# Patient Record
Sex: FEMALE | Race: WHITE | NOT HISPANIC OR LATINO | Employment: OTHER | URBAN - METROPOLITAN AREA
[De-identification: names, ages, dates, MRNs, and addresses within clinical notes are randomized per-mention and may not be internally consistent; named-entity substitution may affect disease eponyms.]

---

## 2023-01-10 ENCOUNTER — APPOINTMENT (EMERGENCY)
Dept: RADIOLOGY | Facility: HOSPITAL | Age: 33
DRG: 832 | End: 2023-01-10
Payer: COMMERCIAL

## 2023-01-10 ENCOUNTER — TRANSCRIBE ORDERS (OUTPATIENT)
Dept: SCHEDULING | Age: 33
End: 2023-01-10

## 2023-01-10 DIAGNOSIS — E05.90 THYROTOXICOSIS, UNSPECIFIED WITHOUT THYROTOXIC CRISIS OR STORM: Primary | ICD-10-CM

## 2023-01-10 LAB
ANION GAP SERPL CALC-SCNC: 1 MEQ/L (ref 3–15)
BASOPHILS # BLD: 0.04 K/UL (ref 0.01–0.1)
BASOPHILS NFR BLD: 1 %
BLASTS # BLD MANUAL: 0 %
BUN SERPL-MCNC: 14 MG/DL (ref 8–20)
CALCIUM SERPL-MCNC: 8.7 MG/DL (ref 8.9–10.3)
CHLORIDE SERPL-SCNC: 105 MEQ/L (ref 98–109)
CO2 SERPL-SCNC: 25 MEQ/L (ref 22–32)
CREAT SERPL-MCNC: 0.7 MG/DL (ref 0.6–1.1)
DIFFERENTIAL METHOD BLD: ABNORMAL
EOSINOPHIL # BLD: 0.04 K/UL (ref 0.04–0.36)
EOSINOPHIL NFR BLD: 1 %
ERYTHROCYTE [DISTWIDTH] IN BLOOD BY AUTOMATED COUNT: 13.3 % (ref 11.7–14.4)
GFR SERPL CREATININE-BSD FRML MDRD: >60 ML/MIN/1.73M*2
GLUCOSE SERPL-MCNC: 105 MG/DL (ref 70–99)
HCT VFR BLDCO AUTO: 24.9 % (ref 35–45)
HGB BLD-MCNC: 7.9 G/DL (ref 11.8–15.7)
HYPOCHROMIA BLD QL SMEAR: ABNORMAL
LYMPHOCYTES # BLD: 0.86 K/UL (ref 1.2–3.5)
LYMPHOCYTES NFR BLD: 23 %
MCH RBC QN AUTO: 26.7 PG (ref 28–33.2)
MCHC RBC AUTO-ENTMCNC: 31.7 G/DL (ref 32.2–35.5)
MCV RBC AUTO: 84.1 FL (ref 83–98)
METAMYELOCYTES NFR BLD MANUAL: 0 %
MONOCYTES # BLD: 0.41 K/UL (ref 0.28–0.8)
MONOCYTES NFR BLD: 11 %
MYELOCYTES NFR BLD MANUAL: 0 %
NEUTS BAND # BLD: 2.41 K/UL (ref 1.7–7)
NEUTS BAND NFR BLD: 0 %
NEUTS SEG NFR BLD: 64 %
NRBC BLD MANUAL-RTO: 0 /100 WBC
OTHER CELLS # BLD MANUAL: 0 %
PDW BLD AUTO: 9.9 FL (ref 9.4–12.3)
PLATELET # BLD AUTO: 202 K/UL (ref 150–369)
PLATELET # BLD EST: ABNORMAL 10*3/UL
POTASSIUM SERPL-SCNC: 3.7 MEQ/L (ref 3.6–5.1)
PROMYELOCYTES NFR BLD MANUAL: 0 %
RBC # BLD AUTO: 2.96 M/UL (ref 3.93–5.22)
SODIUM SERPL-SCNC: 131 MEQ/L (ref 136–144)
TROPONIN I SERPL HS-MCNC: 4.6 PG/ML
VARIANT LYMPHS NFR BLD: 0 %
WBC # BLD AUTO: 3.76 K/UL (ref 3.8–10.5)

## 2023-01-10 PROCEDURE — 84484 ASSAY OF TROPONIN QUANT: CPT | Mod: 91

## 2023-01-10 PROCEDURE — 84439 ASSAY OF FREE THYROXINE: CPT | Performed by: PHYSICIAN ASSISTANT

## 2023-01-10 PROCEDURE — 85025 COMPLETE CBC W/AUTO DIFF WBC: CPT

## 2023-01-10 PROCEDURE — 84484 ASSAY OF TROPONIN QUANT: CPT | Mod: 91 | Performed by: EMERGENCY MEDICINE

## 2023-01-10 PROCEDURE — 84443 ASSAY THYROID STIM HORMONE: CPT | Performed by: PHYSICIAN ASSISTANT

## 2023-01-10 PROCEDURE — 84484 ASSAY OF TROPONIN QUANT: CPT

## 2023-01-10 PROCEDURE — 84702 CHORIONIC GONADOTROPIN TEST: CPT

## 2023-01-10 PROCEDURE — 36415 COLL VENOUS BLD VENIPUNCTURE: CPT

## 2023-01-10 PROCEDURE — 80048 BASIC METABOLIC PNL TOTAL CA: CPT | Performed by: EMERGENCY MEDICINE

## 2023-01-10 PROCEDURE — 84702 CHORIONIC GONADOTROPIN TEST: CPT | Performed by: EMERGENCY MEDICINE

## 2023-01-10 PROCEDURE — 93005 ELECTROCARDIOGRAM TRACING: CPT | Performed by: EMERGENCY MEDICINE

## 2023-01-10 PROCEDURE — 80048 BASIC METABOLIC PNL TOTAL CA: CPT

## 2023-01-10 PROCEDURE — 93970 EXTREMITY STUDY: CPT

## 2023-01-10 PROCEDURE — 85025 COMPLETE CBC W/AUTO DIFF WBC: CPT | Performed by: EMERGENCY MEDICINE

## 2023-01-10 PROCEDURE — 93005 ELECTROCARDIOGRAM TRACING: CPT

## 2023-01-10 PROCEDURE — 99285 EMERGENCY DEPT VISIT HI MDM: CPT | Mod: 25

## 2023-01-11 ENCOUNTER — APPOINTMENT (EMERGENCY)
Dept: RADIOLOGY | Facility: HOSPITAL | Age: 33
DRG: 832 | End: 2023-01-11
Payer: COMMERCIAL

## 2023-01-11 ENCOUNTER — HOSPITAL ENCOUNTER (INPATIENT)
Facility: HOSPITAL | Age: 33
LOS: 2 days | Discharge: ANOTHER HOSPITAL | DRG: 832 | End: 2023-01-13
Attending: EMERGENCY MEDICINE | Admitting: STUDENT IN AN ORGANIZED HEALTH CARE EDUCATION/TRAINING PROGRAM
Payer: COMMERCIAL

## 2023-01-11 DIAGNOSIS — O26.91 ABNORMAL PREGNANCY IN FIRST TRIMESTER: ICD-10-CM

## 2023-01-11 DIAGNOSIS — D64.9 ANEMIA, UNSPECIFIED TYPE: Primary | ICD-10-CM

## 2023-01-11 DIAGNOSIS — R00.0 TACHYCARDIA: ICD-10-CM

## 2023-01-11 DIAGNOSIS — E05.90 HYPERTHYROIDISM: ICD-10-CM

## 2023-01-11 PROBLEM — Z3A.01 LESS THAN 8 WEEKS GESTATION OF PREGNANCY: Status: ACTIVE | Noted: 2023-01-11

## 2023-01-11 PROBLEM — D50.9 MICROCYTIC ANEMIA: Status: ACTIVE | Noted: 2023-01-11

## 2023-01-11 PROBLEM — O99.280 HYPERTHYROIDISM DURING PREGNANCY: Status: ACTIVE | Noted: 2023-01-11

## 2023-01-11 PROBLEM — Z86.16 HISTORY OF COVID-19: Status: ACTIVE | Noted: 2023-01-11

## 2023-01-11 LAB
ABO + RH BLD: NORMAL
ALBUMIN SERPL-MCNC: 2.7 G/DL (ref 3.4–5)
ALP SERPL-CCNC: 88 IU/L (ref 35–126)
ALT SERPL-CCNC: 26 IU/L (ref 11–54)
ANION GAP SERPL CALC-SCNC: 7 MEQ/L (ref 3–15)
ANISOCYTOSIS BLD QL SMEAR: ABNORMAL
AST SERPL-CCNC: 32 IU/L (ref 15–41)
ATRIAL RATE: 129
BASOPHILS # BLD: 0.01 K/UL (ref 0.01–0.1)
BASOPHILS NFR BLD: 0.3 %
BILIRUB SERPL-MCNC: 0.2 MG/DL (ref 0.3–1.2)
BLD GP AB SCN SERPL QL: NEGATIVE
BUN SERPL-MCNC: 11 MG/DL (ref 8–20)
CALCIUM SERPL-MCNC: 8.3 MG/DL (ref 8.9–10.3)
CHLORIDE SERPL-SCNC: 109 MEQ/L (ref 98–109)
CO2 SERPL-SCNC: 19 MEQ/L (ref 22–32)
CREAT SERPL-MCNC: 0.6 MG/DL (ref 0.6–1.1)
D AG BLD QL: POSITIVE
D DIMER PPP IA.FEU-MCNC: 3.37 UG/ML FEU (ref 0–0.5)
DIFFERENTIAL METHOD BLD: ABNORMAL
EOSINOPHIL # BLD: 0.01 K/UL (ref 0.04–0.36)
EOSINOPHIL NFR BLD: 0.3 %
ERYTHROCYTE [DISTWIDTH] IN BLOOD BY AUTOMATED COUNT: 13.5 % (ref 11.7–14.4)
FERRITIN SERPL-MCNC: 55 NG/ML (ref 11–250)
GFR SERPL CREATININE-BSD FRML MDRD: >60 ML/MIN/1.73M*2
GLUCOSE SERPL-MCNC: 134 MG/DL (ref 70–99)
HCT VFR BLDCO AUTO: 20.9 % (ref 35–45)
HGB BLD-MCNC: 6.9 G/DL (ref 11.8–15.7)
IMM GRANULOCYTES # BLD AUTO: 0.01 K/UL (ref 0–0.08)
IMM GRANULOCYTES NFR BLD AUTO: 0.3 %
IRON SATN MFR SERPL: 12 % (ref 15–45)
IRON SERPL-MCNC: 31 UG/DL (ref 35–150)
LABORATORY COMMENT REPORT: NORMAL
LABORATORY COMMENT REPORT: NORMAL
LYMPHOCYTES # BLD: 0.71 K/UL (ref 1.2–3.5)
LYMPHOCYTES NFR BLD: 24.2 %
MCH RBC QN AUTO: 27.1 PG (ref 28–33.2)
MCHC RBC AUTO-ENTMCNC: 33 G/DL (ref 32.2–35.5)
MCV RBC AUTO: 82 FL (ref 83–98)
MONOCYTES # BLD: 0.44 K/UL (ref 0.28–0.8)
MONOCYTES NFR BLD: 15 %
NEUTROPHILS # BLD: 1.75 K/UL (ref 1.7–7)
NEUTS SEG NFR BLD: 59.9 %
NRBC BLD-RTO: 0 %
P AXIS: 72
PDW BLD AUTO: 9.7 FL (ref 9.4–12.3)
PLAT MORPH BLD: NORMAL
PLATELET # BLD AUTO: 168 K/UL (ref 150–369)
PLATELET # BLD EST: ABNORMAL 10*3/UL
POTASSIUM SERPL-SCNC: 3.8 MEQ/L (ref 3.6–5.1)
PR INTERVAL: 136
PROT SERPL-MCNC: 5.7 G/DL (ref 6–8.2)
QRS DURATION: 74
QT INTERVAL: 290
QTC CALCULATION(BAZETT): 424
R AXIS: 34
RBC # BLD AUTO: 2.55 M/UL (ref 3.93–5.22)
RETICS #: 0.08 M/UL (ref 0–0.12)
RETICS/RBC NFR: 3.21 % (ref 0.6–2.8)
SODIUM SERPL-SCNC: 135 MEQ/L (ref 136–144)
SPECIMEN EXP DATE BLD: NORMAL
T WAVE AXIS: 44
T3FREE SERPL-MCNC: 10.42 PG/ML (ref 2.1–3.7)
T4 FREE SERPL-MCNC: 5.07 NG/DL (ref 0.58–1.64)
TIBC SERPL-MCNC: 262 UG/DL (ref 270–460)
TROPONIN I SERPL HS-MCNC: 4.3 PG/ML
TSH SERPL DL<=0.05 MIU/L-ACNC: <0.01 MIU/L (ref 0.34–5.6)
UIBC SERPL-MCNC: 231 UG/DL (ref 180–360)
VENTRICULAR RATE: 129
WBC # BLD AUTO: 2.93 K/UL (ref 3.8–10.5)

## 2023-01-11 PROCEDURE — 82728 ASSAY OF FERRITIN: CPT | Performed by: STUDENT IN AN ORGANIZED HEALTH CARE EDUCATION/TRAINING PROGRAM

## 2023-01-11 PROCEDURE — 63700000 HC SELF-ADMINISTRABLE DRUG: Performed by: STUDENT IN AN ORGANIZED HEALTH CARE EDUCATION/TRAINING PROGRAM

## 2023-01-11 PROCEDURE — 83010 ASSAY OF HAPTOGLOBIN QUANT: CPT | Performed by: STUDENT IN AN ORGANIZED HEALTH CARE EDUCATION/TRAINING PROGRAM

## 2023-01-11 PROCEDURE — 63700000 HC SELF-ADMINISTRABLE DRUG: Performed by: INTERNAL MEDICINE

## 2023-01-11 PROCEDURE — P9016 RBC LEUKOCYTES REDUCED: HCPCS

## 2023-01-11 PROCEDURE — 86920 COMPATIBILITY TEST SPIN: CPT

## 2023-01-11 PROCEDURE — 85379 FIBRIN DEGRADATION QUANT: CPT | Performed by: PHYSICIAN ASSISTANT

## 2023-01-11 PROCEDURE — 25800000 HC PHARMACY IV SOLUTIONS: Performed by: PHYSICIAN ASSISTANT

## 2023-01-11 PROCEDURE — 25800000 HC PHARMACY IV SOLUTIONS: Performed by: STUDENT IN AN ORGANIZED HEALTH CARE EDUCATION/TRAINING PROGRAM

## 2023-01-11 PROCEDURE — 85045 AUTOMATED RETICULOCYTE COUNT: CPT | Performed by: STUDENT IN AN ORGANIZED HEALTH CARE EDUCATION/TRAINING PROGRAM

## 2023-01-11 PROCEDURE — 93975 VASCULAR STUDY: CPT

## 2023-01-11 PROCEDURE — 86850 RBC ANTIBODY SCREEN: CPT

## 2023-01-11 PROCEDURE — 76817 TRANSVAGINAL US OBSTETRIC: CPT

## 2023-01-11 PROCEDURE — 21400000 HC ROOM AND CARE CCU/INTERMEDIATE

## 2023-01-11 PROCEDURE — 36415 COLL VENOUS BLD VENIPUNCTURE: CPT | Performed by: PHYSICIAN ASSISTANT

## 2023-01-11 PROCEDURE — 63600000 HC DRUGS/DETAIL CODE: Performed by: STUDENT IN AN ORGANIZED HEALTH CARE EDUCATION/TRAINING PROGRAM

## 2023-01-11 PROCEDURE — 76815 OB US LIMITED FETUS(S): CPT

## 2023-01-11 PROCEDURE — 1123F ACP DISCUSS/DSCN MKR DOCD: CPT | Performed by: STUDENT IN AN ORGANIZED HEALTH CARE EDUCATION/TRAINING PROGRAM

## 2023-01-11 PROCEDURE — 84481 FREE ASSAY (FT-3): CPT | Performed by: STUDENT IN AN ORGANIZED HEALTH CARE EDUCATION/TRAINING PROGRAM

## 2023-01-11 PROCEDURE — 83540 ASSAY OF IRON: CPT | Performed by: STUDENT IN AN ORGANIZED HEALTH CARE EDUCATION/TRAINING PROGRAM

## 2023-01-11 PROCEDURE — 99223 1ST HOSP IP/OBS HIGH 75: CPT | Performed by: STUDENT IN AN ORGANIZED HEALTH CARE EDUCATION/TRAINING PROGRAM

## 2023-01-11 PROCEDURE — 80053 COMPREHEN METABOLIC PANEL: CPT | Performed by: STUDENT IN AN ORGANIZED HEALTH CARE EDUCATION/TRAINING PROGRAM

## 2023-01-11 PROCEDURE — 85025 COMPLETE CBC W/AUTO DIFF WBC: CPT | Performed by: STUDENT IN AN ORGANIZED HEALTH CARE EDUCATION/TRAINING PROGRAM

## 2023-01-11 PROCEDURE — 36430 TRANSFUSION BLD/BLD COMPNT: CPT

## 2023-01-11 RX ORDER — NITROGLYCERIN 0.4 MG/1
0.4 TABLET SUBLINGUAL EVERY 5 MIN PRN
Status: DISCONTINUED | OUTPATIENT
Start: 2023-01-11 | End: 2023-01-13 | Stop reason: HOSPADM

## 2023-01-11 RX ORDER — ACETAMINOPHEN 325 MG/1
650 TABLET ORAL EVERY 4 HOURS PRN
Status: DISCONTINUED | OUTPATIENT
Start: 2023-01-11 | End: 2023-01-13 | Stop reason: HOSPADM

## 2023-01-11 RX ORDER — SODIUM CHLORIDE 9 MG/ML
5 INJECTION, SOLUTION INTRAVENOUS AS NEEDED
Status: ACTIVE | OUTPATIENT
Start: 2023-01-11 | End: 2023-01-12

## 2023-01-11 RX ORDER — PROPYLTHIOURACIL 50 MG/1
50 TABLET ORAL
Status: DISCONTINUED | OUTPATIENT
Start: 2023-01-11 | End: 2023-01-13 | Stop reason: HOSPADM

## 2023-01-11 RX ORDER — PROTRIPTYLINE HYDROCHLORIDE 5 MG/1
10 TABLET, FILM COATED ORAL DAILY
COMMUNITY
Start: 2022-02-21 | End: 2023-01-21 | Stop reason: HOSPADM

## 2023-01-11 RX ORDER — DEXTROSE 50 % IN WATER (D50W) INTRAVENOUS SYRINGE
25 AS NEEDED
Status: DISCONTINUED | OUTPATIENT
Start: 2023-01-11 | End: 2023-01-13 | Stop reason: HOSPADM

## 2023-01-11 RX ORDER — METOPROLOL TARTRATE 1 MG/ML
5 INJECTION, SOLUTION INTRAVENOUS ONCE
Status: DISCONTINUED | OUTPATIENT
Start: 2023-01-11 | End: 2023-01-11

## 2023-01-11 RX ORDER — ONDANSETRON 4 MG/1
TABLET, FILM COATED ORAL
COMMUNITY
Start: 2022-12-21 | End: 2023-01-21 | Stop reason: HOSPADM

## 2023-01-11 RX ORDER — ATROPINE SULFATE 0.1 MG/ML
0.5 INJECTION INTRAVENOUS EVERY 5 MIN PRN
Status: DISCONTINUED | OUTPATIENT
Start: 2023-01-11 | End: 2023-01-13 | Stop reason: HOSPADM

## 2023-01-11 RX ORDER — BUPROPION HYDROCHLORIDE 150 MG/1
150 TABLET ORAL NIGHTLY
COMMUNITY
Start: 2022-12-15

## 2023-01-11 RX ORDER — DEXTROSE 40 %
15-30 GEL (GRAM) ORAL AS NEEDED
Status: DISCONTINUED | OUTPATIENT
Start: 2023-01-11 | End: 2023-01-13 | Stop reason: HOSPADM

## 2023-01-11 RX ORDER — ACETAMINOPHEN 650 MG/20.3ML
650 LIQUID ORAL EVERY 4 HOURS PRN
Status: DISCONTINUED | OUTPATIENT
Start: 2023-01-11 | End: 2023-01-13 | Stop reason: HOSPADM

## 2023-01-11 RX ORDER — ONDANSETRON HYDROCHLORIDE 2 MG/ML
4 INJECTION, SOLUTION INTRAVENOUS EVERY 8 HOURS PRN
Status: DISCONTINUED | OUTPATIENT
Start: 2023-01-11 | End: 2023-01-13 | Stop reason: HOSPADM

## 2023-01-11 RX ORDER — SODIUM CHLORIDE 9 MG/ML
INJECTION, SOLUTION INTRAVENOUS CONTINUOUS
Status: ACTIVE | OUTPATIENT
Start: 2023-01-11 | End: 2023-01-12

## 2023-01-11 RX ORDER — ACETAMINOPHEN 650 MG/1
650 SUPPOSITORY RECTAL EVERY 4 HOURS PRN
Status: DISCONTINUED | OUTPATIENT
Start: 2023-01-11 | End: 2023-01-13 | Stop reason: HOSPADM

## 2023-01-11 RX ORDER — ONDANSETRON 4 MG/1
4 TABLET, ORALLY DISINTEGRATING ORAL EVERY 8 HOURS PRN
Status: DISCONTINUED | OUTPATIENT
Start: 2023-01-11 | End: 2023-01-13 | Stop reason: HOSPADM

## 2023-01-11 RX ORDER — IBUPROFEN 200 MG
16-32 TABLET ORAL AS NEEDED
Status: DISCONTINUED | OUTPATIENT
Start: 2023-01-11 | End: 2023-01-13 | Stop reason: HOSPADM

## 2023-01-11 RX ORDER — ENOXAPARIN SODIUM 100 MG/ML
40 INJECTION SUBCUTANEOUS
Status: DISCONTINUED | OUTPATIENT
Start: 2023-01-11 | End: 2023-01-13 | Stop reason: HOSPADM

## 2023-01-11 RX ADMIN — ENOXAPARIN SODIUM 40 MG: 40 INJECTION SUBCUTANEOUS at 18:56

## 2023-01-11 RX ADMIN — METOPROLOL TARTRATE 12.5 MG: 25 TABLET, FILM COATED ORAL at 11:59

## 2023-01-11 RX ADMIN — SODIUM CHLORIDE 500 ML: 9 INJECTION, SOLUTION INTRAVENOUS at 07:56

## 2023-01-11 RX ADMIN — ONDANSETRON HYDROCHLORIDE 4 MG: 2 INJECTION, SOLUTION INTRAMUSCULAR; INTRAVENOUS at 21:01

## 2023-01-11 RX ADMIN — METOPROLOL TARTRATE 12.5 MG: 25 TABLET, FILM COATED ORAL at 18:56

## 2023-01-11 RX ADMIN — SODIUM CHLORIDE: 9 INJECTION, SOLUTION INTRAVENOUS at 10:50

## 2023-01-11 RX ADMIN — PROPYLTHIOURACIL 50 MG: 50 TABLET ORAL at 19:57

## 2023-01-11 RX ADMIN — SODIUM CHLORIDE: 9 INJECTION, SOLUTION INTRAVENOUS at 21:03

## 2023-01-11 ASSESSMENT — ENCOUNTER SYMPTOMS
FEVER: 0
BACK PAIN: 0
HEADACHES: 0
PALPITATIONS: 0
COLOR CHANGE: 0
WOUND: 1
FLANK PAIN: 0
ABDOMINAL PAIN: 1
LIGHT-HEADEDNESS: 0
DYSURIA: 0
VOMITING: 0
WEAKNESS: 0
COUGH: 0
SHORTNESS OF BREATH: 1
NUMBNESS: 0
DIFFICULTY URINATING: 0
DIARRHEA: 0

## 2023-01-11 NOTE — ASSESSMENT & PLAN NOTE
Pregnant and hyperthyroid. tsh suppressed with elevated ft3 and ft4. Beta blocker per OB - was ordered metoprolol 12.5mg bid. Can start PTU during first trimester, then will need follow up to change to methimazole after first trimester. Start 50mg bid of propylthiouracil and repeat labs in 3 weeks on d/c with endocrine or pcp on d/c as she lives in [place] and says she will find someone to f/u with there. Anemia can be in part related to her hyperthyroidism. futher w/u per OB

## 2023-01-11 NOTE — ED PROVIDER NOTES
Emergency Medicine Note  HPI   HISTORY OF PRESENT ILLNESS       The patient is a 32-year-old pregnant female,  who presents emergency room with right lower extremity pain and bruising.  She reports that she is about 6 weeks pregnant.  She states that she had COVID about 3 weeks ago and ever since she has had a high heart rate and some shortness of breath.  She has seen her doctor who did blood work which showed that she is anemic and her TSH is low.  She is scheduled to see a cardiologist this morning around 9 AM due to the elevated heart rate.   when she called her doctor about the right leg pain she was instructed to come to the ER to rule out a clot.  She noticed bruising behind the knee earlier today and denies any injury.  She has not had any difficulty ambulating.  She has not had any chest pain, pleuritic pain.  She does admit to some lower abdominal pain right side worse than left.  She has not had any vaginal bleeding, change in urination, back pain, nausea or vomiting.  She has not been started on any thyroid medications or iron tablets.  She is taking prenatal vitamins.  Denies a family or personal history of blood clots.      HPI      Patient History   PAST HISTORY     Reviewed from Nursing Triage:       No past medical history on file.    No past surgical history on file.    No family history on file.           Review of Systems   REVIEW OF SYSTEMS     Review of Systems   Constitutional: Negative for fever.   Respiratory: Positive for shortness of breath. Negative for cough.    Cardiovascular: Negative for chest pain, palpitations and leg swelling.   Gastrointestinal: Positive for abdominal pain. Negative for diarrhea and vomiting.   Genitourinary: Negative for difficulty urinating, dysuria, flank pain, urgency and vaginal discharge.   Musculoskeletal: Negative for back pain.        RLE pain   Skin: Positive for wound (bruising to the posterior knee). Negative for color change and rash.    Neurological: Negative for syncope, weakness, light-headedness, numbness and headaches.         VITALS     ED Vitals    Date/Time Temp Pulse Resp BP SpO2 [facility]   01/11/23 0742 36.7 °C (98.1 °F) 116 18 93/47 98 % SDN   01/11/23 0555 -- 118 20 117/58 99 % SL   01/11/23 0356 -- 126 20 122/56 100 % SL   01/11/23 0158 -- 126 20 119/54 100 % SL   01/10/23 2314 36.7 °C (98 °F) 128 18 123/60 100 % RPF   01/10/23 2036 36.8 °C (98.2 °F) 131 18 115/59 100 % BM        Pulse Ox %: 100 % (01/11/23 0348)  Pulse Ox Interpretation: Normal (01/11/23 0348)  Heart Rate: 126 (01/11/23 0348)  Rhythm Strip Interpretation: Sinus Tachycardia (01/11/23 0348)     Physical Exam   PHYSICAL EXAM     Physical Exam  Vitals and nursing note reviewed.   Constitutional:       General: She is not in acute distress.     Appearance: Normal appearance. She is well-developed. She is not ill-appearing or toxic-appearing.   HENT:      Head: Atraumatic.   Eyes:      Conjunctiva/sclera: Conjunctivae normal.   Cardiovascular:      Rate and Rhythm: Regular rhythm. Tachycardia present.   Pulmonary:      Effort: Pulmonary effort is normal. No respiratory distress.      Breath sounds: Normal breath sounds. No wheezing or rhonchi.   Abdominal:      Palpations: Abdomen is soft.      Tenderness: There is abdominal tenderness in the right lower quadrant and suprapubic area. There is no right CVA tenderness, left CVA tenderness, guarding or rebound. Negative signs include Esquivel's sign.   Musculoskeletal:         General: No deformity. Normal range of motion.      Cervical back: Normal range of motion and neck supple. No rigidity.      Right knee: Ecchymosis (posterior knee, mild tenderness) present. No swelling or bony tenderness. Normal range of motion. Tenderness (posterior knee) present.      Right lower leg: Normal. No swelling, tenderness or bony tenderness. No edema.      Left lower leg: Normal. No tenderness or bony tenderness. No edema.      Comments: No calf  tenderness, no cords palpated   Skin:     General: Skin is warm and dry.      Capillary Refill: Capillary refill takes less than 2 seconds.   Neurological:      Mental Status: She is alert and oriented to person, place, and time. Mental status is at baseline.   Psychiatric:         Behavior: Behavior normal. Behavior is cooperative.           PROCEDURES     Critical Care  Performed by: Pollo Hughes MD  Authorized by: Pollo Hughes MD     Critical care provider statement:     Critical care time (minutes):  90    Critical care was necessary to treat or prevent imminent or life-threatening deterioration of the following conditions: Anemia, tachycardia, hyperthyroidism.    Critical care was time spent personally by me on the following activities:  Development of treatment plan with patient or surrogate, discussions with consultants, evaluation of patient's response to treatment, examination of patient, ordering and review of laboratory studies, ordering and performing treatments and interventions, ordering and review of radiographic studies, pulse oximetry and re-evaluation of patient's condition         DATA     Results     Procedure Component Value Units Date/Time    D-dimer, quantitative [[id number]]  (Abnormal) Collected: 01/11/23 0356    Specimen: Blood, Venous Updated: 01/11/23 0423     D-Dimer, Quant 3.37 ug/mL FEU      Comment: The D-Dimer assay can be used as an aid in the diagnosis of DVT or PE. The test can not be used by itself to exclude DVT or PE. When used as a diagnostic aid, the cutoff value is the same as the reference range: <0.5 ug/ml FEU.       TSH w reflex FT4 [[id number]]  (Abnormal) Collected: 01/10/23 2050    Specimen: Blood, Venous Updated: 01/11/23 0238     TSH <0.01 mIU/L     BhCG, Serum, Quant [[id number]]  (Abnormal) Collected: 01/10/23 2050    Specimen: Blood, Venous Updated: 01/10/23 2152     hCG Quant 444.6 IU/L (mIU/mL)      Comment: Approx. Gestational Age   Approx. hCG  Range         (Weeks)                  (IU/L)          0.2-1                    5-50            1-2                               2-3                  100-5000            3-4                  500-10,000            4-5                 1000-50,000            5-6               10,000-100,000            6-8               15,000-200,000            8-12              10,000-100,000       Basic metabolic panel [[id number]]  (Abnormal) Collected: 01/10/23 2050    Specimen: Blood, Venous Updated: 01/10/23 2152     Sodium 131 mEQ/L      Potassium 3.7 mEQ/L      Comment: Results obtained on plasma. Plasma Potassium values may be up to 0.4 mEQ/L less than serum values. The differences may be greater for patients with high platelet or white cell counts.        Chloride 105 mEQ/L      CO2 25 mEQ/L      BUN 14 mg/dL      Creatinine 0.7 mg/dL      Glucose 105 mg/dL      Calcium 8.7 mg/dL      eGFR >60.0 mL/min/1.73m*2      Anion Gap 1 mEQ/L     HS Troponin I (with 2 hour reflex) [[id number]]  (Normal) Collected: 01/10/23 2050    Specimen: Blood, Venous Updated: 01/10/23 2150     High Sens Troponin I 4.6 pg/mL     CBC and differential [[id number]]  (Abnormal) Collected: 01/10/23 2050    Specimen: Blood, Venous Updated: 01/10/23 2141     WBC 3.76 K/uL      RBC 2.96 M/uL      Hemoglobin 7.9 g/dL      Hematocrit 24.9 %      MCV 84.1 fL      MCH 26.7 pg      MCHC 31.7 g/dL      RDW 13.3 %      Platelets 202 K/uL      MPV 9.9 fL      Differential Type Manu     Neutrophils 64 %      Lymphocytes 23 %      Monocytes 11 %      Eosinophils 1 %      Basophils 1 %      Bands 0 %      Metamyelocytes 0 %      Myelocytes 0 %      Promyelocytes 0 %      Blasts 0 %      Atypical Lymphocytes 0 %      Other Cells 0 %      nRBC/100 Cells 0 /100 WBC      Neutrophils, Absolute 2.41 K/uL      Lymphocytes, Absolute 0.86 K/uL      Monocytes, Absolute 0.41 K/uL      Eosinophils, Absolute 0.04 K/uL      Basophils, Absolute 0.04 K/uL      Platelet Estimate  Adequate (150,000-400,000)     Hypochromia 1+          Imaging Results          ULTRASOUND PREGNANCY < 14 WEEKS TRANSABDOMINAL LIMITED (Final result)  Result time 01/11/23 08:17:06    Final result                 Impression:    IMPRESSION:  1.  No intrauterine gestation and no adnexal mass evident. These findings  represent a pregnancy of unknown location. The appearance of a complex mass with  small cystic spaces and peripheral vascularity occupying the central uterine  cavity could represent a molar pregnancy but additional evaluation is suggested.  2.  Both ovaries are mildly enlarged with multiple complex cystic lesions as  well as some with solid-appearing echoes that may represent a combination of  hemorrhagic cysts/follicles or endometriomas, but challenging to evaluate as the  ovaries are only well evaluated transabdominally. Recommended these be  reassessed on follow-up. No sonographic findings of ovarian torsion.                   Narrative:    CLINICAL HISTORY:   right sided pelvic pain, 6 weeks pregnant   LMP 12/6/2022    PROCEDURE: Ultrasound examination of the pelvis was performed transabdominally.  Because of the concern for ovarian torsion, a vascular study of the ovaries was  done using color flow and spectral Doppler imaging.    COMPARISON: None.    COMMENT:    UTERUS: Anteverted and anteflexed measuring 13.4 x 13.2 x 15.9 cm. There is a  large solid mixed echogenicity mass projecting over the central uterine cavity  measuring 8 x 9.8 x 11.7 cm. It is hyperechoic eccentrically and hypoechoic  centrally, with multiple small cystic spaces and surrounding vascularity.    RIGHT OVARY/ADNEXA: Mildly enlarged, containing several complex cystic  structures with low level internal echoes or eccentric solid appearing echoes.  For example a 3.1 x 3.3 x 2.1 cm cystic structure with eccentric solid appearing  echoes and no internal color flow, likely representing a hemorrhagic cyst with  retractile clot..  Normal arterial and venous flow on color and spectral Doppler.    Ovarian size: 4.3 x  6.8 x  7.0 cm    LEFT OVARY/ADNEXA: Contains multiple complex cystic structures, some with  nonappearing internal echoes and no internal color flow. Normal arterial and  venous flow on color and spectral Doppler.  Ovarian size: 3.2 x 6.4 x 6.6 cm    FLUID: There is no abnormal free fluid in the cul-de-sac.                    ED Interpretation    Patient Name: Alondra Villela   Exam(s): pelvic US  MR#: [id number]    History: ABD discomfort x1wk, HCG=445    Preliminary Impression:    Thickened, abnormal appearing endometrium with poorly defined borders and with several small cystic spaces, and with apparent vascular flow. No intrauterine uterine pregnancy seen. No adnexal masses seen to suggest ectopic pregnancy. While the etiology for the abnormal appearance of the endometrium is not definitive, the possibility of molar pregnancy or other malignant process is not entirely excluded and additional workup is suggested.    Bilateral ovarian cysts, likely follicles, some of which are somewhat complex, likely reflecting hemorrhagic cysts, but no mural nodularity or vascularity is noted.      Interpreted by: Efrain Awad MD, Jan 11, 2023 05:51 AM    Patient Name: Alondra Villela  Exam(s): pelvic US  MR#: [id number]    History: ABD discomfort x1wk, HCG=445    Preliminary Impression:    Thickened, abnormal appearing endometrium with poorly   defined borders and with several small cystic spaces, and with apparent vascular flow. No intrauterine uterine pregnancy seen. No adnexal masses seen to suggest ectopic pregnancy. While the etiology for the abnormal appearance of the endometrium is not definitive, the possibility of molar pregnancy or other malignant process is not entirely excluded and additional workup is suggested.    Bilateral ovarian cysts, likely follicles, some of which are somewhat complex, likely reflecting hemorrhagic cysts, but no  mural nodularity or vascularity is noted.      Interpreted by: Efrain Awad MD, Jan 11, 2023 05:51 AM    Alondra Villela [id number], Jan 11, 2023                               ULTRASOUND PREGNANCY TRANSVAGINAL ONLY (Final result)  Result time 01/11/23 08:17:06    Final result                 Impression:    IMPRESSION:  1.  No intrauterine gestation and no adnexal mass evident. These findings  represent a pregnancy of unknown location. The appearance of a complex mass with  small cystic spaces and peripheral vascularity occupying the central uterine  cavity could represent a molar pregnancy but additional evaluation is suggested.  2.  Both ovaries are mildly enlarged with multiple complex cystic lesions as  well as some with solid-appearing echoes that may represent a combination of  hemorrhagic cysts/follicles or endometriomas, but challenging to evaluate as the  ovaries are only well evaluated transabdominally. Recommended these be  reassessed on follow-up. No sonographic findings of ovarian torsion.                   Narrative:    CLINICAL HISTORY:   right sided pelvic pain, 6 weeks pregnant   LMP 12/6/2022    PROCEDURE: Ultrasound examination of the pelvis was performed transabdominally.  Because of the concern for ovarian torsion, a vascular study of the ovaries was  done using color flow and spectral Doppler imaging.    COMPARISON: None.    COMMENT:    UTERUS: Anteverted and anteflexed measuring 13.4 x 13.2 x 15.9 cm. There is a  large solid mixed echogenicity mass projecting over the central uterine cavity  measuring 8 x 9.8 x 11.7 cm. It is hyperechoic eccentrically and hypoechoic  centrally, with multiple small cystic spaces and surrounding vascularity.    RIGHT OVARY/ADNEXA: Mildly enlarged, containing several complex cystic  structures with low level internal echoes or eccentric solid appearing echoes.  For example a 3.1 x 3.3 x 2.1 cm cystic structure with eccentric solid appearing  echoes and no internal  color flow, likely representing a hemorrhagic cyst with  retractile clot.. Normal arterial and venous flow on color and spectral Doppler.    Ovarian size: 4.3 x  6.8 x  7.0 cm    LEFT OVARY/ADNEXA: Contains multiple complex cystic structures, some with  nonappearing internal echoes and no internal color flow. Normal arterial and  venous flow on color and spectral Doppler.  Ovarian size: 3.2 x 6.4 x 6.6 cm    FLUID: There is no abnormal free fluid in the cul-de-sac.                               ULTRASOUND DOPPLER (Final result)  Result time 01/11/23 08:17:06    Final result                 Impression:    IMPRESSION:  1.  No intrauterine gestation and no adnexal mass evident. These findings  represent a pregnancy of unknown location. The appearance of a complex mass with  small cystic spaces and peripheral vascularity occupying the central uterine  cavity could represent a molar pregnancy but additional evaluation is suggested.  2.  Both ovaries are mildly enlarged with multiple complex cystic lesions as  well as some with solid-appearing echoes that may represent a combination of  hemorrhagic cysts/follicles or endometriomas, but challenging to evaluate as the  ovaries are only well evaluated transabdominally. Recommended these be  reassessed on follow-up. No sonographic findings of ovarian torsion.                   Narrative:    CLINICAL HISTORY:   right sided pelvic pain, 6 weeks pregnant   LMP 12/6/2022    PROCEDURE: Ultrasound examination of the pelvis was performed transabdominally.  Because of the concern for ovarian torsion, a vascular study of the ovaries was  done using color flow and spectral Doppler imaging.    COMPARISON: None.    COMMENT:    UTERUS: Anteverted and anteflexed measuring 13.4 x 13.2 x 15.9 cm. There is a  large solid mixed echogenicity mass projecting over the central uterine cavity  measuring 8 x 9.8 x 11.7 cm. It is hyperechoic eccentrically and hypoechoic  centrally, with multiple  small cystic spaces and surrounding vascularity.    RIGHT OVARY/ADNEXA: Mildly enlarged, containing several complex cystic  structures with low level internal echoes or eccentric solid appearing echoes.  For example a 3.1 x 3.3 x 2.1 cm cystic structure with eccentric solid appearing  echoes and no internal color flow, likely representing a hemorrhagic cyst with  retractile clot.. Normal arterial and venous flow on color and spectral Doppler.    Ovarian size: 4.3 x  6.8 x  7.0 cm    LEFT OVARY/ADNEXA: Contains multiple complex cystic structures, some with  nonappearing internal echoes and no internal color flow. Normal arterial and  venous flow on color and spectral Doppler.  Ovarian size: 3.2 x 6.4 x 6.6 cm    FLUID: There is no abnormal free fluid in the cul-de-sac.                               US venous leg, bilateral (Final result)  Result time 01/10/23 21:56:11    Final result                 Impression:    IMPRESSION: No femoropopliteal thrombosis.    COMMENT: A duplex Doppler study was performed of both lower extremities,  consisting of integrated two-dimensional (2D) real-time imaging color flow  Doppler and Doppler spectral analysis utilizing 5.0 MHz and 7.0 MHz linear array  probes.    RIGHT:    COMMON FEMORAL VEIN: Normal compression  DEEP FEMORAL VEIN: Patent by color Doppler.  FEMORAL VEIN: Normal compression, flow, and augmentation demonstrated on Doppler  spectral analysis.  POPLITEAL VEIN: Normal compression, flow, and augmentation demonstrated on  Doppler spectral analysis.    LEFT:    COMMON FEMORAL VEIN: Normal compression  DEEP FEMORAL VEIN: Patent by color Doppler.  FEMORAL VEIN: Normal compression, flow, and augmentation demonstrated on Doppler  spectral analysis.  POPLITEAL VEIN: Normal compression, flow, and augmentation demonstrated on  Doppler spectral analysis.    Visualized calf veins compress normally.                 Narrative:    CLINICAL INDICATION: Pregnancy pain R leg, high  HR    COMPARISON: None                                ECG 12 lead             Scoring tools                                  ED Course & MDM   MDM / ED COURSE / CLINICAL IMPRESSION / DISPO     Medical Decision Making  Amount and/or Complexity of Data Reviewed  Labs: ordered.  Radiology: ordered and independent interpretation performed. Decision-making details documented in ED Course.  ECG/medicine tests: ordered.  Discussion of management or test interpretation with external provider(s): Discussed with OB GYN and [facility]      Risk  Decision regarding hospitalization.          ED Course as of 01/11/23 0840   Wed Jan 11, 2023   0156 Called US, they have a patient on their table. They will take the patient next [LB]   0555   Patient Name: Alondra Villela  Exam(s): pelvic US  MR#: [id number]    History: ABD discomfort x1wk, HCG=445    Preliminary Impression:    Thickened, abnormal appearing endometrium with poorly defined borders and with several small cystic spaces, and with apparent vascular flow. No intrauterine uterine pregnancy seen. No adnexal masses seen to suggest ectopic pregnancy. While the etiology for the abnormal appearance of the endometrium is not definitive, the possibility of molar pregnancy or other malignant process is not entirely excluded and additional workup is suggested.    Bilateral ovarian cysts, likely follicles, some of which are somewhat complex, likely reflecting hemorrhagic cysts, but no mural nodularity or vascularity is noted.      Interpreted by: Efrain Awad MD, Jan 11, 2023 05:51 AM    Alondra Villela [id number], Jan 11, 2023   [LB]   0566 Patient Name: Cl Villelaanda   Exam(s): pelvic US  MR#: [id number]    History: ABD discomfort x1wk, HCG=445    Preliminary Impression:    Thickened, abnormal appearing endometrium with poorly defined borders and with several small cystic spaces, and with apparent vascular flow. No intrauterine uterine pregnancy seen. No adnexal masses seen to suggest ectopic  pregnancy. While the etiology for the abnormal appearance of the endometrium is not definitive, the possibility of molar pregnancy or other malignant process is not entirely excluded and additional workup is suggested.    Bilateral ovarian cysts, likely follicles, some of which are somewhat complex, likely reflecting hemorrhagic cysts, but no mural nodularity or vascularity is noted.      Interpreted by: Efrain Awad MD, Jan 11, 2023 05:51 AM   [RS]   0559 Jonah Murphy, OB GYN. we discussed the patient's history, labs, ultrasound.  Dr. Murphy does not feel that the ultrasound is consistent with an ectopic or anything acute.  We also discussed her thyroid function and he recommended that we discuss the levels with [facility] or endocrine.  He feels that if the patient is clinically stable she could likely be discharged home to follow-up with her OB/GYN in [place] as an outpatient.  He does not feel   [LB]   0559 Dr [LB]   0601 Evaluation patient continues to have no chest pain no pleuritic symptoms.  Dimer may be elevated due to pregnancy.  We are consulting obstetrics about an empty uterus and about 6 weeks pregnant.  Obstetrics was not concerned however will admit patient to medicine for hypothyroidism anemia and tachycardia. [RS]   0610 Paged [facility] [LB]   0656 Signed It out to [facility], would like a CT PE [LB]   0743 Pt was seen by Dr murphy. He does not think pt needs CT scan. Page out to [facility] to notify them  [AC]      ED Course User Index  [AC] Estela Adamson PA C  [LB] Maribel Portillo PA C  [RS] Pollo Hughes MD     Clinical Impression      Anemia, unspecified type   Hyperthyroidism   Tachycardia   Abnormal pregnancy in first trimester     _________________     ED Disposition   Admit / Observation                   Maribel Portillo PA C  01/11/23 9802

## 2023-01-11 NOTE — PLAN OF CARE
Problem: Adult Inpatient Plan of Care  Goal: Plan of Care Review  Outcome: Unable to meet, plan of care revised  Flowsheets (Taken 1/11/2023 5940)  Progress: improving  Plan of Care Reviewed With:   patient   family  Goal: Patient-Specific Goal (Individualized)  Outcome: Progressing  Goal: Absence of Hospital-Acquired Illness or Injury  Outcome: Progressing  Goal: Optimal Comfort and Wellbeing  Outcome: Progressing  Goal: Readiness for Transition of Care  Outcome: Progressing   Plan of Care Review  Plan of Care Reviewed With: patient, family  Progress: improving

## 2023-01-11 NOTE — Clinical Note
Send to the following Provider Care Team:: [facility] CONSULT/ADMIT TEAM [[id number]]

## 2023-01-11 NOTE — ASSESSMENT & PLAN NOTE
Patient reports typically becomes anemia during pregnancy  Does not recall most recent Hb  Hb dropped from 7.9>6.9  Possible dilutional with IVFs, but warranted transfusion with Hb<7  Iron panel, ferritin, acceptable  Haptoglobin pending, retic count mildly elevated  Improved with 1u PRBC  Likely related to above concern for malignancy  Serial cbc

## 2023-01-11 NOTE — CONSULTS
Obstetrics Consult Note    Subjective     Alondra Villela is a 32 y.o. female  LMP 2022 who was admitted for Tachycardia [R00.0] and leg pain and anemia. Patient was seen in consultation at the request of referring physician for management recommendations. Patient is recently diagnosed with current pregnancy. She denies any abdominal pain or vaginal bleeding.    QBHCG 445. US no IUP, thickened endometrium with cystic spaces, no adnexal masses.    TSH 0.1, FT4 5.07, Hb 7.9    Pertinent radiology results reviewed. Pertinent lab results reviewed..    OB History:  PCS (fetal nonreassurance) male 8lb 13oz  8/15/2022 ERCS male 7lb 14 oz  OB History   No obstetric history on file.       Medical History: Migraine headaches without aura    Surgical History: CS x 2, right knee meniscus repair    Social History: Denies Tob, ETOH, street drugs    Family History: Unremarkable    Allergies: Patient has no known allergies.      Current Facility-Administered Medications:   •  metoprolol (LOPRESSOR) injection 5 mg, 5 mg, intravenous, Once, Maribel Portillo PA C  No current outpatient medications on file.    Review of Systems:  All other systems reviewed and negative except as noted in the HPI.    Objective   Labs:  I have reviewed the patient's labs.  Significant abnormals are reported.    Imaging/Ultrasounds:   I have independently reviewed the patient's ultrasounds. Significant abnormals are reported.      Assessment   Alondra Villela is a 32 y.o. female , early first trimester pregnancy with tachycardia and hyperthyroidism.    No IUP on US c/w early pregnancy with QBHCG    Plan Cannot r/o GTN or ectopic pregnancy, although no symptoms or findings to establish the diagnoses at this time.    Will obtain serial QBHCG and pelvic US when indicated.    Explained my findings and management recommendation to Alondra and her .    Pt being [place] management [place] [facility]    Reg Murphy DO  Pager  [phone]  [place] [phone]

## 2023-01-11 NOTE — Clinical Note
Patient placed on procedure table in supine position with arms at side. Positioning devices: arm board under arms and safety strap applied.

## 2023-01-11 NOTE — H&P
Hospital Medicine Service -  History & Physical        CHIEF COMPLAINT   tachycardia     HISTORY OF PRESENT ILLNESS      Alondra Villela is a 32 y.o.  female with no other significant past medical history who presents with tachycardia.    States that she had COVID about 3 weeks ago residual tachycardia and intermittent shortness of breath.  She was seen in an urgent care and found to be pregnant.  She has not followed up with her PCP who performed routine blood tests and noted anemia and low TSH.  She was scheduled to be seen by cardiologist today, but this morning noticed right leg pain therefore recommended to present to the ED for PE evaluation.    On presentation, vitals significant for tachycardia heart rate ranging 110s to 130s, BP borderline low, afebrile, sats maintained mid 90s.  Labs significant for sodium 131, troponin 4.6> 4.3, TSH<0.01, free T4 5.07, hCG 444, hemoglobin 7.8, D-dimer 3.37.  EKG with sinus tachycardia, .  Pelvic ultrasound with no intrauterine pregnancy, and bilateral enlarged ovaries.  Lower extremity Doppler ultrasound showed no evidence of DVT      Admits headache and occasional lightheadedness with standing.  Admits occasional palpitations.  Admits extreme fatigue, intermittent nausea and vomiting consistent with morning sickness.  Admits small area of bruising behind the right knee and calf soreness.    No rhinorrhea, sore throat, otalgia, postnasal drip.  No visual changes, focal weakness, speech disturbances.  No dysphagia.  No cough or shortness of breath or dyspnea on exertion.  No leg edema, orthopnea, paroxysmal nocturnal dyspnea.  No melena or bright red blood per rectum.  No abdominal pain, diarrhea or constipation.  No dysuria or hematuria.  No fever, chills, rigors.  No rashes. No depression or anxiety. No suicidal thoughts or homicidal thoughts.       PAST MEDICAL AND SURGICAL HISTORY      History reviewed. No pertinent past medical history.    Past Surgical  History:   Procedure Laterality Date   •  SECTION      x2       PCP: Pedro Pyle, DO    MEDICATIONS      Prior to Admission medications    Not on File       ALLERGIES      Patient has no known allergies.    FAMILY HISTORY      History reviewed. No pertinent family history.    SOCIAL HISTORY      Social History     Socioeconomic History   • Marital status:      Spouse name: None   • Number of children: None   • Years of education: None   • Highest education level: None   Tobacco Use   • Smoking status: Never   • Smokeless tobacco: Never   Substance and Sexual Activity   • Alcohol use: Not Currently   • Drug use: Never   • Sexual activity: Yes     Social Determinants of Health     Food Insecurity: No Food Insecurity   • Worried About Running Out of Food in the Last Year: Never true   • Ran Out of Food in the Last Year: Never true       REVIEW OF SYSTEMS      All other systems reviewed and negative except as noted in HPI    PHYSICAL EXAMINATION      Temp:  [36.3 °C (97.4 °F)-36.8 °C (98.2 °F)] 36.3 °C (97.4 °F)  Heart Rate:  [102-131] 103  Resp:  [18-20] 20  BP: ()/(47-60) 104/49  Body mass index is 29.18 kg/m².    Physical Exam  General: NAD, pleasant, appears tired, overweight, well developed, laying in bed comfortably comfortably  HEENT: atraumatic, normocephalic, dry mm, sclera anicteric  Cardiovascular: tachycardic, no murmurs; S1/S2+  Pulmonary: CTAB; no rales, rhonchi or wheezing  Gi: Soft, nontender, nondistended, BS present  Ext: trace peripheral edema  Neuro: Alert and oriented x3; sensations and motor function grossly intact  Psych: Calm, cooperative, appropriate answers    LABS / IMAGING / EKG        Labs  I have reviewed the patient's pertinent labs.  Significant abnormals are as below.  Results from last 7 days   Lab Units 23  1049 01/10/23  2050   WBC K/uL 2.93* 3.76*   HEMOGLOBIN g/dL 6.9* 7.9*   HEMATOCRIT % 20.9* 24.9*   PLATELETS K/uL 168 202    Results from last 7  days   Lab Units 01/11/23  1049 01/10/23  2050   SODIUM mEQ/L 135* 131*   POTASSIUM mEQ/L 3.8 3.7   CHLORIDE mEQ/L 109 105   CO2 mEQ/L 19* 25   BUN mg/dL 11 14   CREATININE mg/dL 0.6 0.7   GLUCOSE mg/dL 134* 105*                        Results from last 7 days   Lab Units 01/11/23  1049   AST IU/L 32   ALT IU/L 26   ALK PHOS IU/L 88   BILIRUBIN TOTAL mg/dL 0.2*   PROTEIN TOTAL g/dL 5.7*   ALBUMIN g/dL 2.7*         Results from last 7 days   Lab Units 01/11/23  0356   D DIMER FEU ug/mL FEU 3.37*    Results from last 7 days   Lab Units 01/10/23  2317 01/10/23  2050   HIGH SENSITIVE TROPONIN I pg/mL 4.3 4.6        Microbiology Results     ** No results found for the last 720 hours. **           Imaging  I have independently reviewed the patient's pertinent imaging for this hospital visit.   US venous leg, bilateral    Result Date: 1/10/2023  Narrative: CLINICAL INDICATION: Pregnancy pain R leg, high HR COMPARISON: None     Impression: IMPRESSION: No femoropopliteal thrombosis. COMMENT: A duplex Doppler study was performed of both lower extremities, consisting of integrated two-dimensional (2D) real-time imaging color flow Doppler and Doppler spectral analysis utilizing 5.0 MHz and 7.0 MHz linear array probes. RIGHT: COMMON FEMORAL VEIN: Normal compression DEEP FEMORAL VEIN: Patent by color Doppler. FEMORAL VEIN: Normal compression, flow, and augmentation demonstrated on Doppler spectral analysis. POPLITEAL VEIN: Normal compression, flow, and augmentation demonstrated on Doppler spectral analysis. LEFT: COMMON FEMORAL VEIN: Normal compression DEEP FEMORAL VEIN: Patent by color Doppler. FEMORAL VEIN: Normal compression, flow, and augmentation demonstrated on Doppler spectral analysis. POPLITEAL VEIN: Normal compression, flow, and augmentation demonstrated on Doppler spectral analysis. Visualized calf veins compress normally.     ULTRASOUND PREGNANCY < 14 WEEKS TRANSABDOMINAL LIMITED, ULTRASOUND PREGNANCY TRANSVAGINAL  ONLY, ULTRASOUND DOPPLER    Result Date: 1/11/2023  Narrative: CLINICAL HISTORY:   right sided pelvic pain, 6 weeks pregnant   LMP 12/6/2022 PROCEDURE: Ultrasound examination of the pelvis was performed transabdominally. Because of the concern for ovarian torsion, a vascular study of the ovaries was done using color flow and spectral Doppler imaging. COMPARISON: None. COMMENT: UTERUS: Anteverted and anteflexed measuring 13.4 x 13.2 x 15.9 cm. There is a large solid mixed echogenicity mass projecting over the central uterine cavity measuring 8 x 9.8 x 11.7 cm. It is hyperechoic eccentrically and hypoechoic centrally, with multiple small cystic spaces and surrounding vascularity. RIGHT OVARY/ADNEXA: Mildly enlarged, containing several complex cystic structures with low level internal echoes or eccentric solid appearing echoes. For example a 3.1 x 3.3 x 2.1 cm cystic structure with eccentric solid appearing echoes and no internal color flow, likely representing a hemorrhagic cyst with retractile clot.. Normal arterial and venous flow on color and spectral Doppler. Ovarian size: 4.3 x  6.8 x  7.0 cm LEFT OVARY/ADNEXA: Contains multiple complex cystic structures, some with nonappearing internal echoes and no internal color flow. Normal arterial and venous flow on color and spectral Doppler. Ovarian size: 3.2 x 6.4 x 6.6 cm FLUID: There is no abnormal free fluid in the cul-de-sac.     Impression: IMPRESSION: 1.  No intrauterine gestation and no adnexal mass evident. These findings represent a pregnancy of unknown location. The appearance of a complex mass with small cystic spaces and peripheral vascularity occupying the central uterine cavity could represent a molar pregnancy but additional evaluation is suggested. 2.  Both ovaries are mildly enlarged with multiple complex cystic lesions as well as some with solid-appearing echoes that may represent a combination of hemorrhagic cysts/follicles or endometriomas, but  challenging to evaluate as the ovaries are only well evaluated transabdominally. Recommended these be reassessed on follow-up. No sonographic findings of ovarian torsion.       No results found for: COVID19    ECG/Telemetry  I have independently reviewed the telemetry. Significant findings include sinus tachycardia., I have independently reviewed the ECG. Significant findings include sinus tachycardia.    ASSESSMENT AND PLAN           * Hyperthyroidism during pregnancy  Assessment & Plan  TSH<0.01  Free T4  5.07  Endocrinology consulted  As patient is in the first trimester of pregnancy, to consider starting PTU for treatment  Start metoprolol for tachycardia as BP allows    Tachycardia  Assessment & Plan  Likely 2/2 to hyperthryoidism  D-dimer check and is elevated  Troponin negative x2  LE dopplers showed no No femoropopliteal thrombosis    Recommended for CT chest PE study but patient declined  Discussed with Dr. Murphy, OB/GYN, who relayed d-dimer elevation is normal in pregnancy and has a low suspicion for acute PE    Discussed at length with the patient and discussed risk vs benefit of PE study. She continues to have concern for radiation exposure with her current pregnancy, but will think about it  Start BB as BP allows  IVF hydration  Check echocardiogram  Consider card consult  Will consider initiation of thyroid treatment after discussion with Endocrinology      Microcytic anemia  Assessment & Plan  Patient reports typically becomes anemia during pregnancy  Does not recall most recent Hb  Hb dropped from 7.9>6.9  Possible dilutional with IVFs, but warrants transfusion with Hb<7  Check iron panel, ferritin, haptoglobin, retic count  Will transfuse 1u PRBC  F/u repeat CBC after transfusion completion    History of COVID-19  Assessment & Plan  Reported prior history of COVID infection 3 weeks ago  Does not require repeat testing at this time  Does not require isolation precautions   Supportive  care      Less than 8 weeks gestation of pregnancy  Assessment & Plan  Reported 6 wks pregnancy  bHCG noted  Pelvic US with no IUP, but per OB this is not uncommon in early first trimester  OB/GYN following, recs appreciated  Avoid teratogenic agents as able       VTE Assessment: Padua VTE Score: 6  VTE Prophylaxis: Current anticoagulants:  enoxaparin (LOVENOX) syringe 40 mg, subcutaneous, Daily (6p)      Code Status: Full Code  Palliative Care Screening Score: 0    Alondra's surrogate decision maker is her .  Estimated Discharge Date: 1/13/2023  Disposition Planning: admit to telemetry, endo consult, echo     Zeb Lara, DO  1/11/2023

## 2023-01-11 NOTE — CONSULTS
Endocrinology Consult Note  Subjective     Alondra Villela is a 32 y.o. female who was admitted for pregnant and hyperthyroid. tsh found to be low with elevated ft4. This is third pregnancy states about 6 weeks pregnant. Mother in room. Patient is visiting mom lives in [place]. No prior h/o thyroid dz with other pregnancies. Elevated HR. Otherwise feels well at this time.    .    Medical History: History reviewed. No pertinent past medical history.    Surgical History:   Past Surgical History:   Procedure Laterality Date   •  SECTION      x2       Social History:   Lives in [place]    Family History: History reviewed. No pertinent family history.    Allergies: Patient has no known allergies.    Current Inpatient Medications   Medication Dose Route Frequency Provider Last Rate Last Admin   • acetaminophen (TYLENOL) tablet 650 mg  650 mg oral q4h PRN Zeb Lara DO        Or   • acetaminophen (TYLENOL) suppository 650 mg  650 mg rectal q4h PRN Zeb Lara, DO        Or   • acetaminophen (TYLENOL) 650 mg/20.3 mL solution 650 mg  650 mg oral q4h PRN Zeb Lara, DO       • atropine injection 0.5 mg  0.5 mg intravenous q5 min PRN Zeb Lara, DO       • glucose chewable tablet 16-32 g of dextrose  16-32 g of dextrose oral PRN Zeb Lara DO        Or   • dextrose 40 % oral gel 15-30 g of dextrose  15-30 g of dextrose oral PRN Zeb Lara, DO        Or   • glucagon (GLUCAGEN) injection 1 mg  1 mg intramuscular PRN Zeb Lara, DO        Or   • dextrose 50 % in water (D50) injection 12.5 g  25 mL intravenous PRN Zeb Lara, DO       • enoxaparin (LOVENOX) syringe 40 mg  40 mg subcutaneous Daily (6p) Zeb Lara DO       • metoprolol tartrate (LOPRESSOR) tablet 12.5 mg  12.5 mg oral BID Zeb Lara DO   12.5 mg at 23 1159   • nitroglycerin (NITROSTAT) SL tablet 0.4 mg  0.4 mg sublingual q5 min PRN Zeb Lara DO       • ondansetron ODT  "(ZOFRAN-ODT) disintegrating tablet 4 mg  4 mg oral q8h PRN Marco, Vinodhini N, DO        Or   • ondansetron (ZOFRAN) injection 4 mg  4 mg intravenous q8h PRN Marco, Vinodhini N, DO       • sodium chloride 0.9 % infusion   intravenous Continuous Marco, Vinodhini N,  mL/hr at 01/11/23 1050 New Bag at 01/11/23 1050   • sodium chloride 0.9 % infusion  5 mL/hr intravenous PRN Marco, Vinodhini N, DO           Review of Systems  All other systems reviewed and negative except as noted in the HPI.    Objective   Labs  reviewed    Physical Exam  Visit Vitals  BP (!) 112/54 (BP Location: Left upper arm)   Pulse (!) 112   Temp 36.7 °C (98 °F) (Temporal)   Resp 16   Ht 1.626 m (5' 4\")   Wt 77.1 kg (170 lb)   LMP 12/06/2022 (Approximate)   SpO2 100%   BMI 29.18 kg/m²       General Appearance:  Alert, cooperative, no distress, appears stated age   Head:  Normocephalic, without obvious abnormality, atraumatic   Eyes:  eomi          Neck: Supple,    Thyroid: No enlargement/tenderness/nodules palpated;        Lungs:    respirations unlabored   Heart: Regular rate   Abdomen:   Soft, non-tender,    Extremities:  : No  edema         Skin: Skin warm and dry       Neurologic:     [id number]             Assessment   32 y.o. female being consulted for management recommendations   Plan   * Hyperthyroidism during pregnancy  Assessment & Plan  Pregnant and hyperthyroid. tsh suppressed with elevated ft3 and ft4. This is overt hyperthyroidism.  Beta blocker per OB - was ordered metoprolol 12.5mg bid. Can start PTU during first trimester as is recommended for hyperthyroidism in pregnancy, then will need follow up to adjust dosing and to eventually change to methimazole after first trimester. Start 50mg bid of propylthiouracil and repeat labs in 2 weeks on d/c with endocrine on d/c . she lives in [place] and says she will find someone to f/u with there. Anemia can be in part related to her hyperthyroidism. futher w/u per OB. She does run a low " wbc count but absolute neutrophils are within normal range. She should have this closely monitored while on PTU . Other symptoms she is experiencing being worked up by OB

## 2023-01-11 NOTE — ASSESSMENT & PLAN NOTE
Likely 2/2 to hyperthryoidism  D-dimer check and is elevated  Troponin negative x2  LE dopplers showed no No femoropopliteal thrombosis    Recommended for CT chest PE study but patient initially declined  Discussed with Dr. Murphy, OB/GYN, on day of admission who relayed d-dimer elevation is normal in pregnancy and had a low suspicion for acute PE    Due to persistent tachycardia, borderline hypotension, and new right chest pain proceeded with CTA PE study  CTA PE with abnormal findings as above, no PE noted but significant other abnormal findings  Continue BB as BP allows  IVF hydration  Echocardiogram wnl  Consider card consult  Started on PTU for treatment of hyperthyroidism

## 2023-01-11 NOTE — ASSESSMENT & PLAN NOTE
Reported 6 wks pregnancy  bHCG noted  Pelvic US with no IUP, but per OB this is not uncommon in early first trimester  OB/GYN following, recs appreciated  Avoid teratogenic agents as able   no

## 2023-01-11 NOTE — ASSESSMENT & PLAN NOTE
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  MRI brain WO with scattered nonspecific white matter changes  MRI brain W contrast showed no evidence of metastatic brain lesions  CT A/P W contrast pending, delayed till this AM to allow for contrast washout  PICC team consult placed  Gyn/Onc following and has initiated transfer to [facility] for further management and initiation of specialized treatment

## 2023-01-11 NOTE — ED ATTESTATION NOTE
I have personally seen and examined the patient.  I reviewed and agree with physician assistant / nurse practitioner’s assessment and plan of care    My examination, assessment, and plan of care of  is as follows:     Patient is here for dyspnea on exertion and palpitations for the last few days she had COVID recently.  She is pregnant and does admit to having some right side abdominal pain no vaginal bleeding or vaginal discharge.  She is anemic and had her hemoglobin checked yesterday she dropped almost a gram.  I immediately did an ultrasound at bedside did not see any bowtie sign or blood around the bladder.    Exam  Patient is awake alert nontoxic-appearing  Lungs are clear bilateral auscultation  Heart tachycardic but regular rhythm  Abdomen is soft nontender nondistended  Skin is warm and dry    Plan  We are ruling patient out emergently for ectopic pregnancy ultrasound has been ordered.  If ultrasound is negative.  We will need to explain symptoms may be due to anemia, patient has no chest pain no pleuritic symptoms at all, will explore PE if no other explanation can be found     Pollo Hughes MD  01/11/23 3047

## 2023-01-11 NOTE — ASSESSMENT & PLAN NOTE
Likely 2/2 to hyperthryoidism  D-dimer check and is elevated  Troponin negative x2  LE dopplers showed no No femoropopliteal thrombosis    Recommended for CT chest PE study but patient declined  Discussed with Dr. Murphy, OB/GYN, who relayed d-dimer elevation is normal in pregnancy and has a low suspicion for acute PE    Discussed at length with the patient and discussed risk vs benefit of PE study. She continues to have concern for radiation exposure with her current pregnancy, but will think about it  Start BB as BP allows  IVF hydration  Check echocardiogram  Consider card consult  Will consider initiation of thyroid treatment after discussion with Endocrinology

## 2023-01-11 NOTE — ASSESSMENT & PLAN NOTE
TSH<0.01  Free T4  5.07  Endocrinology consulted  As patient is in the first trimester of pregnancy, to consider starting PTU for treatment  Start metoprolol for tachycardia as BP allows

## 2023-01-11 NOTE — ASSESSMENT & PLAN NOTE
TSH<0.01  Free T4  5.07  maria iens related to malignancy    Endocrinology consulted, recs appreciated  As patient is in the first trimester of pregnancy, started on PTU  Continue metoprolol for tachycardia as BP allows  Will require repeat TFTs in 2 weeks

## 2023-01-11 NOTE — ASSESSMENT & PLAN NOTE
Reported prior history of COVID infection 3 weeks ago  Does not require repeat testing at this time  Does not require isolation precautions   Supportive care

## 2023-01-12 ENCOUNTER — APPOINTMENT (INPATIENT)
Dept: CARDIOLOGY | Facility: HOSPITAL | Age: 33
DRG: 832 | End: 2023-01-12
Attending: STUDENT IN AN ORGANIZED HEALTH CARE EDUCATION/TRAINING PROGRAM
Payer: COMMERCIAL

## 2023-01-12 ENCOUNTER — APPOINTMENT (INPATIENT)
Dept: RADIOLOGY | Facility: HOSPITAL | Age: 33
DRG: 832 | End: 2023-01-12
Attending: OBSTETRICS & GYNECOLOGY
Payer: COMMERCIAL

## 2023-01-12 ENCOUNTER — APPOINTMENT (INPATIENT)
Dept: RADIOLOGY | Facility: HOSPITAL | Age: 33
DRG: 832 | End: 2023-01-12
Attending: STUDENT IN AN ORGANIZED HEALTH CARE EDUCATION/TRAINING PROGRAM
Payer: COMMERCIAL

## 2023-01-12 ENCOUNTER — APPOINTMENT (INPATIENT)
Dept: RADIOLOGY | Facility: HOSPITAL | Age: 33
DRG: 832 | End: 2023-01-12
Attending: RADIOLOGY
Payer: COMMERCIAL

## 2023-01-12 ENCOUNTER — APPOINTMENT (INPATIENT)
Dept: RADIOLOGY | Facility: HOSPITAL | Age: 33
DRG: 832 | End: 2023-01-12
Payer: COMMERCIAL

## 2023-01-12 PROBLEM — C57.9: Status: ACTIVE | Noted: 2023-01-11

## 2023-01-12 PROBLEM — R93.89 ABNORMAL CT SCAN: Status: ACTIVE | Noted: 2023-01-12

## 2023-01-12 LAB
ALBUMIN SERPL-MCNC: 2.8 G/DL (ref 3.4–5)
ALP SERPL-CCNC: 101 IU/L (ref 35–126)
ALT SERPL-CCNC: 32 IU/L (ref 11–54)
ANION GAP SERPL CALC-SCNC: 8 MEQ/L (ref 3–15)
AORTIC ROOT ANNULUS: 2.7 CM
AORTIC VALVE MEAN VELOCITY: 1.19 M/S
AORTIC VALVE VELOCITY TIME INTEGRAL: 28.7 CM
ASCENDING AORTA: 2.9 CM
AST SERPL-CCNC: 40 IU/L (ref 15–41)
AV MEAN GRADIENT: 7 MMHG
AV PEAK GRADIENT: 13 MMHG
AV PEAK VELOCITY-S: 1.79 M/S
AV VALVE AREA INDEX: 0.93
AV VALVE AREA: 1.36 CM2
AV VELOCITY RATIO: 0.69
AVA (VTI): 1.75 CM2
BASOPHILS # BLD: 0.01 K/UL (ref 0.01–0.1)
BASOPHILS NFR BLD: 0.3 %
BILIRUB DIRECT SERPL-MCNC: 0.3 MG/DL
BILIRUB SERPL-MCNC: 0.7 MG/DL (ref 0.3–1.2)
BSA FOR ECHO PROCEDURE: 1.87 M2
BUN SERPL-MCNC: 8 MG/DL (ref 8–20)
CALCIUM SERPL-MCNC: 8.3 MG/DL (ref 8.9–10.3)
CANCER AG125 SERPL-ACNC: 87 U/ML (ref 0–35)
CANCER AG15-3 SERPL-ACNC: 10.4 U/ML (ref 0–31.3)
CANCER AG19-9 SERPL-ACNC: 5 U/ML (ref 0–35)
CEA SERPL-MCNC: 1.3 NG/ML
CHLORIDE SERPL-SCNC: 108 MEQ/L (ref 98–109)
CO2 SERPL-SCNC: 18 MEQ/L (ref 22–32)
CREAT SERPL-MCNC: 0.4 MG/DL (ref 0.6–1.1)
DIFFERENTIAL METHOD BLD: ABNORMAL
DOP CALC LVOT STROKE VOLUME: 50.1 CM3
E WAVE DECELERATION TIME: 236 MS
E/A RATIO: 1.4
E/E' RATIO: 11
E/LAT E' RATIO: 8.3
EDV (BP): 87.3 CM3
EF (A4C): 64.8 %
EF A2C: 63.7 %
EJECTION FRACTION: 64.3 %
EOSINOPHIL # BLD: 0.03 K/UL (ref 0.04–0.36)
EOSINOPHIL NFR BLD: 0.8 %
ERYTHROCYTE [DISTWIDTH] IN BLOOD BY AUTOMATED COUNT: 13.5 % (ref 11.7–14.4)
ESV (BP): 31.2 CM3
FRACTIONAL SHORTENING: 27.07 %
GFR SERPL CREATININE-BSD FRML MDRD: >60 ML/MIN/1.73M*2
GLUCOSE SERPL-MCNC: 81 MG/DL (ref 70–99)
HAPTOGLOB SERPL-MCNC: 69 MG/DL (ref 41–203)
HCT VFR BLDCO AUTO: 24.4 % (ref 35–45)
HGB BLD-MCNC: 7.9 G/DL (ref 11.8–15.7)
IMM GRANULOCYTES # BLD AUTO: 0.01 K/UL (ref 0–0.08)
IMM GRANULOCYTES NFR BLD AUTO: 0.3 %
INR PPP: 1.2
INTERVENTRICULAR SEPTUM: 1.31 CM
LA ESV (BP): 47.8 CM3
LA ESV INDEX (A2C): 29.57 CM3/M2
LA ESV INDEX (BP): 25.56 CM3/M2
LA/AORTA RATIO: 1.22
LAAS-AP2: 19.7 CM2
LAAS-AP4: 17.6 CM2
LAD 2D: 3.3 CM
LALD A4C: 5.52 CM
LALD A4C: 5.52 CM
LAV-S: 55.3 CM3
LEFT ATRIUM VOLUME INDEX: 22.62 CM3/M2
LEFT ATRIUM VOLUME: 42.3 CM3
LEFT INTERNAL DIMENSION IN SYSTOLE: 3.26 CM (ref 2.55–3.85)
LEFT VENTRICLE DIASTOLIC VOLUME INDEX: 42.35 CM3/M2
LEFT VENTRICLE DIASTOLIC VOLUME: 79.2 CM3
LEFT VENTRICLE SYSTOLIC VOLUME INDEX: 14.92 CM3/M2
LEFT VENTRICLE SYSTOLIC VOLUME: 27.9 CM3
LEFT VENTRICULAR INTERNAL DIMENSION IN DIASTOLE: 4.47 CM (ref 4.3–5.98)
LEFT VENTRICULAR POSTERIOR WALL IN END DIASTOLE: 1.24 CM (ref 0.56–1.05)
LV DIASTOLIC VOLUME: 96.6 CM3
LV ESV (APICAL 2 CHAMBER): 35.1 CM3
LVAD-AP2: 30.6 CM2
LVAD-AP4: 28.2 CM2
LVAS-AP2: 17.1 CM2
LVAS-AP4: 15.4 CM2
LVEDVI(A2C): 51.66 CM3/M2
LVEDVI(BP): 46.68 CM3/M2
LVESVI(A2C): 18.77 CM3/M2
LVESVI(BP): 16.68 CM3/M2
LVLD-AP2: 8.17 CM
LVLD-AP4: 8.11 CM
LVLS-AP2: 7.08 CM
LVLS-AP4: 6.96 CM
LVOT 2D: 1.8 CM
LVOT A: 2.54 CM2
LVOT MG: 3 MMHG
LVOT MV: 0.78 M/S
LVOT PEAK VELOCITY: 1.22 M/S
LVOT PG: 6 MMHG
LVOT STROKE VOLUME INDEX: 26.79 ML/M2
LVOT VTI: 19.7 CM
LYMPHOCYTES # BLD: 0.81 K/UL (ref 1.2–3.5)
LYMPHOCYTES NFR BLD: 22.6 %
MCH RBC QN AUTO: 27.4 PG (ref 28–33.2)
MCHC RBC AUTO-ENTMCNC: 32.4 G/DL (ref 32.2–35.5)
MCV RBC AUTO: 84.7 FL (ref 83–98)
MLH CV ECHO AVA INDEX VELOCITY RATIO: 0.7
MONOCYTES # BLD: 0.42 K/UL (ref 0.28–0.8)
MONOCYTES NFR BLD: 11.7 %
MV E'TISSUE VEL-LAT: 0.15 M/S
MV E'TISSUE VEL-MED: 0.11 M/S
MV PEAK A VEL: 0.92 M/S
MV PEAK E VEL: 1.25 M/S
NEUTROPHILS # BLD: 2.3 K/UL (ref 1.7–7)
NEUTS SEG NFR BLD: 64.3 %
NRBC BLD-RTO: 0 %
PDW BLD AUTO: 10.1 FL (ref 9.4–12.3)
PLATELET # BLD AUTO: 161 K/UL (ref 150–369)
POSTERIOR WALL: 1.24 CM
POTASSIUM SERPL-SCNC: 4.1 MEQ/L (ref 3.6–5.1)
PROT SERPL-MCNC: 5.4 G/DL (ref 6–8.2)
PROTHROMBIN TIME: 14.9 SEC (ref 12.2–14.5)
PV PEAK GRADIENT: 3 MMHG
PV PV: 0.87 M/S
RBC # BLD AUTO: 2.88 M/UL (ref 3.93–5.22)
SEPTAL TISSUE DOPPLER FREE WALL LATE DIA VELOCITY (APICAL 4 CHAMBER VIEW): 0.19 M/S
SODIUM SERPL-SCNC: 134 MEQ/L (ref 136–144)
TAPSE: 2.9 CM
WBC # BLD AUTO: 3.58 K/UL (ref 3.8–10.5)
Z-SCORE OF LEFT VENTRICULAR DIMENSION IN END DIASTOLE: -1.26
Z-SCORE OF LEFT VENTRICULAR DIMENSION IN END SYSTOLE: 0.32
Z-SCORE OF LEFT VENTRICULAR POSTERIOR WALL IN END DIASTOLE: 2.48

## 2023-01-12 PROCEDURE — 25800000 HC PHARMACY IV SOLUTIONS: Performed by: STUDENT IN AN ORGANIZED HEALTH CARE EDUCATION/TRAINING PROGRAM

## 2023-01-12 PROCEDURE — 86304 IMMUNOASSAY TUMOR CA 125: CPT | Performed by: INTERNAL MEDICINE

## 2023-01-12 PROCEDURE — 86301 IMMUNOASSAY TUMOR CA 19-9: CPT | Performed by: INTERNAL MEDICINE

## 2023-01-12 PROCEDURE — 71275 CT ANGIOGRAPHY CHEST: CPT | Mod: ME

## 2023-01-12 PROCEDURE — 63600000 HC DRUGS/DETAIL CODE: Performed by: STUDENT IN AN ORGANIZED HEALTH CARE EDUCATION/TRAINING PROGRAM

## 2023-01-12 PROCEDURE — 80048 BASIC METABOLIC PNL TOTAL CA: CPT | Performed by: STUDENT IN AN ORGANIZED HEALTH CARE EDUCATION/TRAINING PROGRAM

## 2023-01-12 PROCEDURE — 12000000 HC ROOM AND CARE MED/SURG

## 2023-01-12 PROCEDURE — 63700000 HC SELF-ADMINISTRABLE DRUG: Performed by: INTERNAL MEDICINE

## 2023-01-12 PROCEDURE — 63700000 HC SELF-ADMINISTRABLE DRUG: Performed by: STUDENT IN AN ORGANIZED HEALTH CARE EDUCATION/TRAINING PROGRAM

## 2023-01-12 PROCEDURE — 71045 X-RAY EXAM CHEST 1 VIEW: CPT

## 2023-01-12 PROCEDURE — 63600105 HC IODINE BASED CONTRAST: Performed by: PHYSICIAN ASSISTANT

## 2023-01-12 PROCEDURE — 63600000 HC DRUGS/DETAIL CODE: Performed by: RADIOLOGY

## 2023-01-12 PROCEDURE — 86300 IMMUNOASSAY TUMOR CA 15-3: CPT | Performed by: INTERNAL MEDICINE

## 2023-01-12 PROCEDURE — 82378 CARCINOEMBRYONIC ANTIGEN: CPT | Performed by: INTERNAL MEDICINE

## 2023-01-12 PROCEDURE — 80076 HEPATIC FUNCTION PANEL: CPT | Performed by: STUDENT IN AN ORGANIZED HEALTH CARE EDUCATION/TRAINING PROGRAM

## 2023-01-12 PROCEDURE — 93306 TTE W/DOPPLER COMPLETE: CPT

## 2023-01-12 PROCEDURE — 70552 MRI BRAIN STEM W/DYE: CPT | Mod: MG

## 2023-01-12 PROCEDURE — 36415 COLL VENOUS BLD VENIPUNCTURE: CPT | Performed by: INTERNAL MEDICINE

## 2023-01-12 PROCEDURE — 84445 ASSAY OF TSI GLOBULIN: CPT | Performed by: INTERNAL MEDICINE

## 2023-01-12 PROCEDURE — A9585 GADOBUTROL INJECTION: HCPCS | Mod: JW | Performed by: OBSTETRICS & GYNECOLOGY

## 2023-01-12 PROCEDURE — 36100360 IR TECHNICAL ASSISTANCE

## 2023-01-12 PROCEDURE — 99233 SBSQ HOSP IP/OBS HIGH 50: CPT | Performed by: STUDENT IN AN ORGANIZED HEALTH CARE EDUCATION/TRAINING PROGRAM

## 2023-01-12 PROCEDURE — 63600000 HC DRUGS/DETAIL CODE: Performed by: INTERNAL MEDICINE

## 2023-01-12 PROCEDURE — 85025 COMPLETE CBC W/AUTO DIFF WBC: CPT | Performed by: STUDENT IN AN ORGANIZED HEALTH CARE EDUCATION/TRAINING PROGRAM

## 2023-01-12 PROCEDURE — 70551 MRI BRAIN STEM W/O DYE: CPT | Mod: MG

## 2023-01-12 PROCEDURE — 85610 PROTHROMBIN TIME: CPT | Performed by: RADIOLOGY

## 2023-01-12 PROCEDURE — 1123F ACP DISCUSS/DSCN MKR DOCD: CPT | Performed by: STUDENT IN AN ORGANIZED HEALTH CARE EDUCATION/TRAINING PROGRAM

## 2023-01-12 PROCEDURE — 88305 TISSUE EXAM BY PATHOLOGIST: CPT | Performed by: STUDENT IN AN ORGANIZED HEALTH CARE EDUCATION/TRAINING PROGRAM

## 2023-01-12 PROCEDURE — 20600000 HC ROOM AND CARE INTERMEDIATE/TELEMETRY

## 2023-01-12 PROCEDURE — 77012 CT SCAN FOR NEEDLE BIOPSY: CPT

## 2023-01-12 PROCEDURE — 0BBF3ZX EXCISION OF RIGHT LOWER LUNG LOBE, PERCUTANEOUS APPROACH, DIAGNOSTIC: ICD-10-PCS | Performed by: RADIOLOGY

## 2023-01-12 RX ORDER — MORPHINE SULFATE 2 MG/ML
2 INJECTION, SOLUTION INTRAMUSCULAR; INTRAVENOUS ONCE
Status: COMPLETED | OUTPATIENT
Start: 2023-01-12 | End: 2023-01-12

## 2023-01-12 RX ORDER — ONDANSETRON 4 MG/1
4 TABLET, ORALLY DISINTEGRATING ORAL EVERY 8 HOURS PRN
Status: CANCELLED | OUTPATIENT
Start: 2023-01-12

## 2023-01-12 RX ORDER — ACETAMINOPHEN 650 MG/20.3ML
650 LIQUID ORAL EVERY 4 HOURS PRN
Status: CANCELLED | OUTPATIENT
Start: 2023-01-12

## 2023-01-12 RX ORDER — DEXTROSE 40 %
15-30 GEL (GRAM) ORAL AS NEEDED
Status: CANCELLED | OUTPATIENT
Start: 2023-01-12

## 2023-01-12 RX ORDER — MORPHINE SULFATE 2 MG/ML
2 INJECTION, SOLUTION INTRAMUSCULAR; INTRAVENOUS ONCE
Status: DISCONTINUED | OUTPATIENT
Start: 2023-01-12 | End: 2023-01-12

## 2023-01-12 RX ORDER — SODIUM CHLORIDE 9 MG/ML
5 INJECTION, SOLUTION INTRAVENOUS AS NEEDED
Status: CANCELLED | OUTPATIENT
Start: 2023-01-12 | End: 2023-01-13

## 2023-01-12 RX ORDER — DEXTROSE 50 % IN WATER (D50W) INTRAVENOUS SYRINGE
25 AS NEEDED
Status: CANCELLED | OUTPATIENT
Start: 2023-01-12

## 2023-01-12 RX ORDER — IBUPROFEN 200 MG
16-32 TABLET ORAL AS NEEDED
Status: CANCELLED | OUTPATIENT
Start: 2023-01-12

## 2023-01-12 RX ORDER — NITROGLYCERIN 0.4 MG/1
0.4 TABLET SUBLINGUAL EVERY 5 MIN PRN
Status: CANCELLED | OUTPATIENT
Start: 2023-01-12

## 2023-01-12 RX ORDER — SODIUM CHLORIDE 9 MG/ML
INJECTION, SOLUTION INTRAVENOUS CONTINUOUS
Status: CANCELLED | OUTPATIENT
Start: 2023-01-12 | End: 2023-01-13

## 2023-01-12 RX ORDER — TRAMADOL HYDROCHLORIDE 50 MG/1
50 TABLET ORAL ONCE
Status: COMPLETED | OUTPATIENT
Start: 2023-01-12 | End: 2023-01-12

## 2023-01-12 RX ORDER — PROPYLTHIOURACIL 50 MG/1
50 TABLET ORAL
Status: CANCELLED | OUTPATIENT
Start: 2023-01-13

## 2023-01-12 RX ORDER — SODIUM CHLORIDE 9 MG/ML
INJECTION, SOLUTION INTRAVENOUS CONTINUOUS
Status: DISCONTINUED | OUTPATIENT
Start: 2023-01-12 | End: 2023-01-13

## 2023-01-12 RX ORDER — ONDANSETRON HYDROCHLORIDE 2 MG/ML
4 INJECTION, SOLUTION INTRAVENOUS EVERY 8 HOURS PRN
Status: CANCELLED | OUTPATIENT
Start: 2023-01-12

## 2023-01-12 RX ORDER — LORAZEPAM 1 MG/1
1 TABLET ORAL EVERY 8 HOURS PRN
Status: DISCONTINUED | OUTPATIENT
Start: 2023-01-12 | End: 2023-01-13 | Stop reason: HOSPADM

## 2023-01-12 RX ORDER — ACETAMINOPHEN 325 MG/1
650 TABLET ORAL EVERY 4 HOURS PRN
Status: CANCELLED | OUTPATIENT
Start: 2023-01-12

## 2023-01-12 RX ORDER — SODIUM CHLORIDE 9 MG/ML
5 INJECTION, SOLUTION INTRAVENOUS AS NEEDED
Status: DISCONTINUED | OUTPATIENT
Start: 2023-01-12 | End: 2023-01-13 | Stop reason: HOSPADM

## 2023-01-12 RX ORDER — FENTANYL CITRATE 50 UG/ML
INJECTION, SOLUTION INTRAMUSCULAR; INTRAVENOUS
Status: COMPLETED | OUTPATIENT
Start: 2023-01-12 | End: 2023-01-12

## 2023-01-12 RX ORDER — LORAZEPAM 1 MG/1
1 TABLET ORAL EVERY 8 HOURS PRN
Status: CANCELLED | OUTPATIENT
Start: 2023-01-12

## 2023-01-12 RX ORDER — GADOBUTROL 604.72 MG/ML
7 INJECTION INTRAVENOUS ONCE
Status: COMPLETED | OUTPATIENT
Start: 2023-01-12 | End: 2023-01-12

## 2023-01-12 RX ORDER — BUPROPION HYDROCHLORIDE 150 MG/1
150 TABLET ORAL DAILY
Status: DISCONTINUED | OUTPATIENT
Start: 2023-01-12 | End: 2023-01-13 | Stop reason: HOSPADM

## 2023-01-12 RX ORDER — ATROPINE SULFATE 0.1 MG/ML
0.5 INJECTION INTRAVENOUS EVERY 5 MIN PRN
Status: CANCELLED | OUTPATIENT
Start: 2023-01-12

## 2023-01-12 RX ORDER — ACETAMINOPHEN 650 MG/1
650 SUPPOSITORY RECTAL EVERY 4 HOURS PRN
Status: CANCELLED | OUTPATIENT
Start: 2023-01-12

## 2023-01-12 RX ADMIN — GADOBUTROL 7 MMOL: 604.72 INJECTION INTRAVENOUS at 19:56

## 2023-01-12 RX ADMIN — PROPYLTHIOURACIL 50 MG: 50 TABLET ORAL at 08:00

## 2023-01-12 RX ADMIN — PROPYLTHIOURACIL 50 MG: 50 TABLET ORAL at 21:02

## 2023-01-12 RX ADMIN — METOPROLOL TARTRATE 12.5 MG: 25 TABLET, FILM COATED ORAL at 21:02

## 2023-01-12 RX ADMIN — ONDANSETRON HYDROCHLORIDE 4 MG: 2 INJECTION, SOLUTION INTRAMUSCULAR; INTRAVENOUS at 19:13

## 2023-01-12 RX ADMIN — TRAMADOL HYDROCHLORIDE 50 MG: 50 TABLET, COATED ORAL at 21:02

## 2023-01-12 RX ADMIN — SODIUM CHLORIDE: 9 INJECTION, SOLUTION INTRAVENOUS at 04:45

## 2023-01-12 RX ADMIN — SODIUM CHLORIDE: 9 INJECTION, SOLUTION INTRAVENOUS at 18:58

## 2023-01-12 RX ADMIN — ACETAMINOPHEN 650 MG: 325 TABLET ORAL at 08:01

## 2023-01-12 RX ADMIN — FENTANYL CITRATE 50 MCG: 50 INJECTION, SOLUTION INTRAMUSCULAR; INTRAVENOUS at 16:47

## 2023-01-12 RX ADMIN — MORPHINE SULFATE 2 MG: 2 INJECTION, SOLUTION INTRAMUSCULAR; INTRAVENOUS at 23:11

## 2023-01-12 RX ADMIN — BUPROPION HYDROCHLORIDE 150 MG: 150 TABLET, EXTENDED RELEASE ORAL at 21:02

## 2023-01-12 RX ADMIN — IOHEXOL 80 ML: 350 INJECTION, SOLUTION INTRAVENOUS at 12:36

## 2023-01-12 RX ADMIN — METOPROLOL TARTRATE 12.5 MG: 25 TABLET, FILM COATED ORAL at 08:05

## 2023-01-12 NOTE — TREATMENT PLAN
I independently reviewed and personally discussed CT scan results with Radiologist which revealed concern for metastatic disease of unknown origin.     Discussed case at length with Oncologist, Dr. Souza. He agreed with my plan to obtain CT A/P with contrast, consult IR for lung biopsy. Recommended also to perform MRI brain w/wo, stool occult, and cancer screening lab tests.     I went to bedside and had a lengthy discussion with the patient, her mother at bedside, and  over the phone. I described in detail the CT results and reviewed the images with them on the computer. I relayed that these abnormal findings are concerning for metastatic cancer. The patient and her family were expectedly distraught with the new findings. I provided much emotional support and answered all of their questions to the best of my ability. We discussed the above plan for further investigation of the etiology of the abnormal findings. The patient relayed that knowing there may be risk to a possible pregnancy, she wishes to proceed with the above plan in pursuit of elucidating the etiology of this possible life threatening disease.    I called and discussed the case with the IR physician, Dr. Adames, who will proceed with the biopsy this afternoon.     I called and discussed the case with OB physician, Dr. Murphy. He recommended to keep patient NPO after midnight if procedure is requiring tomorrow based on CT abd imaging.    After discussion with the MRI department, MRI brain ordered without contrast per [facility] policy for a potentially pregnant patient.    Dr. Souza to discuss with patient's PCP and will see patient later this afternoon.

## 2023-01-12 NOTE — PATIENT CARE CONFERENCE
Care Progression Rounds Note  Date: 1/12/2023  Time: 1:49 PM     Patient Name: Alondra Villela     Medical Record Number: [id number]   YOB: 1990  Sex: Female      Room/Bed: 0600    Admitting Diagnosis: Hyperthyroidism [E05.90]  Tachycardia [R00.0]  Abnormal pregnancy in first trimester [O26.91]  Anemia, unspecified type [D64.9]   Admit Date/Time: 1/11/2023  1:26 AM    Primary Diagnosis: Hyperthyroidism during pregnancy  Principal Problem: Hyperthyroidism during pregnancy    GMLOS: pending  Anticipated Discharge Date: 1/13/2023    AM-PAC:  Mobility Score:      Discharge Planning:  Anticipated Discharge Disposition: home with assistance    Barriers to Discharge:  Medical issues not resolved    Comments:       Participants:  nursing

## 2023-01-12 NOTE — PROGRESS NOTES
Hospital Medicine Service -  Daily Progress Note       SUBJECTIVE   Interval History: Patient was seen and examined at bedside. No acute events overnight. Patient states she feels much better after the blood transfusion. She now has right sided chest pain that is new     OBJECTIVE      Vital signs in last 24 hours:  Temp:  [36.3 °C (97.3 °F)-37.1 °C (98.8 °F)] 36.3 °C (97.3 °F)  Heart Rate:  [102-119] 115  Resp:  [16-18] 16  BP: (103-117)/(51-59) 103/54    Intake/Output Summary (Last 24 hours) at 1/12/2023 1540  Last data filed at 1/11/2023 1849  Gross per 24 hour   Intake 374.33 ml   Output --   Net 374.33 ml       PHYSICAL EXAMINATION      Physical Exam  General: NAD, pleasant, improved appearance, well developed, sitting up in bed comfortably  HEENT: atraumatic, normocephalic, mmm, sclera anicteric  Cardiovascular: tachycardic, no murmurs; S1/S2+  Pulmonary: CTAB; no rales, rhonchi or wheezing  Gi: Soft, nontender, nondistended, BS present  Ext: trace peripheral edema  Neuro: Alert and oriented x3; sensations and motor function grossly intact  Psych: Calm, cooperative, appropriate answers     LINES, CATHETERS, DRAINS, AIRWAYS, AND WOUNDS   Lines, Drains, and Airways:  Wounds (agree with documentation and present on admission):  Peripheral IV (Adult) 01/10/23 Right Antecubital (Active)   Number of days: 2         Comments:      LABS / IMAGING / TELE      Labs  I have reviewed the patient's pertinent labs.  Significant abnormals are as below.  Results from last 7 days   Lab Units 01/12/23  0624 01/11/23  1049 01/10/23  2050   WBC K/uL 3.58* 2.93* 3.76*   HEMOGLOBIN g/dL 7.9* 6.9* 7.9*   HEMATOCRIT % 24.4* 20.9* 24.9*   PLATELETS K/uL 161 168 202    Results from last 7 days   Lab Units 01/12/23  0624 01/11/23  1049 01/10/23  2050   SODIUM mEQ/L 134* 135* 131*   POTASSIUM mEQ/L 4.1 3.8 3.7   CHLORIDE mEQ/L 108 109 105   CO2 mEQ/L 18* 19* 25   BUN mg/dL 8 11 14   CREATININE mg/dL 0.4* 0.6 0.7   GLUCOSE mg/dL 81  134* 105*                   Results from last 7 days   Lab Units 01/11/23  0915   FERRITIN ng/mL 55      Results from last 7 days   Lab Units 01/11/23  1049   AST IU/L 32   ALT IU/L 26   ALK PHOS IU/L 88   BILIRUBIN TOTAL mg/dL 0.2*   PROTEIN TOTAL g/dL 5.7*   ALBUMIN g/dL 2.7*    Results from last 7 days   Lab Units 01/12/23  1514   PROTIME sec 14.9*   INR  1.2      Results from last 7 days   Lab Units 01/11/23  0356   D DIMER FEU ug/mL FEU 3.37*    Results from last 7 days   Lab Units 01/10/23  2317 01/10/23  2050   HIGH SENSITIVE TROPONIN I pg/mL 4.3 4.6        Microbiology Results     ** No results found for the last 720 hours. **           No results found for: COVID19    Imaging  I have independently reviewed the patient's pertinent imaging for this hospital visit.   CT ANGIOGRAPHY CHEST PULMONARY EMBOLISM WITH IV CONTRAST    Result Date: 1/12/2023  Narrative: CLINICAL HISTORY: Shortness of breath. Positive d-dimer. Patient is 6 weeks pregnant. COMMENT: CTA examination of the chest with attention to the pulmonary vasculature was performed using contiguous 1.25 mm transaxial sections. 3D MIP and/or volume rendered image reconstruction performed and reviewed.Images were acquired after the uneventful administration of 80 cc of Omnipaque 350 intravenously. CT DOSE:  One or more dose reduction techniques (e.g. automated exposure control, adjustment of the mA and/or kV according to patient size, use of iterative reconstruction technique) utilized for this examination Comparison studies: None. Heart and pericardium: The heart is normal in size.  There is no pericardial effusion. Great vessels: There is no evidence for thoracic aortic aneurysm. The main pulmonary artery is normal in caliber.  No large or central filling defects are identified. Mediastinum: No mediastinal adenopathy. Anterior mediastinal mass measures 2.1 x 2.3 x 3.8 cm. Hina: No hilar adenopathy. Axilla: No axillary adenopathy. Lung parenchyma:  Lobulated mass in the right lower lobe extending to pleura measures 3.3 x 5.1 x 5.1 cm. Multiple pulmonary nodules most in keeping with metastases. Pleura: Small right effusions. Visualized upper abdominal organs: There are hyperenhancing lesions in the right lobe of the liver measuring 2.1 x 4.6 cm. A 4 mm hyperenhancing lesion in the left lobe. These findings are nonspecific and further assessment with MRI is recommended. Bones: Within normal limits.     Impression: IMPRESSION: No evidence for pulmonary embolism. 5 cm right lower lobe mass. Anterior mediastinal mass. Innumerable bilateral pulmonary nodules which are suspicious for metastatic disease to the lungs. 2 hyperenhancing liver lesions, the larger measuring 5 cm in maximal dimension for which further assessment with MRI with and without contrast is recommended.     US venous leg, bilateral    Result Date: 1/10/2023  Narrative: CLINICAL INDICATION: Pregnancy pain R leg, high HR COMPARISON: None     Impression: IMPRESSION: No femoropopliteal thrombosis. COMMENT: A duplex Doppler study was performed of both lower extremities, consisting of integrated two-dimensional (2D) real-time imaging color flow Doppler and Doppler spectral analysis utilizing 5.0 MHz and 7.0 MHz linear array probes. RIGHT: COMMON FEMORAL VEIN: Normal compression DEEP FEMORAL VEIN: Patent by color Doppler. FEMORAL VEIN: Normal compression, flow, and augmentation demonstrated on Doppler spectral analysis. POPLITEAL VEIN: Normal compression, flow, and augmentation demonstrated on Doppler spectral analysis. LEFT: COMMON FEMORAL VEIN: Normal compression DEEP FEMORAL VEIN: Patent by color Doppler. FEMORAL VEIN: Normal compression, flow, and augmentation demonstrated on Doppler spectral analysis. POPLITEAL VEIN: Normal compression, flow, and augmentation demonstrated on Doppler spectral analysis. Visualized calf veins compress normally.     ULTRASOUND PREGNANCY < 14 WEEKS TRANSABDOMINAL LIMITED,  ULTRASOUND PREGNANCY TRANSVAGINAL ONLY, ULTRASOUND DOPPLER    Result Date: 1/11/2023  Narrative: CLINICAL HISTORY:   right sided pelvic pain, 6 weeks pregnant   LMP 12/6/2022 PROCEDURE: Ultrasound examination of the pelvis was performed transabdominally. Because of the concern for ovarian torsion, a vascular study of the ovaries was done using color flow and spectral Doppler imaging. COMPARISON: None. COMMENT: UTERUS: Anteverted and anteflexed measuring 13.4 x 13.2 x 15.9 cm. There is a large solid mixed echogenicity mass projecting over the central uterine cavity measuring 8 x 9.8 x 11.7 cm. It is hyperechoic eccentrically and hypoechoic centrally, with multiple small cystic spaces and surrounding vascularity. RIGHT OVARY/ADNEXA: Mildly enlarged, containing several complex cystic structures with low level internal echoes or eccentric solid appearing echoes. For example a 3.1 x 3.3 x 2.1 cm cystic structure with eccentric solid appearing echoes and no internal color flow, likely representing a hemorrhagic cyst with retractile clot.. Normal arterial and venous flow on color and spectral Doppler. Ovarian size: 4.3 x  6.8 x  7.0 cm LEFT OVARY/ADNEXA: Contains multiple complex cystic structures, some with nonappearing internal echoes and no internal color flow. Normal arterial and venous flow on color and spectral Doppler. Ovarian size: 3.2 x 6.4 x 6.6 cm FLUID: There is no abnormal free fluid in the cul-de-sac.     Impression: IMPRESSION: 1.  No intrauterine gestation and no adnexal mass evident. These findings represent a pregnancy of unknown location. The appearance of a complex mass with small cystic spaces and peripheral vascularity occupying the central uterine cavity could represent a molar pregnancy but additional evaluation is suggested. 2.  Both ovaries are mildly enlarged with multiple complex cystic lesions as well as some with solid-appearing echoes that may represent a combination of hemorrhagic  cysts/follicles or endometriomas, but challenging to evaluate as the ovaries are only well evaluated transabdominally. Recommended these be reassessed on follow-up. No sonographic findings of ovarian torsion.       ECG/Telemetry  I have independently reviewed the telemetry. Significant findings include sinus tachycardia.    ASSESSMENT AND PLAN      * Hyperthyroidism during pregnancy  Assessment & Plan  TSH<0.01  Free T4  5.07  Endocrinology consulted, recs appreciated  As patient is in the first trimester of pregnancy, started on PTU  Continue metoprolol for tachycardia as BP allows  Will require repeat TFTs in 2 weeks    Tachycardia  Assessment & Plan  Likely 2/2 to hyperthryoidism  D-dimer check and is elevated  Troponin negative x2  LE dopplers showed no No femoropopliteal thrombosis    Recommended for CT chest PE study but patient declined  Discussed with Dr. Murphy, OB/GYN, who relayed d-dimer elevation is normal in pregnancy and has a low suspicion for acute PE    Discussed at length with the patient and discussed risk vs benefit of PE study. She continues to have concern for radiation exposure with her current pregnancy, but decided to proceed with CT PE study  Continue BB as BP allows  IVF hydration  Check echocardiogram, pending report  Consider card consult  Started on PTU      Microcytic anemia  Assessment & Plan  Patient reports typically becomes anemia during pregnancy  Does not recall most recent Hb  Hb dropped from 7.9>6.9  Possible dilutional with IVFs, but warrants transfusion with Hb<7  Iron panel, ferritin, acceptable  Haptoglobin pending, retic count mildly elevated  Improved with 1u PRBC  Serial cbc    History of COVID-19  Assessment & Plan  Reported prior history of COVID infection 3 weeks ago  Does not require repeat testing at this time  Does not require isolation precautions   Supportive care      Less than 8 weeks gestation of pregnancy  Assessment & Plan  Reported 6 wks pregnancy  [facility]  [place]  Pelvic US with no IUP, but per OB this is not uncommon in early first trimester  OB/GYN following, recs appreciated  Avoid teratogenic agents as able       VTE Assessment: Pad[place] VTE Score: 6  VTE Prophylaxis:  Current anticoagulants:  [Provider Managed Hold] enoxaparin (LOVENOX) syringe 40 mg, subcutaneous, Daily (6p)      Code Status: Full Code  Palliative Care Screening Score: 0   Estimated Discharge Date: 1/14/2023     Disposition Planning: awaiting imaging results     Zeb Lara,   1/12/2023

## 2023-01-12 NOTE — POST-PROCEDURE NOTE
Interventional Radiology Brief Postprocedure Note    Alondra Villela     Attending: Arturo Adames MD     Assistant: None    Diagnosis: lung masses    Description of procedure: CT guided RLL lung mass biopsy    Contrast: None     Anesthesia:  Local (Lidocaine), fentanyl IV    Volume of Lidocaine Utilized (ml): 5     Medications: None     Complications: None      Estimated Blood Loss: Estimated Blood Loss: 0-10 ml    Anticoagulation: None    Specimens: 4 20G core samples    Findings: Status post CT guided right lung mass biopsy.    1/12/2023 5:10 PM

## 2023-01-12 NOTE — PLAN OF CARE
Problem: Adult Inpatient Plan of Care  Goal: Readiness for Transition of Care  Outcome: Progressing  Intervention: Mutually Develop Transition Plan  Flowsheets (Taken 1/12/2023 1530)  Anticipated Discharge Disposition: home with assistance  Equipment Needed After Discharge: none  Assistive Device/Animal Currently Used at Home: none  Anticipated Changes Related to Illness: none  Transportation Concerns: car, none  Readmission Within the Last 30 Days: no previous admission in last 30 days  Patient/Family Anticipated Services at Transition: none  Patient/Family Anticipates Transition to: home with family  Transportation Anticipated: family or friend will provide  Concerns to be Addressed: no discharge needs identified  Offered/Gave Vendor List: no     Pt presented from home with tachycardia. SW met with pt and her  at the bedside this afternoon. Pt reported she lives with her  and 2 children in a 2SH with basement and 2 KAPIL. Pt has a ME on 1st flr and full baths on the 2nd. Pt was independent with ADLs PTA. Pt has no AD, O2, AD/LW, HC, or SNF/[facility] stays. PCP and Pharmacy confirmed. Pts  will transport home. No other d/c needs identified.    Discharge Plan  Home   will transport

## 2023-01-12 NOTE — PLAN OF CARE
Discussed with OB/GYN,  Dr. Murphy, and Heme/Onc, Dr. Souza. Concern for possible gestational trophoblastic disease. Recommend MRI brain and CT abd/pelvis tonight and planning for D&C in the AM. Requested for PICC line placement as soon as possible.    IV team consulted. Patient NPO after midnight.     Dr. Souza and I both agree there medical necessity and urgency for additional contrast imaging studies to further delineate the abdominal pathology prior to planned D&C tomorrow. Advise that CT imaging with PO and IV contrast proceed today. Will continue aggressive IVF hydration as prophylaxis against contrast induced nephropathy.

## 2023-01-13 ENCOUNTER — HOSPITAL ENCOUNTER (INPATIENT)
Facility: HOSPITAL | Age: 33
LOS: 8 days | Discharge: HOME | DRG: 754 | End: 2023-01-21
Attending: OBSTETRICS & GYNECOLOGY | Admitting: STUDENT IN AN ORGANIZED HEALTH CARE EDUCATION/TRAINING PROGRAM
Payer: COMMERCIAL

## 2023-01-13 ENCOUNTER — PREP FOR CASE (OUTPATIENT)
Dept: GYNECOLOGIC ONCOLOGY | Facility: CLINIC | Age: 33
End: 2023-01-13
Payer: COMMERCIAL

## 2023-01-13 ENCOUNTER — APPOINTMENT (INPATIENT)
Dept: RADIOLOGY | Facility: HOSPITAL | Age: 33
DRG: 832 | End: 2023-01-13
Attending: STUDENT IN AN ORGANIZED HEALTH CARE EDUCATION/TRAINING PROGRAM
Payer: COMMERCIAL

## 2023-01-13 VITALS
TEMPERATURE: 98.2 F | DIASTOLIC BLOOD PRESSURE: 55 MMHG | RESPIRATION RATE: 16 BRPM | OXYGEN SATURATION: 96 % | HEART RATE: 105 BPM | HEIGHT: 64 IN | SYSTOLIC BLOOD PRESSURE: 105 MMHG | WEIGHT: 172.4 LBS | BODY MASS INDEX: 29.43 KG/M2

## 2023-01-13 DIAGNOSIS — C58 GESTATIONAL CHORIOCARCINOMA (CMS/HCC)  (CMS/HCC): Primary | ICD-10-CM

## 2023-01-13 DIAGNOSIS — O02.89 GESTATIONAL CHORIOCARCINOMA (CMS/HCC)  (CMS/HCC): Primary | ICD-10-CM

## 2023-01-13 DIAGNOSIS — R00.0 TACHYCARDIA: ICD-10-CM

## 2023-01-13 PROBLEM — O01.9 GESTATIONAL TROPHOBLASTIC DISEASE: Status: ACTIVE | Noted: 2023-01-13

## 2023-01-13 LAB
ABO + RH BLD: NORMAL
ALBUMIN SERPL-MCNC: 2.5 G/DL (ref 3.4–5)
ALP SERPL-CCNC: 92 IU/L (ref 35–126)
ALT SERPL-CCNC: 28 IU/L (ref 11–54)
ANION GAP SERPL CALC-SCNC: 6 MEQ/L (ref 3–15)
APTT PPP: 26 SEC (ref 23–35)
AST SERPL-CCNC: 28 IU/L (ref 15–41)
BASOPHILS # BLD: 0 K/UL (ref 0.01–0.1)
BASOPHILS NFR BLD: 0 %
BILIRUB SERPL-MCNC: 0.4 MG/DL (ref 0.3–1.2)
BLD GP AB SCN SERPL QL: NEGATIVE
BUN SERPL-MCNC: 7 MG/DL (ref 8–20)
CALCIUM SERPL-MCNC: 8.2 MG/DL (ref 8.9–10.3)
CHLORIDE SERPL-SCNC: 109 MEQ/L (ref 98–109)
CO2 SERPL-SCNC: 19 MEQ/L (ref 22–32)
CREAT SERPL-MCNC: 0.5 MG/DL (ref 0.6–1.1)
CROSSMATCH: NORMAL
D AG BLD QL: POSITIVE
DIFFERENTIAL METHOD BLD: ABNORMAL
EOSINOPHIL # BLD: 0.04 K/UL (ref 0.04–0.36)
EOSINOPHIL NFR BLD: 1.2 %
ERYTHROCYTE [DISTWIDTH] IN BLOOD BY AUTOMATED COUNT: 13.5 % (ref 11.7–14.4)
FIBRINOGEN PPP-MCNC: 268 MG/DL (ref 220–480)
GFR SERPL CREATININE-BSD FRML MDRD: >60 ML/MIN/1.73M*2
GLUCOSE SERPL-MCNC: 88 MG/DL (ref 70–99)
HCG SERPL-ACNC: ABNORMAL IU/L (MIU/ML)
HCG SERPL-ACNC: ABNORMAL IU/L (MIU/ML)
HCT VFR BLDCO AUTO: 24.2 % (ref 35–45)
HGB BLD-MCNC: 7.9 G/DL (ref 11.8–15.7)
IMM GRANULOCYTES # BLD AUTO: 0.01 K/UL (ref 0–0.08)
IMM GRANULOCYTES NFR BLD AUTO: 0.3 %
INR PPP: 1.3
ISBT CODE: 7300
LABORATORY COMMENT REPORT: NORMAL
LYMPHOCYTES # BLD: 0.86 K/UL (ref 1.2–3.5)
LYMPHOCYTES NFR BLD: 26.7 %
MAGNESIUM SERPL-MCNC: 1.4 MG/DL (ref 1.8–2.5)
MCH RBC QN AUTO: 27.1 PG (ref 28–33.2)
MCHC RBC AUTO-ENTMCNC: 32.6 G/DL (ref 32.2–35.5)
MCV RBC AUTO: 82.9 FL (ref 83–98)
MONOCYTES # BLD: 0.38 K/UL (ref 0.28–0.8)
MONOCYTES NFR BLD: 11.8 %
NEUTROPHILS # BLD: 1.93 K/UL (ref 1.7–7)
NEUTS SEG NFR BLD: 60 %
NRBC BLD-RTO: 0 %
PDW BLD AUTO: 9.6 FL (ref 9.4–12.3)
PLATELET # BLD AUTO: 151 K/UL (ref 150–369)
POTASSIUM SERPL-SCNC: 3.7 MEQ/L (ref 3.6–5.1)
PRODUCT CODE: NORMAL
PRODUCT STATUS: NORMAL
PROT SERPL-MCNC: 5.3 G/DL (ref 6–8.2)
PROTHROMBIN TIME: 16.1 SEC (ref 12.2–14.5)
RBC # BLD AUTO: 2.92 M/UL (ref 3.93–5.22)
SODIUM SERPL-SCNC: 134 MEQ/L (ref 136–144)
SPECIMEN EXP DATE BLD: NORMAL
SPECIMEN EXP DATE BLD: NORMAL
UNIT ABO: NORMAL
UNIT ID: NORMAL
UNIT RH: POSITIVE
WBC # BLD AUTO: 3.22 K/UL (ref 3.8–10.5)

## 2023-01-13 PROCEDURE — 99222 1ST HOSP IP/OBS MODERATE 55: CPT | Performed by: INTERNAL MEDICINE

## 2023-01-13 PROCEDURE — 63700000 HC SELF-ADMINISTRABLE DRUG: Performed by: OBSTETRICS & GYNECOLOGY

## 2023-01-13 PROCEDURE — 25500000 HC DRUGS/INCIDENT RAD: Performed by: INTERNAL MEDICINE

## 2023-01-13 PROCEDURE — 86900 BLOOD TYPING SEROLOGIC ABO: CPT

## 2023-01-13 PROCEDURE — 99239 HOSP IP/OBS DSCHRG MGMT >30: CPT | Performed by: STUDENT IN AN ORGANIZED HEALTH CARE EDUCATION/TRAINING PROGRAM

## 2023-01-13 PROCEDURE — 85730 THROMBOPLASTIN TIME PARTIAL: CPT | Performed by: OBSTETRICS & GYNECOLOGY

## 2023-01-13 PROCEDURE — 80053 COMPREHEN METABOLIC PANEL: CPT | Performed by: STUDENT IN AN ORGANIZED HEALTH CARE EDUCATION/TRAINING PROGRAM

## 2023-01-13 PROCEDURE — 25800000 HC PHARMACY IV SOLUTIONS: Performed by: STUDENT IN AN ORGANIZED HEALTH CARE EDUCATION/TRAINING PROGRAM

## 2023-01-13 PROCEDURE — 83735 ASSAY OF MAGNESIUM: CPT | Performed by: OBSTETRICS & GYNECOLOGY

## 2023-01-13 PROCEDURE — C1751 CATH, INF, PER/CENT/MIDLINE: HCPCS

## 2023-01-13 PROCEDURE — 99223 1ST HOSP IP/OBS HIGH 75: CPT | Performed by: STUDENT IN AN ORGANIZED HEALTH CARE EDUCATION/TRAINING PROGRAM

## 2023-01-13 PROCEDURE — 25800000 HC PHARMACY IV SOLUTIONS: Performed by: OBSTETRICS & GYNECOLOGY

## 2023-01-13 PROCEDURE — 63600000 HC DRUGS/DETAIL CODE: Performed by: OBSTETRICS & GYNECOLOGY

## 2023-01-13 PROCEDURE — 63700000 HC SELF-ADMINISTRABLE DRUG: Performed by: INTERNAL MEDICINE

## 2023-01-13 PROCEDURE — 84702 CHORIONIC GONADOTROPIN TEST: CPT | Performed by: OBSTETRICS & GYNECOLOGY

## 2023-01-13 PROCEDURE — 36415 COLL VENOUS BLD VENIPUNCTURE: CPT | Performed by: OBSTETRICS & GYNECOLOGY

## 2023-01-13 PROCEDURE — 36573 INSJ PICC RS&I 5 YR+: CPT

## 2023-01-13 PROCEDURE — 05HB33Z INSERTION OF INFUSION DEVICE INTO RIGHT BASILIC VEIN, PERCUTANEOUS APPROACH: ICD-10-PCS | Performed by: STUDENT IN AN ORGANIZED HEALTH CARE EDUCATION/TRAINING PROGRAM

## 2023-01-13 PROCEDURE — 71045 X-RAY EXAM CHEST 1 VIEW: CPT

## 2023-01-13 PROCEDURE — P9016 RBC LEUKOCYTES REDUCED: HCPCS

## 2023-01-13 PROCEDURE — 63600000 HC DRUGS/DETAIL CODE: Performed by: STUDENT IN AN ORGANIZED HEALTH CARE EDUCATION/TRAINING PROGRAM

## 2023-01-13 PROCEDURE — 86850 RBC ANTIBODY SCREEN: CPT

## 2023-01-13 PROCEDURE — 85025 COMPLETE CBC W/AUTO DIFF WBC: CPT | Performed by: STUDENT IN AN ORGANIZED HEALTH CARE EDUCATION/TRAINING PROGRAM

## 2023-01-13 PROCEDURE — 63700000 HC SELF-ADMINISTRABLE DRUG: Performed by: STUDENT IN AN ORGANIZED HEALTH CARE EDUCATION/TRAINING PROGRAM

## 2023-01-13 PROCEDURE — 21400000 HC ROOM AND CARE CCU/INTERMEDIATE

## 2023-01-13 PROCEDURE — 99222 1ST HOSP IP/OBS MODERATE 55: CPT | Performed by: SURGERY

## 2023-01-13 PROCEDURE — 86300 IMMUNOASSAY TUMOR CA 15-3: CPT | Performed by: INTERNAL MEDICINE

## 2023-01-13 PROCEDURE — 1123F ACP DISCUSS/DSCN MKR DOCD: CPT | Performed by: STUDENT IN AN ORGANIZED HEALTH CARE EDUCATION/TRAINING PROGRAM

## 2023-01-13 PROCEDURE — 0BBF3ZX EXCISION OF RIGHT LOWER LUNG LOBE, PERCUTANEOUS APPROACH, DIAGNOSTIC: ICD-10-PCS | Performed by: RADIOLOGY

## 2023-01-13 PROCEDURE — 74177 CT ABD & PELVIS W/CONTRAST: CPT | Mod: MG

## 2023-01-13 PROCEDURE — 85610 PROTHROMBIN TIME: CPT | Performed by: OBSTETRICS & GYNECOLOGY

## 2023-01-13 PROCEDURE — 63600105 HC IODINE BASED CONTRAST: Performed by: STUDENT IN AN ORGANIZED HEALTH CARE EDUCATION/TRAINING PROGRAM

## 2023-01-13 PROCEDURE — 86920 COMPATIBILITY TEST SPIN: CPT

## 2023-01-13 PROCEDURE — 85384 FIBRINOGEN ACTIVITY: CPT | Performed by: OBSTETRICS & GYNECOLOGY

## 2023-01-13 PROCEDURE — 3E03305 INTRODUCTION OF OTHER ANTINEOPLASTIC INTO PERIPHERAL VEIN, PERCUTANEOUS APPROACH: ICD-10-PCS | Performed by: STUDENT IN AN ORGANIZED HEALTH CARE EDUCATION/TRAINING PROGRAM

## 2023-01-13 RX ORDER — IBUPROFEN 200 MG
16-32 TABLET ORAL AS NEEDED
Status: DISCONTINUED | OUTPATIENT
Start: 2023-01-13 | End: 2023-01-21 | Stop reason: HOSPADM

## 2023-01-13 RX ORDER — OXYCODONE HYDROCHLORIDE 5 MG/1
5 TABLET ORAL EVERY 4 HOURS PRN
Status: DISCONTINUED | OUTPATIENT
Start: 2023-01-13 | End: 2023-01-13 | Stop reason: HOSPADM

## 2023-01-13 RX ORDER — DEXTROSE 50 % IN WATER (D50W) INTRAVENOUS SYRINGE
25 AS NEEDED
Status: DISCONTINUED | OUTPATIENT
Start: 2023-01-13 | End: 2023-01-21 | Stop reason: HOSPADM

## 2023-01-13 RX ORDER — LORAZEPAM 1 MG/1
1 TABLET ORAL EVERY 8 HOURS PRN
Status: DISCONTINUED | OUTPATIENT
Start: 2023-01-13 | End: 2023-01-21 | Stop reason: HOSPADM

## 2023-01-13 RX ORDER — SODIUM CHLORIDE 9 MG/ML
5 INJECTION, SOLUTION INTRAVENOUS AS NEEDED
Status: ACTIVE | OUTPATIENT
Start: 2023-01-13 | End: 2023-01-14

## 2023-01-13 RX ORDER — EPINEPHRINE 1 MG/ML
0.5 INJECTION, SOLUTION INTRAMUSCULAR; SUBCUTANEOUS ONCE AS NEEDED
Status: CANCELLED | OUTPATIENT
Start: 2023-01-13

## 2023-01-13 RX ORDER — SODIUM CHLORIDE 9 MG/ML
500 INJECTION, SOLUTION INTRAVENOUS CONTINUOUS
Status: CANCELLED | OUTPATIENT
Start: 2023-01-13

## 2023-01-13 RX ORDER — FAMOTIDINE 10 MG/ML
20 INJECTION INTRAVENOUS ONCE AS NEEDED
Status: CANCELLED | OUTPATIENT
Start: 2023-01-13

## 2023-01-13 RX ORDER — DEXTROSE 40 %
15-30 GEL (GRAM) ORAL AS NEEDED
Status: DISCONTINUED | OUTPATIENT
Start: 2023-01-13 | End: 2023-01-21 | Stop reason: HOSPADM

## 2023-01-13 RX ORDER — PALONOSETRON 0.05 MG/ML
250 INJECTION, SOLUTION INTRAVENOUS ONCE
Status: CANCELLED | OUTPATIENT
Start: 2023-01-13

## 2023-01-13 RX ORDER — SENNOSIDES 8.6 MG/1
1 TABLET ORAL 2 TIMES DAILY PRN
Status: DISCONTINUED | OUTPATIENT
Start: 2023-01-13 | End: 2023-01-21 | Stop reason: HOSPADM

## 2023-01-13 RX ORDER — BUPROPION HYDROCHLORIDE 150 MG/1
150 TABLET ORAL DAILY
Status: DISCONTINUED | OUTPATIENT
Start: 2023-01-14 | End: 2023-01-13

## 2023-01-13 RX ORDER — FAMOTIDINE 10 MG/ML
20 INJECTION INTRAVENOUS ONCE AS NEEDED
Status: CANCELLED | OUTPATIENT
Start: 2023-01-14

## 2023-01-13 RX ORDER — SODIUM CHLORIDE 9 MG/ML
500 INJECTION, SOLUTION INTRAVENOUS CONTINUOUS
Status: CANCELLED | OUTPATIENT
Start: 2023-01-14

## 2023-01-13 RX ORDER — FAMOTIDINE 10 MG/ML
20 INJECTION INTRAVENOUS ONCE AS NEEDED
Status: DISCONTINUED | OUTPATIENT
Start: 2023-01-13 | End: 2023-01-19

## 2023-01-13 RX ORDER — MORPHINE SULFATE 2 MG/ML
2 INJECTION, SOLUTION INTRAMUSCULAR; INTRAVENOUS EVERY 4 HOURS PRN
Status: CANCELLED | OUTPATIENT
Start: 2023-01-13

## 2023-01-13 RX ORDER — IBUPROFEN 400 MG/1
400 TABLET ORAL EVERY 4 HOURS PRN
Status: DISCONTINUED | OUTPATIENT
Start: 2023-01-13 | End: 2023-01-21 | Stop reason: HOSPADM

## 2023-01-13 RX ORDER — DEXTROSE MONOHYDRATE AND SODIUM CHLORIDE 5; .9 G/100ML; G/100ML
INJECTION, SOLUTION INTRAVENOUS CONTINUOUS
Status: DISCONTINUED | OUTPATIENT
Start: 2023-01-13 | End: 2023-01-13 | Stop reason: HOSPADM

## 2023-01-13 RX ORDER — PROPYLTHIOURACIL 50 MG/1
50 TABLET ORAL
Status: DISCONTINUED | OUTPATIENT
Start: 2023-01-13 | End: 2023-01-13

## 2023-01-13 RX ORDER — OXYCODONE HYDROCHLORIDE 5 MG/1
5 TABLET ORAL EVERY 4 HOURS PRN
Status: CANCELLED | OUTPATIENT
Start: 2023-01-13

## 2023-01-13 RX ORDER — MORPHINE SULFATE 2 MG/ML
2 INJECTION, SOLUTION INTRAMUSCULAR; INTRAVENOUS EVERY 4 HOURS PRN
Status: DISCONTINUED | OUTPATIENT
Start: 2023-01-13 | End: 2023-01-13 | Stop reason: HOSPADM

## 2023-01-13 RX ORDER — DEXAMETHASONE SODIUM PHOSPHATE 4 MG/ML
8 INJECTION, SOLUTION INTRA-ARTICULAR; INTRALESIONAL; INTRAMUSCULAR; INTRAVENOUS; SOFT TISSUE ONCE
Status: CANCELLED | OUTPATIENT
Start: 2023-01-14

## 2023-01-13 RX ORDER — SODIUM CHLORIDE 9 MG/ML
500 INJECTION, SOLUTION INTRAVENOUS CONTINUOUS
Status: ACTIVE | OUTPATIENT
Start: 2023-01-13 | End: 2023-01-13

## 2023-01-13 RX ORDER — PALONOSETRON 0.05 MG/ML
250 INJECTION, SOLUTION INTRAVENOUS ONCE
Status: CANCELLED | OUTPATIENT
Start: 2023-01-14

## 2023-01-13 RX ORDER — ONDANSETRON HYDROCHLORIDE 2 MG/ML
4 INJECTION, SOLUTION INTRAVENOUS EVERY 8 HOURS PRN
Status: DISCONTINUED | OUTPATIENT
Start: 2023-01-13 | End: 2023-01-21 | Stop reason: HOSPADM

## 2023-01-13 RX ORDER — PALONOSETRON 0.05 MG/ML
250 INJECTION, SOLUTION INTRAVENOUS ONCE
Status: COMPLETED | OUTPATIENT
Start: 2023-01-13 | End: 2023-01-13

## 2023-01-13 RX ORDER — PROPYLTHIOURACIL 50 MG/1
100 TABLET ORAL EVERY 8 HOURS
Status: DISCONTINUED | OUTPATIENT
Start: 2023-01-13 | End: 2023-01-21

## 2023-01-13 RX ORDER — DIPHENHYDRAMINE HCL 50 MG/ML
25 VIAL (ML) INJECTION ONCE AS NEEDED
Status: DISCONTINUED | OUTPATIENT
Start: 2023-01-13 | End: 2023-01-19

## 2023-01-13 RX ORDER — BUPROPION HYDROCHLORIDE 150 MG/1
150 TABLET ORAL DAILY
Status: DISCONTINUED | OUTPATIENT
Start: 2023-01-13 | End: 2023-01-21 | Stop reason: HOSPADM

## 2023-01-13 RX ORDER — DEXTROSE MONOHYDRATE AND SODIUM CHLORIDE 5; .9 G/100ML; G/100ML
INJECTION, SOLUTION INTRAVENOUS CONTINUOUS
Status: CANCELLED | OUTPATIENT
Start: 2023-01-13 | End: 2023-01-14

## 2023-01-13 RX ORDER — PROTRIPTYLINE HYDROCHLORIDE 5 MG/1
15 TABLET, FILM COATED ORAL DAILY
Status: DISCONTINUED | OUTPATIENT
Start: 2023-01-14 | End: 2023-01-21 | Stop reason: HOSPADM

## 2023-01-13 RX ORDER — DIPHENHYDRAMINE HCL 50 MG/ML
25 VIAL (ML) INJECTION ONCE AS NEEDED
Status: CANCELLED | OUTPATIENT
Start: 2023-01-13

## 2023-01-13 RX ORDER — EPINEPHRINE 1 MG/ML
0.5 INJECTION, SOLUTION INTRAMUSCULAR; SUBCUTANEOUS ONCE AS NEEDED
Status: DISCONTINUED | OUTPATIENT
Start: 2023-01-13 | End: 2023-01-19

## 2023-01-13 RX ORDER — EPINEPHRINE 1 MG/ML
0.5 INJECTION, SOLUTION INTRAMUSCULAR; SUBCUTANEOUS ONCE AS NEEDED
Status: CANCELLED | OUTPATIENT
Start: 2023-01-14

## 2023-01-13 RX ORDER — DIPHENHYDRAMINE HCL 50 MG/ML
25 VIAL (ML) INJECTION ONCE AS NEEDED
Status: CANCELLED | OUTPATIENT
Start: 2023-01-14

## 2023-01-13 RX ORDER — ACETAMINOPHEN 325 MG/1
650 TABLET ORAL EVERY 4 HOURS PRN
Status: DISCONTINUED | OUTPATIENT
Start: 2023-01-13 | End: 2023-01-21 | Stop reason: HOSPADM

## 2023-01-13 RX ORDER — ONDANSETRON 4 MG/1
4 TABLET, ORALLY DISINTEGRATING ORAL EVERY 8 HOURS PRN
Status: DISCONTINUED | OUTPATIENT
Start: 2023-01-13 | End: 2023-01-21 | Stop reason: HOSPADM

## 2023-01-13 RX ORDER — SODIUM CHLORIDE, SODIUM LACTATE, POTASSIUM CHLORIDE, CALCIUM CHLORIDE 600; 310; 30; 20 MG/100ML; MG/100ML; MG/100ML; MG/100ML
INJECTION, SOLUTION INTRAVENOUS CONTINUOUS
Status: DISCONTINUED | OUTPATIENT
Start: 2023-01-13 | End: 2023-01-13

## 2023-01-13 RX ADMIN — PROPYLTHIOURACIL 50 MG: 50 TABLET ORAL at 06:21

## 2023-01-13 RX ADMIN — PROPYLTHIOURACIL 100 MG: 50 TABLET ORAL at 15:06

## 2023-01-13 RX ADMIN — METOPROLOL TARTRATE 12.5 MG: 25 TABLET, FILM COATED ORAL at 09:57

## 2023-01-13 RX ADMIN — BUPROPION HYDROCHLORIDE 150 MG: 150 TABLET, EXTENDED RELEASE ORAL at 21:45

## 2023-01-13 RX ADMIN — SODIUM CHLORIDE 500 ML/HR: 9 INJECTION, SOLUTION INTRAVENOUS at 20:43

## 2023-01-13 RX ADMIN — POTASSIUM CHLORIDE 500 ML/HR: 2 INJECTION, SOLUTION, CONCENTRATE INTRAVENOUS at 15:18

## 2023-01-13 RX ADMIN — DEXAMETHASONE SODIUM PHOSPHATE 12 MG: 4 INJECTION, SOLUTION INTRA-ARTICULAR; INTRALESIONAL; INTRAMUSCULAR; INTRAVENOUS; SOFT TISSUE at 17:06

## 2023-01-13 RX ADMIN — SODIUM CHLORIDE: 9 INJECTION, SOLUTION INTRAVENOUS at 03:32

## 2023-01-13 RX ADMIN — ONDANSETRON 4 MG: 4 TABLET, ORALLY DISINTEGRATING ORAL at 16:13

## 2023-01-13 RX ADMIN — FOSAPREPITANT DIMEGLUMINE 150 MG: 150 INJECTION, POWDER, LYOPHILIZED, FOR SOLUTION INTRAVENOUS at 16:31

## 2023-01-13 RX ADMIN — DEXTROSE MONOHYDRATE 188 MG: 50 INJECTION, SOLUTION INTRAVENOUS at 17:47

## 2023-01-13 RX ADMIN — METOPROLOL TARTRATE 12.5 MG: 25 TABLET, FILM COATED ORAL at 21:44

## 2023-01-13 RX ADMIN — IOHEXOL 100 ML: 350 INJECTION, SOLUTION INTRAVENOUS at 10:34

## 2023-01-13 RX ADMIN — BARIUM SULFATE 675 ML: 20 SUSPENSION ORAL at 07:43

## 2023-01-13 RX ADMIN — PALONOSETRON HYDROCHLORIDE 250 MCG: 0.25 INJECTION, SOLUTION INTRAVENOUS at 16:28

## 2023-01-13 RX ADMIN — CISPLATIN 38 MG: 1 INJECTION, SOLUTION INTRAVENOUS at 18:56

## 2023-01-13 RX ADMIN — BARIUM SULFATE 225 ML: 20 SUSPENSION ORAL at 07:43

## 2023-01-13 RX ADMIN — PROPYLTHIOURACIL 100 MG: 50 TABLET ORAL at 21:45

## 2023-01-13 NOTE — DISCHARGE SUMMARY
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  MRI brain WO with scattered nonspecific white matter changes  MRI brain W contrast showed no evidence of metastatic brain lesions  CT A/P W contrast pending, delayed till this AM to allow for contrast washout  PICC team consult placed  Gyn/Onc following and [place] [facility] [place] of specialized treatment      * Hyperthyroidism  Assessment & Plan  TSH<0.01  Free T4   5.07  maria ines related to malignancy    Endocrinology consulted, recs appreciated  As patient is in the first trimester of pregnancy, started on PTU  Continue metoprolol for tachycardia as BP allows  Will require repeat TFTs in 2 weeks    Abnormal CT scan  Assessment & Plan  Abnormal CT chest findings concerning for metastatic disease in the liver and lungs  IR performed CT guided biopsy of right lung mass  Serial post-procedure CXRs noted  F/u pathology  Heme/Onc and Gyn/Onc following    Microcytic anemia  Assessment & Plan  Patient reports typically becomes anemia during pregnancy  Does not recall most recent Hb  Hb dropped from 7.9>6.9  Possible dilutional with IVFs, but warranted transfusion with Hb<7  Iron panel, ferritin, acceptable  Haptoglobin pending, retic count mildly elevated  Improved with 1u PRBC  Likely related to above concern for malignancy  Serial cbc    Tachycardia  Assessment & Plan  Likely 2/2 to hyperthryoidism  D-dimer check and is elevated  Troponin negative x2  LE dopplers showed no No femoropopliteal thrombosis    Recommended for CT chest PE study but patient initially declined  Discussed with Dr. Murphy, OB/GYN, on day of admission who relayed d-dimer elevation is normal in pregnancy and had a low suspicion for acute PE    Due to persistent tachycardia, borderline hypotension, and new right chest pain proceeded with CTA PE study  CTA PE with abnormal findings as above, no PE noted but significant other abnormal findings  Continue BB as BP allows  IVF hydration  Echocardiogram wnl  Consider card consult  Started on PTU for treatment of hyperthyroidism      History of COVID-19  Assessment & Plan  Reported prior history of COVID infection 3 weeks ago  Does not require repeat testing at this time  Does not require isolation precautions   Supportive care      SUMMARY OF HOSPITALIZATION      Presenting Problem/History of Present Illness  This is a 32 y.o. year-old female admitted on 1/11/2023  "with Hyperthyroidism [E05.90]  Tachycardia [R00.0]  Abnormal pregnancy in first trimester [O26.91]  Anemia, unspecified type [D64.9].    History per admission H&P:  \"Alondra Villela is a 32 y.o.  female with no other significant past medical history who presents with tachycardia.     States that she had COVID about 3 weeks ago residual tachycardia and intermittent shortness of breath.  She was seen in an urgent care and found to be pregnant.  She has not followed up with her PCP who performed routine blood tests and noted anemia and low TSH.  She was scheduled to be seen by cardiologist today, but this morning noticed right leg pain therefore recommended to present to the ED for PE evaluation.     On presentation, vitals significant for tachycardia heart rate ranging 110s to 130s, BP borderline low, afebrile, sats maintained mid 90s.  Labs significant for sodium 131, troponin 4.6> 4.3, TSH<0.01, free T4 5.07, hCG 444, hemoglobin 7.8, D-dimer 3.37.  EKG with sinus tachycardia, .  Pelvic ultrasound with no intrauterine pregnancy, and bilateral enlarged ovaries.  Lower extremity Doppler ultrasound showed no evidence of DVT        Admits headache and occasional lightheadedness with standing.  Admits occasional palpitations.  Admits extreme fatigue, intermittent nausea and vomiting consistent with morning sickness.  Admits small area of bruising behind the right knee and calf soreness.     No rhinorrhea, sore throat, otalgia, postnasal drip.  No visual changes, focal weakness, speech disturbances.  No dysphagia.  No cough or shortness of breath or dyspnea on exertion.  No leg edema, orthopnea, paroxysmal nocturnal dyspnea.  No melena or bright red blood per rectum.  No abdominal pain, diarrhea or constipation.  No dysuria or hematuria.  No fever, chills, rigors.  No rashes. No depression or anxiety. No suicidal thoughts or homicidal thoughts. \"       Hospital Course    Alondra Villela is a 33yo female with no " significant past medical history that was admitted for evaluation of palpitations. She was diagnosed as 6 weeks pregnant per outpatient urine pregnancy screen and elevated bHCG level.     Admission blood work was consistent with hyperthyroidism therefore, at the guidance of the Endocrinology team, she was initiated on treatment with Propylthiouracil and Lopressor.     She had a pelvic ultrasound which showed no intrauterine pregnancy but did note a complex mass within the central uterine cavity concerning for a possible molar pregnancy and bilateral enlarged ovaries with multiple complex cystic lesions. Gynecology followed in consult.     Repeat blood work revealed worsening anemia which responded to 1 unit PRBC transfusion.     She had persistent tachycardia, borderline hypotension, and new right sided chest pain. CTA chest PE study ordered which noted no pulmonary embolism, but noted significant abnormal findings of a 5cm right lower lobe mass, an anterior mediastinal mass, innumerable bilateral pulmonary nodules suspicious for metastatic disease to the lungs and 2 hyperenhancing liver lesions.     Hematology/Oncology and Gynecology/Oncology were consulted. She had a CT-guided biopsy of the right lung mass by Interventional Radiology on 1/12/23. Serial CXR post-biopsy with no evidence of pneumothorax. A MRI brain without JASWANT preliminary read was  concerning for mild scattered white matter disease which is nonspecific. MRI brain with JASWANT showed no abnormal intracranial contrast enhancement identified to suggest intracranial metastatic disease. CT abdomen/pelvis with contrast ordered and pending.     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 Patient will be transferred to [facility] for further specialized treatment.     Plan discussed at length with patient and her  at bedside and they are agreeable  to proceed with transfer.    Exam on Day of Discharge  Physical Exam   General: NAD, pleasant,non toxic, well developed, sitting up in bed comfortably  HEENT: atraumatic, normocephalic, mmm, sclera anicteric  Cardiovascular: tachycardic improved, no murmurs; S1/S2+  Pulmonary: CTAB; no rales, rhonchi or wheezing  Gi: Soft, nontender, nondistended, BS present  MSK: TTP of right chest wall and at biopsy site  Ext: trace peripheral edema  Neuro: Alert and oriented x3; sensations and motor function grossly intact  Psych: Calm, cooperative, appropriate answers    Consults During Admission  IP CONSULT TO OB GYN  IP CONSULT TO ENDOCRINOLOGY  IP CONSULT TO HEMATOLOGY/ONCOLOGY  IP CONSULT TO IV TEAM    DISCHARGE MEDICATIONS               Medication List      ASK your doctor about these medications    buPROPion  mg 24 hr tablet  Commonly known as: WELLBUTRIN XL  Take 150 mg by mouth daily.  Dose: 150 mg     ondansetron 4 mg tablet  Commonly known as: ZOFRAN  Take by mouth.     protriptyline 5 mg tablet  Commonly known as: VIVACTIL  Take 10 mg by mouth daily.  Dose: 10 mg                 Current Facility-Administered Medications   Medication Dose Route Frequency   • acetaminophen  650 mg oral q4h PRN    Or   • acetaminophen  650 mg rectal q4h PRN    Or   • acetaminophen  650 mg oral q4h PRN   • atropine  0.5 mg intravenous q5 min PRN   • buPROPion XL  150 mg oral Daily   • D5 % and 0.9 % sodium chloride   intravenous Continuous   • glucose  16-32 g of dextrose oral PRN    Or   • dextrose  15-30 g of dextrose oral PRN    Or   • glucagon  1 mg intramuscular PRN    Or   • dextrose 50 % in water (D50)  25 mL intravenous PRN   • [Provider Managed Hold] enoxaparin  40 mg subcutaneous Daily (6p)   • LORazepam  1 mg oral q8h PRN   • metoprolol tartrate  12.5 mg oral BID   • oxyCODONE  5 mg oral q4h PRN    And   • morphine  2 mg intravenous q4h PRN   • nitroglycerin  0.4 mg sublingual q5 min PRN   • ondansetron ODT  4 mg oral q8h  PRN    Or   • ondansetron  4 mg intravenous q8h PRN   • propylthiouraciL  50 mg oral q12h (7a, 7p)   • sodium chloride 0.9 %  5 mL/hr intravenous PRN          PROCEDURES / LABS / IMAGING      Operative Procedures  none    Other Procedures  PRBC transfusion x1unit 1/11/23  Lung biopsy 1/12/23    Pertinent Labs  Results from last 7 days   Lab Units 01/13/23  0517 01/12/23  0624 01/11/23  1049   WBC K/uL 3.22* 3.58* 2.93*   HEMOGLOBIN g/dL 7.9* 7.9* 6.9*   HEMATOCRIT % 24.2* 24.4* 20.9*   PLATELETS K/uL 151 161 168    Results from last 7 days   Lab Units 01/13/23  0517 01/12/23  0624 01/11/23  1049   SODIUM mEQ/L 134* 134* 135*   POTASSIUM mEQ/L 3.7 4.1 3.8   CHLORIDE mEQ/L 109 108 109   CO2 mEQ/L 19* 18* 19*   BUN mg/dL 7* 8 11   CREATININE mg/dL 0.5* 0.4* 0.6   GLUCOSE mg/dL 88 81 134*                   Results from last 7 days   Lab Units 01/11/23  0915   FERRITIN ng/mL 55      Results from last 7 days   Lab Units 01/13/23  0517 01/12/23  0624 01/11/23  1049   AST IU/L 28 40 32   ALT IU/L 28 32 26   ALK PHOS IU/L 92 101 88   BILIRUBIN TOTAL mg/dL 0.4 0.7 0.2*   PROTEIN TOTAL g/dL 5.3* 5.4* 5.7*   ALBUMIN g/dL 2.5* 2.8* 2.7*    Results from last 7 days   Lab Units 01/12/23  1514   PROTIME sec 14.9*   INR  1.2      Results from last 7 days   Lab Units 01/11/23  0356   D DIMER FEU ug/mL FEU 3.37*    Results from last 7 days   Lab Units 01/10/23  2317 01/10/23  2050   HIGH SENSITIVE TROPONIN I pg/mL 4.3 4.6        Microbiology Results     ** No results found for the last 720 hours. **         Results from last 7 days   Lab Units 01/11/23  0915 01/10/23  2050   TSH mIU/L  --  <0.01*   T3 FREE pg/mL 10.42*  --    FREE T4 ng/dL  --  5.07*                         No results found for: COVID19    Pertinent Imaging  MRI BRAIN WITHOUT CONTRAST    Result Date: 1/13/2023  Narrative: CLINICAL HISTORY: Gynecologic malignancy.  Evaluate for metastatic disease. COMMENT: Unenhanced magnetic resonance imaging of the brain was  performed utilizing standard [facility] protocol. Comparison: None Findings: The ventricles, sulci and cisternal spaces are unremarkable. There is no acute intracranial hemorrhage, intra-axial mass-effect or extra-axial fluid collection. No abnormal areas of restricted diffusion are identified to suggest acute infarction.  Multiple small foci of increased T2 signal are present in the periventricular and subcortical white matter which are nonspecific. The major intracranial arterial flow-voids are preserved. Concentric mucosal thickening is present in the maxillary sinuses, frontal and sphenoid sinuses and there is ethmoid air cell mucosal thickening.  The mastoid air cells appear patent.     Impression: IMPRESSION: 1.  No acute infarct, acute intracranial hemorrhage or evidence of intra-axial mass effect. 2.  Mild scattered white matter disease which is nonspecific. 3.  Please note that evaluation for intracranial metastatic disease is limited without intravenous contrast.    MRI BRAIN WITH CONTRAST    Result Date: 1/13/2023  Narrative: CLINICAL HISTORY: Metastatic disease evaluation.  Newly diagnosed gynecologic malignancy. COMMENT: Magnetic resonance imaging of the brain was performed with intravenous contrast. Axial pre and postcontrast T1 weighted sequences and coronal and sagittal postcontrast T1 weighted sequences were obtained.  A total of 7 cc of Gadavist were administered intravenously without reported complication. COMPARISON: Correlation is made with the unenhanced MRI of the brain performed two hours earlier. FINDINGS: There is no abnormally enhancing intraparenchymal lesion to suggest intraparenchymal metastatic disease.  There is no abnormal pachymeningeal contrast-enhancement.  The previously seen scattered nonspecific foci of increased T2 signal in the white matter do not demonstrate contrast enhancement.     Impression: IMPRESSION: 1.  No abnormal intracranial contrast enhancement identified  to suggest intracranial metastatic disease. 2.  Previously seen tiny foci of increased T2 signal in the white matter do not demonstrate contrast enhancement.  These findings could reflect foci of chronic ischemia, the sequelae of migraine headaches, demyelinating or postinfectious processes.  Other etiologies are not excluded.  Clinical correlation is recommended.    CT ANGIOGRAPHY CHEST PULMONARY EMBOLISM WITH IV CONTRAST    Result Date: 1/12/2023  Narrative: CLINICAL HISTORY: Shortness of breath. Positive d-dimer. Patient is 6 weeks pregnant. COMMENT: CTA examination of the chest with attention to the pulmonary vasculature was performed using contiguous 1.25 mm transaxial sections. 3D MIP and/or volume rendered image reconstruction performed and reviewed.Images were acquired after the uneventful administration of 80 cc of Omnipaque 350 intravenously. CT DOSE:  One or more dose reduction techniques (e.g. automated exposure control, adjustment of the mA and/or kV according to patient size, use of iterative reconstruction technique) utilized for this examination Comparison studies: None. Heart and pericardium: The heart is normal in size.  There is no pericardial effusion. Great vessels: There is no evidence for thoracic aortic aneurysm. The main pulmonary artery is normal in caliber.  No large or central filling defects are identified. Mediastinum: No mediastinal adenopathy. Anterior mediastinal mass measures 2.1 x 2.3 x 3.8 cm. Hina: No hilar adenopathy. Axilla: No axillary adenopathy. Lung parenchyma: Lobulated mass in the right lower lobe extending to pleura measures 3.3 x 5.1 x 5.1 cm. Multiple pulmonary nodules most in keeping with metastases. Pleura: Small right effusions. Visualized upper abdominal organs: There are hyperenhancing lesions in the right lobe of the liver measuring 2.1 x 4.6 cm. A 4 mm hyperenhancing lesion in the left lobe. These findings are nonspecific and further assessment with MRI is  recommended. Bones: Within normal limits.     Impression: IMPRESSION: No evidence for pulmonary embolism. 5 cm right lower lobe mass. Anterior mediastinal mass. Innumerable bilateral pulmonary nodules which are suspicious for metastatic disease to the lungs. 2 hyperenhancing liver lesions, the larger measuring 5 cm in maximal dimension for which further assessment with MRI with and without contrast is recommended.     X-RAY CHEST 1 VIEW    Result Date: 1/12/2023  Narrative: CLINICAL HISTORY: Status post right lung biopsy COMMENT: A single frontal radiograph of the chest is obtained, and compared to previous exam from 1/12/2023. The heart is normal in size. The mediastinum is within normal limits. Right peripheral basilar lung mass. No pneumothorax. The regional osseous structures are unremarkable.     Impression: IMPRESSION: No pneumothorax following right lung biopsy.    X-RAY CHEST 1 VIEW    Result Date: 1/12/2023  Narrative: CLINICAL HISTORY: s/p lung bx COMMENT: COMPARISON: CTA chest on 1/12/2023. TECHNIQUE: One view of the chest was obtained. FINDINGS: Lines/tubes/foreign bodies:  None. Lungs:  Multiple lung masses are noted, with the largest one at the right lung base. Pleura:  No evidence for pneumothorax or pleural effusion. Heart / mediastinum:  Cardiac and mediastinal silhouettes are within normal limits. Bones / soft tissues:  Unremarkable.     Impression: IMPRESSION: No pneumothorax status post right lung mass biopsy.     US venous leg, bilateral    Result Date: 1/10/2023  Narrative: CLINICAL INDICATION: Pregnancy pain R leg, high HR COMPARISON: None     Impression: IMPRESSION: No femoropopliteal thrombosis. COMMENT: A duplex Doppler study was performed of both lower extremities, consisting of integrated two-dimensional (2D) real-time imaging color flow Doppler and Doppler spectral analysis utilizing 5.0 MHz and 7.0 MHz linear array probes. RIGHT: COMMON FEMORAL VEIN: Normal compression DEEP FEMORAL  VEIN: Patent by color Doppler. FEMORAL VEIN: Normal compression, flow, and augmentation demonstrated on Doppler spectral analysis. POPLITEAL VEIN: Normal compression, flow, and augmentation demonstrated on Doppler spectral analysis. LEFT: COMMON FEMORAL VEIN: Normal compression DEEP FEMORAL VEIN: Patent by color Doppler. FEMORAL VEIN: Normal compression, flow, and augmentation demonstrated on Doppler spectral analysis. POPLITEAL VEIN: Normal compression, flow, and augmentation demonstrated on Doppler spectral analysis. Visualized calf veins compress normally.     Transthoracic Echo (TTE) Complete    Result Date: 1/12/2023  Narrative: •  Left Ventricle: Normal ventricle size. Mild concentric left ventricular hypertrophy. Estimated EF 65%. •  Mitral Valve: Normal leaflet structure. •  Left Atrium: Normal sized atrium. •  Aortic Valve: Normal structure. No regurgitation. No stenosis. Calculated dimensionless index = 0.69. •  Aorta: Aortic root normal. •  Right Atrium: Normal sized atrium. •  Right Ventricle: Normal ventricle size. Normal systolic function. •  Tricuspid Valve: Normal structure. •  Pulmonic Valve: Normal structure. •  IVC/SVC: Normal sized inferior vena cava. •  Pericardium: Normal structure.     ULTRASOUND PREGNANCY < 14 WEEKS TRANSABDOMINAL LIMITED, ULTRASOUND PREGNANCY TRANSVAGINAL ONLY, ULTRASOUND DOPPLER    Result Date: 1/11/2023  Narrative: CLINICAL HISTORY:   right sided pelvic pain, 6 weeks pregnant   LMP 12/6/2022 PROCEDURE: Ultrasound examination of the pelvis was performed transabdominally. Because of the concern for ovarian torsion, a vascular study of the ovaries was done using color flow and spectral Doppler imaging. COMPARISON: None. COMMENT: UTERUS: Anteverted and anteflexed measuring 13.4 x 13.2 x 15.9 cm. There is a large solid mixed echogenicity mass projecting over the central uterine cavity measuring 8 x 9.8 x 11.7 cm. It is hyperechoic eccentrically and hypoechoic centrally, with  multiple small cystic spaces and surrounding vascularity. RIGHT OVARY/ADNEXA: Mildly enlarged, containing several complex cystic structures with low level internal echoes or eccentric solid appearing echoes. For example a 3.1 x 3.3 x 2.1 cm cystic structure with eccentric solid appearing echoes and no internal color flow, likely representing a hemorrhagic cyst with retractile clot.. Normal arterial and venous flow on color and spectral Doppler. Ovarian size: 4.3 x  6.8 x  7.0 cm LEFT OVARY/ADNEXA: Contains multiple complex cystic structures, some with nonappearing internal echoes and no internal color flow. Normal arterial and venous flow on color and spectral Doppler. Ovarian size: 3.2 x 6.4 x 6.6 cm FLUID: There is no abnormal free fluid in the cul-de-sac.     Impression: IMPRESSION: 1.  No intrauterine gestation and no adnexal mass evident. These findings represent a pregnancy of unknown location. The appearance of a complex mass with small cystic spaces and peripheral vascularity occupying the central uterine cavity could represent a molar pregnancy but additional evaluation is suggested. 2.  Both ovaries are mildly enlarged with multiple complex cystic lesions as well as some with solid-appearing echoes that may represent a combination of hemorrhagic cysts/follicles or endometriomas, but challenging to evaluate as the ovaries are only well evaluated transabdominally. Recommended these be reassessed on follow-up. No sonographic findings of ovarian torsion.       OUTPATIENT  FOLLOW-UP / REFERRALS / PENDING TESTS        Outpatient Follow-Up Appointments  Encounter Information    This patient does not currently have any appointments scheduled.         Referrals  No orders of the defined types were placed in this encounter.      Test Results Pending at Discharge  Unresulted Labs (From admission, onward)     Start     Ordered    01/13/23 0600  BhCG, Serum, Quant  Once        Question:  Release to patient  Answer:   Immediate    01/11/23 0738    01/13/23 0600  CBC and Differential  Morning draw        Question:  Release to patient  Answer:  Immediate    01/12/23 1545    01/13/23 0600  Comprehensive metabolic panel  Morning draw        Question:  Release to patient  Answer:  Immediate    01/12/23 1545    01/12/23 1648  Pathology Tissue Exam  RELEASE UPON ORDERING        Comments: Specimen 1: right lung mass     Question:  Release to patient  Answer:  Immediate    01/12/23 1648    01/12/23 1517    Once        Question:  Release to patient  Answer:  Immediate    01/12/23 1516    01/12/23 1517  CA 27.29  Once        Question:  Release to patient  Answer:  Immediate    01/12/23 1516    01/12/23 1517  CEA  Once        Question:  Release to patient  Answer:  Immediate    01/12/23 1516    01/12/23 1517  Cancer antigen 15-3  Once        Question:  Release to patient  Answer:  Immediate    01/12/23 1516    01/12/23 1517  Cancer antigen 19-9  Once        Question:  Release to patient  Answer:  Immediate    01/12/23 1516    01/12/23 1436  Occult blood x 1, stool Per Rectum  Once        Question:  Release to patient  Answer:  Immediate    01/12/23 1436    01/11/23 1741  Thyroid stimulating immunoglobulin  Once        Question:  Release to patient  Answer:  Immediate    01/11/23 1741    Signed and Held  BhCG, Serum, Quant  Once        Question:  Release to patient  Answer:  Immediate    Signed and Held    Signed and Held  Occult blood x 1, stool Per Rectum  Once        Question:  Release to patient  Answer:  Immediate    Signed and Held    Signed and Held  Comprehensive metabolic panel  Morning draw        Question:  Release to patient  Answer:  Immediate    Signed and Held                Important Issues to Address in Follow-Up  Transfer to [facility] [place] for Gyn/Onc evaluation and treatment    DISCHARGE DISPOSITION AND DESTINATION      Disposition: Transfer to another type of institution   Destination: Home                            Code  Status At Discharge: Full Code    Physician Order for Life-Sustaining Treatment Document Status      No documents found                 Spent more than 35 minutes in patient evaluation, family update, physical evaluation, and  coordination of discharge and care.

## 2023-01-13 NOTE — PLAN OF CARE
Problem: Adult Inpatient Plan of Care  Goal: Plan of Care Review  Outcome: Progressing  Flowsheets (Taken 1/13/2023 9305)  Progress: no change  Plan of Care Reviewed With: patient  Outcome Summary: Pt with complaints of pain, not covered with tylenol, Dr. carr notified, tramadol ordered without relief, morphine ordered with relief of pain.  in room. MRI of brain completed, chest x-ray completed. Spoke with CT, pt to begin contrast around 0900 due to recent CT withing 24 hrs. Independent in room, AAOx4. Tachy on monitor, VSS will ctm.   Plan of Care Review  Plan of Care Reviewed With: patient  Progress: no change  Outcome Summary: Pt with complaints of pain, not covered with tylenol, Dr. carr notified, tramadol ordered without relief, morphine ordered with relief of pain.  in room. MRI of brain completed, chest x-ray completed. Spoke with CT, pt to begin contrast around 0900 due to recent CT withing 24 hrs. Independent in room, AAOx4. Tachy on monitor, VSS will ctm.

## 2023-01-13 NOTE — CONSULTS
PATIENTS: Alondra Villela  YOB: 1990  DATE: January 13, 2023  VISIT TYPE: consultation    History of Present Illness:   Alondra Villela is a 32 year old woman on whom we are consulted for management of HCG mediated hyperthyroidism in the context of a new diagnosis of gestational trophoblastic neoplasm.    She has no prior past medical or endocrine related history.  She had 2 prior uncomplicated pregnancies with youngest child now 4 months old.     She recently learned of pregnancy with LMP 12/6.  She lives in [place] and is currently visiting family members in [place].     She experienced COVID infection several weeks ago.   She came to attention this week with concerns of progresive palpitations, nausea/vomiting with inability to keep down food, and weight loss of 13 pounds.  She was seen in urgent care and was told of pregnancy.  She started to experience R leg pain and proceeded to the ED for evaluation to rule out DVT.    Upon presentation to [facility] she was found to be hyperthyroid with suppressed TSH and free T4 4.5.  FT3 was 10.42.  She was found to have very high (and rapidly increasing) HCG levels. US imaging showed no evidence of intrauterine pregnancy - but with a complex mass in the uterine cavity that raised the suspicion of trophoblastic neoplasm.    She has since been closely evaluated by gyn onc.  Abdominal CT imaging has confirmed the presence of a 12.9 x 10.6 x 13.8 cm uterine mass with peripheral  enhancement and central relative hypoattenuation.  She had numerous pulmonary nodules and a 4.9 cm mass in the RLL.  She had hypervascular liver lesions as well.  Brain MRI imaging did not suggest intracranial metastatic disease though non specific findings were noted.      She was seen by endocrinology at [facility] prior to her receiving a dx of trophoblastic neoplasm - when she was still thought to have a typical pregnancy.  She was started on PTU 50 bid and beta blockade with metoprolol 12.5 bid.     She  was seen by gyn and prompt initiation of chemotherapy has been recommended.  She was transferred to [facility] [place] for this purpose. She notably will be receiving high dose dex as part of her chemotherapeutic regimen.       Medical History:  No past medical history on file.    Surgical History:   Past Surgical History:   Procedure Laterality Date   •  SECTION      x2       Family History:  No family history on file.    Social history:  Social History     Socioeconomic History   • Marital status:      Spouse name: Not on file   • Number of children: Not on file   • Years of education: Not on file   • Highest education level: Not on file   Occupational History   • Not on file   Tobacco Use   • Smoking status: Never   • Smokeless tobacco: Never   Substance and Sexual Activity   • Alcohol use: Not Currently   • Drug use: Never   • Sexual activity: Yes   Other Topics Concern   • Not on file   Social History Narrative   • Not on file     Social Determinants of Health     Financial Resource Strain: Not on file   Food Insecurity: No Food Insecurity   • Worried About Running Out of Food in the Last Year: Never true   • Ran Out of Food in the Last Year: Never true   Transportation Needs: Not on file   Physical Activity: Not on file   Stress: Not on file   Social Connections: Not on file   Intimate Partner Violence: Not on file   Housing Stability: Not on file       Medication(active prior to today):  Current Facility-Administered Medications   Medication Dose Route Frequency Provider Last Rate Last Admin   • acetaminophen (TYLENOL) tablet 650 mg  650 mg oral q4h PRN Gerardo Sparks MD        And   • ibuprofen (MOTRIN) tablet 400 mg  400 mg oral q4h PRN Gerardo Sparks MD       • alteplase (CATHFLO ACTIVASE) injection 2 mg  2 mg intra-catheter PRN Lorena Hogue MD       • CISplatin (PLATINOL) 38 mg in sodium chloride 0.9 % 583 mL chemo IVPB  20 mg/m2 (Treatment Plan Recorded) intravenous Once Shantelle Mcmullen  MD BENNIE       • dexAMETHasone (DECADRON) 12 mg in sodium chloride 0.9 % 50 mL IVPB  12 mg intravenous Once Lorena Hogue MD       • glucose chewable tablet 16-32 g of dextrose  16-32 g of dextrose oral PRN Gerardo Sparks MD        Or   • dextrose 40 % oral gel 15-30 g of dextrose  15-30 g of dextrose oral PRN Gerardo Sparks MD        Or   • glucagon (GLUCAGEN) injection 1 mg  1 mg intramuscular PRN Gerardo Sparks MD        Or   • dextrose 50 % in water (D50) injection 12.5 g  25 mL intravenous PRN Gerardo Sparks MD       • diphenhydrAMINE (BENADRYL) injection 25 mg  25 mg intravenous Once PRN Lorena Hogue MD       • EPINEPHrine (ADRENALIN) injection 0.5 mg  0.5 mg intramuscular Once PRN Lorena Hogue MD       • etoposide (TOPOSAR) 188 mg in dextrose 5 % 554.4 mL chemo IVPB  100 mg/m2 (Treatment Plan Recorded) intravenous Once Shantelle Mcmullen MD       • famotidine (PEPCID) injection 20 mg  20 mg intravenous Once PRN Lorena Hogue MD       • fosaprepitant (EMEND) 150 mg in sodium chloride 0.9 % 150 mL IVPB  150 mg intravenous Once Lorena Hogue MD       • LORazepam (ATIVAN) tablet 1 mg  1 mg oral q8h PRN Zeb Lara DO       • magnesium sulfate 1 g, potassium chloride 10 mEq in sodium chloride 0.9 % 500 mL IVPB  500 mL/hr intravenous Continuous Lorena Hogue MD       • methylPREDNISolone sod suc(PF) (SOLU-Medrol) injection 40 mg  40 mg intravenous Once PRN Lorena Hogue MD       • metoprolol tartrate (LOPRESSOR) split tablet 12.5 mg  12.5 mg oral BID Gerardo Sparks MD       • ondansetron ODT (ZOFRAN-ODT) disintegrating tablet 4 mg  4 mg oral q8h PRN Gerardo Sparks MD        Or   • ondansetron (ZOFRAN) injection 4 mg  4 mg intravenous q8h PRN Gerardo Sparks MD       • palonosetron (ALOXI) injection 250 mcg  250 mcg intravenous Once Stickdi, MD Lorena       • propylthiouraciL (PTU) tablet 100 mg  100 mg oral q8h Phuong Valderrama MD       • nicohlas  (SENOKOT) tablet 1 tablet  1 tablet oral 2x daily PRN Gerardo Sparks MD       • sodium chloride 0.9 % infusion  500 mL/hr intravenous Lorena Patterson MD       • sodium chloride 0.9 % infusion  5 mL/hr intravenous PRN Gerardo Sparks MD           Allergies:  Patient has no known allergies.    Review of Systems:   +palpitations +wt loss + nausea/vomiting  Remainder     Vitals Signs:  Vitals:    01/13/23 1229   Pulse: (!) 122     There is no height or weight on file to calculate BMI.      Physical Exam:  Constitutional: Patient is oriented to person, place, and time.  Appears comfortable and not in immediate distress  Eyes: EOM are normal. No scleral icterus  Neck: Normal range of motion. Neck supple. no readily palpable thyroid nodules; thyroid with minimal prominence  Cardiovascular: tachy to 120s   Pulmonary/Chest: Effort normal and breath sounds normal.   Abdominal: Soft.   Musculoskeletal: Normal range of motion.   Neurological:  Alert and oriented to person, place, and time. limited tremor  Skin: Skin is warm and dry.   Psychiatric:  Normal mood and affect. Behavior is normal. Judgment and thought content normal.     Labs:  Lab Results   Component Value Date    WBC 3.22 (L) 01/13/2023    HGB 7.9 (L) 01/13/2023    HCT 24.2 (L) 01/13/2023     01/13/2023    ALT 28 01/13/2023    AST 28 01/13/2023     (L) 01/13/2023    K 3.7 01/13/2023     01/13/2023    CREATININE 0.5 (L) 01/13/2023    BUN 7 (L) 01/13/2023    CO2 19 (L) 01/13/2023    TSH <0.01 (L) 01/10/2023    INR 1.2 01/12/2023      Latest Reference Range & Units 01/10/23 20:50 01/13/23 05:17   hCG Quant <5.0 IU/L (mIU/mL) >461,700.0 (H) (C) >1,846,800.0 (H) (C)   (H): Data is abnormally high  (C): Corrected     Latest Reference Range & Units 01/10/23 20:50 01/11/23 09:15   TSH 0.34 - 5.60 mIU/L <0.01 (L)    Free T4 0.58 - 1.64 ng/dL 5.07 (H)    T3, Free 2.10 - 3.70 pg/mL  10.42 (H)      Latest Reference Range & Units 01/12/23  06:24   CA 15-3 0.0 - 31.3 U/mL 10.4   CA 19-9 0.0 - 35.0 U/mL 5.0    0 - 35 U/mL 87 (H)   CEA ng/mL 1.3   (H): Data is abnormally high      Assessment/Plan:    Hyperthyroidism  32 year old woman with very new recognition of gestational trophoblastic neoplasm  She has exceedingly high and rapidly rising HCG levels  Her hyperthyroidism is most likely related to HCG effect - ie HCG has homology to TSH and is binding to the TSH receptor to create hyperthyroidism related to increased production of thyroid hormone - ATD therapy should therefore be used to blunt excessive production of thyroid hormone.    She was started on PTU (rather than MMI) when she was thought to have a conventional 1st trimester pregnancy as this is thought to be less teratogenic than MMI     Although a fetus is not present can plan to continue PTU rather than MMI for potentially more immediate effect and greater reduction in T3 conversion    Small doses of ATD therapy are generally chosen in conventional pregnancy. Because no fetus is present - and because of the magnitude of her TFTs will plan to augment PTU  - at least short term -  to 100 mg tid.      Can consider changing her beta blocker to propranolol - would be cautious with dosing as she has baseline lower BP and her current high HR may be helping to preserve BP.    Ideally with treatment of her trophoblastic neoplasm HCG levels will begin to decline which should favorably affect her hyperthyroidism.  Can plan to monitor TFTs closely and wean down ATD therapy accordingly.    Her baseline WBC are borderline lower with anemia - her CBC should be scrutinized closely while on PTU with these baseline changes and with her receiving chemotherapy.        For completeness can check TSIs but the much more likely unifying explanation to her hyperthyroidism relates to HCG effect rather than TSI activity.    Adrenal calcification  Noted on CT  Uncertain significance  She is receiving dex as part  of her chemotherapeutic regimen  Can monitor closely for any evidence of adrenal hormone compromise once the effect of dex dissipates    Gestational trophoblastic neoplasm  Metastatic hanna per gyn onc  Initiation of chemotherapy today noted.  Concern for high risk of lung hemorrhage noted with plan for involvement of thoracic surgery              Electronically signed by: Phuong Gaona MD on 1/13/2023 1:22 PM

## 2023-01-13 NOTE — CONSULTS
Thoracic Surgery Consult    Subjective     Alondra Villela is a 32 y.o. female with no significant past medical history who was transferred to [facility] from [facility] for Gestational trophoblastic disease [O01.9]. Patient was seen in consultation at the request of referring physician for management recommendations regarding lung mets and concerns for spontaneous bleeding from lung mets with initiation of chemo.    Pt initially presented to [facility] on  from urgent care for concerns of tachycardia and swelling of the right lower extremity, and pt was found to be pregnant as well (LMP 12/). LE doppler showed no evidence of DVT. Initially, held off on CT PE due to recent pregnancy finding, but was then completed on  which demonstrated 5cm right lower lobe mass as well as innumerable b/l pulmonary nodules suspicious for metastatic disease of the lungs, as well as 2 hyperenhancing liver lesions. Biopsy of the RLL mass was completed which has since resulted as benign. CT A/P today demonstrates uterine mass measuring up to 13.8 cm, consistent with choriocarcinoma.     Pt was transferred to [facility] today, , to initiate chemotherapy. Pt reports feeling overall tired today. Reports some intermittent nausea and vomiting leading up to presentation to [place], which she associated to morning sickness. Denies cough, shortness of breath. Endorses mild chest pain at the site of the previous lung biopsy from yesterday. Denies any history of previous respiratory conditions or previous cancers. Denies smoking history. Pt with 2 previous pregnancies, most recent was uncomplicated and deliver via planned  in 2022.       Medical History: No past medical history on file.    Surgical History:   Past Surgical History:   Procedure Laterality Date   •  SECTION      x2       Social History:   Social History     Social History Narrative   • Not on file       Family History: No family history on file.    Allergies:  Patient has no known allergies.    Home Medications:  •  buPROPion XL, Take 150 mg by mouth daily.  •  ondansetron, Take by mouth.  •  protriptyline, Take 10 mg by mouth daily.    Current Medications:  •  acetaminophen, 650 mg, oral, q4h PRN **AND** ibuprofen, 400 mg, oral, q4h PRN  •  alteplase, 2 mg, intra-catheter, PRN  •  CISplatin (PLATINOL) chemo IVPB, 20 mg/m2 (Treatment Plan Recorded), intravenous, Once  •  dexamethasone, 12 mg, intravenous, Once  •  glucose, 16-32 g of dextrose, oral, PRN **OR** dextrose, 15-30 g of dextrose, oral, PRN **OR** glucagon, 1 mg, intramuscular, PRN **OR** dextrose 50 % in water (D50), 25 mL, intravenous, PRN  •  diphenhydrAMINE, 25 mg, intravenous, Once PRN  •  EPINEPHrine, 0.5 mg, intramuscular, Once PRN  •  etoposide, 100 mg/m2 (Treatment Plan Recorded), intravenous, Once  •  famotidine, 20 mg, intravenous, Once PRN  •  fosaprepitant (EMEND) IVPB, 150 mg, intravenous, Once  •  LORazepam, 1 mg, oral, q8h PRN  •  custom IVPB builder, 500 mL/hr, intravenous, Continuous  •  methylPREDNISolone sodium succinate, 40 mg, intravenous, Once PRN  •  metoprolol tartrate, 12.5 mg, oral, BID  •  ondansetron ODT, 4 mg, oral, q8h PRN **OR** ondansetron, 4 mg, intravenous, q8h PRN  •  palonosetron, 250 mcg, intravenous, Once  •  propylthiouraciL, 100 mg, oral, q8h NOHEMY  •  senna, 1 tablet, oral, 2x daily PRN  •  sodium chloride 0.9 %, 500 mL/hr, intravenous, Continuous  •  sodium chloride 0.9 %, 5 mL/hr, intravenous, PRN    Review of Systems  Pertinent items are noted in HPI.    Objective     Physicial Exam  Visit Vitals  Pulse (!) 122       General appearance: alert, appears stated age and cooperative  Head: normocephalic, without obvious abnormality  Neck: supple, symmetrical, trachea midline  Lungs: clear to ausculation anterior fields  Heart: regular rate and rhythm  Abdomen: soft, mildly distended, nontender  Extremities: warm and well perfused  Skin: warm and dry  Neurologic: Grossly  normal  Labs  CBC Results       01/13/23 01/12/23 01/11/23     0517 0624 1049    WBC 3.22 3.58 2.93    RBC 2.92 2.88 2.55    HGB 7.9 7.9 6.9    HCT 24.2 24.4 20.9    MCV 82.9 84.7 82.0    MCH 27.1 27.4 27.1    MCHC 32.6 32.4 33.0     161 168         Comment for HGB at 1049 on 01/11/23: ALL RESULTS HAVE BEEN CHECKED. This result has been called to CHANA CHOPRA by Bonita Murphy on 01 11 23 at 11:02, and has been read back.         BMP Results       01/13/23 01/12/23 01/11/23     0517 0624 1049     134 135    K 3.7 4.1 3.8    Cl 109 108 109    CO2 19 18 19    Glucose 88 81 134    BUN 7 8 11    Creatinine 0.5 0.4 0.6    Calcium 8.2 8.3 8.3    Anion Gap 6 8 7    EGFR >60.0 >60.0 >60.0         Comment for K at 1049 on 01/11/23: Results obtained on plasma. Plasma Potassium values may be up to 0.4 mEQ/L less than serum values. The differences may be greater for patients with high platelet or white cell counts.          Imaging  CT A/P:    IMPRESSION:     1.   Uterine mass measuring up to 13.8 cm, consistent with the clinically  suspected diagnosis of choriocarcinoma.  2.  Extensive pulmonary metastases, most notably a right lower lobe 4.9 cm  subpleural mass.  3.  Enlarged ovaries with numerous cysts likely representing theca lutein cysts  4.  Indeterminate 0.8 cm hypoattenuating lesion at the dome of the liver.  The  previously noted hypervascular liver lesions are not well seen due to the phase  of contrast at the time of imaging.  Contrast-enhanced MRI of the abdomen can be  considered for more optimal characterization.  5.  Indeterminate hypoattenuating lesions in the right kidney which can also be  more optimally evaluated with MRI.    Assessment     Alondra Villela is a 31 y/o female with no significant PMHx who presented to [facility] on 1/11 found to have gestational trophoblastic disease, [place] [facility] [place] initiate chemotherapy. Imaging demonstrates 5cm mass in the RLL (biopsy benign), innumerable  pulmonary nodules, questionable liver mets. CT A/P pelvis today demonstrated uterine mass measuring up to 13.8 cm consistent with choriocarcinoma. Thoracic surgery has been consulted for lungs mets as well as concerns for spontaneous bleeding from lung nodules associated with initiation of chemotherapy.        Plan     -Thoracic surgery available as needed should there be concerns for spontaneous bleeding after initiating chemotherapy. Please page [phone] with concerns.  -Recommend preparing additional blood units should need arise.   -Please page [phone] with any questions or concerns.    Case discussed with Dr. Grant.    KELTON Ospina

## 2023-01-13 NOTE — CONSULTS
Gyn Onc Consult Note    Subjective     Alondra Villela is a 32 y.o. female who was admitted for shortness of breath, tachycardia, anemia, and found to have positive HCG and imaging findings suspicious for metastatic GTN to liver, lungs, and questionable brain mets. Gyn oncology was consulted for management and assumption of care. Her  and in-laws are at the bedside.    Alondra is a  who had an uncomplicated 2nd pregnancy and delivered via planned repeat  in 2022. She reports having resumed menstruation post-partum and believes her LMP to be 22. She lives in [place] and is in the area visiting her parents. She used to live in the area.     She contracted COVID-19 3 weeks ago and developed persistent shortness of breath. She was evaluated by her previous PCP from when she used to live around here Dr. Hinton. CBC showed anemia and she was found to be tachycardic to the 140s. She then developed left leg pain and was sent to the ER on 1/10/23 for VTE evaluation. Upon arrival, she was found to be severely hyperthyroid, tachycardic, anemic (hgb 6.9), with a mildly elevated serum HCG of 444. Pelvic US showed no intra-uterine pregnancy, bilateral complex ovarian cysts, and material in the endometrial cavity. CTPA showed no PE but innumerable lung metastases. She underwent an image-guided biopsy of the largest right lower lobe lesion which showed benign liver tissue.  She received 1 unit PRBC upon admission with a rise in hgb to 7.9.    Medical History: History reviewed. No pertinent past medical history.    Surgical History:   Past Surgical History:   Procedure Laterality Date   •  SECTION      x2       Menstrual History:  OB History        2    Para   2    Term   2            AB        Living           SAB        IAB        Ectopic        Multiple        Live Births   2                Patient's last menstrual period was 2022 (approximate).      Social History:    Social History     Socioeconomic History   • Marital status:      Spouse name: None   • Number of children: None   • Years of education: None   • Highest education level: None   Tobacco Use   • Smoking status: Never   • Smokeless tobacco: Never   Substance and Sexual Activity   • Alcohol use: Not Currently   • Drug use: Never   • Sexual activity: Yes     Social Determinants of Health     Food Insecurity: No Food Insecurity   • Worried About Running Out of Food in the Last Year: Never true   • Ran Out of Food in the Last Year: Never true       Family History: History reviewed. No pertinent family history.    Allergies: Patient has no known allergies.    Current Inpatient Medications   Medication Dose Route Frequency Provider Last Rate Last Admin   • acetaminophen (TYLENOL) tablet 650 mg  650 mg oral q4h PRN Zeb Lara, DO   650 mg at 01/12/23 0801    Or   • acetaminophen (TYLENOL) suppository 650 mg  650 mg rectal q4h PRN Zeb Lara, DO        Or   • acetaminophen (TYLENOL) 650 mg/20.3 mL solution 650 mg  650 mg oral q4h PRN Zeb Lara, DO       • atropine injection 0.5 mg  0.5 mg intravenous q5 min PRN Zeb Lara, DO       • buPROPion XL (WELLBUTRIN XL) 24 hr ER tablet 150 mg  150 mg oral Daily Zeb Lara, DO   150 mg at 01/12/23 2102   • D5 % and 0.9 % sodium chloride infusion   intravenous Continuous Jacqueline Mcgregor, DO       • glucose chewable tablet 16-32 g of dextrose  16-32 g of dextrose oral PRN Zeb Lara, DO        Or   • dextrose 40 % oral gel 15-30 g of dextrose  15-30 g of dextrose oral PRN Zeb Lara, DO        Or   • glucagon (GLUCAGEN) injection 1 mg  1 mg intramuscular PRN Zeb Lara, DO        Or   • dextrose 50 % in water (D50) injection 12.5 g  25 mL intravenous PRN Zeb Lara, DO       • [Provider Managed Hold] enoxaparin (LOVENOX) syringe 40 mg  40 mg subcutaneous Daily (6p) Zeb Lara DO   40 mg at 01/11/23  1856   • LORazepam (ATIVAN) tablet 1 mg  1 mg oral q8h PRN Zeb Lara DO       • metoprolol tartrate (LOPRESSOR) split tablet 12.5 mg  12.5 mg oral BID Zeb Lara DO   12.5 mg at 01/13/23 0957   • oxyCODONE (ROXICODONE) immediate release tablet 5 mg  5 mg oral q4h PRN Zeb Lara DO        And   • morphine injection 2 mg  2 mg intravenous q4h PRN Zeb Lara, DO       • nitroglycerin (NITROSTAT) SL tablet 0.4 mg  0.4 mg sublingual q5 min PRN Zeb Lara DO       • ondansetron ODT (ZOFRAN-ODT) disintegrating tablet 4 mg  4 mg oral q8h PRN Zeb Lara DO        Or   • ondansetron (ZOFRAN) injection 4 mg  4 mg intravenous q8h PRN Zeb Lara DO   4 mg at 01/12/23 1913   • propylthiouraciL (PTU) tablet 50 mg  50 mg oral q12h (7a, 7p) Sonal Gonzales MD   50 mg at 01/13/23 0621   • sodium chloride 0.9 % infusion  5 mL/hr intravenous PRN Reg Murphy DO            Medication List      ASK your doctor about these medications    buPROPion  mg 24 hr tablet  Commonly known as: WELLBUTRIN XL  Take 150 mg by mouth daily.  Dose: 150 mg     ondansetron 4 mg tablet  Commonly known as: ZOFRAN  Take by mouth.     protriptyline 5 mg tablet  Commonly known as: VIVACTIL  Take 10 mg by mouth daily.  Dose: 10 mg          Review of Systems  All other systems reviewed and negative except as noted in the HPI.    Objective    Temp:  [36.4 °C (97.5 °F)-37.4 °C (99.4 °F)] 36.8 °C (98.2 °F)  Heart Rate:  [102-137] 105  Resp:  [16-18] 16  BP: ()/(52-74) 105/55   Physical Exam:  General: well-developed, well-nourished, no apparent distress  Extremity: no clubbing, cyanosis, edema  GI: abdomen soft, non-distended, non-tender, no hepatosplenomegaly, no masses  Neurologic: alert & oriented x3, no gross deficits  Psychiatric: mood and affect normal  Musculoskeletal: no deformity or gross strength deficit    Images of pelvic US, CTPA, CXR, and MRI brain without and  post-contrast were independently reviewed and discussed with radiology and neurosurgery    10/10/23 jose armando HCG upon admission was 444, but after 1:10 dilutional assay was performed, resulted as >461,700 (after dilution)    Assessment   32 y.o.  Lady with  1. 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 Given timing of antecedent 2nd pregnancy, multiple metastases, less likely that this is due to a 3rd pregnancy. Initially there was concern for brain metastases on non-contrast MRI. Post-contrast images were obtained which was interpreted as negative for metastatic disease. The areas of increased T2 signal in the white matter does not enhance with contrast, thus less likely to be metastatic and more likely due to chronic ischemia or other non-malignant causes. In light of this new finding, her WHO score calculation is 17  Age <40: 0  Antecedent pregnancy term: 2  Interval months 4-6: 1  Pre-treatment HCG level >100,000: 4  Largest tumor >5cm: 2  Site of metastases brain/liver: 4  Number of metastases innumerable >8: 4  Previous chemo failure none: 0    CT abd/pelvis with po and IV contrast read is pending. The question was raised that the liver lesions may or may not represent metastases, could be hemangiomas. However, it was reviewed by multiple radiologists and final read will be out shortly. Given the time-sensitive nature of the need to start chemotherapy, and even with the lack of liver metastases, the situation still does not obviate the need for a multi-drug regimen, will hold off on obtaining abdominal MRI liver protocol.  Official read is pending. Even if these end up not being metastases, her WHO score is only down-graded to 13, which is still high risk disease requiring multi-drug regimen. I have discussed this unusual scenario with GTN center at [facility]. Recommendation is for induction chemo with EP for 2-3 cycles (to decrease the risk of hemorrhagic  complications from rapidly killed-off tumor), followed by EMA-EP for 3 cycles past negative HCG levels. Given the extremely time-sensitive nature of this diagnosis and the propensity for rapid progression without prompt treatment, [place] [facility] [place] and negatively impact her prognosis. Plan to transfer patient to [facility] now to initial chemo today. She will need a PICC line as soon as possible upon admission.    I have spoke with Dr. Bran from neurosurgery. We will touch base with thoracic surgery and have them available in case she starts to bleed from the lung metastases during induction chemo.    Long-term prognosis is still good (60-80% probability of cure) in the abcence of brain mets.    Anticipate she'll be in-house for at least the first 2 cycles of induction chemotherapy. Depending on how she does, she may or may not be able to go home between subsequent chemo cycles.    2. Hyperthyroidism, due to #1. Currently on labetalol and PTU. Will consult endocrinology at [facility] for management    3. Cancer diagnosis related acute adjustment disorder. Emotional support was provided. Patient has excellent family support system in the area. She plans to stay in the area for the duration of treatment. But understandably it will be difficult to be away from her family, particularly her young children. Can add benzodiazepine as needed for anxiety. Discussed role of therapy in helping her and family navigate this sudden and devastating diagnosis.    The patient was extensively counseled on the risks of the porposed chemotherapy regimen: induction etoposide/cisplatin .    Risks include:    General: malaise, fatigue, myalgias and flu-like symptoms, mucositis    Hematologic:  Neutropenia leading to increased risk of infection and more serious infections  Anemia leading to fatigue and possibly needing transfusion  Thrombocytopenia leading to easy bruising/bleeding and  possibly needing transfusion  Small risk of myelodysplastic syndrome, even hematologic cancers    GI:  Constipation is almost universal. A good bowel regimen was discussed.  Diarrhea is less likely but can occur.    Neuro:  Neuropathy, usually mild but can be a dose-limiting toxicity, altered taste tinnitus. Sometimes this can be permanent    Skin/soft tissue:  Total alopecia, which is usually temporary and Extravasation injury.    Allergy:  Infusion reaction, which is usually mild and self-limited, but occasionally can be anaphylaxis-like and life-threatening    Organ function:  Kidney dysfunction, even kidney failure and Liver dysfunction, even liver failure     Fertility and teratogenicity:  contracpetion is recommended and avoidance of conception for at least 12 months is advised. She has no plans for further child-bearing    Chemotherapy consent was signed and witnessed by her .    I spent 240 minutes on this date of service performing the following activities: obtaining history, performing examination, entering orders, documenting, preparing for visit, obtaining / reviewing records, providing counseling and education, independently reviewing study/studies, communicating results and coordinating care.     Lorena Hogue MD, FACOG, FACS

## 2023-01-13 NOTE — ASSESSMENT & PLAN NOTE
Abnormal CT chest findings concerning for metastatic disease in the liver and lungs  IR performed CT guided biopsy of right lung mass  Serial post-procedure CXRs noted  F/u pathology  Heme/Onc and Gyn/Onc following

## 2023-01-13 NOTE — H&P
Gynecology History and Physical    HPI     Patient is a 32 y.o. 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     Please see Dr. Hogue consultation note from earlier today for additional details regarding patient history and presentation.     OB History:   OB History    Para Term  AB Living   2 2 2 0 0 0   SAB IAB Ectopic Multiple Live Births   0 0 0 0 2      # Outcome Date GA Lbr Kelvin/2nd Weight Sex Delivery Anes PTL Lv   2 Term            1 Term                Medical History: No past medical history on file.    Surgical History:   Past Surgical History:   Procedure Laterality Date   •  SECTION      x2     Allergies: Patient has no known allergies.    Prior to Admission medications    Medication Sig Start Date End Date Taking? Authorizing Provider   buPROPion XL (WELLBUTRIN XL) 150 mg 24 hr tablet Take 150 mg by mouth daily. 12/15/22   ProviderRicardo MD   ondansetron (ZOFRAN) 4 mg tablet Take by mouth. 22   ProviderRicardo MD   protriptyline (VIVACTIL) 5 mg tablet Take 10 mg by mouth daily. 22   ProviderRicardo MD     Review of Systems  All systems were reviewed and normal with the exception of the above.     Objective     Vital Signs for the last 24 hours:  Temp:  [36.4 °C (97.5 °F)-37.4 °C (99.4 °F)] 36.8 °C (98.2 °F)  Heart Rate:  [102-137] 122  Resp:  [16-18] 16  BP: ()/(52-74) 105/55    Exam  General Appearance: Alert, cooperative, no acute distress  Lungs: Clear to auscultation bilaterally, respirations unlabored  Heart: tachycardic, regular rhythm  Breast: Deferred  Abdomen: soft, non-tender, non-distended  Genitalia: Deferred  Rectal: Deferred  Extremities: no edema or calf tenderness    Labs  Results from last 7 days   Lab Units 23  0517 23  0624 23  1049   WBC K/uL 3.22* 3.58* 2.93*   HEMOGLOBIN g/dL 7.9* 7.9* 6.9*   HEMATOCRIT % 24.2* 24.4* 20.9*   PLATELETS  K/uL 151 161 168      Results from last 7 days   Lab Units 01/13/23  0517 01/12/23  0624 01/11/23  1049   SODIUM mEQ/L 134* 134* 135*   POTASSIUM mEQ/L 3.7 4.1 3.8   CHLORIDE mEQ/L 109 108 109   CO2 mEQ/L 19* 18* 19*   BUN mg/dL 7* 8 11   CREATININE mg/dL 0.5* 0.4* 0.6   GLUCOSE mg/dL 88 81 134*   CALCIUM mg/dL 8.2* 8.3* 8.3*        Imaging    CT A/P 1/13/22  --  IMPRESSION:     1.   Uterine mass measuring up to 13.8 cm, consistent with the clinically  suspected diagnosis of choriocarcinoma.  2.  Extensive pulmonary metastases, most notably a right lower lobe 4.9 cm  subpleural mass.  3.  Enlarged ovaries with numerous cysts likely representing theca lutein cysts  4.  Indeterminate 0.8 cm hypoattenuating lesion at the dome of the liver.  The  previously noted hypervascular liver lesions are not well seen due to the phase  of contrast at the time of imaging.  Contrast-enhanced MRI of the abdomen can be  considered for more optimal characterization.  5.  Indeterminate hypoattenuating lesions in the right kidney which can also be  more optimally evaluated with MRI.    MRI brain 1/12  --  IMPRESSION:  1.  No abnormal intracranial contrast enhancement identified to suggest  intracranial metastatic disease.  2.  Previously seen tiny foci of increased T2 signal in the white matter do not  demonstrate contrast enhancement.  These findings could reflect foci of chronic  ischemia, the sequelae of migraine headaches, demyelinating or postinfectious  processes.  Other etiologies are not excluded.  Clinical correlation is  Recommended.    Assessment/Plan     Alondra Villela is a 32 y.o. 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     1. Gestational trophoblastic neoplasia:   a. Please see consult note by Dr. Hogue for full patient discussion of diease diagnosis, treatment and prognosis.   b. IV team consulted STAT for PICC line placement. Chemotherapy regimen ordered with  plan for EP induction for 2-3 cycles followed by EMA-EP for 3 cycles past negative hcg levels.   c. Thoracic surgery consulted 2/2 high risk of lung metastasis hemorrhage with initiation of chemotherapy, appreciate input. Coags ordered and patient crossmatched for an additional 2u per recommendations.     2. Thyroid storm:   a. 1/10 TSH <0.01, T4 5.04. TSI ab pending.  b. Patient persistently tachycardic, on tele monitoring pending endocrinology recommendations.   c. PTU and metoprolol ordered per Bernardo Monroy endocrinology recommendations.   [facility] for additional recommendations regarding medications, vital sign monitoring and trending of labs, appreciate input.     3. Anemia:   a. Most recent hgb 7.9 s/p 1u PRBC.   b. Will transfuse an additional unit at this time for goal >8.0 in setting of chemotherapy.   c. Continue daily CBC for time being.    4. VTE PPE:   a. Hold off on chemical VTE for now given high risk of hemorrhage with chemotherapy induction. Will reassess as able.   b. Mechanical prophylaxis with SCDs ordered.   c. Encourage ambulation QID.    5. Anx/Depression   a. Continue home regimen of Wellbutrin 150 mg qd.   b. Ativan ordered PRN while inpatient.     Dr. Mcmullen was present for assessment and plan of patient.     Gerardo Sparks MD

## 2023-01-13 NOTE — CONSULTS
Patient seen and examined  Chart Reviewed    Case D/w Gyn-Onc, Dr. Murphy, Dr. Lara, Dr. Hinton    D/w patient,  and parents at bedside    Admitted with anemia, hyperthyroidism, abnormal uterine U/S. CT done today S/O met. Disease  Concern for GTN raised--Lung Bx done and results pending  Will get PICC line for Chemo thru Gyn-ONC  Monitor HCG  Rpt Labs  PRBC  MRI brain  CT A/P pending  Monitor CXRAY and pulmonary status carefully  Hopefully have all info by tomorrow and then start chemotherapy there after

## 2023-01-13 NOTE — PLAN OF CARE
Problem: Adult Inpatient Plan of Care  Goal: Plan of Care Review  Outcome: Progressing  Flowsheets (Taken 1/13/2023 1443)  Progress: improving  Outcome Summary: Pt transferred from [facility] for chemo. DANIEL TBD.     SW will remain available for emotional support and dispo planning. Alka NICHOLSON [phone]

## 2023-01-13 NOTE — DISCHARGE INSTRUCTIONS
Alondra Villela is a 33yo female with no significant past medical history that was admitted for evaluation of palpitations. She was diagnosed as 6 weeks pregnant per outpatient urine pregnancy screen and elevated bHCG level.     Admission blood work was consistent with hyperthyroidism therefore, at the guidance of the Endocrinology team, she was initiated on treatment with Propylthiouracil and Lopressor.     She had a pelvic ultrasound which showed no intrauterine pregnancy but did note a complex mass within the central uterine cavity concerning for a possible molar pregnancy and bilateral enlarged ovaries with multiple complex cystic lesions. Gynecology followed in consult.     Repeat blood work revealed worsening anemia which responded to 1 unit PRBC transfusion.     She had persistent tachycardia, borderline hypotension, and new right sided chest pain. CTA chest PE study ordered which noted no pulmonary embolism, but noted significant abnormal findings of a 5cm right lower lobe mass, an anterior mediastinal mass, innumerable bilateral pulmonary nodules suspicious for metastatic disease to the lungs and 2 hyperenhancing liver lesions.     Hematology/Oncology and Gynecology/Oncology were consulted. She had a CT-guided biopsy of the right lung mass by Interventional Radiology on 1/12/23. Serial CXR post-biopsy with no evidence of pneumothorax. A MRI brain without JASWANT preliminary read was  concerning for mild scattered white matter disease which is nonspecific. MRI brain with JASWANT showed no abnormal intracranial contrast enhancement identified to suggest intracranial metastatic disease. CT abdomen/pelvis with contrast ordered and pending.     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 [place] [facility] [place].     Plan discussed at length with patient and her   at bedside and they are agreeable to proceed with transfer.

## 2023-01-14 LAB
ALBUMIN SERPL-MCNC: 2.8 G/DL (ref 3.4–5)
ALP SERPL-CCNC: 107 IU/L (ref 35–126)
ALT SERPL-CCNC: 28 IU/L (ref 11–54)
ANION GAP SERPL CALC-SCNC: 9 MEQ/L (ref 3–15)
AST SERPL-CCNC: 28 IU/L (ref 15–41)
BILIRUB SERPL-MCNC: 0.9 MG/DL (ref 0.3–1.2)
BUN SERPL-MCNC: 8 MG/DL (ref 8–20)
CALCIUM SERPL-MCNC: 8.7 MG/DL (ref 8.9–10.3)
CHLORIDE SERPL-SCNC: 108 MEQ/L (ref 98–109)
CO2 SERPL-SCNC: 18 MEQ/L (ref 22–32)
CREAT SERPL-MCNC: 0.5 MG/DL (ref 0.6–1.1)
ERYTHROCYTE [DISTWIDTH] IN BLOOD BY AUTOMATED COUNT: 13.2 % (ref 11.7–14.4)
GFR SERPL CREATININE-BSD FRML MDRD: >60 ML/MIN/1.73M*2
GLUCOSE SERPL-MCNC: 130 MG/DL (ref 70–99)
HCT VFR BLDCO AUTO: 27.9 % (ref 35–45)
HGB BLD-MCNC: 9.4 G/DL (ref 11.8–15.7)
MAGNESIUM SERPL-MCNC: 1.7 MG/DL (ref 1.8–2.5)
MCH RBC QN AUTO: 27.3 PG (ref 28–33.2)
MCHC RBC AUTO-ENTMCNC: 33.7 G/DL (ref 32.2–35.5)
MCV RBC AUTO: 81.1 FL (ref 83–98)
PDW BLD AUTO: 10.3 FL (ref 9.4–12.3)
PLATELET # BLD AUTO: 163 K/UL (ref 150–369)
POTASSIUM SERPL-SCNC: 4.1 MEQ/L (ref 3.6–5.1)
PROT SERPL-MCNC: 5.6 G/DL (ref 6–8.2)
RBC # BLD AUTO: 3.44 M/UL (ref 3.93–5.22)
SODIUM SERPL-SCNC: 135 MEQ/L (ref 136–144)
WBC # BLD AUTO: 3.28 K/UL (ref 3.8–10.5)

## 2023-01-14 PROCEDURE — 25800000 HC PHARMACY IV SOLUTIONS: Performed by: OBSTETRICS & GYNECOLOGY

## 2023-01-14 PROCEDURE — 36415 COLL VENOUS BLD VENIPUNCTURE: CPT | Performed by: STUDENT IN AN ORGANIZED HEALTH CARE EDUCATION/TRAINING PROGRAM

## 2023-01-14 PROCEDURE — 63600000 HC DRUGS/DETAIL CODE: Performed by: OBSTETRICS & GYNECOLOGY

## 2023-01-14 PROCEDURE — 25800000 HC PHARMACY IV SOLUTIONS: Performed by: STUDENT IN AN ORGANIZED HEALTH CARE EDUCATION/TRAINING PROGRAM

## 2023-01-14 PROCEDURE — 36430 TRANSFUSION BLD/BLD COMPNT: CPT

## 2023-01-14 PROCEDURE — 63700000 HC SELF-ADMINISTRABLE DRUG: Performed by: STUDENT IN AN ORGANIZED HEALTH CARE EDUCATION/TRAINING PROGRAM

## 2023-01-14 PROCEDURE — 63600000 HC DRUGS/DETAIL CODE: Performed by: STUDENT IN AN ORGANIZED HEALTH CARE EDUCATION/TRAINING PROGRAM

## 2023-01-14 PROCEDURE — 63700000 HC SELF-ADMINISTRABLE DRUG: Performed by: OBSTETRICS & GYNECOLOGY

## 2023-01-14 PROCEDURE — 99232 SBSQ HOSP IP/OBS MODERATE 35: CPT | Performed by: INTERNAL MEDICINE

## 2023-01-14 PROCEDURE — 83735 ASSAY OF MAGNESIUM: CPT | Performed by: STUDENT IN AN ORGANIZED HEALTH CARE EDUCATION/TRAINING PROGRAM

## 2023-01-14 PROCEDURE — 21400000 HC ROOM AND CARE CCU/INTERMEDIATE

## 2023-01-14 PROCEDURE — 80053 COMPREHEN METABOLIC PANEL: CPT | Performed by: STUDENT IN AN ORGANIZED HEALTH CARE EDUCATION/TRAINING PROGRAM

## 2023-01-14 PROCEDURE — 63700000 HC SELF-ADMINISTRABLE DRUG: Performed by: INTERNAL MEDICINE

## 2023-01-14 PROCEDURE — 85027 COMPLETE CBC AUTOMATED: CPT | Performed by: STUDENT IN AN ORGANIZED HEALTH CARE EDUCATION/TRAINING PROGRAM

## 2023-01-14 PROCEDURE — 99231 SBSQ HOSP IP/OBS SF/LOW 25: CPT | Performed by: STUDENT IN AN ORGANIZED HEALTH CARE EDUCATION/TRAINING PROGRAM

## 2023-01-14 PROCEDURE — 99232 SBSQ HOSP IP/OBS MODERATE 35: CPT | Performed by: SURGERY

## 2023-01-14 RX ORDER — PALONOSETRON 0.05 MG/ML
250 INJECTION, SOLUTION INTRAVENOUS ONCE
Status: COMPLETED | OUTPATIENT
Start: 2023-01-14 | End: 2023-01-14

## 2023-01-14 RX ORDER — SODIUM CHLORIDE 9 MG/ML
500 INJECTION, SOLUTION INTRAVENOUS CONTINUOUS
Status: ACTIVE | OUTPATIENT
Start: 2023-01-14 | End: 2023-01-14

## 2023-01-14 RX ORDER — DIPHENHYDRAMINE HCL 50 MG/ML
25 VIAL (ML) INJECTION ONCE AS NEEDED
Status: DISCONTINUED | OUTPATIENT
Start: 2023-01-14 | End: 2023-01-21 | Stop reason: HOSPADM

## 2023-01-14 RX ORDER — DEXAMETHASONE SODIUM PHOSPHATE 4 MG/ML
8 INJECTION, SOLUTION INTRA-ARTICULAR; INTRALESIONAL; INTRAMUSCULAR; INTRAVENOUS; SOFT TISSUE ONCE
Status: COMPLETED | OUTPATIENT
Start: 2023-01-14 | End: 2023-01-14

## 2023-01-14 RX ORDER — FAMOTIDINE 10 MG/ML
20 INJECTION INTRAVENOUS ONCE AS NEEDED
Status: DISCONTINUED | OUTPATIENT
Start: 2023-01-14 | End: 2023-01-21 | Stop reason: HOSPADM

## 2023-01-14 RX ORDER — EPINEPHRINE 1 MG/ML
0.5 INJECTION, SOLUTION INTRAMUSCULAR; SUBCUTANEOUS ONCE AS NEEDED
Status: DISCONTINUED | OUTPATIENT
Start: 2023-01-14 | End: 2023-01-21 | Stop reason: HOSPADM

## 2023-01-14 RX ADMIN — ONDANSETRON 4 MG: 2 INJECTION INTRAMUSCULAR; INTRAVENOUS at 06:25

## 2023-01-14 RX ADMIN — PROPYLTHIOURACIL 100 MG: 50 TABLET ORAL at 14:04

## 2023-01-14 RX ADMIN — METOPROLOL TARTRATE 12.5 MG: 25 TABLET, FILM COATED ORAL at 20:11

## 2023-01-14 RX ADMIN — ACETAMINOPHEN 650 MG: 325 TABLET, FILM COATED ORAL at 15:04

## 2023-01-14 RX ADMIN — DEXAMETHASONE SODIUM PHOSPHATE 8 MG: 4 INJECTION, SOLUTION INTRA-ARTICULAR; INTRALESIONAL; INTRAMUSCULAR; INTRAVENOUS; SOFT TISSUE at 14:57

## 2023-01-14 RX ADMIN — DEXTROSE MONOHYDRATE 188 MG: 50 INJECTION, SOLUTION INTRAVENOUS at 15:47

## 2023-01-14 RX ADMIN — BUPROPION HYDROCHLORIDE 150 MG: 150 TABLET, EXTENDED RELEASE ORAL at 20:11

## 2023-01-14 RX ADMIN — PALONOSETRON HYDROCHLORIDE 250 MCG: 0.25 INJECTION, SOLUTION INTRAVENOUS at 14:59

## 2023-01-14 RX ADMIN — CISPLATIN 38 MG: 1 INJECTION, SOLUTION INTRAVENOUS at 16:57

## 2023-01-14 RX ADMIN — PROPYLTHIOURACIL 100 MG: 50 TABLET ORAL at 22:27

## 2023-01-14 RX ADMIN — PROTRIPTYLINE HYDROCHLORIDE 15 MG: 5 TABLET, FILM COATED ORAL at 22:26

## 2023-01-14 RX ADMIN — METOPROLOL TARTRATE 12.5 MG: 25 TABLET, FILM COATED ORAL at 09:11

## 2023-01-14 RX ADMIN — PROPYLTHIOURACIL 100 MG: 50 TABLET ORAL at 05:39

## 2023-01-14 RX ADMIN — POTASSIUM CHLORIDE 500 ML/HR: 2 INJECTION, SOLUTION, CONCENTRATE INTRAVENOUS at 13:14

## 2023-01-14 RX ADMIN — SODIUM CHLORIDE 500 ML/HR: 9 INJECTION, SOLUTION INTRAVENOUS at 17:57

## 2023-01-14 NOTE — PLAN OF CARE
Plan of Care Review  Plan of Care Reviewed With: patient  Progress: improving  Outcome Summary: Pt remains tachycardic t/o shift. HR 110s-130s with ambulation. Pt c/o new rash on chin, chest, and one hive on her back. Dr. Sparks made aware; see note. Pt c/o new dry cough, resident also made aware. Chemotherapy day two completed. FSP maintained and call bell in reach.

## 2023-01-14 NOTE — NURSING NOTE
Dr. Sparks paged to make aware of new development of persistent dry cough. Oxygen 97% on RA. . Pt denies SOB at this time.     Also paged [phone]

## 2023-01-14 NOTE — PROGRESS NOTES
ROB4 note:     Temp:  [36.5 °C (97.7 °F)-36.9 °C (98.4 °F)] 36.7 °C (98 °F)  Heart Rate:  [] 115  Resp:  [16-18] 18  BP: (104-123)/(53-63) 113/55  SpO2:  [95 %-99 %] 99 %  Oxygen Therapy: None (Room air)    Reviewed new sx of dry cough with Dr. Mcmullen. This is an expected side effect of chemotherapy in the setting of lung metastasis. Vitals stable. Will continue to monitor.     Gerardo Sparks MD

## 2023-01-14 NOTE — PROGRESS NOTES
Gynecology Progress Note    Subjective     Patient feeling well this afternoon. Reports her rash has improved. Had a headache earlier but this resolved with tylenol. Reports her dry cough has improved as well.     She is tolerating PO without issue and denies nausea or vomiting. She is ambulating without. Issue. She is voiding without issue and her most recent BM was yesterday.     Objective     Vital signs in last 24 hours:  Temp:  [36.5 °C (97.7 °F)-37.7 °C (99.8 °F)] 37.2 °C (99 °F)  Heart Rate:  [] 130  Resp:  [16-18] 18  BP: (104-123)/(53-61) 116/56    Intake/Output this shift:  No intake/output data recorded.    Exam  General Appearance: Alert, cooperative, no acute distress  Lungs: Clear to auscultation bilaterally, respirations unlabored  Heart: Regular rate and rhythm  Abdomen: soft, non-tender, non-distended  Extremities: no edema or calf tenderness; no SCDs in room  Skin: erythema of chin and chest significantly improved, remains flat and without raised lesions     Labs  Results from last 7 days   Lab Units 01/14/23  0540 01/13/23  0517 01/12/23  0624   WBC K/uL 3.28* 3.22* 3.58*   HEMOGLOBIN g/dL 9.4* 7.9* 7.9*   HEMATOCRIT % 27.9* 24.2* 24.4*   PLATELETS K/uL 163 151 161      Results from last 7 days   Lab Units 01/14/23  0540 01/13/23  0517 01/12/23  0624   SODIUM mEQ/L 135* 134* 134*   POTASSIUM mEQ/L 4.1 3.7 4.1   CHLORIDE mEQ/L 108 109 108   CO2 mEQ/L 18* 19* 18*   BUN mg/dL 8 7* 8   CREATININE mg/dL 0.5* 0.5* 0.4*   GLUCOSE mg/dL 130* 88 81   CALCIUM mg/dL 8.7* 8.2* 8.3*        Assessment/Plan     Alondra Villela is a 32 y.o. 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      1. Gestational trophoblastic neoplasia:   a. s/p PICC line placement. Chemotherapy regimen ordered with plan for EP induction for 2-3 cycles followed by EMA-EP for 3 cycles past negative hcg levels. Successfully received her first dose of EP last night, second dose infusing at  this time.   b. Thoracic surgery consulted 2/2 high risk of lung metastasis hemorrhage with initiation of chemotherapy, appreciate input. Coags ordered and patient crossmatched for an additional 2u per recommendations.      2. Thyroid storm:   a. 1/10 TSH <0.01, T4 5.04. TSI ab pending.  b. Patient remains largely tachycardic, remains on tele.   c. PTU and metoprolol ordered.   d. Will plan to repeat FT4 and FT3 1/15 to ensure response.   e. S/p endocrine consult, continue to appreciate recommendations.      3. Anemia:   a. Most recent hgb 9.4 s/p 2u PRBC.   b. Goal hemoglobin>8.0 in setting of chemotherapy.   c. Continue daily CBC.     4. VTE PPE:   a. Hold off on chemical VTE for now given high risk of hemorrhage with chemotherapy induction. Will reassess as able.   b. Mechanical prophylaxis with SCDs ordered. SCDs not present in room on afternoon rounds, discussed with nursing who will place.    c. Encourage ambulation QID.     5. Anx/Depression:  a. Continue home regimen of Wellbutrin 150 mg qd.   b. Ativan ordered PRN while inpatient.       6.  Migraines:   A. Home protriptyline ordered via nonformulary order.    Plan previously discussed with Dr. Benedict Sparks MD

## 2023-01-14 NOTE — PROGRESS NOTES
Thoracic Surgery Daily Progress Note      Subjective   Patient with gestational trophoblastic disease. CT A/P 1/13 demonstrates uterine mass measuring up to 13.8 cm, consistent with choriocarcinoma. Multiple pulmonary nodules also suspicious for metastatic disease. Following pre-emptively if there is spontaneous bleeding from lung mets with initiation of chemo  No acute events overnight, patient feels well without any shortness of breathe or abdominal pain     Objective     Vital signs in last 24 hours:  Temp:  [36.6 °C (97.9 °F)-36.9 °C (98.4 °F)] 36.6 °C (97.9 °F)  Heart Rate:  [] 96  Resp:  [16-18] 18  BP: (104-123)/(53-63) 118/58      Intake/Output Summary (Last 24 hours) at 1/14/2023 0614  Last data filed at 1/14/2023 0017  Gross per 24 hour   Intake 325 ml   Output --   Net 325 ml     Intake/Output this shift:  I/O this shift:  In: 325 [Blood:325]  Out: -     Physical Exam    General appearance: alert, appears stated age and cooperative  Head: normocephalic, without obvious abnormality  Neck: supple, symmetrical, trachea midline  Lungs: clear to ausculation anterior fields  Heart: regular rate and rhythm  Abdomen: soft, mildly distended, nontender  Extremities: warm and well perfused  Skin: warm and dry  Neurologic: Grossly normal    VTE Assessment: I have reassessed and the patient's VTE risk and treatment plan is appropriate.    Labs  I have reviewed the patient's labs.  Current labs are within normal limits.    Imaging  I have reviewed the Imaging from the last 24 hrs.      Assessment/Plan   33 y/o female with no significant PMHx who presented to [facility] on 1/11 found to have gestational trophoblastic disease, [place] [facility] [place] to initiate chemotherapy. Imaging demonstrates 5cm mass in the RLL (biopsy benign), innumerable pulmonary nodules, questionable liver mets. CT A/P pelvis today demonstrated uterine mass measuring up to 13.8 cm consistent with choriocarcinoma. Thoracic surgery following for  concern of lung mets and preemptively for concerns of spontaneous bleeding from lung nodules that can happen with initiation of chemotherapy     - Thoracic surgery available as needed if patient develops spontaneous bleeding after initiation of chemotherapy   - Rest of care per primary team     Please page [phone] with any questions or concerns     Bill Patel MD  General Surgery PGY-2

## 2023-01-14 NOTE — NURSING NOTE
Chemotherapy day two    Pre hydration given along with pre meds    Etoposide started at 1547 completed at 1654    Cisplatin started at 1657 completed at 1757    Post hydration completed.     VSS t/o infusions.

## 2023-01-14 NOTE — PROGRESS NOTES
Endocrinology Progress Note       SUBJECTIVE   Interval History:pt tolerated chemotherapy yesterday without difficultly.  She feels well today with only occasional palpitations.     OBJECTIVE      Vital signs in last 24 hours:  Temp:  [36.5 °C (97.7 °F)-37.7 °C (99.8 °F)] 37.7 °C (99.8 °F)  Heart Rate:  [] 128  Resp:  [16-18] 18  BP: (104-123)/(53-61) 116/56      PHYSICAL EXAMINATION        HEENT: NCAT mucous membranes moist  EOMI sclerae anicteric  NEURO:A&Ox3   PSYCH:Appropriate and cooperative       LABS / IMAGING / TELE      Labs    Results from last 7 days   Lab Units 01/11/23  0915 01/10/23  2050   TSH mIU/L  --  <0.01*   T3 FREE pg/mL 10.42*  --    FREE T4 ng/dL  --  5.07*     [unfilled]  Lab Results   Component Value Date    WBC 3.28 (L) 01/14/2023    HGB 9.4 (L) 01/14/2023    HCT 27.9 (L) 01/14/2023    MCV 81.1 (L) 01/14/2023     01/14/2023     No results found for: HGBA1C  Lab Results   Component Value Date    CALCIUM 8.7 (L) 01/14/2023     Results from last 7 days   Lab Units 01/14/23  0540   SODIUM mEQ/L 135*   POTASSIUM mEQ/L 4.1   CHLORIDE mEQ/L 108   CO2 mEQ/L 18*   BUN mg/dL 8   CREATININE mg/dL 0.5*   CALCIUM mg/dL 8.7*   ALBUMIN g/dL 2.8*   BILIRUBIN TOTAL mg/dL 0.9   ALK PHOS IU/L 107   ALT IU/L 28   AST IU/L 28   GLUCOSE mg/dL 130*     Results from last 7 days   Lab Units 01/11/23  0915 01/10/23  2050   TSH mIU/L  --  <0.01*   T3 FREE pg/mL 10.42*  --    FREE T4 ng/dL  --  5.07*         ASSESSMENT AND PLAN      1) Hyperthyroidism  32 year old woman with very new recognition of gestational trophoblastic neoplasm  She presented with exceedingly high and rapidly rising HCG levels  Her hyperthyroidism is most likely related to HCG effect  Now on  mg tid together with beta blockade  Would repeat FT4 and FT3 in the next 1-2 days to ensure early response to ATD therapy  HR gradually improved with intermittent excursions  Continue beta blockade.  Ongoing monitoring of LITs and  CBC/Diff with PTU and with chemotherapy    2) Gestational trophoblastic neoplasm  Metastatic hanna per gyn onc  Initiation of chemotherapy today noted.  Concern for high risk of lung hemorrhage noted  - with plan for involvement of thoracic surgery     3) Adrenal calcification  Noted on CT  Uncertain significance  She is receiving dex as part of her chemotherapeutic regimen  Can monitor closely for any evidence of adrenal hormone compromise once the effect of dex dissipates

## 2023-01-14 NOTE — PROGRESS NOTES
Gynecology Progress Note    Subjective     Alondra is doing very well this morning.  She tolerated the chemotherapy yesterday evening.  She is sitting up in bed this morning eating breakfast.  She had a mild bout of nausea earlier this morning that was relieved with Zofran.  Denies headache, chest pain, shortness of breath, vomiting, fever, chills, hemoptysis, or changes in bowel or bladder habits.    Objective     Vital signs in last 24 hours:  Temp:  [36.6 °C (97.9 °F)-36.9 °C (98.4 °F)] 36.6 °C (97.9 °F)  Heart Rate:  [] 99  Resp:  [16-18] 18  BP: (104-123)/(53-63) 118/58    Intake/Output this shift:  No intake/output data recorded.    Exam  General Appearance: Alert, cooperative, no acute distress  Lungs: Clear to auscultation bilaterally, respirations unlabored  Heart: Regular rate and rhythm  Abdomen: Soft, nontender to palpation.  Nondistended.  No rebound or guarding.  Extremities: no edema or calf tenderness;     Labs  Results from last 7 days   Lab Units 01/14/23  0540 01/13/23  0517 01/12/23  0624   WBC K/uL 3.28* 3.22* 3.58*   HEMOGLOBIN g/dL 9.4* 7.9* 7.9*   HEMATOCRIT % 27.9* 24.2* 24.4*   PLATELETS K/uL 163 151 161      Results from last 7 days   Lab Units 01/14/23  0540 01/13/23  0517 01/12/23  0624   SODIUM mEQ/L 135* 134* 134*   POTASSIUM mEQ/L 4.1 3.7 4.1   CHLORIDE mEQ/L 108 109 108   CO2 mEQ/L 18* 19* 18*   BUN mg/dL 8 7* 8   CREATININE mg/dL 0.5* 0.5* 0.4*   GLUCOSE mg/dL 130* 88 81   CALCIUM mg/dL 8.7* 8.2* 8.3*        Assessment/Plan     Alondra Villela is a 32 y.o.  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      1. Gestational trophoblastic neoplasia:   a. Please see consult note by Dr. Hogue for full patient discussion of diease diagnosis, treatment and prognosis.   b. s/p PICC line placement. Chemotherapy regimen ordered with plan for EP induction for 2-3 cycles followed by EMA-EP for 3 cycles past negative hcg levels.   Successfully received her first round of EP last night  c. Thoracic surgery consulted 2/2 high risk of lung metastasis hemorrhage with initiation of chemotherapy, appreciate input. Coags ordered and patient crossmatched for an additional 2u per recommendations.      2. Thyroid storm:   a. 1/10 TSH <0.01, T4 5.04. TSI ab pending.  b. The patient's tachycardia seems to be improving heart rate most recently 99.  c. PTU and metoprolol ordered  d. S/p endocrine consults, recommendations much appreciated.     3. Anemia:   a. Most recent hgb 9.4 s/p 2u PRBC (1 of which was administered last night).   b. Goal hemoglobin>8.0 in setting of chemotherapy.   c. Continue daily CBC for time being.     4. VTE PPE:   a. Hold off on chemical VTE for now given high risk of hemorrhage with chemotherapy induction. Will reassess as able.   b. Mechanical prophylaxis with SCDs ordered.   c. Encourage ambulation QID.     5. Anx/Depression:  a. Continue home regimen of Wellbutrin 150 mg qd.   b. Ativan ordered PRN while inpatient.      6.  Migraines:   A.  Home protriptyline ordered via nonformulary order.    Discussed with Dr. Benedict Sweeney DO

## 2023-01-14 NOTE — NURSING NOTE
Cisplatin infusion completed. Pt tolerated well. Denies pain/discomfort. VSS. Pt sinus tachy on tele . 500ml NSS bolus currently infusing.

## 2023-01-14 NOTE — PROGRESS NOTES
ROB4 progress note:     Temp:  [36.5 °C (97.7 °F)-36.9 °C (98.4 °F)] 36.7 °C (98 °F)  Heart Rate:  [] 115  Resp:  [16-18] 18  BP: (104-123)/(53-63) 113/55  SpO2:  [95 %-99 %] 99 %  Oxygen Therapy: None (Room air)    Called to bedside to assess patient's newly developing rash. Patient with diffuse redness of lower chin and upper chest, sparing her neck. Redness is dry appearing and without raised lesions. No other skin abnormalities visualized. Patient denies itchiness, pain, SOB, CP. Will continue to monitor, no medication changes at this time.     Discussed with Dr. Benedict Sparks MD

## 2023-01-14 NOTE — PROGRESS NOTES
GYN resident to bedside to assess patient after completion of chemotherapy infusion. She had nausea during the infusion which subsided with Zofran.  Alondra has no complaints at this time and feels well.   She is sitting up in bed, alert and oriented, with her partner at bedside.  Consented Alondra for blood transfusion.  She will receive 1 unit of packed red blood cells.     Visit Vitals  /61 (BP Location: Left upper arm)   Pulse (!) 107   Temp 36.7 °C (98 °F) (Oral)   Resp 16   SpO2 96%     Discussed with Dr. Benedict Sweeney DO, PGY-3  (She/Her)  Obstetrics and Gynecology  Pager #[phone]

## 2023-01-14 NOTE — PLAN OF CARE
Problem: Adult Inpatient Plan of Care  Goal: Plan of Care Review  Outcome: Progressing  Flowsheets (Taken 1/14/2023 0548)  Progress: no change  Plan of Care Reviewed With: patient  Outcome Summary: Pt stable. VSS. Denies pain/discomfort. 1st day of chemo complete. Mild nausea at beginning of shift resolving on it's own. Pt with good water intake. Up to BR independenlty. Emotional support provided.  at bedside.

## 2023-01-15 LAB
ALBUMIN SERPL-MCNC: 2.6 G/DL (ref 3.4–5)
ALP SERPL-CCNC: 116 IU/L (ref 35–126)
ALT SERPL-CCNC: 53 IU/L (ref 11–54)
ANION GAP SERPL CALC-SCNC: 11 MEQ/L (ref 3–15)
AST SERPL-CCNC: 62 IU/L (ref 15–41)
BASOPHILS # BLD: 0 K/UL (ref 0.01–0.1)
BASOPHILS NFR BLD: 0 %
BILIRUB SERPL-MCNC: 0.7 MG/DL (ref 0.3–1.2)
BUN SERPL-MCNC: 12 MG/DL (ref 8–20)
BURR CELLS BLD QL SMEAR: ABNORMAL
CALCIUM SERPL-MCNC: 8.4 MG/DL (ref 8.9–10.3)
CANCER AG27-29 SERPL-ACNC: 20 U/ML
CHLORIDE SERPL-SCNC: 105 MEQ/L (ref 98–109)
CO2 SERPL-SCNC: 16 MEQ/L (ref 22–32)
CREAT SERPL-MCNC: 0.6 MG/DL (ref 0.6–1.1)
CROSSMATCH: NORMAL
CROSSMATCH: NORMAL
DACRYOCYTES BLD QL SMEAR: ABNORMAL
DIFFERENTIAL METHOD BLD: ABNORMAL
DOHLE BOD BLD QL SMEAR: ABNORMAL
EOSINOPHIL # BLD: 0.04 K/UL (ref 0.04–0.36)
EOSINOPHIL NFR BLD: 1 %
ERYTHROCYTE [DISTWIDTH] IN BLOOD BY AUTOMATED COUNT: 13.3 % (ref 11.7–14.4)
ERYTHROCYTE [DISTWIDTH] IN BLOOD BY AUTOMATED COUNT: 13.3 % (ref 11.7–14.4)
GFR SERPL CREATININE-BSD FRML MDRD: >60 ML/MIN/1.73M*2
GLUCOSE SERPL-MCNC: 91 MG/DL (ref 70–99)
HCG SERPL-ACNC: ABNORMAL IU/L (MIU/ML)
HCT VFR BLDCO AUTO: 25.9 % (ref 35–45)
HCT VFR BLDCO AUTO: 26.7 % (ref 35–45)
HGB BLD-MCNC: 9 G/DL (ref 11.8–15.7)
HGB BLD-MCNC: 9.1 G/DL (ref 11.8–15.7)
HYPOCHROMIA BLD QL SMEAR: ABNORMAL
ISBT CODE: 7300
ISBT CODE: 7300
LYMPHOCYTES # BLD: 0.07 K/UL (ref 1.2–3.5)
LYMPHOCYTES NFR BLD: 2 %
MAGNESIUM SERPL-MCNC: 1.5 MG/DL (ref 1.8–2.5)
MCH RBC QN AUTO: 27.6 PG (ref 28–33.2)
MCH RBC QN AUTO: 28 PG (ref 28–33.2)
MCHC RBC AUTO-ENTMCNC: 34.1 G/DL (ref 32.2–35.5)
MCHC RBC AUTO-ENTMCNC: 34.7 G/DL (ref 32.2–35.5)
MCV RBC AUTO: 79.4 FL (ref 83–98)
MCV RBC AUTO: 82.2 FL (ref 83–98)
MICROCYTES BLD QL SMEAR: ABNORMAL
MONOCYTES # BLD: 0 K/UL (ref 0.28–0.8)
MONOCYTES NFR BLD: 0 %
NEUTS BAND # BLD: 3.45 K/UL (ref 1.7–7)
NEUTS SEG NFR BLD: 97 %
OVALOCYTES BLD QL SMEAR: ABNORMAL
PDW BLD AUTO: 10.1 FL (ref 9.4–12.3)
PDW BLD AUTO: 10.1 FL (ref 9.4–12.3)
PLAT MORPH BLD: NORMAL
PLATELET # BLD AUTO: 100 K/UL (ref 150–369)
PLATELET # BLD AUTO: 124 K/UL (ref 150–369)
PLATELET # BLD EST: ABNORMAL 10*3/UL
POTASSIUM SERPL-SCNC: 3.8 MEQ/L (ref 3.6–5.1)
PRODUCT CODE: NORMAL
PRODUCT CODE: NORMAL
PRODUCT STATUS: NORMAL
PRODUCT STATUS: NORMAL
PROT SERPL-MCNC: 5.1 G/DL (ref 6–8.2)
RBC # BLD AUTO: 3.25 M/UL (ref 3.93–5.22)
RBC # BLD AUTO: 3.26 M/UL (ref 3.93–5.22)
SODIUM SERPL-SCNC: 132 MEQ/L (ref 136–144)
SPECIMEN EXP DATE BLD: NORMAL
SPECIMEN EXP DATE BLD: NORMAL
T3FREE SERPL-MCNC: 3.95 PG/ML (ref 2.1–3.7)
T4 FREE SERPL-MCNC: 3.59 NG/DL (ref 0.58–1.64)
TOXIC GRANULES BLD QL SMEAR: SLIGHT
UNIT ABO: NORMAL
UNIT ABO: NORMAL
UNIT ID: NORMAL
UNIT ID: NORMAL
UNIT RH: POSITIVE
UNIT RH: POSITIVE
WBC # BLD AUTO: 3.56 K/UL (ref 3.8–10.5)
WBC # BLD AUTO: 5.37 K/UL (ref 3.8–10.5)

## 2023-01-15 PROCEDURE — 63700000 HC SELF-ADMINISTRABLE DRUG: Performed by: OBSTETRICS & GYNECOLOGY

## 2023-01-15 PROCEDURE — 85027 COMPLETE CBC AUTOMATED: CPT | Performed by: STUDENT IN AN ORGANIZED HEALTH CARE EDUCATION/TRAINING PROGRAM

## 2023-01-15 PROCEDURE — 63700000 HC SELF-ADMINISTRABLE DRUG: Performed by: STUDENT IN AN ORGANIZED HEALTH CARE EDUCATION/TRAINING PROGRAM

## 2023-01-15 PROCEDURE — 84702 CHORIONIC GONADOTROPIN TEST: CPT | Performed by: STUDENT IN AN ORGANIZED HEALTH CARE EDUCATION/TRAINING PROGRAM

## 2023-01-15 PROCEDURE — 83735 ASSAY OF MAGNESIUM: CPT | Performed by: STUDENT IN AN ORGANIZED HEALTH CARE EDUCATION/TRAINING PROGRAM

## 2023-01-15 PROCEDURE — 93005 ELECTROCARDIOGRAM TRACING: CPT | Performed by: STUDENT IN AN ORGANIZED HEALTH CARE EDUCATION/TRAINING PROGRAM

## 2023-01-15 PROCEDURE — 63600000 HC DRUGS/DETAIL CODE: Performed by: STUDENT IN AN ORGANIZED HEALTH CARE EDUCATION/TRAINING PROGRAM

## 2023-01-15 PROCEDURE — 84439 ASSAY OF FREE THYROXINE: CPT | Performed by: STUDENT IN AN ORGANIZED HEALTH CARE EDUCATION/TRAINING PROGRAM

## 2023-01-15 PROCEDURE — 80053 COMPREHEN METABOLIC PANEL: CPT | Performed by: STUDENT IN AN ORGANIZED HEALTH CARE EDUCATION/TRAINING PROGRAM

## 2023-01-15 PROCEDURE — 21400000 HC ROOM AND CARE CCU/INTERMEDIATE

## 2023-01-15 PROCEDURE — 36415 COLL VENOUS BLD VENIPUNCTURE: CPT | Performed by: STUDENT IN AN ORGANIZED HEALTH CARE EDUCATION/TRAINING PROGRAM

## 2023-01-15 PROCEDURE — 84481 FREE ASSAY (FT-3): CPT | Performed by: STUDENT IN AN ORGANIZED HEALTH CARE EDUCATION/TRAINING PROGRAM

## 2023-01-15 PROCEDURE — 99232 SBSQ HOSP IP/OBS MODERATE 35: CPT | Performed by: SURGERY

## 2023-01-15 PROCEDURE — 85025 COMPLETE CBC W/AUTO DIFF WBC: CPT | Performed by: STUDENT IN AN ORGANIZED HEALTH CARE EDUCATION/TRAINING PROGRAM

## 2023-01-15 PROCEDURE — 99232 SBSQ HOSP IP/OBS MODERATE 35: CPT | Performed by: INTERNAL MEDICINE

## 2023-01-15 PROCEDURE — 63700000 HC SELF-ADMINISTRABLE DRUG: Performed by: INTERNAL MEDICINE

## 2023-01-15 PROCEDURE — 99231 SBSQ HOSP IP/OBS SF/LOW 25: CPT | Performed by: STUDENT IN AN ORGANIZED HEALTH CARE EDUCATION/TRAINING PROGRAM

## 2023-01-15 RX ORDER — METOPROLOL TARTRATE 25 MG/1
25 TABLET, FILM COATED ORAL 2 TIMES DAILY
Status: DISCONTINUED | OUTPATIENT
Start: 2023-01-15 | End: 2023-01-16

## 2023-01-15 RX ADMIN — PROPYLTHIOURACIL 100 MG: 50 TABLET ORAL at 21:02

## 2023-01-15 RX ADMIN — BUPROPION HYDROCHLORIDE 150 MG: 150 TABLET, EXTENDED RELEASE ORAL at 21:01

## 2023-01-15 RX ADMIN — IBUPROFEN 400 MG: 400 TABLET, FILM COATED ORAL at 06:51

## 2023-01-15 RX ADMIN — IBUPROFEN 400 MG: 400 TABLET, FILM COATED ORAL at 15:05

## 2023-01-15 RX ADMIN — METOPROLOL TARTRATE 12.5 MG: 25 TABLET, FILM COATED ORAL at 08:30

## 2023-01-15 RX ADMIN — ACETAMINOPHEN 650 MG: 325 TABLET, FILM COATED ORAL at 22:10

## 2023-01-15 RX ADMIN — PROTRIPTYLINE HYDROCHLORIDE 15 MG: 5 TABLET, FILM COATED ORAL at 21:02

## 2023-01-15 RX ADMIN — PROPYLTHIOURACIL 100 MG: 50 TABLET ORAL at 06:52

## 2023-01-15 RX ADMIN — ACETAMINOPHEN 650 MG: 325 TABLET, FILM COATED ORAL at 11:05

## 2023-01-15 RX ADMIN — PROPYLTHIOURACIL 100 MG: 50 TABLET ORAL at 15:05

## 2023-01-15 RX ADMIN — BENZOCAINE AND MENTHOL 1 LOZENGE: 15; 3.6 LOZENGE ORAL at 15:06

## 2023-01-15 RX ADMIN — MAGNESIUM SULFATE HEPTAHYDRATE 2 G: 40 INJECTION, SOLUTION INTRAVENOUS at 15:06

## 2023-01-15 RX ADMIN — LORAZEPAM 1 MG: 1 TABLET ORAL at 16:18

## 2023-01-15 RX ADMIN — ACETAMINOPHEN 650 MG: 325 TABLET, FILM COATED ORAL at 03:59

## 2023-01-15 RX ADMIN — METOPROLOL TARTRATE 25 MG: 25 TABLET, FILM COATED ORAL at 21:01

## 2023-01-15 NOTE — PLAN OF CARE
Plan of Care Review  Plan of Care Reviewed With: patient  Progress: improving  Outcome Summary: Pt remains tachycardic t/o shift. Still with persistent dry cough. Vitals otherwise stable. PRN meds for headache. Pt c/o CP, EKG normal, VSS, stat labs normal. PRN ativan given with improvement. Family updated at the bedside. Mg repleted today. FSP maintained and call bell in reach.

## 2023-01-15 NOTE — PROGRESS NOTES
Endocrinology Progress Note       SUBJECTIVE   Interval History:pt has received 2 doses of chemotherapy and will reportedly receive her next dose on 1/20.  She reports feeling worse today with cough and low grade fever  She has intermittent palpitations and desires to increase her beta blockade.     OBJECTIVE      Vital signs in last 24 hours:  Temp:  [36.6 °C (97.8 °F)-37.7 °C (99.8 °F)] 36.7 °C (98.1 °F)  Heart Rate:  [] 111  Resp:  [18] 18  BP: (105-128)/(52-87) 105/52      PHYSICAL EXAMINATION        HEENT: NCAT mucous membranes moist  EOMI sclerae anicteric  NEURO:A&Ox3   PSYCH:Appropriate and cooperative       LABS / IMAGING / TELE      Labs    [unfilled]  Lab Results   Component Value Date    WBC 5.37 01/15/2023    HGB 9.1 (L) 01/15/2023    HCT 26.7 (L) 01/15/2023    MCV 82.2 (L) 01/15/2023     (L) 01/15/2023     No results found for: HGBA1C  Lab Results   Component Value Date    CALCIUM 8.4 (L) 01/15/2023     Results from last 7 days   Lab Units 01/15/23  0701   SODIUM mEQ/L 132*   POTASSIUM mEQ/L 3.8   CHLORIDE mEQ/L 105   CO2 mEQ/L 16*   BUN mg/dL 12   CREATININE mg/dL 0.6   CALCIUM mg/dL 8.4*   ALBUMIN g/dL 2.6*   BILIRUBIN TOTAL mg/dL 0.7   ALK PHOS IU/L 116   ALT IU/L 53   AST IU/L 62*   GLUCOSE mg/dL 91        Latest Reference Range & Units 01/10/23 20:50 01/11/23 09:15 01/15/23 07:01   TSH 0.34 - 5.60 mIU/L <0.01 (L)     Free T4 0.58 - 1.64 ng/dL 5.07 (H)  3.59 (H)   T3, Free 2.10 - 3.70 pg/mL  10.42 (H) 3.95 (H)       ASSESSMENT AND PLAN      1) Hyperthyroidism  32 year old woman with very new recognition of gestational trophoblastic neoplasm  She presented with exceedingly high and rapidly rising HCG levels  HCG notably higher though will defer to gyn onc regarding expected timing of decline in HCG levels following initiation of chemotherapy  FT3 markedly improved with just 2 days of high dose PTU - FT4 trending down as well  Will continue current dose of PTU  Low grade fevers noted  which is likely multifactorial  WBC today are WNL in 5s - please obtain diff with CBCs as well  Discussed with pt rare risk of agranulocytosis with ATD therapy though it would be unusual for this to occur very early in the course of ATD therapy  LITs mildly elevated today - likely also multifactorial with chemo and hyperthyroidism itself - please continue to monitor with PTU treatment to ensure that LITs are not trending up further  She is having ongoing palpitations and wishes to augment her beta blockade  Will attempt a trial of 25 bid metoprolol - however her HR may be preserving BP and if BP trends too lower would need to reduce dosing   Would make sure electrolytes including Mg remain replete in the event that this is aggravating her palpitations      2) Gestational trophoblastic neoplasm  Ongoing mgt per gyn onc  Received chemotherapy 1/13 and 1/14  - plans to resume 1/20 noted  Concern for high risk of lung hemorrhage noted  - with plan for involvement of thoracic surgery     3) Adrenal calcification  Noted on CT  Uncertain significance  She received dex as part of her chemotherapeutic regimen  Can monitor closely for any evidence of adrenal hormone compromise once the effect of dex dissipates

## 2023-01-15 NOTE — PLAN OF CARE
Problem: Adult Inpatient Plan of Care  Goal: Plan of Care Review  Flowsheets (Taken 1/15/2023 0544)  Progress: no change  Plan of Care Reviewed With: patient  Outcome Summary: Patient denies pain, nausea/vomiting all shift. Reported headache this morning for which Tylenol was administered. Remains tachycardic with rates in the 110's. Dry cough present. SCD's in place. Spouse at bedside. Call bell within reach.

## 2023-01-15 NOTE — PROGRESS NOTES
Gynecology Progress Note    Subjective     Called to evaluate patient given new onset chest pain 6/10 which occurred suddenly. Per RN patient seems to get rebound increase in tachycardia after PTU medication. Patient reports that after seeing her HR increase she developed chest pressure. During my assessment she reports this decreased slightly to a 5/10. Patient denies SOB or wheezing. She continues to have some cough which is improved slightly with cough drops. She denies abdominal pain or vaginal bleeding.     Objective     Vital signs in last 24 hours:  Temp:  [36.6 °C (97.8 °F)-37.3 °C (99.2 °F)] 37.3 °C (99.1 °F)  Heart Rate:  [] 120  Resp:  [18] 18  BP: (105-128)/(52-87) 120/55   O2 100% on RA    Exam  General Appearance: Alert, cooperative, no acute distress  Lungs: Clear to auscultation bilaterally, respirations unlabored  Heart: tachycardic   Abdomen:  Soft, non tender, non distended   Extremities: no edema or calf tenderness    Labs    Results from last 7 days   Lab Units 01/15/23  1623 01/14/23  0540 01/13/23  0517 01/11/23  1049 01/10/23  2050   WBC K/uL 3.56*   < > 3.22*   < > 3.76*   HEMOGLOBIN g/dL 9.0*   < > 7.9*   < > 7.9*   HEMATOCRIT % 25.9*   < > 24.2*   < > 24.9*   PLATELETS K/uL 100*   < > 151   < > 202   DIFF TYPE  Manu  --  Auto   < > Manu   NRBC %  --   --  0.0   < >  --    IMM GRANULOCYTES %  --   --  0.3   < >  --    NEUTROPHILS %  --   --  60.0   < >  --    NEUTROS PCT MAN % 97  --   --   --  64   LYMPHOCYTES %  --   --  26.7   < >  --    LYMPHO PCT MAN % 2  --   --   --  23   MONOCYTES %  --   --  11.8   < >  --    MONO PCT MAN % 0  --   --   --  11   EOSINOPHILS %  --   --  1.2   < >  --    EOSINO PCT MAN % 1  --   --   --  1   BASOPHILS %  --   --  0.0   < >  --    BASOS PCT MAN % 0  --   --   --  1   BANDS PCT MAN %  --   --   --   --  0   IMM GRANUCOCYTES ABS K/uL  --   --  0.01   < >  --    SEGS ABS MAN K/uL 3.45  --   --   --  2.41   LYMPHO ABS MAN K/uL 0.07*  --   --   --   0.86*   MONO ABS AUTO K/uL  --   --  0.38   < >  --    MONO ABS MAN K/uL 0.00*  --   --   --  0.41   EOS ABS AUTO K/uL  --   --  0.04   < >  --    EOS ABS MAN K/uL 0.04  --   --   --  0.04   BASO ABS AUTO K/uL  --   --  0.00*   < >  --    BASOS ABS MAN K/uL 0.00*  --   --   --  0.04    < > = values in this interval not displayed.       Results from last 7 days   Lab Units 01/15/23  0701 01/14/23  0540 01/13/23  0517   SODIUM mEQ/L 132* 135* 134*   POTASSIUM mEQ/L 3.8 4.1 3.7   CHLORIDE mEQ/L 105 108 109   CO2 mEQ/L 16* 18* 19*   BUN mg/dL 12 8 7*   CREATININE mg/dL 0.6 0.5* 0.5*   GLUCOSE mg/dL 91 130* 88   CALCIUM mg/dL 8.4* 8.7* 8.2*      Quantitative Beta-HCGs in last 30 days:   Recent Labs   Lab 01/15/23  0701 01/13/23  0517 01/10/23  2050   HCGQUANT >2,740,000.0* >1,846,800.0* >461,700.0*         Assessment/Plan     Alondra Villela is a 32 y.o. 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      1. Gestational trophoblastic neoplasia:   a. s/p PICC line placement. Chemotherapy regimen ordered with plan for EP induction for 2-3 cycles followed by EMA-EP for 3 cycles past negative hcg levels. Successfully received first induction cycle 1/13-1/14. Next induction to be administered 1/20-1/21. bHCG increasing but to be expected. Induction cycles to occur weekly.   b. Thoracic surgery consulted 2/2 high risk of lung metastasis hemorrhage with initiation of chemotherapy, appreciate input. Coags ordered and patient crossmatched for an additional 2u per recommendations.   c. Continue daily CBC with diff, CMP and Mg with repletion PRN. Will order for labs to be drawn off PICC.   d. Patient with cough since chemo started, cepacol cough drop ordered PRN.      2. Thyroid storm:   a. 1/10 TSH <0.01, T4 5.04. TSI ab pending.  b. Repeat T4/T3 on 1/15 were 3.59/3.95.   c. Patient remains largely tachycardic, stable. Patient also with development of low grade fever. Remains on tele and  close vital sign monitoring.   d. PTU and metoprolol ordered per endocrine. Currently on Metoprolol 25 BID and  TID   e. S/p endocrine consult, continue to appreciate recommendations.      3. Anemia:   a. Most recent hgb 9.1 s/p 2u PRBC.   b. Goal hemoglobin >8.0 in setting of chemotherapy.   c. Continue daily CBC with diff.     4. VTE PPE:   a. Hold off on chemical VTE for now given high risk of hemorrhage with chemotherapy induction. Will reassess as able.   b. Mechanical prophylaxis with SCDs ordered.  c. Encourage ambulation QID.     5. Anx/Depression:  a. Continue home regimen of Wellbutrin 150 mg qd.   b. Ativan ordered PRN while inpatient.       6.  Migraines:              A. Home protriptyline ordered via nonformulary order.    7. Chest Pain:    A. Stat CBC with diff ordered to assess for hemorrhage, would be possible to develop chest pain with bleeding in chest. Reassured that stat labs showed  stable Hgb 9.1->9.0 now. Suspect a component of anxiety may be contributing to chest pain however given patient's self report that CP occurred after  seeing HR increase. Will give dose ativan now and  continue close monitoring.     8. Hypomagnesia:   A. Magnesium 1.5 this AM s/p 2g IV Mg ordered. Will continue to trend daily.     Reviewed with Dr. Benedict Koehler MD PGY4  Pager [phone]

## 2023-01-15 NOTE — PROGRESS NOTES
Gynecology Progress Note    Subjective     Patient feeling well this AM and is without complaints. She is tolerating PO without issue and denies nausea or vomiting. She is ambulating without. Issue. She is voiding without issue and her most recent BM was 1/13.     Objective     Vital signs in last 24 hours:  Temp:  [36.5 °C (97.7 °F)-37.7 °C (99.8 °F)] 37.2 °C (99 °F)  Heart Rate:  [] 119  Resp:  [18] 18  BP: (113-128)/(55-87) 122/60    Exam  General Appearance: Alert, cooperative, no acute distress  Lungs: Clear to auscultation bilaterally, respirations unlabored  Heart: Regular rate and rhythm  Abdomen: soft, non-tender, non-distended  Extremities: no edema or calf tenderness; SCDs not in place but are now present in room  Skin: erythema of chin and chest unchanged, remains flat and without raised lesions     Labs  Results from last 7 days   Lab Units 01/14/23  0540 01/13/23  0517 01/12/23  0624   WBC K/uL 3.28* 3.22* 3.58*   HEMOGLOBIN g/dL 9.4* 7.9* 7.9*   HEMATOCRIT % 27.9* 24.2* 24.4*   PLATELETS K/uL 163 151 161      Results from last 7 days   Lab Units 01/14/23  0540 01/13/23  0517 01/12/23  0624   SODIUM mEQ/L 135* 134* 134*   POTASSIUM mEQ/L 4.1 3.7 4.1   CHLORIDE mEQ/L 108 109 108   CO2 mEQ/L 18* 19* 18*   BUN mg/dL 8 7* 8   CREATININE mg/dL 0.5* 0.5* 0.4*   GLUCOSE mg/dL 130* 88 81   CALCIUM mg/dL 8.7* 8.2* 8.3*        Assessment/Plan     Alondra Villela is a 32 y.o. 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      1. Gestational trophoblastic neoplasia:   a. s/p PICC line placement. Chemotherapy regimen ordered with plan for EP induction for 2-3 cycles followed by EMA-EP for 3 cycles past negative hcg levels. Successfully received first induction cycle 1/13-1/14.   b. Thoracic surgery consulted 2/2 high risk of lung metastasis hemorrhage with initiation of chemotherapy, appreciate input. Coags ordered and patient crossmatched for an additional 2u per  recommendations.   c. Continue daily CMP and Mg with repletion PRN.      2. Thyroid storm:   a. 1/10 TSH <0.01, T4 5.04. TSI ab pending.  b. Patient remains largely tachycardic, stable. Patient also with development of low grade fever. Remains on tele and close vital sign monitoring.   c. PTU and metoprolol ordered.   d. Will plan to repeat FT4 and FT3 today to ensure response.   e. S/p endocrine consult, continue to appreciate recommendations.      3. Anemia:   a. Most recent hgb 9.4 s/p 2u PRBC.   b. Goal hemoglobin >8.0 in setting of chemotherapy.   c. Continue daily CBC.     4. VTE PPE:   a. Hold off on chemical VTE for now given high risk of hemorrhage with chemotherapy induction. Will reassess as able.   b. Mechanical prophylaxis with SCDs ordered.  c. Encourage ambulation QID.     5. Anx/Depression:  a. Continue home regimen of Wellbutrin 150 mg qd.   b. Ativan ordered PRN while inpatient.       6.  Migraines:              A. Home protriptyline ordered via nonformulary order.    Will discuss with day time coverage. Please page [phone] with questions.     Gerardo Sparks MD

## 2023-01-15 NOTE — PROGRESS NOTES
Thoracic Surgery Daily Progress Note      Subjective     Patient with gestational trophoblastic disease. CT A/P 1/13 demonstrates uterine mass measuring up to 13.8 cm, consistent with choriocarcinoma. Multiple pulmonary nodules also suspicious for metastatic disease. Following pre-emptively if there is spontaneous bleeding from lung mets with initiation of chemo    No acute events overnight, Pt has no complaints at this time. On a regular diet, She received 1u pRBC, responded to hgb 9.4 yesterday, (9.1 this AM)    Objective     Vital signs in last 24 hours:  Temp:  [36.6 °C (97.8 °F)-37.7 °C (99.8 °F)] 36.7 °C (98.1 °F)  Heart Rate:  [] 111  Resp:  [18] 18  BP: (105-128)/(52-87) 105/52    No intake or output data in the 24 hours ending 01/15/23 1227  Intake/Output this shift:  No intake/output data recorded.    Physical Exam    General appearance: alert, appears stated age and cooperative  Head: normocephalic, without obvious abnormality  Neck: supple, symmetrical, trachea midline  Lungs: clear to ausculation anterior fields  Heart: regular rate and rhythm  Abdomen: soft, mildly distended, nontender  Extremities: warm and well perfused  Skin: warm and dry  Neurologic: Grossly normal    VTE Assessment: I have reassessed and the patient's VTE risk and treatment plan is appropriate.    Labs  I have reviewed the patient's labs.  Current labs are within normal limits.   CBC Results       01/15/23 01/14/23 01/13/23     0701 0540 0517    WBC 5.37 3.28 3.22    RBC 3.25 3.44 2.92    HGB 9.1 9.4 7.9    HCT 26.7 27.9 24.2    MCV 82.2 81.1 82.9    MCH 28.0 27.3 27.1    MCHC 34.1 33.7 32.6     163 151        CMP Results       01/15/23 01/14/23 01/13/23     0701 0540 0517     135 134    K 3.8 4.1 3.7    Cl 105 108 109    CO2 16 18 19    Glucose 91 130 88    BUN 12 8 7    Creatinine 0.6 0.5 0.5    Calcium 8.4 8.7 8.2    Anion Gap 11 9 6    AST 62 28 28    ALT 53 28 28    Albumin 2.6 2.8 2.5    EGFR >60.0 >60.0  >60.0          Imaging  I have reviewed the Imaging from the last 24 hrs.      Assessment/Plan   31 y/o female with no significant PMHx who presented to [facility] on 1/11 found to have gestational trophoblastic disease, transferred to [facility] [place] on 1/13 to initiate chemotherapy. Imaging demonstrates 5cm mass in the RLL (biopsy benign), innumerable pulmonary nodules, questionable liver mets. CT A/P pelvis today demonstrated uterine mass measuring up to 13.8 cm consistent with choriocarcinoma. Thoracic surgery following for concern of lung mets and preemptively for concerns of spontaneous bleeding from lung nodules that can happen with initiation of chemotherapy     - Thoracic surgery available as needed if patient develops spontaneous bleeding after initiation of chemotherapy   - f/u daily CBC  - Rest of care per primary team     Please page [phone] with any questions or concerns     Glynn Hernandez MD  General Surgery PGY-1

## 2023-01-16 LAB
ALBUMIN SERPL-MCNC: 2.4 G/DL (ref 3.4–5)
ALP SERPL-CCNC: 150 IU/L (ref 35–126)
ALT SERPL-CCNC: 45 IU/L (ref 11–54)
ANION GAP SERPL CALC-SCNC: 10 MEQ/L (ref 3–15)
ANION GAP SERPL CALC-SCNC: 8 MEQ/L (ref 3–15)
ANISOCYTOSIS BLD QL SMEAR: ABNORMAL
AST SERPL-CCNC: 47 IU/L (ref 15–41)
ATRIAL RATE: 128
BASOPHILS # BLD: 0 K/UL (ref 0.01–0.1)
BASOPHILS NFR BLD: 0 %
BILIRUB SERPL-MCNC: 2.6 MG/DL (ref 0.3–1.2)
BUN SERPL-MCNC: 11 MG/DL (ref 8–20)
BUN SERPL-MCNC: 14 MG/DL (ref 8–20)
BURR CELLS BLD QL SMEAR: ABNORMAL
CALCIUM SERPL-MCNC: 7.8 MG/DL (ref 8.9–10.3)
CALCIUM SERPL-MCNC: 8 MG/DL (ref 8.9–10.3)
CHLORIDE SERPL-SCNC: 103 MEQ/L (ref 98–109)
CHLORIDE SERPL-SCNC: 104 MEQ/L (ref 98–109)
CO2 SERPL-SCNC: 16 MEQ/L (ref 22–32)
CO2 SERPL-SCNC: 16 MEQ/L (ref 22–32)
CREAT SERPL-MCNC: 0.7 MG/DL (ref 0.6–1.1)
CREAT SERPL-MCNC: 0.7 MG/DL (ref 0.6–1.1)
DIFFERENTIAL METHOD BLD: ABNORMAL
EOSINOPHIL # BLD: 0 K/UL (ref 0.04–0.36)
EOSINOPHIL NFR BLD: 0 %
ERYTHROCYTE [DISTWIDTH] IN BLOOD BY AUTOMATED COUNT: 13.2 % (ref 11.7–14.4)
GFR SERPL CREATININE-BSD FRML MDRD: >60 ML/MIN/1.73M*2
GFR SERPL CREATININE-BSD FRML MDRD: >60 ML/MIN/1.73M*2
GLUCOSE SERPL-MCNC: 105 MG/DL (ref 70–99)
GLUCOSE SERPL-MCNC: 74 MG/DL (ref 70–99)
HCT VFR BLDCO AUTO: 27.9 % (ref 35–45)
HGB BLD-MCNC: 9.2 G/DL (ref 11.8–15.7)
LYMPHOCYTES # BLD: 0.08 K/UL (ref 1.2–3.5)
LYMPHOCYTES NFR BLD: 3 %
MAGNESIUM SERPL-MCNC: 1.4 MG/DL (ref 1.8–2.5)
MCH RBC QN AUTO: 27.1 PG (ref 28–33.2)
MCHC RBC AUTO-ENTMCNC: 33 G/DL (ref 32.2–35.5)
MCV RBC AUTO: 82.3 FL (ref 83–98)
MONOCYTES # BLD: 0.08 K/UL (ref 0.28–0.8)
MONOCYTES NFR BLD: 3 %
NEUTS BAND # BLD: 2.47 K/UL (ref 1.7–7)
NEUTS SEG NFR BLD: 94 %
P AXIS: 47
PDW BLD AUTO: 10.7 FL (ref 9.4–12.3)
PLAT MORPH BLD: NORMAL
PLATELET # BLD AUTO: 90 K/UL (ref 150–369)
PLATELET # BLD EST: ABNORMAL 10*3/UL
POTASSIUM SERPL-SCNC: 3.4 MEQ/L (ref 3.6–5.1)
POTASSIUM SERPL-SCNC: 3.9 MEQ/L (ref 3.6–5.1)
PR INTERVAL: 128
PROT SERPL-MCNC: 4.7 G/DL (ref 6–8.2)
QRS DURATION: 76
QT INTERVAL: 300
QTC CALCULATION(BAZETT): 438
R AXIS: 12
RBC # BLD AUTO: 3.39 M/UL (ref 3.93–5.22)
RBC MORPH BLD: NORMAL
SODIUM SERPL-SCNC: 127 MEQ/L (ref 136–144)
SODIUM SERPL-SCNC: 130 MEQ/L (ref 136–144)
T WAVE AXIS: 38
TSI SER-ACNC: 95 % BASELINE
VENTRICULAR RATE: 128
WBC # BLD AUTO: 2.63 K/UL (ref 3.8–10.5)

## 2023-01-16 PROCEDURE — 83735 ASSAY OF MAGNESIUM: CPT | Performed by: STUDENT IN AN ORGANIZED HEALTH CARE EDUCATION/TRAINING PROGRAM

## 2023-01-16 PROCEDURE — 99232 SBSQ HOSP IP/OBS MODERATE 35: CPT | Performed by: INTERNAL MEDICINE

## 2023-01-16 PROCEDURE — 80048 BASIC METABOLIC PNL TOTAL CA: CPT | Performed by: OBSTETRICS & GYNECOLOGY

## 2023-01-16 PROCEDURE — 63700000 HC SELF-ADMINISTRABLE DRUG: Performed by: OBSTETRICS & GYNECOLOGY

## 2023-01-16 PROCEDURE — 63700000 HC SELF-ADMINISTRABLE DRUG: Performed by: STUDENT IN AN ORGANIZED HEALTH CARE EDUCATION/TRAINING PROGRAM

## 2023-01-16 PROCEDURE — 99232 SBSQ HOSP IP/OBS MODERATE 35: CPT | Performed by: SURGERY

## 2023-01-16 PROCEDURE — 82533 TOTAL CORTISOL: CPT | Performed by: INTERNAL MEDICINE

## 2023-01-16 PROCEDURE — 99232 SBSQ HOSP IP/OBS MODERATE 35: CPT | Performed by: OBSTETRICS & GYNECOLOGY

## 2023-01-16 PROCEDURE — 21400000 HC ROOM AND CARE CCU/INTERMEDIATE

## 2023-01-16 PROCEDURE — 63700000 HC SELF-ADMINISTRABLE DRUG: Performed by: INTERNAL MEDICINE

## 2023-01-16 PROCEDURE — 80053 COMPREHEN METABOLIC PANEL: CPT | Performed by: STUDENT IN AN ORGANIZED HEALTH CARE EDUCATION/TRAINING PROGRAM

## 2023-01-16 PROCEDURE — 93010 ELECTROCARDIOGRAM REPORT: CPT | Performed by: INTERNAL MEDICINE

## 2023-01-16 PROCEDURE — 63600000 HC DRUGS/DETAIL CODE: Performed by: OBSTETRICS & GYNECOLOGY

## 2023-01-16 PROCEDURE — 36415 COLL VENOUS BLD VENIPUNCTURE: CPT | Performed by: STUDENT IN AN ORGANIZED HEALTH CARE EDUCATION/TRAINING PROGRAM

## 2023-01-16 PROCEDURE — 85025 COMPLETE CBC W/AUTO DIFF WBC: CPT | Performed by: STUDENT IN AN ORGANIZED HEALTH CARE EDUCATION/TRAINING PROGRAM

## 2023-01-16 RX ORDER — POTASSIUM CHLORIDE 750 MG/1
40 TABLET, EXTENDED RELEASE ORAL ONCE
Status: COMPLETED | OUTPATIENT
Start: 2023-01-16 | End: 2023-01-16

## 2023-01-16 RX ORDER — AMOXICILLIN 250 MG
2 CAPSULE ORAL 2 TIMES DAILY
Status: DISCONTINUED | OUTPATIENT
Start: 2023-01-16 | End: 2023-01-21 | Stop reason: HOSPADM

## 2023-01-16 RX ADMIN — METOPROLOL TARTRATE 25 MG: 25 TABLET, FILM COATED ORAL at 08:04

## 2023-01-16 RX ADMIN — IBUPROFEN 400 MG: 400 TABLET, FILM COATED ORAL at 08:04

## 2023-01-16 RX ADMIN — BUPROPION HYDROCHLORIDE 150 MG: 150 TABLET, EXTENDED RELEASE ORAL at 21:13

## 2023-01-16 RX ADMIN — POTASSIUM CHLORIDE 40 MEQ: 750 TABLET, EXTENDED RELEASE ORAL at 21:15

## 2023-01-16 RX ADMIN — PROPYLTHIOURACIL 100 MG: 50 TABLET ORAL at 16:38

## 2023-01-16 RX ADMIN — MAGNESIUM SULFATE HEPTAHYDRATE 2 G: 40 INJECTION, SOLUTION INTRAVENOUS at 17:07

## 2023-01-16 RX ADMIN — PROPYLTHIOURACIL 100 MG: 50 TABLET ORAL at 23:58

## 2023-01-16 RX ADMIN — SENNOSIDES 1 TABLET: 8.6 TABLET, FILM COATED ORAL at 16:45

## 2023-01-16 RX ADMIN — METOPROLOL TARTRATE 37.5 MG: 25 TABLET, FILM COATED ORAL at 21:13

## 2023-01-16 RX ADMIN — SENNOSIDES AND DOCUSATE SODIUM 2 TABLET: 50; 8.6 TABLET ORAL at 21:14

## 2023-01-16 RX ADMIN — PROPYLTHIOURACIL 100 MG: 50 TABLET ORAL at 06:42

## 2023-01-16 RX ADMIN — PROTRIPTYLINE HYDROCHLORIDE 15 MG: 5 TABLET, FILM COATED ORAL at 21:09

## 2023-01-16 NOTE — PROGRESS NOTES
Thoracic Surgery Daily Progress Note      Subjective     Patient with gestational trophoblastic disease. CT A/P 1/13 demonstrates uterine mass measuring up to 13.8 cm, consistent with choriocarcinoma. Multiple pulmonary nodules also suspicious for metastatic disease. Following pre-emptively if there is spontaneous bleeding from lung mets with initiation of chemo    Interval history:  NAEON, patient denies any hemoptysis, coughing, or SOB. No new complaints at this time     Objective     Vital signs in last 24 hours:  Temp:  [36.7 °C (98.1 °F)-37.4 °C (99.4 °F)] 37.4 °C (99.4 °F)  Heart Rate:  [106-128] 128  Resp:  [18-20] 20  BP: (100-137)/(51-62) 137/62    No intake or output data in the 24 hours ending 01/16/23 0759  Intake/Output this shift:  No intake/output data recorded.    Physical Exam    General appearance: alert, appears stated age and cooperative  Head: normocephalic, without obvious abnormality  Neck: supple, symmetrical, trachea midline  Lungs: clear to ausculation anterior fields  Heart: regular rate and rhythm  Abdomen: soft, mildly distended, nontender  Extremities: warm and well perfused  Skin: warm and dry  Neurologic: Grossly normal    VTE Assessment: I have reassessed and the patient's VTE risk and treatment plan is appropriate.    Labs  I have reviewed the patient's labs.  Current labs are within normal limits.   CBC Results       01/15/23 01/14/23 01/13/23     0701 0540 0517    WBC 5.37 3.28 3.22    RBC 3.25 3.44 2.92    HGB 9.1 9.4 7.9    HCT 26.7 27.9 24.2    MCV 82.2 81.1 82.9    MCH 28.0 27.3 27.1    MCHC 34.1 33.7 32.6     163 151        CMP Results       01/15/23 01/14/23 01/13/23     0701 0540 0517     135 134    K 3.8 4.1 3.7    Cl 105 108 109    CO2 16 18 19    Glucose 91 130 88    BUN 12 8 7    Creatinine 0.6 0.5 0.5    Calcium 8.4 8.7 8.2    Anion Gap 11 9 6    AST 62 28 28    ALT 53 28 28    Albumin 2.6 2.8 2.5    EGFR >60.0 >60.0 >60.0          Imaging  I have reviewed  the Imaging from the last 24 hrs.      Assessment/Plan   31 y/o female with no significant PMHx who presented to [facility] on 1/11 found to have gestational trophoblastic disease, transferred to [facility] [place] on 1/13 to initiate chemotherapy. Imaging demonstrates 5cm mass in the RLL (biopsy benign), innumerable pulmonary nodules, questionable liver mets. CT A/P pelvis today demonstrated uterine mass measuring up to 13.8 cm consistent with choriocarcinoma. Thoracic surgery following for concern of lung mets and preemptively for concerns of spontaneous bleeding from lung nodules that can happen with initiation of chemotherapy     - Thoracic surgery available as needed if patient develops spontaneous bleeding after initiation of chemotherapy   - f/u daily CBC, 1/16 AM Hgb not resulted at time of this note  - Rest of care per primary team     Please page [phone] with any questions or concerns     Pedro Epstein DO

## 2023-01-16 NOTE — PROGRESS NOTES
Gynecologic Oncology Progress Note    Subjective     Patient seen on afternoon rounds. Feeling subjectively well, reports some fatigue. Continues to have dry cough which is worse with deep inspiration/talking. Denies headache, chest pain/pressure, dyspnea, wheezing, abdominal pain, vaginal bleeding, lightheadedness, dizziness, palpitations. Ambulating frequently, voiding spontaneously, reports constipation.    Objective     Vital signs in last 24 hours:  Temp:  [36.4 °C (97.5 °F)-37.5 °C (99.5 °F)] 36.4 °C (97.5 °F)  Heart Rate:  [106-128] 112  Resp:  [18-20] 18  BP: (100-137)/(51-62) 100/54  SpO2:  [95 %-98 %] 97 %  Oxygen Therapy: None (Room air)    Exam  General Appearance: Resting, arousable, alert, cooperative, no acute distress   Lungs: Clear to auscultation bilaterally, respirations unlabored   Heart: Regular rate (high 80s) and rhythm   Abdomen:  Soft, non-tender, non-distended   Extremities: No edema or calf tenderness     Labs    Results from last 7 days   Lab Units 01/16/23  0810 01/14/23  0540 01/13/23  0517 01/11/23  1049 01/10/23  2050   WBC K/uL 2.63*   < > 3.22*   < > 3.76*   HEMOGLOBIN g/dL 9.2*   < > 7.9*   < > 7.9*   HEMATOCRIT % 27.9*   < > 24.2*   < > 24.9*   PLATELETS K/uL 90*   < > 151   < > 202   DIFF TYPE  Manu   < > Auto   < > Manu   NRBC %  --   --  0.0   < >  --    IMM GRANULOCYTES %  --   --  0.3   < >  --    NEUTROPHILS %  --   --  60.0   < >  --    NEUTROS PCT MAN % 94   < >  --   --  64   LYMPHOCYTES %  --   --  26.7   < >  --    LYMPHO PCT MAN % 3   < >  --   --  23   MONOCYTES %  --   --  11.8   < >  --    MONO PCT MAN % 3   < >  --   --  11   EOSINOPHILS %  --   --  1.2   < >  --    EOSINO PCT MAN % 0   < >  --   --  1   BASOPHILS %  --   --  0.0   < >  --    BASOS PCT MAN % 0   < >  --   --  1   BANDS PCT MAN %  --   --   --   --  0   IMM GRANUCOCYTES ABS K/uL  --   --  0.01   < >  --    SEGS ABS MAN K/uL 2.47   < >  --   --  2.41   LYMPHO ABS MAN K/uL 0.08*   < >  --   --  0.86*    MONO ABS AUTO K/uL  --   --  0.38   < >  --    MONO ABS MAN K/uL 0.08*   < >  --   --  0.41   EOS ABS AUTO K/uL  --   --  0.04   < >  --    EOS ABS MAN K/uL 0.00*   < >  --   --  0.04   BASO ABS AUTO K/uL  --   --  0.00*   < >  --    BASOS ABS MAN K/uL 0.00*   < >  --   --  0.04    < > = values in this interval not displayed.     Results from last 7 days   Lab Units 01/16/23  0810 01/15/23  0701 01/14/23  0540   SODIUM mEQ/L 127* 132* 135*   POTASSIUM mEQ/L 3.9 3.8 4.1   CHLORIDE mEQ/L 103 105 108   CO2 mEQ/L 16* 16* 18*   BUN mg/dL 11 12 8   CREATININE mg/dL 0.7 0.6 0.5*   GLUCOSE mg/dL 74 91 130*   CALCIUM mg/dL 7.8* 8.4* 8.7*      Quantitative Beta-HCGs in last 30 days:   Recent Labs   Lab 01/15/23  0701 01/13/23  0517 01/10/23  2050   HCGQUANT >2,740,000.0* >1,846,800.0* >461,700.0*     Results from last 7 days   Lab Units 01/15/23  0701 01/12/23  0624 01/11/23  0915 01/10/23  2050   TSH mIU/L  --   --   --  <0.01*   T3 FREE pg/mL 3.95*  --  10.42*  --    FREE T4 ng/dL 3.59*  --   --  5.07*   TSI (THYROID STIMULATING IMMUNOGLOBULIN) % baseline  --  95  --   --        TSH   Date Value Ref Range Status   01/10/2023 <0.01 (L) 0.34 - 5.60 mIU/L Final     Free T4   Date Value Ref Range Status   01/15/2023 3.59 (H) 0.58 - 1.64 ng/dL Final     T3, Free   Date Value Ref Range Status   01/15/2023 3.95 (H) 2.10 - 3.70 pg/mL Final     Thyroid Stimulating Immunoglobulin   Date Value Ref Range Status   01/12/2023 95 <140 % baseline Final     Comment:     Thyroid stimulating immunoglobulins (TSI) can engage  the TSH receptors resulting in hyperthyroidism in  Graves' disease patients. TSI levels can be useful in  monitoring the clinical outcome of Graves' disease as  well as assessing the potential for hyperthyroidism  from maternal-fetal transfer. TSI results greater than  or equal to (>=) 140% of the Reference Control are  considered positive.     NOTE:  A serum TSH level greater than 350 micro-International  Units/mL can  interfere with the TSI bioassay and  potentially give false positive results.     Patients who are pregnant and are suspected of having  hyperthyroidism should have both TSI and human  Chorionic Gonadotropin(hCG) tests measured. A serum  hCG level greater than 40,625 mIU/mL can interfere  with the TSI bioassay and may give false negative  results. In these patients it is recommended that  a second TSI be obtained when the hCG concentration  falls below 40,625 mIU/mL (usually after approximately  20-weeks gestation).     The analytical performance characteristics of this  assay have been determined by [employer] [place], [place], [place].  The modifications  have not been cleared or approved by the FDA.  This  assay has been validated pursuant to the CLIA  regulations and is used for clinical purposes.                Assessment/Plan     Alondra Villela is a 32 y.o. 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      1. Gestational Trophoblastic Neoplasia:   · s/p PICC line placement.  · Chemotherapy regimen ordered with plan for EP induction for 2-3 cycles followed by EMA-EP for 3 cycles past negative hcg levels. Successfully received first induction cycle 1/13/23-1/14/23. Next induction to be administered 1/20/23-1/21/23.  · bHCG increasing but to be expected. Induction cycles to occur weekly.   · Thoracic surgery consulted 2/2 high risk of lung metastasis hemorrhage with initiation of chemotherapy, appreciate input.  · Coags were obtained and patient crossmatched for an additional 2u per recommendations.   · Continue daily CBC with diff, CMP, and Mg with repletion PRN. Ordered for labs to be drawn off PICC.   · Patient with cough since chemo started, cepacol cough drop ordered PRN.    · PRN stool softeners for constipation.  2. Thyroid Storm:   · 1/10/23: TSH <0.01, T4 5.04. TSI antibodies normal (95).  · Repeat T4/T3 on 1/15/23 were 3.59/3.95  · Patient remains largely  tachycardic, stable. Patient also with low grade fever. Remains on tele and close vital sign monitoring.   · PTU and metoprolol ordered per Endocrinology. Increased to Metoprolol 37.5 mg BID and continue  mg TID.  · Continue to appreciate Endocrinology recommendations including daily CMP.  3. Monitoring for Neutropenia:  · ANC 2.47 today.   · Likely chemo effect.   · Continue to trend, consider Neupogen/Filgrastim if below neutropenic threshold.   4. Anemia:   · Most recent Hgb 9.3 s/p 2u PRBC.   · Goal hemoglobin >8.0 in setting of chemotherapy.   · Continue daily CBC with diff.   5. VTE PPx:   · Hold off on chemical VTE for now given high risk of hemorrhage s/p chemotherapy induction. Will reassess as able.   · Mechanical prophylaxis with SCDs ordered.  · Encouraged ambulation QID.  6. Anxiety/Depression:  · Continue home regimen of Wellbutrin 150 mg qd.   · Ativan ordered PRN while inpatient.  7. Migraines:  · Home protriptyline ordered via nonformulary order.  8. Hypomagnesia:  · Magnesium 1.5 this on 1/15 s/p 2g IV Mg ordered. Will continue to trend daily.   9. Hyponatremia:  · Na 127 today, ordered a BMP now to calculate her sodium needs to aid in determining plan.  · Continue to trend daily CMP.  · Continue strict I/Os.    Discussed with Dr. Smith and Dr. Mcmullen.    Please reach out to our team at pager #[phone] with any questions or concerns.     Reg Madison MD, PGY3  Obstetrics & Gynecology

## 2023-01-16 NOTE — PLAN OF CARE
Plan of Care Review  Plan of Care Reviewed With: patient  Progress: improving  Outcome Summary: Hr SR/ST @-'s. Ambulate in hallway. No pain.  @ bedside. Emotional support provided. Stool softner ordered. Will cont to monitor.

## 2023-01-16 NOTE — PLAN OF CARE
Problem: Adult Inpatient Plan of Care  Goal: Plan of Care Review  Outcome: Progressing  Flowsheets (Taken 1/16/2023 1520)  Progress: improving  Outcome Summary: Pt is not stable for d/c. Continue to monitor. Plan for chemo while inpatient. Pt will d/c home when stable for d/c. SW will remain available for emotional support and dispo planning. Alka NICHOLSON [phone]

## 2023-01-16 NOTE — PROGRESS NOTES
Gynecology Progress Note    Subjective     Patient seen on AM rounds. Reports chest pain/pressure from yesterday afternoon resolved. Continues to have dry cough which is worse with deep inspiration/talking. Denies SOB or wheezing. No abdominal pain or vaginal bleeding.     Objective     Vital signs in last 24 hours:  Temp:  [36.7 °C (98.1 °F)-37.4 °C (99.4 °F)] 37.4 °C (99.4 °F)  Heart Rate:  [106-128] 128  Resp:  [18-20] 20  BP: (100-137)/(51-62) 137/62   O2 96-99% on RA    Exam  General Appearance: Alert, cooperative, no acute distress  Lungs: Clear to auscultation bilaterally, respirations unlabored  Heart: tachycardic   Abdomen:  Soft, non tender, non distended   Extremities: no edema or calf tenderness    Labs    Results from last 7 days   Lab Units 01/15/23  1623 01/14/23  0540 01/13/23  0517 01/11/23  1049 01/10/23  2050   WBC K/uL 3.56*   < > 3.22*   < > 3.76*   HEMOGLOBIN g/dL 9.0*   < > 7.9*   < > 7.9*   HEMATOCRIT % 25.9*   < > 24.2*   < > 24.9*   PLATELETS K/uL 100*   < > 151   < > 202   DIFF TYPE  Manu  --  Auto   < > Manu   NRBC %  --   --  0.0   < >  --    IMM GRANULOCYTES %  --   --  0.3   < >  --    NEUTROPHILS %  --   --  60.0   < >  --    NEUTROS PCT MAN % 97  --   --   --  64   LYMPHOCYTES %  --   --  26.7   < >  --    LYMPHO PCT MAN % 2  --   --   --  23   MONOCYTES %  --   --  11.8   < >  --    MONO PCT MAN % 0  --   --   --  11   EOSINOPHILS %  --   --  1.2   < >  --    EOSINO PCT MAN % 1  --   --   --  1   BASOPHILS %  --   --  0.0   < >  --    BASOS PCT MAN % 0  --   --   --  1   BANDS PCT MAN %  --   --   --   --  0   IMM GRANUCOCYTES ABS K/uL  --   --  0.01   < >  --    SEGS ABS MAN K/uL 3.45  --   --   --  2.41   LYMPHO ABS MAN K/uL 0.07*  --   --   --  0.86*   MONO ABS AUTO K/uL  --   --  0.38   < >  --    MONO ABS MAN K/uL 0.00*  --   --   --  0.41   EOS ABS AUTO K/uL  --   --  0.04   < >  --    EOS ABS MAN K/uL 0.04  --   --   --  0.04   BASO ABS AUTO K/uL  --   --  0.00*   < >  --     BASOS ABS MAN K/uL 0.00*  --   --   --  0.04    < > = values in this interval not displayed.       Results from last 7 days   Lab Units 01/15/23  0701 01/14/23  0540 01/13/23  0517   SODIUM mEQ/L 132* 135* 134*   POTASSIUM mEQ/L 3.8 4.1 3.7   CHLORIDE mEQ/L 105 108 109   CO2 mEQ/L 16* 18* 19*   BUN mg/dL 12 8 7*   CREATININE mg/dL 0.6 0.5* 0.5*   GLUCOSE mg/dL 91 130* 88   CALCIUM mg/dL 8.4* 8.7* 8.2*      Quantitative Beta-HCGs in last 30 days:   Recent Labs   Lab 01/15/23  0701 01/13/23  0517 01/10/23  2050   HCGQUANT >2,740,000.0* >1,846,800.0* >461,700.0*         Assessment/Plan     Alondra Villela is a 32 y.o. 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      1. Gestational trophoblastic neoplasia:   a. s/p PICC line placement. Chemotherapy regimen ordered with plan for EP induction for 2-3 cycles followed by EMA-EP for 3 cycles past negative hcg levels. Successfully received first induction cycle 1/13-1/14. Next induction to be administered 1/20-1/21. bHCG increasing but to be expected. Induction cycles to occur weekly.   b. Thoracic surgery consulted 2/2 high risk of lung metastasis hemorrhage with initiation of chemotherapy, appreciate input. Coags ordered and patient crossmatched for an additional 2u per recommendations.   c. Continue daily CBC with diff, CMP and Mg with repletion PRN. Will order for labs to be drawn off PICC.   d. Patient with cough since chemo started, cepacol cough drop ordered PRN.      2. Thyroid storm:   a. 1/10 TSH <0.01, T4 5.04. TSI ab pending.  b. Repeat T4/T3 on 1/15 were 3.59/3.95.   c. Patient remains largely tachycardic, stable. Patient also with low grade fever. Remains on tele and close vital sign monitoring.   d. PTU and metoprolol ordered per endocrine. Currently on Metoprolol 25 BID and  TID   e. S/p endocrine consult, continue to appreciate recommendations.      3. Anemia:   a. Most recent hgb 9.0 s/p 2u PRBC.   b. Goal hemoglobin >8.0  in setting of chemotherapy.   c. Continue daily CBC with diff.     4. VTE PPE:   a. Hold off on chemical VTE for now given high risk of hemorrhage with chemotherapy induction. Will reassess as able.   b. Mechanical prophylaxis with SCDs ordered.  c. Encourage ambulation QID.     5. Anx/Depression:  a. Continue home regimen of Wellbutrin 150 mg qd.   b. Ativan ordered PRN while inpatient.       6.  Migraines:              A. Home protriptyline ordered via nonformulary order.    7. Hypomagnesia:   A. Magnesium 1.5 this on 1/15 s/p 2g IV Mg ordered. Will continue to trend daily.     Will review with Dr. Mcmullen and Dr. Luis Koehler MD PGY4  Pager [phone]

## 2023-01-16 NOTE — PROGRESS NOTES
Endocrinology Progress Note       SUBJECTIVE   Interval History:    pt has received 2 doses of chemotherapy and will reportedly receive her next dose on 1/20.    She feels slightly better today. Has dry cough. No SOB. No abd pain.      OBJECTIVE      Vital signs in last 24 hours:  Temp:  [36.8 °C (98.3 °F)-37.5 °C (99.5 °F)] 37.5 °C (99.5 °F)  Heart Rate:  [106-128] 122  Resp:  [18-20] 18  BP: (100-137)/(51-62) 122/59      PHYSICAL EXAMINATION        HEENT: NCAT mucous membranes moist  EOMI sclerae anicteric  NEURO:A&Ox3   PSYCH:Appropriate and cooperative       LABS / IMAGING / TELE      Labs    CBC Results       01/16/23 01/15/23 01/15/23     0810 1623 0701    WBC 2.63 3.56 5.37    RBC 3.39 3.26 3.25    HGB 9.2 9.0 9.1    HCT 27.9 25.9 26.7    MCV 82.3 79.4 82.2    MCH 27.1 27.6 28.0    MCHC 33.0 34.7 34.1    PLT 90 100 124         Comment for PLT at 1623 on 01/15/23: ALL RESULTS HAVE BEEN RECHECKED. CONFIRMED WITH SMEAR ESTIMATE        Lab Results   Component Value Date    GLUCOSE 74 01/16/2023    CALCIUM 7.8 (L) 01/16/2023     (L) 01/16/2023    K 3.9 01/16/2023    CO2 16 (L) 01/16/2023     01/16/2023    BUN 11 01/16/2023    CREATININE 0.7 01/16/2023      Latest Reference Range & Units 01/13/23 05:17 01/14/23 05:40 01/15/23 07:01 01/15/23 16:23 01/16/23 08:10   AST (SGOT) 15 - 41 IU/L 28 28 62 (H)  47 (H)   ALT (SGPT) 11 - 54 IU/L 28 28 53  45   WBC 3.80 - 10.50 K/uL 3.22 (L) 3.28 (L) 5.37 3.56 (L) 2.63 (L)   (H): Data is abnormally high  (L): Data is abnormally low     Latest Reference Range & Units 01/10/23 20:50 01/11/23 09:15 01/15/23 07:01   TSH 0.34 - 5.60 mIU/L <0.01 (L)     Free T4 0.58 - 1.64 ng/dL 5.07 (H)  3.59 (H)   T3, Free 2.10 - 3.70 pg/mL  10.42 (H) 3.95 (H)      Latest Reference Range & Units 01/10/23 20:50 01/13/23 05:17 01/15/23 07:01   hCG Quant <5.0 IU/L (mIU/mL) >461,700.0 (H) (C) >1,846,800.0 (H) (C) >2,740,000.0 (H)   (H): Data is abnormally high  (C):  Corrected    ASSESSMENT AND PLAN      1) Hyperthyroidism  32 year old woman with very new recognition of gestational trophoblastic neoplasm  She presented with exceedingly high and rapidly rising HCG levels    FT3 markedly improved with just 2 days of high dose PTU - FT4 trending down as well  Will continue current dose of PTU  Mildly low WBC and elevated LFT.  Suspect multifactorial and related to chemo.  However PTU can rarely cause those changes as well. Will closely monitor.     2) Tachycardia  - believe hyperthyroidism is contributing to the presentation. However her HR seems higher from the 14th, which is not totally correlating with T3/T4 level. pls rule out other cause for tachycardia.   - recommend to increase metoprolol dose to 37.5 mg bid and 50 mg bid if tolerated/needed.     2) Gestational trophoblastic neoplasm  Ongoing mgt per gyn onc  Received chemotherapy 1/13 and 1/14  - plans to resume 1/20 noted  Concern for high risk of lung hemorrhage noted  - with plan for involvement of thoracic surgery     3) Adrenal calcification  Noted on CT  Uncertain significance  She received dex as part of her chemotherapeutic regimen  Can monitor closely for any evidence of adrenal hormone compromise once the effect of dex dissipates    Discussed with Dr. Koehler.        Alice Hodge MD

## 2023-01-17 LAB
ABO + RH BLD: NORMAL
ALBUMIN SERPL-MCNC: 2.3 G/DL (ref 3.4–5)
ALP SERPL-CCNC: 165 IU/L (ref 35–126)
ALT SERPL-CCNC: 40 IU/L (ref 11–54)
ANION GAP SERPL CALC-SCNC: 8 MEQ/L (ref 3–15)
ANISOCYTOSIS BLD QL SMEAR: ABNORMAL
AST SERPL-CCNC: 40 IU/L (ref 15–41)
BASOPHILS # BLD: 0 K/UL (ref 0.01–0.1)
BASOPHILS NFR BLD: 0 %
BILIRUB SERPL-MCNC: 1.8 MG/DL (ref 0.3–1.2)
BLD GP AB SCN SERPL QL: NEGATIVE
BUN SERPL-MCNC: 9 MG/DL (ref 8–20)
BURR CELLS BLD QL SMEAR: ABNORMAL
CALCIUM SERPL-MCNC: 8 MG/DL (ref 8.9–10.3)
CHLORIDE SERPL-SCNC: 103 MEQ/L (ref 98–109)
CO2 SERPL-SCNC: 17 MEQ/L (ref 22–32)
CORTIS SERPL-MCNC: 15 UG/DL
CORTIS SERPL-MCNC: 23.8 UG/DL
CREAT SERPL-MCNC: 0.7 MG/DL (ref 0.6–1.1)
CREAT UR-MCNC: 147.7 MG/DL
CROSSMATCH: NORMAL
D AG BLD QL: POSITIVE
DIFFERENTIAL METHOD BLD: ABNORMAL
EOSINOPHIL # BLD: 0.18 K/UL (ref 0.04–0.36)
EOSINOPHIL NFR BLD: 8 %
ERYTHROCYTE [DISTWIDTH] IN BLOOD BY AUTOMATED COUNT: 13.3 % (ref 11.7–14.4)
GFR SERPL CREATININE-BSD FRML MDRD: >60 ML/MIN/1.73M*2
GLUCOSE SERPL-MCNC: 78 MG/DL (ref 70–99)
HCT VFR BLDCO AUTO: 27.7 % (ref 35–45)
HGB BLD-MCNC: 9.5 G/DL (ref 11.8–15.7)
ISBT CODE: 5100
LABORATORY COMMENT REPORT: NORMAL
LYMPHOCYTES # BLD: 0.15 K/UL (ref 1.2–3.5)
LYMPHOCYTES NFR BLD: 7 %
MAGNESIUM SERPL-MCNC: 1.5 MG/DL (ref 1.8–2.5)
MCH RBC QN AUTO: 27.7 PG (ref 28–33.2)
MCHC RBC AUTO-ENTMCNC: 34.3 G/DL (ref 32.2–35.5)
MCV RBC AUTO: 80.8 FL (ref 83–98)
MICROCYTES BLD QL SMEAR: ABNORMAL
MONOCYTES # BLD: 0.04 K/UL (ref 0.28–0.8)
MONOCYTES NFR BLD: 2 %
NEUTS BAND # BLD: 1.82 K/UL (ref 1.7–7)
NEUTS SEG NFR BLD: 83 %
OSMOLALITY UR: 581 MOSM/KG (ref 100–1400)
PDW BLD AUTO: 10.4 FL (ref 9.4–12.3)
PLAT MORPH BLD: NORMAL
PLATELET # BLD AUTO: 82 K/UL (ref 150–369)
PLATELET # BLD EST: ABNORMAL 10*3/UL
POTASSIUM SERPL-SCNC: 3.9 MEQ/L (ref 3.6–5.1)
PRODUCT CODE: NORMAL
PRODUCT STATUS: NORMAL
PROT SERPL-MCNC: 4.7 G/DL (ref 6–8.2)
RBC # BLD AUTO: 3.43 M/UL (ref 3.93–5.22)
SODIUM SERPL-SCNC: 128 MEQ/L (ref 136–144)
SODIUM UR-SCNC: 113 MEQ/L
SPECIMEN EXP DATE BLD: NORMAL
UNIT ABO: NORMAL
UNIT ID: NORMAL
UNIT RH: POSITIVE
WBC # BLD AUTO: 2.19 K/UL (ref 3.8–10.5)

## 2023-01-17 PROCEDURE — 63700000 HC SELF-ADMINISTRABLE DRUG: Performed by: INTERNAL MEDICINE

## 2023-01-17 PROCEDURE — 86901 BLOOD TYPING SEROLOGIC RH(D): CPT

## 2023-01-17 PROCEDURE — 84300 ASSAY OF URINE SODIUM: CPT | Performed by: STUDENT IN AN ORGANIZED HEALTH CARE EDUCATION/TRAINING PROGRAM

## 2023-01-17 PROCEDURE — 63700000 HC SELF-ADMINISTRABLE DRUG: Performed by: OBSTETRICS & GYNECOLOGY

## 2023-01-17 PROCEDURE — 21400000 HC ROOM AND CARE CCU/INTERMEDIATE

## 2023-01-17 PROCEDURE — 63700000 HC SELF-ADMINISTRABLE DRUG: Performed by: STUDENT IN AN ORGANIZED HEALTH CARE EDUCATION/TRAINING PROGRAM

## 2023-01-17 PROCEDURE — 82570 ASSAY OF URINE CREATININE: CPT | Performed by: STUDENT IN AN ORGANIZED HEALTH CARE EDUCATION/TRAINING PROGRAM

## 2023-01-17 PROCEDURE — 83935 ASSAY OF URINE OSMOLALITY: CPT | Performed by: STUDENT IN AN ORGANIZED HEALTH CARE EDUCATION/TRAINING PROGRAM

## 2023-01-17 PROCEDURE — 80053 COMPREHEN METABOLIC PANEL: CPT | Performed by: STUDENT IN AN ORGANIZED HEALTH CARE EDUCATION/TRAINING PROGRAM

## 2023-01-17 PROCEDURE — 85025 COMPLETE CBC W/AUTO DIFF WBC: CPT | Performed by: STUDENT IN AN ORGANIZED HEALTH CARE EDUCATION/TRAINING PROGRAM

## 2023-01-17 PROCEDURE — 82040 ASSAY OF SERUM ALBUMIN: CPT | Performed by: STUDENT IN AN ORGANIZED HEALTH CARE EDUCATION/TRAINING PROGRAM

## 2023-01-17 PROCEDURE — 99232 SBSQ HOSP IP/OBS MODERATE 35: CPT | Performed by: SURGERY

## 2023-01-17 PROCEDURE — 99231 SBSQ HOSP IP/OBS SF/LOW 25: CPT | Performed by: STUDENT IN AN ORGANIZED HEALTH CARE EDUCATION/TRAINING PROGRAM

## 2023-01-17 PROCEDURE — 36415 COLL VENOUS BLD VENIPUNCTURE: CPT | Performed by: STUDENT IN AN ORGANIZED HEALTH CARE EDUCATION/TRAINING PROGRAM

## 2023-01-17 PROCEDURE — 99232 SBSQ HOSP IP/OBS MODERATE 35: CPT | Performed by: INTERNAL MEDICINE

## 2023-01-17 PROCEDURE — 83735 ASSAY OF MAGNESIUM: CPT | Performed by: STUDENT IN AN ORGANIZED HEALTH CARE EDUCATION/TRAINING PROGRAM

## 2023-01-17 PROCEDURE — 82533 TOTAL CORTISOL: CPT | Performed by: OBSTETRICS & GYNECOLOGY

## 2023-01-17 PROCEDURE — 86900 BLOOD TYPING SEROLOGIC ABO: CPT

## 2023-01-17 RX ORDER — DIPHENHYDRAMINE HCL 25 MG
25 CAPSULE ORAL EVERY 6 HOURS PRN
Status: DISCONTINUED | OUTPATIENT
Start: 2023-01-17 | End: 2023-01-21 | Stop reason: HOSPADM

## 2023-01-17 RX ORDER — METOPROLOL TARTRATE 25 MG/1
25 TABLET, FILM COATED ORAL 2 TIMES DAILY
Status: DISCONTINUED | OUTPATIENT
Start: 2023-01-17 | End: 2023-01-19

## 2023-01-17 RX ORDER — BENZONATATE 100 MG/1
100 CAPSULE ORAL 3 TIMES DAILY PRN
Status: DISCONTINUED | OUTPATIENT
Start: 2023-01-17 | End: 2023-01-21 | Stop reason: HOSPADM

## 2023-01-17 RX ADMIN — PROPYLTHIOURACIL 100 MG: 50 TABLET ORAL at 21:23

## 2023-01-17 RX ADMIN — METOPROLOL TARTRATE 25 MG: 25 TABLET, FILM COATED ORAL at 21:23

## 2023-01-17 RX ADMIN — PROTRIPTYLINE HYDROCHLORIDE 15 MG: 5 TABLET, FILM COATED ORAL at 21:24

## 2023-01-17 RX ADMIN — BENZOCAINE AND MENTHOL 1 LOZENGE: 15; 3.6 LOZENGE ORAL at 20:04

## 2023-01-17 RX ADMIN — BENZONATATE 100 MG: 100 CAPSULE ORAL at 21:23

## 2023-01-17 RX ADMIN — ACETAMINOPHEN 650 MG: 325 TABLET, FILM COATED ORAL at 20:04

## 2023-01-17 RX ADMIN — PROPYLTHIOURACIL 100 MG: 50 TABLET ORAL at 15:52

## 2023-01-17 RX ADMIN — SENNOSIDES AND DOCUSATE SODIUM 2 TABLET: 50; 8.6 TABLET ORAL at 21:23

## 2023-01-17 RX ADMIN — IBUPROFEN 400 MG: 400 TABLET, FILM COATED ORAL at 09:19

## 2023-01-17 RX ADMIN — DIPHENHYDRAMINE HYDROCHLORIDE 25 MG: 25 CAPSULE ORAL at 15:51

## 2023-01-17 RX ADMIN — PROPYLTHIOURACIL 100 MG: 50 TABLET ORAL at 09:17

## 2023-01-17 RX ADMIN — SENNOSIDES AND DOCUSATE SODIUM 2 TABLET: 50; 8.6 TABLET ORAL at 09:17

## 2023-01-17 RX ADMIN — BUPROPION HYDROCHLORIDE 150 MG: 150 TABLET, EXTENDED RELEASE ORAL at 21:23

## 2023-01-17 NOTE — PLAN OF CARE
Plan of Care Review  Plan of Care Reviewed With: patient  Progress: improving  Outcome Summary: Patient denies pain, complaints of rash/itching on abdomen. PO benadryl given. BP's soft. No events on tele. No signs of bleeding this shift. Family at the bedside. Call bell within reach, bed alarm refused.

## 2023-01-17 NOTE — PLAN OF CARE
Plan of Care Review  Plan of Care Reviewed With: patient  Progress: improving  Outcome Summary: Pt is in room with  at the start of the shift. Answered all questions about medications due, &lab results. Pts HR remains in the 115's. No c/o of pain, tolerated all PO meds well. No signs of bleeding. Pt rested well t/o the night.

## 2023-01-17 NOTE — PROGRESS NOTES
Thoracic Surgery Daily Progress Note      Subjective     Patient with gestational trophoblastic disease. CT A/P 1/13 demonstrates uterine mass measuring up to 13.8 cm, consistent with choriocarcinoma. Multiple pulmonary nodules also suspicious for metastatic disease. Following pre-emptively if there is spontaneous bleeding from lung mets with initiation of chemo    Interval history:  Patient examined at bedside this morning. She endorses an intermittent, nonproductive cough. No hemoptysis. She notes that yesterday she was having some central chest pressure that has since resolved. EKG obtained showed no arrhythmia or ischemic changes. Tolerating diet, no nausea or vomiting     Objective     Vital signs in last 24 hours:  Temp:  [36.4 °C (97.5 °F)-37 °C (98.6 °F)] 37 °C (98.6 °F)  Heart Rate:  [] 86  Resp:  [16-18] 16  BP: (100-113)/(51-58) 111/54    No intake or output data in the 24 hours ending 01/17/23 0823  Intake/Output this shift:  No intake/output data recorded.    Physical Exam    General appearance: alert, appears stated age and cooperative  Head: normocephalic, without obvious abnormality  Neck: supple, symmetrical, trachea midline  Lungs: clear to ausculation anterior fields  Heart: regular rate and rhythm  Abdomen: soft, mildly distended, nontender  Extremities: warm and well perfused  Skin: warm and dry  Neurologic: Grossly normal    VTE Assessment: I have reassessed and the patient's VTE risk and treatment plan is appropriate.    Labs  I have reviewed the patient's labs.  Current labs are within normal limits.   CBC Results       01/15/23 01/14/23 01/13/23     0701 0540 0517    WBC 5.37 3.28 3.22    RBC 3.25 3.44 2.92    HGB 9.1 9.4 7.9    HCT 26.7 27.9 24.2    MCV 82.2 81.1 82.9    MCH 28.0 27.3 27.1    MCHC 34.1 33.7 32.6     163 151        CMP Results       01/15/23 01/14/23 01/13/23     0701 0540 0517     135 134    K 3.8 4.1 3.7    Cl 105 108 109    CO2 16 18 19    Glucose 91  130 88    BUN 12 8 7    Creatinine 0.6 0.5 0.5    Calcium 8.4 8.7 8.2    Anion Gap 11 9 6    AST 62 28 28    ALT 53 28 28    Albumin 2.6 2.8 2.5    EGFR >60.0 >60.0 >60.0          Imaging  I have reviewed the Imaging from the last 24 hrs.      Assessment/Plan   31 y/o female with no significant PMHx who presented to [facility] on 1/11 found to have gestational trophoblastic disease, transferred to [facility] [place] [facility] on 1/13 to initiate chemotherapy. Imaging demonstrates 5cm mass in the RLL (biopsy benign), innumerable pulmonary nodules, questionable liver mets. CT A/P pelvis today demonstrated uterine mass measuring up to 13.8 cm consistent with choriocarcinoma. Thoracic surgery following for concern of lung mets and preemptively for concerns of spontaneous bleeding from lung nodules that can happen with initiation of chemotherapy     - Thoracic surgery available as needed if patient develops spontaneous bleeding after initiation of chemotherapy   - f/u daily CBC, 1/17 AM Hgb 9.5, up from 9.2  -Will continue to monitor  - Rest of care per primary team     Please page [phone] with any questions or concerns     Pedro Epstein DO

## 2023-01-17 NOTE — PROGRESS NOTES
Gynecology Progress Note    Subjective     Patient seen on PM rounds. She recently received benadryl for itchy rash on abdomen. Continues to have dry cough which is worse with deep inspiration/talking. Denies SOB or wheezing. Has still not had BM although admits she did not walk much today.     Objective     Vital signs in last 24 hours:  Temp:  [36.6 °C (97.8 °F)-37 °C (98.6 °F)] 36.6 °C (97.9 °F)  Heart Rate:  [] 97  Resp:  [16-18] 18  BP: ()/(49-58) 94/55   O2 96-99% on RA    Exam  General Appearance: Alert, cooperative, no acute distress  Lungs: Clear to auscultation bilaterally, respirations unlabored  Heart: regular heart rate  Abdomen:  Soft, non tender, non distended  Extremities: no edema or calf tenderness, SCDs on and functioning     Labs    Results from last 7 days   Lab Units 01/17/23  0642 01/14/23  0540 01/13/23  0517 01/11/23  1049 01/10/23  2050   WBC K/uL 2.19*   < > 3.22*   < > 3.76*   HEMOGLOBIN g/dL 9.5*   < > 7.9*   < > 7.9*   HEMATOCRIT % 27.7*   < > 24.2*   < > 24.9*   PLATELETS K/uL 82*   < > 151   < > 202   DIFF TYPE  Manu   < > Auto   < > Manu   NRBC %  --   --  0.0   < >  --    IMM GRANULOCYTES %  --   --  0.3   < >  --    NEUTROPHILS %  --   --  60.0   < >  --    NEUTROS PCT MAN % 83   < >  --   --  64   LYMPHOCYTES %  --   --  26.7   < >  --    LYMPHO PCT MAN % 7   < >  --   --  23   MONOCYTES %  --   --  11.8   < >  --    MONO PCT MAN % 2   < >  --   --  11   EOSINOPHILS %  --   --  1.2   < >  --    EOSINO PCT MAN % 8   < >  --   --  1   BASOPHILS %  --   --  0.0   < >  --    BASOS PCT MAN % 0   < >  --   --  1   BANDS PCT MAN %  --   --   --   --  0   IMM GRANUCOCYTES ABS K/uL  --   --  0.01   < >  --    SEGS ABS MAN K/uL 1.82   < >  --   --  2.41   LYMPHO ABS MAN K/uL 0.15*   < >  --   --  0.86*   MONO ABS AUTO K/uL  --   --  0.38   < >  --    MONO ABS MAN K/uL 0.04*   < >  --   --  0.41   EOS ABS AUTO K/uL  --   --  0.04   < >  --    EOS ABS MAN K/uL 0.18   < >  --   --   0.04   BASO ABS AUTO K/uL  --   --  0.00*   < >  --    BASOS ABS MAN K/uL 0.00*   < >  --   --  0.04    < > = values in this interval not displayed.       Results from last 7 days   Lab Units 01/17/23  0642 01/16/23  1705 01/16/23  0810   SODIUM mEQ/L 128* 130* 127*   POTASSIUM mEQ/L 3.9 3.4* 3.9   CHLORIDE mEQ/L 103 104 103   CO2 mEQ/L 17* 16* 16*   BUN mg/dL 9 14 11   CREATININE mg/dL 0.7 0.7 0.7   GLUCOSE mg/dL 78 105* 74   CALCIUM mg/dL 8.0* 8.0* 7.8*      Quantitative Beta-HCGs in last 30 days:   Recent Labs   Lab 01/15/23  0701 01/13/23  0517 01/10/23  2050   HCGQUANT >2,740,000.0* >1,846,800.0* >461,700.0*         Assessment/Plan     Alondra Villela is a 32 y.o. 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      1. Gestational trophoblastic neoplasia:   a. s/p PICC line placement. Chemotherapy regimen ordered with plan for EP induction for 2-3 cycles followed by EMA-EP for 3 cycles past negative hcg levels. Successfully received first induction cycle 1/13-1/14. Next induction to be administered 1/20-1/21. bHCG increasing but to be expected. Induction cycles to occur weekly. Will repeat bhcg on 1/20/23.   b. Thoracic surgery consulted 2/2 high risk of lung metastasis hemorrhage with initiation of chemotherapy, appreciate input. Coags ordered and patient crossmatched on admission. Will keep active type and screen on file, next due on 1/20/23.   c. Continue daily CBC with diff, CMP and Mg with repletion PRN. Labs ordered to be drawn off PICC.   d. Patient with cough since chemo started, cepacol cough drop ordered PRN.      2. Thyroid storm:   a. 1/10 TSH <0.01, T4 5.04. TSI ab WNL.   b. Repeat T4/T3 on 1/15 were 3.59/3.95. Repeat thyroid function labs ordered for tomorrow AM.   c. Patient remains intermittently tachycardic, stable. Remains on tele and close vital sign monitoring.   d. PTU and metoprolol ordered per endocrine. Metoprolol decreased to 25 BID today given low BP, PTU ordered  100 TID   e. S/p endocrine consult, continue to appreciate recommendations.      3. Anemia:   a. Most recent hgb 9.5 s/p 2u PRBC.   b. Goal hemoglobin >8.0 in setting of chemotherapy.   c. Continue daily CBC with diff.     4. VTE PPE:   a. Hold off on chemical VTE for now given high risk of hemorrhage with chemotherapy induction. Will reassess as able.   b. Mechanical prophylaxis with SCDs ordered.  c. Encourage ambulation QID.     5. Anx/Depression:  a. Continue home regimen of Wellbutrin 150 mg qd.   b. Ativan ordered PRN while inpatient.       6.  Migraines:              A. Home protriptyline ordered via nonformulary order.    7. Hypomagnesia:   A. Magnesium 1.5 on 1/16 s/p 2g IV Mg ordered. Will continue to trend daily.     8. Hyponatremia:    A. Na last 128 this AM. Will continue to trend CMP daily. Fluid restriction of 1L daily ordered and urine osmolality/Na  collected and WNL. Patient asymptomatic     9. Rash/Pruititis    A. Present on abdomen. Benadryl ordered PRN.     Reviewed with Dr. Benedict Koehler MD PGY4  Pager [phone]

## 2023-01-17 NOTE — PROGRESS NOTES
Endocrinology Progress Note       SUBJECTIVE   Interval History:    Metoprolol dose was increased slightly. HR improving but BP is low this morning.  She feels tired but otherwise asymptomatic for dizziness, lightheadedness.  No CP or SOB. Has cough. No abd pain. .      OBJECTIVE      Vital signs in last 24 hours:  Temp:  [36.4 °C (97.5 °F)-37 °C (98.6 °F)] 36.7 °C (98 °F)  Heart Rate:  [] 115  Resp:  [16-18] 18  BP: ()/(49-58) 86/49      PHYSICAL EXAMINATION        HEENT: NCAT mucous membranes moist  EOMI sclerae anicteric  NEURO:A&Ox3   PSYCH:Appropriate and cooperative       LABS / IMAGING / TELE      Labs  Lab Results   Component Value Date    WBC 2.19 (LL) 01/17/2023    HGB 9.5 (L) 01/17/2023    HCT 27.7 (L) 01/17/2023    MCV 80.8 (L) 01/17/2023    PLT 82 (L) 01/17/2023     Lab Results   Component Value Date    GLUCOSE 78 01/17/2023    CALCIUM 8.0 (L) 01/17/2023     (L) 01/17/2023    K 3.9 01/17/2023    CO2 17 (L) 01/17/2023     01/17/2023    BUN 9 01/17/2023    CREATININE 0.7 01/17/2023     AST/ALT wnl     Latest Reference Range & Units 01/10/23 20:50 01/11/23 09:15 01/15/23 07:01   TSH 0.34 - 5.60 mIU/L <0.01 (L)     Free T4 0.58 - 1.64 ng/dL 5.07 (H)  3.59 (H)   T3, Free 2.10 - 3.70 pg/mL  10.42 (H) 3.95 (H)      Latest Reference Range & Units 01/10/23 20:50 01/13/23 05:17 01/15/23 07:01   hCG Quant <5.0 IU/L (mIU/mL) >461,700.0 (H) (C) >1,846,800.0 (H) (C) >2,740,000.0 (H)   (H): Data is abnormally high  (C): Corrected    ASSESSMENT AND PLAN      1) Hyperthyroidism  32 year old woman with very new recognition of gestational trophoblastic neoplasm  She presented with exceedingly high and rapidly rising HCG levels    FT3 markedly improved with just 2 days of high dose PTU - FT4 trending down as well  Will continue current dose of PTU. Please check FT3, FT4 tmr.   Low WBC: likely related to chemo.  Neutrophil 83%  Normal LFT this morning.     2) Tachycardia  - believe hyperthyroidism  is contributing to the presentation.  The change of HR didn't correlate with TFT improvement.  Likely anemia, electrolytes disturbances all contributing to the presentation.   - BP was low with metoprolol 37.5 mg bid. pls reduce back to 25 mg bid.   - keep well hydration.   - added Am cortisol stat.  Last Dex dose was 1/14.     2) Gestational trophoblastic neoplasm  Ongoing mgt per gyn onc  Received chemotherapy 1/13 and 1/14  - plans to resume 1/20 noted  Concern for high risk of lung hemorrhage noted  - with plan for involvement of thoracic surgery     3) Adrenal calcification  Noted on CT  Uncertain significance  She received dex as part of her chemotherapeutic regimen    4) hyponatremia  - worsening.   - appears euvolemic clinically. pls check urine osm, Na.   - fu cortisol level.       Discussed with Dr. Koehler.        Alice Hodge MD

## 2023-01-17 NOTE — NURSING NOTE
Pt reported some brownish aginal discharge. No active bleeding. Gyn Resident with pt. Emotional supporrt provided.

## 2023-01-17 NOTE — PROGRESS NOTES
Gyn resident to bedside to assess patient after episode of vaginal spotting    Alondra noted a scant amount of brown discharge in the toilet paper after urinating.  She denies pelvic or abdominal pain.  She has no complaints at this time aside from this abnormal discharge.  Since urinating she has had no additional vaginal bleeding or spotting.  She seems very anxious about this discharge.      Visit Vitals  BP (!) 100/54 (BP Location: Left upper arm)   Pulse (!) 112   Temp 36.4 °C (97.5 °F) (Oral)   Resp 18   SpO2 97%     BMP Results       01/16/23 01/16/23 01/15/23     1705 0810 0701     127 132    K 3.4 3.9 3.8    Cl 104 103 105    CO2 16 16 16    Glucose 105 74 91    BUN 14 11 12    Creatinine 0.7 0.7 0.6    Calcium 8.0 7.8 8.4    Anion Gap 10 8 11    EGFR >60.0 >60.0 >60.0        Physical Exam:  General: Well-appearing, no apparent distress  Heart: Regular rate and rhythm. No murmurs  Lungs: Clear to auscultation throughout all lung fields  Abdomen: nontender to palpation throughout  Extremities: Warm, symmetric, no edema.  No calf tenderness.  : Normal external genitalia.  No active vaginal bleeding noted.  No abnormal discharge noted. Chux beneath the patient appears clean and dry.    Alondra's exam is benign.  Requested that she place a pad inside of her underwear and please let her nurse know if she has any additional vaginal bleeding.  Reassured Alondra that she is stable at this time. Sodium is improving from 127 at at 8AM today to 130 at 5PM this afternoon.  Will continue fluid restriction at this time.  Potassium is 3.4-> will replete with 40 mEq PO.  Alondra reports that her cough is improving.  Encouraged Alondra to please let her nurse know if she has any additional concerns, and reassured her that we are always here to evaluate her if needed.    Discussed with Dr. Luis Sweeney DO, PGY-3  (She/Her)  Obstetrics and Gynecology  Gyn Pager #[phone]

## 2023-01-17 NOTE — PROGRESS NOTES
Gynecology Progress Note    Subjective     Patient seen on AM rounds. Continues to have dry cough which is worse with deep inspiration/talking. Denies SOB or wheezing. No abdominal pain. Had a few episodes of dark vaginal spotting when coughing overnight but denies other vaginal bleeding. Has not had BM since admission but is taking senna. Reports  presented to her room for AM labs but she requested labs be done off her PICC, nurse notified of this request. Patient reports she had 2 cups of water overnight. Patient also reports itchy rash on abdomen which she noticed last night.     Objective     Vital signs in last 24 hours:  Temp:  [36.4 °C (97.5 °F)-37.5 °C (99.5 °F)] 37 °C (98.6 °F)  Heart Rate:  [] 93  Resp:  [16-18] 16  BP: (100-122)/(51-59) 111/54   O2 96-99% on RA    Exam  General Appearance: Alert, cooperative, no acute distress  Lungs: Clear to auscultation bilaterally, respirations unlabored  Heart: regular heart rate  Abdomen:  Soft, non tender, non distended, small amount petechia noted on abdomen    Extremities: no edema or calf tenderness, SCDs on and functioning     Labs    Results from last 7 days   Lab Units 01/16/23  0810 01/14/23  0540 01/13/23  0517 01/11/23  1049 01/10/23  2050   WBC K/uL 2.63*   < > 3.22*   < > 3.76*   HEMOGLOBIN g/dL 9.2*   < > 7.9*   < > 7.9*   HEMATOCRIT % 27.9*   < > 24.2*   < > 24.9*   PLATELETS K/uL 90*   < > 151   < > 202   DIFF TYPE  Manu   < > Auto   < > Manu   NRBC %  --   --  0.0   < >  --    IMM GRANULOCYTES %  --   --  0.3   < >  --    NEUTROPHILS %  --   --  60.0   < >  --    NEUTROS PCT MAN % 94   < >  --   --  64   LYMPHOCYTES %  --   --  26.7   < >  --    LYMPHO PCT MAN % 3   < >  --   --  23   MONOCYTES %  --   --  11.8   < >  --    MONO PCT MAN % 3   < >  --   --  11   EOSINOPHILS %  --   --  1.2   < >  --    EOSINO PCT MAN % 0   < >  --   --  1   BASOPHILS %  --   --  0.0   < >  --    BASOS PCT MAN % 0   < >  --   --  1   BANDS PCT MAN %  --    --   --   --  0   IMM GRANUCOCYTES ABS K/uL  --   --  0.01   < >  --    SEGS ABS MAN K/uL 2.47   < >  --   --  2.41   LYMPHO ABS MAN K/uL 0.08*   < >  --   --  0.86*   MONO ABS AUTO K/uL  --   --  0.38   < >  --    MONO ABS MAN K/uL 0.08*   < >  --   --  0.41   EOS ABS AUTO K/uL  --   --  0.04   < >  --    EOS ABS MAN K/uL 0.00*   < >  --   --  0.04   BASO ABS AUTO K/uL  --   --  0.00*   < >  --    BASOS ABS MAN K/uL 0.00*   < >  --   --  0.04    < > = values in this interval not displayed.       Results from last 7 days   Lab Units 01/16/23  1705 01/16/23  0810 01/15/23  0701   SODIUM mEQ/L 130* 127* 132*   POTASSIUM mEQ/L 3.4* 3.9 3.8   CHLORIDE mEQ/L 104 103 105   CO2 mEQ/L 16* 16* 16*   BUN mg/dL 14 11 12   CREATININE mg/dL 0.7 0.7 0.6   GLUCOSE mg/dL 105* 74 91   CALCIUM mg/dL 8.0* 7.8* 8.4*      Quantitative Beta-HCGs in last 30 days:   Recent Labs   Lab 01/15/23  0701 01/13/23  0517 01/10/23  2050   HCGQUANT >2,740,000.0* >1,846,800.0* >461,700.0*         Assessment/Plan     Alondra Villela is a 32 y.o. 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      1. Gestational trophoblastic neoplasia:   a. s/p PICC line placement. Chemotherapy regimen ordered with plan for EP induction for 2-3 cycles followed by EMA-EP for 3 cycles past negative hcg levels. Successfully received first induction cycle 1/13-1/14. Next induction to be administered 1/20-1/21. bHCG increasing but to be expected. Induction cycles to occur weekly.   b. Thoracic surgery consulted 2/2 high risk of lung metastasis hemorrhage with initiation of chemotherapy, appreciate input. Coags ordered and patient crossmatched on admission. Will keep active type and screen on file.    c. Continue daily CBC with diff, CMP and Mg with repletion PRN. Will order for labs to be drawn off PICC.   d. Patient with cough since chemo started, cepacol cough drop ordered PRN.      2. Thyroid storm:   a. 1/10 TSH <0.01, T4 5.04. TSI ab  pending.  b. Repeat T4/T3 on 1/15 were 3.59/3.95.   c. Patient remains largely tachycardic, stable. Patient also with low grade fever. Remains on tele and close vital sign monitoring.   d. PTU and metoprolol ordered per endocrine. Currently on Metoprolol 37.5 BID and  TID   e. S/p endocrine consult, continue to appreciate recommendations.      3. Anemia:   a. Most recent hgb 9.2 s/p 2u PRBC.   b. Goal hemoglobin >8.0 in setting of chemotherapy.   c. Continue daily CBC with diff.     4. VTE PPE:   a. Hold off on chemical VTE for now given high risk of hemorrhage with chemotherapy induction. Will reassess as able.   b. Mechanical prophylaxis with SCDs ordered.  c. Encourage ambulation QID.     5. Anx/Depression:  a. Continue home regimen of Wellbutrin 150 mg qd.   b. Ativan ordered PRN while inpatient.       6.  Migraines:              A. Home protriptyline ordered via nonformulary order.    7. Hypomagnesia:   A. Magnesium 1.4 on 1/16 s/p 2g IV Mg ordered. Will continue to trend daily.     8. Hyponatremia:    A. Na last 130 yesterday evening. Am labs ordered. Fluid restriction of 1L daily ordered and RN notified.     9. Hypokalemia:    A. Potassium 3.4 yesterday evening, 40meq KCL ordered yesterday evening. AM labs ordered and RN  aware.     Will review with Dr. Mcmullen and Dr. Luis Koehler MD PGY4  Pager [phone]

## 2023-01-18 LAB
ALBUMIN SERPL-MCNC: 2.4 G/DL (ref 3.4–5)
ALP SERPL-CCNC: 221 IU/L (ref 35–126)
ALT SERPL-CCNC: 40 IU/L (ref 11–54)
ANION GAP SERPL CALC-SCNC: 7 MEQ/L (ref 3–15)
AST SERPL-CCNC: 33 IU/L (ref 15–41)
BASOPHILS # BLD: 0.02 K/UL (ref 0.01–0.1)
BASOPHILS NFR BLD: 1 %
BILIRUB SERPL-MCNC: 1.4 MG/DL (ref 0.3–1.2)
BUN SERPL-MCNC: 8 MG/DL (ref 8–20)
BURR CELLS BLD QL SMEAR: ABNORMAL
CALCIUM SERPL-MCNC: 8.3 MG/DL (ref 8.9–10.3)
CHLORIDE SERPL-SCNC: 108 MEQ/L (ref 98–109)
CO2 SERPL-SCNC: 17 MEQ/L (ref 22–32)
CREAT SERPL-MCNC: 0.6 MG/DL (ref 0.6–1.1)
DACRYOCYTES BLD QL SMEAR: ABNORMAL
DIFFERENTIAL METHOD BLD: ABNORMAL
EOSINOPHIL # BLD: 0.13 K/UL (ref 0.04–0.36)
EOSINOPHIL NFR BLD: 6 %
ERYTHROCYTE [DISTWIDTH] IN BLOOD BY AUTOMATED COUNT: 13.5 % (ref 11.7–14.4)
GFR SERPL CREATININE-BSD FRML MDRD: >60 ML/MIN/1.73M*2
GLUCOSE SERPL-MCNC: 92 MG/DL (ref 70–99)
HCT VFR BLDCO AUTO: 29.9 % (ref 35–45)
HGB BLD-MCNC: 10.2 G/DL (ref 11.8–15.7)
LYMPHOCYTES # BLD: 0.19 K/UL (ref 1.2–3.5)
LYMPHOCYTES NFR BLD: 9 %
MAGNESIUM SERPL-MCNC: 1.5 MG/DL (ref 1.8–2.5)
MCH RBC QN AUTO: 27.4 PG (ref 28–33.2)
MCHC RBC AUTO-ENTMCNC: 34.1 G/DL (ref 32.2–35.5)
MCV RBC AUTO: 80.4 FL (ref 83–98)
MONOCYTES # BLD: 0 K/UL (ref 0.28–0.8)
MONOCYTES NFR BLD: 0 %
NEUTS BAND # BLD: 0.02 K/UL (ref 0–0.53)
NEUTS BAND # BLD: 1.76 K/UL (ref 1.7–7)
NEUTS BAND NFR BLD: 1 %
NEUTS SEG NFR BLD: 83 %
OVALOCYTES BLD QL SMEAR: ABNORMAL
PDW BLD AUTO: 10.2 FL (ref 9.4–12.3)
PLAT MORPH BLD: NORMAL
PLATELET # BLD AUTO: 69 K/UL (ref 150–369)
PLATELET # BLD EST: ABNORMAL 10*3/UL
POTASSIUM SERPL-SCNC: 4.2 MEQ/L (ref 3.6–5.1)
PROT SERPL-MCNC: 4.9 G/DL (ref 6–8.2)
RBC # BLD AUTO: 3.72 M/UL (ref 3.93–5.22)
SODIUM SERPL-SCNC: 132 MEQ/L (ref 136–144)
T3FREE SERPL-MCNC: 2.8 PG/ML (ref 2.1–3.7)
T4 FREE SERPL-MCNC: 2.02 NG/DL (ref 0.58–1.64)
WBC # BLD AUTO: 2.12 K/UL (ref 3.8–10.5)

## 2023-01-18 PROCEDURE — 25800000 HC PHARMACY IV SOLUTIONS: Performed by: OBSTETRICS & GYNECOLOGY

## 2023-01-18 PROCEDURE — 80053 COMPREHEN METABOLIC PANEL: CPT | Performed by: STUDENT IN AN ORGANIZED HEALTH CARE EDUCATION/TRAINING PROGRAM

## 2023-01-18 PROCEDURE — 83735 ASSAY OF MAGNESIUM: CPT | Performed by: STUDENT IN AN ORGANIZED HEALTH CARE EDUCATION/TRAINING PROGRAM

## 2023-01-18 PROCEDURE — 63700000 HC SELF-ADMINISTRABLE DRUG: Performed by: INTERNAL MEDICINE

## 2023-01-18 PROCEDURE — 85025 COMPLETE CBC W/AUTO DIFF WBC: CPT | Performed by: STUDENT IN AN ORGANIZED HEALTH CARE EDUCATION/TRAINING PROGRAM

## 2023-01-18 PROCEDURE — 63700000 HC SELF-ADMINISTRABLE DRUG: Performed by: OBSTETRICS & GYNECOLOGY

## 2023-01-18 PROCEDURE — 63600000 HC DRUGS/DETAIL CODE: Performed by: STUDENT IN AN ORGANIZED HEALTH CARE EDUCATION/TRAINING PROGRAM

## 2023-01-18 PROCEDURE — 63600000 HC DRUGS/DETAIL CODE: Performed by: OBSTETRICS & GYNECOLOGY

## 2023-01-18 PROCEDURE — 36415 COLL VENOUS BLD VENIPUNCTURE: CPT | Performed by: STUDENT IN AN ORGANIZED HEALTH CARE EDUCATION/TRAINING PROGRAM

## 2023-01-18 PROCEDURE — 99232 SBSQ HOSP IP/OBS MODERATE 35: CPT | Performed by: STUDENT IN AN ORGANIZED HEALTH CARE EDUCATION/TRAINING PROGRAM

## 2023-01-18 PROCEDURE — 63700000 HC SELF-ADMINISTRABLE DRUG: Performed by: STUDENT IN AN ORGANIZED HEALTH CARE EDUCATION/TRAINING PROGRAM

## 2023-01-18 PROCEDURE — 21400000 HC ROOM AND CARE CCU/INTERMEDIATE

## 2023-01-18 PROCEDURE — 84439 ASSAY OF FREE THYROXINE: CPT | Performed by: OBSTETRICS & GYNECOLOGY

## 2023-01-18 PROCEDURE — 99232 SBSQ HOSP IP/OBS MODERATE 35: CPT | Performed by: INTERNAL MEDICINE

## 2023-01-18 PROCEDURE — 84481 FREE ASSAY (FT-3): CPT | Performed by: OBSTETRICS & GYNECOLOGY

## 2023-01-18 RX ADMIN — PROTRIPTYLINE HYDROCHLORIDE 15 MG: 5 TABLET, FILM COATED ORAL at 21:40

## 2023-01-18 RX ADMIN — BUPROPION HYDROCHLORIDE 150 MG: 150 TABLET, EXTENDED RELEASE ORAL at 21:39

## 2023-01-18 RX ADMIN — BENZONATATE 100 MG: 100 CAPSULE ORAL at 05:43

## 2023-01-18 RX ADMIN — SENNOSIDES AND DOCUSATE SODIUM 2 TABLET: 50; 8.6 TABLET ORAL at 21:39

## 2023-01-18 RX ADMIN — PROPYLTHIOURACIL 100 MG: 50 TABLET ORAL at 21:40

## 2023-01-18 RX ADMIN — IBUPROFEN 400 MG: 400 TABLET, FILM COATED ORAL at 03:52

## 2023-01-18 RX ADMIN — METOPROLOL TARTRATE 25 MG: 25 TABLET, FILM COATED ORAL at 09:41

## 2023-01-18 RX ADMIN — METOPROLOL TARTRATE 25 MG: 25 TABLET, FILM COATED ORAL at 21:39

## 2023-01-18 RX ADMIN — BENZONATATE 100 MG: 100 CAPSULE ORAL at 15:48

## 2023-01-18 RX ADMIN — ONDANSETRON 4 MG: 2 INJECTION INTRAMUSCULAR; INTRAVENOUS at 11:14

## 2023-01-18 RX ADMIN — PROPYLTHIOURACIL 100 MG: 50 TABLET ORAL at 05:42

## 2023-01-18 RX ADMIN — PROPYLTHIOURACIL 100 MG: 50 TABLET ORAL at 15:46

## 2023-01-18 RX ADMIN — SENNOSIDES AND DOCUSATE SODIUM 2 TABLET: 50; 8.6 TABLET ORAL at 09:41

## 2023-01-18 RX ADMIN — PROMETHAZINE HYDROCHLORIDE 12.5 MG: 25 INJECTION INTRAMUSCULAR; INTRAVENOUS at 17:42

## 2023-01-18 NOTE — PROGRESS NOTES
Gynecology Progress Note    Subjective     Patient seen on AM rounds. She received benadryl yesterday afternoon for rash but reports she continued to have difficulty sleeping overnight. Continues to have dry cough however this improved with tessalon pearls overnight. Had a few drops of bleeding from nose yesterday afternoon but no active nose bleeding overnight. Denies SOB or wheezing. Has still not had BM.    Objective     Vital signs in last 24 hours:  Temp:  [36.4 °C (97.5 °F)-36.9 °C (98.4 °F)] 36.9 °C (98.4 °F)  Heart Rate:  [] 95  Resp:  [16-18] 18  BP: ()/(49-56) 110/51   O2 96-99% on RA    Exam  General Appearance: Alert, cooperative, no acute distress  Lungs: Clear to auscultation bilaterally, respirations unlabored  Heart: regular heart rate  Abdomen:  Soft, non tender, non distended  Extremities: no edema or calf tenderness    Labs    Results from last 7 days   Lab Units 01/18/23  0350 01/17/23  0642 01/14/23  0540 01/13/23  0517   WBC K/uL 2.12* 2.19*   < > 3.22*   HEMOGLOBIN g/dL 10.2* 9.5*   < > 7.9*   HEMATOCRIT % 29.9* 27.7*   < > 24.2*   PLATELETS K/uL 69* 82*   < > 151   DIFF TYPE   --  Manu   < > Auto   NRBC %  --   --   --  0.0   IMM GRANULOCYTES %  --   --   --  0.3   NEUTROPHILS %  --   --   --  60.0   NEUTROS PCT MAN %  --  83   < >  --    LYMPHOCYTES %  --   --   --  26.7   LYMPHO PCT MAN %  --  7   < >  --    MONOCYTES %  --   --   --  11.8   MONO PCT MAN %  --  2   < >  --    EOSINOPHILS %  --   --   --  1.2   EOSINO PCT MAN %  --  8   < >  --    BASOPHILS %  --   --   --  0.0   BASOS PCT MAN %  --  0   < >  --    IMM GRANUCOCYTES ABS K/uL  --   --   --  0.01   SEGS ABS MAN K/uL  --  1.82   < >  --    LYMPHO ABS MAN K/uL  --  0.15*   < >  --    MONO ABS AUTO K/uL  --   --   --  0.38   MONO ABS MAN K/uL  --  0.04*   < >  --    EOS ABS AUTO K/uL  --   --   --  0.04   EOS ABS MAN K/uL  --  0.18   < >  --    BASO ABS AUTO K/uL  --   --   --  0.00*   BASOS ABS MAN K/uL  --  0.00*   <  >  --     < > = values in this interval not displayed.       Results from last 7 days   Lab Units 01/18/23  0350 01/17/23  0642 01/16/23  1705   SODIUM mEQ/L 132* 128* 130*   POTASSIUM mEQ/L 4.2 3.9 3.4*   CHLORIDE mEQ/L 108 103 104   CO2 mEQ/L 17* 17* 16*   BUN mg/dL 8 9 14   CREATININE mg/dL 0.6 0.7 0.7   GLUCOSE mg/dL 92 78 105*   CALCIUM mg/dL 8.3* 8.0* 8.0*      Quantitative Beta-HCGs in last 30 days:   Recent Labs   Lab 01/15/23  0701 01/13/23  0517 01/10/23  2050   HCGQUANT >2,740,000.0* >1,846,800.0* >461,700.0*     Results from last 7 days   Lab Units 01/18/23  0350 01/17/23  0642 01/16/23  0810   MAGNESIUM mg/dL 1.5* 1.5* 1.4*         Assessment/Plan     Alondra Villela is a 32 y.o. 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      1. Gestational trophoblastic neoplasia:   a. s/p PICC line placement. Chemotherapy regimen ordered with plan for EP induction for 2-3 cycles followed by EMA-EP for 3 cycles past negative hcg levels. Successfully received first induction cycle 1/13-1/14. Next induction to be administered 1/20-1/21. bHCG increasing but to be expected. Induction cycles to occur weekly. Will repeat bhcg on 1/20/23.   b. Thoracic surgery consulted 2/2 high risk of lung metastasis hemorrhage with initiation of chemotherapy, appreciate input. Will keep active type and screen on file, next due on 1/20/23.   c. Continue daily CBC with diff, CMP and Mg with repletion PRN. Labs ordered to be drawn off PICC.   d. Patient with cough since chemo started, cepacol cough drop and tessalon pearls ordered PRN.      2. Thyroid storm:   a. 1/10 TSH <0.01, T4 5.04. TSI ab WNL.   b. Repeat T4/T3 on 1/18 were FT4 2.02 T3 2.8.   c. Patient remains intermittently tachycardic although improved since admission. Remains on tele and close vital sign monitoring.   d. PTU and metoprolol ordered per endocrine. Metoprolol at 25 BID, PTU ordered 100 TID   e. S/p endocrine consult, continue to appreciate  recommendations.      3. Anemia:   a. Most recent hgb 10.2 s/p 2u PRBC.   b. Goal hemoglobin >8.0 in setting of chemotherapy.   c. Continue daily CBC with diff.     4. VTE PPE:   a. Hold off on chemical VTE for now given high risk of hemorrhage with chemotherapy induction. Will reassess as able.   b. Mechanical prophylaxis with SCDs ordered.  c. Encourage ambulation QID.     5. Anx/Depression:  a. Continue home regimen of Wellbutrin 150 mg qd.   b. Ativan ordered PRN while inpatient.       6.  Migraines:              A. Home protriptyline ordered via nonformulary order.    7. Hypomagnesia:   A. Magnesium 1.5 on 1/18 & 2g IV Mg ordered. Will continue to trend daily.     8. Hyponatremia:    A. Na last 132 this AM. Will continue to trend CMP daily. Fluid restriction of 1L daily ordered and urine osmolality/Na  Collected 1/17 were WNL. Patient asymptomatic     9. Rash/Pruititis    A. Present on abdomen. Benadryl ordered PRN.     10. Constipation   A. Senna ordered BID, consider enema. Would hold on diuretics given intermittent e-lyte abnormalities on chemo    11. Thrombocytopenia    A. plts most recently 69. Patient asymptomatic. Continue to monitor daily with CBC with diff. Side effect of chemo.     12. Neutropenia    A. Continue CBC with Diff, would consider neulasta/neupogen if ANC <0.5    Will review with Dr. Benedict Koehler MD PGY4  Pager [phone]

## 2023-01-18 NOTE — PLAN OF CARE
Problem: Adult Inpatient Plan of Care  Goal: Plan of Care Review  Outcome: Progressing  Flowsheets (Taken 1/18/2023 0558)  Progress: no change  Plan of Care Reviewed With: patient  Outcome Summary: Pt stable. VSS. C/o a mild headache and generalized pain during the night. PRN meds given with relief. Tessalon pearls ordered/given for persistent dry cough. Dizziness reported when walking from the bathroom this AM. MD aware. Vitals remain WDL. Pt encouraged to call nursing staff when getting up oob. Bed alarm refused. PO intake monitored. Family remains at bedside.

## 2023-01-18 NOTE — PROGRESS NOTES
Gynecologic Oncology Progress Note    Subjective     Interval events: transient nausea earlier today resolved with a dose of IV Zofran.    Otherwise, patient able to get some more sleep today. Continues to have dry cough however improved with tessalon perles. No mac epistaxis, dyspnea, chest pain, or wheezing.    Objective     Vital signs in last 24 hours:  Temp:  [36.4 °C (97.5 °F)-36.9 °C (98.4 °F)] 36.8 °C (98.2 °F)  Heart Rate:  [] 100  Resp:  [16-18] 16  BP: ()/(50-56) 102/50  SpO2:  [98 %-100 %] 100 %  Oxygen Therapy: None (Room air)    Exam:  General Appearance: Alert, cooperative, no acute distress  Lungs: Clear to auscultation bilaterally, respirations unlabored  Heart: Mildly tachycardic heart rate  Abdomen:  Soft, non tender, non distended  Extremities: No edema or calf tenderness    Labs:  Results from last 7 days   Lab Units 01/18/23  0350 01/14/23  0540 01/13/23  0517   WBC K/uL 2.12*   < > 3.22*   HEMOGLOBIN g/dL 10.2*   < > 7.9*   HEMATOCRIT % 29.9*   < > 24.2*   PLATELETS K/uL 69*   < > 151   DIFF TYPE  Manu   < > Auto   NRBC %  --   --  0.0   IMM GRANULOCYTES %  --   --  0.3   NEUTROPHILS %  --   --  60.0   NEUTROS PCT MAN % 83   < >  --    LYMPHOCYTES %  --   --  26.7   LYMPHO PCT MAN % 9   < >  --    MONOCYTES %  --   --  11.8   MONO PCT MAN % 0   < >  --    EOSINOPHILS %  --   --  1.2   EOSINO PCT MAN % 6   < >  --    BASOPHILS %  --   --  0.0   BASOS PCT MAN % 1   < >  --    BANDS PCT MAN % 1  --   --    IMM GRANUCOCYTES ABS K/uL  --   --  0.01   SEGS ABS MAN K/uL 1.76   < >  --    LYMPHO ABS MAN K/uL 0.19*   < >  --    MONO ABS AUTO K/uL  --   --  0.38   MONO ABS MAN K/uL 0.00*   < >  --    EOS ABS AUTO K/uL  --   --  0.04   EOS ABS MAN K/uL 0.13   < >  --    BASO ABS AUTO K/uL  --   --  0.00*   BASOS ABS MAN K/uL 0.02   < >  --     < > = values in this interval not displayed.     Results from last 7 days   Lab Units 01/18/23  0350 01/17/23  0642 01/16/23  1705   SODIUM mEQ/L  132* 128* 130*   POTASSIUM mEQ/L 4.2 3.9 3.4*   CHLORIDE mEQ/L 108 103 104   CO2 mEQ/L 17* 17* 16*   BUN mg/dL 8 9 14   CREATININE mg/dL 0.6 0.7 0.7   GLUCOSE mg/dL 92 78 105*   CALCIUM mg/dL 8.3* 8.0* 8.0*        Recent Labs   Lab 01/15/23  0701 01/13/23  0517 01/10/23  2050   HCGQUANT >2,740,000.0* >1,846,800.0* >461,700.0*     Results from last 7 days   Lab Units 01/18/23  0350 01/17/23  0642 01/16/23  0810   MAGNESIUM mg/dL 1.5* 1.5* 1.4*     Results from last 7 days   Lab Units 01/18/23  0350 01/15/23  0701 01/12/23  0624   T3 FREE pg/mL 2.80 3.95*  --    FREE T4 ng/dL 2.02* 3.59*  --    TSI (THYROID STIMULATING IMMUNOGLOBULIN) % baseline  --   --  95       TSH   Date Value Ref Range Status   01/10/2023 <0.01 (L) 0.34 - 5.60 mIU/L Final     Free T4   Date Value Ref Range Status   01/18/2023 2.02 (H) 0.58 - 1.64 ng/dL Final     T3, Free   Date Value Ref Range Status   01/18/2023 2.80 2.10 - 3.70 pg/mL Final     Thyroid Stimulating Immunoglobulin   Date Value Ref Range Status   01/12/2023 95 <140 % baseline Final     Comment:     Thyroid stimulating immunoglobulins (TSI) can engage  the TSH receptors resulting in hyperthyroidism in  Graves' disease patients. TSI levels can be useful in  monitoring the clinical outcome of Graves' disease as  well as assessing the potential for hyperthyroidism  from maternal-fetal transfer. TSI results greater than  or equal to (>=) 140% of the Reference Control are  considered positive.     NOTE:  A serum TSH level greater than 350 micro-International  Units/mL can interfere with the TSI bioassay and  potentially give false positive results.     Patients who are pregnant and are suspected of having  hyperthyroidism should have both TSI and human  Chorionic Gonadotropin(hCG) tests measured. A serum  hCG level greater than 40,625 mIU/mL can interfere  with the TSI bioassay and may give false negative  results. In these patients it is recommended that  a second TSI be obtained when  the hCG concentration  falls below 40,625 mIU/mL (usually after approximately  20-weeks gestation).     The analytical performance characteristics of this  assay have been determined by [facility], [place], [place].  The modifications  have not been cleared or approved by the FDA.  This  assay has been validated pursuant to the CLIA  regulations and is used for clinical purposes.                Assessment/Plan     Alondra Villela is a 32 y.o. 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      1. Gestational trophoblastic neoplasia:   a. s/p PICC line placement. Chemotherapy regimen ordered with plan for EP induction for 2-3 cycles followed by EMA-EP for 3 cycles past negative hcg levels. Successfully received first induction cycle 1/13-1/14. Next induction to be administered 1/20-1/21. beta hCG increasing but to be expected. Induction cycles to occur weekly. Will repeat beta hCG on 1/20/23.   b. Thoracic surgery consulted 2/2 high risk of lung metastasis hemorrhage with initiation of chemotherapy, appreciate input. Will keep active type and screen on file, next due on 1/20/23.   c. Continue daily CBC with diff, CMP and Mg with repletion PRN. Labs ordered to be drawn off PICC.   d. Patient with cough since chemo started, cepacol cough drop and tessalon perles ordered PRN.   2. Thyroid storm:   a. 1/10 TSH <0.01, T4 5.04. TSI ab WNL.  b. Repeat T4/T3 on 1/18 were FT4 2.02 T3 2.8.   c. Patient remains intermittently tachycardic although improved since admission. Remains on tele and close vital sign monitoring.   d. PTU and metoprolol ordered per Endocrinology. Metoprolol at 25 mg BID and continue  mg TID.  e. Continue to appreciate Endocrinology recommendations including daily CMP.  3. Anemia:   a. Most recent Hgb 10.2 s/p 2u pRBC.   b. Goal hemoglobin >8.0 in setting of chemotherapy.   c. Continue daily CBC with diff.  4. VTE ppx:   a. Hold off on chemical VTE for  now given high risk of hemorrhage with chemotherapy induction. Will reassess as able.   b. Mechanical prophylaxis with SCDs ordered.  c. Encourage ambulation QID.  5. Anxiety/depression:  a. Continue home regimen of Wellbutrin 150 mg qd.   b. Ativan ordered PRN while inpatient.  6. Migraines:   a. Home protriptyline ordered via nonformulary order.  7. Hypomagnesia:  a. Magnesium 1.5 on 1/18/23 & 2g IV Mg ordered.  b. Continue to trend and replete PRN.  8. Hyponatremia:  a. Na last 132 this AM, continue to trend CMP daily.  b. Continue fluid restriction of 1L daily.  c. Urine osmolality/Na 1/17/23 were WNL  d. Patient asymptomatic.  9. Rash/pruititis  a. Present on abdomen.  b. Benadryl ordered PRN.  10. Constipation  a. Senna ordered BID, consider enema.  b. Would hold on diuretics given intermittent e-lyte abnormalities on chemo  11. Thrombocytopenia  a. Plts most recently 69.  b. Patient asymptomatic.  c. Continue to monitor daily with CBC with diff.  d. Known side effect of chemo.  12. Neutropenia   a. Continue CBC with diff daily, last ANC 1.76.  b. Will consider neulasta/neupogen if ANC <1.0    Please reach out to the team at pager #[phone] with any questions or concerns.    Will review with Dr. Mcmullen.    Reg Madison MD, PGY3  Obstetrics & Gynecology

## 2023-01-18 NOTE — PROGRESS NOTES
R3 GYN ONC    To bedside to assess patient for episode of emesis.    S:    Alondra reports that at 1600 today she experienced a return of nausea (which had previously responded to Zofran) and an episode of vomiting brought on by a violent coughing fit. Also noted some pink-tinged mucous on blowing her nose after that episode.     Alondra is also reporting RUQ pain which she thinks may be near the prior biopsy site, but is difficult to localize. She only began to notice this recently with the vomiting. No other associated symptoms.     O:    Temp:  [36.4 °C (97.5 °F)-36.9 °C (98.4 °F)] 36.8 °C (98.2 °F)  Heart Rate:  [] 100  Resp:  [16-18] 16  BP: ()/(50-56) 102/50  SpO2:  [98 %-100 %] 100 %  Oxygen Therapy: None (Room air)    On exam she is visibly uncomfortable s/p emesis, however alert, cooperative, not acutely distressed, mildly tachycardic heart rate, lungs are CTAB, dry cough present on deep inspiration, pink-tinged mucous on nasal tissue with no active bleeding, abdomen soft, non-distended, no point tenderness, no reproducible pain, no CVA tenderness, no LE edema/TTP, neurologically grossly intact.     A/P:    Emesis: Likely in the setting of persistent coughing fits. Will give a dose of IV phenergan and continue to monitor very closely.     RUQ Pain: Mild, non-reproducible, etiology unclear, will continue to monitor and if no improvement (or worsening) despite PO meds (Tylenol/Motrin) have low threshold to obtain stat CBC/diff and reassess.     Remainder of plan as previously noted.    Discussed with Dr. Mcmullen.    Reg Madison MD, PGY3  Obstetrics & Gynecology

## 2023-01-18 NOTE — PROGRESS NOTES
Endocrinology Progress Note       SUBJECTIVE   Interval History:  She feels tired. Cough slightly better with symptomatic treatment.  HR went up to 140 while getting up to bathroom last night. No CP or SOB.      OBJECTIVE      Vital signs in last 24 hours:  Temp:  [36.4 °C (97.5 °F)-36.9 °C (98.4 °F)] 36.7 °C (98 °F)  Heart Rate:  [] 113  Resp:  [16-18] 18  BP: ()/(51-56) 90/55      PHYSICAL EXAMINATION        HEENT: NCAT mucous membranes moist  EOMI sclerae anicteric  NEURO:A&Ox3   PSYCH:Appropriate and cooperative       LABS / IMAGING / TELE      Labs  Lab Results   Component Value Date    WBC 2.12 (LL) 01/18/2023    HGB 10.2 (L) 01/18/2023    HCT 29.9 (L) 01/18/2023    MCV 80.4 (L) 01/18/2023    PLT 69 (L) 01/18/2023     Lab Results   Component Value Date    GLUCOSE 92 01/18/2023    CALCIUM 8.3 (L) 01/18/2023     (L) 01/18/2023    K 4.2 01/18/2023    CO2 17 (L) 01/18/2023     01/18/2023    BUN 8 01/18/2023    CREATININE 0.6 01/18/2023     AST/ALT wnl       Latest Reference Range & Units 01/10/23 20:50 01/11/23 09:15 01/12/23 06:24 01/13/23 05:17 01/15/23 07:01 01/18/23 03:50   TSH 0.34 - 5.60 mIU/L <0.01 (L)        Free T4 0.58 - 1.64 ng/dL 5.07 (H)    3.59 (H) 2.02 (H)   T3, Free 2.10 - 3.70 pg/mL  10.42 (H)   3.95 (H) 2.80   Thyroid Stimulating Immunoglobulin <140 % baseline   95      hCG Quant <5.0 IU/L (mIU/mL) >461,700.0 (H) (C)   >1,846,800.0 (H) (C) >2,740,000.0 (H)    (L): Data is abnormally low  (H): Data is abnormally high  (C): Corrected    ASSESSMENT AND PLAN      1) Hyperthyroidism  32 year old woman with very new recognition of gestational trophoblastic neoplasm  She presented with exceedingly high and rapidly rising HCG levels    Responding to PTU well. T3 normalized. T4 remains high but much improved.  Will continue the current dose.   Low WBC: likely related to chemo. good Neutrophil count  Normal LFT    2) Tachycardia  - multifactorial, related to hyperthyroidism,  anemia, electrolytes disturbances etc. Improving overall. Keep metoprolol 25 mg bid.   - keep well hydration.   - AM cortisol >20 on 1/16.  No sign of AI    2) Gestational trophoblastic neoplasm  Ongoing mgt per gyn onc  Received chemotherapy 1/13 and 1/14  - plans to resume 1/20 noted  Concern for high risk of lung hemorrhage noted  - with plan for involvement of thoracic surgery     3) Adrenal calcification  Noted on CT  Uncertain significance  She received dex as part of her chemotherapeutic regimen  Good AM cortisol level     4) hyponatremia/hypomagnesium  - improving.   - mgt per primary team.       Discussed with Dr. Koehler.        Alice Hodge MD

## 2023-01-19 ENCOUNTER — DOCUMENTATION (OUTPATIENT)
Dept: GYNECOLOGIC ONCOLOGY | Facility: CLINIC | Age: 33
End: 2023-01-19
Payer: COMMERCIAL

## 2023-01-19 LAB
ALBUMIN SERPL-MCNC: 2.4 G/DL (ref 3.4–5)
ALP SERPL-CCNC: 288 IU/L (ref 35–126)
ALT SERPL-CCNC: 41 IU/L (ref 11–54)
ANION GAP SERPL CALC-SCNC: 7 MEQ/L (ref 3–15)
AST SERPL-CCNC: 32 IU/L (ref 15–41)
BASOPHILS # BLD: 0.01 K/UL (ref 0.01–0.1)
BASOPHILS NFR BLD: 0.3 %
BILIRUB SERPL-MCNC: 1.2 MG/DL (ref 0.3–1.2)
BUN SERPL-MCNC: 11 MG/DL (ref 8–20)
BURR CELLS BLD QL SMEAR: ABNORMAL
CALCIUM SERPL-MCNC: 8.2 MG/DL (ref 8.9–10.3)
CHLORIDE SERPL-SCNC: 104 MEQ/L (ref 98–109)
CO2 SERPL-SCNC: 18 MEQ/L (ref 22–32)
CREAT SERPL-MCNC: 0.7 MG/DL (ref 0.6–1.1)
DIFFERENTIAL METHOD BLD: ABNORMAL
EOSINOPHIL # BLD: 0.12 K/UL (ref 0.04–0.36)
EOSINOPHIL NFR BLD: 4.2 %
ERYTHROCYTE [DISTWIDTH] IN BLOOD BY AUTOMATED COUNT: 13.3 % (ref 11.7–14.4)
GFR SERPL CREATININE-BSD FRML MDRD: >60 ML/MIN/1.73M*2
GLUCOSE SERPL-MCNC: 84 MG/DL (ref 70–99)
HCT VFR BLDCO AUTO: 30.5 % (ref 35–45)
HGB BLD-MCNC: 10.4 G/DL (ref 11.8–15.7)
IMM GRANULOCYTES # BLD AUTO: 0.07 K/UL (ref 0–0.08)
IMM GRANULOCYTES NFR BLD AUTO: 2.4 %
LYMPHOCYTES # BLD: 0.28 K/UL (ref 1.2–3.5)
LYMPHOCYTES NFR BLD: 9.7 %
MAGNESIUM SERPL-MCNC: 1.4 MG/DL (ref 1.8–2.5)
MCH RBC QN AUTO: 27.7 PG (ref 28–33.2)
MCHC RBC AUTO-ENTMCNC: 34.1 G/DL (ref 32.2–35.5)
MCV RBC AUTO: 81.1 FL (ref 83–98)
MONOCYTES # BLD: 0.07 K/UL (ref 0.28–0.8)
MONOCYTES NFR BLD: 2.4 %
NEUTROPHILS # BLD: 2.33 K/UL (ref 1.7–7)
NEUTS SEG NFR BLD: 81 %
NRBC BLD-RTO: 0 %
OVALOCYTES BLD QL SMEAR: ABNORMAL
PDW BLD AUTO: 10.3 FL (ref 9.4–12.3)
PLAT MORPH BLD: NORMAL
PLATELET # BLD AUTO: 68 K/UL (ref 150–369)
PLATELET # BLD EST: ABNORMAL 10*3/UL
POTASSIUM SERPL-SCNC: 4.3 MEQ/L (ref 3.6–5.1)
PROT SERPL-MCNC: 5.1 G/DL (ref 6–8.2)
RBC # BLD AUTO: 3.76 M/UL (ref 3.93–5.22)
SODIUM SERPL-SCNC: 129 MEQ/L (ref 136–144)
WBC # BLD AUTO: 2.88 K/UL (ref 3.8–10.5)

## 2023-01-19 PROCEDURE — 63700000 HC SELF-ADMINISTRABLE DRUG: Performed by: INTERNAL MEDICINE

## 2023-01-19 PROCEDURE — 36415 COLL VENOUS BLD VENIPUNCTURE: CPT | Performed by: STUDENT IN AN ORGANIZED HEALTH CARE EDUCATION/TRAINING PROGRAM

## 2023-01-19 PROCEDURE — 21400000 HC ROOM AND CARE CCU/INTERMEDIATE

## 2023-01-19 PROCEDURE — 99232 SBSQ HOSP IP/OBS MODERATE 35: CPT | Performed by: STUDENT IN AN ORGANIZED HEALTH CARE EDUCATION/TRAINING PROGRAM

## 2023-01-19 PROCEDURE — 85025 COMPLETE CBC W/AUTO DIFF WBC: CPT | Performed by: STUDENT IN AN ORGANIZED HEALTH CARE EDUCATION/TRAINING PROGRAM

## 2023-01-19 PROCEDURE — 63700000 HC SELF-ADMINISTRABLE DRUG: Performed by: STUDENT IN AN ORGANIZED HEALTH CARE EDUCATION/TRAINING PROGRAM

## 2023-01-19 PROCEDURE — 80053 COMPREHEN METABOLIC PANEL: CPT | Performed by: STUDENT IN AN ORGANIZED HEALTH CARE EDUCATION/TRAINING PROGRAM

## 2023-01-19 PROCEDURE — 63700000 HC SELF-ADMINISTRABLE DRUG: Performed by: OBSTETRICS & GYNECOLOGY

## 2023-01-19 PROCEDURE — 83735 ASSAY OF MAGNESIUM: CPT | Performed by: STUDENT IN AN ORGANIZED HEALTH CARE EDUCATION/TRAINING PROGRAM

## 2023-01-19 PROCEDURE — 63600000 HC DRUGS/DETAIL CODE: Performed by: OBSTETRICS & GYNECOLOGY

## 2023-01-19 PROCEDURE — 99232 SBSQ HOSP IP/OBS MODERATE 35: CPT | Performed by: INTERNAL MEDICINE

## 2023-01-19 RX ADMIN — MAGNESIUM SULFATE HEPTAHYDRATE 2 G: 40 INJECTION, SOLUTION INTRAVENOUS at 09:40

## 2023-01-19 RX ADMIN — BENZONATATE 100 MG: 100 CAPSULE ORAL at 04:10

## 2023-01-19 RX ADMIN — PROPYLTHIOURACIL 100 MG: 50 TABLET ORAL at 13:03

## 2023-01-19 RX ADMIN — IBUPROFEN 400 MG: 400 TABLET, FILM COATED ORAL at 20:13

## 2023-01-19 RX ADMIN — BUPROPION HYDROCHLORIDE 150 MG: 150 TABLET, EXTENDED RELEASE ORAL at 20:14

## 2023-01-19 RX ADMIN — BENZONATATE 100 MG: 100 CAPSULE ORAL at 13:03

## 2023-01-19 RX ADMIN — BENZONATATE 100 MG: 100 CAPSULE ORAL at 20:16

## 2023-01-19 RX ADMIN — METOPROLOL TARTRATE 12.5 MG: 25 TABLET, FILM COATED ORAL at 20:14

## 2023-01-19 RX ADMIN — SENNOSIDES AND DOCUSATE SODIUM 2 TABLET: 50; 8.6 TABLET ORAL at 09:40

## 2023-01-19 RX ADMIN — SENNOSIDES AND DOCUSATE SODIUM 2 TABLET: 50; 8.6 TABLET ORAL at 20:14

## 2023-01-19 RX ADMIN — PROPYLTHIOURACIL 100 MG: 50 TABLET ORAL at 06:45

## 2023-01-19 RX ADMIN — PROTRIPTYLINE HYDROCHLORIDE 15 MG: 5 TABLET, FILM COATED ORAL at 21:10

## 2023-01-19 RX ADMIN — IBUPROFEN 400 MG: 400 TABLET, FILM COATED ORAL at 14:35

## 2023-01-19 RX ADMIN — IBUPROFEN 400 MG: 400 TABLET, FILM COATED ORAL at 04:10

## 2023-01-19 RX ADMIN — PROPYLTHIOURACIL 100 MG: 50 TABLET ORAL at 21:10

## 2023-01-19 NOTE — PLAN OF CARE
Plan of Care Review  Plan of Care Reviewed With: patient  Progress: improving  Outcome Summary: BP soft in the 80s, see flowsheets. Paged [phone] with each low BP. Pt asymtomatic. Metoprolol dose held this morning and evening dose decreased. FSP Maintained. Mg repleted. Family updated at the bedside. Call bell in reach.

## 2023-01-19 NOTE — PROGRESS NOTES
Endocrinology Progress Note       SUBJECTIVE   Interval History:  Seen in the morning. Feeling tired and has dry cough as before. No bleeding. Trying to keep a good balance between hydration and limiting liquid intake   OBJECTIVE      Vital signs in last 24 hours:  Temp:  [36.5 °C (97.7 °F)-37.3 °C (99.2 °F)] 36.7 °C (98.1 °F)  Heart Rate:  [] 108  Resp:  [16-18] 16  BP: ()/(46-97) 83/46      PHYSICAL EXAMINATION        HEENT: NCAT mucous membranes moist  EOMI sclerae anicteric  NEURO:A&Ox3   PSYCH:Appropriate and cooperative       LABS / IMAGING / TELE      Labs  Lab Results   Component Value Date    WBC 2.88 (L) 01/19/2023    HGB 10.4 (L) 01/19/2023    HCT 30.5 (L) 01/19/2023    MCV 81.1 (L) 01/19/2023    PLT 68 (L) 01/19/2023     Lab Results   Component Value Date    GLUCOSE 84 01/19/2023    CALCIUM 8.2 (L) 01/19/2023     (L) 01/19/2023    K 4.3 01/19/2023    CO2 18 (L) 01/19/2023     01/19/2023    BUN 11 01/19/2023    CREATININE 0.7 01/19/2023     AST/ALT wnl       Latest Reference Range & Units 01/10/23 20:50 01/11/23 09:15 01/12/23 06:24 01/13/23 05:17 01/15/23 07:01 01/18/23 03:50   TSH 0.34 - 5.60 mIU/L <0.01 (L)        Free T4 0.58 - 1.64 ng/dL 5.07 (H)    3.59 (H) 2.02 (H)   T3, Free 2.10 - 3.70 pg/mL  10.42 (H)   3.95 (H) 2.80   Thyroid Stimulating Immunoglobulin <140 % baseline   95      hCG Quant <5.0 IU/L (mIU/mL) >461,700.0 (H) (C)   >1,846,800.0 (H) (C) >2,740,000.0 (H)    (L): Data is abnormally low  (H): Data is abnormally high  (C): Corrected    ASSESSMENT AND PLAN      1) Hyperthyroidism  32 year old woman with very new recognition of gestational trophoblastic neoplasm  She presented with exceedingly high and rapidly rising HCG levels    Responding to PTU well. T3 normalized. T4 remains high but much improved.  Will continue the current dose.   Low WBC: likely related to chemo. good Neutrophil count  Normal LFT    2) Tachycardia/Hypotensiion  - multifactorial, related  to hyperthyroidism, anemia, electrolytes disturbances etc.  - tachycardia is improving.  Consider to reduce metoprolol to 12.5 mg bid if BP persistently low.   - keep well hydration.   - AM cortisol >20 on 1/16.  No sign of AI    2) Gestational trophoblastic neoplasm  Ongoing mgt per gyn onc  Received chemotherapy 1/13 and 1/14  - plans to resume 1/20      3) Adrenal calcification  Noted on CT  Uncertain significance  She received dex as part of her chemotherapeutic regimen  Good AM cortisol level     4) hyponatremia/hypomagnesium  - mgt per primary team.     Discussed with Dr. Madison.          Alice Hodge MD

## 2023-01-19 NOTE — PROGRESS NOTES
Called [facility] to perform a prior authorization request for outpatient cisplatin, neulasta and etopaside. PA started over the phone with representative Carmen but unable to continue without stage of cancer. She will fax me a form to continue filling out and explained I can call back with answers or go online to complete the form.   Draft code: [id number].     Will complete with needed information to proceed.

## 2023-01-19 NOTE — PROGRESS NOTES
Gynecologic Oncology Progress Note    Subjective     Interval events: assessed for mild asymptomatic hypotension which has improved slightly (please see prior note), metoprolol dose lowered per Endocrinology recommendations    Alondra continues to feel subjectively well. Continues to rest intermittently through the day. Continues to have dry cough which is controlled with tessalon perles. No abdominal pain, mac epistaxis, dyspnea, chest pain, wheezing, vaginal bleeding. Noted a small brown spot of vaginal discharge which was thought to be physiologic discharge. No BM yet. Passing flatus.     Objective     Vital signs in last 24 hours:  Temp:  [36.5 °C (97.7 °F)-37.4 °C (99.3 °F)] 37.4 °C (99.3 °F)  Heart Rate:  [] 118  Resp:  [16-18] 16  BP: ()/(46-97) 101/51  SpO2:  [97 %-100 %] 100 %  Oxygen Therapy: None (Room air)    Exam:  General Appearance: Resting; on awakening, alert, cooperative, no acute distress  Lungs: Clear to auscultation bilaterally, respirations unlabored  Heart: Mildly tachycardic heart rate, regular rhythm  Abdomen: Soft, hypoactive bowel sounds present, non-distended, non-tender, no RUQ/epigastric pain elicited on palpation, no rebound/rigidity/guarding, rash of upper abdomen present in distribution/shape of previously removed adhesives  Extremities: No edema or calf tenderness, SCDs not on    Labs:  Results from last 7 days   Lab Units 01/19/23  0409 01/18/23  0350   WBC K/uL 2.88* 2.12*   HEMOGLOBIN g/dL 10.4* 10.2*   HEMATOCRIT % 30.5* 29.9*   PLATELETS K/uL 68* 69*   DIFF TYPE  Auto Manu   NRBC % 0.0  --    IMM GRANULOCYTES % 2.4  --    NEUTROPHILS % 81.0  --    NEUTROS PCT MAN %  --  83   LYMPHOCYTES % 9.7  --    LYMPHO PCT MAN %  --  9   MONOCYTES % 2.4  --    MONO PCT MAN %  --  0   EOSINOPHILS % 4.2  --    EOSINO PCT MAN %  --  6   BASOPHILS % 0.3  --    BASOS PCT MAN %  --  1   BANDS PCT MAN %  --  1   IMM GRANUCOCYTES ABS K/uL 0.07  --    SEGS ABS MAN K/uL  --  1.76    LYMPHO ABS MAN K/uL  --  0.19*   MONO ABS AUTO K/uL 0.07*  --    MONO ABS MAN K/uL  --  0.00*   EOS ABS AUTO K/uL 0.12  --    EOS ABS MAN K/uL  --  0.13   BASO ABS AUTO K/uL 0.01  --    BASOS ABS MAN K/uL  --  0.02      Results from last 7 days   Lab Units 01/19/23  0409 01/18/23  0350 01/17/23  0642   SODIUM mEQ/L 129* 132* 128*   POTASSIUM mEQ/L 4.3 4.2 3.9   CHLORIDE mEQ/L 104 108 103   CO2 mEQ/L 18* 17* 17*   BUN mg/dL 11 8 9   CREATININE mg/dL 0.7 0.6 0.7   GLUCOSE mg/dL 84 92 78   CALCIUM mg/dL 8.2* 8.3* 8.0*       Recent Labs   Lab 01/15/23  0701 01/13/23  0517 01/10/23  2050   HCGQUANT >2,740,000.0* >1,846,800.0* >461,700.0*      Results from last 7 days   Lab Units 01/19/23  0409 01/18/23  0350 01/17/23  0642   MAGNESIUM mg/dL 1.4* 1.5* 1.5*      Results from last 7 days   Lab Units 01/18/23  0350 01/15/23  0701   T3 FREE pg/mL 2.80 3.95*   FREE T4 ng/dL 2.02* 3.59*     TSH   Date Value Ref Range Status   01/10/2023 <0.01 (L) 0.34 - 5.60 mIU/L Final     Free T4   Date Value Ref Range Status   01/18/2023 2.02 (H) 0.58 - 1.64 ng/dL Final     T3, Free   Date Value Ref Range Status   01/18/2023 2.80 2.10 - 3.70 pg/mL Final     Thyroid Stimulating Immunoglobulin   Date Value Ref Range Status   01/12/2023 95 <140 % baseline Final     Comment:     Thyroid stimulating immunoglobulins (TSI) can engage  the TSH receptors resulting in hyperthyroidism in  Graves' disease patients. TSI levels can be useful in  monitoring the clinical outcome of Graves' disease as  well as assessing the potential for hyperthyroidism  from maternal-fetal transfer. TSI results greater than  or equal to (>=) 140% of the Reference Control are  considered positive.     NOTE:  A serum TSH level greater than 350 micro-International  Units/mL can interfere with the TSI bioassay and  potentially give false positive results.     Patients who are pregnant and are suspected of having  hyperthyroidism should have both TSI and human  Chorionic  Gonadotropin(hCG) tests measured. A serum  hCG level greater than 40,625 mIU/mL can interfere  with the TSI bioassay and may give false negative  results. In these patients it is recommended that  a second TSI be obtained when the hCG concentration  falls below 40,625 mIU/mL (usually after approximately  20-weeks gestation).     The analytical performance characteristics of this  assay have been determined by [facility], [place], [place].  The modifications  have not been cleared or approved by the FDA.  This  assay has been validated pursuant to the CLIA  regulations and is used for clinical purposes.                Assessment/Plan     Alondra Villela is a 32 y.o. 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      1. Gestational trophoblastic neoplasia:   a. s/p PICC line placement. Chemotherapy regimen ordered with plan for EP induction for 2-3 cycles followed by EMA-EP for 3 cycles past negative hcg levels. Successfully received first induction cycle 1/13/23-1/14/23. Next induction to be administered 1/20/23-1/21/23. beta hCG increasing but to be expected. Induction cycles to occur weekly. Will repeat beta hCG on 1/20/23 (spoke with lab who is aware that a dilutional assay should be performed, can always call again if issues at extension [phone]).  b. Thoracic surgery consulted 2/2 high risk of lung metastasis hemorrhage with initiation of chemotherapy, appreciate input. Will keep active type and screen on file, next due on 1/20/23.   c. Continue daily CBC with diff, CMP and Mg with repletion PRN. Labs ordered to be drawn off PICC.   d. Patient with cough since chemo started, cepacol cough drop and tessalon perles ordered PRN.   2. Thyroid storm:   a. 1/10/23: TSH <0.01, T4 5.04. TSI ab WNL.  b. Repeat T4/T3 on 1/18/23 were FT4 2.02 T3 2.8.   c. Patient remains intermittently tachycardic although improved since admission. Remains on tele and close vital sign  monitoring.   d. PTU and metoprolol ordered per Endocrinology. Metoprolol reduced to 12.5 mg BID due to mild asymptomatic hypotension, and continue  mg TID.  e. Continue to appreciate Endocrinology recommendations including daily CMP.  3. Anemia:   a. Most recent Hgb 10.4 this AM.  b. S/p 2u pRBC.   c. Goal hemoglobin >8.0 in setting of chemotherapy.   d. Continue daily CBC with diff.  4. VTE ppx:   a. Hold off on chemical VTE for now given high risk of hemorrhage with chemotherapy induction. Will reassess as able.   b. Mechanical prophylaxis with SCDs while in bed ordered and encouraged.  c. Encourage ambulation QID.  5. Anxiety/depression:  a. Mood has been stable.  b. Continue home regimen of Wellbutrin 150 mg qd.   c. Ativan ordered PRN while inpatient.  6. Migraines:   a. Home protriptyline ordered via nonformulary order.  7. Hypomagnesia:  a. Magnesium last 1.4 this AM, additional 2g IV Mg was ordered.  b. Continue to trend and replete PRN.  8. Hyponatremia:  a. Na last 129 this AM, continue to trend CMP daily.  b. Continue fluid restriction of 1L daily.  c. Urine osmolality/Na 1/17/23 were WNL.  d. Patient asymptomatic.  9. Rash/pruititis  a. Present on abdomen.  b. Benadryl ordered PRN.  10. Constipation  a. Baseline/chronic.  b. Senna ordered BID, consider enema.  c. Would hold on diuretics given intermittent e-lyte abnormalities on chemo.  d. Also will hold off on osmotic laxatives (i.e. Miralax) for now given her fluid restriction.  11. Thrombocytopenia  a. Plts this AM 68 (stable from 69).  b. Patient asymptomatic.  c. Continue to monitor daily with CBC with diff.  d. Known side effect of chemo.  12. Neutropenia   a. Continue CBC with diff daily, ANC 2.33 this AM.  b. Plan for neulasta/neupogen if ANC <1.0.    Please reach out to the team at pager #[phone] with any questions or concerns.    Discussed with Dr. Mcmullen.    Reg Madison MD, PGY3  Obstetrics & Gynecology

## 2023-01-19 NOTE — PROGRESS NOTES
Gynecologic Oncology Progress Note    Subjective     Interval events: no further nausea/emesis overnight, and no other acute events.    Alondra is feeling subjectively better this AM. Was able to get some rest overnight. Continues to have dry cough however reporting continued improvement with tessalon perles. No mac epistaxis, dyspnea, chest pain, or wheezing. Reassured further as she reports the RUQ pain she was having yesterday responded to ibuprofen, and there is no pain at present. She has still not had a BM, but reports this would not be unusual for her at home (tells me she usually has a BM once per week on average). Passing flatus.    Objective     Vital signs in last 24 hours:  Temp:  [36.7 °C (98 °F)-37.3 °C (99.2 °F)] 37.3 °C (99.2 °F)  Heart Rate:  [] 104  Resp:  [16-18] 18  BP: ()/(50-97) 141/97  SpO2:  [97 %-100 %] 98 %  Oxygen Therapy: None (Room air)    Exam:  General Appearance: Resting; on awakening, alert, cooperative, no acute distress  Lungs: Clear to auscultation bilaterally, respirations unlabored  Heart: Mildly tachycardic heart rate, regular rhythm  Abdomen: Soft, hypoactive bowel sounds present, non-distended, non-tender, no RUQ/epigastric pain elicited on palpation, no rebound/rigidity/guarding, rash of upper abdomen present in distribution/shape of previously removed adhesives  Extremities: No edema or calf tenderness, SCDs not on    Labs:  Results from last 7 days   Lab Units 01/19/23  0409 01/18/23  0350 01/14/23  0540 01/13/23  0517   WBC K/uL 2.88* 2.12*   < > 3.22*   HEMOGLOBIN g/dL 10.4* 10.2*   < > 7.9*   HEMATOCRIT % 30.5* 29.9*   < > 24.2*   PLATELETS K/uL 68* 69*   < > 151   DIFF TYPE   --  Manu   < > Auto   NRBC %  --   --   --  0.0   IMM GRANULOCYTES %  --   --   --  0.3   NEUTROPHILS %  --   --   --  60.0   NEUTROS PCT MAN %  --  83   < >  --    LYMPHOCYTES %  --   --   --  26.7   LYMPHO PCT MAN %  --  9   < >  --    MONOCYTES %  --   --   --  11.8   MONO PCT MAN  %  --  0   < >  --    EOSINOPHILS %  --   --   --  1.2   EOSINO PCT MAN %  --  6   < >  --    BASOPHILS %  --   --   --  0.0   BASOS PCT MAN %  --  1   < >  --    BANDS PCT MAN %  --  1  --   --    IMM GRANUCOCYTES ABS K/uL  --   --   --  0.01   SEGS ABS MAN K/uL  --  1.76   < >  --    LYMPHO ABS MAN K/uL  --  0.19*   < >  --    MONO ABS AUTO K/uL  --   --   --  0.38   MONO ABS MAN K/uL  --  0.00*   < >  --    EOS ABS AUTO K/uL  --   --   --  0.04   EOS ABS MAN K/uL  --  0.13   < >  --    BASO ABS AUTO K/uL  --   --   --  0.00*   BASOS ABS MAN K/uL  --  0.02   < >  --     < > = values in this interval not displayed.     Results from last 7 days   Lab Units 01/19/23  0409 01/18/23  0350 01/17/23  0642   SODIUM mEQ/L 129* 132* 128*   POTASSIUM mEQ/L 4.3 4.2 3.9   CHLORIDE mEQ/L 104 108 103   CO2 mEQ/L 18* 17* 17*   BUN mg/dL 11 8 9   CREATININE mg/dL 0.7 0.6 0.7   GLUCOSE mg/dL 84 92 78   CALCIUM mg/dL 8.2* 8.3* 8.0*        Recent Labs   Lab 01/15/23  0701 01/13/23  0517 01/10/23  2050   HCGQUANT >2,740,000.0* >1,846,800.0* >461,700.0*     Results from last 7 days   Lab Units 01/19/23  0409 01/18/23  0350 01/17/23  0642   MAGNESIUM mg/dL 1.4* 1.5* 1.5*     Results from last 7 days   Lab Units 01/18/23  0350 01/15/23  0701   T3 FREE pg/mL 2.80 3.95*   FREE T4 ng/dL 2.02* 3.59*       TSH   Date Value Ref Range Status   01/10/2023 <0.01 (L) 0.34 - 5.60 mIU/L Final     Free T4   Date Value Ref Range Status   01/18/2023 2.02 (H) 0.58 - 1.64 ng/dL Final     T3, Free   Date Value Ref Range Status   01/18/2023 2.80 2.10 - 3.70 pg/mL Final     Thyroid Stimulating Immunoglobulin   Date Value Ref Range Status   01/12/2023 95 <140 % baseline Final     Comment:     Thyroid stimulating immunoglobulins (TSI) can engage  the TSH receptors resulting in hyperthyroidism in  Graves' disease patients. TSI levels can be useful in  monitoring the clinical outcome of Graves' disease as  well as assessing the potential for  hyperthyroidism  from maternal-fetal transfer. TSI results greater than  or equal to (>=) 140% of the Reference Control are  considered positive.     NOTE:  A serum TSH level greater than 350 micro-International  Units/mL can interfere with the TSI bioassay and  potentially give false positive results.     Patients who are pregnant and are suspected of having  hyperthyroidism should have both TSI and human  Chorionic Gonadotropin(hCG) tests measured. A serum  hCG level greater than 40,625 mIU/mL can interfere  with the TSI bioassay and may give false negative  results. In these patients it is recommended that  a second TSI be obtained when the hCG concentration  falls below 40,625 mIU/mL (usually after approximately  20-weeks gestation).     The analytical performance characteristics of this  assay have been determined by [employer], [place], [place].  The modifications  have not been cleared or approved by the FDA.  This  assay has been validated pursuant to the CLIA  regulations and is used for clinical purposes.                Assessment/Plan     Alondra Villela is a 32 y.o. 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      1. Gestational trophoblastic neoplasia:   a. s/p PICC line placement. Chemotherapy regimen ordered with plan for EP induction for 2-3 cycles followed by EMA-EP for 3 cycles past negative hcg levels. Successfully received first induction cycle 1/13-1/14. Next induction to be administered 1/20-1/21. beta hCG increasing but to be expected. Induction cycles to occur weekly. Will repeat beta hCG on 1/20/23.   b. Thoracic surgery consulted 2/2 high risk of lung metastasis hemorrhage with initiation of chemotherapy, appreciate input. Will keep active type and screen on file, next due on 1/20/23.   c. Continue daily CBC with diff, CMP and Mg with repletion PRN. Labs ordered to be drawn off PICC.   d. Patient with cough since chemo started, cepacol cough  drop and tessalon perles ordered PRN.   2. Thyroid storm:   a. 1/10/23: TSH <0.01, T4 5.04. TSI ab WNL.  b. Repeat T4/T3 on 1/18/23 were FT4 2.02 T3 2.8.   c. Patient remains intermittently tachycardic although improved since admission. Remains on tele and close vital sign monitoring.   d. PTU and metoprolol ordered per Endocrinology. Metoprolol at 25 mg BID and continue  mg TID.  e. Continue to appreciate Endocrinology recommendations including daily CMP.  3. Anemia:   a. Most recent Hgb 10.4 this AM.  b. S/p 2u pRBC.   c. Goal hemoglobin >8.0 in setting of chemotherapy.   d. Continue daily CBC with diff.  4. VTE ppx:   a. Hold off on chemical VTE for now given high risk of hemorrhage with chemotherapy induction. Will reassess as able.   b. Mechanical prophylaxis with SCDs ordered.  c. Encourage ambulation QID.  5. Anxiety/depression:  a. Mood has been stable.  b. Continue home regimen of Wellbutrin 150 mg qd.   c. Ativan ordered PRN while inpatient.  6. Migraines:   a. Home protriptyline ordered via nonformulary order.  7. Hypomagnesia:  a. Magnesium last 1.4 this AM, additional 2g IV Mg ordered.  b. Continue to trend and replete PRN.  8. Hyponatremia:  a. Na last 129 this AM, continue to trend CMP daily.  b. Continue fluid restriction of 1L daily.  c. Urine osmolality/Na 1/17/23 were WNL.  d. Patient asymptomatic.  9. Rash/pruititis  a. Present on abdomen.  b. Benadryl ordered PRN.  10. Constipation  a. Senna ordered BID, consider enema.  b. Would hold on diuretics given intermittent e-lyte abnormalities on chemo  c. Also will hold off on osmotic laxatives (I.e. Miralax) for now given her fluid restriction.  11. Thrombocytopenia  a. Plts this AM 68 (stable from 69).  b. Patient asymptomatic.  c. Continue to monitor daily with CBC with diff.  d. Known side effect of chemo.  12. Neutropenia   a. Continue CBC with diff daily, last ANC 1.76 (this AM result is still in process)  b. Plan for neulasta/neupogen if  ANC <1.0.    Please reach out to the team at pager #[phone] with any questions or concerns.    Will review with Dr. Mcmullen.    Reg Madison MD, PGY3  Obstetrics & Gynecology

## 2023-01-19 NOTE — PLAN OF CARE
Plan of Care Review  Plan of Care Reviewed With: patient, spouse, mother  Outcome Summary: Pt received in bed A&O x4. C/o nausea this morning. PRN IV Zofran given with effective symptom mgmt.  1 episode of emesis. IVPB Phenergan given.  Pt c/o pain in right flank.  PRN Motrin given with effective pain resolution.  No SOB. Pulse ox stable on RA.  PRN Tessalon pearls given for persistent dry cough.  Double lumen PICC in place. CHG bath given.  Pt's spouse supportive and attentive.  At bedside this evening.

## 2023-01-19 NOTE — PLAN OF CARE
Problem: Adult Inpatient Plan of Care  Goal: Plan of Care Review  Outcome: Progressing  Flowsheets (Taken 1/19/2023 0502)  Progress: no change  Plan of Care Reviewed With: patient  Outcome Summary: Pt stable. VSS. Slept t/o most of the night. Denies nausea. C/o mild generalized pain controlled with PRN motrin. Tessalon pearls given for dry cough. Pt up oob indepedently. Intermittent dizziness reported. Call bell within reach. Support provided.  at bedside

## 2023-01-19 NOTE — PROGRESS NOTES
R3 GYN ONC    To bedside to assess patient for hypotension and ongoing tachycardia.    S:  At the bedside, she was found to be sleeping. On awakening, she denies any complaints and reports feeling subjectively well. She denies nausea, vomiting, lightheadedness, dizziness, abdominal pain, chest pain, dyspnea, palpitations, fevers, chills, vaginal bleeding, epistaxis, confusion. Feels tired but no complaints besides her baseline dry cough this admission.    O:  Temp:  [36.5 °C (97.7 °F)-37.3 °C (99.2 °F)] 36.7 °C (98.1 °F)  Heart Rate:  [] 108  Resp:  [16-18] 16  BP: ()/(46-97) 83/46  SpO2:  [97 %-100 %] 99 %  Oxygen Therapy: None (Room air)    On exam she is comfortable, and upon arousal is alert, cooperative, not acutely distressed, mildly tachycardic heart rate, lungs are CTAB, abdomen soft, non-distended, non-tender, no rebound/rigidity/guarding, no LE edema/TTP, neurologically grossly intact and no altered mental status.    ANC still has not resulted from this AM, but CBC/diff as previously noted is below:    Results from last 7 days   Lab Units 01/19/23  0409 01/18/23  0350 01/17/23  0642 01/16/23  0810 01/14/23  0540 01/13/23  0517   WBC K/uL 2.88* 2.12* 2.19* 2.63*   < > 3.22*   HEMOGLOBIN g/dL 10.4* 10.2* 9.5* 9.2*   < > 7.9*   HEMATOCRIT % 30.5* 29.9* 27.7* 27.9*   < > 24.2*   PLATELETS K/uL 68* 69* 82* 90*   < > 151   DIFF TYPE  Auto Manu Manu Manu   < > Auto   NRBC % 0.0  --   --   --   --  0.0   IMM GRANULOCYTES % 2.4  --   --   --   --  0.3   NEUTROPHILS % 81.0  --   --   --   --  60.0   NEUTROS PCT MAN %  --  83 83 94   < >  --    LYMPHOCYTES % 9.7  --   --   --   --  26.7   LYMPHO PCT MAN %  --  9 7 3   < >  --    MONOCYTES % 2.4  --   --   --   --  11.8   MONO PCT MAN %  --  0 2 3   < >  --    EOSINOPHILS % 4.2  --   --   --   --  1.2   EOSINO PCT MAN %  --  6 8 0   < >  --    BASOPHILS % 0.3  --   --   --   --  0.0   BASOS PCT MAN %  --  1 0 0   < >  --    BANDS PCT MAN %  --  1  --   --    --   --    IMM GRANUCOCYTES ABS K/uL 0.07  --   --   --   --  0.01   SEGS ABS MAN K/uL  --  1.76 1.82 2.47   < >  --    LYMPHO ABS MAN K/uL  --  0.19* 0.15* 0.08*   < >  --    MONO ABS AUTO K/uL 0.07*  --   --   --   --  0.38   MONO ABS MAN K/uL  --  0.00* 0.04* 0.08*   < >  --    EOS ABS AUTO K/uL 0.12  --   --   --   --  0.04   EOS ABS MAN K/uL  --  0.13 0.18 0.00*   < >  --    BASO ABS AUTO K/uL 0.01  --   --   --   --  0.00*   BASOS ABS MAN K/uL  --  0.02 0.00* 0.00*   < >  --     < > = values in this interval not displayed.     A/P:    Hypotension: Suspect dehydration in the setting of under-resuscitation given her fluid restriction for hyponatremia, and patient admittedly has been resting. Reassured as she is asymptomatic and with benign exam. No signs/symptoms of acute bleeding at this time. Monitor closely.    Remainder as previously noted.    Pleas reach out to the team at pager #[phone] with any questions or concerns.     Discussed with Dr. Mcmullen.     Reg Madison MD, PGY3  Obstetrics & Gynecology

## 2023-01-20 ENCOUNTER — DOCUMENTATION (OUTPATIENT)
Dept: GYNECOLOGIC ONCOLOGY | Facility: CLINIC | Age: 33
End: 2023-01-20
Payer: COMMERCIAL

## 2023-01-20 ENCOUNTER — TELEPHONE (OUTPATIENT)
Dept: GYNECOLOGIC ONCOLOGY | Facility: CLINIC | Age: 33
End: 2023-01-20
Payer: COMMERCIAL

## 2023-01-20 LAB
ABO + RH BLD: NORMAL
ALBUMIN SERPL-MCNC: 2.3 G/DL (ref 3.4–5)
ALP SERPL-CCNC: 260 IU/L (ref 35–126)
ALT SERPL-CCNC: 34 IU/L (ref 11–54)
ANION GAP SERPL CALC-SCNC: 9 MEQ/L (ref 3–15)
AST SERPL-CCNC: 22 IU/L (ref 15–41)
BASOPHILS # BLD: 0 K/UL (ref 0.01–0.1)
BASOPHILS NFR BLD: 0 %
BILIRUB SERPL-MCNC: 1 MG/DL (ref 0.3–1.2)
BLD GP AB SCN SERPL QL: NEGATIVE
BUN SERPL-MCNC: 9 MG/DL (ref 8–20)
BURR CELLS BLD QL SMEAR: ABNORMAL
CALCIUM SERPL-MCNC: 8.2 MG/DL (ref 8.9–10.3)
CHLORIDE SERPL-SCNC: 106 MEQ/L (ref 98–109)
CO2 SERPL-SCNC: 18 MEQ/L (ref 22–32)
CREAT SERPL-MCNC: 0.7 MG/DL (ref 0.6–1.1)
D AG BLD QL: POSITIVE
DIFFERENTIAL METHOD BLD: ABNORMAL
EOSINOPHIL # BLD: 0.14 K/UL (ref 0.04–0.36)
EOSINOPHIL NFR BLD: 7 %
ERYTHROCYTE [DISTWIDTH] IN BLOOD BY AUTOMATED COUNT: 13.3 % (ref 11.7–14.4)
GFR SERPL CREATININE-BSD FRML MDRD: >60 ML/MIN/1.73M*2
GLUCOSE SERPL-MCNC: 96 MG/DL (ref 70–99)
HCG SERPL-ACNC: ABNORMAL IU/L (MIU/ML)
HCT VFR BLDCO AUTO: 30 % (ref 35–45)
HGB BLD-MCNC: 10.1 G/DL (ref 11.8–15.7)
LABORATORY COMMENT REPORT: NORMAL
LYMPHOCYTES # BLD: 0.43 K/UL (ref 1.2–3.5)
LYMPHOCYTES NFR BLD: 21 %
MAGNESIUM SERPL-MCNC: 1.6 MG/DL (ref 1.8–2.5)
MCH RBC QN AUTO: 27.4 PG (ref 28–33.2)
MCHC RBC AUTO-ENTMCNC: 33.7 G/DL (ref 32.2–35.5)
MCV RBC AUTO: 81.3 FL (ref 83–98)
MONOCYTES # BLD: 0.04 K/UL (ref 0.28–0.8)
MONOCYTES NFR BLD: 2 %
NEUTS BAND # BLD: 1.43 K/UL (ref 1.7–7)
NEUTS SEG NFR BLD: 70 %
OVALOCYTES BLD QL SMEAR: ABNORMAL
PDW BLD AUTO: 10.6 FL (ref 9.4–12.3)
PLAT MORPH BLD: NORMAL
PLATELET # BLD AUTO: 71 K/UL (ref 150–369)
PLATELET # BLD EST: ABNORMAL 10*3/UL
POTASSIUM SERPL-SCNC: 3.8 MEQ/L (ref 3.6–5.1)
PROT SERPL-MCNC: 4.8 G/DL (ref 6–8.2)
RBC # BLD AUTO: 3.69 M/UL (ref 3.93–5.22)
SODIUM SERPL-SCNC: 133 MEQ/L (ref 136–144)
SPECIMEN EXP DATE BLD: NORMAL
WBC # BLD AUTO: 2.04 K/UL (ref 3.8–10.5)

## 2023-01-20 PROCEDURE — 63700000 HC SELF-ADMINISTRABLE DRUG: Performed by: INTERNAL MEDICINE

## 2023-01-20 PROCEDURE — 25800000 HC PHARMACY IV SOLUTIONS: Performed by: STUDENT IN AN ORGANIZED HEALTH CARE EDUCATION/TRAINING PROGRAM

## 2023-01-20 PROCEDURE — 86901 BLOOD TYPING SEROLOGIC RH(D): CPT

## 2023-01-20 PROCEDURE — 63700000 HC SELF-ADMINISTRABLE DRUG: Performed by: STUDENT IN AN ORGANIZED HEALTH CARE EDUCATION/TRAINING PROGRAM

## 2023-01-20 PROCEDURE — 63700000 HC SELF-ADMINISTRABLE DRUG: Performed by: OBSTETRICS & GYNECOLOGY

## 2023-01-20 PROCEDURE — 99231 SBSQ HOSP IP/OBS SF/LOW 25: CPT | Performed by: STUDENT IN AN ORGANIZED HEALTH CARE EDUCATION/TRAINING PROGRAM

## 2023-01-20 PROCEDURE — 85025 COMPLETE CBC W/AUTO DIFF WBC: CPT | Performed by: STUDENT IN AN ORGANIZED HEALTH CARE EDUCATION/TRAINING PROGRAM

## 2023-01-20 PROCEDURE — 21400000 HC ROOM AND CARE CCU/INTERMEDIATE

## 2023-01-20 PROCEDURE — 63600000 HC DRUGS/DETAIL CODE: Performed by: STUDENT IN AN ORGANIZED HEALTH CARE EDUCATION/TRAINING PROGRAM

## 2023-01-20 PROCEDURE — 99232 SBSQ HOSP IP/OBS MODERATE 35: CPT | Performed by: SURGERY

## 2023-01-20 PROCEDURE — 99232 SBSQ HOSP IP/OBS MODERATE 35: CPT | Performed by: INTERNAL MEDICINE

## 2023-01-20 PROCEDURE — 36415 COLL VENOUS BLD VENIPUNCTURE: CPT | Performed by: STUDENT IN AN ORGANIZED HEALTH CARE EDUCATION/TRAINING PROGRAM

## 2023-01-20 PROCEDURE — 83735 ASSAY OF MAGNESIUM: CPT | Performed by: STUDENT IN AN ORGANIZED HEALTH CARE EDUCATION/TRAINING PROGRAM

## 2023-01-20 PROCEDURE — 80053 COMPREHEN METABOLIC PANEL: CPT | Performed by: STUDENT IN AN ORGANIZED HEALTH CARE EDUCATION/TRAINING PROGRAM

## 2023-01-20 PROCEDURE — 3E03305 INTRODUCTION OF OTHER ANTINEOPLASTIC INTO PERIPHERAL VEIN, PERCUTANEOUS APPROACH: ICD-10-PCS | Performed by: STUDENT IN AN ORGANIZED HEALTH CARE EDUCATION/TRAINING PROGRAM

## 2023-01-20 PROCEDURE — 84702 CHORIONIC GONADOTROPIN TEST: CPT | Performed by: STUDENT IN AN ORGANIZED HEALTH CARE EDUCATION/TRAINING PROGRAM

## 2023-01-20 RX ORDER — EPINEPHRINE 1 MG/ML
0.5 INJECTION, SOLUTION INTRAMUSCULAR; SUBCUTANEOUS ONCE AS NEEDED
Status: DISCONTINUED | OUTPATIENT
Start: 2023-01-20 | End: 2023-01-21 | Stop reason: HOSPADM

## 2023-01-20 RX ORDER — SODIUM CHLORIDE 9 MG/ML
500 INJECTION, SOLUTION INTRAVENOUS CONTINUOUS
Status: ACTIVE | OUTPATIENT
Start: 2023-01-20 | End: 2023-01-20

## 2023-01-20 RX ORDER — DIPHENHYDRAMINE HCL 50 MG/ML
25 VIAL (ML) INJECTION ONCE AS NEEDED
Status: DISCONTINUED | OUTPATIENT
Start: 2023-01-20 | End: 2023-01-21 | Stop reason: HOSPADM

## 2023-01-20 RX ORDER — FAMOTIDINE 10 MG/ML
20 INJECTION INTRAVENOUS ONCE AS NEEDED
Status: DISCONTINUED | OUTPATIENT
Start: 2023-01-20 | End: 2023-01-21 | Stop reason: HOSPADM

## 2023-01-20 RX ORDER — PALONOSETRON 0.05 MG/ML
250 INJECTION, SOLUTION INTRAVENOUS ONCE
Status: COMPLETED | OUTPATIENT
Start: 2023-01-20 | End: 2023-01-20

## 2023-01-20 RX ADMIN — PALONOSETRON HYDROCHLORIDE 250 MCG: 0.25 INJECTION, SOLUTION INTRAVENOUS at 11:46

## 2023-01-20 RX ADMIN — PROPYLTHIOURACIL 100 MG: 50 TABLET ORAL at 21:09

## 2023-01-20 RX ADMIN — DEXAMETHASONE SODIUM PHOSPHATE 12 MG: 4 INJECTION, SOLUTION INTRA-ARTICULAR; INTRALESIONAL; INTRAMUSCULAR; INTRAVENOUS; SOFT TISSUE at 11:47

## 2023-01-20 RX ADMIN — SENNOSIDES AND DOCUSATE SODIUM 2 TABLET: 50; 8.6 TABLET ORAL at 09:22

## 2023-01-20 RX ADMIN — BUPROPION HYDROCHLORIDE 150 MG: 150 TABLET, EXTENDED RELEASE ORAL at 21:08

## 2023-01-20 RX ADMIN — IBUPROFEN 400 MG: 400 TABLET, FILM COATED ORAL at 21:08

## 2023-01-20 RX ADMIN — FILGRASTIM-SNDZ 300 MCG: 300 INJECTION, SOLUTION INTRAVENOUS; SUBCUTANEOUS at 16:35

## 2023-01-20 RX ADMIN — IBUPROFEN 400 MG: 400 TABLET, FILM COATED ORAL at 09:22

## 2023-01-20 RX ADMIN — PROTRIPTYLINE HYDROCHLORIDE 15 MG: 5 TABLET, FILM COATED ORAL at 21:08

## 2023-01-20 RX ADMIN — BENZONATATE 100 MG: 100 CAPSULE ORAL at 08:07

## 2023-01-20 RX ADMIN — METOPROLOL TARTRATE 12.5 MG: 25 TABLET, FILM COATED ORAL at 21:08

## 2023-01-20 RX ADMIN — SODIUM CHLORIDE 500 ML/HR: 9 INJECTION, SOLUTION INTRAVENOUS at 15:07

## 2023-01-20 RX ADMIN — PROPYLTHIOURACIL 100 MG: 50 TABLET ORAL at 05:02

## 2023-01-20 RX ADMIN — DEXTROSE MONOHYDRATE 188 MG: 50 INJECTION, SOLUTION INTRAVENOUS at 12:21

## 2023-01-20 RX ADMIN — METOPROLOL TARTRATE 12.5 MG: 25 TABLET, FILM COATED ORAL at 09:22

## 2023-01-20 RX ADMIN — SODIUM CHLORIDE 150 MG: 900 INJECTION, SOLUTION INTRAVENOUS at 11:47

## 2023-01-20 RX ADMIN — PROPYLTHIOURACIL 100 MG: 50 TABLET ORAL at 13:45

## 2023-01-20 RX ADMIN — MAGNESIUM SULFATE HEPTAHYDRATE 2 G: 40 INJECTION, SOLUTION INTRAVENOUS at 07:59

## 2023-01-20 RX ADMIN — CISPLATIN 38 MG: 1 INJECTION, SOLUTION INTRAVENOUS at 13:46

## 2023-01-20 RX ADMIN — BENZONATATE 100 MG: 100 CAPSULE ORAL at 16:35

## 2023-01-20 RX ADMIN — SENNOSIDES AND DOCUSATE SODIUM 2 TABLET: 50; 8.6 TABLET ORAL at 21:08

## 2023-01-20 RX ADMIN — POTASSIUM CHLORIDE 500 ML/HR: 2 INJECTION, SOLUTION, CONCENTRATE INTRAVENOUS at 10:05

## 2023-01-20 NOTE — CONSULTS
Brief Nutrition Note    Recommendations   1. Continue Regular diet.     2. Daily MVI.     Clinical Course: Patient is a 32 y.o. female who was admitted on 2023 with a diagnosis of Gestational trophoblastic disease [O01.9].     No past medical history on file.  Past Surgical History:   Procedure Laterality Date   •  SECTION      x2       Reason for Assessment  Reason For Assessment: identified at risk by screening criteria     [facility] Nutrition Screen Tool  Has patient lost weight without trying?: 0-->No  Has patient been eating poorly due to decreased appetite?: 0-->No  [facility] Nutrition Screen Score: 0     Nutrition/Diet History  Appetite Prior to Admission: Good-50-75%  Intake (%): 75%    Physical Findings  Last Bowel Movement: 23     [id number] Physical Appearance  Last Bowel Movement: 23     Nutrition Order  Nutrition Order: meets nutritional requirements  Nutrition Order Comments: Regular       Labs/Procedures/Meds  Lab Results Reviewed: reviewed, pertinent   BMP Results       23     0509 0409 0350     129 132    K 3.8 4.3 4.2    Cl 106 104 108    CO2 18 18 17    Glucose 96 84 92    BUN 9 11 8    Creatinine 0.7 0.7 0.6    Calcium 8.2 8.2 8.3    Anion Gap 9 7 7    EGFR >60.0 >60.0 >60.0      M.6      Medications  Pertinent Medications Reviewed: reviewed, pertinent   Scheduled Meds:  • buPROPion XL  150 mg oral Daily   • CISplatin (PLATINOL) chemo IVPB  20 mg/m2 (Treatment Plan Recorded) intravenous Once   • dexamethasone  12 mg intravenous Once   • etoposide  100 mg/m2 (Treatment Plan Recorded) intravenous Once   • filgrastim-sndz  300 mcg subcutaneous Once   • fosaprepitant (EMEND) IVPB  150 mg intravenous Once   • metoprolol tartrate  12.5 mg oral BID   • palonosetron  250 mcg intravenous Once   • propylthiouraciL  100 mg oral q8h NOHEMY   • protriptyline  15 mg oral Daily   • sennosides-docusate sodium  2 tablet oral BID     Continuous Infusions:  • custom IVPB  builder  500 mL/hr 500 mL/hr (01/20/23 1005)   • sodium chloride 0.9 %  500 mL/hr             Clinical comments: Pt seen for LOS. Pt undergoing chemotherapy via PICC line for treatment of gestational trophoblastic neoplasia. Pt seen resting in bed, reports good appetite. Pt denies concerns regarding menu at this time. Family bringing in food PRN. Electrolytes being monitored. No concerns/questions regarding diet at this time.     Goals: PO intake >75% of meals.   Monitor: PO intake, weights, labs, skin, plan of care.     Recommendations: See above       Date: 01/20/23  Signature: DEEPA Spaulding

## 2023-01-20 NOTE — PROGRESS NOTES
Endocrinology Progress Note       SUBJECTIVE   Interval History:  Seen in the morning. Feeling better. Plan to start chemo again today.   dry cough same as before. No bleeding.   Metoprolol reduced to 12.5 mg twice a day. BP low normal range   OBJECTIVE      Vital signs in last 24 hours:  Temp:  [36.6 °C (97.9 °F)-37.4 °C (99.3 °F)] 36.6 °C (97.9 °F)  Heart Rate:  [] 91  Resp:  [16-18] 18  BP: ()/(49-55) 88/49      PHYSICAL EXAMINATION        HEENT: NCAT mucous membranes moist  EOMI sclerae anicteric  NEURO:A&Ox3   PSYCH:Appropriate and cooperative       LABS / IMAGING / TELE      Labs  Lab Results   Component Value Date    WBC 2.04 (LL) 01/20/2023    HGB 10.1 (L) 01/20/2023    HCT 30.0 (L) 01/20/2023    MCV 81.3 (L) 01/20/2023    PLT 71 (L) 01/20/2023     Lab Results   Component Value Date    GLUCOSE 96 01/20/2023    CALCIUM 8.2 (L) 01/20/2023     (L) 01/20/2023    K 3.8 01/20/2023    CO2 18 (L) 01/20/2023     01/20/2023    BUN 9 01/20/2023    CREATININE 0.7 01/20/2023     AST/ALT wnl       Latest Reference Range & Units 01/10/23 20:50 01/11/23 09:15 01/12/23 06:24 01/13/23 05:17 01/15/23 07:01 01/18/23 03:50   TSH 0.34 - 5.60 mIU/L <0.01 (L)        Free T4 0.58 - 1.64 ng/dL 5.07 (H)    3.59 (H) 2.02 (H)   T3, Free 2.10 - 3.70 pg/mL  10.42 (H)   3.95 (H) 2.80   Thyroid Stimulating Immunoglobulin <140 % baseline   95      hCG Quant <5.0 IU/L (mIU/mL) >461,700.0 (H) (C)   >1,846,800.0 (H) (C) >2,740,000.0 (H)    (L): Data is abnormally low  (H): Data is abnormally high  (C): Corrected      ASSESSMENT AND PLAN      1) Hyperthyroidism  32 year old woman with very new recognition of gestational trophoblastic neoplasm  She presented with exceedingly high and rapidly rising HCG levels    Responding to PTU well. T3 normalized. T4 remains high but much improved.  Will continue the current dose.  Please repeat FT4 level and FT3 Sunday.   Low WBC: likely related to chemo. good Neutrophil  count  Normal LFT    2) Tachycardia/Hypotensiion  - multifactorial, related to hyperthyroidism, anemia, electrolytes disturbances etc.  - cont metoprolol to 12.5 mg bid. Monitor BP  - keep well hydration.   - AM cortisol >20 on 1/16.  No sign of AI    2) Gestational trophoblastic neoplasm  Ongoing mgt per gyn onc  Received chemotherapy 1/13 and 1/14. Now second cycle today      3) Adrenal calcification  Noted on CT  Uncertain significance  She received dex as part of her chemotherapeutic regimen  Good AM cortisol level     4) hyponatremia/hypomagnesium  - mgt per primary team.            Alice Hodge MD

## 2023-01-20 NOTE — PLAN OF CARE
Problem: Adult Inpatient Plan of Care  Goal: Plan of Care Review  Outcome: Progressing  Flowsheets (Taken 1/20/2023 4570)  Progress: improving  Outcome Summary: Per info in medical rounds today, pt is not stable for d/c. DANIEL TBD. Plans for chemo while inpatient. Plan for pt to d/c home when stable for d/c.     SW will remain available for emotional support and dispo planning. Alka NICHOLSON [phone]

## 2023-01-20 NOTE — PROGRESS NOTES
General Surgery Daily Progress Note    Subjective     Interval History: Feeling well, no complaints of chest pain, SOB or dizziness.         Objective     Vital signs in last 24 hours:  Temp:  [36.7 °C (98 °F)-37.4 °C (99.3 °F)] 36.8 °C (98.2 °F)  Heart Rate:  [] 98  Resp:  [16-18] 18  BP: ()/(46-55) 99/54    No intake or output data in the 24 hours ending 01/20/23 0935  Intake/Output this shift:  No intake/output data recorded.    Physical Exam    General appearance: alert, appears stated age and cooperative  Neck: no adenopathy, no carotid bruit, no JVD, supple, symmetrical, trachea midline and thyroid not enlarged, symmetric, no tenderness/mass/nodules  Lungs: clear to auscultation bilaterally  Heart: regular rate and rhythm, S1, S2 normal, no murmur, click, rub or gallop  Abdomen: soft, non-tender; bowel sounds normal; no masses, no organomegaly  Extremities: extremities normal, warm and well-perfused; no cyanosis, clubbing, or edema  Pulses: 2+ and symmetric  Skin: Skin color, texture, turgor normal. No rashes or lesions  Neurologic: Grossly normal    VTE Assessment: I have reassessed and the patient's VTE risk and treatment plan is appropriate.    Labs    CBC:   Lab Results   Component Value Date    WBC 2.04 (LL) 01/20/2023    RBC 3.69 (L) 01/20/2023     BMP:   Lab Results   Component Value Date    GLUCOSE 96 01/20/2023    CO2 18 (L) 01/20/2023    BUN 9 01/20/2023    CREATININE 0.7 01/20/2023    CALCIUM 8.2 (L) 01/20/2023     Coagulation: No results found for: PT, INR, APTT    Imaging  I have reviewed the Imaging from the last 24 hrs.      Assessment/Plan      31 y/o female with newly diagnosed gestational trophoblastic disease. Thoracic surgery consulted in the event patient develops intrathoracic hemorrhage while undergoing chemo.     -Chemo first performed 1/13 - 1/4  -Next Chemo 1/20-1/21  -Thoracic surgery available as needed, page with questions or concerns  -remainder of care per primary  team    Contact [phone] for questions or concerns  Sergey Irving, DO

## 2023-01-20 NOTE — PROGRESS NOTES
Representative Zach DE LUNA   from [facility] called to request using a biosilimar for Neupogen called [employer]  that is preferred option for patient.  This can be given the day after treatment up to five days/times depending on how neutropenic they are.     Pending code : [id number]    I can call [phone] extension [phone]. Say I do not have ID in order to put in extension. This case expires tuesday at 11:30 am. Needs answer by Monday at 11:30.     I am also faxing clinicals to Zach at  [phone].

## 2023-01-20 NOTE — TELEPHONE ENCOUNTER
"Call made to [facility] and spoke with representative Paris ZAPATA to submit \"Urgent\" inpatient prior authorizations for the below, all with a start of service date for 1/26/23. Paris states if patient clinicals are needed a representative will reach out to our office within 48 business hours.   -Etoposide ()  -Actinomycin ()  -Methotrexate ()  Pending case #: [id number]    Addendum 1/23/23: Fax determination received today from [facility] approving (inpatient) Etoposide () Actinomycin (), and Methotrexate (), service date of 1/26/23 Authorization #: [id number] Stating \"Total number of days or services approved:1\" Follow up call made to [facility] to clarify prior authorization details. Representative Danny WEINER states a new authorization must be submitted for each inpatient stay. Additionally confirming each injection was only approved x1. I requested number of units for Etoposide () and Actinomycin () be updated to 2. Danny states he will adjust the authorization and send it to the clinical team for review. He states determination should be made by today.  Call reference #: [id number]    Addendum 1/24/23: Called [facility] and spoke with representative Jessica OLIVER To follow up on status of prior authorization. She states she has updated the units requested for both Etoposide and Actinomycin from 1 to 2. She provided me with contact information for  Stefany Pimentel with instructions to fax patient clinicals P) [phone], F) [phone]. This nurse called and left a voicemail for Stefany with request for call back, additionally faxed patients clinical notes and treatment plan to the number above. Call reference #: [id number]    Addendum 1/26/23: Call made to [facility] and spoke to representative Jl DUTTA to request change in date of service for inpatient authorization [id number]. Requested to update service date from 1/26/23 to 1/29/23. Jl states date cannot be changed at " this time and we must call [facility] on 1/29/23 to request change.  Call reference #: [id number]

## 2023-01-20 NOTE — PLAN OF CARE
Plan of Care Review  Plan of Care Reviewed With: patient  Progress: improving  Outcome Summary: Hypotensive again today, pt asymptomatic. OOB & independent with ADLs. Family updated at the bedside. Chemo day 1 of cycle 2 completed, pt tolerated well. FSP maintained and call bell in reach.

## 2023-01-20 NOTE — PROGRESS NOTES
Gynecologic Oncology Progress Note    Subjective     Patient seen on AM rounds. She is feeling well. Reports continued brown vaginal discharge without active vaginal bleeding. No nose bleeds overnight. Continues to have dry cough which is improved with tessalon perles. No abdominal pain, dyspnea, chest pain, wheezing. Patient had a BM last night. Passing flatus.     Objective     Vital signs in last 24 hours:  Temp:  [36.5 °C (97.7 °F)-37.4 °C (99.3 °F)] 36.7 °C (98 °F)  Heart Rate:  [] 92  Resp:  [16-18] 16  BP: ()/(46-55) 103/52  SpO2:  [99 %-100 %] 100 %  Oxygen Therapy: None (Room air)    Exam:  General Appearance: Resting; on awakening, alert, cooperative, no acute distress  Lungs: Clear to auscultation bilaterally, respirations unlabored  Heart: regular heart rate, regular rhythm  Abdomen: Soft, non-distended, non-tender, no RUQ/epigastric pain   Extremities: No edema or calf tenderness    Labs:  Results from last 7 days   Lab Units 01/19/23  0409 01/18/23  0350   WBC K/uL 2.88* 2.12*   HEMOGLOBIN g/dL 10.4* 10.2*   HEMATOCRIT % 30.5* 29.9*   PLATELETS K/uL 68* 69*   DIFF TYPE  Auto Manu   NRBC % 0.0  --    IMM GRANULOCYTES % 2.4  --    NEUTROPHILS % 81.0  --    NEUTROS PCT MAN %  --  83   LYMPHOCYTES % 9.7  --    LYMPHO PCT MAN %  --  9   MONOCYTES % 2.4  --    MONO PCT MAN %  --  0   EOSINOPHILS % 4.2  --    EOSINO PCT MAN %  --  6   BASOPHILS % 0.3  --    BASOS PCT MAN %  --  1   BANDS PCT MAN %  --  1   IMM GRANUCOCYTES ABS K/uL 0.07  --    SEGS ABS MAN K/uL  --  1.76   LYMPHO ABS MAN K/uL  --  0.19*   MONO ABS AUTO K/uL 0.07*  --    MONO ABS MAN K/uL  --  0.00*   EOS ABS AUTO K/uL 0.12  --    EOS ABS MAN K/uL  --  0.13   BASO ABS AUTO K/uL 0.01  --    BASOS ABS MAN K/uL  --  0.02      Results from last 7 days   Lab Units 01/20/23  0509 01/19/23  0409 01/18/23  0350   SODIUM mEQ/L 133* 129* 132*   POTASSIUM mEQ/L 3.8 4.3 4.2   CHLORIDE mEQ/L 106 104 108   CO2 mEQ/L 18* 18* 17*   BUN mg/dL 9 11  8   CREATININE mg/dL 0.7 0.7 0.6   GLUCOSE mg/dL 96 84 92   CALCIUM mg/dL 8.2* 8.2* 8.3*       Recent Labs   Lab 01/15/23  0701 01/13/23  0517 01/10/23  2050   HCGQUANT >2,740,000.0* >1,846,800.0* >461,700.0*      Results from last 7 days   Lab Units 01/20/23  0509 01/19/23  0409 01/18/23  0350   MAGNESIUM mg/dL 1.6* 1.4* 1.5*      Results from last 7 days   Lab Units 01/18/23  0350 01/15/23  0701   T3 FREE pg/mL 2.80 3.95*   FREE T4 ng/dL 2.02* 3.59*     TSH   Date Value Ref Range Status   01/10/2023 <0.01 (L) 0.34 - 5.60 mIU/L Final     Free T4   Date Value Ref Range Status   01/18/2023 2.02 (H) 0.58 - 1.64 ng/dL Final     T3, Free   Date Value Ref Range Status   01/18/2023 2.80 2.10 - 3.70 pg/mL Final     Thyroid Stimulating Immunoglobulin   Date Value Ref Range Status   01/12/2023 95 <140 % baseline Final     Comment:     Thyroid stimulating immunoglobulins (TSI) can engage  the TSH receptors resulting in hyperthyroidism in  Graves' disease patients. TSI levels can be useful in  monitoring the clinical outcome of Graves' disease as  well as assessing the potential for hyperthyroidism  from maternal-fetal transfer. TSI results greater than  or equal to (>=) 140% of the Reference Control are  considered positive.     NOTE:  A serum TSH level greater than 350 micro-International  Units/mL can interfere with the TSI bioassay and  potentially give false positive results.     Patients who are pregnant and are suspected of having  hyperthyroidism should have both TSI and human  Chorionic Gonadotropin(hCG) tests measured. A serum  hCG level greater than 40,625 mIU/mL can interfere  with the TSI bioassay and may give false negative  results. In these patients it is recommended that  a second TSI be obtained when the hCG concentration  falls below 40,625 mIU/mL (usually after approximately  20-weeks gestation).     The analytical performance characteristics of this  assay have been determined by [facility] [employer]  [place], [place].  The modifications  have not been cleared or approved by the FDA.  This  assay has been validated pursuant to the CLIA  regulations and is used for clinical purposes.                Assessment/Plan     Alondra Villela is a 32 y.o. 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      1. Gestational trophoblastic neoplasia:   a. s/p PICC line placement. Chemotherapy regimen ordered with plan for EP induction for 2-3 cycles followed by EMA-EP for 3 cycles past negative hcg levels. Successfully received first induction cycle 1/13/23-1/14/23. Next induction to be administered 1/20/23-1/21/23. beta hCG increasing but to be expected. Induction cycles to occur weekly. Will repeat beta hCG today 1/20/23 (spoke with lab who is aware that a dilutional assay should be performed, can always call again if issues at extension [phone]).  b. Thoracic surgery consulted 2/2 high risk of lung metastasis hemorrhage with initiation of chemotherapy, appreciate input. Will keep active type and screen on file, next due on 1/20/23.   c. Continue daily CBC with diff, CMP and Mg with repletion PRN. Labs ordered to be drawn off PICC.   d. Patient with cough since chemo started, cepacol cough drop and tessalon perles ordered PRN.   2. Thyroid storm:   a. 1/10/23: TSH <0.01, T4 5.04. TSI ab WNL.  b. Repeat T4/T3 on 1/18/23 were FT4 2.02 T3 2.8.   c. Patient remains intermittently tachycardic although improved since admission. Remains on tele and close vital sign monitoring.   d. PTU and metoprolol ordered per Endocrinology. Metoprolol reduced to 12.5 mg BID due to mild asymptomatic hypotension, and continue  mg TID.  e. Continue to appreciate Endocrinology recommendations including daily CMP.  3. Anemia:   a. Most recent Hgb 10.4, CBC pending today.  b. S/p 2u pRBC.   c. Goal hemoglobin >8.0 in setting of chemotherapy.   d. Continue daily CBC with diff.  4. VTE ppx:   a. Hold off on chemical  VTE for now given high risk of hemorrhage with chemotherapy induction. Will reassess as able.   b. Mechanical prophylaxis with SCDs while in bed ordered and encouraged.  c. Encourage ambulation QID.  5. Anxiety/depression:  a. Mood has been stable.  b. Continue home regimen of Wellbutrin 150 mg qd.   c. Ativan ordered PRN while inpatient.  6. Migraines:   a. Home protriptyline ordered via nonformulary order.  7. Hypomagnesia:  a. Magnesium last 1.6 this AM, additional 2g IV Mg was ordered.  b. Continue to trend and replete PRN.  8. Hyponatremia:  a. Na last 133 this AM, continue to trend CMP daily.  b. Continue fluid restriction of 1L daily.  c. Urine osmolality/Na 1/17/23 were WNL.  d. Patient asymptomatic.  9. Intermittent Rash/pruititis  a. Previously present on chest and then on abdomen.  b. Benadryl ordered PRN.  10. Constipation  a. Baseline/chronic.  b. Senna ordered BID  c. Would hold on diuretics given intermittent e-lyte abnormalities on chemo.  d. Also will hold off on osmotic laxatives (i.e. Miralax) for now given her fluid restriction.  11. Thrombocytopenia  a. Plts yesterday 68 (stable from 69).  b. Patient asymptomatic.  c. Continue to monitor daily with CBC with diff.  d. Known side effect of chemo.  12. Neutropenia   a. Continue CBC with diff daily  b. Plan for neulasta/neupogen if ANC <1.0.    Please reach out to the team at pager #[phone] with any questions or concerns.    Discussed with Dr. Mcmullen.    Bailee Koehler MD, PGY4  Obstetrics & Gynecology

## 2023-01-20 NOTE — PROGRESS NOTES
"Submitted prior authorization for generic of Folinic Acid- Leucorvin Calcium 15mg via [employer]. Received message; \"This medication or product is on your plan's list of covered drugs. Prior authorization is not required at this time. If your pharmacy has questions regarding the processing of your prescription, please have them call the [facility] pharmacy help desk at [phone]\"  Hooker: [id number]   PA Case ID: [id number]    "

## 2023-01-20 NOTE — PLAN OF CARE
Problem: Adult Inpatient Plan of Care  Goal: Plan of Care Review  Outcome: Progressing  Flowsheets (Taken 1/20/2023 0797)  Progress: improving  Plan of Care Reviewed With: patient  Outcome Summary: BPs more stable this shift wth SBP's in 100's-110's. Pt headache controlled with motrin, coughing with tessalon pearls. Pt slept most of the night. HR more stable in NSR. Pt on RA, AAOx4, Independent in room, call light within reach. Pt had no sign or complaints of anxiety this shift, will cont. to monitor.

## 2023-01-20 NOTE — PROGRESS NOTES
Gynecologic Oncology Progress Note    Subjective      Interval History: patient received cisplatin/etoposide today, still due for neupogen    Patient seen on afternoon rounds. She is feeling well. Reports continued brown vaginal discharge without active vaginal bleeding. Ate lunch without N/V. Not feeling any side effects from chemotherapy. Continues to have dry cough which is improved with tessalon perles. No abdominal pain, dyspnea, chest pain, wheezing. Patient had a BM last night. Passing flatus.     Objective     Vital signs in last 24 hours:  Temp:  [36.6 °C (97.9 °F)-36.8 °C (98.2 °F)] 36.6 °C (97.9 °F)  Heart Rate:  [] 91  Resp:  [16-18] 18  BP: ()/(49-55) 88/49  SpO2:  [100 %] 100 %  Oxygen Therapy: None (Room air)    Exam:  General Appearance: Resting; on awakening, alert, cooperative, no acute distress  Lungs: Clear to auscultation bilaterally, respirations unlabored  Heart: regular heart rate, regular rhythm  Abdomen: Soft, non-distended, non-tender, no RUQ/epigastric pain   Extremities: No edema or calf tenderness    Labs:  Results from last 7 days   Lab Units 01/20/23  0509 01/19/23  0409 01/18/23  0350   WBC K/uL 2.04* 2.88* 2.12*   HEMOGLOBIN g/dL 10.1* 10.4* 10.2*   HEMATOCRIT % 30.0* 30.5* 29.9*   PLATELETS K/uL 71* 68* 69*   DIFF TYPE  Manu Auto Manu   NRBC %  --  0.0  --    IMM GRANULOCYTES %  --  2.4  --    NEUTROPHILS %  --  81.0  --    NEUTROS PCT MAN % 70  --  83   LYMPHOCYTES %  --  9.7  --    LYMPHO PCT MAN % 21  --  9   MONOCYTES %  --  2.4  --    MONO PCT MAN % 2  --  0   EOSINOPHILS %  --  4.2  --    EOSINO PCT MAN % 7  --  6   BASOPHILS %  --  0.3  --    BASOS PCT MAN % 0  --  1   BANDS PCT MAN %  --   --  1   IMM GRANUCOCYTES ABS K/uL  --  0.07  --    SEGS ABS MAN K/uL 1.43*  --  1.76   LYMPHO ABS MAN K/uL 0.43*  --  0.19*   MONO ABS AUTO K/uL  --  0.07*  --    MONO ABS MAN K/uL 0.04*  --  0.00*   EOS ABS AUTO K/uL  --  0.12  --    EOS ABS MAN K/uL 0.14  --  0.13   BASO ABS  AUTO K/uL  --  0.01  --    BASOS ABS MAN K/uL 0.00*  --  0.02      Results from last 7 days   Lab Units 01/20/23  0509 01/19/23  0409 01/18/23  0350   SODIUM mEQ/L 133* 129* 132*   POTASSIUM mEQ/L 3.8 4.3 4.2   CHLORIDE mEQ/L 106 104 108   CO2 mEQ/L 18* 18* 17*   BUN mg/dL 9 11 8   CREATININE mg/dL 0.7 0.7 0.6   GLUCOSE mg/dL 96 84 92   CALCIUM mg/dL 8.2* 8.2* 8.3*       Recent Labs   Lab 01/20/23  0509 01/15/23  0701 01/13/23  0517 01/10/23  2050   HCGQUANT >1,361,000.0* >2,740,000.0* >1,846,800.0* >461,700.0*      Results from last 7 days   Lab Units 01/20/23  0509 01/19/23  0409 01/18/23  0350   MAGNESIUM mg/dL 1.6* 1.4* 1.5*      Results from last 7 days   Lab Units 01/18/23  0350 01/15/23  0701   T3 FREE pg/mL 2.80 3.95*   FREE T4 ng/dL 2.02* 3.59*     TSH   Date Value Ref Range Status   01/10/2023 <0.01 (L) 0.34 - 5.60 mIU/L Final     Free T4   Date Value Ref Range Status   01/18/2023 2.02 (H) 0.58 - 1.64 ng/dL Final     T3, Free   Date Value Ref Range Status   01/18/2023 2.80 2.10 - 3.70 pg/mL Final     Thyroid Stimulating Immunoglobulin   Date Value Ref Range Status   01/12/2023 95 <140 % baseline Final     Comment:     Thyroid stimulating immunoglobulins (TSI) can engage  the TSH receptors resulting in hyperthyroidism in  Graves' disease patients. TSI levels can be useful in  monitoring the clinical outcome of Graves' disease as  well as assessing the potential for hyperthyroidism  from maternal-fetal transfer. TSI results greater than  or equal to (>=) 140% of the Reference Control are  considered positive.     NOTE:  A serum TSH level greater than 350 micro-International  Units/mL can interfere with the TSI bioassay and  potentially give false positive results.     Patients who are pregnant and are suspected of having  hyperthyroidism should have both TSI and human  Chorionic Gonadotropin(hCG) tests measured. A serum  hCG level greater than 40,625 mIU/mL can interfere  with the TSI bioassay and may give  false negative  results. In these patients it is recommended that  a second TSI be obtained when the hCG concentration  falls below 40,625 mIU/mL (usually after approximately  20-weeks gestation).     The analytical performance characteristics of this  assay have been determined by [employer] [facility], [place], [place].  The modifications  have not been cleared or approved by the FDA.  This  assay has been validated pursuant to the CLIA  regulations and is used for clinical purposes.                Assessment/Plan     Alondra Villela is a 32 y.o. 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      1. Gestational trophoblastic neoplasia:   a. s/p PICC line placement. Chemotherapy regimen ordered with plan for EP induction for 2-3 cycles followed by EMA-EP for 3 cycles past negative hcg levels. Successfully received first induction cycle 1/13/23-1/14/23. Second induction cycle begun 1/20/23 and will continue 1/21/23. Repeat bhcg on 1/20/23 downtrending. Patient due for neupogen on 1/20/23 given ANC 1400.   b. Thoracic surgery consulted 2/2 high risk of lung metastasis hemorrhage with initiation of chemotherapy, appreciate input. Will keep active type and screen on file, next due on 1/20/23.   c. Continue daily CBC with diff, CMP and Mg with repletion PRN. Labs ordered to be drawn off PICC.   d. Patient with cough since chemo started, cepacol cough drop and tessalon perles ordered PRN.   2. Thyroid storm:   a. 1/10/23: TSH <0.01, T4 5.04. TSI ab WNL.  b. Repeat T4/T3 on 1/18/23 were FT4 2.02 T3 2.8.   c. Patient remains intermittently tachycardic although improved since admission. Remains on tele and close vital sign monitoring.   d. PTU and metoprolol ordered per Endocrinology. Metoprolol reduced to 12.5 mg BID due to mild asymptomatic hypotension, and continue  mg TID.  e. Continue to appreciate Endocrinology recommendations including daily CMP.  3. Anemia:   a. Most  recent Hgb 10.1, CBC pending today.  b. S/p 2u pRBC.   c. Goal hemoglobin >8.0 in setting of chemotherapy.   d. Continue daily CBC with diff.  4. VTE ppx:   a. Hold off on chemical VTE for now given high risk of hemorrhage with chemotherapy induction. Will reassess as able.   b. Mechanical prophylaxis with SCDs while in bed ordered and encouraged.  c. Encourage ambulation QID.  5. Anxiety/depression:  a. Mood has been stable.  b. Continue home regimen of Wellbutrin 150 mg qd.   c. Ativan ordered PRN while inpatient.  6. Migraines:   a. Home protriptyline ordered via nonformulary order.  7. Hypomagnesia:  a. Magnesium last 1.6 this AM, additional 2g IV Mg was ordered.  b. Continue to trend and replete PRN.  8. Hyponatremia:  a. Na last 133 this AM, continue to trend CMP daily.  b. Continue fluid restriction of 1L daily.  c. Urine osmolality/Na 1/17/23 were WNL.  d. Patient asymptomatic.  9. Intermittent Rash/pruititis  a. Previously present on chest and then on abdomen.  b. Benadryl ordered PRN.  10. Constipation  a. Baseline/chronic.  b. Senna ordered BID  c. Would hold on diuretics given intermittent e-lyte abnormalities on chemo.  d. Also will hold off on osmotic laxatives (i.e. Miralax) for now given her fluid restriction.  11. Thrombocytopenia  a. Plts today 71 (stable from 69).  b. Patient asymptomatic.  c. Continue to monitor daily with CBC with diff.  d. Known side effect of chemo.  12. Neutropenia   a. Continue CBC with diff daily  b. Neupogen ordered on 1/20/23     Please reach out to the team at pager #[phone] with any questions or concerns.    Bailee Koehler MD, PGY4  Obstetrics & Gynecology

## 2023-01-20 NOTE — PROGRESS NOTES
Spoke with Narendra FLORES from [facility] [employer] at [phone] to finish authorization request for draft code: [id number]    Medications include:  -Cisplatin : total of 12 units( twice a month x 6 months)  -Etoposide  total of 12 units ( twice a month x 6 months)  Status pending: code: [id number]        -Neupogen  480 mcg max 9 doses per cycle total of 108   Status pending: pending code [id number]    All clinical documents requested to be faxed to [phone]

## 2023-01-21 VITALS
OXYGEN SATURATION: 100 % | SYSTOLIC BLOOD PRESSURE: 118 MMHG | TEMPERATURE: 98 F | HEART RATE: 98 BPM | RESPIRATION RATE: 18 BRPM | DIASTOLIC BLOOD PRESSURE: 64 MMHG

## 2023-01-21 LAB
ALBUMIN SERPL-MCNC: 2.4 G/DL (ref 3.4–5)
ALP SERPL-CCNC: 277 IU/L (ref 35–126)
ALT SERPL-CCNC: 38 IU/L (ref 11–54)
ANION GAP SERPL CALC-SCNC: 9 MEQ/L (ref 3–15)
ANISOCYTOSIS BLD QL SMEAR: ABNORMAL
AST SERPL-CCNC: 24 IU/L (ref 15–41)
BASOPHILS # BLD: 0 K/UL (ref 0.01–0.1)
BASOPHILS NFR BLD: 0 %
BILIRUB SERPL-MCNC: 0.8 MG/DL (ref 0.3–1.2)
BUN SERPL-MCNC: 11 MG/DL (ref 8–20)
BURR CELLS BLD QL SMEAR: ABNORMAL
CALCIUM SERPL-MCNC: 8.3 MG/DL (ref 8.9–10.3)
CHLORIDE SERPL-SCNC: 112 MEQ/L (ref 98–109)
CO2 SERPL-SCNC: 15 MEQ/L (ref 22–32)
CREAT SERPL-MCNC: 0.6 MG/DL (ref 0.6–1.1)
DACRYOCYTES BLD QL SMEAR: ABNORMAL
DIFFERENTIAL METHOD BLD: ABNORMAL
EOSINOPHIL # BLD: 0 K/UL (ref 0.04–0.36)
EOSINOPHIL NFR BLD: 0 %
ERYTHROCYTE [DISTWIDTH] IN BLOOD BY AUTOMATED COUNT: 13.2 % (ref 11.7–14.4)
GFR SERPL CREATININE-BSD FRML MDRD: >60 ML/MIN/1.73M*2
GIANT PLATELETS BLD QL SMEAR: ABNORMAL
GLUCOSE SERPL-MCNC: 129 MG/DL (ref 70–99)
HCT VFR BLDCO AUTO: 27.4 % (ref 35–45)
HGB BLD-MCNC: 9.5 G/DL (ref 11.8–15.7)
HYPOCHROMIA BLD QL SMEAR: ABNORMAL
LYMPHOCYTES # BLD: 0.05 K/UL (ref 1.2–3.5)
LYMPHOCYTES NFR BLD: 1 %
MAGNESIUM SERPL-MCNC: 1.8 MG/DL (ref 1.8–2.5)
MCH RBC QN AUTO: 27.8 PG (ref 28–33.2)
MCHC RBC AUTO-ENTMCNC: 34.7 G/DL (ref 32.2–35.5)
MCV RBC AUTO: 80.1 FL (ref 83–98)
MONOCYTES # BLD: 0.05 K/UL (ref 0.28–0.8)
MONOCYTES NFR BLD: 1 %
NEUTS BAND # BLD: 0.38 K/UL (ref 0–0.53)
NEUTS BAND # BLD: 4.9 K/UL (ref 1.7–7)
NEUTS BAND NFR BLD: 7 %
NEUTS SEG NFR BLD: 91 %
OVALOCYTES BLD QL SMEAR: ABNORMAL
PDW BLD AUTO: 11.3 FL (ref 9.4–12.3)
PLATELET # BLD AUTO: 62 K/UL (ref 150–369)
PLATELET # BLD EST: ABNORMAL 10*3/UL
POIKILOCYTOSIS BLD QL SMEAR: ABNORMAL
POTASSIUM SERPL-SCNC: 4.6 MEQ/L (ref 3.6–5.1)
PROT SERPL-MCNC: 4.6 G/DL (ref 6–8.2)
RBC # BLD AUTO: 3.42 M/UL (ref 3.93–5.22)
SODIUM SERPL-SCNC: 136 MEQ/L (ref 136–144)
T3FREE SERPL-MCNC: 2.24 PG/ML (ref 2.1–3.7)
T4 FREE SERPL-MCNC: 1.38 NG/DL (ref 0.58–1.64)
TOXIC GRANULES BLD QL SMEAR: ABNORMAL
WBC # BLD AUTO: 5.39 K/UL (ref 3.8–10.5)

## 2023-01-21 PROCEDURE — 63700000 HC SELF-ADMINISTRABLE DRUG: Performed by: INTERNAL MEDICINE

## 2023-01-21 PROCEDURE — 99232 SBSQ HOSP IP/OBS MODERATE 35: CPT | Performed by: INTERNAL MEDICINE

## 2023-01-21 PROCEDURE — 63600000 HC DRUGS/DETAIL CODE: Performed by: OBSTETRICS & GYNECOLOGY

## 2023-01-21 PROCEDURE — 63700000 HC SELF-ADMINISTRABLE DRUG: Performed by: OBSTETRICS & GYNECOLOGY

## 2023-01-21 PROCEDURE — 85025 COMPLETE CBC W/AUTO DIFF WBC: CPT | Performed by: STUDENT IN AN ORGANIZED HEALTH CARE EDUCATION/TRAINING PROGRAM

## 2023-01-21 PROCEDURE — 84481 FREE ASSAY (FT-3): CPT | Performed by: OBSTETRICS & GYNECOLOGY

## 2023-01-21 PROCEDURE — 83735 ASSAY OF MAGNESIUM: CPT | Performed by: STUDENT IN AN ORGANIZED HEALTH CARE EDUCATION/TRAINING PROGRAM

## 2023-01-21 PROCEDURE — 63700000 HC SELF-ADMINISTRABLE DRUG: Performed by: STUDENT IN AN ORGANIZED HEALTH CARE EDUCATION/TRAINING PROGRAM

## 2023-01-21 PROCEDURE — 36415 COLL VENOUS BLD VENIPUNCTURE: CPT | Performed by: STUDENT IN AN ORGANIZED HEALTH CARE EDUCATION/TRAINING PROGRAM

## 2023-01-21 PROCEDURE — 25800000 HC PHARMACY IV SOLUTIONS: Performed by: STUDENT IN AN ORGANIZED HEALTH CARE EDUCATION/TRAINING PROGRAM

## 2023-01-21 PROCEDURE — 84439 ASSAY OF FREE THYROXINE: CPT | Performed by: OBSTETRICS & GYNECOLOGY

## 2023-01-21 PROCEDURE — 99238 HOSP IP/OBS DSCHRG MGMT 30/<: CPT | Performed by: OBSTETRICS & GYNECOLOGY

## 2023-01-21 PROCEDURE — 80053 COMPREHEN METABOLIC PANEL: CPT | Performed by: STUDENT IN AN ORGANIZED HEALTH CARE EDUCATION/TRAINING PROGRAM

## 2023-01-21 PROCEDURE — 63600000 HC DRUGS/DETAIL CODE: Performed by: STUDENT IN AN ORGANIZED HEALTH CARE EDUCATION/TRAINING PROGRAM

## 2023-01-21 RX ORDER — METHIMAZOLE 5 MG/1
5 TABLET ORAL DAILY
Qty: 30 TABLET | Refills: 1 | Status: SHIPPED | OUTPATIENT
Start: 2023-01-21 | End: 2023-02-04 | Stop reason: SDUPTHER

## 2023-01-21 RX ORDER — BENZONATATE 100 MG/1
100 CAPSULE ORAL 3 TIMES DAILY PRN
Qty: 90 CAPSULE | Refills: 0 | Status: SHIPPED | OUTPATIENT
Start: 2023-01-21 | End: 2023-01-31

## 2023-01-21 RX ORDER — PROTRIPTYLINE HYDROCHLORIDE 5 MG/1
15 TABLET, FILM COATED ORAL DAILY
Qty: 90 TABLET | Refills: 0
Start: 2023-01-21 | End: 2023-03-20 | Stop reason: SDUPTHER

## 2023-01-21 RX ORDER — PROCHLORPERAZINE MALEATE 5 MG
5-10 TABLET ORAL EVERY 6 HOURS PRN
Qty: 60 TABLET | Refills: 0 | Status: SHIPPED | OUTPATIENT
Start: 2023-01-21 | End: 2023-03-06

## 2023-01-21 RX ORDER — EPINEPHRINE 1 MG/ML
0.5 INJECTION, SOLUTION INTRAMUSCULAR; SUBCUTANEOUS ONCE AS NEEDED
Status: DISCONTINUED | OUTPATIENT
Start: 2023-01-21 | End: 2023-01-21 | Stop reason: HOSPADM

## 2023-01-21 RX ORDER — FAMOTIDINE 10 MG/ML
20 INJECTION INTRAVENOUS ONCE AS NEEDED
Status: DISCONTINUED | OUTPATIENT
Start: 2023-01-21 | End: 2023-01-21 | Stop reason: HOSPADM

## 2023-01-21 RX ORDER — METOPROLOL TARTRATE 25 MG/1
6.25 TABLET, FILM COATED ORAL 2 TIMES DAILY
Qty: 15 TABLET | Refills: 0 | Status: SHIPPED | OUTPATIENT
Start: 2023-01-21 | End: 2023-01-21 | Stop reason: SDUPTHER

## 2023-01-21 RX ORDER — DEXAMETHASONE SODIUM PHOSPHATE 4 MG/ML
8 INJECTION, SOLUTION INTRA-ARTICULAR; INTRALESIONAL; INTRAMUSCULAR; INTRAVENOUS; SOFT TISSUE ONCE
Status: COMPLETED | OUTPATIENT
Start: 2023-01-21 | End: 2023-01-21

## 2023-01-21 RX ORDER — DIPHENHYDRAMINE HCL 50 MG/ML
25 VIAL (ML) INJECTION ONCE AS NEEDED
Status: DISCONTINUED | OUTPATIENT
Start: 2023-01-21 | End: 2023-01-21 | Stop reason: HOSPADM

## 2023-01-21 RX ORDER — METOPROLOL TARTRATE 25 MG/1
6.25 TABLET, FILM COATED ORAL 2 TIMES DAILY
Qty: 15 TABLET | Refills: 0 | Status: CANCELLED | OUTPATIENT
Start: 2023-01-21 | End: 2023-02-20

## 2023-01-21 RX ORDER — ONDANSETRON 4 MG/1
4-8 TABLET, ORALLY DISINTEGRATING ORAL EVERY 6 HOURS PRN
Qty: 30 TABLET | Refills: 1 | Status: SHIPPED | OUTPATIENT
Start: 2023-01-21

## 2023-01-21 RX ORDER — METHIMAZOLE 5 MG/1
5 TABLET ORAL DAILY
Status: DISCONTINUED | OUTPATIENT
Start: 2023-01-21 | End: 2023-01-21 | Stop reason: HOSPADM

## 2023-01-21 RX ORDER — METOPROLOL TARTRATE 25 MG/1
12.5 TABLET, FILM COATED ORAL
Qty: 15 TABLET | Refills: 0 | Status: SHIPPED | OUTPATIENT
Start: 2023-01-21 | End: 2023-02-14

## 2023-01-21 RX ORDER — SODIUM CHLORIDE 9 MG/ML
500 INJECTION, SOLUTION INTRAVENOUS CONTINUOUS
Status: ACTIVE | OUTPATIENT
Start: 2023-01-21 | End: 2023-01-21

## 2023-01-21 RX ORDER — PALONOSETRON 0.05 MG/ML
250 INJECTION, SOLUTION INTRAVENOUS ONCE
Status: COMPLETED | OUTPATIENT
Start: 2023-01-21 | End: 2023-01-21

## 2023-01-21 RX ADMIN — BENZONATATE 100 MG: 100 CAPSULE ORAL at 13:00

## 2023-01-21 RX ADMIN — SENNOSIDES AND DOCUSATE SODIUM 2 TABLET: 50; 8.6 TABLET ORAL at 08:09

## 2023-01-21 RX ADMIN — MAGNESIUM SULFATE HEPTAHYDRATE 2 G: 40 INJECTION, SOLUTION INTRAVENOUS at 08:09

## 2023-01-21 RX ADMIN — METOPROLOL TARTRATE 12.5 MG: 25 TABLET, FILM COATED ORAL at 08:08

## 2023-01-21 RX ADMIN — CISPLATIN 38 MG: 1 INJECTION, SOLUTION INTRAVENOUS at 12:52

## 2023-01-21 RX ADMIN — PALONOSETRON HYDROCHLORIDE 250 MCG: 0.25 INJECTION INTRAVENOUS at 11:18

## 2023-01-21 RX ADMIN — METHIMAZOLE 5 MG: 5 TABLET ORAL at 14:49

## 2023-01-21 RX ADMIN — DEXAMETHASONE SODIUM PHOSPHATE 8 MG: 4 INJECTION, SOLUTION INTRA-ARTICULAR; INTRALESIONAL; INTRAMUSCULAR; INTRAVENOUS; SOFT TISSUE at 11:18

## 2023-01-21 RX ADMIN — SODIUM CHLORIDE 500 ML/HR: 9 INJECTION, SOLUTION INTRAVENOUS at 14:01

## 2023-01-21 RX ADMIN — ONDANSETRON 4 MG: 4 TABLET, ORALLY DISINTEGRATING ORAL at 09:16

## 2023-01-21 RX ADMIN — DEXTROSE MONOHYDRATE 188 MG: 50 INJECTION, SOLUTION INTRAVENOUS at 11:47

## 2023-01-21 RX ADMIN — PROPYLTHIOURACIL 100 MG: 50 TABLET ORAL at 05:29

## 2023-01-21 RX ADMIN — POTASSIUM CHLORIDE 500 ML/HR: 2 INJECTION, SOLUTION, CONCENTRATE INTRAVENOUS at 10:10

## 2023-01-21 NOTE — PROGRESS NOTES
R4    Results from last 7 days   Lab Units 01/21/23  0538   T3 FREE pg/mL 2.24   FREE T4 ng/dL 1.38        Discussed plan of care with Dr. Hodge.  She requested free T3 and free T4 studies today.  These seem to have normalized.  She recommended discontinuing PTU and transitioning to methimazole 5 mg daily.  She initially was planning on metoprolol 6.25 mg twice daily, but given that the smallest tablet is 25 mg, we will proceed with 12.5 mg daily in the morning.  Patient finishing cisplatin now.  Anticipate discharge.    Artur Macias MD PGY4  Obstetrics and Gynecology  Beeper #[id number]     Addendum 2:26 PM: Patient seen and evaluated.  IV hydration running after her cisplatin ended.  She feels well.  Medications reviewed.  Lab and office follow-up instructions reviewed.  Patient provided with phone numbers for both gynecologic oncology and endocrinology on her discharge papers.  All questions answered to the best of my ability and the patient will be discharged home.    Discussed with Dr. Luis Macias MD PGY4  Obstetrics and Gynecology  Beeper #[id number]

## 2023-01-21 NOTE — PROGRESS NOTES
Endocrinology Progress Note       SUBJECTIVE   Interval History:  Seen in the morning. Tolerated chemo well.  Plan to go home after completion.      OBJECTIVE      Vital signs in last 24 hours:  Temp:  [36.3 °C (97.4 °F)-36.6 °C (97.9 °F)] 36.4 °C (97.6 °F)  Heart Rate:  [75-92] 75  Resp:  [16-18] 18  BP: ()/(5-64) 104/59      PHYSICAL EXAMINATION        HEENT: NCAT mucous membranes moist  EOMI sclerae anicteric  NEURO:A&Ox3   PSYCH:Appropriate and cooperative       LABS / IMAGING / TELE      Labs  Lab Results   Component Value Date    WBC 5.39 01/21/2023    HGB 9.5 (L) 01/21/2023    HCT 27.4 (L) 01/21/2023    MCV 80.1 (L) 01/21/2023    PLT 62 (L) 01/21/2023     Lab Results   Component Value Date    GLUCOSE 129 (H) 01/21/2023    CALCIUM 8.3 (L) 01/21/2023     01/21/2023    K 4.6 01/21/2023    CO2 15 (L) 01/21/2023     (H) 01/21/2023    BUN 11 01/21/2023    CREATININE 0.6 01/21/2023     AST/ALT wnl       Latest Reference Range & Units 01/10/23 20:50 01/11/23 09:15 01/12/23 06:24 01/13/23 05:17 01/15/23 07:01 01/18/23 03:50   TSH 0.34 - 5.60 mIU/L <0.01 (L)        Free T4 0.58 - 1.64 ng/dL 5.07 (H)    3.59 (H) 2.02 (H)   T3, Free 2.10 - 3.70 pg/mL  10.42 (H)   3.95 (H) 2.80   Thyroid Stimulating Immunoglobulin <140 % baseline   95      hCG Quant <5.0 IU/L (mIU/mL) >461,700.0 (H) (C)   >1,846,800.0 (H) (C) >2,740,000.0 (H)    (L): Data is abnormally low  (H): Data is abnormally high  (C): Corrected      ASSESSMENT AND PLAN      1) Hyperthyroidism  32 year old woman with very new recognition of gestational trophoblastic neoplasm  She presented with exceedingly high and rapidly rising HCG levels    Responding to PTU well. repeat FT4 level and FT3 today. Will decide on discharge THIEN dose after lab.   Discussed option of PTU and MMI.  She is aware that she might not need to be on PTU for long depending her good response to chemo. She opt to switch to MMI for convenience.   Potential side effects  discussed.  Patient will hold methimazole immediately and notify MD if fever, sore throat, chills or cough, urinary burning; or abdominal pain, nausea vomiting, jaundice.   TFT weekly after discharge.   Will schedule fu in 1-2 weeks.   Reduce metoprolol 6.25 mg bid.     2) Tachycardia/Hypotensiion  - multifactorial, related to hyperthyroidism, anemia, electrolytes disturbances etc.  - improving. Cont low dose metoprolol    2) Gestational trophoblastic neoplasm  Ongoing mgt per gyn onc  Received chemotherapy 1/13 and 1/14.again 1/20, 1/21    3) Adrenal calcification  Noted on CT  Uncertain significance  She received dex as part of her chemotherapeutic regimen  Good AM cortisol level     Discussed with primary team.          Alice Hodge MD

## 2023-01-21 NOTE — NURSING NOTE
Pt discharged. Will be leaving with PICC line. Tele monitor removed. Tolerated chemotherapy very well today. Education provided to pt and mother on flushing PICC. Pt verbalized understanding of all d/c instructions. Will be leaving this afternoon with family.

## 2023-01-21 NOTE — PLAN OF CARE
Problem: Adult Inpatient Plan of Care  Goal: Plan of Care Review  Outcome: Progressing  Flowsheets (Taken 1/21/2023 0614)  Progress: improving  Plan of Care Reviewed With: patient  Outcome Summary: VSS. No major complaints overnight. Mild headaches controlled with PRN motrin. Tessalon pearls given for cough. Pt reports still having brown vaginal discharge. No signs of bleeding noted t/o shift. Neutropenia education/printouts provided. FSP maintained. Call bell witihn reach

## 2023-01-21 NOTE — PROGRESS NOTES
Gynecologic Oncology Progress Note    Subjective      Interval history: no acute events overnight, patient to continue receiving induction chemo today.    Alondra is feeling subjectively very well this AM. Denies active vaginal bleeding. Tolerating regular diet. Not feeling any side effects from chemotherapy. Continues to have dry cough which is improved with tessalon perles. No abdominal pain, dyspnea, chest pain, wheezing. Patient has had a BM recently. Passing flatus.     Objective     Vital signs in last 24 hours:  Temp:  [36.3 °C (97.4 °F)-36.8 °C (98.2 °F)] 36.6 °C (97.9 °F)  Heart Rate:  [77-98] 88  Resp:  [16-18] 16  BP: ()/(5-64) 109/59  SpO2:  [96 %-99 %] 96 %  Oxygen Therapy: None (Room air)    Exam:  General Appearance: Resting; on awakening, alert, cooperative, no acute distress  Lungs: Clear to auscultation bilaterally, respirations unlabored  Heart: Regular heart rate, regular rhythm  Abdomen: Soft, non-distended, non-tender, no RUQ/epigastric pain   Extremities: No edema or calf tenderness    Labs:  Results from last 7 days   Lab Units 01/20/23  0509 01/19/23  0409 01/18/23  0350   WBC K/uL 2.04* 2.88* 2.12*   HEMOGLOBIN g/dL 10.1* 10.4* 10.2*   HEMATOCRIT % 30.0* 30.5* 29.9*   PLATELETS K/uL 71* 68* 69*   DIFF TYPE  Manu Auto Manu   NRBC %  --  0.0  --    IMM GRANULOCYTES %  --  2.4  --    NEUTROPHILS %  --  81.0  --    NEUTROS PCT MAN % 70  --  83   LYMPHOCYTES %  --  9.7  --    LYMPHO PCT MAN % 21  --  9   MONOCYTES %  --  2.4  --    MONO PCT MAN % 2  --  0   EOSINOPHILS %  --  4.2  --    EOSINO PCT MAN % 7  --  6   BASOPHILS %  --  0.3  --    BASOS PCT MAN % 0  --  1   BANDS PCT MAN %  --   --  1   IMM GRANUCOCYTES ABS K/uL  --  0.07  --    SEGS ABS MAN K/uL 1.43*  --  1.76   LYMPHO ABS MAN K/uL 0.43*  --  0.19*   MONO ABS AUTO K/uL  --  0.07*  --    MONO ABS MAN K/uL 0.04*  --  0.00*   EOS ABS AUTO K/uL  --  0.12  --    EOS ABS MAN K/uL 0.14  --  0.13   BASO ABS AUTO K/uL  --  0.01  --     BASOS ABS MAN K/uL 0.00*  --  0.02      Results from last 7 days   Lab Units 01/20/23  0509 01/19/23  0409 01/18/23  0350   SODIUM mEQ/L 133* 129* 132*   POTASSIUM mEQ/L 3.8 4.3 4.2   CHLORIDE mEQ/L 106 104 108   CO2 mEQ/L 18* 18* 17*   BUN mg/dL 9 11 8   CREATININE mg/dL 0.7 0.7 0.6   GLUCOSE mg/dL 96 84 92   CALCIUM mg/dL 8.2* 8.2* 8.3*       Recent Labs   Lab 01/20/23  0509 01/15/23  0701 01/13/23  0517 01/10/23  2050   HCGQUANT >1,361,000.0* >2,740,000.0* >1,846,800.0* >461,700.0*      Results from last 7 days   Lab Units 01/20/23  0509 01/19/23  0409 01/18/23  0350   MAGNESIUM mg/dL 1.6* 1.4* 1.5*      Results from last 7 days   Lab Units 01/18/23  0350 01/15/23  0701   T3 FREE pg/mL 2.80 3.95*   FREE T4 ng/dL 2.02* 3.59*     TSH   Date Value Ref Range Status   01/10/2023 <0.01 (L) 0.34 - 5.60 mIU/L Final     Free T4   Date Value Ref Range Status   01/18/2023 2.02 (H) 0.58 - 1.64 ng/dL Final     T3, Free   Date Value Ref Range Status   01/18/2023 2.80 2.10 - 3.70 pg/mL Final     Thyroid Stimulating Immunoglobulin   Date Value Ref Range Status   01/12/2023 95 <140 % baseline Final     Comment:     Thyroid stimulating immunoglobulins (TSI) can engage  the TSH receptors resulting in hyperthyroidism in  Graves' disease patients. TSI levels can be useful in  monitoring the clinical outcome of Graves' disease as  well as assessing the potential for hyperthyroidism  from maternal-fetal transfer. TSI results greater than  or equal to (>=) 140% of the Reference Control are  considered positive.     NOTE:  A serum TSH level greater than 350 micro-International  Units/mL can interfere with the TSI bioassay and  potentially give false positive results.     Patients who are pregnant and are suspected of having  hyperthyroidism should have both TSI and human  Chorionic Gonadotropin(hCG) tests measured. A serum  hCG level greater than 40,625 mIU/mL can interfere  with the TSI bioassay and may give false negative  results.  In these patients it is recommended that  a second TSI be obtained when the hCG concentration  falls below 40,625 mIU/mL (usually after approximately  20-weeks gestation).     The analytical performance characteristics of this  assay have been determined by [employer] [facility], [place], [place].  The modifications  have not been cleared or approved by the FDA.  This  assay has been validated pursuant to the CLIA  regulations and is used for clinical purposes.                Assessment/Plan     Alondra Villela is a 32 y.o. 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       1. Gestational trophoblastic neoplasia:    a. s/p PICC line placement. Chemotherapy regimen ordered with plan for EP induction for 2-3 cycles followed by EMA-EP for 3 cycles past negative hcg levels. Successfully received first induction cycle 1/13/23-1/14/23. Second induction cycle begun 1/20/23 and will continue today 1/21/23. Repeat bhcg on 1/20/23 downtrending. Patient ordered neupogen on 1/20/23 given ANC 1400.   b. Thoracic surgery consulted 2/2 high risk of lung metastasis hemorrhage with initiation of chemotherapy, appreciate input. Will keep active type and screen on file [id number].   c. Continue daily CBC with diff, CMP and Mg with repletion PRN. Labs ordered to be drawn off PICC.   d. Patient with cough since chemo started, cepacol cough drop and tessalon perles ordered PRN, noted to be improving.  2. Thyroid storm:   a. 1/10/23: TSH <0.01, T4 5.04. TSI Ab WNL.   b. Repeat T4/T3 on 1/18/23 were FT4 2.02 / T3 2.8.   c. Resolution of intermittent tachycardia noted 1/20/23. Remains on tele and close vital sign monitoring.   d. PTU and metoprolol ordered per Endocrinology. Metoprolol reduced to 12.5 mg BID due to mild asymptomatic hypotension, and continue  mg TID.   e. Continue to appreciate Endocrinology recommendations including daily CMP.  3. Anemia:   a. Most recent Hgb 10.1.  b. S/p 2u  pRBC.   c. Goal hemoglobin >8.0 in setting of chemotherapy.   d. Continue daily CBC with diff.   4. VTE ppx:   a. Hold off on chemical VTE for now given high risk of hemorrhage with chemotherapy induction. Will reassess as able.   b. Mechanical prophylaxis with SCDs while in bed ordered and encouraged.  c. Encourage ambulation QID.  5. Anxiety/depression:  a. Mood has been stable.  b. Continue home regimen of Wellbutrin 150 mg qd.   c. Ativan ordered PRN while inpatient.  6. Migraines:   a. Home protriptyline ordered via nonformulary order.  7. Hypomagnesia:  a. Magnesium last 1.6, additional 2g IV Mg was ordered.  b. Continue to trend and replete PRN.  8. Hyponatremia:  a. Improving, Na last 133, continue to trend CMP daily.  b. Continue fluid restriction of 1L daily.  c. Urine osmolality/Na 1/17/23 were WNL.  d. Patient asymptomatic.  9. Intermittent rash/pruititis  a. Previously present on chest and then on abdomen.  b. Benadryl ordered PRN.  10. Constipation  a. Baseline/chronic.  b. Senna ordered BID.  c. Would hold on diuretics given intermittent e-lyte abnormalities on chemo.  d. Also will hold off on osmotic laxatives (i.e. Miralax) for now given her fluid restriction.  11. Thrombocytopenia  a. Plts last 71 (stable from 69).  b. Patient asymptomatic.  c. Continue to monitor daily with CBC with diff.  d. Known side effect of chemo.  12. Neutropenia   a. Continue CBC with diff daily.  b. Neupogen ordered on 1/20/23.    Please reach out to the team at pager #[phone] with any questions or concerns.    To be discussed with Dr. Smith.    Reg Madison MD, PGY3  Obstetrics & Gynecology

## 2023-01-23 ENCOUNTER — DOCUMENTATION (OUTPATIENT)
Dept: GYNECOLOGIC ONCOLOGY | Facility: CLINIC | Age: 33
End: 2023-01-23
Payer: COMMERCIAL

## 2023-01-23 ENCOUNTER — APPOINTMENT (OUTPATIENT)
Dept: LAB | Facility: HOSPITAL | Age: 33
End: 2023-01-23
Attending: STUDENT IN AN ORGANIZED HEALTH CARE EDUCATION/TRAINING PROGRAM
Payer: COMMERCIAL

## 2023-01-23 DIAGNOSIS — O02.89 GESTATIONAL CHORIOCARCINOMA (CMS/HCC)  (CMS/HCC): ICD-10-CM

## 2023-01-23 DIAGNOSIS — C58 GESTATIONAL CHORIOCARCINOMA (CMS/HCC)  (CMS/HCC): ICD-10-CM

## 2023-01-23 LAB
ALBUMIN SERPL-MCNC: 3 G/DL (ref 3.4–5)
ALP SERPL-CCNC: 200 IU/L (ref 35–126)
ALT SERPL-CCNC: 37 IU/L (ref 11–54)
ANION GAP SERPL CALC-SCNC: 4 MEQ/L (ref 3–15)
AST SERPL-CCNC: 21 IU/L (ref 15–41)
BASOPHILS # BLD: 0.05 K/UL (ref 0.01–0.1)
BASOPHILS NFR BLD: 2 %
BILIRUB SERPL-MCNC: 0.9 MG/DL (ref 0.3–1.2)
BUN SERPL-MCNC: 17 MG/DL (ref 8–20)
BURR CELLS BLD QL SMEAR: ABNORMAL
CALCIUM SERPL-MCNC: 8.3 MG/DL (ref 8.9–10.3)
CHLORIDE SERPL-SCNC: 103 MEQ/L (ref 98–109)
CO2 SERPL-SCNC: 23 MEQ/L (ref 22–32)
CREAT SERPL-MCNC: 0.7 MG/DL (ref 0.6–1.1)
DACRYOCYTES BLD QL SMEAR: ABNORMAL
DIFFERENTIAL METHOD BLD: ABNORMAL
EOSINOPHIL # BLD: 0.14 K/UL (ref 0.04–0.36)
EOSINOPHIL NFR BLD: 6 %
ERYTHROCYTE [DISTWIDTH] IN BLOOD BY AUTOMATED COUNT: 12.8 % (ref 11.7–14.4)
GFR SERPL CREATININE-BSD FRML MDRD: >60 ML/MIN/1.73M*2
GLUCOSE SERPL-MCNC: 94 MG/DL (ref 70–99)
HCT VFR BLDCO AUTO: 33 % (ref 35–45)
HGB BLD-MCNC: 10.8 G/DL (ref 11.8–15.7)
LYMPHOCYTES # BLD: 0.46 K/UL (ref 1.2–3.5)
LYMPHOCYTES NFR BLD: 20 %
MAGNESIUM SERPL-MCNC: 1.6 MG/DL (ref 1.8–2.5)
MCH RBC QN AUTO: 27.4 PG (ref 28–33.2)
MCHC RBC AUTO-ENTMCNC: 32.7 G/DL (ref 32.2–35.5)
MCV RBC AUTO: 83.8 FL (ref 83–98)
MONOCYTES # BLD: 0.02 K/UL (ref 0.28–0.8)
MONOCYTES NFR BLD: 1 %
NEUTS BAND # BLD: 0.02 K/UL (ref 0–0.53)
NEUTS BAND # BLD: 1.57 K/UL (ref 1.7–7)
NEUTS BAND NFR BLD: 1 %
NEUTS SEG NFR BLD: 69 %
OVALOCYTES BLD QL SMEAR: ABNORMAL
PDW BLD AUTO: 10.3 FL (ref 9.4–12.3)
PLAT MORPH BLD: NORMAL
PLATELET # BLD AUTO: 96 K/UL (ref 150–369)
PLATELET # BLD EST: ABNORMAL 10*3/UL
POTASSIUM SERPL-SCNC: 3.6 MEQ/L (ref 3.6–5.1)
PROT SERPL-MCNC: 6.2 G/DL (ref 6–8.2)
RBC # BLD AUTO: 3.94 M/UL (ref 3.93–5.22)
SODIUM SERPL-SCNC: 130 MEQ/L (ref 136–144)
VARIANT LYMPHS # BLD MANUAL: 0.02 K/UL
VARIANT LYMPHS NFR BLD: 1 %
WBC # BLD AUTO: 2.28 K/UL (ref 3.8–10.5)

## 2023-01-23 PROCEDURE — 85025 COMPLETE CBC W/AUTO DIFF WBC: CPT

## 2023-01-23 PROCEDURE — 83735 ASSAY OF MAGNESIUM: CPT

## 2023-01-23 PROCEDURE — 80053 COMPREHEN METABOLIC PANEL: CPT

## 2023-01-23 PROCEDURE — 36415 COLL VENOUS BLD VENIPUNCTURE: CPT

## 2023-01-23 NOTE — PROGRESS NOTES
Called and spoke with Zach DE LUNA from [facility]. I informed her that permission was granted to use biosilimar for Neupogen called [employer] . Shs informed me she will cancel the PA request for Neupogen and resubmit request for [employer] . After the Neupogen is canceled she will call me with a new authorization code.

## 2023-01-23 NOTE — DISCHARGE SUMMARY
Inpatient Discharge Summary    BRIEF OVERVIEW  Admitting Provider:  H&P Notes 12/24/2022 to 1/21/2023         Date of Service Author Author Type Status Note Type File Time    01/13/23 1306 Gerardo Sparks MD Resident Attested H&P 01/13/23 2042 01/11/23 1012 Zeb Lara DO Physician Addendum H&P 01/11/23 1420        Primary Care Physician at Discharge: Sunil Hinton [phone]    Admission Date: 1/13/2023     Discharge Date: 1/21/2023    Primary Discharge Diagnosis  Gestational choriocarcinoma (CMS/HCC)    Secondary Discharge Diagnosis  Active Hospital Problems    Diagnosis Date Noted   • Gestational choriocarcinoma (CMS/HCC) 01/11/2023      Resolved Hospital Problems   No resolved problems to display.       DETAILS OF HOSPITAL STAY    Consults:   Consult Notes 12/24/2022 to 1/23/2023         Date of Service Author Author Type Status Note Type File Time    01/20/23 1052 Jacqueline Bah LDN Registered Dietitian Signed Consults 01/20/23 1055    01/13/23 1337 Pedro Lanier PA C Physician Assistant Attested Consults 01/14/23 1055    01/13/23 1322 Phuong Gaona MD Physician Signed Consults 01/13/23 1619    01/13/23 1103 Lorena Hogue MD Physician Addendum Consults 01/13/23 1645    01/12/23 2023 Moisés Souza MD Physician Signed Consults 01/12/23 2027    01/11/23 1656 Sonal Gonzales MD Physician Addendum Consults 01/11/23 1744    01/11/23 0720 Reg Murphy DO Physician Signed Consults 01/11/23 0733          Consult Orders During Admission:  IP CONSULT TO IV TEAM  IP CONSULT TO THORACIC SURGERY  IP CONSULT TO ENDOCRINOLOGY     Imaging  CT GUIDED NEEDLE PLACEMENT WITHOUT IV CONTRAST    Result Date: 1/13/2023  IMPRESSION: CT guided biopsy of right lower lobe lung mass.     MRI BRAIN WITHOUT CONTRAST    Result Date: 1/13/2023  IMPRESSION: 1.  No acute infarct, acute intracranial hemorrhage or evidence of intra-axial mass effect. 2.  Mild scattered white matter disease which is  nonspecific. 3.  Please note that evaluation for intracranial metastatic disease is limited without intravenous contrast.    MRI BRAIN WITH CONTRAST    Result Date: 1/13/2023  IMPRESSION: 1.  No abnormal intracranial contrast enhancement identified to suggest intracranial metastatic disease. 2.  Previously seen tiny foci of increased T2 signal in the white matter do not demonstrate contrast enhancement.  These findings could reflect foci of chronic ischemia, the sequelae of migraine headaches, demyelinating or postinfectious processes.  Other etiologies are not excluded.  Clinical correlation is recommended.    CT ABDOMEN PELVIS WITH IV CONTRAST    Result Date: 1/13/2023  IMPRESSION: 1.   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 2.  Extensive pulmonary metastases, most notably a right lower lobe 4.9 cm subpleural mass. 3.  Enlarged ovaries with numerous cysts likely representing theca lutein cysts 4.  Indeterminate 0.8 cm hypoattenuating lesion at the dome of the liver.  The previously noted hypervascular liver lesions are not well seen due to the phase of contrast at the time of imaging.  Contrast-enhanced MRI of the abdomen can be considered for more optimal characterization. 5.  Indeterminate hypoattenuating lesions in the right kidney which can also be more optimally evaluated with MRI.    CT ANGIOGRAPHY CHEST PULMONARY EMBOLISM WITH IV CONTRAST    Result Date: 1/12/2023  IMPRESSION: No evidence for pulmonary embolism. 5 cm right lower lobe mass. Anterior mediastinal mass. Innumerable bilateral pulmonary nodules which are suspicious for metastatic disease to the lungs. 2 hyperenhancing liver lesions, the larger measuring 5 cm in maximal dimension for which further assessment with MRI with and without contrast is recommended.     X-RAY CHEST 1 VIEW    Result Date: 1/13/2023  IMPRESSION: No significant interval change. No pneumothorax.    X-RAY  CHEST 1 VIEW    Result Date: 1/12/2023  IMPRESSION: No pneumothorax following right lung biopsy.    X-RAY CHEST 1 VIEW    Result Date: 1/12/2023  IMPRESSION: No pneumothorax status post right lung mass biopsy.     US venous leg, bilateral    Result Date: 1/10/2023  IMPRESSION: No femoropopliteal thrombosis. COMMENT: A duplex Doppler study was performed of both lower extremities, consisting of integrated two-dimensional (2D) real-time imaging color flow Doppler and Doppler spectral analysis utilizing 5.0 MHz and 7.0 MHz linear array probes. RIGHT: COMMON FEMORAL VEIN: Normal compression DEEP FEMORAL VEIN: Patent by color Doppler. FEMORAL VEIN: Normal compression, flow, and augmentation demonstrated on Doppler spectral analysis. POPLITEAL VEIN: Normal compression, flow, and augmentation demonstrated on Doppler spectral analysis. LEFT: COMMON FEMORAL VEIN: Normal compression DEEP FEMORAL VEIN: Patent by color Doppler. FEMORAL VEIN: Normal compression, flow, and augmentation demonstrated on Doppler spectral analysis. POPLITEAL VEIN: Normal compression, flow, and augmentation demonstrated on Doppler spectral analysis. Visualized calf veins compress normally.     IR TECHNICAL ASSISTANCE    Result Date: 1/13/2023  IMPRESSION: CT guided biopsy of right lower lobe lung mass.     ULTRASOUND DOPPLER    Result Date: 1/11/2023  IMPRESSION: 1.  No intrauterine gestation and no adnexal mass evident. These findings represent a pregnancy of unknown location. The appearance of a complex mass with small cystic spaces and peripheral vascularity occupying the central uterine cavity could represent a molar pregnancy but additional evaluation is suggested. 2.  Both ovaries are mildly enlarged with multiple complex cystic lesions as well as some with solid-appearing echoes that may represent a combination of hemorrhagic cysts/follicles or endometriomas, but challenging to evaluate as the ovaries are only well evaluated transabdominally.  Recommended these be reassessed on follow-up. No sonographic findings of ovarian torsion.     ULTRASOUND PREGNANCY TRANSVAGINAL ONLY    Result Date: 1/11/2023  IMPRESSION: 1.  No intrauterine gestation and no adnexal mass evident. These findings represent a pregnancy of unknown location. The appearance of a complex mass with small cystic spaces and peripheral vascularity occupying the central uterine cavity could represent a molar pregnancy but additional evaluation is suggested. 2.  Both ovaries are mildly enlarged with multiple complex cystic lesions as well as some with solid-appearing echoes that may represent a combination of hemorrhagic cysts/follicles or endometriomas, but challenging to evaluate as the ovaries are only well evaluated transabdominally. Recommended these be reassessed on follow-up. No sonographic findings of ovarian torsion.     ULTRASOUND PREGNANCY < 14 WEEKS TRANSABDOMINAL LIMITED    Result Date: 1/11/2023  IMPRESSION: 1.  No intrauterine gestation and no adnexal mass evident. These findings represent a pregnancy of unknown location. The appearance of a complex mass with small cystic spaces and peripheral vascularity occupying the central uterine cavity could represent a molar pregnancy but additional evaluation is suggested. 2.  Both ovaries are mildly enlarged with multiple complex cystic lesions as well as some with solid-appearing echoes that may represent a combination of hemorrhagic cysts/follicles or endometriomas, but challenging to evaluate as the ovaries are only well evaluated transabdominally. Recommended these be reassessed on follow-up. No sonographic findings of ovarian torsion.       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    Hospital Course  Alondra Villela is a 32 y.o. female who was admitted to [facility] 1/10/23 for shortness of breath,  tachycardia, anemia, and found to have positive HCG and imaging findings suspicious for metastatic GTN to liver, lungs, and questionable brain mets. Gyn Oncology was consulted for management and assumption of care. Patient is a  who had an uncomplicated 2nd pregnancy and delivered via planned repeat  in 2022. She reported having resumed menstruation post-partum and believes her LMP to have been 22. She lives in [place] and had been in the area visiting her parents where she had used to live.     She contracted COVID-19 3 weeks prior and developed persistent shortness of breath. She was evaluated by her previous PCP from when she used to live around the area, Dr. Hinton. CBC showed anemia and she was found to be tachycardic to the 140s. She then developed left leg pain and was sent to the ER on 1/10/23 for VTE evaluation. Upon arrival, she was found to be severely hyperthyroid, tachycardic, anemic (hgb 6.9), with elevated serum beta-hCG (>461,700, adjusted with dilutional assay). Pelvic US showed no intra-uterine pregnancy, bilateral complex ovarian cysts, and material in the endometrial cavity. CTPA showed no PE but innumerable lung metastases. She underwent an image-guided biopsy of the largest right lower lobe lesion which showed benign liver tissue. She received 1 unit pRBC upon admission with a rise in hgb to 7.9. She was found to be in thyroid storm and was followed closely by Endocrinology. Initially there was concern for brain metastases on non-contrast MRI. Post-contrast images were obtained which were interpreted as negative for metastatic disease. The areas of increased T2 signal in the white matter does not enhance with contrast, thus was felt less likely to be metastatic and more likely due to chronic ischemia or other non-malignant causes.     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 Initially her WHO score calculation was 13 (*vs 17):    Age <40: 0  Antecedent pregnancy term: 2  Interval months 4-6: 1  Pre-treatment HCG level >100,000: 4  Largest tumor >5cm: 2  Site of metastases brain/liver: 4*  Number of metastases innumerable >8: 4  Previous chemo failure none: 0     *The question was raised that the liver lesions may or may not represent metastases, could be hemangiomas, was reviewed by several Radiologists and ultimately felt to be indeterminate.    Nonetheless, given the time-sensitive nature of the need to start chemotherapy, and even with the lack of liver metastases, the situation still would not have obviated the need for a multi-drug regimen. Even if these ended up not being metastases, her WHO score would only be down-graded to 13, which is still high risk disease requiring multi-drug regimen.    Dr. Hogue discussed this unusual scenario with [facility] center at [facility]. Recommendation was made for induction chemo with EP for 2-3 cycles (to decrease the risk of hemorrhagic complications from rapidly killed-off tumor), followed by EMA-EP for 3 cycles past negative HCG levels. Given the extremely time-sensitive nature of this diagnosis and the propensity for rapid progression without prompt treatment, transfer to [facility] center on the [facility] was felt to likely take too long and negatively impact her prognosis. Plan was made to transfer patient to [facility] for PICC line and to initiate chemo. Anticipated that she would be be in-house for at least the first 2 cycles of induction chemotherapy, with the possibility that she may or may not be able to go home between subsequent chemo cycles.     On arrival to [facility] she received PICC line and Endocrinology (due to thyroid storm) as well as Thoracic Surgery (in order to have them available in case she started to bleed from the lung metastases during induction chemo) saw the patient. She  was continued on metoprolol and PTU. Emotional support was provided. Discussed role of therapy in helping her and family navigate this sudden and devastating diagnosis. First round of induction chemotherapy (etoposide/cisplatin) was administered from 1/13/23 through 1/14/23. She tolerated this well with minimal side effects. She was monitored closely and medical problems managed throughout the next week (see below), and the next induction cycle of etoposide/cisplatin was administered 1/20/23 through 1/21/23, at which point she was felt to be stable for discharge.    Hospital problems on the morning of discharge included:     1. Gestational trophoblastic neoplasia:    a. s/p PICC line placement. Chemotherapy regimen ordered with plan for EP induction for 2-3 cycles followed by EMA-EP for 3 cycles past negative hcg levels. Successfully received first induction cycle 1/13/23-1/14/23. Second induction cycle begun 1/20/23 and will continue today 1/21/23. Repeat bhcg on 1/20/23 downtrending. Patient ordered neupogen on 1/20/23 given ANC 1400.   b. Thoracic surgery consulted 2/2 high risk of lung metastasis hemorrhage with initiation of chemotherapy, appreciate input. Will keep active type and screen on file q72h.   c. Continue daily CBC with diff, CMP and Mg with repletion PRN. Labs ordered to be drawn off PICC.   d. Patient with cough since chemo started, cepacol cough drop and tessalon perles ordered PRN, noted to be improving.  2. Thyroid storm:   a. 1/10/23: TSH <0.01, T4 5.04. TSI Ab WNL.   b. Repeat T4/T3 on 1/18/23 were FT4 2.02 / T3 2.8.   c. Resolution of intermittent tachycardia noted 1/20/23. Remains on tele and close vital sign monitoring.   d. PTU and metoprolol ordered per Endocrinology. Metoprolol reduced to 12.5 mg BID due to mild asymptomatic hypotension, and continue  mg TID.   e. Continue to appreciate Endocrinology recommendations including daily CMP.  3. Anemia:   a. Most recent  Hgb 10.1.  b. S/p 2u pRBC.   c. Goal hemoglobin >8.0 in setting of chemotherapy.   d. Continue daily CBC with diff.   4. VTE ppx:   a. Hold off on chemical VTE for now given high risk of hemorrhage with chemotherapy induction. Will reassess as able.   b. Mechanical prophylaxis with SCDs while in bed ordered and encouraged.  c. Encourage ambulation QID.  5. Anxiety/depression:  a. Mood has been stable.  b. Continue home regimen of Wellbutrin 150 mg qd.   c. Ativan ordered PRN while inpatient.  1. Migraines:   a. Home protriptyline ordered via nonformulary order.  2. Hypomagnesia:  a. Magnesium last 1.6, additional 2g IV Mg was ordered.  b. Continue to trend and replete PRN.  3. Hyponatremia:  a. Improving, Na last 133, continue to trend CMP daily.  b. Continue fluid restriction of 1L daily.  c. Urine osmolality/Na 1/17/23 were WNL.  d. Patient asymptomatic.  4. Intermittent rash/pruititis  a. Previously present on chest and then on abdomen.  b. Benadryl ordered PRN.  5. Constipation  a. Baseline/chronic.  b. Senna ordered BID.  c. Would hold on diuretics given intermittent e-lyte abnormalities on chemo.  d. Also will hold off on osmotic laxatives (i.e. Miralax) for now given her fluid restriction.  6. Thrombocytopenia  a. Plts last 71 (stable from 69).  b. Patient asymptomatic.  c. Continue to monitor daily with CBC with diff.  d. Known side effect of chemo.  7. Neutropenia   a. Continue CBC with diff daily.  b. Neupogen ordered on 1/20/23.     Prior to discharge, Endocrinology requested repeat TFTs and determined the patient could be transitioned from PTU to methimazole, and should continue metoprolol at the same dose. She was re-examined by Dr. Macias and Dr. Smith on the day of discharge, and was again determined to be stable for discharge home with education, precautions, and extremely close outpatient follow-up which was scheduled in advance.      Discharge Orders     Medication List      START taking these  medications    benzonatate 100 mg capsule  Commonly known as: TESSALON  Take 1 capsule (100 mg total) by mouth 3 (three) times a day as needed for cough for up to 10 days.  Dose: 100 mg     methIMAzole 5 mg tablet  Commonly known as: TAPAZOLE  Take 1 tablet (5 mg total) by mouth daily.  Dose: 5 mg     metoprolol tartrate 25 mg tablet  Commonly known as: LOPRESSOR  Take 0.5 tablets (12.5 mg total) by mouth daily with breakfast.  Dose: 12.5 mg     ondansetron ODT 4 mg disintegrating tablet  Commonly known as: ZOFRAN-ODT  Take 1-2 tablets (4-8 mg total) by mouth every 6 (six) hours as needed for nausea or vomiting (Nausea/Vomiting). Maximum 32mg per day.  Dose: 4-8 mg     prochlorperazine 5 mg tablet  Commonly known as: COMPAZINE  Take 1-2 tablets (5-10 mg total) by mouth every 6 (six) hours as needed for nausea or vomiting. This medication can make you sleepy.  Dose: 5-10 mg        CHANGE how you take these medications    protriptyline 5 mg tablet  Commonly known as: VIVACTIL  Take 3 tablets (15 mg total) by mouth daily.  Dose: 15 mg  What changed: how much to take        CONTINUE taking these medications    buPROPion  mg 24 hr tablet  Commonly known as: WELLBUTRIN XL  Take 150 mg by mouth daily.  Dose: 150 mg        STOP taking these medications    ondansetron 4 mg tablet  Commonly known as: ZOFRAN          Outpatient Follow-Ups            1/24/23 Shantelle Mcmullen MD [facility] [address]    ~ 1 week Lorena Hogue MD [facility]          Test Results  Unresulted Labs (From admission, onward)     Start     Ordered    01/19/23 0000  BhCG, Serum, Quant        Question Answer Comment   Gestational age of fetus (weeks): 0 Not pregnant   Release to patient Immediate        01/19/23 1122    01/19/23 0000  CBC and differential        Question:  Release to patient  Answer:  Immediate    01/19/23 1122    01/19/23 0000  Comprehensive metabolic  panel        Question:  Release to patient  Answer:  Immediate    01/19/23 1122    01/19/23 0000  Magnesium        Question:  Release to patient  Answer:  Immediate    01/19/23 1122    01/17/23 0635  Type and screen  Every 72 hours      Question Answer Comment   Are you aware of this patient having previously identified antibodies? NO    Does this Patient have Sickle Cell Disease/Sickle Cell Anemia? NO    Release to patient Immediate       Order ID Start Status   [id number] 01/23/23 0635 Needs to be Collected    01/26/23 0635 Scheduled    01/29/23 0635 Scheduled    02/01/23 0635 Scheduled    02/04/23 0635 Scheduled       01/17/23 0634              Quantitative Beta-HCGs in last 30 days:   Recent Labs   Lab 01/20/23  0509 01/15/23  0701 01/13/23  0517 01/10/23  2050   HCGQUANT >1,361,000.0* >2,740,000.0* >1,846,800.0* >461,700.0*     Discharge Disposition  Disposition: Home     Code Status at Discharge: Prior

## 2023-01-23 NOTE — PROGRESS NOTES
Spoke with Zach DE LUNA from [employer] at [phone] extension [phone]. She reports that Cisplatin  and Etoposide  are approved from 2/3/2023-2/3/2024. She said she faxed an approval letter but will resend another one. Reference number [id number]

## 2023-01-23 NOTE — PROGRESS NOTES
Spoke to Zach at [employer] at [phone] extension [phone] in regards to PA request submitted for Zarxio Q [id number]. Zach reports that Zarxio is approved for 30 days starting 1/23/23-2/23/23 and that the office can order according to whatever method we use for office visit injections.    However, after the 30 days this must change and the medication needs a new prior authorization and must come from a speciality pharmacy. She is going to send information to my [facility] e-mail but she also reports a few specialty pharmacy's to chose from out of many: [facility], [facility], and there are more on the sent list. She reports when placing a new authorization that the specialty pharmacy must be choose as the servicing provider.  She also mentioned that the medication can be delivered at the patient's home or can be administered in the office but cannot be charged for the medication, only the administration of the medication can be charged for.

## 2023-01-24 ENCOUNTER — TELEPHONE (OUTPATIENT)
Dept: GYNECOLOGIC ONCOLOGY | Facility: CLINIC | Age: 33
End: 2023-01-24

## 2023-01-24 DIAGNOSIS — O02.89 GESTATIONAL CHORIOCARCINOMA (CMS/HCC)  (CMS/HCC): Primary | ICD-10-CM

## 2023-01-24 DIAGNOSIS — Z51.11 ENCOUNTER FOR ANTINEOPLASTIC CHEMOTHERAPY: ICD-10-CM

## 2023-01-24 DIAGNOSIS — C58 GESTATIONAL CHORIOCARCINOMA (CMS/HCC)  (CMS/HCC): Primary | ICD-10-CM

## 2023-01-24 NOTE — TELEPHONE ENCOUNTER
Called and spoke to Alondra's father to remind Alondra to get her labs drawn tomorrow if possible before her office appointment. He informed that she can go to [facility] to get her labs done.

## 2023-01-25 ENCOUNTER — APPOINTMENT (OUTPATIENT)
Dept: LAB | Facility: HOSPITAL | Age: 33
End: 2023-01-25
Attending: PHYSICIAN ASSISTANT
Payer: COMMERCIAL

## 2023-01-25 ENCOUNTER — DOCUMENTATION (OUTPATIENT)
Dept: GYNECOLOGIC ONCOLOGY | Facility: CLINIC | Age: 33
End: 2023-01-25

## 2023-01-25 ENCOUNTER — OFFICE VISIT (OUTPATIENT)
Dept: GYNECOLOGIC ONCOLOGY | Facility: CLINIC | Age: 33
End: 2023-01-25
Attending: STUDENT IN AN ORGANIZED HEALTH CARE EDUCATION/TRAINING PROGRAM
Payer: COMMERCIAL

## 2023-01-25 ENCOUNTER — HOSPITAL ENCOUNTER (OUTPATIENT)
Dept: INPATIENT UNIT | Facility: HOSPITAL | Age: 33
Discharge: HOME | End: 2023-01-25
Attending: PHYSICIAN ASSISTANT
Payer: COMMERCIAL

## 2023-01-25 ENCOUNTER — TELEPHONE (OUTPATIENT)
Dept: GYNECOLOGIC ONCOLOGY | Facility: CLINIC | Age: 33
End: 2023-01-25

## 2023-01-25 VITALS
OXYGEN SATURATION: 95 % | HEART RATE: 110 BPM | BODY MASS INDEX: 28.34 KG/M2 | SYSTOLIC BLOOD PRESSURE: 103 MMHG | DIASTOLIC BLOOD PRESSURE: 70 MMHG | WEIGHT: 166 LBS | TEMPERATURE: 97.7 F | HEIGHT: 64 IN

## 2023-01-25 DIAGNOSIS — O02.89 GESTATIONAL CHORIOCARCINOMA (CMS/HCC)  (CMS/HCC): Primary | ICD-10-CM

## 2023-01-25 DIAGNOSIS — O01.9 GESTATIONAL TROPHOBLASTIC NEOPLASM: ICD-10-CM

## 2023-01-25 DIAGNOSIS — Z51.11 ENCOUNTER FOR ANTINEOPLASTIC CHEMOTHERAPY: ICD-10-CM

## 2023-01-25 DIAGNOSIS — C58 GESTATIONAL CHORIOCARCINOMA (CMS/HCC)  (CMS/HCC): Primary | ICD-10-CM

## 2023-01-25 DIAGNOSIS — C58 GESTATIONAL CHORIOCARCINOMA (CMS/HCC)  (CMS/HCC): ICD-10-CM

## 2023-01-25 DIAGNOSIS — E05.90 HYPERTHYROIDISM: ICD-10-CM

## 2023-01-25 DIAGNOSIS — O02.89 GESTATIONAL CHORIOCARCINOMA (CMS/HCC)  (CMS/HCC): ICD-10-CM

## 2023-01-25 DIAGNOSIS — R00.0 TACHYCARDIA: ICD-10-CM

## 2023-01-25 DIAGNOSIS — D70.2 DRUG-INDUCED NEUTROPENIA (CMS/HCC): Primary | ICD-10-CM

## 2023-01-25 LAB
BASOPHILS # BLD: 0 K/UL (ref 0.01–0.1)
BASOPHILS NFR BLD: 0 %
BURR CELLS BLD QL SMEAR: ABNORMAL
DACRYOCYTES BLD QL SMEAR: ABNORMAL
DIFFERENTIAL METHOD BLD: ABNORMAL
EOSINOPHIL # BLD: 0.13 K/UL (ref 0.04–0.36)
EOSINOPHIL NFR BLD: 11 %
ERYTHROCYTE [DISTWIDTH] IN BLOOD BY AUTOMATED COUNT: 12.4 % (ref 11.7–14.4)
HCT VFR BLDCO AUTO: 36.1 % (ref 35–45)
HGB BLD-MCNC: 11.9 G/DL (ref 11.8–15.7)
LYMPHOCYTES # BLD: 0.72 K/UL (ref 1.2–3.5)
LYMPHOCYTES NFR BLD: 63 %
MCH RBC QN AUTO: 27.4 PG (ref 28–33.2)
MCHC RBC AUTO-ENTMCNC: 33 G/DL (ref 32.2–35.5)
MCV RBC AUTO: 83 FL (ref 83–98)
MONOCYTES # BLD: 0.01 K/UL (ref 0.28–0.8)
MONOCYTES NFR BLD: 1 %
NEUTROPHILS # BLD: 0.33 K/UL (ref 1.7–7)
NEUTS BAND # BLD: 0.29 K/UL (ref 1.7–7)
NEUTS SEG NFR BLD: 25 %
OVALOCYTES BLD QL SMEAR: ABNORMAL
PDW BLD AUTO: 10.3 FL (ref 9.4–12.3)
PLAT MORPH BLD: NORMAL
PLATELET # BLD AUTO: 105 K/UL (ref 150–369)
PLATELET # BLD EST: ABNORMAL 10*3/UL
RBC # BLD AUTO: 4.35 M/UL (ref 3.93–5.22)
WBC # BLD AUTO: 1.15 K/UL (ref 3.8–10.5)

## 2023-01-25 PROCEDURE — 3008F BODY MASS INDEX DOCD: CPT | Performed by: STUDENT IN AN ORGANIZED HEALTH CARE EDUCATION/TRAINING PROGRAM

## 2023-01-25 PROCEDURE — 36415 COLL VENOUS BLD VENIPUNCTURE: CPT

## 2023-01-25 PROCEDURE — 63600000 HC DRUGS/DETAIL CODE: Mod: JG | Performed by: PHYSICIAN ASSISTANT

## 2023-01-25 PROCEDURE — 96372 THER/PROPH/DIAG INJ SC/IM: CPT

## 2023-01-25 PROCEDURE — 3E013GC INTRODUCTION OF OTHER THERAPEUTIC SUBSTANCE INTO SUBCUTANEOUS TISSUE, PERCUTANEOUS APPROACH: ICD-10-PCS | Performed by: PHYSICIAN ASSISTANT

## 2023-01-25 PROCEDURE — 99215 OFFICE O/P EST HI 40 MIN: CPT | Performed by: STUDENT IN AN ORGANIZED HEALTH CARE EDUCATION/TRAINING PROGRAM

## 2023-01-25 PROCEDURE — 85025 COMPLETE CBC W/AUTO DIFF WBC: CPT

## 2023-01-25 RX ADMIN — FILGRASTIM-SNDZ 300 MCG: 300 INJECTION, SOLUTION INTRAVENOUS; SUBCUTANEOUS at 15:22

## 2023-01-25 NOTE — PROGRESS NOTES
Received call from [facility] to report critical lab:    WBC 1.15    Result read back for confirmation

## 2023-01-25 NOTE — PROGRESS NOTES
PATIENT ID:  Alondra Villela is a 32 y.o. female.  REFERRING PHYSICIAN:   No referring provider defined for this encounter.  PRIMARY CARE PROVIDER:  Sunil Hinton MD      HPI:   Ms. Alondra Villela is a 32 y.o. yo lady with gestational trophoblastic neoplasm for f/up following hospital admission where she began chemotherapy for her high risk GTN.   She has received 2 cycles of induction EP. Here today to discuss next steps. She reports scant brown vaginal discharge. Cough is the same. Denies any abdominal pain.             Regarding her diagnosis, she is  and had an uncomplicated 2nd pregnancy and delivered via planned repeat  in 2022. She reports having resumed menstruation post-partum and believes her LMP to be 22. She lives in [place] and is in the area visiting her parents. She used to live in the area.      She contracted COVID-19 3 weeks ago and developed persistent shortness of breath. She was evaluated by her previous PCP from when she used to live around here Dr. Hinton. CBC showed anemia and she was found to be tachycardic to the 140s. She then developed left leg pain and was sent to the ER on 1/10/23 for VTE evaluation. Upon arrival, she was found to be severely hyperthyroid, tachycardic, anemic (hgb 6.9), with a mildly elevated serum HCG of 444. Pelvic US showed no intra-uterine pregnancy, bilateral complex ovarian cysts, and material in the endometrial cavity. CTPA showed no PE, but innumerable lung metastases. She underwent an image-guided biopsy of the largest right lower lobe lesion which showed benign liver tissue.    She began chemotherapy regimen inpatient with plan for EP induction for 2-3 cycles followed by EMA-EP for 3 cycles past negative hcg levels. Successfully received first induction cycle 23-23. Second induction 23-23. Bhcg downtrending 23.   Recieving Neupogen - last given 23 (ANC 1400). She was followed by Endocrine, Dr. Hodge  inpatient for thyroid storm. T3/T4 WNL as of 23.       Medical History:   Past Medical History:   Diagnosis Date   • Cervical cancer (CMS/HCC)    • Hx antineoplastic chemo        Surgical History:   Past Surgical History:   Procedure Laterality Date   •  SECTION      x2       Gynecologic/Obstetric History:  Menstrual History:  OB History        2    Para   2    Term   2            AB        Living   2       SAB        IAB        Ectopic        Multiple        Live Births   2                No LMP recorded.         Social History:   Social History     Tobacco Use   • Smoking status: Never   • Smokeless tobacco: Never   Vaping Use   • Vaping Use: Never used   Substance Use Topics   • Alcohol use: Not Currently   • Drug use: Never       Family History:   Family History   Problem Relation Age of Onset   • Hypertension Biological Mother    • Hypertension Biological Father        Allergies: Patient has no known allergies.    Medications:   Current Outpatient Medications   Medication Sig Dispense Refill   • benzonatate (TESSALON) 100 mg capsule Take 1 capsule (100 mg total) by mouth 3 (three) times a day as needed for cough for up to 10 days. 90 capsule 0   • buPROPion XL (WELLBUTRIN XL) 150 mg 24 hr tablet Take 150 mg by mouth daily.     • methIMAzole (TAPAZOLE) 5 mg tablet Take 1 tablet (5 mg total) by mouth daily. 30 tablet 1   • metoprolol tartrate (LOPRESSOR) 25 mg tablet Take 0.5 tablets (12.5 mg total) by mouth daily with breakfast. 15 tablet 0   • ondansetron ODT (ZOFRAN-ODT) 4 mg disintegrating tablet Take 1-2 tablets (4-8 mg total) by mouth every 6 (six) hours as needed for nausea or vomiting (Nausea/Vomiting). Maximum 32mg per day. 30 tablet 1   • prochlorperazine (COMPAZINE) 5 mg tablet Take 1-2 tablets (5-10 mg total) by mouth every 6 (six) hours as needed for nausea or vomiting. This medication can make you sleepy. 60 tablet 0   • protriptyline (VIVACTIL) 5 mg tablet Take 3 tablets  "(15 mg total) by mouth daily. 90 tablet 0   • leucovorin 5 mg tablet Take 3 tablets (15 mg total) by mouth every 12 (twelve) hours for 4 doses. Take with a full glass of water.    TAKE ON DAY 2 and day 3 of cycle ONLY; call with any questions 12 tablet 7     No current facility-administered medications for this visit.       Physical Exam:  Vital Signs:  Visit Vitals  /70 (BP Location: Left upper arm, Patient Position: Sitting)   Pulse (!) 110   Temp 36.5 °C (97.7 °F) (Oral)   Ht 1.626 m (5' 4\")   Wt 75.3 kg (166 lb)   SpO2 95%   BMI 28.49 kg/m²       General: well-developed, well-nourished, no apparent distress        ASSESEMENT/PLAN:  32 y.o. lady with high risk GTN here to discuss next steps  Problem List Items Addressed This Visit        Circulatory    Tachycardia       Endocrine/Metabolic    Hyperthyroidism       Hematologic    Drug-induced neutropenia (CMS/HCC) - Primary    Current Assessment & Plan     Grade 4. To receive g-csf with each week of chemo            Obstetric    Gestational trophoblastic neoplasm    Current Assessment & Plan     Plan for EMA- EP to start on 1/30.  The patient was extensively counseled on the risks of the porposed chemotherapy regimen    She will receive G-CSF to help with her Grade 4 neutropenia.     Risks include:    General: fatigue, myalgias and flu-like symptoms    Hematologic:  Neutropenia leading to increased risk of infection and more serious infections  Anemia leading to fatigue and possibly needing transfusion  Thrombocytopenia leading to easy bruising/bleeding and possibly needing transfusion  Small risk of myelodysplastic syndrome, even hematologic cancers    GI:  Constipation is almost universal. A good bowel regimen was discussed.  Diarrhea is less likely but can occur.    Neuro:  Neuropathy, usually mild but can be a dose-limiting toxicity. Sometimes this can be permanent    Skin/soft tissue:  Total alopecia, which is usually temporary, Darkening of the nail " beds, which is cosmetic and not harmful, Pastor-plantar erythrodysesthesia and Extravasation injury.    Allergy:  Infusion reaction, which is usually mild and self-limited, but occasionally can be anaphylaxis-like and life-threatening    Organ function:  Kidney dysfunction, even kidney failure, Liver dysfunction, even liver failure and Heart failure, which is usually dose-dependent     Fertility and teratogenicity:  MTX can cause significant anomalies. She is on strict pelvic rest during her treatment.            Chemotherapy consent was signed today.

## 2023-01-25 NOTE — TELEPHONE ENCOUNTER
"Called [employer] to clarify details of Zarxio () prior authorization. Authorization #: [id number]. She confirms it has been approved for 999 units from 1/23/23 to 2/23/23. Place of Service:Outpatient. Call reference #: Glory SOUZA Determination letter scanned to media section of chart.     Addendum 1/26/23: Call made to [employer] to further clarify Zarxio () prior authorization. Spoke with representative Wai FLORES She states that Dr. Hogue NPI only pertains to the \"Requesting Provider.\" She states that [employer] does not use an NPI for the \"Service Details/Place of Service\". She further clarified that this authorization is approved for any outpatient setting; which would include outpatient hospital setting or office.   Call reference #: Anibal FLORES, 1/26/23  "

## 2023-01-25 NOTE — PROGRESS NOTES
1445 Pt arrived to 6w/infusion room for #1 Zario( filgrastim -sndz) 300 mcg sq.  ID verified. Pt without complaints.  1522 Zario 300 mcg sq adm Lt post upper arm without difficulty. Pt tolerated well.  Education completed. Pt discharged to home in stable condition.

## 2023-01-26 ENCOUNTER — HOSPITAL ENCOUNTER (OUTPATIENT)
Dept: INPATIENT UNIT | Facility: HOSPITAL | Age: 33
Discharge: HOME | End: 2023-01-26
Attending: PHYSICIAN ASSISTANT
Payer: COMMERCIAL

## 2023-01-26 DIAGNOSIS — C58 GESTATIONAL CHORIOCARCINOMA (CMS/HCC)  (CMS/HCC): Primary | ICD-10-CM

## 2023-01-26 DIAGNOSIS — Z51.11 ENCOUNTER FOR ANTINEOPLASTIC CHEMOTHERAPY: ICD-10-CM

## 2023-01-26 DIAGNOSIS — O02.89 GESTATIONAL CHORIOCARCINOMA (CMS/HCC)  (CMS/HCC): Primary | ICD-10-CM

## 2023-01-26 PROBLEM — D70.2 DRUG-INDUCED NEUTROPENIA (CMS/HCC): Status: ACTIVE | Noted: 2023-01-26

## 2023-01-26 PROBLEM — O01.9 GESTATIONAL TROPHOBLASTIC NEOPLASM: Status: ACTIVE | Noted: 2023-01-11

## 2023-01-26 PROCEDURE — 63600000 HC DRUGS/DETAIL CODE: Mod: JG | Performed by: PHYSICIAN ASSISTANT

## 2023-01-26 PROCEDURE — 96372 THER/PROPH/DIAG INJ SC/IM: CPT

## 2023-01-26 PROCEDURE — 3E013GC INTRODUCTION OF OTHER THERAPEUTIC SUBSTANCE INTO SUBCUTANEOUS TISSUE, PERCUTANEOUS APPROACH: ICD-10-PCS | Performed by: PHYSICIAN ASSISTANT

## 2023-01-26 RX ORDER — LEUCOVORIN CALCIUM 5 MG/1
15 TABLET ORAL EVERY 12 HOURS
Qty: 12 TABLET | Refills: 7 | Status: SHIPPED | OUTPATIENT
Start: 2023-01-26 | End: 2023-01-31 | Stop reason: HOSPADM

## 2023-01-26 RX ADMIN — FILGRASTIM-SNDZ 300 MCG: 300 INJECTION, SOLUTION INTRAVENOUS; SUBCUTANEOUS at 13:30

## 2023-01-26 NOTE — ASSESSMENT & PLAN NOTE
Plan for EMA- EP to start on 1/30.  The patient was extensively counseled on the risks of the porposed chemotherapy regimen    She will receive G-CSF to help with her Grade 4 neutropenia.     Risks include:    General: fatigue, myalgias and flu-like symptoms    Hematologic:  Neutropenia leading to increased risk of infection and more serious infections  Anemia leading to fatigue and possibly needing transfusion  Thrombocytopenia leading to easy bruising/bleeding and possibly needing transfusion  Small risk of myelodysplastic syndrome, even hematologic cancers    GI:  Constipation is almost universal. A good bowel regimen was discussed.  Diarrhea is less likely but can occur.    Neuro:  Neuropathy, usually mild but can be a dose-limiting toxicity. Sometimes this can be permanent    Skin/soft tissue:  Total alopecia, which is usually temporary, Darkening of the nail beds, which is cosmetic and not harmful, Pastor-plantar erythrodysesthesia and Extravasation injury.    Allergy:  Infusion reaction, which is usually mild and self-limited, but occasionally can be anaphylaxis-like and life-threatening    Organ function:  Kidney dysfunction, even kidney failure, Liver dysfunction, even liver failure and Heart failure, which is usually dose-dependent     Fertility and teratogenicity:  MTX can cause significant anomalies. She is on strict pelvic rest during her treatment.            Chemotherapy consent was signed today.

## 2023-01-26 NOTE — PROGRESS NOTES
1310- Pt arrived on 6W to receive Zarxio injection.  ID bracelet verified, pt oriented to room 606, assessment complete.  Pharmacy contacted to prepare.     1330- Pt given Zarxio 300mcg SQ, posterior L upper arm per MD order.  Pt tolerated well, VSS, no signs or symptoms of reaction, no c/o.  Pt given discharge instructions/education.  Pt verbalized understanding.

## 2023-01-27 ENCOUNTER — PATIENT OUTREACH (OUTPATIENT)
Dept: HEMATOLOGY/ONCOLOGY | Facility: HOSPITAL | Age: 33
End: 2023-01-27

## 2023-01-27 ENCOUNTER — TELEPHONE (OUTPATIENT)
Dept: GYNECOLOGIC ONCOLOGY | Facility: CLINIC | Age: 33
End: 2023-01-27
Payer: COMMERCIAL

## 2023-01-27 ENCOUNTER — HOSPITAL ENCOUNTER (OUTPATIENT)
Dept: INPATIENT UNIT | Facility: HOSPITAL | Age: 33
Discharge: HOME | End: 2023-01-27
Attending: PHYSICIAN ASSISTANT
Payer: COMMERCIAL

## 2023-01-27 DIAGNOSIS — K12.31 MUCOSITIS DUE TO CHEMOTHERAPY: Primary | ICD-10-CM

## 2023-01-27 DIAGNOSIS — O01.9 GESTATIONAL TROPHOBLASTIC NEOPLASM: ICD-10-CM

## 2023-01-27 DIAGNOSIS — Z51.11 ENCOUNTER FOR ANTINEOPLASTIC CHEMOTHERAPY: ICD-10-CM

## 2023-01-27 DIAGNOSIS — C58 GESTATIONAL CHORIOCARCINOMA (CMS/HCC)  (CMS/HCC): ICD-10-CM

## 2023-01-27 DIAGNOSIS — O02.89 GESTATIONAL CHORIOCARCINOMA (CMS/HCC)  (CMS/HCC): ICD-10-CM

## 2023-01-27 LAB
ALBUMIN SERPL-MCNC: 3.1 G/DL (ref 3.4–5)
ALP SERPL-CCNC: 138 IU/L (ref 35–126)
ALT SERPL-CCNC: 22 IU/L (ref 11–54)
ANION GAP SERPL CALC-SCNC: 7 MEQ/L (ref 3–15)
AST SERPL-CCNC: 13 IU/L (ref 15–41)
BASOPHILS # BLD: 0 K/UL (ref 0.01–0.1)
BASOPHILS NFR BLD: 0 %
BILIRUB SERPL-MCNC: 0.7 MG/DL (ref 0.3–1.2)
BUN SERPL-MCNC: 9 MG/DL (ref 8–20)
CALCIUM SERPL-MCNC: 8.4 MG/DL (ref 8.9–10.3)
CHLORIDE SERPL-SCNC: 107 MEQ/L (ref 98–109)
CO2 SERPL-SCNC: 19 MEQ/L (ref 22–32)
CREAT SERPL-MCNC: 0.7 MG/DL (ref 0.6–1.1)
DIFFERENTIAL METHOD BLD: ABNORMAL
EOSINOPHIL # BLD: 0.03 K/UL (ref 0.04–0.36)
EOSINOPHIL NFR BLD: 4 %
ERYTHROCYTE [DISTWIDTH] IN BLOOD BY AUTOMATED COUNT: 12.3 % (ref 11.7–14.4)
GFR SERPL CREATININE-BSD FRML MDRD: >60 ML/MIN/1.73M*2
GLUCOSE SERPL-MCNC: 105 MG/DL (ref 70–99)
HCG SERPL-ACNC: ABNORMAL IU/L (MIU/ML)
HCT VFR BLDCO AUTO: 27.7 % (ref 35–45)
HGB BLD-MCNC: 9.3 G/DL (ref 11.8–15.7)
LYMPHOCYTES # BLD: 0.56 K/UL (ref 1.2–3.5)
LYMPHOCYTES NFR BLD: 78 %
MAGNESIUM SERPL-MCNC: 1.5 MG/DL (ref 1.8–2.5)
MCH RBC QN AUTO: 27.4 PG (ref 28–33.2)
MCHC RBC AUTO-ENTMCNC: 33.6 G/DL (ref 32.2–35.5)
MCV RBC AUTO: 81.5 FL (ref 83–98)
MONOCYTES # BLD: 0.08 K/UL (ref 0.28–0.8)
MONOCYTES NFR BLD: 11 %
NEUTROPHILS # BLD: 0.03 K/UL (ref 1.7–7)
NEUTS BAND # BLD: 0.05 K/UL (ref 1.7–7)
NEUTS SEG NFR BLD: 7 %
PDW BLD AUTO: 10.1 FL (ref 9.4–12.3)
PLATELET # BLD AUTO: 75 K/UL (ref 150–369)
PLATELET # BLD EST: ABNORMAL 10*3/UL
POTASSIUM SERPL-SCNC: 3.6 MEQ/L (ref 3.6–5.1)
PROT SERPL-MCNC: 5.6 G/DL (ref 6–8.2)
RBC # BLD AUTO: 3.4 M/UL (ref 3.93–5.22)
SODIUM SERPL-SCNC: 133 MEQ/L (ref 136–144)
WBC # BLD AUTO: 0.72 K/UL (ref 3.8–10.5)

## 2023-01-27 PROCEDURE — 36415 COLL VENOUS BLD VENIPUNCTURE: CPT | Performed by: PHYSICIAN ASSISTANT

## 2023-01-27 PROCEDURE — 96372 THER/PROPH/DIAG INJ SC/IM: CPT

## 2023-01-27 PROCEDURE — 83735 ASSAY OF MAGNESIUM: CPT | Performed by: PHYSICIAN ASSISTANT

## 2023-01-27 PROCEDURE — 80053 COMPREHEN METABOLIC PANEL: CPT | Performed by: PHYSICIAN ASSISTANT

## 2023-01-27 PROCEDURE — 96523 IRRIG DRUG DELIVERY DEVICE: CPT

## 2023-01-27 PROCEDURE — 84702 CHORIONIC GONADOTROPIN TEST: CPT | Performed by: PHYSICIAN ASSISTANT

## 2023-01-27 PROCEDURE — 3E013GC INTRODUCTION OF OTHER THERAPEUTIC SUBSTANCE INTO SUBCUTANEOUS TISSUE, PERCUTANEOUS APPROACH: ICD-10-PCS | Performed by: PHYSICIAN ASSISTANT

## 2023-01-27 PROCEDURE — 63600000 HC DRUGS/DETAIL CODE: Mod: JG | Performed by: PHYSICIAN ASSISTANT

## 2023-01-27 PROCEDURE — 85025 COMPLETE CBC W/AUTO DIFF WBC: CPT | Performed by: PHYSICIAN ASSISTANT

## 2023-01-27 RX ORDER — FAMOTIDINE 10 MG/ML
20 INJECTION INTRAVENOUS ONCE AS NEEDED
Status: CANCELLED | OUTPATIENT
Start: 2023-01-31

## 2023-01-27 RX ORDER — EPINEPHRINE 1 MG/ML
0.5 INJECTION, SOLUTION INTRAMUSCULAR; SUBCUTANEOUS ONCE AS NEEDED
Status: CANCELLED | OUTPATIENT
Start: 2023-02-01

## 2023-01-27 RX ORDER — PALONOSETRON 0.05 MG/ML
250 INJECTION, SOLUTION INTRAVENOUS ONCE
Status: CANCELLED | OUTPATIENT
Start: 2023-02-06

## 2023-01-27 RX ORDER — FAMOTIDINE 10 MG/ML
20 INJECTION INTRAVENOUS ONCE AS NEEDED
Status: CANCELLED | OUTPATIENT
Start: 2023-02-01

## 2023-01-27 RX ORDER — EPINEPHRINE 1 MG/ML
0.5 INJECTION, SOLUTION INTRAMUSCULAR; SUBCUTANEOUS ONCE AS NEEDED
Status: CANCELLED | OUTPATIENT
Start: 2023-02-04

## 2023-01-27 RX ORDER — EPINEPHRINE 1 MG/ML
0.5 INJECTION, SOLUTION INTRAMUSCULAR; SUBCUTANEOUS ONCE AS NEEDED
Status: CANCELLED | OUTPATIENT
Start: 2023-02-06

## 2023-01-27 RX ORDER — EPINEPHRINE 1 MG/ML
0.5 INJECTION, SOLUTION INTRAMUSCULAR; SUBCUTANEOUS ONCE AS NEEDED
Status: CANCELLED | OUTPATIENT
Start: 2023-01-31

## 2023-01-27 RX ORDER — FAMOTIDINE 10 MG/ML
20 INJECTION INTRAVENOUS ONCE AS NEEDED
Status: CANCELLED | OUTPATIENT
Start: 2023-02-06

## 2023-01-27 RX ORDER — LEUCOVORIN CALCIUM 5 MG/1
15 TABLET ORAL
Status: CANCELLED | OUTPATIENT
Start: 2023-01-31

## 2023-01-27 RX ORDER — SODIUM CHLORIDE 9 MG/ML
500 INJECTION, SOLUTION INTRAVENOUS CONTINUOUS
Status: CANCELLED | OUTPATIENT
Start: 2023-02-06

## 2023-01-27 RX ORDER — LEUCOVORIN CALCIUM 5 MG/1
15 TABLET ORAL
Status: CANCELLED | OUTPATIENT
Start: 2023-02-01

## 2023-01-27 RX ORDER — FAMOTIDINE 10 MG/ML
20 INJECTION INTRAVENOUS ONCE AS NEEDED
Status: CANCELLED | OUTPATIENT
Start: 2023-02-04

## 2023-01-27 RX ADMIN — FILGRASTIM-SNDZ 300 MCG: 300 INJECTION, SOLUTION INTRAVENOUS; SUBCUTANEOUS at 13:35

## 2023-01-27 NOTE — TELEPHONE ENCOUNTER
Called leslie back  With change of instruction for her sore throat. I informed her that Dr. Mcmullen says her sore throat could be a side effect of Etoposide and she is ordering magic mouth wash to her pharmacy. We confirmed [facility] in [place]. I read the instructions to Leslie stating she should swish and spit the medication out, take 15 mls and she an take as needed every 4 hours. She understood and agreed to the above

## 2023-01-27 NOTE — PROGRESS NOTES
1320 Pt arrived to 6w/infusion room for inj /lab drawn.  Labs drawn via Rt arm picc.  Both lumens flushed.  Zarxio 300 mcg sq adm Lt post upper arm. Pt tolerated well.  Iv team changed dressing on picc site.  Pt discharged to home in stable condition.

## 2023-01-27 NOTE — TELEPHONE ENCOUNTER
Called patient to review details of her chemotherapy treatment plan;  1. Admissions will call her Sunday to give her arrival time for Sunday evening admission to [facility]  2. Patient is scheduled for Zarxio injection at [facility] for the following dates at 2:30pm; 2/1, 2/2, 2/3, 2/7, 2/8, 2/9, 2/10. Pre chemo blood work will be done on both Friday appointments (2/3 and 2/10).   3. Counseled patient on taking Folinic Acid- morning and night on day 2 and 3 of chemotherapy cycle. Emailed patient a copy of her updated chemo calendar, showing when to take Folinic Acid  4. Plan for PICC dressing change tomorrow on [facility]. Working on the plan for future dressing changes, will refer patient to Home Health. She states her  is willing to learn how to manage.

## 2023-01-27 NOTE — TELEPHONE ENCOUNTER
Called patient about issue filling her Rx for mouth wash. Rx magic mouth wash sent to [facility] which is the closest compounding pharmacy. Advised po hydration. Can use orajel right before drinking/eating, to maintain po intake.

## 2023-01-28 NOTE — PATIENT INSTRUCTIONS
Nurse Navigators can assist you during your diagnosis, testing, and / or treatment. Some things the Nurse can do to help are listed here. Most importantly we will have a caring relationship with you and your family so that you have support throughout your treatment.    Provide personalized support and guidance   Provide emotional support for patients and families by partnering in your care   Act as a liaison between you and your health care providers  Assist with coordination of care   Explain treatment plans more fully  Answer questions that you may have about clinical care (such as: testing, pathology, medications)  Provide education and assistance in preparing for surgical procedures and post- operative care  Supply appropriate educational materials  Link you to resources available to help with your care i.e., nutrition, social work  Provide information on integrative care (such as: massage, acupuncture, Reiki, yoga, Aromatherapy) which may be accessible through your insurance provider  Answer questions related to treatment side effects  Discuss and refer to genetic counseling when needed  Refer to  for financial and emotional support  Follow up with you at regular intervals prior, during and after treatment  Provide a survivorship plan at end of treatment for you and your primary care provider  Shabana Atkins RN Navigator   [facility] [phone]

## 2023-01-29 ENCOUNTER — HOSPITAL ENCOUNTER (EMERGENCY)
Facility: HOSPITAL | Age: 33
End: 2023-01-29
Payer: COMMERCIAL

## 2023-01-29 ENCOUNTER — HOSPITAL ENCOUNTER (INPATIENT)
Facility: HOSPITAL | Age: 33
LOS: 2 days | Discharge: HOME | DRG: 847 | End: 2023-01-31
Attending: STUDENT IN AN ORGANIZED HEALTH CARE EDUCATION/TRAINING PROGRAM | Admitting: OBSTETRICS & GYNECOLOGY
Payer: COMMERCIAL

## 2023-01-29 DIAGNOSIS — O01.9 GESTATIONAL TROPHOBLASTIC NEOPLASM: Primary | ICD-10-CM

## 2023-01-29 LAB
ALBUMIN SERPL-MCNC: 3 G/DL (ref 3.4–5)
ALP SERPL-CCNC: 121 IU/L (ref 35–126)
ALT SERPL-CCNC: 19 IU/L (ref 11–54)
ANION GAP SERPL CALC-SCNC: 8 MEQ/L (ref 3–15)
AST SERPL-CCNC: 12 IU/L (ref 15–41)
BASOPHILS # BLD: 0.01 K/UL (ref 0.01–0.1)
BASOPHILS NFR BLD: 1 %
BILIRUB SERPL-MCNC: 0.2 MG/DL (ref 0.3–1.2)
BUN SERPL-MCNC: 7 MG/DL (ref 8–20)
CALCIUM SERPL-MCNC: 8.3 MG/DL (ref 8.9–10.3)
CHLORIDE SERPL-SCNC: 106 MEQ/L (ref 98–109)
CO2 SERPL-SCNC: 19 MEQ/L (ref 22–32)
CREAT SERPL-MCNC: 0.8 MG/DL (ref 0.6–1.1)
DIFFERENTIAL METHOD BLD: ABNORMAL
EOSINOPHIL # BLD: 0 K/UL (ref 0.04–0.36)
EOSINOPHIL NFR BLD: 0 %
ERYTHROCYTE [DISTWIDTH] IN BLOOD BY AUTOMATED COUNT: 12.2 % (ref 11.7–14.4)
GFR SERPL CREATININE-BSD FRML MDRD: >60 ML/MIN/1.73M*2
GLUCOSE SERPL-MCNC: 96 MG/DL (ref 70–99)
HCT VFR BLDCO AUTO: 25.2 % (ref 35–45)
HGB BLD-MCNC: 8.9 G/DL (ref 11.8–15.7)
HYPOCHROMIA BLD QL SMEAR: ABNORMAL
LYMPHOCYTES # BLD: 0.75 K/UL (ref 1.2–3.5)
LYMPHOCYTES NFR BLD: 72 %
MAGNESIUM SERPL-MCNC: 1.5 MG/DL (ref 1.8–2.5)
MCH RBC QN AUTO: 27.7 PG (ref 28–33.2)
MCHC RBC AUTO-ENTMCNC: 35.3 G/DL (ref 32.2–35.5)
MCV RBC AUTO: 78.5 FL (ref 83–98)
MONOCYTES # BLD: 0.21 K/UL (ref 0.28–0.8)
MONOCYTES NFR BLD: 20 %
NEUTS BAND # BLD: 0.07 K/UL (ref 1.7–7)
NEUTS SEG NFR BLD: 7 %
PDW BLD AUTO: 10.5 FL (ref 9.4–12.3)
PHOSPHATE SERPL-MCNC: 3.3 MG/DL (ref 2.4–4.7)
PLAT MORPH BLD: NORMAL
PLATELET # BLD AUTO: 99 K/UL (ref 150–369)
PLATELET # BLD EST: ABNORMAL 10*3/UL
POLYCHROMASIA BLD QL SMEAR: ABNORMAL
POTASSIUM SERPL-SCNC: 3.5 MEQ/L (ref 3.6–5.1)
PROT SERPL-MCNC: 5.4 G/DL (ref 6–8.2)
RBC # BLD AUTO: 3.21 M/UL (ref 3.93–5.22)
SODIUM SERPL-SCNC: 133 MEQ/L (ref 136–144)
T3FREE SERPL-MCNC: 2.81 PG/ML (ref 2.1–3.7)
T4 FREE SERPL-MCNC: 0.59 NG/DL (ref 0.58–1.64)
TSH SERPL DL<=0.05 MIU/L-ACNC: 0.09 MIU/L (ref 0.34–5.6)
WBC # BLD AUTO: 1.04 K/UL (ref 3.8–10.5)

## 2023-01-29 PROCEDURE — 84100 ASSAY OF PHOSPHORUS: CPT | Performed by: OBSTETRICS & GYNECOLOGY

## 2023-01-29 PROCEDURE — 63700000 HC SELF-ADMINISTRABLE DRUG: Performed by: STUDENT IN AN ORGANIZED HEALTH CARE EDUCATION/TRAINING PROGRAM

## 2023-01-29 PROCEDURE — 84439 ASSAY OF FREE THYROXINE: CPT | Performed by: OBSTETRICS & GYNECOLOGY

## 2023-01-29 PROCEDURE — 36415 COLL VENOUS BLD VENIPUNCTURE: CPT | Performed by: OBSTETRICS & GYNECOLOGY

## 2023-01-29 PROCEDURE — 80053 COMPREHEN METABOLIC PANEL: CPT | Performed by: OBSTETRICS & GYNECOLOGY

## 2023-01-29 PROCEDURE — 99222 1ST HOSP IP/OBS MODERATE 55: CPT | Performed by: STUDENT IN AN ORGANIZED HEALTH CARE EDUCATION/TRAINING PROGRAM

## 2023-01-29 PROCEDURE — 84702 CHORIONIC GONADOTROPIN TEST: CPT | Performed by: OBSTETRICS & GYNECOLOGY

## 2023-01-29 PROCEDURE — 83735 ASSAY OF MAGNESIUM: CPT | Performed by: OBSTETRICS & GYNECOLOGY

## 2023-01-29 PROCEDURE — 21400000 HC ROOM AND CARE CCU/INTERMEDIATE

## 2023-01-29 PROCEDURE — 84481 FREE ASSAY (FT-3): CPT | Performed by: OBSTETRICS & GYNECOLOGY

## 2023-01-29 PROCEDURE — 63600000 HC DRUGS/DETAIL CODE: Performed by: STUDENT IN AN ORGANIZED HEALTH CARE EDUCATION/TRAINING PROGRAM

## 2023-01-29 PROCEDURE — 63700000 HC SELF-ADMINISTRABLE DRUG: Performed by: OBSTETRICS & GYNECOLOGY

## 2023-01-29 PROCEDURE — 85025 COMPLETE CBC W/AUTO DIFF WBC: CPT | Performed by: OBSTETRICS & GYNECOLOGY

## 2023-01-29 PROCEDURE — 84443 ASSAY THYROID STIM HORMONE: CPT | Performed by: OBSTETRICS & GYNECOLOGY

## 2023-01-29 RX ORDER — DEXTROSE 40 %
15-30 GEL (GRAM) ORAL AS NEEDED
Status: DISCONTINUED | OUTPATIENT
Start: 2023-01-29 | End: 2023-01-31 | Stop reason: HOSPADM

## 2023-01-29 RX ORDER — IBUPROFEN 200 MG
16-32 TABLET ORAL AS NEEDED
Status: DISCONTINUED | OUTPATIENT
Start: 2023-01-29 | End: 2023-01-31 | Stop reason: HOSPADM

## 2023-01-29 RX ORDER — IBUPROFEN 600 MG/1
600 TABLET ORAL EVERY 6 HOURS PRN
Status: DISCONTINUED | OUTPATIENT
Start: 2023-01-29 | End: 2023-01-31 | Stop reason: HOSPADM

## 2023-01-29 RX ORDER — DEXTROSE 50 % IN WATER (D50W) INTRAVENOUS SYRINGE
25 AS NEEDED
Status: DISCONTINUED | OUTPATIENT
Start: 2023-01-29 | End: 2023-01-31 | Stop reason: HOSPADM

## 2023-01-29 RX ORDER — BUPROPION HYDROCHLORIDE 150 MG/1
150 TABLET ORAL DAILY
Status: DISCONTINUED | OUTPATIENT
Start: 2023-01-29 | End: 2023-01-31 | Stop reason: HOSPADM

## 2023-01-29 RX ORDER — METHIMAZOLE 5 MG/1
5 TABLET ORAL DAILY
Status: DISCONTINUED | OUTPATIENT
Start: 2023-01-29 | End: 2023-01-31 | Stop reason: HOSPADM

## 2023-01-29 RX ORDER — ACETAMINOPHEN 325 MG/1
975 TABLET ORAL EVERY 8 HOURS PRN
Status: DISCONTINUED | OUTPATIENT
Start: 2023-01-29 | End: 2023-01-31 | Stop reason: HOSPADM

## 2023-01-29 RX ORDER — BENZONATATE 100 MG/1
100 CAPSULE ORAL 3 TIMES DAILY PRN
Status: DISCONTINUED | OUTPATIENT
Start: 2023-01-29 | End: 2023-01-31 | Stop reason: HOSPADM

## 2023-01-29 RX ORDER — POTASSIUM CHLORIDE 750 MG/1
40 TABLET, EXTENDED RELEASE ORAL ONCE
Status: COMPLETED | OUTPATIENT
Start: 2023-01-29 | End: 2023-01-29

## 2023-01-29 RX ADMIN — MAGNESIUM SULFATE HEPTAHYDRATE 2 G: 40 INJECTION, SOLUTION INTRAVENOUS at 18:55

## 2023-01-29 RX ADMIN — BENZONATATE 100 MG: 100 CAPSULE ORAL at 17:47

## 2023-01-29 RX ADMIN — METHIMAZOLE 5 MG: 5 TABLET ORAL at 17:03

## 2023-01-29 RX ADMIN — DIPHENHYDRAMINE HYDROCHLORIDE AND LIDOCAINE HYDROCHLORIDE AND ALUMINUM HYDROXIDE AND MAGNESIUM HYDRO 15 ML: KIT at 18:30

## 2023-01-29 RX ADMIN — POTASSIUM CHLORIDE 40 MEQ: 750 TABLET, EXTENDED RELEASE ORAL at 18:55

## 2023-01-29 RX ADMIN — BUPROPION HYDROCHLORIDE 150 MG: 150 TABLET, EXTENDED RELEASE ORAL at 17:47

## 2023-01-29 RX ADMIN — IBUPROFEN 600 MG: 600 TABLET, FILM COATED ORAL at 20:32

## 2023-01-30 LAB
ALBUMIN SERPL-MCNC: 3 G/DL (ref 3.4–5)
ALP SERPL-CCNC: 115 IU/L (ref 35–126)
ALT SERPL-CCNC: 18 IU/L (ref 11–54)
ANION GAP SERPL CALC-SCNC: 6 MEQ/L (ref 3–15)
AST SERPL-CCNC: 11 IU/L (ref 15–41)
BASOPHILS # BLD: 0.01 K/UL (ref 0.01–0.1)
BASOPHILS NFR BLD: 1 %
BILIRUB SERPL-MCNC: 0.3 MG/DL (ref 0.3–1.2)
BUN SERPL-MCNC: 5 MG/DL (ref 8–20)
CALCIUM SERPL-MCNC: 8.1 MG/DL (ref 8.9–10.3)
CHLORIDE SERPL-SCNC: 111 MEQ/L (ref 98–109)
CO2 SERPL-SCNC: 19 MEQ/L (ref 22–32)
CREAT SERPL-MCNC: 0.8 MG/DL (ref 0.6–1.1)
DIFFERENTIAL METHOD BLD: ABNORMAL
EOSINOPHIL # BLD: 0.01 K/UL (ref 0.04–0.36)
EOSINOPHIL NFR BLD: 1 %
ERYTHROCYTE [DISTWIDTH] IN BLOOD BY AUTOMATED COUNT: 12.3 % (ref 11.7–14.4)
GFR SERPL CREATININE-BSD FRML MDRD: >60 ML/MIN/1.73M*2
GIANT PLATELETS BLD QL SMEAR: ABNORMAL
GLUCOSE SERPL-MCNC: 85 MG/DL (ref 70–99)
HCT VFR BLDCO AUTO: 27.9 % (ref 35–45)
HGB BLD-MCNC: 9.1 G/DL (ref 11.8–15.7)
HYPOCHROMIA BLD QL SMEAR: ABNORMAL
LYMPHOCYTES # BLD: 0.79 K/UL (ref 1.2–3.5)
LYMPHOCYTES NFR BLD: 65 %
MAGNESIUM SERPL-MCNC: 2.1 MG/DL (ref 1.8–2.5)
MCH RBC QN AUTO: 27.5 PG (ref 28–33.2)
MCHC RBC AUTO-ENTMCNC: 32.6 G/DL (ref 32.2–35.5)
MCV RBC AUTO: 84.3 FL (ref 83–98)
MONOCYTES # BLD: 0.26 K/UL (ref 0.28–0.8)
MONOCYTES NFR BLD: 21 %
MYELOCYTES # BLD MANUAL: 0.01 K/UL
MYELOCYTES NFR BLD MANUAL: 1 %
NEUTS BAND # BLD: 0.02 K/UL (ref 0–0.53)
NEUTS BAND # BLD: 0.11 K/UL (ref 1.7–7)
NEUTS BAND NFR BLD: 2 %
NEUTS SEG NFR BLD: 9 %
PDW BLD AUTO: 9.6 FL (ref 9.4–12.3)
PLATELET # BLD AUTO: 104 K/UL (ref 150–369)
PLATELET # BLD EST: ABNORMAL 10*3/UL
POLYCHROMASIA BLD QL SMEAR: ABNORMAL
POTASSIUM SERPL-SCNC: 4.4 MEQ/L (ref 3.6–5.1)
PROT SERPL-MCNC: 5.4 G/DL (ref 6–8.2)
RBC # BLD AUTO: 3.31 M/UL (ref 3.93–5.22)
SODIUM SERPL-SCNC: 136 MEQ/L (ref 136–144)
WBC # BLD AUTO: 1.22 K/UL (ref 3.8–10.5)

## 2023-01-30 PROCEDURE — 63700000 HC SELF-ADMINISTRABLE DRUG: Performed by: OBSTETRICS & GYNECOLOGY

## 2023-01-30 PROCEDURE — 99232 SBSQ HOSP IP/OBS MODERATE 35: CPT | Performed by: STUDENT IN AN ORGANIZED HEALTH CARE EDUCATION/TRAINING PROGRAM

## 2023-01-30 PROCEDURE — 63600000 HC DRUGS/DETAIL CODE: Performed by: STUDENT IN AN ORGANIZED HEALTH CARE EDUCATION/TRAINING PROGRAM

## 2023-01-30 PROCEDURE — 21400000 HC ROOM AND CARE CCU/INTERMEDIATE

## 2023-01-30 PROCEDURE — 36415 COLL VENOUS BLD VENIPUNCTURE: CPT | Performed by: STUDENT IN AN ORGANIZED HEALTH CARE EDUCATION/TRAINING PROGRAM

## 2023-01-30 PROCEDURE — 85025 COMPLETE CBC W/AUTO DIFF WBC: CPT | Performed by: STUDENT IN AN ORGANIZED HEALTH CARE EDUCATION/TRAINING PROGRAM

## 2023-01-30 PROCEDURE — 63700000 HC SELF-ADMINISTRABLE DRUG: Performed by: STUDENT IN AN ORGANIZED HEALTH CARE EDUCATION/TRAINING PROGRAM

## 2023-01-30 PROCEDURE — 3E03305 INTRODUCTION OF OTHER ANTINEOPLASTIC INTO PERIPHERAL VEIN, PERCUTANEOUS APPROACH: ICD-10-PCS | Performed by: INTERNAL MEDICINE

## 2023-01-30 PROCEDURE — 83735 ASSAY OF MAGNESIUM: CPT | Performed by: STUDENT IN AN ORGANIZED HEALTH CARE EDUCATION/TRAINING PROGRAM

## 2023-01-30 PROCEDURE — 80053 COMPREHEN METABOLIC PANEL: CPT | Performed by: STUDENT IN AN ORGANIZED HEALTH CARE EDUCATION/TRAINING PROGRAM

## 2023-01-30 PROCEDURE — 25800000 HC PHARMACY IV SOLUTIONS: Performed by: STUDENT IN AN ORGANIZED HEALTH CARE EDUCATION/TRAINING PROGRAM

## 2023-01-30 PROCEDURE — 25000000 HC PHARMACY GENERAL: Performed by: STUDENT IN AN ORGANIZED HEALTH CARE EDUCATION/TRAINING PROGRAM

## 2023-01-30 RX ORDER — EPINEPHRINE 1 MG/ML
0.5 INJECTION, SOLUTION INTRAMUSCULAR; SUBCUTANEOUS ONCE AS NEEDED
Status: DISCONTINUED | OUTPATIENT
Start: 2023-01-30 | End: 2023-01-31

## 2023-01-30 RX ORDER — FAMOTIDINE 10 MG/ML
20 INJECTION INTRAVENOUS ONCE AS NEEDED
Status: DISCONTINUED | OUTPATIENT
Start: 2023-01-30 | End: 2023-01-31

## 2023-01-30 RX ORDER — DIPHENHYDRAMINE HCL 50 MG/ML
25 VIAL (ML) INJECTION ONCE AS NEEDED
Status: DISCONTINUED | OUTPATIENT
Start: 2023-01-30 | End: 2023-01-31

## 2023-01-30 RX ORDER — ONDANSETRON HYDROCHLORIDE 2 MG/ML
4 INJECTION, SOLUTION INTRAVENOUS EVERY 8 HOURS PRN
Status: DISCONTINUED | OUTPATIENT
Start: 2023-01-30 | End: 2023-01-31 | Stop reason: HOSPADM

## 2023-01-30 RX ADMIN — ACETAMINOPHEN 975 MG: 325 TABLET ORAL at 19:57

## 2023-01-30 RX ADMIN — DEXTROSE 184 MG: 5 SOLUTION INTRAVENOUS at 11:09

## 2023-01-30 RX ADMIN — BENZONATATE 100 MG: 100 CAPSULE ORAL at 06:56

## 2023-01-30 RX ADMIN — DACTINOMYCIN 0.5 MG: 0.5 INJECTION, POWDER, LYOPHILIZED, FOR SOLUTION INTRAVENOUS at 12:18

## 2023-01-30 RX ADMIN — DEXAMETHASONE SODIUM PHOSPHATE: 4 INJECTION, SOLUTION INTRA-ARTICULAR; INTRALESIONAL; INTRAMUSCULAR; INTRAVENOUS; SOFT TISSUE at 10:21

## 2023-01-30 RX ADMIN — ACETAMINOPHEN 975 MG: 325 TABLET ORAL at 06:55

## 2023-01-30 RX ADMIN — PROMETHAZINE HYDROCHLORIDE 6.25 MG: 25 INJECTION INTRAMUSCULAR; INTRAVENOUS at 23:05

## 2023-01-30 RX ADMIN — BENZONATATE 100 MG: 100 CAPSULE ORAL at 17:42

## 2023-01-30 RX ADMIN — METHOTREXATE 550 MG: 25 INJECTION, SOLUTION INTRA-ARTERIAL; INTRAMUSCULAR; INTRATHECAL; INTRAVENOUS at 12:35

## 2023-01-30 RX ADMIN — BUPROPION HYDROCHLORIDE 150 MG: 150 TABLET, EXTENDED RELEASE ORAL at 17:41

## 2023-01-30 RX ADMIN — METHIMAZOLE 5 MG: 5 TABLET ORAL at 08:34

## 2023-01-30 RX ADMIN — ONDANSETRON HYDROCHLORIDE 4 MG: 2 SOLUTION INTRAMUSCULAR; INTRAVENOUS at 19:21

## 2023-01-31 ENCOUNTER — DOCUMENTATION (OUTPATIENT)
Dept: GYNECOLOGIC ONCOLOGY | Facility: CLINIC | Age: 33
End: 2023-01-31
Payer: COMMERCIAL

## 2023-01-31 VITALS
TEMPERATURE: 97.4 F | SYSTOLIC BLOOD PRESSURE: 89 MMHG | HEART RATE: 83 BPM | OXYGEN SATURATION: 98 % | BODY MASS INDEX: 28.31 KG/M2 | HEIGHT: 64 IN | DIASTOLIC BLOOD PRESSURE: 54 MMHG | RESPIRATION RATE: 14 BRPM | WEIGHT: 165.8 LBS

## 2023-01-31 DIAGNOSIS — Z51.11 ENCOUNTER FOR ANTINEOPLASTIC CHEMOTHERAPY: ICD-10-CM

## 2023-01-31 DIAGNOSIS — O01.9 GESTATIONAL TROPHOBLASTIC NEOPLASM: Primary | ICD-10-CM

## 2023-01-31 LAB — HCG SERPL-ACNC: ABNORMAL IU/L (MIU/ML)

## 2023-01-31 PROCEDURE — 63700000 HC SELF-ADMINISTRABLE DRUG: Performed by: OBSTETRICS & GYNECOLOGY

## 2023-01-31 PROCEDURE — 63600000 HC DRUGS/DETAIL CODE: Mod: JG | Performed by: STUDENT IN AN ORGANIZED HEALTH CARE EDUCATION/TRAINING PROGRAM

## 2023-01-31 PROCEDURE — 63700000 HC SELF-ADMINISTRABLE DRUG: Performed by: STUDENT IN AN ORGANIZED HEALTH CARE EDUCATION/TRAINING PROGRAM

## 2023-01-31 PROCEDURE — 99232 SBSQ HOSP IP/OBS MODERATE 35: CPT | Performed by: STUDENT IN AN ORGANIZED HEALTH CARE EDUCATION/TRAINING PROGRAM

## 2023-01-31 PROCEDURE — 25000000 HC PHARMACY GENERAL: Performed by: STUDENT IN AN ORGANIZED HEALTH CARE EDUCATION/TRAINING PROGRAM

## 2023-01-31 PROCEDURE — 25800000 HC PHARMACY IV SOLUTIONS: Performed by: STUDENT IN AN ORGANIZED HEALTH CARE EDUCATION/TRAINING PROGRAM

## 2023-01-31 RX ORDER — EPINEPHRINE 1 MG/ML
0.5 INJECTION, SOLUTION INTRAMUSCULAR; SUBCUTANEOUS ONCE AS NEEDED
Status: DISCONTINUED | OUTPATIENT
Start: 2023-01-31 | End: 2023-01-31 | Stop reason: HOSPADM

## 2023-01-31 RX ORDER — LEUCOVORIN CALCIUM 5 MG/1
15 TABLET ORAL
Status: DISCONTINUED | OUTPATIENT
Start: 2023-01-31 | End: 2023-01-31 | Stop reason: HOSPADM

## 2023-01-31 RX ORDER — DIPHENHYDRAMINE HCL 50 MG/ML
25 VIAL (ML) INJECTION ONCE AS NEEDED
Status: DISCONTINUED | OUTPATIENT
Start: 2023-01-31 | End: 2023-01-31 | Stop reason: HOSPADM

## 2023-01-31 RX ORDER — FAMOTIDINE 10 MG/ML
20 INJECTION INTRAVENOUS ONCE AS NEEDED
Status: DISCONTINUED | OUTPATIENT
Start: 2023-01-31 | End: 2023-01-31 | Stop reason: HOSPADM

## 2023-01-31 RX ORDER — LEUCOVORIN CALCIUM 15 MG/1
15 TABLET ORAL
Qty: 3 TABLET | Refills: 0 | Status: SHIPPED | OUTPATIENT
Start: 2023-01-31 | End: 2023-02-02

## 2023-01-31 RX ADMIN — DEXAMETHASONE SODIUM PHOSPHATE: 4 INJECTION, SOLUTION INTRA-ARTICULAR; INTRALESIONAL; INTRAMUSCULAR; INTRAVENOUS; SOFT TISSUE at 12:12

## 2023-01-31 RX ADMIN — DACTINOMYCIN 0.5 MG: 0.5 INJECTION, POWDER, LYOPHILIZED, FOR SOLUTION INTRAVENOUS at 14:26

## 2023-01-31 RX ADMIN — METHIMAZOLE 5 MG: 5 TABLET ORAL at 08:51

## 2023-01-31 RX ADMIN — IBUPROFEN 600 MG: 600 TABLET, FILM COATED ORAL at 08:06

## 2023-01-31 RX ADMIN — BENZONATATE 100 MG: 100 CAPSULE ORAL at 05:36

## 2023-01-31 RX ADMIN — DEXTROSE MONOHYDRATE 184 MG: 50 INJECTION, SOLUTION INTRAVENOUS at 13:02

## 2023-01-31 RX ADMIN — ACETAMINOPHEN 975 MG: 325 TABLET ORAL at 14:55

## 2023-01-31 RX ADMIN — BENZONATATE 100 MG: 100 CAPSULE ORAL at 14:55

## 2023-01-31 RX ADMIN — LEUCOVORIN CALCIUM 15 MG: 5 TABLET ORAL at 13:07

## 2023-01-31 NOTE — PROGRESS NOTES
Called Home health services to see if Alondra could be set up to have Picc line dressing changes at home, I spoke with one of the nurses who stated that home health  No longer does picc line dressing changes at home. She reports picc line dressing changes can be set up thru [facility] or [facility].     Information repeated back and confirmed.

## 2023-01-31 NOTE — PROGRESS NOTES
Spoke with representative from [facility] ([facility]). She confirms they can provide weekly PICC line dressing changes and will teach family members to manage. This nurse requested start of care for tomorrow, emphasizing patient is due for dressing change on 2/3. Faxed referral documents to [facility] at [phone].

## 2023-01-31 NOTE — PROGRESS NOTES
Received call from Summer MANDEL, RN from the medical resourcing team in reference to Alondra Villela Scot. She informed me that once the PA for Zarxio expires on 2/23/23, that if Alondra still needs the medication we need to do another PA but it will NOT have to come from a Specialty Pharmacy. She also mentioned that the next approval will be more than 30 days, it can be for a longer time if needed. I informed her that Sheliao is not on the office's buy and bill list of medicatios and asked if the approval will cover outpatient at [facility] in general. She stated she she believed so because it does not have to be out sourced  Outside of [facility]. Her number is [phone], which is her Voicemail for anyone who has an questions.     Information we confirmed thru reiteration:    -The location does not matter any more   -Follow normal process for getting PA request a buy and bill if needed   -PA requirements  does no include having to choose an outside  pharmacy anymore

## 2023-02-01 ENCOUNTER — HOSPITAL ENCOUNTER (OUTPATIENT)
Dept: HEMATOLOGY/ONCOLOGY | Facility: HOSPITAL | Age: 33
Setting detail: INFUSION SERIES
Discharge: HOME | End: 2023-02-01
Attending: OBSTETRICS & GYNECOLOGY
Payer: COMMERCIAL

## 2023-02-01 ENCOUNTER — PATIENT OUTREACH (OUTPATIENT)
Dept: HEMATOLOGY/ONCOLOGY | Facility: HOSPITAL | Age: 33
End: 2023-02-01
Payer: COMMERCIAL

## 2023-02-01 ENCOUNTER — DOCUMENTATION (OUTPATIENT)
Dept: GYNECOLOGIC ONCOLOGY | Facility: CLINIC | Age: 33
End: 2023-02-01
Payer: COMMERCIAL

## 2023-02-01 DIAGNOSIS — O01.9 GESTATIONAL TROPHOBLASTIC NEOPLASM: Primary | ICD-10-CM

## 2023-02-01 PROCEDURE — 3E013GC INTRODUCTION OF OTHER THERAPEUTIC SUBSTANCE INTO SUBCUTANEOUS TISSUE, PERCUTANEOUS APPROACH: ICD-10-PCS | Performed by: OBSTETRICS & GYNECOLOGY

## 2023-02-01 PROCEDURE — 96372 THER/PROPH/DIAG INJ SC/IM: CPT

## 2023-02-01 PROCEDURE — 63600000 HC DRUGS/DETAIL CODE: Mod: JG | Performed by: STUDENT IN AN ORGANIZED HEALTH CARE EDUCATION/TRAINING PROGRAM

## 2023-02-01 RX ORDER — SODIUM CHLORIDE 0.9 % (FLUSH) 0.9 %
10 SYRINGE (ML) INJECTION
Status: DISCONTINUED | OUTPATIENT
Start: 2023-02-01 | End: 2023-02-01 | Stop reason: HOSPADM

## 2023-02-01 RX ORDER — SODIUM CHLORIDE 0.9 % (FLUSH) 0.9 %
10 SYRINGE (ML) INJECTION AS NEEDED
Status: DISCONTINUED | OUTPATIENT
Start: 2023-02-01 | End: 2023-02-01 | Stop reason: HOSPADM

## 2023-02-01 RX ADMIN — FILGRASTIM-SNDZ 480 MCG: 480 INJECTION, SOLUTION INTRAVENOUS; SUBCUTANEOUS at 14:58

## 2023-02-01 ASSESSMENT — ENCOUNTER SYMPTOMS
SORE THROAT: 1
BRUISES/BLEEDS EASILY: 1
MUSCULOSKELETAL NEGATIVE: 1
EYES NEGATIVE: 1
FATIGUE: 1
CARDIOVASCULAR NEGATIVE: 1
ENDOCRINE NEGATIVE: 1
NAUSEA: 1
COUGH: 1
NEUROLOGICAL NEGATIVE: 1
PSYCHIATRIC NEGATIVE: 1

## 2023-02-01 NOTE — PROGRESS NOTES
Review of Systems   Constitutional: Positive for fatigue (mild).   HENT:   Positive for sore throat (has had little sore throat).    Eyes: Negative.    Respiratory: Positive for cough (dry).    Cardiovascular: Negative.    Gastrointestinal: Positive for nausea (slight).   Endocrine: Negative.    Genitourinary: Negative.     Musculoskeletal: Negative.    Skin: Negative.    Neurological: Negative.    Hematological: Bruises/bleeds easily.   Psychiatric/Behavioral: Negative.      Zarxio injected subcut in the back of the LUE. Patient tolerated well. Bandage applied. Patient declined AVS. Left ambulatory.

## 2023-02-01 NOTE — PROGRESS NOTES
Inpatient chemotherapy prior authorization pending via [facility] for the below medications. Service date requested 2/12/23  -Etoposide ()  -Actinomycin ()  -Methotrexate ()  Authorization reference number: [id number]    Addendum 2/3/23: Inpatient prior authorization approved for , , and , service date 2/12/23. Letter scanned to patient chart.  Authorization #: [id number]

## 2023-02-01 NOTE — PROGRESS NOTES
Navigator available for patient to have weekly PICC line dressing change to be done during her visit to [facility] for neupogen injection. She has been enrolled w/ [facility] and may be scheduled in time for them to complete dressing change at her residence tomorrow. Navigator to call patient to confirm in the am.

## 2023-02-02 ENCOUNTER — TELEPHONE (OUTPATIENT)
Dept: ENDOCRINOLOGY | Facility: CLINIC | Age: 33
End: 2023-02-02
Payer: COMMERCIAL

## 2023-02-02 ENCOUNTER — HOSPITAL ENCOUNTER (OUTPATIENT)
Dept: HEMATOLOGY/ONCOLOGY | Facility: HOSPITAL | Age: 33
Setting detail: INFUSION SERIES
Discharge: HOME | End: 2023-02-02
Attending: OBSTETRICS & GYNECOLOGY
Payer: COMMERCIAL

## 2023-02-02 ENCOUNTER — DOCUMENTATION (OUTPATIENT)
Dept: GYNECOLOGIC ONCOLOGY | Facility: CLINIC | Age: 33
End: 2023-02-02
Payer: COMMERCIAL

## 2023-02-02 ENCOUNTER — TELEPHONE (OUTPATIENT)
Dept: GYNECOLOGIC ONCOLOGY | Facility: CLINIC | Age: 33
End: 2023-02-02
Payer: COMMERCIAL

## 2023-02-02 VITALS
DIASTOLIC BLOOD PRESSURE: 56 MMHG | SYSTOLIC BLOOD PRESSURE: 113 MMHG | OXYGEN SATURATION: 100 % | HEART RATE: 115 BPM | RESPIRATION RATE: 18 BRPM

## 2023-02-02 DIAGNOSIS — O01.9 GESTATIONAL TROPHOBLASTIC NEOPLASM: Primary | ICD-10-CM

## 2023-02-02 PROCEDURE — 63600000 HC DRUGS/DETAIL CODE: Mod: JG | Performed by: STUDENT IN AN ORGANIZED HEALTH CARE EDUCATION/TRAINING PROGRAM

## 2023-02-02 PROCEDURE — 96372 THER/PROPH/DIAG INJ SC/IM: CPT

## 2023-02-02 RX ADMIN — FILGRASTIM-SNDZ 480 MCG: 480 INJECTION, SOLUTION INTRAVENOUS; SUBCUTANEOUS at 14:51

## 2023-02-02 ASSESSMENT — PAIN SCALES - GENERAL: PAINLEVEL: 0-NO PAIN

## 2023-02-02 NOTE — TELEPHONE ENCOUNTER
I called leslie back. Instructed her to take motrin and Claritin. If pain worsens, she knows to call the office. If she has any shortness of breath of decreased pulse ox, she knows to call with plan for likely for her to go to the ER.

## 2023-02-02 NOTE — TELEPHONE ENCOUNTER
Called and left patient a voicemail message to contact me directly at [phone] to schedule a two week hospital follow up with Dr. Hodge

## 2023-02-02 NOTE — TELEPHONE ENCOUNTER
Alondra called to report that on her way home from receiving injection at [facility] she experienced pain on the right rib cage area and feels a rattling sensation when she takes in deep breath and checked pulse ox and it is 100%. Denies shortness of breath but experiences pain at a 6.5/10. this is a new feeling that started 10 mins.ago. She Denies chest pain or heaviness. Nothing has alleviated the pain as of now but she is going to take a motrin. I advised her to take her motrin and that I will contact providers and return her call with further instruction.

## 2023-02-02 NOTE — PROGRESS NOTES
Inpatient reservation request made for 2/12/23 chemotherapy. Faxed to Eveline Dowling and Mellisa Vang.

## 2023-02-03 ENCOUNTER — HOSPITAL ENCOUNTER (OUTPATIENT)
Dept: HEMATOLOGY/ONCOLOGY | Facility: HOSPITAL | Age: 33
Setting detail: INFUSION SERIES
Discharge: HOME | End: 2023-02-03
Attending: OBSTETRICS & GYNECOLOGY
Payer: COMMERCIAL

## 2023-02-03 DIAGNOSIS — O01.9 GESTATIONAL TROPHOBLASTIC NEOPLASM: Primary | ICD-10-CM

## 2023-02-03 DIAGNOSIS — Z51.11 ENCOUNTER FOR ANTINEOPLASTIC CHEMOTHERAPY: ICD-10-CM

## 2023-02-03 LAB
ALBUMIN SERPL-MCNC: 3.5 G/DL (ref 3.4–5)
ALP SERPL-CCNC: 104 IU/L (ref 35–126)
ALT SERPL-CCNC: 17 IU/L (ref 11–54)
ANION GAP SERPL CALC-SCNC: 8 MEQ/L (ref 3–15)
AST SERPL-CCNC: 15 IU/L (ref 15–41)
BASOPHILS # BLD: 0 K/UL (ref 0.01–0.1)
BASOPHILS NFR BLD: 0 %
BILIRUB SERPL-MCNC: 0.5 MG/DL (ref 0.3–1.2)
BUN SERPL-MCNC: 12 MG/DL (ref 8–20)
BURR CELLS BLD QL SMEAR: ABNORMAL
CALCIUM SERPL-MCNC: 8.3 MG/DL (ref 8.9–10.3)
CHLORIDE SERPL-SCNC: 107 MEQ/L (ref 98–109)
CO2 SERPL-SCNC: 19 MEQ/L (ref 22–32)
CREAT SERPL-MCNC: 0.8 MG/DL (ref 0.6–1.1)
DIFFERENTIAL METHOD BLD: ABNORMAL
EOSINOPHIL # BLD: 0 K/UL (ref 0.04–0.36)
EOSINOPHIL NFR BLD: 0 %
ERYTHROCYTE [DISTWIDTH] IN BLOOD BY AUTOMATED COUNT: 12.5 % (ref 11.7–14.4)
GFR SERPL CREATININE-BSD FRML MDRD: >60 ML/MIN/1.73M*2
GLUCOSE SERPL-MCNC: 110 MG/DL (ref 70–99)
HCG SERPL-ACNC: ABNORMAL IU/L (MIU/ML)
HCT VFR BLDCO AUTO: 28.8 % (ref 35–45)
HGB BLD-MCNC: 9.4 G/DL (ref 11.8–15.7)
HYPOCHROMIA BLD QL SMEAR: ABNORMAL
LYMPHOCYTES # BLD: 1.4 K/UL (ref 1.2–3.5)
LYMPHOCYTES NFR BLD: 32 %
MAGNESIUM SERPL-MCNC: 1.7 MG/DL (ref 1.8–2.5)
MCH RBC QN AUTO: 27.2 PG (ref 28–33.2)
MCHC RBC AUTO-ENTMCNC: 32.6 G/DL (ref 32.2–35.5)
MCV RBC AUTO: 83.5 FL (ref 83–98)
MONOCYTES # BLD: 0.04 K/UL (ref 0.28–0.8)
MONOCYTES NFR BLD: 1 %
NEUTROPHILS # BLD: 2.43 K/UL (ref 1.7–7)
NEUTS BAND # BLD: 2.94 K/UL (ref 1.7–7)
NEUTS SEG NFR BLD: 67 %
OVALOCYTES BLD QL SMEAR: ABNORMAL
PDW BLD AUTO: 8.5 FL (ref 9.4–12.3)
PLAT MORPH BLD: NORMAL
PLATELET # BLD AUTO: 117 K/UL (ref 150–369)
PLATELET # BLD EST: ABNORMAL 10*3/UL
POLYCHROMASIA BLD QL SMEAR: ABNORMAL
POTASSIUM SERPL-SCNC: 3.6 MEQ/L (ref 3.6–5.1)
PROT SERPL-MCNC: 6.5 G/DL (ref 6–8.2)
RBC # BLD AUTO: 3.45 M/UL (ref 3.93–5.22)
SODIUM SERPL-SCNC: 134 MEQ/L (ref 136–144)
WBC # BLD AUTO: 4.39 K/UL (ref 3.8–10.5)

## 2023-02-03 PROCEDURE — 36415 COLL VENOUS BLD VENIPUNCTURE: CPT | Performed by: STUDENT IN AN ORGANIZED HEALTH CARE EDUCATION/TRAINING PROGRAM

## 2023-02-03 PROCEDURE — 83540 ASSAY OF IRON: CPT | Performed by: STUDENT IN AN ORGANIZED HEALTH CARE EDUCATION/TRAINING PROGRAM

## 2023-02-03 PROCEDURE — 80053 COMPREHEN METABOLIC PANEL: CPT | Performed by: STUDENT IN AN ORGANIZED HEALTH CARE EDUCATION/TRAINING PROGRAM

## 2023-02-03 PROCEDURE — 82728 ASSAY OF FERRITIN: CPT | Performed by: STUDENT IN AN ORGANIZED HEALTH CARE EDUCATION/TRAINING PROGRAM

## 2023-02-03 PROCEDURE — 85025 COMPLETE CBC W/AUTO DIFF WBC: CPT | Performed by: STUDENT IN AN ORGANIZED HEALTH CARE EDUCATION/TRAINING PROGRAM

## 2023-02-03 PROCEDURE — 63600000 HC DRUGS/DETAIL CODE: Mod: JG | Performed by: STUDENT IN AN ORGANIZED HEALTH CARE EDUCATION/TRAINING PROGRAM

## 2023-02-03 PROCEDURE — 82607 VITAMIN B-12: CPT | Performed by: STUDENT IN AN ORGANIZED HEALTH CARE EDUCATION/TRAINING PROGRAM

## 2023-02-03 PROCEDURE — 36591 DRAW BLOOD OFF VENOUS DEVICE: CPT

## 2023-02-03 PROCEDURE — 83735 ASSAY OF MAGNESIUM: CPT | Performed by: STUDENT IN AN ORGANIZED HEALTH CARE EDUCATION/TRAINING PROGRAM

## 2023-02-03 PROCEDURE — 84702 CHORIONIC GONADOTROPIN TEST: CPT | Performed by: STUDENT IN AN ORGANIZED HEALTH CARE EDUCATION/TRAINING PROGRAM

## 2023-02-03 PROCEDURE — 82746 ASSAY OF FOLIC ACID SERUM: CPT | Performed by: STUDENT IN AN ORGANIZED HEALTH CARE EDUCATION/TRAINING PROGRAM

## 2023-02-03 PROCEDURE — 96372 THER/PROPH/DIAG INJ SC/IM: CPT

## 2023-02-03 RX ADMIN — FILGRASTIM-SNDZ 480 MCG: 480 INJECTION, SOLUTION INTRAVENOUS; SUBCUTANEOUS at 14:46

## 2023-02-03 ASSESSMENT — ENCOUNTER SYMPTOMS
PSYCHIATRIC NEGATIVE: 1
ENDOCRINE NEGATIVE: 1
FATIGUE: 1
CARDIOVASCULAR NEGATIVE: 1
COUGH: 1
MUSCULOSKELETAL NEGATIVE: 1
GASTROINTESTINAL NEGATIVE: 1
DEPRESSION: 0
EYES NEGATIVE: 1
NEUROLOGICAL NEGATIVE: 1
BRUISES/BLEEDS EASILY: 1
OCCASIONAL FEELINGS OF UNSTEADINESS: 0
LOSS OF SENSATION IN FEET: 0

## 2023-02-03 NOTE — PROGRESS NOTES
Review of Systems   Constitutional: Positive for fatigue (mild).   HENT:  Negative.    Eyes: Negative.    Respiratory: Positive for cough (dry).    Cardiovascular: Negative.    Gastrointestinal: Negative.    Endocrine: Negative.    Genitourinary: Negative.     Musculoskeletal: Negative.    Skin: Negative.    Neurological: Negative.    Hematological: Bruises/bleeds easily.   Psychiatric/Behavioral: Negative.      Labs drawn from [facility]  [facility]. Labs sent for analysis. Zarxio injected subcut in the back of the LUE. Bandage applied. Patient tolerated well. Printed AVS declined. Patient appropriately scheduled out. Patient left ambulatory.

## 2023-02-04 ENCOUNTER — TELEPHONE (OUTPATIENT)
Dept: GYNECOLOGIC ONCOLOGY | Facility: CLINIC | Age: 33
End: 2023-02-04
Payer: COMMERCIAL

## 2023-02-04 ENCOUNTER — HOSPITAL ENCOUNTER (INPATIENT)
Facility: HOSPITAL | Age: 33
LOS: 4 days | Discharge: HOME | DRG: 982 | End: 2023-02-08
Attending: EMERGENCY MEDICINE | Admitting: STUDENT IN AN ORGANIZED HEALTH CARE EDUCATION/TRAINING PROGRAM
Payer: COMMERCIAL

## 2023-02-04 ENCOUNTER — APPOINTMENT (EMERGENCY)
Dept: RADIOLOGY | Facility: HOSPITAL | Age: 33
DRG: 982 | End: 2023-02-04
Attending: EMERGENCY MEDICINE
Payer: COMMERCIAL

## 2023-02-04 DIAGNOSIS — I82.621 ACUTE DEEP VEIN THROMBOSIS (DVT) OF RIGHT UPPER EXTREMITY, UNSPECIFIED VEIN (CMS/HCC): ICD-10-CM

## 2023-02-04 DIAGNOSIS — D70.9 NEUTROPENIA, UNSPECIFIED TYPE (CMS/HCC): ICD-10-CM

## 2023-02-04 DIAGNOSIS — O01.9 GESTATIONAL TROPHOBLASTIC NEOPLASM: ICD-10-CM

## 2023-02-04 DIAGNOSIS — R00.0 TACHYCARDIA: Primary | ICD-10-CM

## 2023-02-04 PROBLEM — R31.9 HEMATURIA: Status: ACTIVE | Noted: 2023-02-04

## 2023-02-04 PROBLEM — F41.9 ANXIETY AND DEPRESSION: Status: ACTIVE | Noted: 2023-02-04

## 2023-02-04 PROBLEM — E87.1 HYPONATREMIA: Status: ACTIVE | Noted: 2023-02-04

## 2023-02-04 PROBLEM — O22.30 DVT (DEEP VEIN THROMBOSIS) IN PREGNANCY: Status: ACTIVE | Noted: 2023-02-04

## 2023-02-04 PROBLEM — R50.81 NEUTROPENIC FEVER (CMS/HCC)  (CMS/HCC): Status: ACTIVE | Noted: 2023-02-04

## 2023-02-04 PROBLEM — D69.6 THROMBOCYTOPENIA (CMS/HCC): Status: ACTIVE | Noted: 2023-02-04

## 2023-02-04 PROBLEM — F32.A ANXIETY AND DEPRESSION: Status: ACTIVE | Noted: 2023-02-04

## 2023-02-04 LAB
ALBUMIN SERPL-MCNC: 3.8 G/DL (ref 3.4–5)
ALP SERPL-CCNC: 114 IU/L (ref 35–126)
ALT SERPL-CCNC: 16 IU/L (ref 11–54)
ANION GAP SERPL CALC-SCNC: 9 MEQ/L (ref 3–15)
APTT PPP: 24 SEC (ref 23–35)
AST SERPL-CCNC: 11 IU/L (ref 15–41)
BACTERIA URNS QL MICRO: ABNORMAL /HPF
BASOPHILS # BLD: 0.02 K/UL (ref 0.01–0.1)
BASOPHILS NFR BLD: 1 %
BILIRUB SERPL-MCNC: 0.5 MG/DL (ref 0.3–1.2)
BILIRUB UR QL STRIP.AUTO: NEGATIVE MG/DL
BUN SERPL-MCNC: 8 MG/DL (ref 8–20)
CALCIUM SERPL-MCNC: 8.9 MG/DL (ref 8.9–10.3)
CHLORIDE SERPL-SCNC: 104 MEQ/L (ref 98–109)
CK SERPL-CCNC: 19 U/L (ref 15–200)
CLARITY UR REFRACT.AUTO: CLEAR
CO2 SERPL-SCNC: 20 MEQ/L (ref 22–32)
COLOR UR AUTO: YELLOW
CREAT SERPL-MCNC: 0.8 MG/DL (ref 0.6–1.1)
DIFFERENTIAL METHOD BLD: ABNORMAL
DOHLE BOD BLD QL SMEAR: ABNORMAL
EOSINOPHIL # BLD: 0 K/UL (ref 0.04–0.36)
EOSINOPHIL NFR BLD: 0 %
ERYTHROCYTE [DISTWIDTH] IN BLOOD BY AUTOMATED COUNT: 12.3 % (ref 11.7–14.4)
GFR SERPL CREATININE-BSD FRML MDRD: >60 ML/MIN/1.73M*2
GLUCOSE SERPL-MCNC: 89 MG/DL (ref 70–99)
GLUCOSE UR STRIP.AUTO-MCNC: NEGATIVE MG/DL
HCT VFR BLDCO AUTO: 30.4 % (ref 35–45)
HGB BLD-MCNC: 10.2 G/DL (ref 11.8–15.7)
HGB UR QL STRIP.AUTO: 3
HYALINE CASTS #/AREA URNS LPF: ABNORMAL /LPF
INR PPP: 1
KETONES UR STRIP.AUTO-MCNC: NEGATIVE MG/DL
LEUKOCYTE ESTERASE UR QL STRIP.AUTO: NEGATIVE
LYMPHOCYTES # BLD: 1.08 K/UL (ref 1.2–3.5)
LYMPHOCYTES NFR BLD: 66 %
MCH RBC QN AUTO: 27.3 PG (ref 28–33.2)
MCHC RBC AUTO-ENTMCNC: 33.6 G/DL (ref 32.2–35.5)
MCV RBC AUTO: 81.3 FL (ref 83–98)
MONOCYTES # BLD: 0.05 K/UL (ref 0.28–0.8)
MONOCYTES NFR BLD: 3 %
MUCOUS THREADS URNS QL MICRO: ABNORMAL /LPF
NEUTS BAND # BLD: 0.44 K/UL (ref 1.7–7)
NEUTS SEG NFR BLD: 27 %
NITRITE UR QL STRIP.AUTO: NEGATIVE
PDW BLD AUTO: 9 FL (ref 9.4–12.3)
PH UR STRIP.AUTO: 6.5 [PH]
PLAT MORPH BLD: NORMAL
PLATELET # BLD AUTO: 110 K/UL (ref 150–369)
PLATELET # BLD EST: ABNORMAL 10*3/UL
POIKILOCYTOSIS BLD QL SMEAR: ABNORMAL
POTASSIUM SERPL-SCNC: 3.7 MEQ/L (ref 3.6–5.1)
PROT SERPL-MCNC: 7.5 G/DL (ref 6–8.2)
PROT UR QL STRIP.AUTO: NEGATIVE
PROTHROMBIN TIME: 13.4 SEC (ref 12.2–14.5)
RBC # BLD AUTO: 3.74 M/UL (ref 3.93–5.22)
RBC #/AREA URNS HPF: ABNORMAL /HPF
SODIUM SERPL-SCNC: 133 MEQ/L (ref 136–144)
SP GR UR REFRACT.AUTO: 1.01
SQUAMOUS URNS QL MICRO: ABNORMAL /HPF
TOXIC GRANULES BLD QL SMEAR: ABNORMAL
TSH SERPL DL<=0.05 MIU/L-ACNC: 1.16 MIU/L (ref 0.34–5.6)
UROBILINOGEN UR STRIP-ACNC: 0.2 EU/DL
VARIANT LYMPHS # BLD MANUAL: 0.05 K/UL
VARIANT LYMPHS NFR BLD: 3 %
WBC # BLD AUTO: 1.63 K/UL (ref 3.8–10.5)
WBC #/AREA URNS HPF: ABNORMAL /HPF

## 2023-02-04 PROCEDURE — 84443 ASSAY THYROID STIM HORMONE: CPT | Performed by: EMERGENCY MEDICINE

## 2023-02-04 PROCEDURE — 93005 ELECTROCARDIOGRAM TRACING: CPT | Performed by: EMERGENCY MEDICINE

## 2023-02-04 PROCEDURE — 63600000 HC DRUGS/DETAIL CODE: Performed by: STUDENT IN AN ORGANIZED HEALTH CARE EDUCATION/TRAINING PROGRAM

## 2023-02-04 PROCEDURE — 25800000 HC PHARMACY IV SOLUTIONS: Performed by: EMERGENCY MEDICINE

## 2023-02-04 PROCEDURE — 81001 URINALYSIS AUTO W/SCOPE: CPT | Performed by: EMERGENCY MEDICINE

## 2023-02-04 PROCEDURE — 93971 EXTREMITY STUDY: CPT | Mod: RT

## 2023-02-04 PROCEDURE — 63700000 HC SELF-ADMINISTRABLE DRUG: Performed by: STUDENT IN AN ORGANIZED HEALTH CARE EDUCATION/TRAINING PROGRAM

## 2023-02-04 PROCEDURE — 36415 COLL VENOUS BLD VENIPUNCTURE: CPT | Performed by: EMERGENCY MEDICINE

## 2023-02-04 PROCEDURE — 25800000 HC PHARMACY IV SOLUTIONS: Performed by: STUDENT IN AN ORGANIZED HEALTH CARE EDUCATION/TRAINING PROGRAM

## 2023-02-04 PROCEDURE — 85730 THROMBOPLASTIN TIME PARTIAL: CPT | Performed by: EMERGENCY MEDICINE

## 2023-02-04 PROCEDURE — 99285 EMERGENCY DEPT VISIT HI MDM: CPT | Mod: 25

## 2023-02-04 PROCEDURE — 80053 COMPREHEN METABOLIC PANEL: CPT | Performed by: EMERGENCY MEDICINE

## 2023-02-04 PROCEDURE — G1004 CDSM NDSC: HCPCS

## 2023-02-04 PROCEDURE — 85610 PROTHROMBIN TIME: CPT | Performed by: EMERGENCY MEDICINE

## 2023-02-04 PROCEDURE — 21400000 HC ROOM AND CARE CCU/INTERMEDIATE

## 2023-02-04 PROCEDURE — 99222 1ST HOSP IP/OBS MODERATE 55: CPT | Performed by: STUDENT IN AN ORGANIZED HEALTH CARE EDUCATION/TRAINING PROGRAM

## 2023-02-04 PROCEDURE — 63700000 HC SELF-ADMINISTRABLE DRUG

## 2023-02-04 PROCEDURE — 87040 BLOOD CULTURE FOR BACTERIA: CPT | Performed by: EMERGENCY MEDICINE

## 2023-02-04 PROCEDURE — 85025 COMPLETE CBC W/AUTO DIFF WBC: CPT | Performed by: EMERGENCY MEDICINE

## 2023-02-04 PROCEDURE — 63600000 HC DRUGS/DETAIL CODE: Performed by: EMERGENCY MEDICINE

## 2023-02-04 PROCEDURE — 87086 URINE CULTURE/COLONY COUNT: CPT | Performed by: EMERGENCY MEDICINE

## 2023-02-04 PROCEDURE — 63600105 HC IODINE BASED CONTRAST: Performed by: EMERGENCY MEDICINE

## 2023-02-04 PROCEDURE — 82550 ASSAY OF CK (CPK): CPT | Performed by: STUDENT IN AN ORGANIZED HEALTH CARE EDUCATION/TRAINING PROGRAM

## 2023-02-04 PROCEDURE — 99284 EMERGENCY DEPT VISIT MOD MDM: CPT | Mod: 25

## 2023-02-04 RX ORDER — PROCHLORPERAZINE MALEATE 10 MG
10 TABLET ORAL EVERY 6 HOURS PRN
Status: DISCONTINUED | OUTPATIENT
Start: 2023-02-04 | End: 2023-02-07

## 2023-02-04 RX ORDER — VANCOMYCIN HYDROCHLORIDE
1250
Status: DISCONTINUED | OUTPATIENT
Start: 2023-02-05 | End: 2023-02-05

## 2023-02-04 RX ORDER — BUPROPION HYDROCHLORIDE 150 MG/1
150 TABLET ORAL DAILY
Status: DISCONTINUED | OUTPATIENT
Start: 2023-02-04 | End: 2023-02-09 | Stop reason: HOSPADM

## 2023-02-04 RX ORDER — IBUPROFEN 200 MG
16-32 TABLET ORAL AS NEEDED
Status: DISCONTINUED | OUTPATIENT
Start: 2023-02-04 | End: 2023-02-09 | Stop reason: HOSPADM

## 2023-02-04 RX ORDER — AMITRIPTYLINE HYDROCHLORIDE 10 MG/1
10 TABLET, FILM COATED ORAL DAILY
Status: DISCONTINUED | OUTPATIENT
Start: 2023-02-05 | End: 2023-02-09 | Stop reason: HOSPADM

## 2023-02-04 RX ORDER — ACETAMINOPHEN 325 MG/1
650 TABLET ORAL EVERY 6 HOURS PRN
Status: DISCONTINUED | OUTPATIENT
Start: 2023-02-04 | End: 2023-02-09 | Stop reason: HOSPADM

## 2023-02-04 RX ORDER — DEXTROSE 50 % IN WATER (D50W) INTRAVENOUS SYRINGE
25 AS NEEDED
Status: DISCONTINUED | OUTPATIENT
Start: 2023-02-04 | End: 2023-02-09 | Stop reason: HOSPADM

## 2023-02-04 RX ORDER — ONDANSETRON 4 MG/1
4-8 TABLET, ORALLY DISINTEGRATING ORAL EVERY 6 HOURS PRN
Status: DISCONTINUED | OUTPATIENT
Start: 2023-02-04 | End: 2023-02-09 | Stop reason: HOSPADM

## 2023-02-04 RX ORDER — OXYCODONE HYDROCHLORIDE 5 MG/1
5 TABLET ORAL EVERY 6 HOURS PRN
Status: DISCONTINUED | OUTPATIENT
Start: 2023-02-04 | End: 2023-02-09 | Stop reason: HOSPADM

## 2023-02-04 RX ORDER — METHIMAZOLE 5 MG/1
2.5 TABLET ORAL DAILY
COMMUNITY
End: 2023-03-20 | Stop reason: ALTCHOICE

## 2023-02-04 RX ORDER — MORPHINE SULFATE 2 MG/ML
2 INJECTION, SOLUTION INTRAMUSCULAR; INTRAVENOUS EVERY 6 HOURS PRN
Status: DISCONTINUED | OUTPATIENT
Start: 2023-02-04 | End: 2023-02-09 | Stop reason: HOSPADM

## 2023-02-04 RX ORDER — ENOXAPARIN SODIUM 100 MG/ML
1 INJECTION SUBCUTANEOUS
Status: DISCONTINUED | OUTPATIENT
Start: 2023-02-04 | End: 2023-02-08

## 2023-02-04 RX ORDER — DEXTROSE 40 %
15-30 GEL (GRAM) ORAL AS NEEDED
Status: DISCONTINUED | OUTPATIENT
Start: 2023-02-04 | End: 2023-02-09 | Stop reason: HOSPADM

## 2023-02-04 RX ORDER — METHIMAZOLE 5 MG/1
5 TABLET ORAL DAILY
Status: DISCONTINUED | OUTPATIENT
Start: 2023-02-04 | End: 2023-02-09 | Stop reason: HOSPADM

## 2023-02-04 RX ORDER — BENZONATATE 100 MG/1
100 CAPSULE ORAL 3 TIMES DAILY PRN
COMMUNITY

## 2023-02-04 RX ADMIN — VANCOMYCIN HYDROCHLORIDE 750 MG: 750 INJECTION, POWDER, LYOPHILIZED, FOR SOLUTION INTRAVENOUS at 23:26

## 2023-02-04 RX ADMIN — IOHEXOL 100 ML: 350 INJECTION, SOLUTION INTRAVENOUS at 15:24

## 2023-02-04 RX ADMIN — MORPHINE SULFATE 2 MG: 2 INJECTION, SOLUTION INTRAMUSCULAR; INTRAVENOUS at 17:46

## 2023-02-04 RX ADMIN — OXYCODONE HYDROCHLORIDE 5 MG: 5 TABLET ORAL at 19:54

## 2023-02-04 RX ADMIN — VANCOMYCIN HYDROCHLORIDE 1 G: 1 INJECTION, POWDER, LYOPHILIZED, FOR SOLUTION INTRAVENOUS at 18:10

## 2023-02-04 RX ADMIN — SODIUM CHLORIDE, POTASSIUM CHLORIDE, SODIUM LACTATE AND CALCIUM CHLORIDE 1000 ML: 600; 310; 30; 20 INJECTION, SOLUTION INTRAVENOUS at 15:50

## 2023-02-04 RX ADMIN — CEFTAZIDIME 2 G: 2 INJECTION, POWDER, FOR SOLUTION INTRAVENOUS at 17:23

## 2023-02-04 RX ADMIN — SODIUM CHLORIDE, POTASSIUM CHLORIDE, SODIUM LACTATE AND CALCIUM CHLORIDE 1000 ML: 600; 310; 30; 20 INJECTION, SOLUTION INTRAVENOUS at 16:54

## 2023-02-04 RX ADMIN — BUPROPION HYDROCHLORIDE 150 MG: 150 TABLET, EXTENDED RELEASE ORAL at 20:51

## 2023-02-04 RX ADMIN — ENOXAPARIN SODIUM 70 MG: 80 INJECTION SUBCUTANEOUS at 17:50

## 2023-02-04 RX ADMIN — MORPHINE SULFATE 2 MG: 2 INJECTION, SOLUTION INTRAMUSCULAR; INTRAVENOUS at 23:27

## 2023-02-04 RX ADMIN — ACETAMINOPHEN 650 MG: 325 TABLET ORAL at 20:52

## 2023-02-04 ASSESSMENT — ENCOUNTER SYMPTOMS
CHILLS: 1
ARTHRALGIAS: 0
VOMITING: 0
ABDOMINAL PAIN: 0
DIARRHEA: 0
WEAKNESS: 1
COUGH: 1
FEVER: 1
ABDOMINAL DISTENTION: 0
DYSURIA: 0
SHORTNESS OF BREATH: 1
LIGHT-HEADEDNESS: 1
NAUSEA: 0

## 2023-02-04 NOTE — ASSESSMENT & PLAN NOTE
Presented with R arm pain, swelling and warmth around PICC line  US shows thrombus in the right subclavian, axillary, brachial and basilic veins   Vascular surgery consulted in the ED, recommended medical therapy    Started on therapeutic lovenox    - Continue on therapeutic lovenox, will need transition to DOAC   - Continue pain management with PRN oxycodone and morphine  - Will pull RUE PICC at the time of Mediport placement  - Apply warm compress to right arm and elevate right arm

## 2023-02-04 NOTE — ASSESSMENT & PLAN NOTE
UA with +3 blood no RBCs  Unclear if this could be vaginal bleeding  CK wnl    - Vaginal bleeding likely 2/2 chemotherapy treatment  - Will need outpatient follow up to ensure this resolves

## 2023-02-04 NOTE — PROGRESS NOTES
Vancomycin Dosing by Pharmacy Consult Initiated    Alondra Villela is a 32 y.o. female who has been consulted for vancomycin dosing for febrile neutropenia prescribed by Annie Dawn    Reviewed relevant clinical data including weight, renal function, previous vancomycin doses, and vancomycin levels:  Creatinine   Date/Time Value Ref Range Status   02/04/2023 1346 0.8 0.6 - 1.1 mg/dL Final   02/03/2023 1439 0.8 0.6 - 1.1 mg/dL Final   01/30/2023 0659 0.8 0.6 - 1.1 mg/dL Final     No results found for: VANCORANDOM, VANCOTROUGH, VANCOPEAK      Vancomycin Administrations (last 96 hours)       Date/Time Action Medication Dose Rate    02/04/23 1810 New Bag    vancomycin 1 g/250 mL IVPB in NSS 1 g 166.7 mL/hr            Assessment/Plan  The patient is ordered vancomycin dosing by pharmacy.    The dose will be based on:         Wt Readings from Last 1 Encounters:   02/04/23 72.6 kg (160 lb)         Estimated Creatinine Clearance: 97.7 mL/min by (C-G formula)      Will initiate a loading dose  1000  mg (already given) + 750 mg IV x1 to complete load and maintenance dose of 1250 mg IV every 12 hours.    Target a trough of 15-20 ug/mL per vancomycin dosing per pharmacy order.  Pharmacy will continue to follow the patient's vancomycin dosing daily.      Please call vancomycin levels to the pharmacy.  Kiya Carter [facility]

## 2023-02-04 NOTE — Clinical Note
Patient placed on procedure table in supine position. Positioning devices: arm board under arms and safety strap applied.

## 2023-02-04 NOTE — ASSESSMENT & PLAN NOTE
Acute on chronic  Hgb stable on admission  Does report some increased vaginal bleeding and subjective BRBPR that sounds hemorrhoidal. No signs of upper GI bleed or brisk GI bleed  Iron panel consistent with anemia of chronic disease, folate and B12 WNL  Hgb around 8,1 (after fluids), no evidence of ongoing profound bleeding    - Trend on CBC (8.6 today), transfuse <7  - Gyn-onc following  - Will continue anticoagulation for now with close monitoring, will hold for 1 dose prior to Mediport placement

## 2023-02-04 NOTE — ASSESSMENT & PLAN NOTE
Appears chronic in the setting of her malignancy  Stable at baseline  Is experiencing some vaginal bleeding and subjective rectal bleeding    Likely in the setting of malignancy vs acute blood loss, currently no evidence of DIC    Dropped to 79 this AM, no evidence of bleeding    - Trend on CBC, transfuse <50 if bleeding, <10 if not

## 2023-02-04 NOTE — H&P
Internal Medicine  History & Physical        CHIEF COMPLAINT   Right arm pain     HISTORY OF PRESENT ILLNESS      This is a 32 y.o. 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 Hogue, known mets to liver and lungs), anxiety/depression, hyperthyroidism who presents to ED with the primary complaint of right arm pain.    Patient states that she had a right arm PICC line placed last Friday in order to start outpatient chemotherapy. She received chemotherapy with methotrexate, etoposide and dactinomycin via her PICC line on February 2. Patient states that she was in her usual state of health until last night when she developed sudden onset right arm pain around the PICC site. She also reports warmness around the PICC site and pain with and without palpation. She denies any purulent drainage or foul smell. Patient also endorses subjective fever and chills at home. She denies any other localizing infectious symptoms such as worsening cough/increased sputum production, headache, confusion, abdominal pain, diarrhea, dysuria, new skin rashes or wounds.    She closely follows with the gynoncology team here at [facility] with Dr. Mcmullen and Dr. Hogue. She is due for her next session of chemotherapy of Monday.    Patient states that she has a cough at baseline secondary to her known metastatic disease. She denies any increased urine production.  She does note occasional shortness of breath with walking around.  Of note, patient has also noticed increased vaginal bleeding over the past week.  She also reports a few episodes of rectal bleeding.  She says it appears as bright red blood per rectum most notably on the toilet paper with occasional maroon stools.  She does endorse some lightheadedness but no syncope.    Patient states that her mom and spouse would make decisions for her  if she was unable to make them herself.  Confirmed CODE STATUS, full code.    In the ED, patient is afebrile, tachycardic to the 110s, respiratory rate 20, satting 100% on room air, blood pressure 102-119/57-73.  Labs are notable for sodium 133, bicarb 20, BUN 8, creatinine 0.8 (baseline around 0.5-0.7), AST 11, ALT 16, alk phos 114, T bili 0.5, TSH 1.16, WBC 1.63, JORDANA 0.44, Hgb 10.2 (baseline 9), plt 110 (appears to run in the low 100s), INR 1, PTT 24.    UA neg leuk esterase, neg nitrite, neg protein, +3 blood, 0-4 RBCs, 4-10 WBCs, rare bacteria.    CTA chest with no evidence of acute PE, intervally decreased size and number of pulmonary nodules, as well as interval resolution of previously seen anterior midsternal mass, in keeping with treatment response, all area of ill-defined groundglass attenuation posterior laterally within the right lower lobe which may reflect an infectious or inflammatory process.    CT abdomen pelvis redemonstration of uterine mass as well as lesions in the liver in keeping with known metastatic disease    She received 2L of IV fluids, 1 dose of vancomycin and 1 dose of ceftazidime in the ED.    PAST MEDICAL AND SURGICAL HISTORY      PMHx:  Past Medical History:   Diagnosis Date   • Cervical cancer (CMS/HCC)    • Hx antineoplastic chemo        PSHx:  Past Surgical History:   Procedure Laterality Date   •  SECTION      x2       PCP:   Sunil Hinton MD    MEDICATIONS      Prior to Admission medications    Medication Sig Start Date End Date Taking? Authorizing Provider   buPROPion XL (WELLBUTRIN XL) 150 mg 24 hr tablet Take 150 mg by mouth daily. 12/15/22   Provider, Ricardo Carr MD   magic mouthwash (BENADRYL-MAALOX-CARAFATE) Swish and spit 15 mL every 4 (four) hours as needed for stomatitis. 23   Estela Bell PA C   magic mouthwash with nystatin (BENADRYL-MAALOX-LIDOCAINE-NYSTATIN) Swish and spit 15 mL every 4 (four) hours as needed for stomatitis. 23    Lorena Hogue MD   methIMAzole (TAPAZOLE) 5 mg tablet Take 1 tablet (5 mg total) by mouth daily. 1/21/23 2/20/23  Artur Macias MD   metoprolol tartrate (LOPRESSOR) 25 mg tablet Take 0.5 tablets (12.5 mg total) by mouth daily with breakfast. 1/21/23 2/20/23  Artur Macias MD   ondansetron ODT (ZOFRAN-ODT) 4 mg disintegrating tablet Take 1-2 tablets (4-8 mg total) by mouth every 6 (six) hours as needed for nausea or vomiting (Nausea/Vomiting). Maximum 32mg per day. 1/21/23   Artur Macias MD   prochlorperazine (COMPAZINE) 5 mg tablet Take 1-2 tablets (5-10 mg total) by mouth every 6 (six) hours as needed for nausea or vomiting. This medication can make you sleepy. 1/21/23   Artur Macias MD   protriptyline (VIVACTIL) 5 mg tablet Take 3 tablets (15 mg total) by mouth daily. 1/21/23 2/20/23  Artur Macias MD       Home medications were personally reviewed.    ALLERGIES      Patient has no known allergies.    FAMILY HISTORY      Family History   Problem Relation Age of Onset   • Hypertension Biological Mother    • Hypertension Biological Father        SOCIAL HISTORY      Social History     Socioeconomic History   • Marital status:      Spouse name: None   • Number of children: None   • Years of education: None   • Highest education level: None   Tobacco Use   • Smoking status: Never   • Smokeless tobacco: Never   Vaping Use   • Vaping Use: Never used   Substance and Sexual Activity   • Alcohol use: Not Currently   • Drug use: Never   • Sexual activity: Not Currently     Social Determinants of Health     Food Insecurity: No Food Insecurity   • Worried About Running Out of Food in the Last Year: Never true   • Ran Out of Food in the Last Year: Never true       REVIEW OF SYSTEMS      Review of Systems   Constitutional: Positive for chills and fever.   Respiratory: Positive for cough and shortness of breath.    Cardiovascular: Negative for chest pain.   Gastrointestinal: Negative for abdominal  distention, abdominal pain, diarrhea, nausea and vomiting.   Genitourinary: Negative for dysuria.   Musculoskeletal: Negative for arthralgias.   Neurological: Positive for weakness and light-headedness. Negative for syncope.       PHYSICAL EXAMINATION      Temp:  [36.7 °C (98.1 °F)] 36.7 °C (98.1 °F)  Heart Rate:  [104-112] 106  Resp:  [16-20] 20  BP: (100-123)/(57-73) 119/73  Body mass index is 27.46 kg/m².    Physical Exam  Constitutional:       General: She is not in acute distress.     Appearance: She is ill-appearing. She is not toxic-appearing.   HENT:      Head: Normocephalic and atraumatic.      Mouth/Throat:      Mouth: Mucous membranes are dry.   Cardiovascular:      Rate and Rhythm: Regular rhythm. Tachycardia present.      Heart sounds: No murmur heard.  Pulmonary:      Effort: Pulmonary effort is normal. No respiratory distress.      Breath sounds: No wheezing or rales.      Comments: Slight crackles at bases  Abdominal:      General: Abdomen is flat. There is no distension.      Palpations: Abdomen is soft.   Genitourinary:     Comments: There is some evidence of vaginal bleeding  Musculoskeletal:         General: Swelling present.      Comments: R arm is edematous, warm and painful to touch  No apparent drainage/purlulence around PICC site   Skin:     General: Skin is warm and dry.   Neurological:      General: No focal deficit present.      Mental Status: She is alert and oriented to person, place, and time.       LABS / IMAGING / EKG        Labs:  Results from last 7 days   Lab Units 02/04/23  1346 02/03/23  1439 01/30/23  0659   SODIUM mEQ/L 133* 134* 136   POTASSIUM mEQ/L 3.7 3.6 4.4   CHLORIDE mEQ/L 104 107 111*   CO2 mEQ/L 20* 19* 19*   BUN mg/dL 8 12 5*   CREATININE mg/dL 0.8 0.8 0.8   CALCIUM mg/dL 8.9 8.3* 8.1*   ALBUMIN g/dL 3.8 3.5 3.0*   BILIRUBIN TOTAL mg/dL 0.5 0.5 0.3   ALK PHOS IU/L 114 104 115   ALT IU/L 16 17 18   AST IU/L 11* 15 11*   GLUCOSE mg/dL 89 110* 85     Results from last 7  days   Lab Units 02/04/23  1346 02/03/23  1439 01/30/23  0659   WBC K/uL 1.63* 4.39 1.22*   HEMOGLOBIN g/dL 10.2* 9.4* 9.1*   HEMATOCRIT % 30.4* 28.8* 27.9*   PLATELETS K/uL 110* 117* 104*     Microbiology Data personally reviewed:  Microbiology Results     ** No results found for the last 720 hours. **          Imaging personally reviewed(does not include unread studies):  CT GUIDED NEEDLE PLACEMENT WITHOUT IV CONTRAST    Result Date: 1/13/2023  IMPRESSION: CT guided biopsy of right lower lobe lung mass.     MRI BRAIN WITHOUT CONTRAST    Result Date: 1/13/2023  IMPRESSION: 1.  No acute infarct, acute intracranial hemorrhage or evidence of intra-axial mass effect. 2.  Mild scattered white matter disease which is nonspecific. 3.  Please note that evaluation for intracranial metastatic disease is limited without intravenous contrast.    MRI BRAIN WITH CONTRAST    Result Date: 1/13/2023  IMPRESSION: 1.  No abnormal intracranial contrast enhancement identified to suggest intracranial metastatic disease. 2.  Previously seen tiny foci of increased T2 signal in the white matter do not demonstrate contrast enhancement.  These findings could reflect foci of chronic ischemia, the sequelae of migraine headaches, demyelinating or postinfectious processes.  Other etiologies are not excluded.  Clinical correlation is recommended.    CT ABDOMEN PELVIS WITH IV CONTRAST    Result Date: 1/13/2023  IMPRESSION: 1.   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 2.  Extensive pulmonary metastases, most notably a right lower lobe 4.9 cm subpleural mass. 3.  Enlarged ovaries with numerous cysts likely representing theca lutein cysts 4.  Indeterminate 0.8 cm hypoattenuating lesion at the dome of the liver.  The previously noted hypervascular liver lesions are not well seen due to the phase of contrast at the time of imaging.  Contrast-enhanced MRI of the abdomen can be  considered for more optimal characterization. 5.  Indeterminate hypoattenuating lesions in the right kidney which can also be more optimally evaluated with MRI.    CT ANGIOGRAPHY CHEST PULMONARY EMBOLISM WITH IV CONTRAST    Result Date: 2/4/2023  IMPRESSION: 1. No evidence of acute pulmonary arterial embolism through the proximal subsegmental branches. 2. Intervally decreased size and number of innumerable pulmonary nodules scattered throughout both lungs including intervally decreased size of a dominant subpleural mass within the right lower lobe now measuring up to 5 cm, as well as interval resolution of a previously seen anterior mediastinal mass, in keeping with treatment response. 3. Small area of ill-defined groundglass attenuation poster laterally within the right lower lobe which may reflect an infectious or inflammatory process.    CT ANGIOGRAPHY CHEST PULMONARY EMBOLISM WITH IV CONTRAST    Result Date: 1/12/2023  IMPRESSION: No evidence for pulmonary embolism. 5 cm right lower lobe mass. Anterior mediastinal mass. Innumerable bilateral pulmonary nodules which are suspicious for metastatic disease to the lungs. 2 hyperenhancing liver lesions, the larger measuring 5 cm in maximal dimension for which further assessment with MRI with and without contrast is recommended.     X-RAY CHEST 1 VIEW    Result Date: 1/13/2023  IMPRESSION: No significant interval change. No pneumothorax.    X-RAY CHEST 1 VIEW    Result Date: 1/12/2023  IMPRESSION: No pneumothorax following right lung biopsy.    X-RAY CHEST 1 VIEW    Result Date: 1/12/2023  IMPRESSION: No pneumothorax status post right lung mass biopsy.     US venous arm, RU extremity    Result Date: 2/4/2023  IMPRESSION: Thrombus in the right subclavian, axillary, brachial and basilic veins as detailed. Finding:    Other   Acuity: Significant  Status:  CLOSED Critical read back was performed and results were read back by Dr. Stanton on 2/4/2023 at 3:12 PM. COMMENT:  Comparison: None Technique: Grayscale ultrasound, color and spectral Doppler of the upper extremity veins. RIGHT: Internal jugular vein: No thrombus seen. Doppler within normal limits. Innominate vein: No thrombus seen. Doppler within normal limits. Subclavian: There is partially occlusive thrombus. Axillary: There is occlusive thrombus. There is thrombus one of the brachial veins and the basilic vein.  The cephalic vein where visualized is patent.     US venous leg, bilateral    Result Date: 1/10/2023  IMPRESSION: No femoropopliteal thrombosis. COMMENT: A duplex Doppler study was performed of both lower extremities, consisting of integrated two-dimensional (2D) real-time imaging color flow Doppler and Doppler spectral analysis utilizing 5.0 MHz and 7.0 MHz linear array probes. RIGHT: COMMON FEMORAL VEIN: Normal compression DEEP FEMORAL VEIN: Patent by color Doppler. FEMORAL VEIN: Normal compression, flow, and augmentation demonstrated on Doppler spectral analysis. POPLITEAL VEIN: Normal compression, flow, and augmentation demonstrated on Doppler spectral analysis. LEFT: COMMON FEMORAL VEIN: Normal compression DEEP FEMORAL VEIN: Patent by color Doppler. FEMORAL VEIN: Normal compression, flow, and augmentation demonstrated on Doppler spectral analysis. POPLITEAL VEIN: Normal compression, flow, and augmentation demonstrated on Doppler spectral analysis. Visualized calf veins compress normally.     IR TECHNICAL ASSISTANCE    Result Date: 1/13/2023  IMPRESSION: CT guided biopsy of right lower lobe lung mass.     ULTRASOUND DOPPLER    Result Date: 1/11/2023  IMPRESSION: 1.  No intrauterine gestation and no adnexal mass evident. These findings represent a pregnancy of unknown location. The appearance of a complex mass with small cystic spaces and peripheral vascularity occupying the central uterine cavity could represent a molar pregnancy but additional evaluation is suggested. 2.  Both ovaries are mildly enlarged with  multiple complex cystic lesions as well as some with solid-appearing echoes that may represent a combination of hemorrhagic cysts/follicles or endometriomas, but challenging to evaluate as the ovaries are only well evaluated transabdominally. Recommended these be reassessed on follow-up. No sonographic findings of ovarian torsion.     ULTRASOUND PREGNANCY TRANSVAGINAL ONLY    Result Date: 1/11/2023  IMPRESSION: 1.  No intrauterine gestation and no adnexal mass evident. These findings represent a pregnancy of unknown location. The appearance of a complex mass with small cystic spaces and peripheral vascularity occupying the central uterine cavity could represent a molar pregnancy but additional evaluation is suggested. 2.  Both ovaries are mildly enlarged with multiple complex cystic lesions as well as some with solid-appearing echoes that may represent a combination of hemorrhagic cysts/follicles or endometriomas, but challenging to evaluate as the ovaries are only well evaluated transabdominally. Recommended these be reassessed on follow-up. No sonographic findings of ovarian torsion.     ULTRASOUND PREGNANCY < 14 WEEKS TRANSABDOMINAL LIMITED    Result Date: 1/11/2023  IMPRESSION: 1.  No intrauterine gestation and no adnexal mass evident. These findings represent a pregnancy of unknown location. The appearance of a complex mass with small cystic spaces and peripheral vascularity occupying the central uterine cavity could represent a molar pregnancy but additional evaluation is suggested. 2.  Both ovaries are mildly enlarged with multiple complex cystic lesions as well as some with solid-appearing echoes that may represent a combination of hemorrhagic cysts/follicles or endometriomas, but challenging to evaluate as the ovaries are only well evaluated transabdominally. Recommended these be reassessed on follow-up. No sonographic findings of ovarian torsion.       ECG/Telemetry  I have independently reviewed the  telemetry. Significant findings include sinus tachycardia.    ASSESSMENT AND PLAN           * Neutropenic fever (CMS/HCC)  Assessment & Plan  history of metastaticgestational trophoblastic neoplasm (on chemotherapy with MTX, Etoposide, and Dactinomcin via PICC, last chemo on 2/2/22  Presents with subjective fever/chills, neutropenia,  WBC 1.63, JORDANA 0.44  No obvious source of infection  UA bland, CT chest/ab/pelvis no obvious source    Cannot fully rule out infectious thrombophlebitis in the setting of arm pain, new DVT    S/p 1 dose of vanc and ceftaz    - Continue broad spectrum antibiotics with vanc and ceftaz for now, ID consulted  - Follow up blood and urine culture  - Neutropenic precautions  - Trend on WBC and JORDANA on CBC  - Per gyn-onc, will give a dose of filgrastim, they will formally see in the morning    DVT (deep vein thrombosis) in pregnancy  Assessment & Plan  Presented with R arm pain, swelling and warmth around PICC line  US shows thrombus in the right subclavian, axillary, brachial and basilic veins   Vascular surgery consulted in the ED, recommended medical therapy    Started on therapeutic lovenox    - Continue on therapeutic lovenox, will need transition to DOAC   - Continue pain management with PRN oxycodone and morphine  - Apply warm compress to right arm and elevate right arm      Hyponatremia  Assessment & Plan  Mild, Na 133  In the setting of hypovolemia    - Trend with IV fluids    Anxiety and depression  Assessment & Plan  Maintained on buproprion 150 mg daily, protriptyline 15 mg daily    - Continue home meds    Hematuria  Assessment & Plan  UA with +3 blood no RBCs  Unclear if this could be vaginal bleeding vs myoglobinuria    - Will check CK to rule out rhabdo  - Will need outpatient follow up to ensure resolves     Thrombocytopenia (CMS/HCC)  Assessment & Plan  Appears chronic in the setting of her malignancy  Stable at baseline  Is experiencing some vaginal bleeding and subjective  rectal bleeding    Likely in the setting of malignancy vs acute blood loss, currently no evidence of DIC    - Trend on CBC, transfuse >50 if bleeding, >10 if not    Microcytic anemia  Assessment & Plan  Acute on chronic  Hgb stable on admission  Does report some increased vaginal bleeding and subjective BRBPR that sounds hemorrhoidal ? No signs of upper GI bleed or brisk GI bleed    - Trend on CBC, transfuse >7  - Follow up iron panel, B12, folate  - Check FOBT  - Gyn-onc following  - Will continue anticoagulation for now with close monitoring    Gestational trophoblastic neoplasm  Assessment & Plan  Known liver and pulm mets  Metastatic gestational trophoblastic neoplasm (on chemotherapy with MTX, Etoposide, and Dactinomcin via PICC, last chemo on 2/2/22  Follows with Dr. Mcmullen and Dr. Hogue    - Gyn-onc consult  - Patient will need chemotherapy on Monday 2/6    Hyperthyroidism  Assessment & Plan  Maintained on methimazole 5 mg and metoprolol tartrate 12.5 mg daily  Repeat TSH 1.16    - Continue home meds    Tachycardia  Assessment & Plan  Chronic  Likely 2/2 hyperthyroidism  May be acute exacerbated in the setting of pain and neutropenic fever  CT without evidence of PE    - Continue cardiac monitoring  - Continue hyperthyroidism tx  - Pain control         VTE Assessment: Padua    VTE Prophylaxis: Current anticoagulants:  enoxaparin (LOVENOX) syringe 70 mg, subcutaneous, q12h INT      Palliative Care Screen:    Code Status: Full Code  Estimated discharge date: 2/7/2023     ATTENDING DOCUMENTATION  ALSO SEE ATTENDING ATTESTATION SECTION OF NOTE

## 2023-02-04 NOTE — ASSESSMENT & PLAN NOTE
Maintained on methimazole 5 mg and metoprolol tartrate 12.5 mg daily  Repeat TSH 1.79    - Continue home meds

## 2023-02-04 NOTE — ASSESSMENT & PLAN NOTE
Chronic  Likely 2/2 hyperthyroidism  May be acute exacerbated in the setting of pain and neutropenic fever  CT without evidence of PE    - Continue cardiac monitoring  - Continue hyperthyroidism tx  - Pain control

## 2023-02-04 NOTE — PROGRESS NOTES
"I saw and evaluated the patient.  I discussed the case with the resident and agree with the findings and plan as documented in the note except for my comments below or within the additional notes today.    Pt is a 31 yo F with PMH of metastatic gestational trophoblastic neoplasm (on chemotherapy with MTX, Etoposide, and Dactinomcin via PICC, last chemo on 2/2/22, follows with Dr. Mcmullen, mets to liver and lungs), hyperthyroidism, anxiety and depression who [place] [facility] [place] for evaluation of fevers and cough.     Met patient at bedside with her mother.     She reports fever starting last night with TMax of 100.2F. Today her temperature was 99F. She reports feeling more fatigued. She denies cough, sore throat, or URI symptoms. She denies any UTI or abdominal symptoms such as abdominal pain, N/VD. She has had increased bloody vaginal discharge which she was told to expect on MTX. She also notes bright red blood in her stool. She has never had any episodes of GIB. She notes RUE pain in the area of her PICC. She had chills last night. She denies CP, SOB, and lightheadedness.     CODE STATUS addressed at time of admission. Patient would like to be FULL CODE in the event of cardiac or pulmonary arrest. She lists her mother as her durable medical POA.     PHYSICAL EXAM  Vitals: Visit Vitals  /60 (BP Location: Left upper arm, Patient Position: Lying)   Pulse (!) 112   Temp 36.7 °C (98.1 °F) (Oral)   Resp 16   Ht 1.626 m (5' 4\")   Wt 72.6 kg (160 lb)   SpO2 96%   BMI 27.46 kg/m²      General: NAD, tired appearing    HEENT: Oropharyx clear and without exudates   Eyes: Conjunctiva clear  EOMI   Cardiac: tachcyardic  No murmurs, rubs, or gallops   Pulmonary: Clear to auscultation bilaterally  No respiratory distress   Abdomen: Soft, non-tender, +BS  No rebound, no guarding   MSK: No joint deformity   Extremities: No peripheral edema  RUE with PICC, no ertyhema  Tenderness in R bicep diffusely    Neuro: AAO x 3, " non-focal  Moves all extremities equally   Psych: Appropriate, cooperative   Skin: No visible rashes     Labs: Na 133, WBC 1.63, Hgb 10.2, platelets 110, UA with +3 blood   EKG: tachycardic sinus rhythm, no acute ischmia, QTc 417  Imaging: CTPE with no PE, decrased size of pulmonary metastasis, ill definied groundglass attenduation in RLL, RUE US with throbus in brachial veins and basilic vein  Interventions:    A+P:    Pt is a 31 yo F with PMH of gestational trophoblastic neoplasm who presents to [facility] ED on 2/4 for evaluation of fevers.     Etiology of her fever could be infectious, which is concerning given she has neutropenia. Alternative etiology could be in setting of thrombosis given her RUE clot at her PICC site. Patient will be admitted for close monitoring and IV abx.     #neutorpenic fevers  Concerning given decreased WBC, = severe neutropenia   Cultures drawn in ED  S/p ceftazadime and vancomycin on admission    -f/u cultures  - ID consulted, recommendations for further abx and discontining PICC as nidus of infection    #RUE DVT  Pt with thrombus in brachial vein and basilic vein  Given enoxaparin in ED    -continue enoxaparin  - price DOAC  - duration per GYN ONC or Hematology     #gestational trophoblastic neoplasm  Follows with Dr. Mcmullen  Last chemotherapy on Tuesday, next chemotherapy on Monday    -GYN ONC consulted, recommendations appreciated

## 2023-02-04 NOTE — ASSESSMENT & PLAN NOTE
history of metastaticgestational trophoblastic neoplasm (on chemotherapy with MTX, Etoposide, and Dactinomcin via PICC, last chemo on 2/2/22  Presents with subjective fever/chills, neutropenia,  WBC 1.63, JORDANA 0.44  No obvious source of infection  UA bland, CT chest/ab/pelvis no obvious source    Cannot fully rule out infectious thrombophlebitis in the setting of arm pain, new DVT    S/p 1 dose of vanc and ceftaz    - Continue antibiotics with cefepime per ID  - Blood cultures no growth to date, urine cultures with likely contaminant  - Neutropenic precautions  - Trend on WBC and ANC on CBC  - Filgrastim 480 mcg today

## 2023-02-04 NOTE — ASSESSMENT & PLAN NOTE
Known liver and pulm mets  Metastatic gestational trophoblastic neoplasm (on chemotherapy with MTX, Etoposide, and Dactinomcin via PICC, last chemo on 2/2/22  Follows with Dr. Mcmullen and Dr. Hogue    - Gyn-onc consult  - Received chemotherapy yesterday via PICC, plan for Mediport placement for future treatment  - Pull PICC at the time of port placement, need to optimize WBC/ANC prior to procedure

## 2023-02-04 NOTE — TELEPHONE ENCOUNTER
Patient called answering service reporting pain at right arm picc site extending into axilla. Last night temp was 100.2, had chills. Took some Motrin. This morning temp around 99.8, no more chills. Arm pain persists. No redness or swelling at picc site, dressing was changed and flushed by home health yesterday.  Recommended to go to [facility] ER for eval of possible thrombus in the picc line vein. Patient concerned about being in ER wh ile neutropenic. Advised to mask, N95 if she has one. Report called to ER provider, who acknowledged patient will be brought right back from waiting room for eval upon arrival.

## 2023-02-04 NOTE — TELEPHONE ENCOUNTER
Spoke with Dr. Stanton in ER. Patient has a thrombus in the vein of picc line but at least one lumen is patent. Curbsided vascular surgery. Guideline recommend ok to keep picc if not too symptomatic. Anticoagulate with lovenox 1.5mg/kg/day. CBC from today reviewed. Platelet count above 100k. ANC decreased compared to yesterday. Patient will get 1 dose of Zarxio 480mcg prior to discharge from ER. Patient to come for chemo as scheduled on  Monday. Our practice will take over rx for lovenox next week.

## 2023-02-05 LAB
ANION GAP SERPL CALC-SCNC: 6 MEQ/L (ref 3–15)
ATRIAL RATE: 109
BASOPHILS # BLD: 0.01 K/UL (ref 0.01–0.1)
BASOPHILS NFR BLD: 1 %
BUN SERPL-MCNC: 5 MG/DL (ref 8–20)
CALCIUM SERPL-MCNC: 7.9 MG/DL (ref 8.9–10.3)
CHLORIDE SERPL-SCNC: 104 MEQ/L (ref 98–109)
CO2 SERPL-SCNC: 23 MEQ/L (ref 22–32)
CREAT SERPL-MCNC: 0.7 MG/DL (ref 0.6–1.1)
DIFFERENTIAL METHOD BLD: ABNORMAL
EOSINOPHIL # BLD: 0 K/UL (ref 0.04–0.36)
EOSINOPHIL NFR BLD: 0 %
ERYTHROCYTE [DISTWIDTH] IN BLOOD BY AUTOMATED COUNT: 12.5 % (ref 11.7–14.4)
FERRITIN SERPL-MCNC: 444 NG/ML (ref 11–250)
FOLATE SERPL-MCNC: >20 NG/ML
GFR SERPL CREATININE-BSD FRML MDRD: >60 ML/MIN/1.73M*2
GLUCOSE BLD-MCNC: 125 MG/DL (ref 70–99)
GLUCOSE SERPL-MCNC: 82 MG/DL (ref 70–99)
HCT VFR BLDCO AUTO: 24.6 % (ref 35–45)
HGB BLD-MCNC: 8.1 G/DL (ref 11.8–15.7)
HYPOCHROMIA BLD QL SMEAR: ABNORMAL
IRON SATN MFR SERPL: 48 % (ref 15–45)
IRON SERPL-MCNC: 120 UG/DL (ref 35–150)
LYMPHOCYTES # BLD: 1.17 K/UL (ref 1.2–3.5)
LYMPHOCYTES NFR BLD: 84 %
MCH RBC QN AUTO: 27.1 PG (ref 28–33.2)
MCHC RBC AUTO-ENTMCNC: 32.9 G/DL (ref 32.2–35.5)
MCV RBC AUTO: 82.3 FL (ref 83–98)
MONOCYTES # BLD: 0.06 K/UL (ref 0.28–0.8)
MONOCYTES NFR BLD: 4 %
NEUTS BAND # BLD: 0.11 K/UL (ref 1.7–7)
NEUTS SEG NFR BLD: 8 %
OVALOCYTES BLD QL SMEAR: ABNORMAL
P AXIS: 71
PDW BLD AUTO: 9.2 FL (ref 9.4–12.3)
PLAT MORPH BLD: NORMAL
PLATELET # BLD AUTO: 79 K/UL (ref 150–369)
PLATELET # BLD EST: ABNORMAL 10*3/UL
POCT TEST: ABNORMAL
POTASSIUM SERPL-SCNC: 4.1 MEQ/L (ref 3.6–5.1)
PR INTERVAL: 122
QRS DURATION: 72
QT INTERVAL: 310
QTC CALCULATION(BAZETT): 417
R AXIS: 34
RBC # BLD AUTO: 2.99 M/UL (ref 3.93–5.22)
SODIUM SERPL-SCNC: 133 MEQ/L (ref 136–144)
T WAVE AXIS: 22
TIBC SERPL-MCNC: 248 UG/DL (ref 270–460)
UIBC SERPL-MCNC: 128 UG/DL (ref 180–360)
VARIANT LYMPHS # BLD MANUAL: 0.04 K/UL
VARIANT LYMPHS NFR BLD: 3 %
VENTRICULAR RATE: 109
VIT B12 SERPL-MCNC: 926 PG/ML (ref 180–914)
WBC # BLD AUTO: 1.39 K/UL (ref 3.8–10.5)

## 2023-02-05 PROCEDURE — 25800000 HC PHARMACY IV SOLUTIONS: Performed by: STUDENT IN AN ORGANIZED HEALTH CARE EDUCATION/TRAINING PROGRAM

## 2023-02-05 PROCEDURE — 25000000 HC PHARMACY GENERAL: Performed by: STUDENT IN AN ORGANIZED HEALTH CARE EDUCATION/TRAINING PROGRAM

## 2023-02-05 PROCEDURE — 63700000 HC SELF-ADMINISTRABLE DRUG

## 2023-02-05 PROCEDURE — 63600000 HC DRUGS/DETAIL CODE: Performed by: INTERNAL MEDICINE

## 2023-02-05 PROCEDURE — 93010 ELECTROCARDIOGRAM REPORT: CPT | Performed by: INTERNAL MEDICINE

## 2023-02-05 PROCEDURE — 25800000 HC PHARMACY IV SOLUTIONS: Performed by: INTERNAL MEDICINE

## 2023-02-05 PROCEDURE — 80048 BASIC METABOLIC PNL TOTAL CA: CPT | Performed by: STUDENT IN AN ORGANIZED HEALTH CARE EDUCATION/TRAINING PROGRAM

## 2023-02-05 PROCEDURE — 63600000 HC DRUGS/DETAIL CODE: Mod: JW | Performed by: EMERGENCY MEDICINE

## 2023-02-05 PROCEDURE — 99221 1ST HOSP IP/OBS SF/LOW 40: CPT | Performed by: OBSTETRICS & GYNECOLOGY

## 2023-02-05 PROCEDURE — 63600000 HC DRUGS/DETAIL CODE: Mod: JG | Performed by: STUDENT IN AN ORGANIZED HEALTH CARE EDUCATION/TRAINING PROGRAM

## 2023-02-05 PROCEDURE — 99232 SBSQ HOSP IP/OBS MODERATE 35: CPT | Performed by: STUDENT IN AN ORGANIZED HEALTH CARE EDUCATION/TRAINING PROGRAM

## 2023-02-05 PROCEDURE — 85025 COMPLETE CBC W/AUTO DIFF WBC: CPT | Performed by: STUDENT IN AN ORGANIZED HEALTH CARE EDUCATION/TRAINING PROGRAM

## 2023-02-05 PROCEDURE — 21400000 HC ROOM AND CARE CCU/INTERMEDIATE

## 2023-02-05 PROCEDURE — 63700000 HC SELF-ADMINISTRABLE DRUG: Performed by: STUDENT IN AN ORGANIZED HEALTH CARE EDUCATION/TRAINING PROGRAM

## 2023-02-05 PROCEDURE — 36415 COLL VENOUS BLD VENIPUNCTURE: CPT | Performed by: STUDENT IN AN ORGANIZED HEALTH CARE EDUCATION/TRAINING PROGRAM

## 2023-02-05 PROCEDURE — 63600000 HC DRUGS/DETAIL CODE: Performed by: STUDENT IN AN ORGANIZED HEALTH CARE EDUCATION/TRAINING PROGRAM

## 2023-02-05 RX ORDER — BENZONATATE 100 MG/1
200 CAPSULE ORAL 3 TIMES DAILY PRN
Status: DISCONTINUED | OUTPATIENT
Start: 2023-02-05 | End: 2023-02-09 | Stop reason: HOSPADM

## 2023-02-05 RX ORDER — POLYETHYLENE GLYCOL 3350 17 G/17G
17 POWDER, FOR SOLUTION ORAL ONCE
Status: COMPLETED | OUTPATIENT
Start: 2023-02-05 | End: 2023-02-05

## 2023-02-05 RX ORDER — DOCUSATE SODIUM 100 MG/1
100 CAPSULE, LIQUID FILLED ORAL DAILY
Status: DISCONTINUED | OUTPATIENT
Start: 2023-02-06 | End: 2023-02-09 | Stop reason: HOSPADM

## 2023-02-05 RX ADMIN — OXYCODONE HYDROCHLORIDE 5 MG: 5 TABLET ORAL at 18:51

## 2023-02-05 RX ADMIN — METHIMAZOLE 5 MG: 5 TABLET ORAL at 08:51

## 2023-02-05 RX ADMIN — ACETAMINOPHEN 650 MG: 325 TABLET ORAL at 12:09

## 2023-02-05 RX ADMIN — BUPROPION HYDROCHLORIDE 150 MG: 150 TABLET, EXTENDED RELEASE ORAL at 08:51

## 2023-02-05 RX ADMIN — OXYCODONE HYDROCHLORIDE 5 MG: 5 TABLET ORAL at 12:09

## 2023-02-05 RX ADMIN — SODIUM CHLORIDE 1250 MG: 9 INJECTION, SOLUTION INTRAVENOUS at 06:11

## 2023-02-05 RX ADMIN — ONDANSETRON 4 MG: 4 TABLET, ORALLY DISINTEGRATING ORAL at 14:05

## 2023-02-05 RX ADMIN — CEFTAZIDIME 2 G: 2 INJECTION, POWDER, FOR SOLUTION INTRAVENOUS at 00:31

## 2023-02-05 RX ADMIN — BENZONATATE 200 MG: 100 CAPSULE ORAL at 03:40

## 2023-02-05 RX ADMIN — BENZONATATE 200 MG: 100 CAPSULE ORAL at 10:09

## 2023-02-05 RX ADMIN — CEFEPIME 2 G: 2 INJECTION, POWDER, FOR SOLUTION INTRAVENOUS at 10:02

## 2023-02-05 RX ADMIN — ONDANSETRON 4 MG: 4 TABLET, ORALLY DISINTEGRATING ORAL at 22:11

## 2023-02-05 RX ADMIN — ACETAMINOPHEN 650 MG: 325 TABLET ORAL at 18:51

## 2023-02-05 RX ADMIN — MORPHINE SULFATE 2 MG: 2 INJECTION, SOLUTION INTRAMUSCULAR; INTRAVENOUS at 09:00

## 2023-02-05 RX ADMIN — CEFEPIME 2 G: 2 INJECTION, POWDER, FOR SOLUTION INTRAVENOUS at 17:02

## 2023-02-05 RX ADMIN — ENOXAPARIN SODIUM 70 MG: 80 INJECTION SUBCUTANEOUS at 17:02

## 2023-02-05 RX ADMIN — MORPHINE SULFATE 2 MG: 2 INJECTION, SOLUTION INTRAMUSCULAR; INTRAVENOUS at 17:07

## 2023-02-05 RX ADMIN — OXYCODONE HYDROCHLORIDE 5 MG: 5 TABLET ORAL at 03:11

## 2023-02-05 RX ADMIN — FILGRASTIM-SNDZ 480 MCG: 480 INJECTION, SOLUTION INTRAVENOUS; SUBCUTANEOUS at 12:49

## 2023-02-05 RX ADMIN — POLYETHYLENE GLYCOL 3350 17 G: 17 POWDER, FOR SOLUTION ORAL at 22:10

## 2023-02-05 RX ADMIN — ENOXAPARIN SODIUM 70 MG: 80 INJECTION SUBCUTANEOUS at 04:52

## 2023-02-05 NOTE — PROGRESS NOTES
Internal Medicine  Daily Progress Note       SUBJECTIVE   This is a 32 y.o. 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    Interval History: No acute events overnight. Patient seen and examined at bedside. Denies any additional fever/chills since being in the hospital. Still endorses right arm pain around the PICC site.     OBJECTIVE   Vital signs in last 24 hours:  Temp:  [36.5 °C (97.7 °F)-37.3 °C (99.2 °F)] 36.5 °C (97.7 °F)  Heart Rate:  [] 95  Resp:  [16-20] 20  BP: ()/(51-75) 95/51  SpO2:  [95 %-100 %] 100 %  Oxygen Therapy: None (Room air)    Weight & I/Os:  Weights (last 5 days)     Date/Time Weight    02/05/23 0900 72.6 kg (160 lb)    02/04/23 1158 72.6 kg (160 lb)          Intake/Output Summary (Last 24 hours) at 2/5/2023 0659  Last data filed at 2/4/2023 1940  24 Hour Net Input/Output from 7AM Yesterday   Intake 2350 ml   Output --   Net 2350 ml        PHYSICAL EXAMINATION   Physical Exam  Constitutional:       General: She is not in acute distress.     Appearance: She is ill-appearing. She is not toxic-appearing.   HENT:      Head: Normocephalic and atraumatic.      Mouth/Throat:      Mouth: Mucous membranes are dry.   Cardiovascular:      Rate and Rhythm: Regular rhythm. Tachycardia present.      Heart sounds: No murmur heard.  Pulmonary:      Effort: Pulmonary effort is normal. No respiratory distress.      Breath sounds: No wheezing or rales.      Comments: Slight crackles at bases  Abdominal:      General: Abdomen is flat. There is no distension.      Palpations: Abdomen is soft.   Musculoskeletal:         General: Swelling present.      Comments: R arm is edematous, warm and painful to touch  No apparent drainage/purlulence around PICC site   Skin:     General: Skin is warm and dry.   Neurological:      General: No focal deficit present.      Mental Status: She is alert and  oriented to person, place, and time   LINES, CATHETERS, DRAINS, AIRWAYS, & WOUNDS   Lines, drains, airways, & wounds:  PICC Double Lumen 01/13/23 Right (Active)   Number of days: 23       Peripheral IV (Adult) 02/05/23 Left;Proximal;Posterior Forearm (Active)   Number of days: 0        LABS, IMAGING, & TELE   Labs:  I have reviewed the patient's labs to the time of note. No new clinical concern.    Results from last 7 days   Lab Units 02/05/23  0603 02/04/23  1346 02/03/23  1439   WBC K/uL 1.39* 1.63* 4.39   HEMOGLOBIN g/dL 8.1* 10.2* 9.4*   HEMATOCRIT % 24.6* 30.4* 28.8*   PLATELETS K/uL 79* 110* 117*       Results from last 7 days   Lab Units 02/05/23  0603 02/04/23  1346 02/03/23  1439 01/30/23  0659   SODIUM mEQ/L 133* 133* 134* 136   POTASSIUM mEQ/L 4.1 3.7 3.6 4.4   CHLORIDE mEQ/L 104 104 107 111*   CO2 mEQ/L 23 20* 19* 19*   BUN mg/dL 5* 8 12 5*   CREATININE mg/dL 0.7 0.8 0.8 0.8   CALCIUM mg/dL 7.9* 8.9 8.3* 8.1*   ALBUMIN g/dL  --  3.8 3.5 3.0*   BILIRUBIN TOTAL mg/dL  --  0.5 0.5 0.3   ALK PHOS IU/L  --  114 104 115   ALT IU/L  --  16 17 18   AST IU/L  --  11* 15 11*   GLUCOSE mg/dL 82 89 110* 85     Imaging personally reviewed (does not include unread studies):  CT ANGIOGRAPHY CHEST PULMONARY EMBOLISM WITH IV CONTRAST    Result Date: 2/4/2023  CLINICAL HISTORY:    Pleuritic chest pain with right upper extremity DVT. COMPARISON:    CT PE 1/12/2023 COMMENT: Technique: CT of the Chest was performed prior to and following the administration of 100 mL Omnipaque 350.  KV and/or mA were adjusted according to the patient's size and body part for CT dose reduction. 3D MIP and/or volume rendered image reconstruction performed and reviewed. CHEST: Pulmonary arteries: Main pulmonary artery is normal in caliber. There are no pulmonary arterial filling defects seen through the proximal subsegmental branches. There is more limited assessment distally. Lungs/airways/pleura: The trachea and central bronchi are patent.  There is no focal airspace consolidation, pleural effusion or pneumothorax. There is mild ill-defined groundglass attenuation poster laterally within the superior segment of the right lower lobe which is new from prior and which may reflect an infectious or inflammatory process. There is intervally decreased size and number of numerous pulmonary nodules throughout both lungs. This includes interval decrease in size of a dominant lobular subpleural mass anterolaterally within the right lower lobe now measuring 3.6 x 5.0 x 3.1 cm. The largest nodule within the left lung poster laterally within the left upper lobe is a bilobed nodule now measuring up to 8 mm, also intervally decreased in size. Lower Neck: within normal limits. Axilla: No enlarged nodes. Mediastinum and Hina: No enlarged nodes. There has been interval resolution of the previously seen anterior mediastinal mass. Heart and Pericardium: Normal size.  No pericardial effusion. Aorta:  No dissection or aneurysm. Esophagus:  Normal caliber. Upper abdomen:  Within normal limits. Chest Wall: Within normal limits.  There is a right upper extremity PICC terminating within the SVC. Bones:  Within normal limits.     IMPRESSION: 1. No evidence of acute pulmonary arterial embolism through the proximal subsegmental branches. 2. Intervally decreased size and number of innumerable pulmonary nodules scattered throughout both lungs including intervally decreased size of a dominant subpleural mass within the right lower lobe now measuring up to 5 cm, as well as interval resolution of a previously seen anterior mediastinal mass, in keeping with treatment response. 3. Small area of ill-defined groundglass attenuation poster laterally within the right lower lobe which may reflect an infectious or inflammatory process.    US venous arm, RU extremity    Result Date: 2/4/2023  CLINICAL HISTORY: Upper extremity pain     IMPRESSION: Thrombus in the right subclavian, axillary,  brachial and basilic veins as detailed. Finding:    Other   Acuity: Significant  Status:  CLOSED Critical read back was performed and results were read back by Dr. Stanton on 2/4/2023 at 3:12 PM. COMMENT: Comparison: None Technique: Grayscale ultrasound, color and spectral Doppler of the upper extremity veins. RIGHT: Internal jugular vein: No thrombus seen. Doppler within normal limits. Innominate vein: No thrombus seen. Doppler within normal limits. Subclavian: There is partially occlusive thrombus. Axillary: There is occlusive thrombus. There is thrombus one of the brachial veins and the basilic vein.  The cephalic vein where visualized is patent.     Telemetry/EKG:  I have independently reviewed the telemetry. Significant findings include sinus tachycardia.     ASSESSMENT & PLAN   * Neutropenic fever (CMS/HCC)  Assessment & Plan  history of metastaticgestational trophoblastic neoplasm (on chemotherapy with MTX, Etoposide, and Dactinomcin via PICC, last chemo on 2/2/22  Presents with subjective fever/chills, neutropenia,  WBC 1.63, JORDANA 0.44  No obvious source of infection  UA bland, CT chest/ab/pelvis no obvious source    Cannot fully rule out infectious thrombophlebitis in the setting of arm pain, new DVT    S/p 1 dose of vanc and ceftaz    - Continue antibiotics with cefepime per ID  - Follow up blood and urine culture  - Neutropenic precautions  - Trend on WBC and JORDANA on CBC  - Per gyn-onc, will give a dose of filgrastim    DVT (deep vein thrombosis) in pregnancy  Assessment & Plan  Presented with R arm pain, swelling and warmth around PICC line  US shows thrombus in the right subclavian, axillary, brachial and basilic veins   Vascular surgery consulted in the ED, recommended medical therapy    Started on therapeutic lovenox    - Continue on therapeutic lovenox, will need transition to DOAC   - Continue pain management with PRN oxycodone and morphine  - Apply warm compress to right arm and elevate right  arm      Hyponatremia  Assessment & Plan  Mild, Na 133  In the setting of hypovolemia    - Trend with IV fluids    Anxiety and depression  Assessment & Plan  Maintained on buproprion 150 mg daily, protriptyline 15 mg daily    - Continue home meds    Hematuria  Assessment & Plan  UA with +3 blood no RBCs  Unclear if this could be vaginal bleeding  CK wnl    - Will need outpatient follow up to ensure resolves     Thrombocytopenia (CMS/HCC)  Assessment & Plan  Appears chronic in the setting of her malignancy  Stable at baseline  Is experiencing some vaginal bleeding and subjective rectal bleeding    Likely in the setting of malignancy vs acute blood loss, currently no evidence of DIC    Dropped to 79 this AM, no evidence of bleeding    - Trend on CBC, transfuse >50 if bleeding, >10 if not    Microcytic anemia  Assessment & Plan   Acute on chronic  Hgb stable on admission  Does report some increased vaginal bleeding and subjective BRBPR that sounds hemorrhoidal ? 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 Mcmullen and Dr. Hogue    - Gyn-onc consult  - Patient will need chemotherapy on Monday 2/6 via PICC  - Plan for port placement on Weds    Hyperthyroidism  Assessment & Plan  Maintained on methimazole 5 mg and metoprolol tartrate 12.5 mg daily  Repeat TSH 1.16    - Continue home meds    Tachycardia  Assessment & Plan  Chronic  Likely 2/2 hyperthyroidism  May be acute exacerbated in the setting of pain and neutropenic fever  CT  without evidence of PE    - Continue cardiac monitoring  - Continue hyperthyroidism tx  - Pain control         VTE Assessment: Padua    VTE Prophylaxis: Current anticoagulants:  enoxaparin (LOVENOX) syringe 70 mg, subcutaneous, q12h INT      Code Status: Full Code  Estimated discharge date: 2/7/2023     ATTENDING DOCUMENTATION  ALSO SEE ATTENDING ATTESTATION SECTION OF NOTE

## 2023-02-05 NOTE — PROGRESS NOTES
Vancomycin Dosing by Pharmacy Consult Discontinued    IV Vancomycin Dosing by Pharmacy Protocol was discontinued.  Pharmacy will sign off and no longer dose vancomycin for this patient.    Agnes Hearn, ZARINA

## 2023-02-05 NOTE — ED PROVIDER NOTES
Emergency Medicine Note  HPI   HISTORY OF PRESENT ILLNESS     Dictation errors and typographical errors may be present in the document below.  [facility] in [place]  RN hx/EHR reviewed  Hx obtained from pt , family and pt's gyn-onc physician (Dr. Hogue)        32-year-old female with a history of gestational trophoblastic neuro plasia, actively getting chemotherapy, presents with pain at the site of her PICC line.  She also had a temperature of 100.2 Fahrenheit last night.  She took an antipyretic last night and a dose of Tylenol at 10:30 AM today.   No shaking chills.  She notes several days ago when visiting nurse tried to flush her PICC line, only one of the ports flushed well.  2 days ago she had an episode of sharp right-sided mid back pain which was worse with inspiration.  Thought to be due to recent injection in that region.  Currently no shortness of breath/chest pain/back pain.  The PICC line otherwise has been working fine.  Patient reports she has regular vaginal bleeding but it has diminished recently most recently just some spotting.  She has also had regular blood in her stool, last episode about 2 days ago.                Patient History   PAST HISTORY     Reviewed from Nursing Triage:  Meds       Past Medical History:   Diagnosis Date   • Cervical cancer (CMS/HCC)    • Hx antineoplastic chemo        Past Surgical History:   Procedure Laterality Date   •  SECTION      x2       Family History   Problem Relation Age of Onset   • Hypertension Biological Mother    • Hypertension Biological Father        Social History     Tobacco Use   • Smoking status: Never   • Smokeless tobacco: Never   Vaping Use   • Vaping Use: Never used   Substance Use Topics   • Alcohol use: Not Currently   • Drug use: Never         Review of Systems   REVIEW OF SYSTEMS     Review of Systems   Constitutional: Negative for chills.         VITALS     ED Vitals    Date/Time Temp Pulse Resp BP SpO2 [facility]   23 2215 37.1 °C  (98.7 °F) 109 20 107/53 99 % GES   02/04/23 2200 -- 117 20 -- 99 % JKT   02/04/23 2100 -- -- 20 -- -- JKT   02/04/23 2052 37.3 °C (99.2 °F) -- -- -- -- CJM   02/04/23 2045 -- 124 20 121/75 100 % JKT   02/04/23 1811 -- 106 20 119/73 96 % NN   02/04/23 1700 -- 104 20 114/67 100 % NN   02/04/23 1600 -- 109 20 108/57 95 % NN   02/04/23 1547 -- 112 16 123/60 96 % NN   02/04/23 1300 -- 108 20 100/57 100 % NN   02/04/23 1200 -- 109 20 105/59 100 % NN   02/04/23 1158 36.7 °C (98.1 °F) 110 20 104/60 100 % NN                       Physical Exam   PHYSICAL EXAM     Physical Exam  Vitals and nursing note reviewed.   Constitutional:       Appearance: She is well-developed.   HENT:      Head: Normocephalic.      Right Ear: External ear normal.      Left Ear: External ear normal.   Eyes:      General: No scleral icterus.     Conjunctiva/sclera: Conjunctivae normal.   Neck:      Trachea: No tracheal deviation.   Cardiovascular:      Rate and Rhythm: Normal rate and regular rhythm.      Heart sounds: No murmur heard.     Comments: 2+radial  b/l  Pulmonary:      Effort: Pulmonary effort is normal. No respiratory distress.      Breath sounds: Normal breath sounds.   Abdominal:      Palpations: Abdomen is soft. There is no mass.      Tenderness: There is no abdominal tenderness. There is no guarding or rebound.   Musculoskeletal:         General: Tenderness ( mod tenderness R arm, medial aspect along course of PICC line; no erythema/warmth; PICC insertion site looks grossly wnl---biopatch not removed) present. Normal range of motion.      Cervical back: Neck supple.      Comments: No LE assymetry/cords/tenderness   Skin:     General: Skin is warm and dry.   Neurological:      Mental Status: She is alert.      Comments: Moves all extremities  Communicates clearly           PROCEDURES     Procedures     DATA     Results     Procedure Component Value Units Date/Time    TSH w reflex FT4 [[id number]]  (Normal) Collected: 02/04/23 1346     Specimen: Blood, Venous Updated: 02/04/23 1658     TSH 1.16 mIU/L     CBC and differential [[id number]]  (Abnormal) Collected: 02/04/23 1346    Specimen: Blood, Venous Updated: 02/04/23 1454     WBC 1.63 K/uL      Comment: RESULTS CHECKED. This result has been called to BRAYDEN CLEARY by Rebecca Henning on 02 04 23 at 14:22, and has been read back.         RBC 3.74 M/uL      Hemoglobin 10.2 g/dL      Hematocrit 30.4 %      MCV 81.3 fL      MCH 27.3 pg      MCHC 33.6 g/dL      RDW 12.3 %      Platelets 110 K/uL      MPV 9.0 fL      Differential Type Manu     Neutrophils 27 %      Lymphocytes 66 %      Monocytes 3 %      Eosinophils 0 %      Basophils 1 %      Atypical Lymphocytes 3 %      Neutrophils, Absolute 0.44 K/uL      Lymphocytes, Absolute 1.08 K/uL      Monocytes, Absolute 0.05 K/uL      Eosinophils, Absolute 0.00 K/uL      Basophils, Absolute 0.02 K/uL      Atypical Lymphs, Absolute 0.05 K/uL      PLT Morphology Normal     Platelet Estimate Decreased (51,000-149,000)     Dohle Bodies Occasional     Toxic Granulation 1+     Poikilocytes 1+    Comprehensive metabolic panel [[id number]]  (Abnormal) Collected: 02/04/23 1346    Specimen: Blood, Venous Updated: 02/04/23 1442     Sodium 133 mEQ/L      Potassium 3.7 mEQ/L      Comment: Results obtained on plasma. Plasma Potassium values may be up to 0.4 mEQ/L less than serum values. The differences may be greater for patients with high platelet or white cell counts.        Chloride 104 mEQ/L      CO2 20 mEQ/L      BUN 8 mg/dL      Creatinine 0.8 mg/dL      Glucose 89 mg/dL      Calcium 8.9 mg/dL      AST (SGOT) 11 IU/L      ALT (SGPT) 16 IU/L      Alkaline Phosphatase 114 IU/L      Total Protein 7.5 g/dL      Comment: Test performed on plasma which typically contains approximately 0.4 g/dL more protein than serum.        Albumin 3.8 g/dL      Bilirubin, Total 0.5 mg/dL      eGFR >60.0 mL/min/1.73m*2      Anion Gap 9 mEQ/L     Protime-INR [[id number]]  (Normal) Collected:  02/04/23 1346    Specimen: Blood, Venous Updated: 02/04/23 1441     PT 13.4 sec      INR 1.0     Comment: INR has no defined significance when PT is within Reference Range.       APTT [[id number]]  (Normal) Collected: 02/04/23 1346    Specimen: Blood, Venous Updated: 02/04/23 1441     PTT 24 sec     Blood Culture Blood, Venous [[id number]] Collected: 02/04/23 1346    Specimen: Blood, Venous Updated: 02/04/23 1433    Blood Culture Blood, Venous [[id number]] Collected: 02/04/23 1346    Specimen: Blood, Venous Updated: 02/04/23 1432    UA with reflex culture [[id number]]  (Abnormal) Collected: 02/04/23 1348    Specimen: Urine, Clean Catch Updated: 02/04/23 1421    Narrative:      The following orders were created for panel order UA with reflex culture.  Procedure                               Abnormality         Status                     ---------                               -----------         ------                     UA Reflex to Culture (Ma...[[id number]]  Abnormal            Final result               UA Microscopic[[id number]]               Abnormal            Final result                 Please view results for these tests on the individual orders.    UA Microscopic [[id number]]  (Abnormal) Collected: 02/04/23 1348    Specimen: Urine, Clean Catch Updated: 02/04/23 1421     RBC, Urine 0 TO 4 /HPF      WBC, Urine 4 TO 10 /HPF      Squamous Epithelial Rare /hpf      Hyaline Cast None Seen /lpf      Bacteria, Urine Rare /HPF      MUCUSUA Rare /LPF     UA Reflex to Culture (Macroscopic) [[id number]]  (Abnormal) Collected: 02/04/23 1348    Specimen: Urine, Clean Catch Updated: 02/04/23 1417     Color, Urine Yellow     Clarity, Urine Clear     Specific Gravity, Urine 1.008     pH, Urine 6.5     Leukocyte Esterase Negative     Comment: Results can be falsely negative due to high specific gravity, some antibiotics, glucose >3 g/dl, or WBC other than neutrophils.        Nitrite, Urine Negative     Protein, Urine Negative      Glucose, Urine Negative mg/dL      Ketones, Urine Negative mg/dL      Urobilinogen, Urine 0.2 EU/dL      Bilirubin, Urine Negative mg/dL      Blood, Urine +3     Comment: The sensitivity of the occult blood test is equivalent to approximately 4 intact RBC/HPF.             Imaging Results          CT ANGIOGRAPHY CHEST PULMONARY EMBOLISM WITH IV CONTRAST (Final result)  Result time 02/04/23 15:47:07    Final result                 Impression:    IMPRESSION:  1. No evidence of acute pulmonary arterial embolism through the proximal  subsegmental branches.  2. Intervally decreased size and number of innumerable pulmonary nodules  scattered throughout both lungs including intervally decreased size of a  dominant subpleural mass within the right lower lobe now measuring up to 5 cm,  as well as interval resolution of a previously seen anterior mediastinal mass,  in keeping with treatment response.  3. Small area of ill-defined groundglass attenuation poster laterally within the  right lower lobe which may reflect an infectious or inflammatory process.             Narrative:    CLINICAL HISTORY:    Pleuritic chest pain with right upper extremity DVT.    COMPARISON:    CT PE 1/12/2023    COMMENT:  Technique: CT of the Chest was performed prior to and following the  administration of 100 mL Omnipaque 350.  KV and/or mA were adjusted according to  the patient's size and body part for CT dose reduction.  3D MIP and/or volume rendered image reconstruction performed and reviewed.    CHEST:  Pulmonary arteries: Main pulmonary artery is normal in caliber. There are no  pulmonary arterial filling defects seen through the proximal subsegmental  branches. There is more limited assessment distally.  Lungs/airways/pleura: The trachea and central bronchi are patent. There is no  focal airspace consolidation, pleural effusion or pneumothorax. There is mild  ill-defined groundglass attenuation poster laterally within the superior  segment  of the right lower lobe which is new from prior and which may reflect an  infectious or inflammatory process. There is intervally decreased size and  number of numerous pulmonary nodules throughout both lungs. This includes  interval decrease in size of a dominant lobular subpleural mass anterolaterally  within the right lower lobe now measuring 3.6 x 5.0 x 3.1 cm. The largest nodule  within the left lung poster laterally within the left upper lobe is a bilobed  nodule now measuring up to 8 mm, also intervally decreased in size.  Lower Neck: within normal limits.  Axilla: No enlarged nodes.  Mediastinum and Hina: No enlarged nodes. There has been interval resolution of  the previously seen anterior mediastinal mass.  Heart and Pericardium: Normal size.  No pericardial effusion.  Aorta:  No dissection or aneurysm.  Esophagus:  Normal caliber.  Upper abdomen:  Within normal limits.  Chest Wall: Within normal limits.  There is a right upper extremity PICC  terminating within the SVC.  Bones:  Within normal limits.                               US venous arm, RU extremity (Final result)  Result time 02/04/23 15:13:42    Final result                 Impression:    IMPRESSION:  Thrombus in the right subclavian, axillary, brachial and basilic veins as  detailed.    Finding:    Other   Acuity: Significant  Status:  CLOSED    Critical read back was performed and results were read back by Dr. Stanton on  2/4/2023 at 3:12 PM.    COMMENT:    Comparison: None    Technique: Grayscale ultrasound, color and spectral Doppler of the upper  extremity veins.    RIGHT:  Internal jugular vein: No thrombus seen. Doppler within normal limits.  Innominate vein: No thrombus seen. Doppler within normal limits.  Subclavian: There is partially occlusive thrombus.  Axillary: There is occlusive thrombus.    There is thrombus one of the brachial veins and the basilic vein.  The cephalic  vein where visualized is patent.                Narrative:    CLINICAL HISTORY: Upper extremity pain                                ECG 12 lead             Scoring tools                                  ED Course & MDM   MDM / ED COURSE / CLINICAL IMPRESSION / DISPO     MDM    ED Course as of 02/07/23 1007   Sat Feb 04, 2023   1337 Presentation concerning for PICC line associated DVT.  Temperature 100.2 yesterday noted, perhaps due to acute thrombosis, though will need to consider infectious etiology.  Of note this patient was just recently neutropenic, if indeed she is neutropenic again today, will need to be concerned about acute bacterial infection including possible septic thrombophlebitis, among other causes.    Given recent pleuritic thoracic pain, will check CAT scan of the chest to rule out PE.    POC us-performed by me using linear probe in the region of the right upper extremity PICC line.  There is a clear thrombus in the brachial vein which seems to extend proximally, will get radiology ultrasound to further characterize. [JF]   1338 <<PROCEDURE NOTE>>-  Using dynamic u/s guidance and aseptic technique, I placed an 18g pIV in pt's left forearm vein on 1st attempt w/out complications; good blood return and easy flush; +tolerated well.   [JF]   1600 Patient is neutropenic today.  Given vancomycin and ceftazidime for presumed neutropenic fever.  Multiple discussions with gynecologic oncology attending.  She discussed case with vascular surgery attending.    Regarding patient's PICC line plan at this time is to keep in place, it is critically important that patient gets her chemotherapy on Monday .  Certainly consideration for removing it once a second PICC line can be placed.    We will start patient on Lovenox.  She is recently had vaginal and rectal bleeding, will need to be monitored for development of severe hemorrhage.    Multiple discussions with the family and the patient.    Additional history obtained from the family at the bedside.    I  reviewed office notes from Dr. Hogue.    Data reviewed independently, including: ct chest (no PE)    Disposition for hospital-based vs. Out-pt management considered. Decision made for hospitalization due to presence of proximal DVT, neutropenia with concern for neutropenic fever, initation of AC in pt w/ ongoing vaginal and rectal bleeding-- which merits hospital-based management.    [place] [facility] [place]    +++++++++++++++++++++++++++++++++++++++++++++++++++++++++++++++++  PROCEDURE NOTE  CRITICAL CARE NOTE:  -ACUTE PROCESS/ORGAN SYSTEM AT RISK- UE DVT AND initiation of anticoagulation and patient with vaginal and rectal bleeding.  Possible neutropenic fever   -I have personally provided 40 minutes of critical care time exclusive of time spent on separately billable procedures. Time includes review of all data / discussion with consultants / admitting physicians, re-evaluation of patient, discussion with patient and/or family, review of all results, and monitoring for potential decompensation.     [JF]      ED Course User Index  [JF] Mathew Stanton MD     Clinical Impression      Acute deep vein thrombosis (DVT) of right upper extremity, unspecified vein (CMS/HCC)   Neutropenia, unspecified type (CMS/HCC)     _________________     ED Disposition   Admit / Observation                   Mathew Stanton MD  02/07/23 1007

## 2023-02-05 NOTE — PLAN OF CARE
Plan of Care Review  Plan of Care Reviewed With: patient  Progress: no change  Outcome Summary: Patient stable this shift. Patient continues with arm pain. PRN pain medication given as ordered. Patient also with nausea this shift. PRN anti nausea meds given. Patient with new order t use PICC line for chemotherapy only. All pre-meds, hydration and IV medications to be gicen through peripheral in left upper extremity. Patient nor spouse offer any questions or conerns at this time. Call bell inreach. Continuing to monitor.

## 2023-02-05 NOTE — PROGRESS NOTES
Spoke with patient at bedside and confirmed with medication list from [facility] and/or patient's own pharmacy to complete the medication reconciliation.     Prior to admission medication list:    Current Outpatient Medications:   •  benzonatate (TESSALON) 100 mg capsule, Take 100 mg by mouth 3 (three) times a day as needed for cough.  •  methIMAzole (TAPAZOLE) 5 mg tablet, Take 2.5 mg by mouth daily.  •  buPROPion XL (WELLBUTRIN XL) 150 mg 24 hr tablet, Take 150 mg by mouth nightly.  •  magic mouthwash (BENADRYL-MAALOX-CARAFATE), Swish and spit 15 mL every 4 (four) hours as needed for stomatitis.  •  magic mouthwash with nystatin (BENADRYL-MAALOX-LIDOCAINE-NYSTATIN), Swish and spit 15 mL every 4 (four) hours as needed for stomatitis.  •  metoprolol tartrate (LOPRESSOR) 25 mg tablet, Take 0.5 tablets (12.5 mg total) by mouth daily with breakfast.  •  ondansetron ODT (ZOFRAN-ODT) 4 mg disintegrating tablet, Take 1-2 tablets (4-8 mg total) by mouth every 6 (six) hours as needed for nausea or vomiting (Nausea/Vomiting). Maximum 32mg per day.  •  prochlorperazine (COMPAZINE) 5 mg tablet, Take 1-2 tablets (5-10 mg total) by mouth every 6 (six) hours as needed for nausea or vomiting. This medication can make you sleepy.  •  protriptyline (VIVACTIL) 5 mg tablet, Take 3 tablets (15 mg total) by mouth daily.    Comments about home medications:  -Patient reports a dose reduction of methimazole instructed by physician approximately a week ago from 5 mg to 2.5 mg po daily. She splits the 5 mg tablet for a daily dose.   -Confirmed patient is not taking Junel 1/20 prior to admission.       Compliance:   -No adherence concerns; all medications were recently filled.

## 2023-02-05 NOTE — CONSULTS
Infectious Disease Consult Note    Patient Name: Alondra Villela  MR#: [id number]  : 1990  Admission Date: 2023  Consult Date: 23 7:18 AM   Consultant: Jaren Adams DO    Reason for Consult: neutropenic fever in chemo/ca pt  Referring Provider: Dr. Chino      History of Present Illness     32-year-old female presents to [facility] with increasing swelling of the right arm and fever.  She has a history of metastatic gestational trophoblastic cancer and is on chemotherapy last chemo was  and follows with Dr. Mcmullen.  She has metastatic disease to the liver and lungs.  She came here for fever chills and right arm pain    She had a recent PICC line placed for chemotherapy    She has now fever and neutropenia and we have asked to further evaluate    Outside records were not reviewed.    Allergies: No Known Allergies    Medical History:   Past Medical History:   Diagnosis Date   • Cervical cancer (CMS/HCC)    • Hx antineoplastic chemo        Surgical History:   Past Surgical History:   Procedure Laterality Date   •  SECTION      x2       Social History:   Social History     Socioeconomic History   • Marital status:      Spouse name: None   • Number of children: None   • Years of education: None   • Highest education level: None   Tobacco Use   • Smoking status: Never   • Smokeless tobacco: Never   Vaping Use   • Vaping Use: Never used   Substance and Sexual Activity   • Alcohol use: Not Currently   • Drug use: Never   • Sexual activity: Not Currently     Social Determinants of Health     Food Insecurity: No Food Insecurity   • Worried About Running Out of Food in the Last Year: Never true   • Ran Out of Food in the Last Year: Never true          Travel Exposure:   Travel and Exposure Screening    Flowsheet Row Most Recent Value   Travel Screening    Overnight hospitalization outside the [place] in the last year? No Filed On: 2023 1159   Exposure Screening     Symptoms               Family History:   Family History   Problem Relation Age of Onset   • Hypertension Biological Mother    • Hypertension Biological Father        Review of Systems    12 point review of systems has been asked and is negative other than above    Medications:    Current IP Meds (From admission, onward)        Frequency     amitriptyline (ELAVIL) tablet 10 mg         Daily     metoprolol tartrate (LOPRESSOR) split tablet 12.5 mg         Daily with breakfast     vancomycin 1.25 gram/250 mL IVPB in NSS  (Vancomycin IV therapy by Pharmacy Protocol)        See Hyperspace for full Linked Orders Report.    Every 12 hours interval     benzonatate (TESSALON) capsule 200 mg         3 times daily PRN     cefTAZidime (FORTAZ) IVPB 2 g/100 mL NSS vial in bag         Every 8 hours interval     acetaminophen (TYLENOL) tablet 650 mg         Every 6 hours PRN     vancomycin 750 mg/250 mL IVPB in NSS         Once     buPROPion XL (WELLBUTRIN XL) 24 hr ER tablet 150 mg         Daily     methIMAzole (TAPAZOLE) tablet 5 mg         Daily     prochlorperazine (COMPAZINE) tablet 10 mg         Every 6 hours PRN     ondansetron ODT (ZOFRAN-ODT) disintegrating tablet 4-8 mg         Every 6 hours PRN     glucose chewable tablet 16-32 g of dextrose  (Hypoglycemia Treatment Protocol and Hyperglycemia Validation Protocol)        See Hyperspace for full Linked Orders Report.    As needed     dextrose 40 % oral gel 15-30 g of dextrose  (Hypoglycemia Treatment Protocol and Hyperglycemia Validation Protocol)        Note to Pharmacy: Pharmacist: [facility] is the depot location for this medication.   See Hyperspace for full Linked Orders Report.    As needed     glucagon (GLUCAGEN) injection 1 mg  (Hypoglycemia Treatment Protocol and Hyperglycemia Validation Protocol)        See Hyperspace for full Linked Orders Report.    As needed     dextrose 50 % in water (D50) injection 12.5 g  (Hypoglycemia Treatment Protocol and Hyperglycemia  Validation Protocol)        See [facility] for full Linked Orders Report.    As needed     filgrastim-sndz (ZARXIO) injection 480 mcg        Note to Pharmacy: Once only    Once     oxyCODONE (ROXICODONE) immediate release tablet 5 mg         Every 6 hours PRN     morphine injection 2 mg         Every 6 hours PRN     enoxaparin (LOVENOX) syringe 70 mg  (enoxaparin (Lovenox) injection - Therapeutic Orders)         Every 12 hours interval     vancomycin 1 g/250 mL IVPB in NSS  (IV Antibiotics)         Once     cefTAZidime (FORTAZ) IVPB 2 g/100 mL NSS vial in bag  (IV Antibiotics)         Once     iohexoL (OMNIPAQUE 350) 350 mg iodine/mL solution 100 mL         Once     lactated ringer's bolus 1,000 mL         Once     lactated ringer's bolus 1,000 mL         Once     filgrastim-sndz (ZARXIO) injection 480 mcg  Status:  Discontinued         Daily          Anti-infectives (From admission, onward)    Start     Dose/Rate Route Frequency Ordered Stop    02/05/23 0600  vancomycin 1.25 gram/250 mL IVPB in NSS        See [facility] for full Linked Orders Report.    1,250 mg  166.7 mL/hr over 90 Minutes intravenous Every 12 hours interval 02/04/23 1835      02/05/23 0000  cefTAZidime (FORTAZ) IVPB 2 g/100 mL NSS vial in bag         2 g  200 mL/hr over 30 Minutes intravenous Every 8 hours interval 02/04/23 1835              Objective     Vital Signs:    Patient Vitals for the past 72 hrs:   BP Temp Temp src Pulse Resp SpO2 Height Weight   02/05/23 0354 -- -- -- 95 -- -- -- --   02/05/23 0300 (!) 95/51 36.5 °C (97.7 °F) Oral (!) 103 20 100 % -- --   02/05/23 0000 -- -- -- 96 -- -- -- --   02/04/23 2215 (!) 107/53 37.1 °C (98.7 °F) Oral (!) 109 20 99 % -- --   02/04/23 2200 -- -- -- (!) 117 20 99 % -- --   02/04/23 2100 -- -- -- -- 20 -- -- --   02/04/23 2052 -- 37.3 °C (99.2 °F) -- -- -- -- -- --   02/04/23 2045 121/75 -- -- (!) 124 20 100 % -- --   02/04/23 1811 119/73 -- -- (!) 106 20 96 % -- --   02/04/23 1700 114/67 -- -- (!)  "104 20 100 % -- --   23 1600 (!) 108/57 -- -- (!) 109 20 95 % -- --   23 1547 123/60 -- -- (!) 112 16 96 % -- --   23 1300 (!) 100/57 -- -- (!) 108 20 100 % -- --   23 1200 (!) 105/59 -- -- (!) 109 20 100 % -- --   23 1158 104/60 36.7 °C (98.1 °F) Oral (!) 110 20 100 % 1.626 m (5' 4\") 72.6 kg (160 lb)       Temp (72hrs), Av.9 °C (98.4 °F), Min:36.5 °C (97.7 °F), Max:37.3 °C (99.2 °F)      Physical Exam:    Visit Vitals  BP (!) 95/51 (BP Location: Left upper arm, Patient Position: Lying)   Pulse 95   Temp 36.5 °C (97.7 °F) (Oral)   Resp 20   Ht 1.626 m (5' 4\")   Wt 72.6 kg (160 lb)   LMP 2022 (Exact Date)   SpO2 100%   Breastfeeding No   BMI 27.46 kg/m²           General: No acute distress.   Skin: Skin is warm and dry.   Lymphatics: No palpable  lymphadenopathy  HEENT: Normocephalic atraumatic, mucous membranes moist no oral lesions, oropharynx clear no mucositis  Conjunctiva: No injection or petechial lesions  Neck: No jugular venous distention.  .   Cardiovascular: Normal and regular S1 and S2, no murmurs.  Chest: Clear to auscultation bilaterally, no wheezing.  Abdomen: Soft and nontender, bowel sounds are present.  Back: No CVA tenderness  Musculoskeletal: No acute arthritis  : Normal external genitalia  Extremities: No cyanosis or edema  Neurological: Grossly nonfocal.  Oriented ×3  Right upper extremity PICC line is intact no phlebitis seen no cellulitis  Peripheral IV appears to now have an infiltrated site infusing vancomycin which I stopped and talk to the nurse about    Lines, Drains, Airways, Wounds:  Peripheral IV (Adult) 23 Anterior;Left;Proximal Forearm (Active)   Number of days: 1       Labs:    CBC Results       23     1346 1439 0659    WBC 1.63 4.39 1.22    RBC 3.74 3.45 3.31    HGB 10.2 9.4 9.1    HCT 30.4 28.8 27.9    MCV 81.3 83.5 84.3    MCH 27.3 27.2 27.5    MCHC 33.6 32.6 32.6     117 104         Comment for WBC at " 1346 on 02/04/23: RESULTS CHECKED. This result has been called to BRAYDEN CLEARY by Rebecca Henning on 02 04 23 at 14:22, and has been read back.     Comment for WBC at 0659 on 01/30/23: ALL RESULTS HAVE BEEN CHECKED. This result has been called to JIA WILLIAMSON by PARMINDER BAKER on 01 30 23 at 08:19, and has been read back.         CMP Results       02/04/23 02/03/23 01/30/23     1346 1439 0659     134 136    K 3.7 3.6 4.4    Cl 104 107 111    CO2 20 19 19    Glucose 89 110 85    BUN 8 12 5    Creatinine 0.8 0.8 0.8    Calcium 8.9 8.3 8.1    Anion Gap 9 8 6    AST 11 15 11    ALT 16 17 18    Albumin 3.8 3.5 3.0    EGFR >60.0 >60.0 >60.0         Comment for K at 1346 on 02/04/23: Results obtained on plasma. Plasma Potassium values may be up to 0.4 mEQ/L less than serum values. The differences may be greater for patients with high platelet or white cell counts.          Microbiology Results     ** No results found for the last 720 hours. **          Pathology Results     ** No results found for the last 720 hours. **          Ultrasound with thrombus in the right subclavian axillary brachial and basilic veins    Imaging:    Radiology Imaging    XR CHEST 1 VW    Narrative  CLINICAL HISTORY: follow up, r/o hemothorax    COMMENT:  Technique: A portable frontal view of the chest was obtained.  Comparison: 1/12/2023.    Slightly decreased lung volumes. Redemonstrated right lower lung zone mass.  Lungs are otherwise clear and without pleural effusion or pneumothorax.  The heart size is normal. The hilar and mediastinal contours are normal.  The regional skeletal structures are unremarkable.    --    Impression  No significant interval change. No pneumothorax.      Assessment     Neutropenic fever  Thrombus associated with PICC line per Gyn/Onc  Gestational trophoblastic neoplasia        Plan     Discontinue vancomycin  DC Fortaz  Start cefepime 2 g IV every 8 hours  PICC line per GYN         70  minutes were spent obtaining  a detailed interval history, detailed exam, evaluating this high complexity case with significant medical decision making including coordination of care,reviewing laboratory and imaging data and discussion withwith primary team and consultants        NOTE: Some or all of the note above was created with the use of dictation software, please note this dictation software can have anomalies in its ability to transcribe. Please contact me directly for anything that seems abnormal for clarification

## 2023-02-05 NOTE — PLAN OF CARE
Plan of Care Review  Plan of Care Reviewed With: patient  Progress: no change  Outcome Summary: Pt c/o RUE arm pain. US positive for blood clot and being treated with Lovenox, warm compresses, and elevation. Oxycodone and morphine for pain. pt min assist OOB.

## 2023-02-05 NOTE — PROGRESS NOTES
Gynecologic Oncology Progress Note    Subjective     No acute events overnight. Notes RUE/axillary discomfort/swelling. No other complaints this AM. Patient had apparently declined her Zarxio as she wanted to be sure this was ok to take with chemotherapy approaching.    Objective     Vital signs in last 24 hours:  Temp:  [36.5 °C (97.7 °F)-37.3 °C (99.2 °F)] 36.5 °C (97.7 °F)  Heart Rate:  [] 95  Resp:  [16-20] 20  BP: ()/(51-75) 95/51    Intake/Output this shift:  I/O this shift:  In: 250 [IV Piggyback:250]  Out: -     Exam:  General Appearance: Alert, cooperative, no acute distress  Lungs: Clear to auscultation bilaterally, respirations unlabored  Heart: Regular rate and rhythm  Abdomen: Soft, non-distended, non-tender, +BS, no rebound/rigidity/guarding  Extremities: R upper arm/axilla edematous, warm, and painful to touch near PICC site; no LE edema or calf tenderness    Labs:  Results from last 7 days   Lab Units 23  1346 23  14323  0659   WBC K/uL 1.63* 4.39 1.22*   HEMOGLOBIN g/dL 10.2* 9.4* 9.1*   HEMATOCRIT % 30.4* 28.8* 27.9*   PLATELETS K/uL 110* 117* 104*   DIFF TYPE  Manu Manu Manu   NEUTROS PCT MAN % 27 67 9   LYMPHO PCT MAN % 66 32 65   MONO PCT MAN % 3 1 21   EOSINO PCT MAN % 0 0 1   BASOS PCT MAN % 1 0 1   BANDS PCT MAN %  --   --  2   SEGS ABS MAN K/uL 0.44* 2.94 0.11*   LYMPHO ABS MAN K/uL 1.08* 1.40 0.79*   MONO ABS MAN K/uL 0.05* 0.04* 0.26*   EOS ABS MAN K/uL 0.00* 0.00* 0.01*   BASOS ABS MAN K/uL 0.02 0.00* 0.01   MYELOCYTES, ABSOLUTE (MANUAL) K/uL  --   --  0.01*       Results from last 7 days   Lab Units 23  1346 23  1439 23  0659   SODIUM mEQ/L 133* 134* 136   POTASSIUM mEQ/L 3.7 3.6 4.4   CHLORIDE mEQ/L 104 107 111*   CO2 mEQ/L 20* 19* 19*   BUN mg/dL 8 12 5*   CREATININE mg/dL 0.8 0.8 0.8   GLUCOSE mg/dL 89 110* 85   CALCIUM mg/dL 8.9 8.3* 8.1*        Assessment/Plan      Alondra Villela is a 32 y.o.  with stage IV gestational  trophoblastic neoplasia who is admitted to [facility] with neutropenic fevers and RUE DVT.     RUE DVT  · Found to have thrombus in brachial vein and basilic vein  · Receiving therapeutic-dose Lovenox (70 mg BID)  · Case Management to price DOAC     Severe Neutropenia  · 2/4/23: WBC 1.63,  (severe neutropenia)  · Neutropenic precautions in place   · Continue Zarxio 480 mcg on days when she is not receiving chemotherapy  · Cultures obtained in ED 2/4/23, follow-up results  · S/p ceftazadime and vancomycin on admission  · ID consulted, recommendations for further abx and discontinuing PICC as nidus of infection    GTN  · S/p EP induction chemotherapy on 1/13/23-1/14/23 and 1/20/23-1/21/23  · S/p Etoposide, Dactinomycin, and Methotrexate on 1/30/23-2/2/23  · Next chemo planned for 2/6/23  · Vaginal bleeding expected in setting of tumor and anticoagulation, continue to monitor Hgb (10.2) and Plts (110) closely  · Continue supportive care (tessalon capsules for cough, pain management)     Hyperthyroidism  · Continue home methimazole and metoprolol      Anxiety/Depression:  · Mood stable, on wellbutrin XL  mg          To be discussed with Dr. Hogue today.    Reg Madison MD, PGY3  Obstetrics & Gynecology

## 2023-02-06 DIAGNOSIS — O01.9 GESTATIONAL TROPHOBLASTIC NEOPLASM: ICD-10-CM

## 2023-02-06 LAB
ANION GAP SERPL CALC-SCNC: 7 MEQ/L (ref 3–15)
BACTERIA UR CULT: NORMAL
BACTERIA UR CULT: NORMAL
BASOPHILS # BLD: 0.02 K/UL (ref 0.01–0.1)
BASOPHILS NFR BLD: 2 %
BUN SERPL-MCNC: 5 MG/DL (ref 8–20)
CALCIUM SERPL-MCNC: 8 MG/DL (ref 8.9–10.3)
CHLORIDE SERPL-SCNC: 108 MEQ/L (ref 98–109)
CO2 SERPL-SCNC: 19 MEQ/L (ref 22–32)
CREAT SERPL-MCNC: 0.8 MG/DL (ref 0.6–1.1)
DIFFERENTIAL METHOD BLD: ABNORMAL
EOSINOPHIL # BLD: 0.02 K/UL (ref 0.04–0.36)
EOSINOPHIL NFR BLD: 2 %
ERYTHROCYTE [DISTWIDTH] IN BLOOD BY AUTOMATED COUNT: 12.4 % (ref 11.7–14.4)
GFR SERPL CREATININE-BSD FRML MDRD: >60 ML/MIN/1.73M*2
GLUCOSE SERPL-MCNC: 79 MG/DL (ref 70–99)
HCT VFR BLDCO AUTO: 21.9 % (ref 35–45)
HGB BLD-MCNC: 7.5 G/DL (ref 11.8–15.7)
INR PPP: 1.1
LYMPHOCYTES # BLD: 0.99 K/UL (ref 1.2–3.5)
LYMPHOCYTES NFR BLD: 87 %
MCH RBC QN AUTO: 27.3 PG (ref 28–33.2)
MCHC RBC AUTO-ENTMCNC: 34.2 G/DL (ref 32.2–35.5)
MCV RBC AUTO: 79.6 FL (ref 83–98)
MONOCYTES # BLD: 0.08 K/UL (ref 0.28–0.8)
MONOCYTES NFR BLD: 7 %
NEUTS BAND # BLD: 0.02 K/UL (ref 1.7–7)
NEUTS SEG NFR BLD: 2 %
PDW BLD AUTO: 9.5 FL (ref 9.4–12.3)
PLAT MORPH BLD: NORMAL
PLATELET # BLD AUTO: 64 K/UL (ref 150–369)
PLATELET # BLD EST: ABNORMAL 10*3/UL
POTASSIUM SERPL-SCNC: 4.1 MEQ/L (ref 3.6–5.1)
PROTHROMBIN TIME: 13.8 SEC (ref 12.2–14.5)
RBC # BLD AUTO: 2.75 M/UL (ref 3.93–5.22)
RBC MORPH BLD: NORMAL
SCHISTOCYTES BLD QL SMEAR: ABNORMAL
SODIUM SERPL-SCNC: 134 MEQ/L (ref 136–144)
T3FREE SERPL-MCNC: 3.25 PG/ML (ref 2.1–3.7)
T4 FREE SERPL-MCNC: 0.68 NG/DL (ref 0.58–1.64)
TSH SERPL DL<=0.05 MIU/L-ACNC: 1.79 MIU/L (ref 0.34–5.6)
WBC # BLD AUTO: 1.14 K/UL (ref 3.8–10.5)

## 2023-02-06 PROCEDURE — 85610 PROTHROMBIN TIME: CPT | Performed by: STUDENT IN AN ORGANIZED HEALTH CARE EDUCATION/TRAINING PROGRAM

## 2023-02-06 PROCEDURE — 63600000 HC DRUGS/DETAIL CODE: Performed by: STUDENT IN AN ORGANIZED HEALTH CARE EDUCATION/TRAINING PROGRAM

## 2023-02-06 PROCEDURE — 25800000 HC PHARMACY IV SOLUTIONS: Performed by: INTERNAL MEDICINE

## 2023-02-06 PROCEDURE — 99221 1ST HOSP IP/OBS SF/LOW 40: CPT | Performed by: SURGERY

## 2023-02-06 PROCEDURE — 25800000 HC PHARMACY IV SOLUTIONS: Performed by: STUDENT IN AN ORGANIZED HEALTH CARE EDUCATION/TRAINING PROGRAM

## 2023-02-06 PROCEDURE — 63700000 HC SELF-ADMINISTRABLE DRUG: Performed by: STUDENT IN AN ORGANIZED HEALTH CARE EDUCATION/TRAINING PROGRAM

## 2023-02-06 PROCEDURE — 80048 BASIC METABOLIC PNL TOTAL CA: CPT | Performed by: STUDENT IN AN ORGANIZED HEALTH CARE EDUCATION/TRAINING PROGRAM

## 2023-02-06 PROCEDURE — 21400000 HC ROOM AND CARE CCU/INTERMEDIATE

## 2023-02-06 PROCEDURE — 84481 FREE ASSAY (FT-3): CPT

## 2023-02-06 PROCEDURE — 63700000 HC SELF-ADMINISTRABLE DRUG

## 2023-02-06 PROCEDURE — 200200 PR NO CHARGE: Performed by: OBSTETRICS & GYNECOLOGY

## 2023-02-06 PROCEDURE — 63600000 HC DRUGS/DETAIL CODE: Performed by: OBSTETRICS & GYNECOLOGY

## 2023-02-06 PROCEDURE — 99232 SBSQ HOSP IP/OBS MODERATE 35: CPT | Performed by: OBSTETRICS & GYNECOLOGY

## 2023-02-06 PROCEDURE — 84443 ASSAY THYROID STIM HORMONE: CPT

## 2023-02-06 PROCEDURE — 63600000 HC DRUGS/DETAIL CODE: Performed by: INTERNAL MEDICINE

## 2023-02-06 PROCEDURE — 84439 ASSAY OF FREE THYROXINE: CPT

## 2023-02-06 PROCEDURE — 85025 COMPLETE CBC W/AUTO DIFF WBC: CPT | Performed by: STUDENT IN AN ORGANIZED HEALTH CARE EDUCATION/TRAINING PROGRAM

## 2023-02-06 PROCEDURE — 99233 SBSQ HOSP IP/OBS HIGH 50: CPT | Performed by: HOSPITALIST

## 2023-02-06 PROCEDURE — 36415 COLL VENOUS BLD VENIPUNCTURE: CPT | Performed by: STUDENT IN AN ORGANIZED HEALTH CARE EDUCATION/TRAINING PROGRAM

## 2023-02-06 PROCEDURE — 25800000 HC PHARMACY IV SOLUTIONS: Performed by: OBSTETRICS & GYNECOLOGY

## 2023-02-06 PROCEDURE — 63600000 HC DRUGS/DETAIL CODE: Mod: JW | Performed by: EMERGENCY MEDICINE

## 2023-02-06 RX ORDER — EPINEPHRINE 1 MG/ML
0.5 INJECTION, SOLUTION INTRAMUSCULAR; SUBCUTANEOUS ONCE AS NEEDED
Status: DISCONTINUED | OUTPATIENT
Start: 2023-02-06 | End: 2023-02-09 | Stop reason: HOSPADM

## 2023-02-06 RX ORDER — FAMOTIDINE 10 MG/ML
20 INJECTION INTRAVENOUS ONCE AS NEEDED
Status: DISCONTINUED | OUTPATIENT
Start: 2023-02-06 | End: 2023-02-09 | Stop reason: HOSPADM

## 2023-02-06 RX ORDER — PALONOSETRON 0.05 MG/ML
250 INJECTION, SOLUTION INTRAVENOUS ONCE
Status: COMPLETED | OUTPATIENT
Start: 2023-02-06 | End: 2023-02-06

## 2023-02-06 RX ORDER — DIPHENHYDRAMINE HCL 50 MG/ML
25 VIAL (ML) INJECTION ONCE AS NEEDED
Status: DISCONTINUED | OUTPATIENT
Start: 2023-02-06 | End: 2023-02-09 | Stop reason: HOSPADM

## 2023-02-06 RX ORDER — SODIUM CHLORIDE 9 MG/ML
500 INJECTION, SOLUTION INTRAVENOUS CONTINUOUS
Status: ACTIVE | OUTPATIENT
Start: 2023-02-06 | End: 2023-02-06

## 2023-02-06 RX ADMIN — CISPLATIN 110 MG: 1 INJECTION, SOLUTION INTRAVENOUS at 17:38

## 2023-02-06 RX ADMIN — DEXTROSE 184 MG: 5 SOLUTION INTRAVENOUS at 16:18

## 2023-02-06 RX ADMIN — CEFEPIME 2 G: 2 INJECTION, POWDER, FOR SOLUTION INTRAVENOUS at 16:26

## 2023-02-06 RX ADMIN — METHIMAZOLE 5 MG: 5 TABLET ORAL at 09:28

## 2023-02-06 RX ADMIN — SODIUM CHLORIDE 500 ML/HR: 9 INJECTION, SOLUTION INTRAVENOUS at 19:49

## 2023-02-06 RX ADMIN — BUPROPION HYDROCHLORIDE 150 MG: 150 TABLET, EXTENDED RELEASE ORAL at 09:28

## 2023-02-06 RX ADMIN — PALONOSETRON HYDROCHLORIDE 250 MCG: 0.25 INJECTION, SOLUTION INTRAVENOUS at 15:39

## 2023-02-06 RX ADMIN — ONDANSETRON 4 MG: 4 TABLET, ORALLY DISINTEGRATING ORAL at 21:15

## 2023-02-06 RX ADMIN — DEXAMETHASONE SODIUM PHOSPHATE 12 MG: 4 INJECTION, SOLUTION INTRA-ARTICULAR; INTRALESIONAL; INTRAMUSCULAR; INTRAVENOUS; SOFT TISSUE at 15:40

## 2023-02-06 RX ADMIN — ACETAMINOPHEN 650 MG: 325 TABLET ORAL at 16:24

## 2023-02-06 RX ADMIN — DOCUSATE SODIUM 100 MG: 100 CAPSULE, LIQUID FILLED ORAL at 09:28

## 2023-02-06 RX ADMIN — ENOXAPARIN SODIUM 70 MG: 80 INJECTION SUBCUTANEOUS at 05:00

## 2023-02-06 RX ADMIN — SODIUM CHLORIDE 150 MG: 900 INJECTION, SOLUTION INTRAVENOUS at 15:41

## 2023-02-06 RX ADMIN — OXYCODONE HYDROCHLORIDE 5 MG: 5 TABLET ORAL at 05:45

## 2023-02-06 RX ADMIN — BENZONATATE 200 MG: 100 CAPSULE ORAL at 12:04

## 2023-02-06 RX ADMIN — CEFEPIME 2 G: 2 INJECTION, POWDER, FOR SOLUTION INTRAVENOUS at 00:47

## 2023-02-06 RX ADMIN — CEFEPIME 2 G: 2 INJECTION, POWDER, FOR SOLUTION INTRAVENOUS at 08:22

## 2023-02-06 RX ADMIN — ACETAMINOPHEN 650 MG: 325 TABLET ORAL at 05:45

## 2023-02-06 RX ADMIN — POTASSIUM CHLORIDE 500 ML/HR: 2 INJECTION, SOLUTION, CONCENTRATE INTRAVENOUS at 13:13

## 2023-02-06 RX ADMIN — ENOXAPARIN SODIUM 70 MG: 80 INJECTION SUBCUTANEOUS at 16:33

## 2023-02-06 RX ADMIN — OXYCODONE HYDROCHLORIDE 5 MG: 5 TABLET ORAL at 16:24

## 2023-02-06 NOTE — PLAN OF CARE
Plan of Care Review  Plan of Care Reviewed With: patient  Progress: no change  Outcome Summary: Patient expresses pain in right arm, relieved with prn oxycodone and tylenol. No events on tele. Vital signs stable, BP soft. Chemp given this shift, through picc per ,vascular surgery and IV team. IV abx given as ordered. Call bell within reach, bed alarm refused. Family at the bedside.

## 2023-02-06 NOTE — PROGRESS NOTES
Gynecology Progress Note    Subjective     Interval History:    Patient is resting comfortably. She finished her etoposide chemotherapy. Reports her RUE is feeling much better today. She is without complaints.     Objective     Vital signs in last 24 hours:  Temp:  [36.8 °C (98.3 °F)-37.3 °C (99.2 °F)] 37.3 °C (99.2 °F)  Heart Rate:  [] 98  Resp:  [18-20] 18  BP: ()/(52-57) 98/52    Exam  General Appearance: Alert, cooperative, no acute distress  Lungs: Clear to auscultation bilaterally, respirations unlabored  Heart: Regular rhythm. Tachycardic  Abdomen: soft, non-tender, non-distended.  Extremities: no edema or calf tenderness. RUE edematous around PICC line- no erythema or drainage noted    Labs    CBC Results       02/06/23 02/05/23 02/04/23     0553 0603 1346    WBC 1.14 1.39 1.63    RBC 2.75 2.99 3.74    HGB 7.5 8.1 10.2    HCT 21.9 24.6 30.4    MCV 79.6 82.3 81.3    MCH 27.3 27.1 27.3    MCHC 34.2 32.9 33.6    PLT 64 79 110         Comment for WBC at 0553 on 02/06/23: ALL RESULTS HAVE BEEN CHECKED. This result has been called to VONDA VILLAFANA by Pollo Dias on 02 06 23 at 07:31, and has been read back.     Comment for WBC at 0603 on 02/05/23: This result has been called to MELVIN DELA CRUZ by Ronald Rapp on 02 05 23 at 08:30, and has been read back.     Comment for WBC at 1346 on 02/04/23: RESULTS CHECKED. This result has been called to BRAYDEN CLEARY by Rebecca Henning on 02 04 23 at 14:22, and has been read back.           Results from last 7 days   Lab Units 02/06/23  0553 02/05/23  0603 02/04/23  1346   SODIUM mEQ/L 134* 133* 133*   POTASSIUM mEQ/L 4.1 4.1 3.7   CHLORIDE mEQ/L 108 104 104   CO2 mEQ/L 19* 23 20*   BUN mg/dL 5* 5* 8   CREATININE mg/dL 0.8 0.7 0.8   GLUCOSE mg/dL 79 82 89   CALCIUM mg/dL 8.0* 7.9* 8.9        Microbiology Results     Procedure Component Value Units Date/Time    Blood Culture Blood, Venous [[id number]]  (Normal) Collected: 02/04/23 1346    Specimen: Blood,  Venous Updated: 23 1601     Culture No growth at 18-24 hours    Blood Culture Blood, Venous [[id number]]  (Normal) Collected: 23 1346    Specimen: Blood, Venous Updated: 23 1601     Culture No growth at 18-24 hours    Urine culture Urine, Clean Catch [[id number]]  (Normal) Collected: 23 1348    Specimen: Urine, Clean Catch Updated: 23 1054     Urine Culture Young Growth            Assessment/Plan     Alondra Villela is a 32 y.o.   with stage IV gestational trophoblastic neoplasia who is admitted with neutropenic fevers and RUE DVT.     1. RUE DVT  a. US doppler  shows thrombus in brachial vein and basilic vein   b. Continue lovenox 70 mg BID   c. PICC line in place- to be used for chemo only   d. Will contact IR  regarding removal and replacement of Right PICC line.   2. Severe neutropenia   a. ANC 22 (severe neutropenia) , follow daily labs   b. Mildly tachycardic to 105s  c. Continue strict precautions  d. Continue zarxio 480 mcg on days when she is not having chemo (next  ~24 hours after she is done with her cisplatin chemotherapy )  e. Blood Cultures from  NGTD, UCx shows likely contaminants  f. ID consulted- to continue cefepime 2g q 8 hrs   3. GTN   a. S/p EP induction chemotherapy on 23-23 and 23-23  b. S/p Etoposide, Dactinomycin, and Methotrexate on 23-23  c. S/p etoposide , to receive Cisplatin this evening  d. Next chemo planned for 23 and 23- to be administered through PICC, other premedications through LUE peripheral line    e. Continue supportive care (tessalon capsules for cough, pain management)  4. Hyperthyroidism  a. Continue home methimazole and metoprolol  5. Depression   a. Mood stable, continue Wellbutrin XL  mg     Plan of care per Dr. Hogue, relayed to [facility] primary team, nursing and patient    Yumiko Sweeney MD, PGY-2  Obstetrics and Gynecology

## 2023-02-06 NOTE — PROGRESS NOTES
Infectious Disease Progress Note    Patient Name: Alondra Villela  MR#: [id number]  : 1990  Admission Date: 2023  Date: 23   Time: 8:17 AM   Author: Gustavo Finley MD        Antibiotics:    Anti-infectives (From admission, onward)    Start     Dose/Rate Route Frequency Ordered Stop    23 0815  ceFEPIme (MAXIPIME) 2 g in 100 mL NSS vial in bag         2 g  200 mL/hr over 30 Minutes intravenous Every 8 hours interval 23 0724            Subjective     Review of Systems    Afebrile.  Mild cough  And some nausea - unchanged.  No other new c/o    Objective     Vital Signs:    Vitals:    23 0800   BP:    Pulse: 83   Resp:    Temp:    SpO2:        Temp (72hrs), Av.9 °C (98.5 °F), Min:36.5 °C (97.7 °F), Max:37.3 °C (99.2 °F)      Physical Exam:    General: alert, nontoxic  HEENT: NC/AT,anicteric sclera, no thrush- no oral lesions  Neck: supple  Cardiovascular: regular S1/S2, no murmurs  Respiratory: clear to auscultation, no wheezing, rales or ronchi  GI/Abdomen: soft, +BS, NT/ND  Lymphatics: No significant lymphadenopathy  Extremities: no clubbing, cyanosis or edema  Musculoskeletal: no acute arthritis  G.U.: No lesions  Skin: warm and dry, no new rashes  Psych:cooperative with exam  Neuro: Moves all 4 extremities    Lines, Drains, Airways, Wounds:  PICC Double Lumen 23 Right (Active)   Number of days: 24       Peripheral IV (Adult) 23 Left;Proximal;Posterior Forearm (Active)   Number of days: 1       Labs:    CBC Results       23     0553 0603 1346    WBC 1.14 1.39 1.63    RBC 2.75 2.99 3.74    HGB 7.5 8.1 10.2    HCT 21.9 24.6 30.4    MCV 79.6 82.3 81.3    MCH 27.3 27.1 27.3    MCHC 34.2 32.9 33.6    PLT 64 79 110         Comment for WBC at 0553 on 23: ALL RESULTS HAVE BEEN CHECKED. This result has been called to VONDA VILLAFANA by Pollo Dias on 23 at 07:31, and has been read back.     Comment for WBC at 0603 on 23: This  result has been called to MELVIN DELA CRUZ by Ronald Rapp on 02 05 23 at 08:30, and has been read back.     Comment for WBC at 1346 on 02/04/23: RESULTS CHECKED. This result has been called to BRAYDEN CLEARY by Rebecca Henning on 02 04 23 at 14:22, and has been read back.          CMP Results       02/06/23 02/05/23 02/04/23     0553 0603 1346     133 133    K 4.1 4.1 3.7    Cl 108 104 104    CO2 19 23 20    Glucose 79 82 89    BUN 5 5 8    Creatinine 0.8 0.7 0.8    Calcium 8.0 7.9 8.9    Anion Gap 7 6 9    AST -- -- 11    ALT -- -- 16    Albumin -- -- 3.8    EGFR >60.0 >60.0 >60.0         Comment for K at 1346 on 02/04/23: Results obtained on plasma. Plasma Potassium values may be up to 0.4 mEQ/L less than serum values. The differences may be greater for patients with high platelet or white cell counts.            Imaging:    Radiology Imaging    XR CHEST 1 VW    Narrative  CLINICAL HISTORY: follow up, r/o hemothorax    COMMENT:  Technique: A portable frontal view of the chest was obtained.  Comparison: 1/12/2023.    Slightly decreased lung volumes. Redemonstrated right lower lung zone mass.  Lungs are otherwise clear and without pleural effusion or pneumothorax.  The heart size is normal. The hilar and mediastinal contours are normal.  The regional skeletal structures are unremarkable.    --    Impression  No significant interval change. No pneumothorax.      MICROBIOLOGY :   Microbiology Results     Procedure Component Value Units Date/Time    Urine culture Urine, Clean Catch [[id number]]  (Normal) Collected: 02/04/23 1348    Specimen: Urine, Clean Catch Updated: 02/05/23 1054     Urine Culture Young Growth    Blood Culture Blood, Venous [[id number]]  (Normal) Collected: 02/04/23 1346    Specimen: Blood, Venous Updated: 02/05/23 1601     Culture No growth at 18-24 hours    Blood Culture Blood, Venous [[id number]]  (Normal) Collected: 02/04/23 1346    Specimen: Blood, Venous Updated: 02/05/23 1601     Culture No  growth at 18-24 hours          Assessment     Febrile neutropenia  DVT- PICC line associated  Gestational trophoblastic neoplasia on chemotherapy  immunosuppressed status due to the above          Plan     Follow ANC- remains neutropenic  No new fevers for now  Blood cx remain negative- will continue to monitor  Ucx pending- follow cx results  Neutropenic precautions  Follow temperature curve  Plan for chemo pending access issues.  Case discussed with the primary team  Plan reviewed with the patient      More than 35 minutes were spent obtaining a detailed interval history, detailed exam, evaluating this high complexity case with significant medical decision making including continuation of antibiotic therapy, coordination of care with primary team and consultants.

## 2023-02-06 NOTE — PROGRESS NOTES
Internal Medicine  Daily Progress Note       SUBJECTIVE   This is a 32 y.o. 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    Interval History: No acute events overnight. Patient seen and examined at bedside this morning. Endorsed some nausea overnight, for which she received a dose of Zofran. No complaints this morning. Denies fever, chills, headache, chest pain, cough, shortness of breath, abdominal pain.     OBJECTIVE   Vital signs in last 24 hours:  Temp:  [36.8 °C (98.3 °F)-37.3 °C (99.2 °F)] 37.3 °C (99.2 °F)  Heart Rate:  [] 83  Resp:  [18-20] 18  BP: ()/(52-57) 98/52  SpO2:  [98 %-100 %] 98 %  Oxygen Therapy: None (Room air)    Weight & I/Os:  Weights (last 5 days)     Date/Time Weight    02/05/23 0900 72.6 kg (160 lb)    02/04/23 1158 72.6 kg (160 lb)          Intake/Output Summary (Last 24 hours) at 2/6/2023 0659  Last data filed at 2/6/2023 0157  24 Hour Net Input/Output from 7AM Yesterday   Intake 100 ml   Output --   Net 100 ml        PHYSICAL EXAMINATION   Physical Exam  Constitutional:       Appearance: She is ill-appearing. She is not toxic-appearing.   HENT:      Head: Normocephalic.      Mouth/Throat:      Mouth: Mucous membranes are dry.   Eyes:      Extraocular Movements: Extraocular movements intact.      Pupils: Pupils are equal, round, and reactive to light.   Cardiovascular:      Rate and Rhythm: Normal rate and regular rhythm.      Pulses: Normal pulses.      Heart sounds: Normal heart sounds.   Pulmonary:      Effort: Pulmonary effort is normal. No respiratory distress.      Breath sounds: Normal breath sounds. No wheezing.   Chest:      Chest wall: No tenderness.   Abdominal:      Palpations: Abdomen is soft.      Tenderness: There is no abdominal tenderness.   Musculoskeletal:      Right lower leg: No edema.      Left lower leg: No edema.      Comments: RUE edema at PICC  site  Mild tenderness to palpation  PICC site clean and dry   Neurological:      General: No focal deficit present.      Mental Status: She is alert and oriented to person, place, and time.   Psychiatric:         Mood and Affect: Mood normal.         Behavior: Behavior normal.          LINES, CATHETERS, DRAINS, AIRWAYS, & WOUNDS   Lines, drains, airways, & wounds:  PICC Double Lumen 01/13/23 Right (Active)   Number of days: 24       Peripheral IV (Adult) 02/05/23 Left;Proximal;Posterior Forearm (Active)   Number of days: 1        LABS, IMAGING, & TELE   Labs:    Results from last 7 days   Lab Units 02/06/23  0553 02/05/23  0603 02/04/23  1346   WBC K/uL 1.14* 1.39* 1.63*   HEMOGLOBIN g/dL 7.5* 8.1* 10.2*   HEMATOCRIT % 21.9* 24.6* 30.4*   PLATELETS K/uL 64* 79* 110*       Results from last 7 days   Lab Units 02/06/23  0553 02/05/23  0603 02/04/23  1346 02/03/23  1439   SODIUM mEQ/L 134* 133* 133* 134*   POTASSIUM mEQ/L 4.1 4.1 3.7 3.6   CHLORIDE mEQ/L 108 104 104 107   CO2 mEQ/L 19* 23 20* 19*   BUN mg/dL 5* 5* 8 12   CREATININE mg/dL 0.8 0.7 0.8 0.8   CALCIUM mg/dL 8.0* 7.9* 8.9 8.3*   ALBUMIN g/dL  --   --  3.8 3.5   BILIRUBIN TOTAL mg/dL  --   --  0.5 0.5   ALK PHOS IU/L  --   --  114 104   ALT IU/L  --   --  16 17   AST IU/L  --   --  11* 15   GLUCOSE mg/dL 79 82 89 110*     Imaging personally reviewed (does not include unread studies):  No results found.  Telemetry/EKG:  I have independently reviewed the telemetry. No events for the last 24 hours.     ASSESSMENT & PLAN   Hyponatremia  Assessment & Plan  Mild, Na 133  In the setting of hypovolemia    - Trend with IV fluids    Anxiety and depression  Assessment & Plan  Maintained on buproprion 150 mg daily, protriptyline 15 mg daily    - Continue home meds    Hematuria  Assessment & Plan  UA with +3 blood no RBCs  Unclear if this could be vaginal bleeding  CK wnl    - Vaginal bleeding likely 2/2 chemotherapy treatment  - Will need outpatient follow up to ensure  this resolves     Thrombocytopenia (CMS/HCC)  Assessment & Plan  Appears chronic in the setting of her malignancy  Stable at baseline  Is experiencing some vaginal bleeding and subjective rectal bleeding    Likely in the setting of malignancy vs acute blood loss, currently no evidence of DIC    Dropped to 79 this AM, no evidence of bleeding    - Trend on CBC, transfuse <50 if bleeding, <10 if not    DVT (deep vein thrombosis) in pregnancy  Assessment & Plan  Presented with R arm pain, swelling and warmth around PICC line  US shows thrombus in the right subclavian, axillary, brachial and basilic veins   Vascular surgery consulted in the ED, recommended medical therapy    Started on therapeutic lovenox    - Continue on therapeutic lovenox, will need transition to DOAC   - Continue pain management with PRN oxycodone and morphine  - Will pull RUE PICC following chemotherapy treatment today  - Apply warm compress to right arm and elevate right arm      Microcytic anemia  Assessment & Plan   Acute on chronic  Hgb stable on admission  Does report some increased vaginal bleeding and subjective BRBPR that sounds hemorrhoidal. No signs of upper GI bleed or brisk GI bleed  Iron panel consistent with anemia of chronic disease, folate and B12 WNL  Hgb around 8,1 (after fluids), no evidence of ongoing profound bleeding    - Trend on CBC (7.5 today), transfuse <7  - Gyn-onc following  - Will continue anticoagulation for now with close monitoring    Gestational trophoblastic neoplasm  Assessment & Plan  Known liver and pulm mets  Metastatic gestational trophoblastic neoplasm (on chemotherapy with MTX, Etoposide, and Dactinomcin via PICC, last chemo on 2/2/22  Follows with Dr. Mcmullen and Dr. Hogue    - Gyn-onc consult  - Patient will need chemotherapy today via PICC   - Plan to pull PICC following chemo today, and plan for port placement later this week    Hyperthyroidism  Assessment & Plan  Maintained on methimazole 5 mg and  metoprolol tartrate 12.5 mg daily  Repeat TSH 1.16    - Continue home meds    Tachycardia  Assessment & Plan  Chronic  Likely 2/2 hyperthyroidism  May be acute exacerbated in the setting of pain and neutropenic fever  CT without evidence of PE    - Continue cardiac monitoring  - Continue hyperthyroidism tx  - Pain control      * Neutropenic fever (CMS/HCC)  Assessment & Plan  history of metastaticgestational trophoblastic neoplasm (on chemotherapy with MTX, Etoposide, and Dactinomcin via PICC, last chemo on 2/2/22  Presents with subjective fever/chills, neutropenia,  WBC 1.63, JORDANA 0.44  No obvious source of infection  UA bland, CT chest/ab/pelvis no obvious source    Cannot fully rule out infectious thrombophlebitis in the setting of arm pain, new DVT    S/p 1 dose of vanc and ceftaz    - Continue antibiotics with cefepime per ID  - Blood cultures no growth to date, urine cultures pending  - Neutropenic precautions  - Trend on WBC and JORDANA on CBC  - Per gyn-onc, will give filgrastim tomorrow       VTE Assessment: Padua    VTE Prophylaxis: Current anticoagulants:  enoxaparin (LOVENOX) syringe 70 mg, subcutaneous, q12h INT      Code Status: Full Code  Estimated discharge date: 2/10/2023     ATTENDING DOCUMENTATION  ALSO SEE ATTENDING ATTESTATION SECTION OF NOTE

## 2023-02-06 NOTE — PLAN OF CARE
Problem: Adult Inpatient Plan of Care  Goal: Readiness for Transition of Care  Intervention: Mutually Develop Transition Plan  Flowsheets (Taken 2/6/2023 8513)  Anticipated Discharge Disposition:   home without assistance or services   home with assistance  Equipment Needed After Discharge: none  Assistive Device/Animal Currently Used at Home: none  Anticipated Changes Related to Illness: none  Readmission Within the Last 30 Days: (pt presented with arm pain.) other (see comments)  Patient/Family Anticipated Services at Transition: none  Patient/Family Anticipates Transition to:   home   home with family  Transportation Anticipated: family or friend will provide  Concerns to be Addressed:   care coordination/care conferences   discharge planning    Pt is independent with her ADL's at baseline. Pt lives with her spouse and children. Pt does not use DME, has never had home care, and has never been to SNF. Plan for pt to d/c home when stable for d/c. No needs anticipated.

## 2023-02-06 NOTE — CONSULTS
Vascular Surgery Consult    Subjective     Alondra Villela is a 32 y.o. 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 Patient was seen in consultation at the request of referring physician for management recommendations for a right upper extremity DVT with PICC in place for chemotherapy. She has a history of gestational trophoblastic neoplasia with metastasis to liver and lung. She has been receiving chemotherapy through a RUE PICC placed on . She presented on  complaining of fever, fatigue and swelling/ pain in her right upper arm. She had RUE u/s which showed thrombus in the right subclavian, axillary, brachial and basilic veins. She was started on lovenox. She is scheduled to receive chemotherapy today, . Vascular surgery was consulted for recommendation on using PICC for scheduled chemotherapy in setting of RUE DVT.    Pt was seen at bedside this morning. She is complaining of swelling and pain in her right arm that radiates to her right axilla. She reports that the pain has improved since starting lovenox. Both ports of PICC line are easily flushable.      Medical History:   Past Medical History:   Diagnosis Date    Cervical cancer (CMS/HCC)     Hx antineoplastic chemo        Surgical History:   Past Surgical History:   Procedure Laterality Date     SECTION      x2       Social History:   Social History     Social History Narrative    Not on file       Family History:   Family History   Problem Relation Age of Onset    Hypertension Biological Mother     Hypertension Biological Father        Allergies: Patient has no known allergies.    Home Medications:    benzonatate, Take 100 mg by mouth 3 (three) times a day as needed for cough.    methIMAzole, Take 2.5 mg by mouth daily.    buPROPion XL, Take 150 mg by mouth nightly.    magic mouthwash, Swish and spit 15 mL every 4 (four) hours as needed for  stomatitis.    magic mouthwash with nystatin, Swish and spit 15 mL every 4 (four) hours as needed for stomatitis.    metoprolol tartrate, Take 0.5 tablets (12.5 mg total) by mouth daily with breakfast.    ondansetron ODT, Take 1-2 tablets (4-8 mg total) by mouth every 6 (six) hours as needed for nausea or vomiting (Nausea/Vomiting). Maximum 32mg per day.    prochlorperazine, Take 1-2 tablets (5-10 mg total) by mouth every 6 (six) hours as needed for nausea or vomiting. This medication can make you sleepy.    protriptyline, Take 3 tablets (15 mg total) by mouth daily.    Current Medications:    acetaminophen, 650 mg, oral, q6h PRN    alteplase, 2 mg, intra-catheter, PRN    amitriptyline, 10 mg, oral, Daily    benzonatate, 200 mg, oral, 3x daily PRN    buPROPion XL, 150 mg, oral, Daily    cefepime, 2 g, intravenous, q8h INT    CISplatin (PLATINOL) chemo IVPB, 75 mg/m2 (Treatment Plan Recorded), intravenous, Once    dexamethasone, 12 mg, intravenous, Once    glucose, 16-32 g of dextrose, oral, PRN **OR** dextrose, 15-30 g of dextrose, oral, PRN **OR** glucagon, 1 mg, intramuscular, PRN **OR** dextrose 50 % in water (D50), 25 mL, intravenous, PRN    diphenhydrAMINE, 25 mg, intravenous, Once PRN    docusate sodium, 100 mg, oral, Daily    enoxaparin, 1 mg/kg, subcutaneous, q12h INT    EPINEPHrine, 0.5 mg, intramuscular, Once PRN    etoposide, 100 mg/m2 (Treatment Plan Recorded), intravenous, Once    famotidine, 20 mg, intravenous, Once PRN    fosaprepitant (EMEND) IVPB, 150 mg, intravenous, Once    methIMAzole, 5 mg, oral, Daily    methylPREDNISolone sodium succinate, 40 mg, intravenous, Once PRN    metoprolol tartrate, 12.5 mg, oral, Daily with breakfast    morphine, 2 mg, intravenous, q6h PRN    NON FORMULARY MEDICATION REQUEST, 10 mg, oral, Daily PRN    ondansetron ODT, 4-8 mg, oral, q6h PRN    oxyCODONE, 5 mg, oral, q6h PRN    palonosetron, 250 mcg, intravenous, Once    prochlorperazine, 10 mg, oral, q6h PRN    custom  "IV fluid builder, 500 mL/hr, intravenous, Continuous    sodium chloride 0.9 %, 500 mL/hr, intravenous, Continuous    white petrolatum, , Topical, q2h PRN    white petrolatum-mineral oiL, , Topical, q2h PRN    Review of Systems  Pertinent items are noted in HPI.    Objective     Physicial Exam  Visit Vitals  BP (!) 98/52 (BP Location: Left upper arm, Patient Position: Lying)   Pulse 83   Temp 37.3 °C (99.2 °F) (Oral)   Resp 18   Ht 1.626 m (5' 4\")   Wt 72.6 kg (160 lb)   LMP 12/06/2022 (Exact Date)   SpO2 98%   Breastfeeding No   BMI 27.46 kg/m²     Physical Exam  Vitals reviewed.   Constitutional:       General: She is not in acute distress.     Appearance: Normal appearance. She is normal weight. She is not toxic-appearing.   HENT:      Head: Normocephalic and atraumatic.      Nose: Nose normal.      Mouth/Throat:      Mouth: Mucous membranes are moist.   Eyes:      Pupils: Pupils are equal, round, and reactive to light.   Cardiovascular:      Rate and Rhythm: Normal rate and regular rhythm.      Pulses: Normal pulses.      Comments: Palpable radial/ ulnar pulses bilaterally   Pulmonary:      Effort: Pulmonary effort is normal. No respiratory distress.   Abdominal:      General: Abdomen is flat.      Palpations: Abdomen is soft.   Musculoskeletal:         General: Normal range of motion.      Comments: RUE appears with mild swelling, tender to palpation, warm, intact motor/ sensory function     Skin:     General: Skin is warm and dry.   Neurological:      General: No focal deficit present.      Mental Status: She is alert and oriented to person, place, and time.     Labs  No new labs.  CBC Results         02/06/23 02/05/23 02/04/23     0553 0603 1346    WBC 1.14 1.39 1.63    RBC 2.75 2.99 3.74    HGB 7.5 8.1 10.2    HCT 21.9 24.6 30.4    MCV 79.6 82.3 81.3    MCH 27.3 27.1 27.3    MCHC 34.2 32.9 33.6    PLT 64 79 110           Comment for WBC at 0553 on 02/06/23: ALL RESULTS HAVE BEEN CHECKED. This result has been " called to VONDA VILLAFANA by Pollo Dias on 02 06 23 at 07:31, and has been read back.     Comment for WBC at 0603 on 02/05/23: This result has been called to MELVIN DELA CRUZ by Ronald Rapp on 02 05 23 at 08:30, and has been read back.     Comment for WBC at 1346 on 02/04/23: RESULTS CHECKED. This result has been called to BRAYDEN CLEARY by Rebecca Henning on 02 04 23 at 14:22, and has been read back.           CMP Results         02/06/23 02/05/23 02/04/23     0553 0603 1346     133 133    K 4.1 4.1 3.7    Cl 108 104 104    CO2 19 23 20    Glucose 79 82 89    BUN 5 5 8    Creatinine 0.8 0.7 0.8    Calcium 8.0 7.9 8.9    Anion Gap 7 6 9    AST -- -- 11    ALT -- -- 16    Albumin -- -- 3.8    EGFR >60.0 >60.0 >60.0           Comment for K at 1346 on 02/04/23: Results obtained on plasma. Plasma Potassium values may be up to 0.4 mEQ/L less than serum values. The differences may be greater for patients with high platelet or white cell counts.          Imaging  I have reviewed the Imaging from the last 24 hrs.  CT ANGIOGRAPHY CHEST PULMONARY EMBOLISM WITH IV CONTRAST   Final Result   IMPRESSION:   1. No evidence of acute pulmonary arterial embolism through the proximal   subsegmental branches.   2. Intervally decreased size and number of innumerable pulmonary nodules   scattered throughout both lungs including intervally decreased size of a   dominant subpleural mass within the right lower lobe now measuring up to 5 cm,   as well as interval resolution of a previously seen anterior mediastinal mass,   in keeping with treatment response.   3. Small area of ill-defined groundglass attenuation poster laterally within the   right lower lobe which may reflect an infectious or inflammatory process.      US venous arm, RU extremity   Final Result   IMPRESSION:   Thrombus in the right subclavian, axillary, brachial and basilic veins as   detailed.      Finding:    Other   Acuity: Significant  Status:  CLOSED       Critical read back was performed and results were read back by Dr. Stanton on   2/4/2023 at 3:12 PM.      COMMENT:      Comparison: None      Technique: Grayscale ultrasound, color and spectral Doppler of the upper   extremity veins.      RIGHT:   Internal jugular vein: No thrombus seen. Doppler within normal limits.   Innominate vein: No thrombus seen. Doppler within normal limits.   Subclavian: There is partially occlusive thrombus.   Axillary: There is occlusive thrombus.      There is thrombus one of the brachial veins and the basilic vein.  The cephalic   vein where visualized is patent.         ECG 12 lead   Final Result          Assessment    Patient is a 33yo F w a history of gestational trophoblastic neoplasia with metastasis to liver and lung, consult is for right upper extremity DVT with PICC in place for chemotherapy. She was started on lovenox with improvement of her symptoms. She is scheduled to receive chemotherapy today, 2/6. Her PICC remains easily flushable.      Plan   - Chemotherapy through RUE PICC line today, 2/6  - continue anticoagulation regimen    PICC is functioning and may be used.  Anticoagulation should be continued while picc is in.    Case discussed with Attending on call      Glynn Hernandez MD   General Surgery Resident  Please page *[place] [phone] with any questions or concerns.

## 2023-02-06 NOTE — PROGRESS NOTES
Vascular surgery attending    Called to assess PICC associated DVT discovered Saturday after the patient developed swelling and pain of the right arm.  She was started on therapeutic dose Lovenox and has had significant improvement in her symptoms with resolution of pain symptoms and improved arm swelling.  On exam this evening she has a palpable right radial pulse.  Her hand is warm and well-perfused without significant edema.  There is mild swelling of the right upper arm.  PICC is in position and was flushed earlier by the IV team nurse.  She is receiving a multidrug chemotherapy regimen through the PICC which appears to be functioning normally.    32 female with PICC associated DVT and a stage IV gestational trophoblastic neoplasia on chemotherapy with neutropenia.  While some may argue that optimal therapy would include removal of the PICC line, it is a necessary vascular access device for delivery of chemotherapeutic agents and it is currently working normally.  The patient has experienced significant symptomatic improvement with resolution of her pain symptoms following initiation of anticoagulant therapy.  She continues to have multiple risk factors for DVT, largely due to her neoplasm, which will not be modified by removal of the PICC.  Under the circumstances it is appropriate to continue using the catheter and treat the patient with an anticoagulant until after the catheter is removed.  This is a widely used approach and was outlined in the 2016 chest guidelines with supporting literature from observational studies in the Journal of clinical oncology in the Journal of vascular surgery.  The patient was counseled on the risks and safety of continuing to use the catheter and she is in agreement with this approach.    I recommend continued therapeutic anticoagulation and continued use of the PICC line as long as the catheter continues to function normally.  Anticoagulation should be continued until after the  catheter is removed although optimal duration of therapy following catheter removal remains unclear.

## 2023-02-06 NOTE — PROGRESS NOTES
Gynecology Progress Note    Subjective     Interval History:    Patient is resting comfortably in bed. Reports ongoing RUE discomfort around PICC line. Denies CP and SOB. Has dark red vaginal spotting.     Objective     Vital signs in last 24 hours:  Temp:  [36.8 °C (98.3 °F)-37.3 °C (99.2 °F)] 37.3 °C (99.2 °F)  Heart Rate:  [] 107  Resp:  [18-20] 18  BP: ()/(52-57) 98/52    Exam  General Appearance: Alert, cooperative, no acute distress  Lungs: Clear to auscultation bilaterally, respirations unlabored  Heart: Regular rhythm. Tachycardic  Abdomen: soft, non-tender, non-distended.  Extremities: no edema or calf tenderness. RUE edematous around PICC line- no erythema or drainage noted    Labs    CBC Results       02/05/23 02/04/23 02/03/23     0603 1346 1439    WBC 1.39 1.63 4.39    RBC 2.99 3.74 3.45    HGB 8.1 10.2 9.4    HCT 24.6 30.4 28.8    MCV 82.3 81.3 83.5    MCH 27.1 27.3 27.2    MCHC 32.9 33.6 32.6    PLT 79 110 117         Comment for WBC at 0603 on 02/05/23: This result has been called to MELVIN DELA CRUZ by Ronald Rapp on 02 05 23 at 08:30, and has been read back.     Comment for WBC at 1346 on 02/04/23: RESULTS CHECKED. This result has been called to BRAYDEN CLEARY by Rebecca Henning on 02 04 23 at 14:22, and has been read back.         Results from last 7 days   Lab Units 02/05/23  0603 02/04/23  1346 02/03/23  1439   SODIUM mEQ/L 133* 133* 134*   POTASSIUM mEQ/L 4.1 3.7 3.6   CHLORIDE mEQ/L 104 104 107   CO2 mEQ/L 23 20* 19*   BUN mg/dL 5* 8 12   CREATININE mg/dL 0.7 0.8 0.8   GLUCOSE mg/dL 82 89 110*   CALCIUM mg/dL 7.9* 8.9 8.3*        Microbiology Results     Procedure Component Value Units Date/Time    Blood Culture Blood, Venous [[id number]]  (Normal) Collected: 02/04/23 1346    Specimen: Blood, Venous Updated: 02/05/23 1601     Culture No growth at 18-24 hours    Blood Culture Blood, Venous [[id number]]  (Normal) Collected: 02/04/23 1346    Specimen: Blood, Venous Updated: 02/05/23 1601      Culture No growth at 18-24 hours    Urine culture Urine, Clean Catch [[id number]]  (Normal) Collected: 23 1348    Specimen: Urine, Clean Catch Updated: 23 1054     Urine Culture Young Growth            Assessment/Plan     Alondra Villela is a 32 y.o.   with stage IV gestational trophoblastic neoplasia who is admitted with neutropenic fevers and RUE DVT.     1. RUE DVT  a. US doppler  shows thrombus in brachial vein and basilic vein   b. Continue lovenox 70 mg BID   c. PICC line in place- to be used for chemo only   2. Severe neutropenia   a.  (severe neutropenia) , follow daily labs   b. Mildly tachycardic to 105s  c. Continue strict precautions  d. Continue zarxio 480 mcg on days when she is not having chemo  e. Blood Cultures from  NGTD, UCx young growth   f. ID consulted- to continue cefepime 2g q 8 hrs   3. GTN   a. S/p EP induction chemotherapy on 23-23 and 23-23  b. S/p Etoposide, Dactinomycin, and Methotrexate on 23-23  c. Next chemo planned for 23 and 23- to be administered through PICC, other premedications through LUE peripheral line   d. Continue supportive care (tessalon capsules for cough, pain management)  4. Hyperthyroidism  a. Continue home methimazole and metoprolol  5. Depression   a. Mood stable, continue Wellbutrin XL  mg     To be Discussed with Dr. Lennie Sweeney MD, PGY-2  Obstetrics and Gynecology

## 2023-02-06 NOTE — NURSING NOTE
IV team was called in to assess picc line which is in her right arm where multiple DVTs present as U/S showed. Treatment team recommended to use it today for th time sensitive chemo despite DVTs present.  Circumference Measurement is 32.5cm now (30cm at the time of picc insertion) with mild to moderate pain which is better than yesterday.Both picc lumens have brisk blood returns .  And pateint denies pain associated with slow flushes via both lumens.  Site looks great without any redness or s/s of abnormalities beside swellings and some pain which is not worse with flushes. Everything was disccused with bedside RN who had also discussion with treatment team who is pursuing to use it today.Patent is in for a port placement tomorrow. Will continue to monitor.

## 2023-02-06 NOTE — PLAN OF CARE
Problem: Adult Inpatient Plan of Care  Goal: Plan of Care Review  Flowsheets (Taken 2/6/2023 0513)  Progress: no change  Plan of Care Reviewed With: patient  Outcome Summary: Patient slept comfortably for most of the night. Reporting 6/10 pain, however refusing pain medication. Zofran administered for nausea during shift. Reported difficulty with having a bowel movement. Dr. Leigh Jeter notified. Miralax administered per order. Will start colace today. HR in the 90's to 100's at rest. 's to 140's with activity. Afebrile. Spouse at bedside. Instructed to call for help as needed.

## 2023-02-06 NOTE — PROGRESS NOTES
R4 GYN/Oncology Progress Note    I visited Alondra multiple times this morning with Dr. Lay Shipman accompanying me. We also have had multiple conversations with Thi Pinzon (bedside RN), Sharmaine Neely (3SW RN Manager), Ghazala Tidwell (Oncology Pharmacist), Rebecca Dormond (Risk Management RN), Dr. Finley (Infectious Disease Attending), Dr. Hernandez and Elizabeth Bhagat (Vascular Surgery Team), and Maricel Peralta (Vascular Access RN Manager), Dr. Annie Dawn ([facility]) and Dr. Hogue (Gynecologic Oncology Attending).     In summary, Alodnra has a symptomatic PICC line associated DVT.  While ideally the PICC line would be removed, given the aggressive nature of choriocarcinoma, it is imperative that she receive her cisplatin and etoposide on time, today.  After discussion with pharmacy, these medications are not to be administered through peripheral line, particularly in a patient who has had multiple peripheral IVs extravasate.  I personally discussed with Alondra that the risk and benefits to every treatment in medicine, and that in her situation, we believe that the benefits of using the PICC line to administer chemotherapy outweigh the risks of leaving the PICC in situ.  After conversations with the entirety of the care team, the plan was made to continue with etoposide and cisplatin administration through the PICC line today, followed by removal of the PICC line this afternoon.  The patient will then obtain a more permanent central access through a Mediport later this week.    Plan of care continuously discussed with Dr. Hogue throughout the day.    Artur Macias MD PGY4  Obstetrics and Gynecology  Beeper #[id number]

## 2023-02-07 LAB
ANION GAP SERPL CALC-SCNC: 7 MEQ/L (ref 3–15)
BASOPHILS # BLD: 0 K/UL (ref 0.01–0.1)
BASOPHILS NFR BLD: 0 %
BUN SERPL-MCNC: 5 MG/DL (ref 8–20)
BURR CELLS BLD QL SMEAR: ABNORMAL
CALCIUM SERPL-MCNC: 8.3 MG/DL (ref 8.9–10.3)
CHLORIDE SERPL-SCNC: 111 MEQ/L (ref 98–109)
CO2 SERPL-SCNC: 18 MEQ/L (ref 22–32)
CREAT SERPL-MCNC: 0.6 MG/DL (ref 0.6–1.1)
DIFFERENTIAL METHOD BLD: ABNORMAL
EOSINOPHIL # BLD: 0 K/UL (ref 0.04–0.36)
EOSINOPHIL NFR BLD: 0 %
ERYTHROCYTE [DISTWIDTH] IN BLOOD BY AUTOMATED COUNT: 12.3 % (ref 11.7–14.4)
GFR SERPL CREATININE-BSD FRML MDRD: >60 ML/MIN/1.73M*2
GIANT PLATELETS BLD QL SMEAR: ABNORMAL
GLUCOSE SERPL-MCNC: 162 MG/DL (ref 70–99)
HCT VFR BLDCO AUTO: 25.1 % (ref 35–45)
HGB BLD-MCNC: 8.6 G/DL (ref 11.8–15.7)
LYMPHOCYTES # BLD: 0.33 K/UL (ref 1.2–3.5)
LYMPHOCYTES NFR BLD: 74 %
MCH RBC QN AUTO: 27.4 PG (ref 28–33.2)
MCHC RBC AUTO-ENTMCNC: 34.3 G/DL (ref 32.2–35.5)
MCV RBC AUTO: 79.9 FL (ref 83–98)
MONOCYTES # BLD: 0.03 K/UL (ref 0.28–0.8)
MONOCYTES NFR BLD: 6 %
NEUTS BAND # BLD: 0.09 K/UL (ref 1.7–7)
NEUTS SEG NFR BLD: 20 %
OVALOCYTES BLD QL SMEAR: ABNORMAL
PDW BLD AUTO: 9.6 FL (ref 9.4–12.3)
PLATELET # BLD AUTO: 58 K/UL (ref 150–369)
PLATELET # BLD EST: ABNORMAL 10*3/UL
POTASSIUM SERPL-SCNC: 4.3 MEQ/L (ref 3.6–5.1)
RBC # BLD AUTO: 3.14 M/UL (ref 3.93–5.22)
SODIUM SERPL-SCNC: 136 MEQ/L (ref 136–144)
WBC # BLD AUTO: 0.44 K/UL (ref 3.8–10.5)

## 2023-02-07 PROCEDURE — 63700000 HC SELF-ADMINISTRABLE DRUG: Performed by: STUDENT IN AN ORGANIZED HEALTH CARE EDUCATION/TRAINING PROGRAM

## 2023-02-07 PROCEDURE — 200200 PR NO CHARGE: Performed by: OBSTETRICS & GYNECOLOGY

## 2023-02-07 PROCEDURE — 80048 BASIC METABOLIC PNL TOTAL CA: CPT | Performed by: STUDENT IN AN ORGANIZED HEALTH CARE EDUCATION/TRAINING PROGRAM

## 2023-02-07 PROCEDURE — 63700000 HC SELF-ADMINISTRABLE DRUG

## 2023-02-07 PROCEDURE — 63600000 HC DRUGS/DETAIL CODE: Performed by: INTERNAL MEDICINE

## 2023-02-07 PROCEDURE — 21400000 HC ROOM AND CARE CCU/INTERMEDIATE

## 2023-02-07 PROCEDURE — 36415 COLL VENOUS BLD VENIPUNCTURE: CPT | Performed by: STUDENT IN AN ORGANIZED HEALTH CARE EDUCATION/TRAINING PROGRAM

## 2023-02-07 PROCEDURE — 99233 SBSQ HOSP IP/OBS HIGH 50: CPT | Performed by: HOSPITALIST

## 2023-02-07 PROCEDURE — 99231 SBSQ HOSP IP/OBS SF/LOW 25: CPT | Performed by: OBSTETRICS & GYNECOLOGY

## 2023-02-07 PROCEDURE — 25800000 HC PHARMACY IV SOLUTIONS: Performed by: INTERNAL MEDICINE

## 2023-02-07 PROCEDURE — 63600000 HC DRUGS/DETAIL CODE: Performed by: EMERGENCY MEDICINE

## 2023-02-07 PROCEDURE — 63600000 HC DRUGS/DETAIL CODE: Mod: JG

## 2023-02-07 PROCEDURE — 85025 COMPLETE CBC W/AUTO DIFF WBC: CPT | Performed by: STUDENT IN AN ORGANIZED HEALTH CARE EDUCATION/TRAINING PROGRAM

## 2023-02-07 RX ORDER — PROMETHAZINE HYDROCHLORIDE 25 MG/1
12.5 TABLET ORAL EVERY 6 HOURS PRN
Status: DISCONTINUED | OUTPATIENT
Start: 2023-02-07 | End: 2023-02-09 | Stop reason: HOSPADM

## 2023-02-07 RX ADMIN — BENZONATATE 200 MG: 100 CAPSULE ORAL at 04:29

## 2023-02-07 RX ADMIN — BENZONATATE 200 MG: 100 CAPSULE ORAL at 21:28

## 2023-02-07 RX ADMIN — ENOXAPARIN SODIUM 70 MG: 80 INJECTION SUBCUTANEOUS at 04:29

## 2023-02-07 RX ADMIN — ONDANSETRON 4 MG: 4 TABLET, ORALLY DISINTEGRATING ORAL at 22:40

## 2023-02-07 RX ADMIN — ONDANSETRON 4 MG: 4 TABLET, ORALLY DISINTEGRATING ORAL at 04:29

## 2023-02-07 RX ADMIN — CEFEPIME 2 G: 2 INJECTION, POWDER, FOR SOLUTION INTRAVENOUS at 23:35

## 2023-02-07 RX ADMIN — BUPROPION HYDROCHLORIDE 150 MG: 150 TABLET, EXTENDED RELEASE ORAL at 08:00

## 2023-02-07 RX ADMIN — CEFEPIME 2 G: 2 INJECTION, POWDER, FOR SOLUTION INTRAVENOUS at 08:00

## 2023-02-07 RX ADMIN — FILGRASTIM-SNDZ 480 MCG: 480 INJECTION, SOLUTION INTRAVENOUS; SUBCUTANEOUS at 19:46

## 2023-02-07 RX ADMIN — PROMETHAZINE HYDROCHLORIDE 12.5 MG: 25 TABLET ORAL at 17:31

## 2023-02-07 RX ADMIN — PROMETHAZINE HYDROCHLORIDE 12.5 MG: 25 TABLET ORAL at 08:00

## 2023-02-07 RX ADMIN — METHIMAZOLE 5 MG: 5 TABLET ORAL at 08:00

## 2023-02-07 RX ADMIN — CEFEPIME 2 G: 2 INJECTION, POWDER, FOR SOLUTION INTRAVENOUS at 00:07

## 2023-02-07 RX ADMIN — CEFEPIME 2 G: 2 INJECTION, POWDER, FOR SOLUTION INTRAVENOUS at 16:33

## 2023-02-07 ASSESSMENT — ENCOUNTER SYMPTOMS: CHILLS: 0

## 2023-02-07 NOTE — PROGRESS NOTES
Internal Medicine  Daily Progress Note       SUBJECTIVE   This is a 32 y.o. 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    Interval History: No acute events overnight. Patient seen and evaluated at bedside. Endorses nausea without any episodes of emesis. Denies fever/chills, headache, cough, SOB, chest pain, and abdominal pain.     OBJECTIVE   Vital signs in last 24 hours:  Temp:  [36.4 °C (97.5 °F)-36.4 °C (97.6 °F)] 36.4 °C (97.5 °F)  Heart Rate:  [80-99] 82  Resp:  [18-19] 18  BP: (106-117)/(58-70) 116/59  SpO2:  [97 %-98 %] 98 %  Oxygen Therapy: None (Room air)    Weight & I/Os:  Weights (last 5 days)     Date/Time Weight    02/05/23 0900 72.6 kg (160 lb)    02/04/23 1158 72.6 kg (160 lb)          Intake/Output Summary (Last 24 hours) at 2/7/2023 0659  Last data filed at 2/6/2023 1658  24 Hour Net Input/Output from 7AM Yesterday   Intake 1250 ml   Output --   Net 1250 ml        PHYSICAL EXAMINATION   Physical Exam  Constitutional:       Appearance: Normal appearance.   HENT:      Head: Normocephalic.   Eyes:      Extraocular Movements: Extraocular movements intact.      Pupils: Pupils are equal, round, and reactive to light.   Cardiovascular:      Rate and Rhythm: Normal rate and regular rhythm.      Pulses: Normal pulses.      Heart sounds: Normal heart sounds.   Pulmonary:      Effort: Pulmonary effort is normal. No respiratory distress.      Breath sounds: Normal breath sounds. No wheezing or rales.   Chest:      Chest wall: No tenderness.   Abdominal:      General: Abdomen is flat.      Palpations: Abdomen is soft.      Tenderness: There is no abdominal tenderness.   Musculoskeletal:         General: No swelling or tenderness. Normal range of motion.      Comments: RUE PICC site c/d/i   Skin:     General: Skin is warm.      Capillary Refill: Capillary refill takes less than 2 seconds.    Neurological:      General: No focal deficit present.      Mental Status: She is alert and oriented to person, place, and time.   Psychiatric:         Mood and Affect: Mood normal.         Behavior: Behavior normal.          LINES, CATHETERS, DRAINS, AIRWAYS, & WOUNDS   Lines, drains, airways, & wounds:  PICC Double Lumen 01/13/23 Right (Active)   Number of days: 25       Peripheral IV (Adult) 02/05/23 Left;Proximal;Posterior Forearm (Active)   Number of days: 2        LABS, IMAGING, & TELE   Labs:    Results from last 7 days   Lab Units 02/07/23  0422 02/06/23  0553 02/05/23  0603   WBC K/uL 0.44* 1.14* 1.39*   HEMOGLOBIN g/dL 8.6* 7.5* 8.1*   HEMATOCRIT % 25.1* 21.9* 24.6*   PLATELETS K/uL 58* 64* 79*       Results from last 7 days   Lab Units 02/07/23  0422 02/06/23  0553 02/05/23  0603 02/04/23  1346 02/03/23  1439   SODIUM mEQ/L 136 134* 133* 133* 134*   POTASSIUM mEQ/L 4.3 4.1 4.1 3.7 3.6   CHLORIDE mEQ/L 111* 108 104 104 107   CO2 mEQ/L 18* 19* 23 20* 19*   BUN mg/dL 5* 5* 5* 8 12   CREATININE mg/dL 0.6 0.8 0.7 0.8 0.8   CALCIUM mg/dL 8.3* 8.0* 7.9* 8.9 8.3*   ALBUMIN g/dL  --   --   --  3.8 3.5   BILIRUBIN TOTAL mg/dL  --   --   --  0.5 0.5   ALK PHOS IU/L  --   --   --  114 104   ALT IU/L  --   --   --  16 17   AST IU/L  --   --   --  11* 15   GLUCOSE mg/dL 162* 79 82 89 110*     Imaging personally reviewed (does not include unread studies):  No results found.  Telemetry/EKG:  I have independently reviewed the telemetry. No events for the last 24 hours.     ASSESSMENT & PLAN   Hyponatremia  Assessment & Plan  Mild, Na 133  In the setting of hypovolemia    - Trend with IV fluids    Anxiety and depression  Assessment & Plan  Maintained on buproprion 150 mg daily, protriptyline 15 mg daily    - Continue home meds    Hematuria  Assessment & Plan  UA with +3 blood no RBCs  Unclear if this could be vaginal bleeding  CK wnl    - Vaginal bleeding likely 2/2 chemotherapy treatment  - Will need outpatient follow up  to ensure this resolves     Thrombocytopenia (CMS/HCC)  Assessment & Plan  Appears chronic in the setting of her malignancy  Stable at baseline  Is experiencing some vaginal bleeding and subjective rectal bleeding    Likely in the setting of malignancy vs acute blood loss, currently no evidence of DIC    Dropped to 79 this AM, no evidence of bleeding    - Trend on CBC, transfuse <50 if bleeding, <10 if not    DVT (deep vein thrombosis) in pregnancy  Assessment & Plan  Presented with R arm pain, swelling and warmth around PICC line  US shows thrombus in the right subclavian, axillary, brachial and basilic veins   Vascular surgery consulted in the ED, recommended medical therapy    Started on therapeutic lovenox    - Continue on therapeutic lovenox, will need transition to DOAC   - Continue pain management with PRN oxycodone and morphine  - Will pull RUE PICC at the time of Mediport placement  - Apply warm compress to right arm and elevate right arm      Microcytic anemia  Assessment & Plan   Acute on chronic  Hgb stable on admission  Does report some increased vaginal bleeding and subjective BRBPR that sounds hemorrhoidal. No signs of upper GI bleed or brisk GI bleed  Iron panel consistent with anemia of chronic disease, folate and B12 WNL  Hgb around 8,1 (after fluids), no evidence of ongoing profound bleeding    - Trend on CBC (8.6 today), transfuse <7  - Gyn-onc following  - Will continue anticoagulation for now with close monitoring, will hold for 1 dose prior to Mediport placement    Gestational trophoblastic neoplasm  Assessment & Plan  Known liver and pulm mets  Metastatic gestational trophoblastic neoplasm (on chemotherapy with MTX, Etoposide, and Dactinomcin via PICC, last chemo on 2/2/22  Follows with Dr. Mcmullen and Dr. Hogue    - Gyn-onc consult  - Received chemotherapy yesterday via PICC, plan for Mediport placement for future treatment  - Pull PICC at the time of port placement, need to optimize  WBC/ANC prior to procedure    Hyperthyroidism  Assessment & Plan  Maintained on methimazole 5 mg and metoprolol tartrate 12.5 mg daily  Repeat TSH 1.79    - Continue home meds    Tachycardia  Assessment & Plan  Chronic  Likely 2/2 hyperthyroidism  May be acute exacerbated in the setting of pain and neutropenic fever  CT without evidence of PE    - Continue cardiac monitoring  - Continue hyperthyroidism tx  - Pain control      * Neutropenic fever (CMS/HCC)  Assessment & Plan  history of metastaticgestational trophoblastic neoplasm (on chemotherapy with MTX, Etoposide, and Dactinomcin via PICC, last chemo on 2/2/22  Presents with subjective fever/chills, neutropenia,  WBC 1.63, JORDANA 0.44  No obvious source of infection  UA bland, CT chest/ab/pelvis no obvious source    Cannot fully rule out infectious thrombophlebitis in the setting of arm pain, new DVT    S/p 1 dose of vanc and ceftaz    - Continue antibiotics with cefepime per ID  - Blood cultures no growth to date, urine cultures with likely contaminant  - Neutropenic precautions  - Trend on WBC and ANC on CBC  - Filgrastim 480 mcg today         VTE Assessment: Padua    VTE Prophylaxis: Current anticoagulants:  enoxaparin (LOVENOX) syringe 70 mg, subcutaneous, q12h INT      Code Status: Full Code  Estimated discharge date: 2/10/2023     ATTENDING DOCUMENTATION  ALSO SEE ATTENDING ATTESTATION SECTION OF NOTE

## 2023-02-07 NOTE — PROGRESS NOTES
Gynecology Progress Note    Subjective     Interval History:    Patient does not have pain in her RUE. She is without complaints at this time.     Objective     Vital signs in last 24 hours:  Temp:  [36.4 °C (97.5 °F)-36.4 °C (97.6 °F)] 36.4 °C (97.5 °F)  Heart Rate:  [] 77  Resp:  [18-19] 18  BP: (106-116)/(58-69) 114/69    Exam  General Appearance: Alert, cooperative, no acute distress  Lungs: Clear to auscultation bilaterally, respirations unlabored  Heart: Regular rate and rhythm.   Abdomen: soft, non-tender, non-distended.  Extremities: no edema or calf tenderness. RUE with PICC line- no erythema or drainage noted    Labs    CBC Results       02/07/23 02/06/23 02/05/23     0422 0553 0603    WBC 0.44 1.14 1.39    RBC 3.14 2.75 2.99    HGB 8.6 7.5 8.1    HCT 25.1 21.9 24.6    MCV 79.9 79.6 82.3    MCH 27.4 27.3 27.1    MCHC 34.3 34.2 32.9    PLT 58 64 79         Comment for WBC at 0422 on 02/07/23: ALL RESULTS HAVE BEEN CHECKED. This result has been called to SAMANTHA PRINGLE by Pollo Dias on 02 07 23 at 07:18, and has been read back.     Comment for WBC at 0553 on 02/06/23: ALL RESULTS HAVE BEEN CHECKED. This result has been called to VONDA VILLAFANA by Pollo Dias on 02 06 23 at 07:31, and has been read back.     Comment for WBC at 0603 on 02/05/23: This result has been called to MELVIN DELA CRUZ by Ronald Rapp on 02 05 23 at 08:30, and has been read back.     Comment for PLT at 0422 on 02/07/23: RESULTS CHECKED. CONSISTENT WITH PREVIOUS RESULTS          Results from last 7 days   Lab Units 02/07/23  0422 02/06/23  0553 02/05/23  0603   SODIUM mEQ/L 136 134* 133*   POTASSIUM mEQ/L 4.3 4.1 4.1   CHLORIDE mEQ/L 111* 108 104   CO2 mEQ/L 18* 19* 23   BUN mg/dL 5* 5* 5*   CREATININE mg/dL 0.6 0.8 0.7   GLUCOSE mg/dL 162* 79 82   CALCIUM mg/dL 8.3* 8.0* 7.9*      Microbiology Results     Procedure Component Value Units Date/Time    Urine culture Urine, Clean Catch [[id number]] Collected: 02/04/23 1346     Specimen: Urine, Clean Catch Updated: 23 0825     Urine Culture Probable contaminants, suggest recollection      >=100,000 cfu/mL Mixed Growth    Blood Culture Blood, Venous [[id number]]  (Normal) Collected: 23 1346    Specimen: Blood, Venous Updated: 23 1601     Culture No growth at 72 hours    Blood Culture Blood, Venous [[id number]]  (Normal) Collected: 23 1346    Specimen: Blood, Venous Updated: 23 1601     Culture No growth at 72 hours            Assessment/Plan     Alondra Villela is a 32 y.o.   with stage IV gestational trophoblastic neoplasia who is admitted with neutropenic fevers and RUE DVT.     1. RUE DVT  a. US doppler  shows thrombus in brachial vein and basilic vein   b. Continue lovenox 70 mg BID (held 2/7 PM and 2/8 AM dose for port placement with IR )  c. PICC line to remain in place until port is placed by IR    2. Severe neutropenia   a. ANC 8 (severe neutropenia) , follow daily labs  b. Continue strict precautions  c. Continue zarxio 480 mcg on days when she is not having chemo (next 1945 and again )  d. Blood Cultures from  NGTD, UCx shows likely contaminants  e. ID consulted- to continue cefepime 2g q 8 hrs   3. GTN   a. S/p EP induction chemotherapy on 23-23 and 23-23  b. S/p Etoposide, Dactinomycin, and Methotrexate on 23-23  c. S/p etoposide/ cisplatin    d. Next chemo planned for 23 and 23- port access to be obtained via IR   e. Continue supportive care (tessalon capsules for cough, pain management)  4. Hyperthyroidism  a. Continue home methimazole and metoprolol  5. Depression    a. Mood stable, continue Wellbutrin XL  mg     Reviewed plan of care with Dr. Hogue at patient's bedside    Yumiko Sweeney MD, PGY-2  Obstetrics and Gynecology     Attending addendum:  Patient seen with resident team on 23 at 5:45PM.   She tolerated cycle 1B of chemo well. She had some nausea that  was improved with prn antiemetics. ANC further decreased today as expected from chemo yesterday. She is grateful for multi-disciplinary care from vascular surgery, ID, [facility], and IR for her complex medical needs.  To resume filgrastim tonight. Blood culture negative x48 hrs. Will follow-up with ID tomorrow about antibiotic recommendations. Appreciate IR agreement to place Mediport tomorrow. Will ask to have PICC line pulled in IR as well. Anticipate she'll be discharged home tomorrow after Mediport placed. Will give one more dose of filgrastim tomorrow prior to discharge. To continue filgrastim as outpatient on Thursday and Friday. Cycle 2A chemo planned for Monday 2/13/23.    Lorena Hogue MD

## 2023-02-07 NOTE — PROGRESS NOTES
Gynecology Progress Note    Subjective     Interval History:    Patient reports feeling nauseous without much relief from zofran. Denies emesis, CP, and SOB. She states her vaginal bleeding is stable and having regular bowel movements.    Objective     Vital signs in last 24 hours:  Temp:  [36.4 °C (97.5 °F)-36.4 °C (97.6 °F)] 36.4 °C (97.5 °F)  Heart Rate:  [80-99] 82  Resp:  [18-19] 18  BP: (106-117)/(58-70) 116/59    Exam  General Appearance: Alert, cooperative, no acute distress  Lungs: Clear to auscultation bilaterally, respirations unlabored  Heart: Regular rate and rhythm.   Abdomen: soft, non-tender, non-distended.  Extremities: no edema or calf tenderness. RUE edematous around PICC line- no erythema or drainage noted    Labs    CBC Results       02/06/23 02/05/23 02/04/23     0553 0603 1346    WBC 1.14 1.39 1.63    RBC 2.75 2.99 3.74    HGB 7.5 8.1 10.2    HCT 21.9 24.6 30.4    MCV 79.6 82.3 81.3    MCH 27.3 27.1 27.3    MCHC 34.2 32.9 33.6    PLT 64 79 110         Comment for WBC at 0553 on 02/06/23: ALL RESULTS HAVE BEEN CHECKED. This result has been called to VONDA VILLAFANA by Pollo Dias on 02 06 23 at 07:31, and has been read back.     Comment for WBC at 0603 on 02/05/23: This result has been called to MELVIN DELA CRUZ by Ronald Rapp on 02 05 23 at 08:30, and has been read back.     Comment for WBC at 1346 on 02/04/23: RESULTS CHECKED. This result has been called to BRAYDEN CLEARY by Rebecca Henning on 02 04 23 at 14:22, and has been read back.           Results from last 7 days   Lab Units 02/06/23  0553 02/05/23  0603 02/04/23  1346   SODIUM mEQ/L 134* 133* 133*   POTASSIUM mEQ/L 4.1 4.1 3.7   CHLORIDE mEQ/L 108 104 104   CO2 mEQ/L 19* 23 20*   BUN mg/dL 5* 5* 8   CREATININE mg/dL 0.8 0.7 0.8   GLUCOSE mg/dL 79 82 89   CALCIUM mg/dL 8.0* 7.9* 8.9        Microbiology Results     Procedure Component Value Units Date/Time    Blood Culture Blood, Venous [[id number]]  (Normal) Collected: 02/04/23 1342     Specimen: Blood, Venous Updated: 23 1601     Culture No growth at 48 hours    Blood Culture Blood, Venous [[id number]]  (Normal) Collected: 23 1346    Specimen: Blood, Venous Updated: 23 1601     Culture No growth at 48 hours    Urine culture Urine, Clean Catch [[id number]] Collected: 23 1348    Specimen: Urine, Clean Catch Updated: 23 0825     Urine Culture Probable contaminants, suggest recollection      >=100,000 cfu/mL Mixed Growth            Assessment/Plan     Alondra Villela is a 32 y.o.   with stage IV gestational trophoblastic neoplasia who is admitted with neutropenic fevers and RUE DVT.     1. RUE DVT  a. US doppler  shows thrombus in brachial vein and basilic vein   b. Continue lovenox 70 mg BID   c. PICC line to remain in place as patient's symptoms have improved and patient will need access for chemotherapy   d. Will contact IR  regarding potential removal and replacement of Right PICC line.   2. Severe neutropenia   a. ANC 22 (severe neutropenia) , follow daily labs  b. Continue strict precautions  c. Continue zarxio 480 mcg on days when she is not having chemo (next  ~24 hours after she is done with her cisplatin chemotherapy )  d. Blood Cultures from  NGTD, UCx shows likely contaminants  e. ID consulted- to continue cefepime 2g q 8 hrs   3. GTN   a. S/p EP induction chemotherapy on 23-23 and 23-23  b. S/p Etoposide, Dactinomycin, and Methotrexate on 23-23  c. S/p etoposide/ cisplatin    d. Next chemo planned for 23 and 23- to be administered through PICC, other premedications through LUE peripheral line    e. Continue supportive care (tessalon capsules for cough, pain management)  4. Hyperthyroidism  a. Continue home methimazole and metoprolol  5. Depression    a. Mood stable, continue Wellbutrin XL  mg     To be discussed with Dr. Lennie Sweeney MD, PGY-2  Obstetrics and Gynecology

## 2023-02-07 NOTE — PROGRESS NOTES
Infectious Disease Progress Note    Patient Name: Alondra Villela  MR#: [id number]  : 1990  Admission Date: 2023  Date: 23   Time: 11:23 AM   Author: Gustavo Finley MD        Antibiotics:    Anti-infectives (From admission, onward)    Start     Dose/Rate Route Frequency Ordered Stop    23 0815  ceFEPIme (MAXIPIME) 2 g in 100 mL NSS vial in bag         2 g  200 mL/hr over 30 Minutes intravenous Every 8 hours interval 23 0724            Subjective     Review of Systems    Afebrile.  No new ON events or c/o per the patient.    Objective     Vital Signs:    Vitals:    23 0400   BP: (!) 116/59   Pulse: 82   Resp: 18   Temp: 36.4 °C (97.5 °F)   SpO2: 98%       Temp (72hrs), Av.8 °C (98.2 °F), Min:36.4 °C (97.5 °F), Max:37.3 °C (99.2 °F)      Physical Exam:  General: alert, nontoxic  HEENT: NC/AT,anicteric sclera, no thrush- no oral lesions  Neck: supple  Cardiovascular: regular S1/S2, no murmurs  Respiratory: clear to auscultation, no wheezing, rales or ronchi  GI/Abdomen: soft, +BS, NT/ND  Lymphatics: No significant lymphadenopathy  Extremities: no clubbing, cyanosis or edema  Musculoskeletal: no acute arthritis  G.U.: No lesions  Skin: warm and dry, no new rashes  Psych:cooperative with exam  Neuro: Moves all 4 extremities      Lines, Drains, Airways, Wounds:  PICC Double Lumen 23 Right (Active)   Number of days: 25       Peripheral IV (Adult) 23 Left;Proximal;Posterior Forearm (Active)   Number of days: 2       Labs:    CBC Results       23     0422 0553 0603    WBC 0.44 1.14 1.39    RBC 3.14 2.75 2.99    HGB 8.6 7.5 8.1    HCT 25.1 21.9 24.6    MCV 79.9 79.6 82.3    MCH 27.4 27.3 27.1    MCHC 34.3 34.2 32.9    PLT 58 64 79         Comment for WBC at 0422 on 23: ALL RESULTS HAVE BEEN CHECKED. This result has been called to SAMANTHA PRINGLE by Pollo Dias on 23 at 07:18, and has been read back.     Comment for WBC at 0553 on  02/06/23: ALL RESULTS HAVE BEEN CHECKED. This result has been called to VONDA VILLAFANA by Pollo Dias on 02 06 23 at 07:31, and has been read back.     Comment for WBC at 0603 on 02/05/23: This result has been called to MELVIN DELA CRUZ by Ronald Rapp on 02 05 23 at 08:30, and has been read back.     Comment for PLT at 0422 on 02/07/23: RESULTS CHECKED. CONSISTENT WITH PREVIOUS RESULTS         CMP Results       02/07/23 02/06/23 02/05/23     0422 0553 0603     134 133    K 4.3 4.1 4.1    Cl 111 108 104    CO2 18 19 23    Glucose 162 79 82    BUN 5 5 5    Creatinine 0.6 0.8 0.7    Calcium 8.3 8.0 7.9    Anion Gap 7 7 6    EGFR >60.0 >60.0 >60.0            Imaging:    Radiology Imaging    XR CHEST 1 VW    Narrative  CLINICAL HISTORY: follow up, r/o hemothorax    COMMENT:  Technique: A portable frontal view of the chest was obtained.  Comparison: 1/12/2023.    Slightly decreased lung volumes. Redemonstrated right lower lung zone mass.  Lungs are otherwise clear and without pleural effusion or pneumothorax.  The heart size is normal. The hilar and mediastinal contours are normal.  The regional skeletal structures are unremarkable.    --    Impression  No significant interval change. No pneumothorax.      MICROBIOLOGY :   Microbiology Results     Procedure Component Value Units Date/Time    Urine culture Urine, Clean Catch [[id number]] Collected: 02/04/23 1348    Specimen: Urine, Clean Catch Updated: 02/06/23 0825     Urine Culture Probable contaminants, suggest recollection      >=100,000 cfu/mL Mixed Growth    Blood Culture Blood, Venous [[id number]]  (Normal) Collected: 02/04/23 1346    Specimen: Blood, Venous Updated: 02/06/23 1601     Culture No growth at 48 hours    Blood Culture Blood, Venous [[id number]]  (Normal) Collected: 02/04/23 1346    Specimen: Blood, Venous Updated: 02/06/23 1601     Culture No growth at 48 hours          Assessment   Neutropenia   Fever  DVT- PICC line associated  Gestational  trophoblastic neoplasia on chemotherapy  Immunosuppressed status due to the above          Plan     Neutropenic precautions  Follow ANC  No new fevers noted  Blood cultures remain negative  Initial fever could have been related to her PICC line related thrombus  Continue with cefepime for now  Monitor temperature curve  Urine cultures with mixed growth  No  symptoms noted  Blood cultures remain negative for 48 hours  Low suspicion for active infection  If blood cultures remain negative, no contraindication to port placement tomorrow  Plan reviewed with the patient and discussed with the medical team    More than 35 minutes were spent obtaining a detailed interval history, detailed exam, evaluating this high complexity case with significant medical decision making including continuation of antibiotic therapy, coordination of care with primary team and consultants.

## 2023-02-08 ENCOUNTER — APPOINTMENT (INPATIENT)
Dept: RADIOLOGY | Facility: HOSPITAL | Age: 33
DRG: 982 | End: 2023-02-08
Attending: OBSTETRICS & GYNECOLOGY
Payer: COMMERCIAL

## 2023-02-08 ENCOUNTER — PATIENT OUTREACH (OUTPATIENT)
Dept: HEMATOLOGY/ONCOLOGY | Facility: HOSPITAL | Age: 33
End: 2023-02-08
Payer: COMMERCIAL

## 2023-02-08 VITALS
HEIGHT: 64 IN | BODY MASS INDEX: 27.31 KG/M2 | WEIGHT: 160 LBS | DIASTOLIC BLOOD PRESSURE: 58 MMHG | HEART RATE: 89 BPM | OXYGEN SATURATION: 100 % | RESPIRATION RATE: 18 BRPM | SYSTOLIC BLOOD PRESSURE: 99 MMHG | TEMPERATURE: 97.7 F

## 2023-02-08 PROBLEM — R00.0 TACHYCARDIA: Status: RESOLVED | Noted: 2023-01-11 | Resolved: 2023-02-08

## 2023-02-08 LAB
ABO + RH BLD: NORMAL
ABO + RH BLD: NORMAL
ANION GAP SERPL CALC-SCNC: 11 MEQ/L (ref 3–15)
APTT PPP: 24 SEC (ref 23–35)
BACTERIA BLD CULT: NORMAL
BACTERIA BLD CULT: NORMAL
BASOPHILS # BLD: 0 K/UL (ref 0.01–0.1)
BASOPHILS NFR BLD: 0 %
BLD GP AB SCN SERPL QL: NEGATIVE
BUN SERPL-MCNC: 10 MG/DL (ref 8–20)
CALCIUM SERPL-MCNC: 7.9 MG/DL (ref 8.9–10.3)
CHLORIDE SERPL-SCNC: 109 MEQ/L (ref 98–109)
CO2 SERPL-SCNC: 16 MEQ/L (ref 22–32)
CREAT SERPL-MCNC: 0.8 MG/DL (ref 0.6–1.1)
D AG BLD QL: POSITIVE
D AG BLD QL: POSITIVE
DIFFERENTIAL METHOD BLD: ABNORMAL
EOSINOPHIL # BLD: 0 K/UL (ref 0.04–0.36)
EOSINOPHIL NFR BLD: 0 %
ERYTHROCYTE [DISTWIDTH] IN BLOOD BY AUTOMATED COUNT: 12.4 % (ref 11.7–14.4)
GFR SERPL CREATININE-BSD FRML MDRD: >60 ML/MIN/1.73M*2
GLUCOSE SERPL-MCNC: 72 MG/DL (ref 70–99)
HCT VFR BLDCO AUTO: 22.3 % (ref 35–45)
HGB BLD-MCNC: 7.6 G/DL (ref 11.8–15.7)
INR PPP: 1
LABORATORY COMMENT REPORT: NORMAL
LABORATORY COMMENT REPORT: NORMAL
LYMPHOCYTES # BLD: 0.95 K/UL (ref 1.2–3.5)
LYMPHOCYTES NFR BLD: 88 %
MCH RBC QN AUTO: 26.9 PG (ref 28–33.2)
MCHC RBC AUTO-ENTMCNC: 34.1 G/DL (ref 32.2–35.5)
MCV RBC AUTO: 78.8 FL (ref 83–98)
MONOCYTES # BLD: 0 K/UL (ref 0.28–0.8)
MONOCYTES NFR BLD: 0 %
NEUTS BAND # BLD: 0.13 K/UL (ref 1.7–7)
NEUTS SEG NFR BLD: 12 %
OVALOCYTES BLD QL SMEAR: ABNORMAL
PDW BLD AUTO: 10 FL (ref 9.4–12.3)
PLAT MORPH BLD: NORMAL
PLATELET # BLD AUTO: 54 K/UL (ref 150–369)
PLATELET # BLD EST: ABNORMAL 10*3/UL
POTASSIUM SERPL-SCNC: 3.9 MEQ/L (ref 3.6–5.1)
PROTHROMBIN TIME: 13.5 SEC (ref 12.2–14.5)
RBC # BLD AUTO: 2.83 M/UL (ref 3.93–5.22)
SODIUM SERPL-SCNC: 136 MEQ/L (ref 136–144)
SPECIMEN EXP DATE BLD: NORMAL
WBC # BLD AUTO: 1.08 K/UL (ref 3.8–10.5)

## 2023-02-08 PROCEDURE — 71000011 HC PACU PHASE 1 EA ADDL MIN

## 2023-02-08 PROCEDURE — 71000001 HC PACU PHASE 1 INITIAL 30MIN

## 2023-02-08 PROCEDURE — 77001 FLUOROGUIDE FOR VEIN DEVICE: CPT

## 2023-02-08 PROCEDURE — 63700000 HC SELF-ADMINISTRABLE DRUG

## 2023-02-08 PROCEDURE — 86900 BLOOD TYPING SEROLOGIC ABO: CPT

## 2023-02-08 PROCEDURE — 85025 COMPLETE CBC W/AUTO DIFF WBC: CPT | Performed by: STUDENT IN AN ORGANIZED HEALTH CARE EDUCATION/TRAINING PROGRAM

## 2023-02-08 PROCEDURE — 63600000 HC DRUGS/DETAIL CODE: Performed by: PHYSICIAN ASSISTANT

## 2023-02-08 PROCEDURE — 99232 SBSQ HOSP IP/OBS MODERATE 35: CPT | Performed by: STUDENT IN AN ORGANIZED HEALTH CARE EDUCATION/TRAINING PROGRAM

## 2023-02-08 PROCEDURE — 02HV33Z INSERTION OF INFUSION DEVICE INTO SUPERIOR VENA CAVA, PERCUTANEOUS APPROACH: ICD-10-PCS | Performed by: RADIOLOGY

## 2023-02-08 PROCEDURE — 80048 BASIC METABOLIC PNL TOTAL CA: CPT | Performed by: STUDENT IN AN ORGANIZED HEALTH CARE EDUCATION/TRAINING PROGRAM

## 2023-02-08 PROCEDURE — C1769 GUIDE WIRE: HCPCS

## 2023-02-08 PROCEDURE — 85730 THROMBOPLASTIN TIME PARTIAL: CPT

## 2023-02-08 PROCEDURE — 85610 PROTHROMBIN TIME: CPT

## 2023-02-08 PROCEDURE — 0JH60WZ INSERTION OF TOTALLY IMPLANTABLE VASCULAR ACCESS DEVICE INTO CHEST SUBCUTANEOUS TISSUE AND FASCIA, OPEN APPROACH: ICD-10-PCS | Performed by: RADIOLOGY

## 2023-02-08 PROCEDURE — 25800000 HC PHARMACY IV SOLUTIONS: Performed by: PHYSICIAN ASSISTANT

## 2023-02-08 PROCEDURE — 25800000 HC PHARMACY IV SOLUTIONS: Performed by: INTERNAL MEDICINE

## 2023-02-08 PROCEDURE — 63700000 HC SELF-ADMINISTRABLE DRUG: Performed by: STUDENT IN AN ORGANIZED HEALTH CARE EDUCATION/TRAINING PROGRAM

## 2023-02-08 PROCEDURE — 36100390 IR PORT PLACEMENT

## 2023-02-08 PROCEDURE — 63600000 HC DRUGS/DETAIL CODE: Performed by: STUDENT IN AN ORGANIZED HEALTH CARE EDUCATION/TRAINING PROGRAM

## 2023-02-08 PROCEDURE — 63600000 HC DRUGS/DETAIL CODE: Performed by: INTERNAL MEDICINE

## 2023-02-08 PROCEDURE — 76937 US GUIDE VASCULAR ACCESS: CPT

## 2023-02-08 PROCEDURE — C1788 PORT, INDWELLING, IMP: HCPCS

## 2023-02-08 PROCEDURE — C1894 INTRO/SHEATH, NON-LASER: HCPCS

## 2023-02-08 PROCEDURE — 86920 COMPATIBILITY TEST SPIN: CPT

## 2023-02-08 PROCEDURE — 63600000 HC DRUGS/DETAIL CODE: Performed by: RADIOLOGY

## 2023-02-08 PROCEDURE — 36415 COLL VENOUS BLD VENIPUNCTURE: CPT

## 2023-02-08 PROCEDURE — 02PYX3Z REMOVAL OF INFUSION DEVICE FROM GREAT VESSEL, EXTERNAL APPROACH: ICD-10-PCS | Performed by: RADIOLOGY

## 2023-02-08 RX ORDER — ONDANSETRON 4 MG/1
4-8 TABLET, ORALLY DISINTEGRATING ORAL EVERY 6 HOURS PRN
Qty: 30 TABLET | Refills: 1 | Status: CANCELLED | OUTPATIENT
Start: 2023-02-08 | End: 2023-03-10

## 2023-02-08 RX ORDER — PROMETHAZINE HYDROCHLORIDE 12.5 MG/1
12.5 TABLET ORAL EVERY 6 HOURS PRN
Qty: 60 TABLET | Refills: 3 | Status: SHIPPED | OUTPATIENT
Start: 2023-02-08 | End: 2023-03-10

## 2023-02-08 RX ORDER — SODIUM CHLORIDE 9 MG/ML
5 INJECTION, SOLUTION INTRAVENOUS AS NEEDED
Status: DISCONTINUED | OUTPATIENT
Start: 2023-02-08 | End: 2023-02-09 | Stop reason: HOSPADM

## 2023-02-08 RX ORDER — LEVOFLOXACIN 750 MG/1
750 TABLET ORAL DAILY
Qty: 2 TABLET | Refills: 0 | Status: SHIPPED | OUTPATIENT
Start: 2023-02-09 | End: 2023-02-14 | Stop reason: HOSPADM

## 2023-02-08 RX ORDER — FENTANYL CITRATE 50 UG/ML
INJECTION, SOLUTION INTRAMUSCULAR; INTRAVENOUS
Status: COMPLETED | OUTPATIENT
Start: 2023-02-08 | End: 2023-02-08

## 2023-02-08 RX ORDER — HEPARIN 100 UNIT/ML
SYRINGE INTRAVENOUS
Status: COMPLETED | OUTPATIENT
Start: 2023-02-08 | End: 2023-02-08

## 2023-02-08 RX ORDER — ENOXAPARIN SODIUM 100 MG/ML
1 INJECTION SUBCUTANEOUS
Status: DISCONTINUED | OUTPATIENT
Start: 2023-02-08 | End: 2023-02-09 | Stop reason: HOSPADM

## 2023-02-08 RX ORDER — PROMETHAZINE HYDROCHLORIDE 12.5 MG/1
12.5 TABLET ORAL EVERY 6 HOURS PRN
Qty: 60 TABLET | Refills: 0 | Status: CANCELLED | OUTPATIENT
Start: 2023-02-08 | End: 2023-03-10

## 2023-02-08 RX ORDER — LORAZEPAM 1 MG/1
1 TABLET ORAL ONCE
Status: COMPLETED | OUTPATIENT
Start: 2023-02-08 | End: 2023-02-08

## 2023-02-08 RX ORDER — MIDAZOLAM HYDROCHLORIDE 2 MG/2ML
INJECTION, SOLUTION INTRAMUSCULAR; INTRAVENOUS
Status: COMPLETED | OUTPATIENT
Start: 2023-02-08 | End: 2023-02-08

## 2023-02-08 RX ADMIN — LORAZEPAM 1 MG: 1 TABLET ORAL at 17:10

## 2023-02-08 RX ADMIN — ENOXAPARIN SODIUM 70 MG: 80 INJECTION SUBCUTANEOUS at 18:59

## 2023-02-08 RX ADMIN — CEFEPIME 2 G: 2 INJECTION, POWDER, FOR SOLUTION INTRAVENOUS at 17:28

## 2023-02-08 RX ADMIN — FENTANYL CITRATE 25 MCG: 50 INJECTION, SOLUTION INTRAMUSCULAR; INTRAVENOUS at 15:25

## 2023-02-08 RX ADMIN — METHIMAZOLE 5 MG: 5 TABLET ORAL at 09:09

## 2023-02-08 RX ADMIN — PROMETHAZINE HYDROCHLORIDE 12.5 MG: 25 TABLET ORAL at 21:23

## 2023-02-08 RX ADMIN — BUPROPION HYDROCHLORIDE 150 MG: 150 TABLET, EXTENDED RELEASE ORAL at 09:09

## 2023-02-08 RX ADMIN — MIDAZOLAM HYDROCHLORIDE 0.5 MG: 1 INJECTION, SOLUTION INTRAMUSCULAR; INTRAVENOUS at 15:25

## 2023-02-08 RX ADMIN — DOCUSATE SODIUM 100 MG: 100 CAPSULE, LIQUID FILLED ORAL at 09:09

## 2023-02-08 RX ADMIN — FILGRASTIM-SNDZ 480 MCG: 480 INJECTION, SOLUTION INTRAVENOUS; SUBCUTANEOUS at 17:44

## 2023-02-08 RX ADMIN — CEFEPIME 2 G: 2 INJECTION, POWDER, FOR SOLUTION INTRAVENOUS at 07:49

## 2023-02-08 RX ADMIN — PROMETHAZINE HYDROCHLORIDE 12.5 MG: 25 TABLET ORAL at 05:13

## 2023-02-08 RX ADMIN — FENTANYL CITRATE 50 MCG: 50 INJECTION, SOLUTION INTRAMUSCULAR; INTRAVENOUS at 15:17

## 2023-02-08 RX ADMIN — MIDAZOLAM HYDROCHLORIDE 1 MG: 1 INJECTION, SOLUTION INTRAMUSCULAR; INTRAVENOUS at 15:17

## 2023-02-08 RX ADMIN — HEPARIN 500 UNITS: 100 SYRINGE at 15:31

## 2023-02-08 RX ADMIN — CEFAZOLIN 2 G: 2 INJECTION, POWDER, FOR SOLUTION INTRAMUSCULAR; INTRAVENOUS at 10:24

## 2023-02-08 NOTE — PLAN OF CARE
[facility] Coordination Discharge Plan Summary   Date: 2/8/2023   Time: 12:10 PM    Patient Name: Alondra Villela  Medical Record Number: [id number]  YOB: 1990  Room/BED: 0377    General Information  Patient-Specific Goals (Include Timeframe)     PCP: Sunil Hinton MD   Insurance Coverage: [employer]  Readmission Within the last 30 days  other (see comments) (pt presented with arm pain.)    Advance Directives (for Healthcare)?  Does patient have advance directive?: No  Patient does not have Advance Directive: Patient/Family declines further information  Does patient have current OOH DNR form?: No  Patient does not have current OOH DNR form: Patient/Family declines further information  Does patient have current POLST?: No  Patient does not have current POLST: Patient/Family declines further information  Does patient have mental health advance directive?: No  Patient does not have Mental Health Advance Directive: Patient/Family declines further information        Information       Living Arrangements       Housing Stability and Financial Resources (SDOH)       Current Outpatient/Agency/Support Group       Type of Current Home Care Services       Assistive Device/Animal Currently Used at Home  none    Functional Status Comments       IADL Comments       Employment/ Status       Concerns to be Addressed  care coordination/care conferences, discharge planning    Current Discharge Risk       Anticipated Changes Related to Illness  none    Transportation Concerns (SDOH)  Transportation Anticipated: family or friend will provide    Concerns Comments       Anticipated Clinical Needs       Anticipated Discharge Plan   Anticipated Discharge Disposition: home without assistance or services, home with assistance  Patient/Family Anticipates Transition to: home, home with family  Patient/Family Anticipated Services at Transition: none  Screening complete: yes    Discharge  Assessment  Concerns to be Addressed: care coordination/care conferences, discharge planning  Anticipated Changes Related to Illness: none    Concerns Comments:      Patient Choice            Discharge Transportation   Does the patient need discharge transport arranged?: No        Discharge Barriers   Barriers to Discharge: None  Participants: nursing, physician,     Destination - Admitted Since 2/4/2023    No services have been selected for the patient.       ADD: Eliquis copay = $0 per pharmacy - Medical team made aware

## 2023-02-08 NOTE — PATIENT INSTRUCTIONS
Nurse Navigators can assist you during your diagnosis, testing, and / or treatment. Some things the Nurse can do to help are listed here. Most importantly we will have a caring relationship with you and your family so that you have support throughout your treatment.    Provide personalized support and guidance   Provide emotional support for patients and families by partnering in your care   Act as a liaison between you and your health care providers  Assist with coordination of care   Explain treatment plans more fully  Answer questions that you may have about clinical care (such as: testing, pathology, medications)  Provide education and assistance in preparing for surgical procedures and post- operative care  Supply appropriate educational materials  Link you to resources available to help with your care i.e., nutrition, social work  Provide information on integrative care (such as: massage, acupuncture, Reiki, yoga, Aromatherapy) which may be accessible through your insurance provider  Answer questions related to treatment side effects  Discuss and refer to genetic counseling when needed  Refer to  for financial and emotional support  Follow up with you at regular intervals prior, during and after treatment  Provide a survivorship plan at end of treatment for you and your primary care provider  Shabana Atkins RN Navigator   [facility] [phone]

## 2023-02-08 NOTE — PROGRESS NOTES
Infectious Disease Progress Note    Patient Name: Alondra Villela  MR#: [id number]  : 1990  Admission Date: 2023  Date: 23   Time: 8:41 AM   Author: Pedro Finney DO        Antibiotics:    Anti-infectives (From admission, onward)    Start     Dose/Rate Route Frequency Ordered Stop    23 0815  ceFEPIme (MAXIPIME) 2 g in 100 mL NSS vial in bag         2 g  200 mL/hr over 30 Minutes intravenous Every 8 hours interval 23 0724            Subjective     Review of Systems    Patient seen and examined.  No acute events overnight.  Remains afebrile.  Remains neutropenic.  Says that she feels fine this morning.  Denies fevers and chills.  Has been eating fine.  No new symptoms.    Objective     Vital Signs:    Vitals:    23 0800   BP:    Pulse: 72   Resp:    Temp:    SpO2:        Temp (72hrs), Av.6 °C (97.8 °F), Min:36.3 °C (97.4 °F), Max:37.3 °C (99.2 °F)      Physical Exam:  General: alert, nontoxic, appears comfortable in bed  HEENT: NC/AT,anicteric sclera, no thrush, no oral lesions, PERRLA, EOMI  Neck: supple, no adenopathy  Cardiovascular: regular S1/S2, no murmurs  Respiratory: clear to auscultation, no wheezing, rales or ronchi  GI/Abdomen: soft, bowel sounds present, nontender, nondistended  Lymphatics: No significant lymphadenopathy  Extremities: no clubbing, cyanosis or edema  Musculoskeletal: no acute arthritis  Skin: warm and dry, no new rashes  Psych:cooperative with exam  Neuro: AAOx3, conversational, moving all extremities      Lines, Drains, Airways, Wounds:  PICC Double Lumen 23 Right (Active)   Number of days: 25       Peripheral IV (Adult) 23 Left;Proximal;Posterior Forearm (Active)   Number of days: 2       Labs:    CBC Results       23     0422 0553 0603    WBC 0.44 1.14 1.39    RBC 3.14 2.75 2.99    HGB 8.6 7.5 8.1    HCT 25.1 21.9 24.6    MCV 79.9 79.6 82.3    MCH 27.4 27.3 27.1    MCHC 34.3 34.2 32.9    PLT 58 64 79          Comment for WBC at 0422 on 02/07/23: ALL RESULTS HAVE BEEN CHECKED. This result has been called to SAMANTHA PRINGLE by Pollo Dias on 02 07 23 at 07:18, and has been read back.     Comment for WBC at 0553 on 02/06/23: ALL RESULTS HAVE BEEN CHECKED. This result has been called to VONDA VILLAFANA by Pollo Dias on 02 06 23 at 07:31, and has been read back.     Comment for WBC at 0603 on 02/05/23: This result has been called to MELVIN DELA CRUZ by Ronald Rapp on 02 05 23 at 08:30, and has been read back.     Comment for PLT at 0422 on 02/07/23: RESULTS CHECKED. CONSISTENT WITH PREVIOUS RESULTS         CMP Results       02/07/23 02/06/23 02/05/23     0422 0553 0603     134 133    K 4.3 4.1 4.1    Cl 111 108 104    CO2 18 19 23    Glucose 162 79 82    BUN 5 5 5    Creatinine 0.6 0.8 0.7    Calcium 8.3 8.0 7.9    Anion Gap 7 7 6    EGFR >60.0 >60.0 >60.0            Imaging:    Radiology Imaging    XR CHEST 1 VW    Narrative  CLINICAL HISTORY: follow up, r/o hemothorax    COMMENT:  Technique: A portable frontal view of the chest was obtained.  Comparison: 1/12/2023.    Slightly decreased lung volumes. Redemonstrated right lower lung zone mass.  Lungs are otherwise clear and without pleural effusion or pneumothorax.  The heart size is normal. The hilar and mediastinal contours are normal.  The regional skeletal structures are unremarkable.    --    Impression  No significant interval change. No pneumothorax.      MICROBIOLOGY :   Microbiology Results     Procedure Component Value Units Date/Time    Urine culture Urine, Clean Catch [[id number]] Collected: 02/04/23 1348    Specimen: Urine, Clean Catch Updated: 02/06/23 0825     Urine Culture Probable contaminants, suggest recollection      >=100,000 cfu/mL Mixed Growth    Blood Culture Blood, Venous [[id number]]  (Normal) Collected: 02/04/23 1346    Specimen: Blood, Venous Updated: 02/06/23 1601     Culture No growth at 48 hours    Blood Culture Blood, Venous  [[id number]]  (Normal) Collected: 02/04/23 1346    Specimen: Blood, Venous Updated: 02/06/23 1601     Culture No growth at 48 hours          Assessment     32-year-old female with a 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  Reportedly found to have a thrombosis associated with her right upper extremity PICC.  This has been removed.  The fevers have resolved.  She does have neutropenia.    2/4 blood cultures no growth to date  2/4 urine cultures no growth to date    1. Febrile neutropenia - fevers have resolved, neutropenia continues  2. Thromboses associated with RUE PICC - initial PICC removed, for repeat placement  3. 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 Immunosuppressed status      Plan   · Patient continues to be afebrile  · Cultures from 2/4 are negative  · Would be okay with discontinuation of cefepime and initiation of p.o. levofloxacin 750 mg daily while the patient remains neutropenic for prophylaxis  · Management of neutropenia and trophoblastic cancer per hematology/oncology  · Would follow ANC  · Patient does not have any new symptoms today and reports she feels well  · Monitor for fevers while here  · Patient understands neutropenic precautions upon discharge  · Continue to have low suspicion for active infection  · Okay with port placement  · Thank you    Please see attending attestation for final recommendations.    Pedro Finney DO  Fellow, infectious diseases

## 2023-02-08 NOTE — PLAN OF CARE
Plan of Care Review  Plan of Care Reviewed With: patient  Progress: improving  Outcome Summary: Pt with VSS overnight. Filgastrim dose administered. R arm pain improved per pt, pain meds offered and refused. IV abx continued through peripheral line. PRN zofran and phenerfan given for c/o nausea. Family at bedside and call bell within reach.     NPO status initiated at MN for port placement today

## 2023-02-08 NOTE — POST-PROCEDURE NOTE
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     Description of procedure: Right IJ Chest Port placement.     Contrast: None.      Anesthesia:  Conscious Sedation    Volume of Lidocaine Utilized (ml): 10 ml lido 1% and 10 ml lido w/ epi.      Medications: 1.5 mg Versed and 75 mcg Fentanyl IV.      Complications: None      Estimated Blood Loss: Estimated Blood Loss: 0-10 ml    Findings: Successful placement of a right IJ chest port with the distal end of the catheter at the cavo-atrial junction. Ok to use chest port.       2/8/2023 3:51 PM

## 2023-02-08 NOTE — DISCHARGE INSTRUCTIONS
Return Sunday evening 2/12/23 for admission for next chemotherapy.   Start Eliquis 5 mg twice daily (every 12 hours).   Start Levaquin 750 mg by mouth once daily for 2 doses (2/9/23-2/10/23).

## 2023-02-08 NOTE — PROGRESS NOTES
R2 Ob.     At bedside to discuss with patient and family discharge plans. Alondra would like to go home this evening. She will be discharged after she receives her unit of blood, dose of filgrastim, dose of antibiotic, and PM lovenox injection. She understands she requires dose of filgrastim tomorrow. She will start eliquis 5 mg by mouth twice daily tomorrow morning. She will take Levaquin 750 PO qd x 2 days starting tomorrow. All questions were answered.     Plan per Dr. Mcmullen.     Lay Shipman, DO PGY2  Obstetrics & Gynecology   Pager #[phone]

## 2023-02-08 NOTE — DISCHARGE SUMMARY
Inpatient Discharge Summary    BRIEF OVERVIEW  Admitting Provider:  H&P Notes 1/9/2023 to 2/8/2023         Date of Service Author Author Type Status Note Type File Time    02/04/23 1807 Annie Dawn,  Resident Attested H&P 02/04/23 2234    01/29/23 1523 Thuy Flores MD Resident Signed H&P 01/29/23 1718    01/13/23 1306 Gerardo Sparks MD Resident Attested H&P 01/13/23 2042    01/11/23 1012 Zeb Lara DO Physician Addendum H&P 01/11/23 1420          Attending Provider: Shantelle Mcmullen MD Attending phys phone: [phone]  Primary Care Physician at Discharge: Sunil Hinton [phone]    Admission Date: 2/4/2023     Discharge Date: 2/8/2023    Primary Discharge Diagnosis  Neutropenic fever (CMS/HCC)    Secondary Discharge Diagnosis  Active Hospital Problems    Diagnosis Date Noted   • Neutropenic fever (CMS/HCC) 02/04/2023   • DVT (deep vein thrombosis) in pregnancy 02/04/2023   • Thrombocytopenia (CMS/HCC) 02/04/2023   • Hematuria 02/04/2023   • Anxiety and depression 02/04/2023   • Hyponatremia 02/04/2023   • Gestational trophoblastic neoplasm 01/11/2023   • Hyperthyroidism 01/11/2023   • Microcytic anemia 01/11/2023      Resolved Hospital Problems    Diagnosis Date Noted Date Resolved   • Tachycardia 01/11/2023 02/08/2023       DETAILS OF HOSPITAL STAY    Operative Procedures Performed      Consults:   Consult Notes 1/9/2023 to 2/8/2023         Date of Service Author Author Type Status Note Type File Time    02/06/23 1137 Glynn Hernandez MD Resident Attested Consults 02/07/23 0719    02/05/23 0718 Jaren Adams DO Physician Signed Consults 02/05/23 0723    01/20/23 1052 Jacqueline Bah LDN Registered Dietitian Signed Consults 01/20/23 1055    01/13/23 1337 Pedro Lanier PA C Physician Assistant Attested Consults 01/14/23 1055    01/13/23 1322 Phuong Gaona MD Physician Signed Consults 01/13/23 1619    01/13/23 1103 Lorena Hogue MD Physician Addendum  Consults 01/13/23 1645    01/12/23 2023 Moisés Souza MD Physician Signed Consults 01/12/23 2027 01/11/23 1656 Sonal Gonzales MD Physician Addendum Consults 01/11/23 1744    01/11/23 0720 Reg Murphy DO Physician Signed Consults 01/11/23 0733          Consult Orders During Admission:  IP CONSULT TO INFECTIOUS DISEASE  IP CONSULT TO GYNECOLOGIC ONCOLOGY  IP CONSULT TO VASCULAR SURGERY     Procedures: Right IJ Chest Port placement  Pertinent Test Results:              Estimated Creatinine Clearance: 97.7 mL/min (by C-G formula based on SCr of 0.8 mg/dL).   Results from last 7 days   Lab Units 02/08/23  0842 02/07/23  0422 02/06/23  0553 02/05/23  0603 02/04/23  1346 02/03/23  1439   SODIUM mEQ/L 136 136 134* 133* 133* 134*   POTASSIUM mEQ/L 3.9 4.3 4.1 4.1 3.7 3.6   CHLORIDE mEQ/L 109 111* 108 104 104 107   CO2 mEQ/L 16* 18* 19* 23 20* 19*   BUN mg/dL 10 5* 5* 5* 8 12   CREATININE mg/dL 0.8 0.6 0.8 0.7 0.8 0.8   GLUCOSE mg/dL 72 162* 79 82 89 110*   CALCIUM mg/dL 7.9* 8.3* 8.0* 7.9* 8.9 8.3*   MAGNESIUM mg/dL  --   --   --   --   --  1.7*   ALK PHOS IU/L  --   --   --   --  114 104   BILIRUBIN TOTAL mg/dL  --   --   --   --  0.5 0.5   ALBUMIN g/dL  --   --   --   --  3.8 3.5   ALT IU/L  --   --   --   --  16 17   AST IU/L  --   --   --   --  11* 15   INR  1.0  --  1.1  --  1.0  --    TSH mIU/L  --   --  1.79  --  1.16  --        Results from last 7 days   Lab Units 02/08/23  0842 02/07/23  0422 02/06/23  0553 02/05/23  0603 02/04/23  1346 02/03/23  1439   WBC K/uL 1.08* 0.44* 1.14* 1.39* 1.63* 4.39   RBC M/uL 2.83* 3.14* 2.75* 2.99* 3.74* 3.45*   HEMOGLOBIN g/dL 7.6* 8.6* 7.5* 8.1* 10.2* 9.4*   HEMATOCRIT % 22.3* 25.1* 21.9* 24.6* 30.4* 28.8*   MCV fL 78.8* 79.9* 79.6* 82.3* 81.3* 83.5   RDW % 12.4 12.3 12.4 12.5 12.3 12.5   MCH pg 26.9* 27.4* 27.3* 27.1* 27.3* 27.2*   MCHC g/dL 34.1 34.3 34.2 32.9 33.6 32.6   PLATELETS K/uL 54* 58* 64* 79* 110* 117*   DIFF TYPE  Manu Manu Manu Manu Manu Manu   NEUTROS PCT  MAN % 12 20 2 8 27 67   LYMPHO PCT MAN % 88 74 87 84 66 32   MONO PCT MAN % 0 6 7 4 3 1   EOSINO PCT MAN % 0 0 2 0 0 0   BASOS PCT MAN % 0 0 2 1 1 0   SEGS ABS MAN K/uL 0.13* 0.09* 0.02* 0.11* 0.44* 2.94   LYMPHO ABS MAN K/uL 0.95* 0.33* 0.99* 1.17* 1.08* 1.40   MONO ABS MAN K/uL 0.00* 0.03* 0.08* 0.06* 0.05* 0.04*   EOS ABS MAN K/uL 0.00* 0.00* 0.02* 0.00* 0.00* 0.00*   BASOS ABS MAN K/uL 0.00* 0.00* 0.02 0.01 0.02 0.00*             Microbiology Results     Procedure Component Value Units Date/Time    Urine culture Urine, Clean Catch [[id number]] Collected: 02/04/23 1348    Specimen: Urine, Clean Catch Updated: 02/06/23 0825     Urine Culture Probable contaminants, suggest recollection      >=100,000 cfu/mL Mixed Growth    Blood Culture Blood, Venous [[id number]]  (Normal) Collected: 02/04/23 1346    Specimen: Blood, Venous Updated: 02/08/23 1601     Culture No growth at 96 hours    Blood Culture Blood, Venous [[id number]]  (Normal) Collected: 02/04/23 1346    Specimen: Blood, Venous Updated: 02/08/23 1601     Culture No growth at 96 hours          Results from last 7 days   Lab Units 02/04/23  1348   COLOR U  Yellow   CLARITY U  Clear   PH U  6.5   SPEC GRAV U  1.008   PROTEIN U  Negative   GLUCOSE U MG/DL mg/dL Negative   BLOOD U  +3*   NITRITE U  Negative   RBC UR HPF /HPF 0 TO 4   WBC UR HPF /HPF 4 TO 10*   BACTERIA UR HPF /HPF Rare*         Imaging  CT GUIDED NEEDLE PLACEMENT WITHOUT IV CONTRAST    Result Date: 1/13/2023  IMPRESSION: CT guided biopsy of right lower lobe lung mass.     MRI BRAIN WITHOUT CONTRAST    Result Date: 1/13/2023  IMPRESSION: 1.  No acute infarct, acute intracranial hemorrhage or evidence of intra-axial mass effect. 2.  Mild scattered white matter disease which is nonspecific. 3.  Please note that evaluation for intracranial metastatic disease is limited without intravenous contrast.    MRI BRAIN WITH CONTRAST    Result Date: 1/13/2023  IMPRESSION: 1.  No abnormal intracranial contrast  enhancement identified to suggest intracranial metastatic disease. 2.  Previously seen tiny foci of increased T2 signal in the white matter do not demonstrate contrast enhancement.  These findings could reflect foci of chronic ischemia, the sequelae of migraine headaches, demyelinating or postinfectious processes.  Other etiologies are not excluded.  Clinical correlation is recommended.    CT ABDOMEN PELVIS WITH IV CONTRAST    Result Date: 1/13/2023  IMPRESSION: 1.   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 2.  Extensive pulmonary metastases, most notably a right lower lobe 4.9 cm subpleural mass. 3.  Enlarged ovaries with numerous cysts likely representing theca lutein cysts 4.  Indeterminate 0.8 cm hypoattenuating lesion at the dome of the liver.  The previously noted hypervascular liver lesions are not well seen due to the phase of contrast at the time of imaging.  Contrast-enhanced MRI of the abdomen can be considered for more optimal characterization. 5.  Indeterminate hypoattenuating lesions in the right kidney which can also be more optimally evaluated with MRI.    CT ANGIOGRAPHY CHEST PULMONARY EMBOLISM WITH IV CONTRAST    Result Date: 2/4/2023  IMPRESSION: 1. No evidence of acute pulmonary arterial embolism through the proximal subsegmental branches. 2. Intervally decreased size and number of innumerable pulmonary nodules scattered throughout both lungs including intervally decreased size of a dominant subpleural mass within the right lower lobe now measuring up to 5 cm, as well as interval resolution of a previously seen anterior mediastinal mass, in keeping with treatment response. 3. Small area of ill-defined groundglass attenuation poster laterally within the right lower lobe which may reflect an infectious or inflammatory process.    CT ANGIOGRAPHY CHEST PULMONARY EMBOLISM WITH IV CONTRAST    Result Date: 1/12/2023  IMPRESSION: No  evidence for pulmonary embolism. 5 cm right lower lobe mass. Anterior mediastinal mass. Innumerable bilateral pulmonary nodules which are suspicious for metastatic disease to the lungs. 2 hyperenhancing liver lesions, the larger measuring 5 cm in maximal dimension for which further assessment with MRI with and without contrast is recommended.     X-RAY CHEST 1 VIEW    Result Date: 1/13/2023  IMPRESSION: No significant interval change. No pneumothorax.    X-RAY CHEST 1 VIEW    Result Date: 1/12/2023  IMPRESSION: No pneumothorax following right lung biopsy.    X-RAY CHEST 1 VIEW    Result Date: 1/12/2023  IMPRESSION: No pneumothorax status post right lung mass biopsy.     US venous arm, RU extremity    Result Date: 2/4/2023  IMPRESSION: Thrombus in the right subclavian, axillary, brachial and basilic veins as detailed. Finding:    Other   Acuity: Significant  Status:  CLOSED Critical read back was performed and results were read back by Dr. Stanton on 2/4/2023 at 3:12 PM. COMMENT: Comparison: None Technique: Grayscale ultrasound, color and spectral Doppler of the upper extremity veins. RIGHT: Internal jugular vein: No thrombus seen. Doppler within normal limits. Innominate vein: No thrombus seen. Doppler within normal limits. Subclavian: There is partially occlusive thrombus. Axillary: There is occlusive thrombus. There is thrombus one of the brachial veins and the basilic vein.  The cephalic vein where visualized is patent.     US venous leg, bilateral    Result Date: 1/10/2023  IMPRESSION: No femoropopliteal thrombosis. COMMENT: A duplex Doppler study was performed of both lower extremities, consisting of integrated two-dimensional (2D) real-time imaging color flow Doppler and Doppler spectral analysis utilizing 5.0 MHz and 7.0 MHz linear array probes. RIGHT: COMMON FEMORAL VEIN: Normal compression DEEP FEMORAL VEIN: Patent by color Doppler. FEMORAL VEIN: Normal compression, flow, and augmentation demonstrated on  Doppler spectral analysis. POPLITEAL VEIN: Normal compression, flow, and augmentation demonstrated on Doppler spectral analysis. LEFT: COMMON FEMORAL VEIN: Normal compression DEEP FEMORAL VEIN: Patent by color Doppler. FEMORAL VEIN: Normal compression, flow, and augmentation demonstrated on Doppler spectral analysis. POPLITEAL VEIN: Normal compression, flow, and augmentation demonstrated on Doppler spectral analysis. Visualized calf veins compress normally.     IR TECHNICAL ASSISTANCE    Result Date: 1/13/2023  IMPRESSION: CT guided biopsy of right lower lobe lung mass.     ULTRASOUND DOPPLER    Result Date: 1/11/2023  IMPRESSION: 1.  No intrauterine gestation and no adnexal mass evident. These findings represent a pregnancy of unknown location. The appearance of a complex mass with small cystic spaces and peripheral vascularity occupying the central uterine cavity could represent a molar pregnancy but additional evaluation is suggested. 2.  Both ovaries are mildly enlarged with multiple complex cystic lesions as well as some with solid-appearing echoes that may represent a combination of hemorrhagic cysts/follicles or endometriomas, but challenging to evaluate as the ovaries are only well evaluated transabdominally. Recommended these be reassessed on follow-up. No sonographic findings of ovarian torsion.     ULTRASOUND PREGNANCY TRANSVAGINAL ONLY    Result Date: 1/11/2023  IMPRESSION: 1.  No intrauterine gestation and no adnexal mass evident. These findings represent a pregnancy of unknown location. The appearance of a complex mass with small cystic spaces and peripheral vascularity occupying the central uterine cavity could represent a molar pregnancy but additional evaluation is suggested. 2.  Both ovaries are mildly enlarged with multiple complex cystic lesions as well as some with solid-appearing echoes that may represent a combination of hemorrhagic cysts/follicles or endometriomas, but challenging to evaluate  "as the ovaries are only well evaluated transabdominally. Recommended these be reassessed on follow-up. No sonographic findings of ovarian torsion.     ULTRASOUND PREGNANCY < 14 WEEKS TRANSABDOMINAL LIMITED    Result Date: 1/11/2023  IMPRESSION: 1.  No intrauterine gestation and no adnexal mass evident. These findings represent a pregnancy of unknown location. The appearance of a complex mass with small cystic spaces and peripheral vascularity occupying the central uterine cavity could represent a molar pregnancy but additional evaluation is suggested. 2.  Both ovaries are mildly enlarged with multiple complex cystic lesions as well as some with solid-appearing echoes that may represent a combination of hemorrhagic cysts/follicles or endometriomas, but challenging to evaluate as the ovaries are only well evaluated transabdominally. Recommended these be reassessed on follow-up. No sonographic findings of ovarian torsion.       Presenting Problem/History of Present Illness  Tachycardia [R00.0]  Gestational trophoblastic neoplasm [O01.9]  Neutropenic fever (CMS/HCC) [D70.9, R50.81]     HPI copied and pasted from H&P:  \"This is a 32 y.o. 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 Hogue, known mets to liver and lungs), anxiety/depression, hyperthyroidism who presents to ED with the primary complaint of right arm pain.     Patient states that she had a right arm PICC line placed last Friday in order to start outpatient chemotherapy. She received chemotherapy with methotrexate, etoposide and dactinomycin via her PICC line on February 2. Patient states that she was in her usual state of health until last night when she developed sudden onset right arm pain around the PICC site. She also reports warmness around the PICC site and pain with and without palpation. She denies any " purulent drainage or foul smell. Patient also endorses subjective fever and chills at home. She denies any other localizing infectious symptoms such as worsening cough/increased sputum production, headache, confusion, abdominal pain, diarrhea, dysuria, new skin rashes or wounds.     She closely follows with the gynoncology team here at [facility] with Dr. Mcmullen and Dr. Hogue. She is due for her next session of chemotherapy of Monday.     Patient states that she has a cough at baseline secondary to her known metastatic disease. She denies any increased urine production.  She does note occasional shortness of breath with walking around.  Of note, patient has also noticed increased vaginal bleeding over the past week.  She also reports a few episodes of rectal bleeding.  She says it appears as bright red blood per rectum most notably on the toilet paper with occasional maroon stools.  She does endorse some lightheadedness but no syncope.     Patient states that her mom and spouse would make decisions for her if she was unable to make them herself.  Confirmed CODE STATUS, full code.     In the ED, patient is afebrile, tachycardic to the 110s, respiratory rate 20, satting 100% on room air, blood pressure 102-119/57-73.  Labs are notable for sodium 133, bicarb 20, BUN 8, creatinine 0.8 (baseline around 0.5-0.7), AST 11, ALT 16, alk phos 114, T bili 0.5, TSH 1.16, WBC 1.63, JORDANA 0.44, Hgb 10.2 (baseline 9), plt 110 (appears to run in the low 100s), INR 1, PTT 24.     UA neg leuk esterase, neg nitrite, neg protein, +3 blood, 0-4 RBCs, 4-10 WBCs, rare bacteria.     CTA chest with no evidence of acute PE, intervally decreased size and number of pulmonary nodules, as well as interval resolution of previously seen anterior midsternal mass, in keeping with treatment response, all area of ill-defined groundglass attenuation posterior laterally within the right lower lobe which may reflect an infectious or inflammatory  "process.     CT abdomen pelvis redemonstration of uterine mass as well as lesions in the liver in keeping with known metastatic disease     She received 2L of IV fluids, 1 dose of vancomycin and 1 dose of ceftazidime in the ED.\"    Hospital Course  Alondra was admitted for management of severe neutropenia with Tmax 100.2F .  She was initially admitted to medicine service and subsequently transferred to gynecology oncology service. Infectious disease was consulted. She was started on broad spectrum antibiotics which were tailored per ID to Cefepime 2g IV q8hrs. Blood cultures resulted no growth at time of discharge.  Work up also revealed RUE DVT at site of PICC Line. She was started on therapeutic lovenox and ultimately her PICC line was removed. On 2/8/23 IR placed right sided IJ chest port for continued chemotherapy access. Alondra continued to receive routine chemotherapy with dose of Etoposide, Dactinomycin, and Methotrexate 1/30/23-2/2/23 and Etoposide/Cisplatin 2/6/23. She received Filgastrim 2/7 and 2/8. ANC was trended closely in-house.  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  Her hemoglobin was 7.6 on 2/8 for which she received 1 unit packed red blood cells.  Other chronic medical conditions were monitored and stable in house.    At time of discharge Alondra is to start Eliquis 5 mg twice daily for treatment of DVT.  This was sent to her pharmacy and submitted by case management, confirmed a $0 co-pay.  She was to take per infectious disease Levaquin 750 mg p.o. daily for 2 days to complete 7-day course of discharge.  Alondra will present to [facility] tomorrow for injection of filgrastim.  She will return to [facility] on Sunday, 2/12 for admission for next dose of chemotherapy.      Discharge Orders     Medication List      START taking these medications    apixaban 5 mg tablet  Commonly known as: ELIQUIS  Take 1 tablet (5 mg total) by mouth 2 (two) times a day.  Dose: 5 mg     levoFLOXacin 750 " mg tablet  Commonly known as: LEVAQUIN  Start taking on: February 9, 2023  Take 1 tablet (750 mg total) by mouth daily for 2 days.  Dose: 750 mg     promethazine 12.5 mg tablet  Commonly known as: PHENERGAN  Take 1 tablet (12.5 mg total) by mouth every 6 (six) hours as needed for nausea or vomiting.  Dose: 12.5 mg        CONTINUE taking these medications    benzonatate 100 mg capsule  Commonly known as: TESSALON  Take 100 mg by mouth 3 (three) times a day as needed for cough.  Dose: 100 mg     buPROPion  mg 24 hr tablet  Commonly known as: WELLBUTRIN XL  Take 150 mg by mouth nightly.  Dose: 150 mg     magic mouthwash  Commonly known as: BENADRYL-MAALOX-CARAFATE  Swish and spit 15 mL every 4 (four) hours as needed for stomatitis.  Dose: 15 mL     magic mouthwash with nystatin  Commonly known as: BENADRYL-MAALOX-LIDOCAINE-NYSTATIN  Swish and spit 15 mL every 4 (four) hours as needed for stomatitis.  Dose: 15 mL     methIMAzole 5 mg tablet  Commonly known as: TAPAZOLE  Take 2.5 mg by mouth daily.  Dose: 2.5 mg     metoprolol tartrate 25 mg tablet  Commonly known as: LOPRESSOR  Take 0.5 tablets (12.5 mg total) by mouth daily with breakfast.  Dose: 12.5 mg     ondansetron ODT 4 mg disintegrating tablet  Commonly known as: ZOFRAN-ODT  Take 1-2 tablets (4-8 mg total) by mouth every 6 (six) hours as needed for nausea or vomiting (Nausea/Vomiting). Maximum 32mg per day.  Dose: 4-8 mg     prochlorperazine 5 mg tablet  Commonly known as: COMPAZINE  Take 1-2 tablets (5-10 mg total) by mouth every 6 (six) hours as needed for nausea or vomiting. This medication can make you sleepy.  Dose: 5-10 mg     protriptyline 5 mg tablet  Commonly known as: VIVACTIL  Take 3 tablets (15 mg total) by mouth daily.  Dose: 15 mg              Instructions for after discharge     Treatment conditions      Ok to treat if:  ANC is GREATER THAN 1,500/mm3.  PLT count is GREATER THAN or EQUAL ,000/mm3.   bilirubin is LESS THAN or EQUAL TO  1.2 mg/dL.  AST/ALT is LESS THAN or EQUAL TO 2X the upper limit of normal.          Outpatient Follow-Ups            Tomorrow Alice Hodge MD [facility] at [facility]    Tomorrow Chair 7; [facility]    In 2 days PH CC CHAIR 2; [facility]    In 1 week PH CC CHAIR 2; [facility]    In 1 week Chair 9; [facility]    In 1 week PH CC CHAIR 2; [facility]    In 1 week Chair 7; [facility]    In 2 weeks Chair 8; [facility]    In 2 weeks PH CC CHAIR 6; [facility]        Referrals:  No orders of the defined types were placed in this encounter.      Active Issues Requiring Follow-up  Issue: GTN, Severe neutropenia  What is Needed: Continue follow-up with Dr. Mcmullen.       Test Results Pending at Discharge  Unresulted Labs (From admission, onward)     Start     Ordered    02/05/23 0600  Basic metabolic panel  Daily      Question:  Release to patient  Answer:  Immediate   Start Status   02/09/23 0600 Scheduled   02/10/23 0600 Scheduled   02/11/23 0600 Scheduled       02/04/23 1838    02/05/23 0600  CBC and differential  Daily      Question:  Release to patient  Answer:  Immediate   Start Status   02/09/23 0600 Scheduled   02/10/23 0600 Scheduled   02/11/23 0600 Scheduled       02/04/23 1838                Discharge Disposition  Disposition: Home   Destination: Home      Code Status at Discharge: Full Code  Physician Order for Life-Sustaining Treatment Document Status      No documents found

## 2023-02-08 NOTE — PROGRESS NOTES
Gynecology Progress Note    Subjective     Interval History: none.     Patient seen and examined at bedside this AM. No acute events overnight. No events per telemetry. She has no complaints. Denies fevers, chills, headache, chest pain, dyspnea, abdominal pain, pelvic pain. Admits to nausea that is relieved with Zofran. Denies emesis. Last BM two days ago. Is tolerating PO and ambulating independently.     Objective     Vital signs in last 24 hours:  Temp:  [36.3 °C (97.4 °F)-36.4 °C (97.6 °F)] 36.4 °C (97.5 °F)  Heart Rate:  [] 73  Resp:  [18] 18  BP: (108-119)/(65-71) 112/68      Exam  General Appearance: Alert, cooperative, no acute distress  Lungs: Clear to auscultation bilaterally, respirations unlabored  Heart: Regular rate and rhythm, +S1S  Abdomen: +BS, soft, nontender, nondistended, no rebound/rigidity/guarding  Extremities: no edema or calf tenderness; RUE PICC in place w/ dressing C/D/I, no erythema or warmth, mildly edematous upper arm    Labs  Results from last 7 days   Lab Units 02/07/23  0422 02/06/23  0553 02/05/23  0603   WBC K/uL 0.44* 1.14* 1.39*   HEMOGLOBIN g/dL 8.6* 7.5* 8.1*   HEMATOCRIT % 25.1* 21.9* 24.6*   PLATELETS K/uL 58* 64* 79*      Results from last 7 days   Lab Units 02/07/23  0422 02/06/23  0553 02/05/23  0603   SODIUM mEQ/L 136 134* 133*   POTASSIUM mEQ/L 4.3 4.1 4.1   CHLORIDE mEQ/L 111* 108 104   CO2 mEQ/L 18* 19* 23   BUN mg/dL 5* 5* 5*   CREATININE mg/dL 0.6 0.8 0.7   GLUCOSE mg/dL 162* 79 82   CALCIUM mg/dL 8.3* 8.0* 7.9*      Microbiology Results     Procedure Component Value Units Date/Time    Urine culture Urine, Clean Catch [[id number]] Collected: 02/04/23 1348    Specimen: Urine, Clean Catch Updated: 02/06/23 0825     Urine Culture Probable contaminants, suggest recollection      >=100,000 cfu/mL Mixed Growth    Blood Culture Blood, Venous [[id number]]  (Normal) Collected: 02/04/23 1346    Specimen: Blood, Venous Updated: 02/07/23 1601     Culture No growth at 72  hours    Blood Culture Blood, Venous [[id number]]  (Normal) Collected: 23 1346    Specimen: Blood, Venous Updated: 23 1601     Culture No growth at 72 hours          Assessment/Plan     Alondra Villela is a 32 y.o.  with stage IV gestational trophoblastic neoplasia, admitted for management neutropenic fever and RUE DVT.    1. AFVSS  2. GTN:  a. S/p EP induction chemotherapy 23-23 and 23-23.   b. S/p Etoposide, Dactinomycin, and Methotrexate 23-23.   c. S/p Etoposide/Cisplatin 23.   d. Continue supportive care.   e. Plan for PORT access today with IR.   3. Severe neutropenia:   a. ANC 0.08 yesterday, continue to monitor with daily labs.   b. Continue Neutropenic precautions.   c. S/p Filgastrim 23. Plan for dose of Filgastrim today. Continue Filgastrim 480 mcg on non-chemotherapy days.  d. Blood cultures 23 NGTD; Urine Cx contaminant.  e. Appreciate ID consult, continue Cefepime 2g q8hr per ID recommendations.   f. Likely initial fever related to PICC thrombus.   4. RUE DVT:   a. US doppler 23 +thrombus in right brachial and basilic vein.   b. Managed therapeutic Lovenox 70 mg BID in house, PM dose  HELD for IR procedure today.   c. Plan for PORT access today with IR; PICC line to be removed at this time.   5. Hyperthyroidism:   a. Stable, continue home medications methimazole 5 mg PO qd and lopressor 12.5 mg PO qd.   6. Depression:   a. Stable, continue home medication Wellbutrin  mg PO qd and Amitriptyline 10 mg PO qd.    Anticipate discharge today after PORT placement, removal of PICC line, and administration of Filgastrim. Alondra is in agreement of this plan.     To be discussed with Dr. Mcmullen.     Lay Shipman, DO   Please page GYN at #[phone] for questions/concerns.

## 2023-02-08 NOTE — PROGRESS NOTES
Met patient in person today as inpatient on 3SW, patient's  is present and has been staying. Patient is in good spirits. She is scheduled for port placement in IR today. PICC line will be removed. She is post chemo from Monday. No current SE however patient is neutropenic (ANC 0.09-2/7/23) and was admitted on Saturday w/ fevers.   Patient asks if she may be able to get the zarxio for home use as she is hoping to have less days of travel and less time at the hospital. Navigator s/w [employer] RN in Dr. Mcmullen's office - zarzio capitated by insurance for outpatient use until 2/23/23. [employer] will advocate for the zarxio to be done @ home after this date.

## 2023-02-09 ENCOUNTER — HOSPITAL ENCOUNTER (OUTPATIENT)
Dept: HEMATOLOGY/ONCOLOGY | Facility: HOSPITAL | Age: 33
Setting detail: INFUSION SERIES
Discharge: HOME | End: 2023-02-09
Attending: OBSTETRICS & GYNECOLOGY
Payer: COMMERCIAL

## 2023-02-09 VITALS — DIASTOLIC BLOOD PRESSURE: 62 MMHG | SYSTOLIC BLOOD PRESSURE: 100 MMHG | TEMPERATURE: 97.2 F | HEART RATE: 109 BPM

## 2023-02-09 DIAGNOSIS — O01.9 GESTATIONAL TROPHOBLASTIC NEOPLASM: Primary | ICD-10-CM

## 2023-02-09 DIAGNOSIS — Z51.11 ENCOUNTER FOR ANTINEOPLASTIC CHEMOTHERAPY: ICD-10-CM

## 2023-02-09 PROCEDURE — 96372 THER/PROPH/DIAG INJ SC/IM: CPT

## 2023-02-09 PROCEDURE — 63600000 HC DRUGS/DETAIL CODE: Mod: JG | Performed by: STUDENT IN AN ORGANIZED HEALTH CARE EDUCATION/TRAINING PROGRAM

## 2023-02-09 RX ORDER — FAMOTIDINE 10 MG/ML
20 INJECTION INTRAVENOUS ONCE AS NEEDED
Status: CANCELLED | OUTPATIENT
Start: 2023-02-15

## 2023-02-09 RX ORDER — LEUCOVORIN CALCIUM 5 MG/1
15 TABLET ORAL
Status: CANCELLED | OUTPATIENT
Start: 2023-02-14

## 2023-02-09 RX ORDER — PALONOSETRON 0.05 MG/ML
250 INJECTION, SOLUTION INTRAVENOUS ONCE
Status: CANCELLED | OUTPATIENT
Start: 2023-02-21

## 2023-02-09 RX ORDER — DIPHENHYDRAMINE HCL 50 MG/ML
25 VIAL (ML) INJECTION ONCE AS NEEDED
Status: CANCELLED | OUTPATIENT
Start: 2023-02-21

## 2023-02-09 RX ORDER — DIPHENHYDRAMINE HCL 50 MG/ML
25 VIAL (ML) INJECTION ONCE AS NEEDED
Status: CANCELLED | OUTPATIENT
Start: 2023-02-15

## 2023-02-09 RX ORDER — FAMOTIDINE 10 MG/ML
20 INJECTION INTRAVENOUS ONCE AS NEEDED
Status: CANCELLED | OUTPATIENT
Start: 2023-02-14

## 2023-02-09 RX ORDER — FAMOTIDINE 10 MG/ML
20 INJECTION INTRAVENOUS ONCE AS NEEDED
Status: CANCELLED | OUTPATIENT
Start: 2023-02-13

## 2023-02-09 RX ORDER — SODIUM CHLORIDE 9 MG/ML
500 INJECTION, SOLUTION INTRAVENOUS CONTINUOUS
Status: CANCELLED | OUTPATIENT
Start: 2023-02-21

## 2023-02-09 RX ORDER — DIPHENHYDRAMINE HCL 50 MG/ML
25 VIAL (ML) INJECTION ONCE AS NEEDED
Status: CANCELLED | OUTPATIENT
Start: 2023-02-13

## 2023-02-09 RX ORDER — LIDOCAINE AND PRILOCAINE 25; 25 MG/G; MG/G
CREAM TOPICAL AS NEEDED
Qty: 1 EACH | Refills: 1 | Status: SHIPPED | OUTPATIENT
Start: 2023-02-09 | End: 2023-07-31 | Stop reason: SDUPTHER

## 2023-02-09 RX ORDER — LEUCOVORIN CALCIUM 5 MG/1
15 TABLET ORAL
Status: CANCELLED | OUTPATIENT
Start: 2023-02-15

## 2023-02-09 RX ORDER — FAMOTIDINE 10 MG/ML
20 INJECTION INTRAVENOUS ONCE AS NEEDED
Status: CANCELLED | OUTPATIENT
Start: 2023-02-21

## 2023-02-09 RX ORDER — EPINEPHRINE 1 MG/ML
0.5 INJECTION, SOLUTION INTRAMUSCULAR; SUBCUTANEOUS ONCE AS NEEDED
Status: CANCELLED | OUTPATIENT
Start: 2023-02-15

## 2023-02-09 RX ORDER — EPINEPHRINE 1 MG/ML
0.5 INJECTION, SOLUTION INTRAMUSCULAR; SUBCUTANEOUS ONCE AS NEEDED
Status: CANCELLED | OUTPATIENT
Start: 2023-02-21

## 2023-02-09 RX ORDER — EPINEPHRINE 1 MG/ML
0.5 INJECTION, SOLUTION INTRAMUSCULAR; SUBCUTANEOUS ONCE AS NEEDED
Status: CANCELLED | OUTPATIENT
Start: 2023-02-14

## 2023-02-09 RX ORDER — EPINEPHRINE 1 MG/ML
0.5 INJECTION, SOLUTION INTRAMUSCULAR; SUBCUTANEOUS ONCE AS NEEDED
Status: CANCELLED | OUTPATIENT
Start: 2023-02-13

## 2023-02-09 RX ORDER — DIPHENHYDRAMINE HCL 50 MG/ML
25 VIAL (ML) INJECTION ONCE AS NEEDED
Status: CANCELLED | OUTPATIENT
Start: 2023-02-14

## 2023-02-09 RX ADMIN — FILGRASTIM-SNDZ 480 MCG: 480 INJECTION, SOLUTION INTRAVENOUS; SUBCUTANEOUS at 15:02

## 2023-02-09 NOTE — PLAN OF CARE
Plan of Care Review  Plan of Care Reviewed With: patient, spouse  Progress: improving  Outcome Summary: pt s/p port placement in IR today.  Filgrastim given x1 and lovenox given as per orders. pt with anxiety after port placement, ativan given.  pt for d/c tonight after 1 unit PRBCs finished.  PRBCs trasnfusing now @ 160cc/hr via [facility] [place].    and mom @ bedside.

## 2023-02-09 NOTE — PROGRESS NOTES
Pt here for Zarxio injection.  Zarxio sq given to back of right arm without incident.  AVS declined. Pt left ambulatory.    [Fever] : fever [Abdominal Pain] : abdominal pain [Negative] : Genitourinary [Vomiting] : no vomiting [Diarrhea] : no diarrhea [Constipation] : no constipation

## 2023-02-09 NOTE — NURSING NOTE
2115-Blood transfusion finished, pt tolerated well. VSS.  Phenergan given per pt's request. Pt denies pain or discomfort at this time.   Discharge instructions discussed with both pt and , all questions answered.   IV removed.   2155-Pt discharged home with all belongings.

## 2023-02-10 ENCOUNTER — HOSPITAL ENCOUNTER (OUTPATIENT)
Dept: HEMATOLOGY/ONCOLOGY | Facility: HOSPITAL | Age: 33
Discharge: HOME | End: 2023-02-10
Attending: OBSTETRICS & GYNECOLOGY
Payer: COMMERCIAL

## 2023-02-10 ENCOUNTER — APPOINTMENT (OUTPATIENT)
Dept: LAB | Facility: HOSPITAL | Age: 33
End: 2023-02-10
Attending: PHYSICIAN ASSISTANT
Payer: COMMERCIAL

## 2023-02-10 VITALS
DIASTOLIC BLOOD PRESSURE: 55 MMHG | RESPIRATION RATE: 18 BRPM | SYSTOLIC BLOOD PRESSURE: 101 MMHG | HEART RATE: 104 BPM | TEMPERATURE: 97.8 F

## 2023-02-10 DIAGNOSIS — O01.9 GESTATIONAL TROPHOBLASTIC NEOPLASM: ICD-10-CM

## 2023-02-10 DIAGNOSIS — O01.9 GESTATIONAL TROPHOBLASTIC NEOPLASM: Primary | ICD-10-CM

## 2023-02-10 DIAGNOSIS — Z51.11 ENCOUNTER FOR ANTINEOPLASTIC CHEMOTHERAPY: ICD-10-CM

## 2023-02-10 LAB
ALBUMIN SERPL-MCNC: 3.9 G/DL (ref 3.4–5)
ALP SERPL-CCNC: 115 IU/L (ref 35–126)
ALT SERPL-CCNC: 21 IU/L (ref 11–54)
ANION GAP SERPL CALC-SCNC: 8 MEQ/L (ref 3–15)
AST SERPL-CCNC: 13 IU/L (ref 15–41)
BASOPHILS # BLD: 0.02 K/UL (ref 0.01–0.1)
BASOPHILS NFR BLD: 2 %
BILIRUB SERPL-MCNC: 0.4 MG/DL (ref 0.3–1.2)
BUN SERPL-MCNC: 14 MG/DL (ref 8–20)
CALCIUM SERPL-MCNC: 8.7 MG/DL (ref 8.9–10.3)
CHLORIDE SERPL-SCNC: 105 MEQ/L (ref 98–109)
CO2 SERPL-SCNC: 21 MEQ/L (ref 22–32)
CREAT SERPL-MCNC: 1 MG/DL (ref 0.6–1.1)
CROSSMATCH: NORMAL
DACRYOCYTES BLD QL SMEAR: ABNORMAL
DIFFERENTIAL METHOD BLD: ABNORMAL
EOSINOPHIL # BLD: 0.02 K/UL (ref 0.04–0.36)
EOSINOPHIL NFR BLD: 2 %
ERYTHROCYTE [DISTWIDTH] IN BLOOD BY AUTOMATED COUNT: 12.2 % (ref 11.7–14.4)
GFR SERPL CREATININE-BSD FRML MDRD: >60 ML/MIN/1.73M*2
GIANT PLATELETS BLD QL SMEAR: ABNORMAL
GLUCOSE SERPL-MCNC: 97 MG/DL (ref 70–99)
HCG SERPL-ACNC: 4706 IU/L (MIU/ML)
HCT VFR BLDCO AUTO: 33.1 % (ref 35–45)
HGB BLD-MCNC: 11.5 G/DL (ref 11.8–15.7)
ISBT CODE: 7300
LYMPHOCYTES # BLD: 0.81 K/UL (ref 1.2–3.5)
LYMPHOCYTES NFR BLD: 78 %
MAGNESIUM SERPL-MCNC: 1.8 MG/DL (ref 1.8–2.5)
MANUAL DIF COMMENT BLD-IMP: ABNORMAL
MCH RBC QN AUTO: 27.4 PG (ref 28–33.2)
MCHC RBC AUTO-ENTMCNC: 34.7 G/DL (ref 32.2–35.5)
MCV RBC AUTO: 78.8 FL (ref 83–98)
MONOCYTES # BLD: 0.08 K/UL (ref 0.28–0.8)
MONOCYTES NFR BLD: 8 %
NEUTROPHILS # BLD: 0.08 K/UL (ref 1.7–7)
NEUTS BAND # BLD: 0.08 K/UL (ref 1.7–7)
NEUTS SEG NFR BLD: 8 %
OVALOCYTES BLD QL SMEAR: ABNORMAL
PDW BLD AUTO: 9.7 FL (ref 9.4–12.3)
PLATELET # BLD AUTO: 82 K/UL (ref 150–369)
PLATELET # BLD EST: ABNORMAL 10*3/UL
POTASSIUM SERPL-SCNC: 3.3 MEQ/L (ref 3.6–5.1)
PRODUCT CODE: NORMAL
PRODUCT STATUS: NORMAL
PROT SERPL-MCNC: 7.6 G/DL (ref 6–8.2)
RBC # BLD AUTO: 4.2 M/UL (ref 3.93–5.22)
SODIUM SERPL-SCNC: 134 MEQ/L (ref 136–144)
SPECIMEN EXP DATE BLD: NORMAL
UNIT ABO: NORMAL
UNIT ID: NORMAL
UNIT RH: POSITIVE
VARIANT LYMPHS # BLD MANUAL: 0.02 K/UL
VARIANT LYMPHS NFR BLD: 2 %
WBC # BLD AUTO: 1.04 K/UL (ref 3.8–10.5)

## 2023-02-10 PROCEDURE — 63600000 HC DRUGS/DETAIL CODE: Mod: JG | Performed by: STUDENT IN AN ORGANIZED HEALTH CARE EDUCATION/TRAINING PROGRAM

## 2023-02-10 PROCEDURE — 83735 ASSAY OF MAGNESIUM: CPT

## 2023-02-10 PROCEDURE — 36415 COLL VENOUS BLD VENIPUNCTURE: CPT

## 2023-02-10 PROCEDURE — 82040 ASSAY OF SERUM ALBUMIN: CPT

## 2023-02-10 PROCEDURE — 85025 COMPLETE CBC W/AUTO DIFF WBC: CPT

## 2023-02-10 PROCEDURE — 84702 CHORIONIC GONADOTROPIN TEST: CPT

## 2023-02-10 PROCEDURE — 96372 THER/PROPH/DIAG INJ SC/IM: CPT

## 2023-02-10 PROCEDURE — 3E01305 INTRODUCTION OF OTHER ANTINEOPLASTIC INTO SUBCUTANEOUS TISSUE, PERCUTANEOUS APPROACH: ICD-10-PCS | Performed by: STUDENT IN AN ORGANIZED HEALTH CARE EDUCATION/TRAINING PROGRAM

## 2023-02-10 RX ADMIN — FILGRASTIM-SNDZ 480 MCG: 480 INJECTION, SOLUTION INTRAVENOUS; SUBCUTANEOUS at 14:47

## 2023-02-10 ASSESSMENT — ENCOUNTER SYMPTOMS
HEMATOLOGIC/LYMPHATIC NEGATIVE: 1
PSYCHIATRIC NEGATIVE: 1
ENDOCRINE NEGATIVE: 1
CONSTITUTIONAL NEGATIVE: 1
GASTROINTESTINAL NEGATIVE: 1
MUSCULOSKELETAL NEGATIVE: 1
EYES NEGATIVE: 1
RESPIRATORY NEGATIVE: 1
CARDIOVASCULAR NEGATIVE: 1
NEUROLOGICAL NEGATIVE: 1

## 2023-02-10 ASSESSMENT — PAIN SCALES - GENERAL: PAINLEVEL: 0-NO PAIN

## 2023-02-10 NOTE — PROGRESS NOTES
Review of Systems   Constitutional: Negative.    HENT:  Negative.    Eyes: Negative.    Respiratory: Negative.    Cardiovascular: Negative.    Gastrointestinal: Negative.    Endocrine: Negative.    Genitourinary: Negative.     Musculoskeletal: Negative.    Skin: Negative.    Neurological: Negative.    Hematological: Negative.    Psychiatric/Behavioral: Negative.    Pt received Zarxio injection in [facility].

## 2023-02-10 NOTE — TELEPHONE ENCOUNTER
Called pt- spouse answered - pt currently at [facility] getting blood drawn, pt's  reports pt is a difficult stick and was wondering if they cannot get the sample, can she have stat labs when admitted Sunday in prep for chemo Monday morning. During phone call,  confirmed they were able to get blood, so should not be a problem.

## 2023-02-10 NOTE — TELEPHONE ENCOUNTER
Pt would like someone to call her back she has some questions about the blood work and labs that she needs done today.

## 2023-02-12 ENCOUNTER — HOSPITAL ENCOUNTER (INPATIENT)
Facility: HOSPITAL | Age: 33
LOS: 2 days | Discharge: HOME | DRG: 847 | End: 2023-02-14
Attending: STUDENT IN AN ORGANIZED HEALTH CARE EDUCATION/TRAINING PROGRAM | Admitting: STUDENT IN AN ORGANIZED HEALTH CARE EDUCATION/TRAINING PROGRAM
Payer: COMMERCIAL

## 2023-02-12 DIAGNOSIS — O01.9 GESTATIONAL TROPHOBLASTIC NEOPLASM: Primary | ICD-10-CM

## 2023-02-12 PROCEDURE — 63700000 HC SELF-ADMINISTRABLE DRUG: Performed by: STUDENT IN AN ORGANIZED HEALTH CARE EDUCATION/TRAINING PROGRAM

## 2023-02-12 PROCEDURE — 21400000 HC ROOM AND CARE CCU/INTERMEDIATE

## 2023-02-12 RX ORDER — DEXTROSE 40 %
15-30 GEL (GRAM) ORAL AS NEEDED
Status: DISCONTINUED | OUTPATIENT
Start: 2023-02-12 | End: 2023-02-14 | Stop reason: HOSPADM

## 2023-02-12 RX ORDER — IBUPROFEN 200 MG
16-32 TABLET ORAL AS NEEDED
Status: DISCONTINUED | OUTPATIENT
Start: 2023-02-12 | End: 2023-02-14 | Stop reason: HOSPADM

## 2023-02-12 RX ORDER — ACETAMINOPHEN 325 MG/1
650 TABLET ORAL EVERY 6 HOURS PRN
Status: DISCONTINUED | OUTPATIENT
Start: 2023-02-12 | End: 2023-02-14 | Stop reason: HOSPADM

## 2023-02-12 RX ORDER — METHIMAZOLE 5 MG/1
5 TABLET ORAL DAILY
Status: DISCONTINUED | OUTPATIENT
Start: 2023-02-13 | End: 2023-02-13

## 2023-02-12 RX ORDER — DEXTROSE 50 % IN WATER (D50W) INTRAVENOUS SYRINGE
25 AS NEEDED
Status: DISCONTINUED | OUTPATIENT
Start: 2023-02-12 | End: 2023-02-14 | Stop reason: HOSPADM

## 2023-02-12 RX ORDER — BENZONATATE 100 MG/1
200 CAPSULE ORAL 3 TIMES DAILY PRN
Status: DISCONTINUED | OUTPATIENT
Start: 2023-02-12 | End: 2023-02-14 | Stop reason: HOSPADM

## 2023-02-12 RX ORDER — BUPROPION HYDROCHLORIDE 150 MG/1
150 TABLET ORAL EVERY EVENING
Status: DISCONTINUED | OUTPATIENT
Start: 2023-02-12 | End: 2023-02-14 | Stop reason: HOSPADM

## 2023-02-12 RX ORDER — BUPROPION HYDROCHLORIDE 150 MG/1
150 TABLET ORAL DAILY
Status: DISCONTINUED | OUTPATIENT
Start: 2023-02-13 | End: 2023-02-12

## 2023-02-12 RX ADMIN — APIXABAN 5 MG: 5 TABLET, FILM COATED ORAL at 19:55

## 2023-02-12 RX ADMIN — ACETAMINOPHEN 650 MG: 325 TABLET ORAL at 22:26

## 2023-02-12 RX ADMIN — BENZONATATE 200 MG: 100 CAPSULE ORAL at 22:26

## 2023-02-12 RX ADMIN — BUPROPION HYDROCHLORIDE 150 MG: 150 TABLET, EXTENDED RELEASE ORAL at 21:03

## 2023-02-12 NOTE — H&P
Gynecology History and Physical    HPI     Alondra Villela is a 32 y.o.  with gestational trophoblastic neoplasia who is presenting as direct admission for chemotherapy.      To review as history discussed in past: patient had an uncomplicated RCS 2022. Patient presented to [facility] on 1/10/23 with SOB/tachycardia and anemia. Her beta-hcG was >461,700 (on adjusted assay). CT 23 showed pulmonary and liver metastases. There was initial concern for brain metastases, however, this was ruled out on further imaging. WHO score 17.     PICC line was placed. She received chemotherapy induction with EP on - and then -. S/p Etoposide, Dactinomycin, and Methotrexate on 23-23. S/p etoposide/ cisplatin  23.    Most recent beta-hcg 4706. Of note, patient received neupogen for ANC 1400. Plan for EMA-EP for 3 cycles past negative hcg levels.     Her PICC line was removed her last admission due to an associated DVT and she had a port placed on 23.     She was also anemic on presentation and received 2u pRBCs. In terms of her thyroid storm, Endocrinology consulted. Patient initially started on PTU which was transitioned to methimazole on discharge. She was also discharged home on metoprolol.      Patient reports she is feeling well today.  She reports a continued dry cough, that is improved with the Tessalon capsules. Patient otherwise denies any chest pain, fevers, chills, dyspnea, nausea, vomiting, lightheadedness/dizziness.     OB History:   OB History    Para Term  AB Living   2 2 2 0 0 2   SAB IAB Ectopic Multiple Live Births   0 0 0 0 2      # Outcome Date GA Lbr Kelvin/2nd Weight Sex Delivery Anes PTL Lv   2 Term            1 Term                Medical History:   Past Medical History:   Diagnosis Date   • Cervical cancer (CMS/HCC)    • Hx antineoplastic chemo        Surgical History:   Past Surgical History:   Procedure Laterality Date   •  SECTION       x2       Social History:   Social History     Socioeconomic History   • Marital status:    Tobacco Use   • Smoking status: Never   • Smokeless tobacco: Never   Vaping Use   • Vaping Use: Never used   Substance and Sexual Activity   • Alcohol use: Not Currently   • Drug use: Never   • Sexual activity: Not Currently     Social Determinants of Health     Food Insecurity: No Food Insecurity   • Worried About Running Out of Food in the Last Year: Never true   • Ran Out of Food in the Last Year: Never true        Family History:   Family History   Problem Relation Age of Onset   • Hypertension Biological Mother    • Hypertension Biological Father        Allergies: Patient has no known allergies.    Prior to Admission medications    Medication Sig Start Date End Date Taking? Authorizing Provider   apixaban (ELIQUIS) 5 mg tablet Take 1 tablet (5 mg total) by mouth 2 (two) times a day. 2/8/23 3/10/23  Lay Shipman DO   benzonatate (TESSALON) 100 mg capsule Take 100 mg by mouth 3 (three) times a day as needed for cough.    ProviderRicardo MD   buPROPion XL (WELLBUTRIN XL) 150 mg 24 hr tablet Take 150 mg by mouth nightly. 12/15/22   ProviderRicardo MD   lidocaine-prilocaine (EMLA) cream Apply topically as needed for mild pain. Apply a quarter size amount to skin over port site approximately one hour prior to chemotherapy. Cover with saran wrap. 2/9/23   Temitope Peres PA C   magic mouthwash (BENADRYL-MAALOX-CARAFATE) Swish and spit 15 mL every 4 (four) hours as needed for stomatitis. 1/27/23   Estela Bell PA C   magic mouthwash with nystatin (BENADRYL-MAALOX-LIDOCAINE-NYSTATIN) Swish and spit 15 mL every 4 (four) hours as needed for stomatitis. 1/27/23   Lorena Hogue MD   methIMAzole (TAPAZOLE) 5 mg tablet Take 2.5 mg by mouth daily.    ProviderRicardo MD   metoprolol tartrate (LOPRESSOR) 25 mg tablet Take 0.5 tablets (12.5 mg total) by mouth daily with breakfast.  1/21/23 2/20/23  Artur Macias MD   ondansetron ODT (ZOFRAN-ODT) 4 mg disintegrating tablet Take 1-2 tablets (4-8 mg total) by mouth every 6 (six) hours as needed for nausea or vomiting (Nausea/Vomiting). Maximum 32mg per day. 1/21/23   Artur Macias MD   prochlorperazine (COMPAZINE) 5 mg tablet Take 1-2 tablets (5-10 mg total) by mouth every 6 (six) hours as needed for nausea or vomiting. This medication can make you sleepy. 1/21/23   Artur Macias MD   promethazine (PHENERGAN) 12.5 mg tablet Take 1 tablet (12.5 mg total) by mouth every 6 (six) hours as needed for nausea or vomiting. 2/8/23 3/10/23  Lay Shipman,    protriptyline (VIVACTIL) 5 mg tablet Take 3 tablets (15 mg total) by mouth daily. 1/21/23 2/20/23  Artur Macias MD       Review of Systems  Pertinent items are noted in HPI.    Objective     Vital Signs for the last 24 hours:  Temp:  [36.7 °C (98.1 °F)] 36.7 °C (98.1 °F)  Heart Rate:  [82] 82  Resp:  [15] 15  BP: (111)/(65) 111/65    Physical Exam:    General: No acute distress, comfortable  Cardiovascular: Regular rate and rhythm   Lungs: Clear to auscultation bilaterally  Abdomen: Soft, bowel sounds auscultated, non-tender to palpation  Extremities: No swelling bilaterally      Labs:  CBC Results       02/10/23 02/08/23 02/07/23     1418 0842 0422    WBC 1.04 1.08 0.44    RBC 4.20 2.83 3.14    HGB 11.5 7.6 8.6    HCT 33.1 22.3 25.1    MCV 78.8 78.8 79.9    MCH 27.4 26.9 27.4    MCHC 34.7 34.1 34.3    PLT 82 54 58         Comment for WBC at 1418 on 02/10/23: ALL RESULTS HAVE BEEN CHECKED. This result has been called to ROXY HAWK by Ulisses Vieira on 02 10 23 at 15:02, and has been read back.     Comment for WBC at 0842 on 02/08/23: ALL RESULTS HAVE BEEN CHECKED. CONSISTENT WITH PREVIOUS RESULTS. This result has been called to ULISSES GARDUNO by PARMINDER BAKER on 02 08 23 at 09:34, and has been read back.     Comment for WBC at 0422 on 02/07/23: ALL RESULTS HAVE BEEN CHECKED. This result has been  called to SAMANTHA PRINGLE by Pollo Dias on 23 at 07:18, and has been read back.     Comment for HGB at 1418 on 02/10/23: RESULT CHECKED    Comment for PLT at 1418 on 02/10/23: ALL RESULTS HAVE BEEN RECHECKED. SPECIMEN QUALITY CHECKED    Comment for PLT at 0842 on 23: CONSISTENT WITH PREVIOUS RESULTS    Comment for PLT at 0422 on 23: RESULTS CHECKED. CONSISTENT WITH PREVIOUS RESULTS        CMP Results       02/10/23 02/08/23 02/07/23     1418 0842 0422     136 136    K 3.3 3.9 4.3    Cl 105 109 111    CO2 21 16 18    Glucose 97 72 162    BUN 14 10 5    Creatinine 1.0 0.8 0.6    Calcium 8.7 7.9 8.3    Anion Gap 8 11 7    AST 13 -- --    ALT 21 -- --    Albumin 3.9 -- --    EGFR >60.0 >60.0 >60.0            Assessment/Plan     Alondra Villela is a 32 y.o. female  admitted for   stage IV gestational trophoblastic neoplasia for chemotherapy.     1. Severe neutropenia   a. ANC 8 (severe neutropenia) 2/10, follow daily labs, expected from her regimen.   b. Continue strict precautions  c. S/p Zarxio 480 mcg (last given 2/10/23)  d. Plan for all medications through peripheral IV except Chemotherapy goes through Port  e. Repeat CBC with differential tonight  2. GTN   a. S/p EP induction chemotherapy on 23-23 and 23-23  b. S/p Etoposide, Dactinomycin, and Methotrexate on 23-23  c. S/p etoposide/ cisplatin  /  d.  Continue supportive care (tessalon capsules for cough, pain management)  e. Plan for pre-meds in the morning followed by chemotherapy, no need for AM labs  3. Hyperthyroidism   a. Stable, continue home medications methimazole 5 mg PO qd and lopressor 12.5 mg PO qd.   4. Depression      a. Stable, continue home medication Wellbutrin  mg PO qd  5. Hx DVT of Right Upper Extremity  a. Eliquis BID, will continue       Plan of care discussed with Dr. Mcmullen.    Lanette Washington MD

## 2023-02-13 DIAGNOSIS — O01.9 GESTATIONAL TROPHOBLASTIC NEOPLASM: Primary | ICD-10-CM

## 2023-02-13 DIAGNOSIS — Z51.11 ENCOUNTER FOR ANTINEOPLASTIC CHEMOTHERAPY: ICD-10-CM

## 2023-02-13 LAB
ALBUMIN SERPL-MCNC: 3.2 G/DL (ref 3.4–5)
ALP SERPL-CCNC: 95 IU/L (ref 35–126)
ALT SERPL-CCNC: 18 IU/L (ref 11–54)
ANION GAP SERPL CALC-SCNC: 8 MEQ/L (ref 3–15)
AST SERPL-CCNC: 18 IU/L (ref 15–41)
BASOPHILS # BLD: 0.03 K/UL (ref 0.01–0.1)
BASOPHILS NFR BLD: 1 %
BILIRUB SERPL-MCNC: 0.1 MG/DL (ref 0.3–1.2)
BUN SERPL-MCNC: 7 MG/DL (ref 8–20)
CALCIUM SERPL-MCNC: 8.4 MG/DL (ref 8.9–10.3)
CHLORIDE SERPL-SCNC: 107 MEQ/L (ref 98–109)
CO2 SERPL-SCNC: 22 MEQ/L (ref 22–32)
CREAT SERPL-MCNC: 0.7 MG/DL (ref 0.6–1.1)
DACRYOCYTES BLD QL SMEAR: ABNORMAL
DIFFERENTIAL METHOD BLD: ABNORMAL
EOSINOPHIL # BLD: 0.05 K/UL (ref 0.04–0.36)
EOSINOPHIL NFR BLD: 2 %
ERYTHROCYTE [DISTWIDTH] IN BLOOD BY AUTOMATED COUNT: 12.2 % (ref 11.7–14.4)
GFR SERPL CREATININE-BSD FRML MDRD: >60 ML/MIN/1.73M*2
GLUCOSE SERPL-MCNC: 91 MG/DL (ref 70–99)
HCG SERPL-ACNC: 2042 IU/L (MIU/ML)
HCT VFR BLDCO AUTO: 28.9 % (ref 35–45)
HGB BLD-MCNC: 9.6 G/DL (ref 11.8–15.7)
LYMPHOCYTES # BLD: 1.67 K/UL (ref 1.2–3.5)
LYMPHOCYTES NFR BLD: 62 %
MCH RBC QN AUTO: 26.6 PG (ref 28–33.2)
MCHC RBC AUTO-ENTMCNC: 33.2 G/DL (ref 32.2–35.5)
MCV RBC AUTO: 80.1 FL (ref 83–98)
MONOCYTES # BLD: 0.32 K/UL (ref 0.28–0.8)
MONOCYTES NFR BLD: 12 %
MYELOCYTES # BLD MANUAL: 0.03 K/UL
MYELOCYTES NFR BLD MANUAL: 1 %
NEUTS BAND # BLD: 0.56 K/UL (ref 1.7–7)
NEUTS SEG NFR BLD: 21 %
OVALOCYTES BLD QL SMEAR: ABNORMAL
PDW BLD AUTO: 10.1 FL (ref 9.4–12.3)
PLAT MORPH BLD: NORMAL
PLATELET # BLD AUTO: 95 K/UL (ref 150–369)
PLATELET # BLD EST: ABNORMAL 10*3/UL
POTASSIUM SERPL-SCNC: 3.7 MEQ/L (ref 3.6–5.1)
PROT SERPL-MCNC: 5.8 G/DL (ref 6–8.2)
RBC # BLD AUTO: 3.61 M/UL (ref 3.93–5.22)
SODIUM SERPL-SCNC: 137 MEQ/L (ref 136–144)
VARIANT LYMPHS # BLD MANUAL: 0.03 K/UL
VARIANT LYMPHS NFR BLD: 1 %
WBC # BLD AUTO: 2.69 K/UL (ref 3.8–10.5)

## 2023-02-13 PROCEDURE — 25800000 HC PHARMACY IV SOLUTIONS: Performed by: OBSTETRICS & GYNECOLOGY

## 2023-02-13 PROCEDURE — 63600000 HC DRUGS/DETAIL CODE: Performed by: OBSTETRICS & GYNECOLOGY

## 2023-02-13 PROCEDURE — 36415 COLL VENOUS BLD VENIPUNCTURE: CPT | Performed by: STUDENT IN AN ORGANIZED HEALTH CARE EDUCATION/TRAINING PROGRAM

## 2023-02-13 PROCEDURE — 25000000 HC PHARMACY GENERAL: Performed by: OBSTETRICS & GYNECOLOGY

## 2023-02-13 PROCEDURE — 3E04305 INTRODUCTION OF OTHER ANTINEOPLASTIC INTO CENTRAL VEIN, PERCUTANEOUS APPROACH: ICD-10-PCS | Performed by: STUDENT IN AN ORGANIZED HEALTH CARE EDUCATION/TRAINING PROGRAM

## 2023-02-13 PROCEDURE — 80053 COMPREHEN METABOLIC PANEL: CPT | Performed by: STUDENT IN AN ORGANIZED HEALTH CARE EDUCATION/TRAINING PROGRAM

## 2023-02-13 PROCEDURE — 21400000 HC ROOM AND CARE CCU/INTERMEDIATE

## 2023-02-13 PROCEDURE — 99232 SBSQ HOSP IP/OBS MODERATE 35: CPT | Performed by: STUDENT IN AN ORGANIZED HEALTH CARE EDUCATION/TRAINING PROGRAM

## 2023-02-13 PROCEDURE — 85025 COMPLETE CBC W/AUTO DIFF WBC: CPT | Performed by: STUDENT IN AN ORGANIZED HEALTH CARE EDUCATION/TRAINING PROGRAM

## 2023-02-13 PROCEDURE — 63700000 HC SELF-ADMINISTRABLE DRUG: Performed by: STUDENT IN AN ORGANIZED HEALTH CARE EDUCATION/TRAINING PROGRAM

## 2023-02-13 PROCEDURE — 63700000 HC SELF-ADMINISTRABLE DRUG

## 2023-02-13 PROCEDURE — 84702 CHORIONIC GONADOTROPIN TEST: CPT | Performed by: STUDENT IN AN ORGANIZED HEALTH CARE EDUCATION/TRAINING PROGRAM

## 2023-02-13 RX ORDER — FAMOTIDINE 10 MG/ML
20 INJECTION INTRAVENOUS ONCE AS NEEDED
Status: DISCONTINUED | OUTPATIENT
Start: 2023-02-13 | End: 2023-02-14 | Stop reason: SDUPTHER

## 2023-02-13 RX ORDER — EPINEPHRINE 1 MG/ML
0.5 INJECTION, SOLUTION INTRAMUSCULAR; SUBCUTANEOUS ONCE AS NEEDED
Status: DISCONTINUED | OUTPATIENT
Start: 2023-02-13 | End: 2023-02-14 | Stop reason: SDUPTHER

## 2023-02-13 RX ORDER — PROMETHAZINE HYDROCHLORIDE 25 MG/1
12.5 TABLET ORAL EVERY 6 HOURS PRN
Status: DISCONTINUED | OUTPATIENT
Start: 2023-02-13 | End: 2023-02-14 | Stop reason: HOSPADM

## 2023-02-13 RX ORDER — DIPHENHYDRAMINE HCL 50 MG/ML
25 VIAL (ML) INJECTION ONCE AS NEEDED
Status: DISCONTINUED | OUTPATIENT
Start: 2023-02-13 | End: 2023-02-14 | Stop reason: SDUPTHER

## 2023-02-13 RX ORDER — METHIMAZOLE 5 MG/1
2.5 TABLET ORAL DAILY
Status: DISCONTINUED | OUTPATIENT
Start: 2023-02-13 | End: 2023-02-14 | Stop reason: HOSPADM

## 2023-02-13 RX ORDER — METHIMAZOLE 5 MG/1
2.5 TABLET ORAL DAILY
Status: DISCONTINUED | OUTPATIENT
Start: 2023-02-14 | End: 2023-02-13

## 2023-02-13 RX ADMIN — METHOTREXATE 550 MG: 25 INJECTION, SOLUTION INTRA-ARTERIAL; INTRAMUSCULAR; INTRATHECAL; INTRAVENOUS at 13:16

## 2023-02-13 RX ADMIN — PROMETHAZINE HYDROCHLORIDE 12.5 MG: 25 TABLET ORAL at 21:12

## 2023-02-13 RX ADMIN — APIXABAN 5 MG: 5 TABLET, FILM COATED ORAL at 21:12

## 2023-02-13 RX ADMIN — DACTINOMYCIN 0.5 MG: 0.5 INJECTION, POWDER, LYOPHILIZED, FOR SOLUTION INTRAVENOUS at 13:07

## 2023-02-13 RX ADMIN — ETOPOSIDE 184 MG: 20 INJECTION INTRAVENOUS at 11:54

## 2023-02-13 RX ADMIN — BUPROPION HYDROCHLORIDE 150 MG: 150 TABLET, EXTENDED RELEASE ORAL at 17:38

## 2023-02-13 RX ADMIN — METHIMAZOLE 2.5 MG: 5 TABLET ORAL at 11:24

## 2023-02-13 RX ADMIN — APIXABAN 5 MG: 5 TABLET, FILM COATED ORAL at 09:35

## 2023-02-13 RX ADMIN — ONDANSETRON HYDROCHLORIDE: 2 INJECTION, SOLUTION INTRAMUSCULAR; INTRAVENOUS at 11:25

## 2023-02-13 RX ADMIN — ACETAMINOPHEN 650 MG: 325 TABLET ORAL at 23:22

## 2023-02-13 NOTE — DISCHARGE SUMMARY
Inpatient Discharge Summary    BRIEF OVERVIEW  Admitting Provider:  H&P Notes 1/14/2023 to 2/13/2023         Date of Service Author Author Type Status Note Type File Time    02/12/23 1833 Lanette Washington MD Resident Cosign Needed H&P 02/12/23 2018 02/04/23 1807 Annie Dawn DO Resident Attested H&P 02/04/23 223    01/29/23 1523 Thuy Flores MD Resident Signed H&P 01/29/23 1718          Attending Provider: Shantelle Mcmullen MD Attending phys phone: [phone]  Primary Care Physician at Discharge: Sunil Hinton [phone]    Admission Date: 2/12/2023     Discharge Date: 2/14/2023    Primary Discharge Diagnosis  Gestational trophoblastic neoplasm    Secondary Discharge Diagnosis  Active Hospital Problems    Diagnosis Date Noted   • Gestational trophoblastic neoplasm 01/11/2023      Resolved Hospital Problems   No resolved problems to display.       DETAILS OF HOSPITAL STAY    Consult Orders During Admission:  None     Presenting Problem/History of Present Illness  Gestational trophoblastic neoplasm [O01.9]   Neutropenia      Hospital Course  The patient was admitted to receive chemotherapy. She was administered MTX, Etoposide, and Dactinomycin. She tolerated the chemo and was discharged in stable condition.     Discharge Orders     Medication List      CONTINUE taking these medications    apixaban 5 mg tablet  Commonly known as: ELIQUIS  Take 1 tablet (5 mg total) by mouth 2 (two) times a day.  Dose: 5 mg     benzonatate 100 mg capsule  Commonly known as: TESSALON  Take 100 mg by mouth 3 (three) times a day as needed for cough.  Dose: 100 mg     buPROPion  mg 24 hr tablet  Commonly known as: WELLBUTRIN XL  Take 150 mg by mouth nightly.  Dose: 150 mg     lidocaine-prilocaine cream  Commonly known as: EMLA  Apply topically as needed for mild pain. Apply a quarter size amount to skin over port site approximately one hour prior to chemotherapy. Cover with saran wrap.     magic  mouthwash  Commonly known as: BENADRYL-MAALOX-CARAFATE  Swish and spit 15 mL every 4 (four) hours as needed for stomatitis.  Dose: 15 mL     magic mouthwash with nystatin  Commonly known as: BENADRYL-MAALOX-LIDOCAINE-NYSTATIN  Swish and spit 15 mL every 4 (four) hours as needed for stomatitis.  Dose: 15 mL     methIMAzole 5 mg tablet  Commonly known as: TAPAZOLE  Take 2.5 mg by mouth daily.  Dose: 2.5 mg     ondansetron ODT 4 mg disintegrating tablet  Commonly known as: ZOFRAN-ODT  Take 1-2 tablets (4-8 mg total) by mouth every 6 (six) hours as needed for nausea or vomiting (Nausea/Vomiting). Maximum 32mg per day.  Dose: 4-8 mg     prochlorperazine 5 mg tablet  Commonly known as: COMPAZINE  Take 1-2 tablets (5-10 mg total) by mouth every 6 (six) hours as needed for nausea or vomiting. This medication can make you sleepy.  Dose: 5-10 mg     promethazine 12.5 mg tablet  Commonly known as: PHENERGAN  Take 1 tablet (12.5 mg total) by mouth every 6 (six) hours as needed for nausea or vomiting.  Dose: 12.5 mg     protriptyline 5 mg tablet  Commonly known as: VIVACTIL  Take 3 tablets (15 mg total) by mouth daily.  Dose: 15 mg        ASK your doctor about these medications    levoFLOXacin 750 mg tablet  Commonly known as: LEVAQUIN  Take 1 tablet (750 mg total) by mouth daily for 2 days.  Dose: 750 mg  Ask about: Should I take this medication?     metoprolol tartrate 25 mg tablet  Commonly known as: LOPRESSOR  Take 0.5 tablets (12.5 mg total) by mouth daily with breakfast.  Dose: 12.5 mg          Outpatient Follow-Ups            Tomorrow  CC CHAIR 2; NURSE [facility]    In 2 days Chair 8; [facility]    In 3 days Chair 9; [facility]    In 6 days Shantelle Mcmullen MD [facility]    In 1 week PH CC CHAIR 3; [facility]    In 1 week Chair 8; [facility]    In 1 week PH CC CHAIR 6; NURSE [facility]        Active Issues Requiring Follow-up   Follow up for outpatient infusion and care   Attend follow up appointments.     Discharge Disposition  Disposition: stable  Destination: home    Code Status at Discharge: Full Code

## 2023-02-13 NOTE — NURSING NOTE
RN confirmed with GYN team that pt okay to not be on telemetry monitoring. Pt with no IV access and CBC ordered. Both pt and spouse requesting that both be done tomorrow when IV team available as pt has required ultrasound guided access in previous admisions. MD made aware. Okay to hold off until tomorrow AM.

## 2023-02-13 NOTE — PLAN OF CARE
Care Coordination Admission Assessment Note  Date: 2/13/2023   Time: 7:52 AM    Patient Name: Alondra Villela  Medical Record Number: [id number]  YOB: 1990  Room/BED: 0377    General Information  Patient-Specific Goals (Include Timeframe)     PCP: Sunil Hinton MD   Insurance Coverage: [employer]  Readmission Within the last 30 days  planned readmission    Advance Directives (for Healthcare)?                 Information       Living Arrangements  Arrived From: home  Current Living Arrangements: home  People in Home: child(brianna), dependent, spouse  Able to Return to Prior Arrangements: yes    Housing Stability and Financial Resources (SDOH)       Current Outpatient/Agency/Support Group       Type of Current Home Care Services       Assistive Device/Animal Currently Used at Home  none    Functional Status Comments       IADL Comments       Employment/ Status       Concerns to be Addressed  no discharge needs identified, adjustment to diagnosis/illness    Current Discharge Risk       Anticipated Changes Related to Illness  none    Transportation Concerns (SDOH)  Transportation Concerns: car, none  Transportation Anticipated: family or friend will provide  In the past 12 months, has lack of transportation kept you from medical appointments or from getting medications?: No  In the past 12 months, has lack of transportation kept you from meetings, work, or from getting things needed for daily living?: No    Concerns Comments       Anticipated Clinical Needs       Anticipated Discharge Plan   Anticipated Discharge Disposition: home with assistance, home without assistance or services  Patient/Family Anticipates Transition to: home  Patient/Family Anticipated Services at Transition: none  Screening complete: yes

## 2023-02-13 NOTE — PLAN OF CARE
Problem: Adult Inpatient Plan of Care  Goal: Plan of Care Review  Outcome: Progressing  Flowsheets (Taken 2/13/2023 0911)  Progress: no change  Plan of Care Reviewed With: patient  Outcome Summary: Pt had no issues overnight. GMF. Tylenol given for c/o chronic lower back pain and Tessalon given for cough. Spouse remained at bedside t/o shift. Plan is for chemo today. Safety maintained.  Goal: Patient-Specific Goal (Individualized)  Outcome: Progressing  Goal: Absence of Hospital-Acquired Illness or Injury  Outcome: Progressing  Goal: Optimal Comfort and Wellbeing  Outcome: Progressing

## 2023-02-13 NOTE — PLAN OF CARE
Plan of Care Review  Plan of Care Reviewed With: patient  Progress: improving  Outcome Summary: Patient denies pain or discomfort. Vital signs stable. GMF. Chemo given as ordered.Neutropenic precautions maintained. Family at the bedside, very supportive of patient. Call bell within reach, bed alarm refused.

## 2023-02-13 NOTE — DISCHARGE INSTRUCTIONS
Discharge Instructions      PLEASE CALL  Dr. Mcmullen's office ([phone]) IF YOU EXPERIENCE ANY OF THE FOLLOWING:   - Heavy bleeding (using one pad an hour)  - Fever greater than 100.4  - Nausea and vomiting that is not controlled with your prescribed medication   - Worsening pain or any other concerns     Please follow up for all outpatient infusions.

## 2023-02-13 NOTE — PROGRESS NOTES
Gynecology Progress Note    Subjective     Interval History: none, patient tolerated chemotherapy and it feeling well. Denies Quintero, CP, SOB, N/V, or F/C.    Objective     Vital signs in last 24 hours:  Temp:  [36.5 °C (97.7 °F)-36.7 °C (98.1 °F)] 36.5 °C (97.7 °F)  Heart Rate:  [77-92] 92  Resp:  [15-18] 18  BP: (111-116)/(55-68) 116/55      Exam  General Appearance: Alert, cooperative, no acute distress  Lungs: Clear to auscultation bilaterally, respirations unlabored  Heart: Regular rate and rhythm  Abdomen: Soft, nontender to palpation. No rebound or guarding   Extremities: no edema or calf tenderness    Labs  Results from last 7 days   Lab Units 02/13/23  0850 02/10/23  1418 02/08/23  0842   WBC K/uL 2.69* 1.04* 1.08*   HEMOGLOBIN g/dL 9.6* 11.5* 7.6*   HEMATOCRIT % 28.9* 33.1* 22.3*   PLATELETS K/uL 95* 82* 54*      Results from last 7 days   Lab Units 02/13/23  0850 02/10/23  1418 02/08/23  0842   SODIUM mEQ/L 137 134* 136   POTASSIUM mEQ/L 3.7 3.3* 3.9   CHLORIDE mEQ/L 107 105 109   CO2 mEQ/L 22 21* 16*   BUN mg/dL 7* 14 10   CREATININE mg/dL 0.7 1.0 0.8   GLUCOSE mg/dL 91 97 72   CALCIUM mg/dL 8.4* 8.7* 7.9*        Assessment/Plan     Alondra Villela is a 32 y.o. admitted with gestational trophoblastic neoplasia who is admitted  for chemotherapy.     Chemotherapy completed for today. Patient is feeling well over all. Continue supportive medications as needed. No labs tomorrow AM.     Rob Sweeney DO

## 2023-02-13 NOTE — PLAN OF CARE
Care Coordination Discharge Plan Note   Date: 2/13/2023    Time: 12:28 PM    Patient Name: Alondra Villela  Medical Record Number: [id number]  YOB: 1990  Room/BED: 0377    Discharge Assessment  Concerns to be Addressed: no discharge needs identified  Anticipated Changes Related to Illness: none    Concerns Comments:      Anticipated Discharge Plan  Anticipated Discharge Disposition: home with assistance, home without assistance or services  Patient/Family Anticipates Transition to: home  Patient/Family Anticipated Services at Transition: none    Per info in medical rounds today, pt is not stable for d/c. Plans for chemo while inpatient. Plan for pt to d/c home when stable for d/c. No needs.    SW will remain available for emotional support and dispo planning. Alka NICHOLSON [phone]

## 2023-02-13 NOTE — PROGRESS NOTES
Gynecology Progress Note    Subjective     Interval History: none  Alondra has no complaints this morning. She feels well overall. Denies Quintero, CP, SOB, N/V, or F/C. Light amount of brown vaginal spotting, which is her baseline.     Objective     Vital signs in last 24 hours:  Temp:  [36.7 °C (98 °F)-36.7 °C (98.1 °F)] 36.7 °C (98 °F)  Heart Rate:  [77-82] 77  Resp:  [15-18] 18  BP: (111-116)/(65-68) 116/68    Exam  General Appearance: Alert, cooperative, no acute distress  Lungs: Clear to auscultation bilaterally, respirations unlabored  Heart: Regular rate and rhythm  Abdomen: Soft, nontender to palpation. No rebound or guarding   Extremities: no edema or calf tenderness    Labs  Results from last 7 days   Lab Units 02/10/23  1418 02/08/23  0842 02/07/23  0422   WBC K/uL 1.04* 1.08* 0.44*   HEMOGLOBIN g/dL 11.5* 7.6* 8.6*   HEMATOCRIT % 33.1* 22.3* 25.1*   PLATELETS K/uL 82* 54* 58*      Results from last 7 days   Lab Units 02/10/23  1418 02/08/23  0842 02/07/23  0422   SODIUM mEQ/L 134* 136 136   POTASSIUM mEQ/L 3.3* 3.9 4.3   CHLORIDE mEQ/L 105 109 111*   CO2 mEQ/L 21* 16* 18*   BUN mg/dL 14 10 5*   CREATININE mg/dL 1.0 0.8 0.6   GLUCOSE mg/dL 97 72 162*   CALCIUM mg/dL 8.7* 7.9* 8.3*        Assessment/Plan     Alondra Villela is a 32 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     GTN:  - S/p EP induction chemotherapy on 1/13/23-1/14/23 and 1/20/23-1/21/23  - S/p Etoposide, Dactinomycin, and Methotrexate on 1/30/23-2/2/23  - S/p etoposide/ cisplatin  2/6  - Continue supportive care. Tylenol PRN for pain.  - Plan for pre-meds in the morning followed by chemotherapy, no need for AM labs     Neutropenia:  - ANC 0.08 on 2/10-->will repeat this AM with IV team stick labs  - Neutropenic precautions ordered  - Will be for filgrastim on days without chemotherapy    Hyperthyroidism:  - stable, continue methimazole 5 mg PO qd and lopressor 12.5mg PO qd  - normal heart  rate    Depression:  - mood stable this AM  - continue Wellbutrin XL 150mg    History of RUE DVT/VTE prophylaxis  - Eliquis BID   - SCDs while in bed    Will discuss with Dr. Benedict Sweeney DO

## 2023-02-14 ENCOUNTER — DOCUMENTATION (OUTPATIENT)
Dept: GYNECOLOGIC ONCOLOGY | Facility: CLINIC | Age: 33
End: 2023-02-14
Payer: COMMERCIAL

## 2023-02-14 VITALS
DIASTOLIC BLOOD PRESSURE: 73 MMHG | RESPIRATION RATE: 18 BRPM | TEMPERATURE: 98 F | HEART RATE: 80 BPM | OXYGEN SATURATION: 98 % | SYSTOLIC BLOOD PRESSURE: 107 MMHG

## 2023-02-14 DIAGNOSIS — O01.9 GESTATIONAL TROPHOBLASTIC NEOPLASM: ICD-10-CM

## 2023-02-14 PROCEDURE — 25000000 HC PHARMACY GENERAL: Performed by: OBSTETRICS & GYNECOLOGY

## 2023-02-14 PROCEDURE — 63700000 HC SELF-ADMINISTRABLE DRUG: Performed by: OBSTETRICS & GYNECOLOGY

## 2023-02-14 PROCEDURE — 63700000 HC SELF-ADMINISTRABLE DRUG

## 2023-02-14 PROCEDURE — 25800000 HC PHARMACY IV SOLUTIONS: Performed by: OBSTETRICS & GYNECOLOGY

## 2023-02-14 PROCEDURE — 63700000 HC SELF-ADMINISTRABLE DRUG: Performed by: STUDENT IN AN ORGANIZED HEALTH CARE EDUCATION/TRAINING PROGRAM

## 2023-02-14 PROCEDURE — 99232 SBSQ HOSP IP/OBS MODERATE 35: CPT | Performed by: STUDENT IN AN ORGANIZED HEALTH CARE EDUCATION/TRAINING PROGRAM

## 2023-02-14 PROCEDURE — 3E04305 INTRODUCTION OF OTHER ANTINEOPLASTIC INTO CENTRAL VEIN, PERCUTANEOUS APPROACH: ICD-10-PCS | Performed by: STUDENT IN AN ORGANIZED HEALTH CARE EDUCATION/TRAINING PROGRAM

## 2023-02-14 PROCEDURE — 63600000 HC DRUGS/DETAIL CODE: Performed by: OBSTETRICS & GYNECOLOGY

## 2023-02-14 RX ORDER — HEPARIN SODIUM,PORCINE/PF 10 UNIT/ML
10 SYRINGE (ML) INTRAVENOUS ONCE
Status: DISCONTINUED | OUTPATIENT
Start: 2023-02-14 | End: 2023-02-14

## 2023-02-14 RX ORDER — LEUCOVORIN CALCIUM 15 MG/1
15 TABLET ORAL
Qty: 1 TABLET | Refills: 0 | Status: SHIPPED | OUTPATIENT
Start: 2023-02-14 | End: 2023-02-14

## 2023-02-14 RX ORDER — HEPARIN 100 UNIT/ML
500 SYRINGE INTRAVENOUS ONCE
Status: COMPLETED | OUTPATIENT
Start: 2023-02-14 | End: 2023-02-14

## 2023-02-14 RX ORDER — LEUCOVORIN CALCIUM 5 MG/1
15 TABLET ORAL
Qty: 20 TABLET | Refills: 3 | Status: SHIPPED | OUTPATIENT
Start: 2023-02-14 | End: 2023-02-15

## 2023-02-14 RX ORDER — DIBUCAINE 1 %
OINTMENT (GRAM) TOPICAL 3 TIMES DAILY
Status: DISCONTINUED | OUTPATIENT
Start: 2023-02-14 | End: 2023-02-14 | Stop reason: HOSPADM

## 2023-02-14 RX ORDER — FAMOTIDINE 10 MG/ML
20 INJECTION INTRAVENOUS ONCE AS NEEDED
Status: DISCONTINUED | OUTPATIENT
Start: 2023-02-14 | End: 2023-02-14 | Stop reason: HOSPADM

## 2023-02-14 RX ORDER — EPINEPHRINE 1 MG/ML
0.5 INJECTION, SOLUTION INTRAMUSCULAR; SUBCUTANEOUS ONCE AS NEEDED
Status: DISCONTINUED | OUTPATIENT
Start: 2023-02-14 | End: 2023-02-14 | Stop reason: HOSPADM

## 2023-02-14 RX ORDER — DIPHENHYDRAMINE HCL 50 MG/ML
25 VIAL (ML) INJECTION ONCE AS NEEDED
Status: DISCONTINUED | OUTPATIENT
Start: 2023-02-14 | End: 2023-02-14 | Stop reason: HOSPADM

## 2023-02-14 RX ORDER — LEUCOVORIN CALCIUM 5 MG/1
15 TABLET ORAL
Status: DISCONTINUED | OUTPATIENT
Start: 2023-02-14 | End: 2023-02-14 | Stop reason: HOSPADM

## 2023-02-14 RX ADMIN — DACTINOMYCIN 0.5 MG: 0.5 INJECTION, POWDER, LYOPHILIZED, FOR SOLUTION INTRAVENOUS at 15:03

## 2023-02-14 RX ADMIN — DEXAMETHASONE SODIUM PHOSPHATE: 4 INJECTION, SOLUTION INTRA-ARTICULAR; INTRALESIONAL; INTRAMUSCULAR; INTRAVENOUS; SOFT TISSUE at 13:16

## 2023-02-14 RX ADMIN — DIBUCAINE 1%: 1 CREAM TOPICAL at 09:30

## 2023-02-14 RX ADMIN — DIBUCAINE 1%: 1 CREAM TOPICAL at 14:06

## 2023-02-14 RX ADMIN — HEPARIN 500 UNITS: 100 SYRINGE at 15:56

## 2023-02-14 RX ADMIN — LEUCOVORIN CALCIUM 15 MG: 5 TABLET ORAL at 13:16

## 2023-02-14 RX ADMIN — APIXABAN 5 MG: 5 TABLET, FILM COATED ORAL at 09:29

## 2023-02-14 RX ADMIN — METHIMAZOLE 2.5 MG: 5 TABLET ORAL at 09:29

## 2023-02-14 RX ADMIN — PROMETHAZINE HYDROCHLORIDE 12.5 MG: 25 TABLET ORAL at 11:05

## 2023-02-14 RX ADMIN — ETOPOSIDE 184 MG: 20 INJECTION INTRAVENOUS at 13:52

## 2023-02-14 NOTE — NURSING NOTE
Chemo treatment initiated. VSS prior to initiation. Pre-chemo checklist verified by 2 RNs. Chemo double checked by 2 RNs at bedside. Education given to patient on s/s of reaction, understanding verbalized. Port flushed with good blood return noted prior to administration. RN remained at bedside for first 15 minutes of infusion. No s/s of reaction noted within that timeframe.

## 2023-02-14 NOTE — NURSING NOTE
Pt stable for D/C s/p chemo tx. D/C papers reviewed with patient and  at bedside. Understanding verballized. Copy of D/C papers sent with patient. Port de-accessed and flushed per order. All belongings packed and taken by patient. Awaiting ride.

## 2023-02-14 NOTE — PROGRESS NOTES
Gynecology Progress Note    Subjective     Mild nausea overnight that has subsided after receiving phenergan. Denies HA, CP, SOB, vomiting, or F/C. Light amount of brown vaginal spotting, which is her baseline.     Objective     Vital signs in last 24 hours:  Temp:  [36.5 °C (97.7 °F)-36.6 °C (97.9 °F)] 36.6 °C (97.8 °F)  Heart Rate:  [72-95] 92  Resp:  [16-18] 18  BP: (116-140)/(55-86) 140/86      Exam  General Appearance: Alert, cooperative, no acute distress  Lungs: Clear to auscultation bilaterally, respirations unlabored  Heart: Regular rate and rhythm  Abdomen: Soft, nontender to palpation. No rebound or guarding   Extremities: no edema or calf tenderness    Labs  Results from last 7 days   Lab Units 02/13/23  0850 02/10/23  1418 02/08/23  0842   WBC K/uL 2.69* 1.04* 1.08*   HEMOGLOBIN g/dL 9.6* 11.5* 7.6*   HEMATOCRIT % 28.9* 33.1* 22.3*   PLATELETS K/uL 95* 82* 54*      Results from last 7 days   Lab Units 02/13/23  0850 02/10/23  1418 02/08/23  0842   SODIUM mEQ/L 137 134* 136   POTASSIUM mEQ/L 3.7 3.3* 3.9   CHLORIDE mEQ/L 107 105 109   CO2 mEQ/L 22 21* 16*   BUN mg/dL 7* 14 10   CREATININE mg/dL 0.7 1.0 0.8   GLUCOSE mg/dL 91 97 72   CALCIUM mg/dL 8.4* 8.7* 7.9*        Assessment/Plan     Alondra Villela is a 32 y.o. admitted with gestational trophoblastic neoplasia who is admitted  for chemotherapy.      GTN:  - received Dactinomycin, MTX, and etoposide yesterday. To receive EP today.  - Continue supportive care. Tylenol PRN for pain, phenergan for nausea.      Moderate Neutropenia:  - ANC 0.08 on 2/10-->0.56 2/13  - Neutropenic precautions ordered  - Will be for filgrastim on days without chemotherapy     Hyperthyroidism:  - stable, continue methimazole 2.5 mg PO qd and lopressor 12.5mg PO qd  - normal heart rate     Depression:  - mood stable this AM  - continue Wellbutrin XL 150mg     History of RUE DVT/VTE prophylaxis  - Eliquis BID   - SCDs while in bed     Will discuss with Dr. Benedict Rivas  Pratima Sweeney DO

## 2023-02-14 NOTE — PROGRESS NOTES
Inpatient chemotherapy prior authorization pending via [facility] Portal for the below medications. Service date requested 2/26/23. Faxed clinicals to [facility] at [phone]. Called [facility] reviewer Maria Eugenia at [phone] to inform her authorization was submitted.   -Etoposide ()  -Actinomycin ()  -Methotrexate ()  Pending authorization #: [id number]      Addendum 2/16/23: Fax determination received approving the above authorizations for date of service 2/26/23. Letter uploaded to chart.   Authorization #: [id number]

## 2023-02-14 NOTE — PROGRESS NOTES
Inpatient reservation request made for 2/26/23 chemotherapy. Faxed request to Eveline Dowling and Mellisa Vang at [phone]

## 2023-02-14 NOTE — PLAN OF CARE
Per information gathered at medical rounds; patient is for home with no needs. SW will remain available for emotional support throughout hospitalization. BHARAT Smith

## 2023-02-14 NOTE — PROGRESS NOTES
Completed FMLA paperwork for patients spouse Magy, emailed patient a copy and uploaded form to patient chart.

## 2023-02-14 NOTE — PROGRESS NOTES
Gynecology Progress Note    Subjective     Daniel is finishing up her chemo for the da. He had a bout of nausea around noon that was relieved with phenergan. She reports feeling well at the moment. Denies headache, chest pain, shortness of breath, vomiting, lightheadedness, or dizziness.     Objective     Vital signs in last 24 hours:  Temp:  [36.6 °C (97.8 °F)-36.7 °C (98 °F)] 36.7 °C (98 °F)  Heart Rate:  [72-95] 80  Resp:  [16-18] 18  BP: (107-140)/(60-86) 107/73    Exam  General Appearance: Alert, cooperative, no acute distress  Lungs: Clear to auscultation bilaterally, respirations unlabored  Heart: Regular rate and rhythm  Abdomen: Soft, nontender to palpation. No rebound or guarding   Extremities: no edema or calf tenderness    Labs  Results from last 7 days   Lab Units 02/13/23  0850 02/10/23  1418 02/08/23  0842   WBC K/uL 2.69* 1.04* 1.08*   HEMOGLOBIN g/dL 9.6* 11.5* 7.6*   HEMATOCRIT % 28.9* 33.1* 22.3*   PLATELETS K/uL 95* 82* 54*      Results from last 7 days   Lab Units 02/13/23  0850 02/10/23  1418 02/08/23  0842   SODIUM mEQ/L 137 134* 136   POTASSIUM mEQ/L 3.7 3.3* 3.9   CHLORIDE mEQ/L 107 105 109   CO2 mEQ/L 22 21* 16*   BUN mg/dL 7* 14 10   CREATININE mg/dL 0.7 1.0 0.8   GLUCOSE mg/dL 91 97 72   CALCIUM mg/dL 8.4* 8.7* 7.9*        Assessment/Plan     Alondra Villela is a 32 y.o. admitted with GTN, receiving chemotherapy     Patient is tolerating chemotherapy without issue. Will be for discharge after chemotherapy is completed.     Rob Sweeney DO

## 2023-02-15 ENCOUNTER — HOSPITAL ENCOUNTER (OUTPATIENT)
Dept: HEMATOLOGY/ONCOLOGY | Facility: HOSPITAL | Age: 33
Setting detail: INFUSION SERIES
Discharge: HOME | End: 2023-02-15
Attending: OBSTETRICS & GYNECOLOGY
Payer: COMMERCIAL

## 2023-02-15 ENCOUNTER — TELEPHONE (OUTPATIENT)
Dept: GYNECOLOGIC ONCOLOGY | Facility: CLINIC | Age: 33
End: 2023-02-15
Payer: COMMERCIAL

## 2023-02-15 DIAGNOSIS — O01.9 GESTATIONAL TROPHOBLASTIC NEOPLASM: ICD-10-CM

## 2023-02-15 DIAGNOSIS — D70.2 DRUG-INDUCED NEUTROPENIA (CMS/HCC): Primary | ICD-10-CM

## 2023-02-15 DIAGNOSIS — O01.9 GESTATIONAL TROPHOBLASTIC NEOPLASM: Primary | ICD-10-CM

## 2023-02-15 DIAGNOSIS — D50.9 MICROCYTIC ANEMIA: ICD-10-CM

## 2023-02-15 DIAGNOSIS — Z51.11 ENCOUNTER FOR ANTINEOPLASTIC CHEMOTHERAPY: ICD-10-CM

## 2023-02-15 PROCEDURE — 63600000 HC DRUGS/DETAIL CODE: Mod: JG | Performed by: PHYSICIAN ASSISTANT

## 2023-02-15 PROCEDURE — 96372 THER/PROPH/DIAG INJ SC/IM: CPT

## 2023-02-15 RX ORDER — FILGRASTIM-SNDZ 480 UG/.8ML
480 INJECTION, SOLUTION INTRAVENOUS; SUBCUTANEOUS SEE ADMIN INSTRUCTIONS
Qty: 12 ML | Refills: 1 | Status: SHIPPED | OUTPATIENT
Start: 2023-02-15 | End: 2023-04-03 | Stop reason: SDUPTHER

## 2023-02-15 RX ORDER — HEPARIN 100 UNIT/ML
500 SYRINGE INTRAVENOUS AS NEEDED
Status: CANCELLED | OUTPATIENT
Start: 2023-02-16

## 2023-02-15 RX ADMIN — FILGRASTIM-SNDZ 480 MCG: 480 INJECTION, SOLUTION INTRAVENOUS; SUBCUTANEOUS at 14:44

## 2023-02-15 NOTE — PROGRESS NOTES
Pt here for Zarxio.  No order in plan. AMOR MELARA called she will update chart for tomorrows dose . Verbal order taken for this dose. Zarxio Sq  given to Left back of arm without incident. Pt declined AVS. Pt verbalized tomorrows appointment. Pt left ambulatory.

## 2023-02-15 NOTE — TELEPHONE ENCOUNTER
Called [facility] and spoke with representative Estela. She informed me that Zarxio injection is supplied through a specialty pharmacy only. Pharmacist is currently processing patients insurance to determine if [facility] is in network and to determine cost of injection. She recommends I call back tomorrow for updates. She states shipment of medication typically takes 2-7 business days and is coordinated with the patient

## 2023-02-16 ENCOUNTER — HOSPITAL ENCOUNTER (OUTPATIENT)
Dept: HEMATOLOGY/ONCOLOGY | Facility: HOSPITAL | Age: 33
Setting detail: INFUSION SERIES
Discharge: HOME | End: 2023-02-16
Attending: OBSTETRICS & GYNECOLOGY
Payer: COMMERCIAL

## 2023-02-16 ENCOUNTER — TELEPHONE (OUTPATIENT)
Dept: GYNECOLOGIC ONCOLOGY | Facility: CLINIC | Age: 33
End: 2023-02-16
Payer: COMMERCIAL

## 2023-02-16 VITALS
SYSTOLIC BLOOD PRESSURE: 114 MMHG | TEMPERATURE: 97.5 F | DIASTOLIC BLOOD PRESSURE: 59 MMHG | OXYGEN SATURATION: 99 % | HEART RATE: 104 BPM | RESPIRATION RATE: 18 BRPM

## 2023-02-16 DIAGNOSIS — O01.9 GESTATIONAL TROPHOBLASTIC NEOPLASM: Primary | ICD-10-CM

## 2023-02-16 PROCEDURE — 63600000 HC DRUGS/DETAIL CODE: Mod: JG | Performed by: OBSTETRICS & GYNECOLOGY

## 2023-02-16 PROCEDURE — 96372 THER/PROPH/DIAG INJ SC/IM: CPT

## 2023-02-16 RX ADMIN — FILGRASTIM-SNDZ 480 MCG: 480 INJECTION, SOLUTION INTRAVENOUS; SUBCUTANEOUS at 14:52

## 2023-02-16 ASSESSMENT — ENCOUNTER SYMPTOMS
CONSTITUTIONAL NEGATIVE: 1
LOSS OF SENSATION IN FEET: 0
NEUROLOGICAL NEGATIVE: 1
CARDIOVASCULAR NEGATIVE: 1
BRUISES/BLEEDS EASILY: 1
DEPRESSION: 0
MUSCULOSKELETAL NEGATIVE: 1
EYES NEGATIVE: 1
OCCASIONAL FEELINGS OF UNSTEADINESS: 0
RESPIRATORY NEGATIVE: 1
ENDOCRINE NEGATIVE: 1
NAUSEA: 1

## 2023-02-16 ASSESSMENT — PAIN SCALES - GENERAL: PAINLEVEL: 0-NO PAIN

## 2023-02-16 NOTE — PROGRESS NOTES
Review of Systems   Constitutional: Negative.    HENT:  Negative.    Eyes: Negative.    Respiratory: Negative.    Cardiovascular: Negative.    Gastrointestinal: Positive for nausea.   Endocrine: Negative.    Genitourinary: Negative.     Musculoskeletal: Negative.    Skin: Negative.    Neurological: Negative.    Hematological: Bruises/bleeds easily.     Patient returns to infusion for Zaxio injection. Injection given in the back of the upper left arm. Bandage applied. Patient tolerated well. Declined AVS. Patient left ambulatory.

## 2023-02-16 NOTE — TELEPHONE ENCOUNTER
Call made to [facility] and spoke with Pharmacist April. She confirmed they are in network with patients insurance and the patient has a $0 copay for 15 doses ordered. She will make outreach to patient to coordinate delivery of medication. The medication will likely be delivered next Tuesday. Plan to keep patient scheduled at the [facility] for injection through next Thursday until its confirmed she has received the shipment to her home. Patient will receive instructions on how to administer Zarzio injection at her upcoming office visit on Monday.

## 2023-02-17 ENCOUNTER — HOSPITAL ENCOUNTER (OUTPATIENT)
Dept: HEMATOLOGY/ONCOLOGY | Facility: HOSPITAL | Age: 33
Discharge: HOME | End: 2023-02-17
Attending: OBSTETRICS & GYNECOLOGY
Payer: COMMERCIAL

## 2023-02-17 ENCOUNTER — APPOINTMENT (OUTPATIENT)
Dept: LAB | Facility: HOSPITAL | Age: 33
End: 2023-02-17
Attending: PHYSICIAN ASSISTANT
Payer: COMMERCIAL

## 2023-02-17 DIAGNOSIS — O01.9 GESTATIONAL TROPHOBLASTIC NEOPLASM: Primary | ICD-10-CM

## 2023-02-17 DIAGNOSIS — O01.9 GESTATIONAL TROPHOBLASTIC NEOPLASM: ICD-10-CM

## 2023-02-17 DIAGNOSIS — Z51.11 ENCOUNTER FOR ANTINEOPLASTIC CHEMOTHERAPY: ICD-10-CM

## 2023-02-17 LAB
ALBUMIN SERPL-MCNC: 4.1 G/DL (ref 3.4–5)
ALP SERPL-CCNC: 110 IU/L (ref 35–126)
ALT SERPL-CCNC: 16 IU/L (ref 11–54)
ANION GAP SERPL CALC-SCNC: 7 MEQ/L (ref 3–15)
AST SERPL-CCNC: 14 IU/L (ref 15–41)
BASOPHILS # BLD: 0.13 K/UL (ref 0.01–0.1)
BASOPHILS NFR BLD: 2 %
BILIRUB SERPL-MCNC: 0.3 MG/DL (ref 0.3–1.2)
BUN SERPL-MCNC: 13 MG/DL (ref 8–20)
CALCIUM SERPL-MCNC: 8.9 MG/DL (ref 8.9–10.3)
CHLORIDE SERPL-SCNC: 107 MEQ/L (ref 98–109)
CO2 SERPL-SCNC: 23 MEQ/L (ref 22–32)
CREAT SERPL-MCNC: 0.8 MG/DL (ref 0.6–1.1)
DIFFERENTIAL METHOD BLD: ABNORMAL
DOHLE BOD BLD QL SMEAR: ABNORMAL
EOSINOPHIL # BLD: 0 K/UL (ref 0.04–0.36)
EOSINOPHIL NFR BLD: 0 %
ERYTHROCYTE [DISTWIDTH] IN BLOOD BY AUTOMATED COUNT: 12.4 % (ref 11.7–14.4)
GFR SERPL CREATININE-BSD FRML MDRD: >60 ML/MIN/1.73M*2
GLUCOSE SERPL-MCNC: 87 MG/DL (ref 70–99)
HCG SERPL-ACNC: 1445 IU/L (MIU/ML)
HCT VFR BLDCO AUTO: 30.1 % (ref 35–45)
HGB BLD-MCNC: 10 G/DL (ref 11.8–15.7)
LYMPHOCYTES # BLD: 2.1 K/UL (ref 1.2–3.5)
LYMPHOCYTES NFR BLD: 32 %
MAGNESIUM SERPL-MCNC: 1.8 MG/DL (ref 1.8–2.5)
MCH RBC QN AUTO: 26.7 PG (ref 28–33.2)
MCHC RBC AUTO-ENTMCNC: 33.2 G/DL (ref 32.2–35.5)
MCV RBC AUTO: 80.3 FL (ref 83–98)
MONOCYTES # BLD: 0.07 K/UL (ref 0.28–0.8)
MONOCYTES NFR BLD: 1 %
NEUTROPHILS # BLD: 4.35 K/UL (ref 1.7–7)
NEUTS BAND # BLD: 0.07 K/UL (ref 0–0.53)
NEUTS BAND # BLD: 4.2 K/UL (ref 1.7–7)
NEUTS BAND NFR BLD: 1 %
NEUTS SEG NFR BLD: 64 %
PDW BLD AUTO: 8.9 FL (ref 9.4–12.3)
PLAT MORPH BLD: NORMAL
PLATELET # BLD AUTO: 206 K/UL (ref 150–369)
PLATELET # BLD EST: ABNORMAL 10*3/UL
POTASSIUM SERPL-SCNC: 4.1 MEQ/L (ref 3.6–5.1)
PROT SERPL-MCNC: 7.4 G/DL (ref 6–8.2)
RBC # BLD AUTO: 3.75 M/UL (ref 3.93–5.22)
RBC MORPH BLD: NORMAL
SODIUM SERPL-SCNC: 137 MEQ/L (ref 136–144)
WBC # BLD AUTO: 6.57 K/UL (ref 3.8–10.5)

## 2023-02-17 PROCEDURE — 63600000 HC DRUGS/DETAIL CODE: Mod: JG | Performed by: PHYSICIAN ASSISTANT

## 2023-02-17 PROCEDURE — 85025 COMPLETE CBC W/AUTO DIFF WBC: CPT

## 2023-02-17 PROCEDURE — 36415 COLL VENOUS BLD VENIPUNCTURE: CPT

## 2023-02-17 PROCEDURE — 3E01305 INTRODUCTION OF OTHER ANTINEOPLASTIC INTO SUBCUTANEOUS TISSUE, PERCUTANEOUS APPROACH: ICD-10-PCS | Performed by: PHYSICIAN ASSISTANT

## 2023-02-17 PROCEDURE — 96372 THER/PROPH/DIAG INJ SC/IM: CPT

## 2023-02-17 PROCEDURE — 80053 COMPREHEN METABOLIC PANEL: CPT

## 2023-02-17 PROCEDURE — 83735 ASSAY OF MAGNESIUM: CPT

## 2023-02-17 PROCEDURE — 84702 CHORIONIC GONADOTROPIN TEST: CPT

## 2023-02-17 RX ORDER — TRIAMCINOLONE ACETONIDE 1 MG/G
OINTMENT TOPICAL 2 TIMES DAILY
COMMUNITY
Start: 2022-08-24

## 2023-02-17 RX ORDER — HEPARIN 100 UNIT/ML
500 SYRINGE INTRAVENOUS AS NEEDED
Status: CANCELLED | OUTPATIENT
Start: 2023-02-18

## 2023-02-17 RX ORDER — HYDROCORTISONE 25 MG/G
2 CREAM TOPICAL AS NEEDED
COMMUNITY
Start: 2023-02-11

## 2023-02-17 RX ORDER — NORETHINDRONE ACETATE AND ETHINYL ESTRADIOL 1MG-20(21)
1 KIT ORAL DAILY
COMMUNITY
Start: 2022-09-30 | End: 2023-03-20 | Stop reason: ALTCHOICE

## 2023-02-17 RX ADMIN — FILGRASTIM-SNDZ 480 MCG: 480 INJECTION, SOLUTION INTRAVENOUS; SUBCUTANEOUS at 14:52

## 2023-02-17 NOTE — PROGRESS NOTES
Ms. Alondra Villela is a 32 y.o.yo lady with  Cancer Staging   Gestational trophoblastic neoplasm  Staging form: Gestational Trophoblastic Neoplasms, AJCC 8th Edition  - Clinical stage from 1/12/2023: FIGO Stage IV (cM1b, Risk score: 17) - Signed by Shantelle Mcmullen MD on 1/26/2023  . She presents for chemotherapy.   Oncology History   Gestational trophoblastic neoplasm   1/10/2023 Tumor Markers     on initial testing. Dilutional studies repeated and found to be >461,700     1/11/2023 Imaging Significant Findings    1.  No intrauterine gestation and no adnexal mass evident. These findings  represent a pregnancy of unknown location. The appearance of a complex mass with  small cystic spaces and peripheral vascularity occupying the central uterine  cavity could represent a molar pregnancy but additional evaluation is suggested.  2.  Both ovaries are mildly enlarged with multiple complex cystic lesions as  well as some with solid-appearing echoes that may represent a combination of  hemorrhagic cysts/follicles or endometriomas, but challenging to evaluate as the  ovaries are only well evaluated transabdominally. Recommended these be  reassessed on follow-up. No sonographic findings of ovarian torsion.     1/12/2023 Imaging Significant Findings    MRI brain with contrast  1.  No abnormal intracranial contrast enhancement identified to suggest  intracranial metastatic disease.  2.  Previously seen tiny foci of increased T2 signal in the white matter do not  demonstrate contrast enhancement.  These findings could reflect foci of chronic  ischemia, the sequelae of migraine headaches, demyelinating or postinfectious  processes.  Other etiologies are not excluded.  Clinical correlation is  recommended.     1/12/2023 Imaging Significant Findings    CTPA  IMPRESSION:  No evidence for pulmonary embolism.  5 cm right lower lobe mass. Anterior mediastinal mass.  Innumerable bilateral pulmonary nodules which are  suspicious for metastatic  disease to the lungs.  2 hyperenhancing liver lesions, the larger measuring 5 cm in maximal dimension  for which further assessment with MRI with and without contrast is recommended.     1/12/2023 Biopsy    Image-guided biopsy of right lower lobe lesion benign liver parenchyma     1/12/2023 Cancer Staged    Staging form: Gestational Trophoblastic Neoplasms, AJCC 8th Edition  - Clinical stage from 1/12/2023: FIGO Stage IV (cM1b, Risk score: 17)     1/13/2023 - 1/27/2023 Chemotherapy    CISplatin / Etoposide, 7 day Cycles - GTN Induction  Plan Provider: Lorena Hogue MD  Treatment goal: Curative  Line of treatment: Induction     1/13/2023 Imaging Significant Findings    CT abd/pelvis  1.   Uterine mass measuring up to 13.8 cm, consistent with the clinically  suspected diagnosis of choriocarcinoma.  2.  Extensive pulmonary metastases, most notably a right lower lobe 4.9 cm  subpleural mass.  3.  Enlarged ovaries with numerous cysts likely representing theca lutein cysts  4.  Indeterminate 0.8 cm hypoattenuating lesion at the dome of the liver.  The  previously noted hypervascular liver lesions are not well seen due to the phase  of contrast at the time of imaging.  Contrast-enhanced MRI of the abdomen can be  considered for more optimal characterization.  5.  Indeterminate hypoattenuating lesions in the right kidney which can also be  more optimally evaluated with MRI.     1/13/2023 Tumor Markers    HCG >1.8468 million     1/30/2023 -  Chemotherapy    Inpatient: EMA/EP (Etoposide / Methotrexate / Dactinomycin / Etoposide / CISPlatin), 14 Day Cycles - Gestational Trophoblastic Neoplasia   Plan Provider: Shantelle Mcmullen MD  Treatment goal: Curative  Line of treatment: First Line       Pt presents for chemotherapy. This is her first outpatient tx. Notes overall feeling well and able to complete ADL. Manages nausea with phenergan. Denies vomiting.  denies neuropathy, denies excessive  fatigue, denies constipation. Denies headache, chest pain, shortness of breath, vomiting, lightheadedness, or dizziness. Denies fevers.       CHEMOTHERAPY REGIMEN: Etoposide/cisplatin                CYCLE 2 Day 8    PROBLEM LIST:  Patient Active Problem List    Diagnosis Date Noted   • Encounter for antineoplastic chemotherapy 2023   • Neutropenic fever (CMS/HCC) 2023   • DVT (deep vein thrombosis) in pregnancy 2023   • Thrombocytopenia (CMS/HCC) 2023   • Hematuria 2023   • Anxiety and depression 2023   • Hyponatremia 2023   • Drug-induced neutropenia (CMS/HCC) 2023   • Abnormal CT scan 2023   • Hyperthyroidism 2023   • Gestational trophoblastic neoplasm 2023   • History of COVID-19 2023   • Microcytic anemia 2023        Medical History:   Past Medical History:   Diagnosis Date   • Cervical cancer (CMS/HCC)    • Hx antineoplastic chemo        Surgical History:   Past Surgical History:   Procedure Laterality Date   •  SECTION      x2       Social History:   Social History     Tobacco Use   • Smoking status: Never   • Smokeless tobacco: Never   Vaping Use   • Vaping Use: Never used   Substance Use Topics   • Alcohol use: Not Currently   • Drug use: Never       Family History: Cancer-related family history is not on file.    Allergies: Patient has no known allergies.    Medications:   Current Outpatient Medications   Medication Instructions   • apixaban (ELIQUIS) 5 mg, oral, 2 times daily   • benzonatate (TESSALON) 100 mg, oral, 3 times daily PRN   • buPROPion XL (WELLBUTRIN XL) 150 mg, oral, Nightly   • hydrocortisone 2.5 % cream APPLY THIN COAT TO AFFECTED AREA TWICE A DAY   • lidocaine-prilocaine (EMLA) cream Topical, As needed, Apply a quarter size amount to skin over port site approximately one hour prior to chemotherapy. Cover with saran wrap.   • magic mouthwash (BENADRYL-MAALOX-CARAFATE) 15 mL, Swish & Spit, Every 4 hours PRN    • magic mouthwash with nystatin (BENADRYL-MAALOX-LIDOCAINE-NYSTATIN) 15 mL, Swish & Spit, Every 4 hours PRN   • methIMAzole (TAPAZOLE) 2.5 mg, oral, Daily   • norethindrone-e.estradioL-iron (JUNEL FE 1/20) 1 mg-20 mcg (21)/75 mg (7) per tablet 1 tablet, oral, Daily   • ondansetron ODT (ZOFRAN-ODT) 4-8 mg, oral, Every 6 hours PRN, Maximum 32mg per day.   • prochlorperazine (COMPAZINE) 5-10 mg, oral, Every 6 hours PRN, This medication can make you sleepy.   • promethazine (PHENERGAN) 12.5 mg, oral, Every 6 hours PRN   • protriptyline (VIVACTIL) 15 mg, oral, Daily   • triamcinolone (KENALOG) 0.1 % ointment Topical, 2 times daily   • ZARXIO 480 mcg, subcutaneous, See admin instructions, Inject under the skin on days 3, 4, 5, 9, 10, 11 and 12 of chemotherapy cycle.         Review of Systems  Pertinent items are noted in HPI.    Labs    CBC Results       02/17/23 02/13/23 02/10/23     1416 0850 1418    WBC 6.57 2.69 1.04    RBC 3.75 3.61 4.20    HGB 10.0 9.6 11.5    HCT 30.1 28.9 33.1    MCV 80.3 80.1 78.8    MCH 26.7 26.6 27.4    MCHC 33.2 33.2 34.7     95 82         Comment for WBC at 0850 on 02/13/23: ALL RESULTS HAVE BEEN CHECKED. CONSISTENT WITH PREVIOUS RESULTS    Comment for WBC at 1418 on 02/10/23: ALL RESULTS HAVE BEEN CHECKED. This result has been called to ROXY HAWK by Maricel Vieira on 02 10 23 at 15:02, and has been read back.     Comment for HGB at 1418 on 02/10/23: RESULT CHECKED    Comment for PLT at 1418 on 02/10/23: ALL RESULTS HAVE BEEN RECHECKED. SPECIMEN QUALITY CHECKED        CMP Results       02/17/23 02/13/23 02/10/23     1415 0850 1418     137 134    K 4.1 3.7 3.3    Cl 107 107 105    CO2 23 22 21    Glucose 87 91 97    BUN 13 7 14    Creatinine 0.8 0.7 1.0    Calcium 8.9 8.4 8.7    Anion Gap 7 8 8    AST 14 18 13    ALT 16 18 21    Albumin 4.1 3.2 3.9    EGFR >60.0 >60.0 >60.0          Physical Exam:  Visit Vitals  /73 (BP Location: Left upper arm, Patient Position:  "Sitting)   Pulse (!) 129   Temp 36.7 °C (98.1 °F) (Oral)   Ht 1.626 m (5' 4\")   Wt 75.3 kg (166 lb)   SpO2 99%   BMI 28.49 kg/m²       General: well-developed, well-nourished, no apparent distress  Neck: supple, no masses  Lymphatic: no supraclavicular, cervical, or inguinal adenopathy  Respiratory: lungs clear to auscultation bilaterally, normal respiratory effort  Cardiovascular: regular rate and rhythm, no murmurs, rubs, gallops.   Extremity: no clubbing, cyanosis, edema  GI: abdomen soft, non-distended, non-tender, no hepatosplenomegaly, no masses  Neurologic: alert & oriented x3, no gross deficits  Psychiatric: mood and affect normal  Musculoskeletal: no deformity or gross strength deficit  Gynecologic: deferred      ASSESSMENT/PLAN:  Assessment   32 y.o. lady with   Cancer Staging   Gestational trophoblastic neoplasm  Staging form: Gestational Trophoblastic Neoplasms, AJCC 8th Edition  - Clinical stage from 1/12/2023: FIGO Stage IV (cM1b, Risk score: 17) - Signed by Shantelle Mcmullen MD on 1/26/2023   who presents for chemotherapy.  ECOG performance status is :0    Problem List Items Addressed This Visit        Hematologic    Drug-induced neutropenia (CMS/HCC)    Current Assessment & Plan     On filgrastin on days she is not having chemo. She is planning to try giving herself the injection at home to help reduce the burden of visits - instructed on how to give herself subQ injection - clean area with alcohol wipe, planning in abd or discussed can use back of arm if being administered by a family member or someone else. Pt expressed understanding. Did not have any further questions. Any issues, advised to call office.             Obstetric    Gestational trophoblastic neoplasm    Overview     S/p EP induction chemotherapy on 1/13/23-1/14/23 and 1/20/23-1/21/23  - S/p Etoposide, Dactinomycin, and Methotrexate on 1/30/23-2/2/23  - S/p etoposide/ cisplatin  2/6         Current Assessment & Plan     Continuing " current tx plan with cycle 2 day 8 etoposide/cisplatin today.          Relevant Orders    Oncology CBC and Diff ([facility] Only)    Infusion Appointment Request    Comprehensive metabolic panel    BhCG, Serum, Quant    Oncology CBC and Diff ([facility] Only)    Comprehensive metabolic panel       Other    Encounter for antineoplastic chemotherapy - Primary    Current Assessment & Plan     Labs reviewed; cleared for chemo today                KELTON Newton

## 2023-02-17 NOTE — PROGRESS NOTES
Pt presented for Zarxio injection. Injection given SQ to posterior L arm w/o incident. AVS denied, pt scheduled out appropriately.

## 2023-02-20 ENCOUNTER — HOSPITAL ENCOUNTER (OUTPATIENT)
Dept: INPATIENT UNIT | Facility: HOSPITAL | Age: 33
Discharge: HOME | End: 2023-02-20
Attending: PHYSICIAN ASSISTANT
Payer: COMMERCIAL

## 2023-02-20 ENCOUNTER — OFFICE VISIT (OUTPATIENT)
Dept: GYNECOLOGIC ONCOLOGY | Facility: CLINIC | Age: 33
End: 2023-02-20
Attending: PHYSICIAN ASSISTANT
Payer: COMMERCIAL

## 2023-02-20 VITALS
TEMPERATURE: 98.1 F | WEIGHT: 166 LBS | SYSTOLIC BLOOD PRESSURE: 114 MMHG | DIASTOLIC BLOOD PRESSURE: 73 MMHG | HEIGHT: 64 IN | OXYGEN SATURATION: 99 % | HEART RATE: 129 BPM | BODY MASS INDEX: 28.34 KG/M2

## 2023-02-20 DIAGNOSIS — O01.9 GESTATIONAL TROPHOBLASTIC NEOPLASM: ICD-10-CM

## 2023-02-20 DIAGNOSIS — Z51.11 ENCOUNTER FOR ANTINEOPLASTIC CHEMOTHERAPY: Primary | ICD-10-CM

## 2023-02-20 DIAGNOSIS — D70.2 DRUG-INDUCED NEUTROPENIA (CMS/HCC): ICD-10-CM

## 2023-02-20 DIAGNOSIS — O01.9 GESTATIONAL TROPHOBLASTIC NEOPLASM: Primary | ICD-10-CM

## 2023-02-20 PROCEDURE — 96367 TX/PROPH/DG ADDL SEQ IV INF: CPT

## 2023-02-20 PROCEDURE — 96366 THER/PROPH/DIAG IV INF ADDON: CPT

## 2023-02-20 PROCEDURE — 63600000 HC DRUGS/DETAIL CODE: Performed by: OBSTETRICS & GYNECOLOGY

## 2023-02-20 PROCEDURE — 96375 TX/PRO/DX INJ NEW DRUG ADDON: CPT

## 2023-02-20 PROCEDURE — 96413 CHEMO IV INFUSION 1 HR: CPT

## 2023-02-20 PROCEDURE — 3008F BODY MASS INDEX DOCD: CPT | Performed by: PHYSICIAN ASSISTANT

## 2023-02-20 PROCEDURE — 3E04305 INTRODUCTION OF OTHER ANTINEOPLASTIC INTO CENTRAL VEIN, PERCUTANEOUS APPROACH: ICD-10-PCS | Performed by: PHYSICIAN ASSISTANT

## 2023-02-20 PROCEDURE — 96365 THER/PROPH/DIAG IV INF INIT: CPT

## 2023-02-20 PROCEDURE — 96415 CHEMO IV INFUSION ADDL HR: CPT

## 2023-02-20 PROCEDURE — 96417 CHEMO IV INFUS EACH ADDL SEQ: CPT

## 2023-02-20 PROCEDURE — 96368 THER/DIAG CONCURRENT INF: CPT

## 2023-02-20 PROCEDURE — 96374 THER/PROPH/DIAG INJ IV PUSH: CPT

## 2023-02-20 PROCEDURE — 99215 OFFICE O/P EST HI 40 MIN: CPT | Performed by: PHYSICIAN ASSISTANT

## 2023-02-20 PROCEDURE — 3E043GC INTRODUCTION OF OTHER THERAPEUTIC SUBSTANCE INTO CENTRAL VEIN, PERCUTANEOUS APPROACH: ICD-10-PCS | Performed by: PHYSICIAN ASSISTANT

## 2023-02-20 PROCEDURE — 96361 HYDRATE IV INFUSION ADD-ON: CPT

## 2023-02-20 PROCEDURE — 96360 HYDRATION IV INFUSION INIT: CPT

## 2023-02-20 PROCEDURE — 96376 TX/PRO/DX INJ SAME DRUG ADON: CPT

## 2023-02-20 PROCEDURE — 25800000 HC PHARMACY IV SOLUTIONS: Performed by: OBSTETRICS & GYNECOLOGY

## 2023-02-20 RX ORDER — PALONOSETRON 0.05 MG/ML
250 INJECTION, SOLUTION INTRAVENOUS ONCE
Status: COMPLETED | OUTPATIENT
Start: 2023-02-20 | End: 2023-02-20

## 2023-02-20 RX ORDER — SODIUM CHLORIDE 9 MG/ML
500 INJECTION, SOLUTION INTRAVENOUS CONTINUOUS
Status: ACTIVE | OUTPATIENT
Start: 2023-02-20 | End: 2023-02-20

## 2023-02-20 RX ORDER — HEPARIN 100 UNIT/ML
500 SYRINGE INTRAVENOUS AS NEEDED
Status: CANCELLED | OUTPATIENT
Start: 2023-02-23

## 2023-02-20 RX ORDER — HEPARIN 100 UNIT/ML
500 SYRINGE INTRAVENOUS AS NEEDED
Status: DISCONTINUED | OUTPATIENT
Start: 2023-02-20 | End: 2023-02-21 | Stop reason: HOSPADM

## 2023-02-20 RX ADMIN — PALONOSETRON HYDROCHLORIDE 250 MCG: 0.25 INJECTION, SOLUTION INTRAVENOUS at 11:56

## 2023-02-20 RX ADMIN — HEPARIN 500 UNITS: 100 SYRINGE at 15:53

## 2023-02-20 RX ADMIN — SODIUM CHLORIDE 150 MG: 900 INJECTION, SOLUTION INTRAVENOUS at 11:56

## 2023-02-20 RX ADMIN — DEXAMETHASONE SODIUM PHOSPHATE 12 MG: 4 INJECTION, SOLUTION INTRA-ARTICULAR; INTRALESIONAL; INTRAMUSCULAR; INTRAVENOUS; SOFT TISSUE at 11:56

## 2023-02-20 RX ADMIN — MAGNESIUM SULFATE HEPTAHYDRATE 500 ML/HR: 500 INJECTION, SOLUTION INTRAMUSCULAR; INTRAVENOUS at 09:59

## 2023-02-20 RX ADMIN — SODIUM CHLORIDE 500 ML/HR: 9 INJECTION, SOLUTION INTRAVENOUS at 15:39

## 2023-02-20 RX ADMIN — DEXTROSE MONOHYDRATE 184 MG: 50 INJECTION, SOLUTION INTRAVENOUS at 12:36

## 2023-02-20 RX ADMIN — CISPLATIN 110 MG: 50 INJECTION, SOLUTION INTRAVENOUS at 13:40

## 2023-02-20 NOTE — ASSESSMENT & PLAN NOTE
On filgrastin on days she is not having chemo. She is planning to try giving herself the injection at home to help reduce the burden of visits - instructed on how to give herself subQ injection - clean area with alcohol wipe, planning in abd or discussed can use back of arm if being administered by a family member or someone else. Pt expressed understanding. Did not have any further questions. Any issues, advised to call office.

## 2023-02-20 NOTE — PATIENT INSTRUCTIONS
**When to notify your Doctor**    Contact your doctor if you experience any of the following:    Pain, redness, swelling, or blistering near or at your administration site    Fever 100.4 or higher with or without chills    Nausea/ vomiting/ diarrhea not relieved with meds or is persistent    Develop mouth sores that prevent you from eating or drinking    Have black bowel movements, bruise easily, new rash, new headache, abdominal pain, swelling, or any signs of bleeding or new onset/ worsening shortness of breath    Have any new or worsening pain, numbness or tingling    Have any changes in your ability to perform daily activities, change in memory, or dizziness    Have any unexplained problems, questions, or concerns    Chemotherapy is in your body fluids for 48 hours- use precautions    It is safe for your family to have contact with you during treatment    Flush toilet twice after using, with lid closed    Your caregiver should wear gloves if handling your body fluids for 48 hours after your treatment, followed by thorough handwashing    Special precautions may be necessary if you are sexually active during your treatment (see additional info under “Chemotherapy” section)    [employer] is a helpful website for information

## 2023-02-20 NOTE — PROGRESS NOTES
0930 Pt to floor to receive cisplatin/etoposide for gestational trophoblastic neoplasia . VSS, pt was assessed by RN. RCW port accessed w/ +flush/ +blood return noted.     0959 1L NSS w/ 20mEqKCl and 1gram mag up/2hrs.     1156 Pre chemo IVF complete and pt received ordered premeds: Aloxi 0.25mg IV, Emend 150mg IV, and Decadron 12mg IV.    1236 Etoposide 184 mg up over one hour, checked and verified by 2 chemo RN's.    1340 Etoposide complete, patient tolerated without difficulty.Cisplatin 110 mg/ NSS up per orders, set to infuse over 2 hrs. pt/ BSA/ chemo/ dose/ orders/ consent were verified by 2 chemo RNS.     1440 NSS 500ml up over one hour.     1538 Cisplatin infusion complete w/o incident, pt w/o c/o. NSS complete. Port flushed with NSS and heparin 500 units/5ml and de-accessed. Patient given AVS and was discharged to home.

## 2023-02-23 ENCOUNTER — TELEPHONE (OUTPATIENT)
Dept: GYNECOLOGIC ONCOLOGY | Facility: CLINIC | Age: 33
End: 2023-02-23
Payer: COMMERCIAL

## 2023-02-23 NOTE — TELEPHONE ENCOUNTER
Patient now self administering Zarxio injections at home. Called to check in and see if she any questions/concerns regarding the process or schedule. Left voicemail.

## 2023-02-26 ENCOUNTER — HOSPITAL ENCOUNTER (INPATIENT)
Facility: HOSPITAL | Age: 33
LOS: 2 days | Discharge: HOME | DRG: 847 | End: 2023-02-28
Attending: STUDENT IN AN ORGANIZED HEALTH CARE EDUCATION/TRAINING PROGRAM | Admitting: OBSTETRICS & GYNECOLOGY
Payer: COMMERCIAL

## 2023-02-26 DIAGNOSIS — Z51.11 ENCOUNTER FOR ANTINEOPLASTIC CHEMOTHERAPY: ICD-10-CM

## 2023-02-26 DIAGNOSIS — H93.13 TINNITUS OF BOTH EARS: ICD-10-CM

## 2023-02-26 DIAGNOSIS — O01.9 GESTATIONAL TROPHOBLASTIC NEOPLASM: Primary | ICD-10-CM

## 2023-02-26 LAB
ALBUMIN SERPL-MCNC: 3.7 G/DL (ref 3.4–5)
ALP SERPL-CCNC: 111 IU/L (ref 35–126)
ALT SERPL-CCNC: 25 IU/L (ref 11–54)
ANION GAP SERPL CALC-SCNC: 10 MEQ/L (ref 3–15)
AST SERPL-CCNC: 19 IU/L (ref 15–41)
BASOPHILS # BLD: 0.04 K/UL (ref 0.01–0.1)
BASOPHILS NFR BLD: 0.7 %
BILIRUB SERPL-MCNC: 0.4 MG/DL (ref 0.3–1.2)
BUN SERPL-MCNC: 13 MG/DL (ref 8–20)
CALCIUM SERPL-MCNC: 8.3 MG/DL (ref 8.9–10.3)
CHLORIDE SERPL-SCNC: 103 MEQ/L (ref 98–109)
CO2 SERPL-SCNC: 21 MEQ/L (ref 22–32)
CREAT SERPL-MCNC: 0.9 MG/DL (ref 0.6–1.1)
DIFFERENTIAL METHOD BLD: ABNORMAL
EOSINOPHIL # BLD: 0 K/UL (ref 0.04–0.36)
EOSINOPHIL NFR BLD: 0 %
ERYTHROCYTE [DISTWIDTH] IN BLOOD BY AUTOMATED COUNT: 12.8 % (ref 11.7–14.4)
GFR SERPL CREATININE-BSD FRML MDRD: >60 ML/MIN/1.73M*2
GLUCOSE SERPL-MCNC: 100 MG/DL (ref 70–99)
HCT VFR BLDCO AUTO: 25.6 % (ref 35–45)
HGB BLD-MCNC: 9 G/DL (ref 11.8–15.7)
HYPOCHROMIA BLD QL SMEAR: ABNORMAL
IMM GRANULOCYTES # BLD AUTO: 0.3 K/UL (ref 0–0.08)
IMM GRANULOCYTES NFR BLD AUTO: 5 %
LYMPHOCYTES # BLD: 2.34 K/UL (ref 1.2–3.5)
LYMPHOCYTES NFR BLD: 38.8 %
MAGNESIUM SERPL-MCNC: 1.4 MG/DL (ref 1.8–2.5)
MCH RBC QN AUTO: 27 PG (ref 28–33.2)
MCHC RBC AUTO-ENTMCNC: 35.2 G/DL (ref 32.2–35.5)
MCV RBC AUTO: 76.9 FL (ref 83–98)
MONOCYTES # BLD: 1.03 K/UL (ref 0.28–0.8)
MONOCYTES NFR BLD: 17.1 %
NEUTROPHILS # BLD: 2.32 K/UL (ref 1.7–7)
NEUTS SEG NFR BLD: 38.4 %
NRBC BLD-RTO: 0 %
PDW BLD AUTO: 10.4 FL (ref 9.4–12.3)
PLAT MORPH BLD: NORMAL
PLATELET # BLD AUTO: 66 K/UL (ref 150–369)
PLATELET # BLD EST: ABNORMAL 10*3/UL
POTASSIUM SERPL-SCNC: 3.8 MEQ/L (ref 3.6–5.1)
PROT SERPL-MCNC: 6 G/DL (ref 6–8.2)
RBC # BLD AUTO: 3.33 M/UL (ref 3.93–5.22)
SODIUM SERPL-SCNC: 134 MEQ/L (ref 136–144)
WBC # BLD AUTO: 6.03 K/UL (ref 3.8–10.5)

## 2023-02-26 PROCEDURE — 85025 COMPLETE CBC W/AUTO DIFF WBC: CPT | Performed by: STUDENT IN AN ORGANIZED HEALTH CARE EDUCATION/TRAINING PROGRAM

## 2023-02-26 PROCEDURE — 36415 COLL VENOUS BLD VENIPUNCTURE: CPT | Performed by: STUDENT IN AN ORGANIZED HEALTH CARE EDUCATION/TRAINING PROGRAM

## 2023-02-26 PROCEDURE — 84702 CHORIONIC GONADOTROPIN TEST: CPT

## 2023-02-26 PROCEDURE — 80053 COMPREHEN METABOLIC PANEL: CPT | Performed by: STUDENT IN AN ORGANIZED HEALTH CARE EDUCATION/TRAINING PROGRAM

## 2023-02-26 PROCEDURE — 21400000 HC ROOM AND CARE CCU/INTERMEDIATE

## 2023-02-26 PROCEDURE — 83735 ASSAY OF MAGNESIUM: CPT | Performed by: STUDENT IN AN ORGANIZED HEALTH CARE EDUCATION/TRAINING PROGRAM

## 2023-02-26 PROCEDURE — 63700000 HC SELF-ADMINISTRABLE DRUG: Performed by: STUDENT IN AN ORGANIZED HEALTH CARE EDUCATION/TRAINING PROGRAM

## 2023-02-26 RX ORDER — PROTRIPTYLINE HYDROCHLORIDE 5 MG/1
15 TABLET, FILM COATED ORAL NIGHTLY
Status: DISCONTINUED | OUTPATIENT
Start: 2023-02-27 | End: 2023-02-28 | Stop reason: HOSPADM

## 2023-02-26 RX ORDER — LANOLIN ALCOHOL/MO/W.PET/CERES
400 CREAM (GRAM) TOPICAL 2 TIMES DAILY
Status: COMPLETED | OUTPATIENT
Start: 2023-02-26 | End: 2023-02-27

## 2023-02-26 RX ORDER — BUPROPION HYDROCHLORIDE 150 MG/1
150 TABLET ORAL NIGHTLY
Status: DISCONTINUED | OUTPATIENT
Start: 2023-02-27 | End: 2023-02-28 | Stop reason: HOSPADM

## 2023-02-26 RX ORDER — PROMETHAZINE HYDROCHLORIDE 25 MG/1
12.5 TABLET ORAL EVERY 6 HOURS PRN
Status: DISCONTINUED | OUTPATIENT
Start: 2023-02-26 | End: 2023-02-28 | Stop reason: HOSPADM

## 2023-02-26 RX ORDER — DEXTROSE 40 %
15-30 GEL (GRAM) ORAL AS NEEDED
Status: DISCONTINUED | OUTPATIENT
Start: 2023-02-26 | End: 2023-02-28 | Stop reason: HOSPADM

## 2023-02-26 RX ORDER — IBUPROFEN 200 MG
16-32 TABLET ORAL AS NEEDED
Status: DISCONTINUED | OUTPATIENT
Start: 2023-02-26 | End: 2023-02-28 | Stop reason: HOSPADM

## 2023-02-26 RX ORDER — DEXTROSE 50 % IN WATER (D50W) INTRAVENOUS SYRINGE
25 AS NEEDED
Status: DISCONTINUED | OUTPATIENT
Start: 2023-02-26 | End: 2023-02-28 | Stop reason: HOSPADM

## 2023-02-26 RX ADMIN — APIXABAN 5 MG: 5 TABLET, FILM COATED ORAL at 21:46

## 2023-02-26 RX ADMIN — PROMETHAZINE HYDROCHLORIDE 12.5 MG: 25 TABLET ORAL at 21:47

## 2023-02-26 RX ADMIN — MAGNESIUM OXIDE TAB 400 MG (241.3 MG ELEMENTAL MG) 400 MG: 400 (241.3 MG) TAB at 22:47

## 2023-02-27 LAB — HCG SERPL-ACNC: 428.1 IU/L (MIU/ML)

## 2023-02-27 PROCEDURE — 21400000 HC ROOM AND CARE CCU/INTERMEDIATE

## 2023-02-27 PROCEDURE — 63600000 HC DRUGS/DETAIL CODE: Performed by: STUDENT IN AN ORGANIZED HEALTH CARE EDUCATION/TRAINING PROGRAM

## 2023-02-27 PROCEDURE — 63700000 HC SELF-ADMINISTRABLE DRUG: Performed by: STUDENT IN AN ORGANIZED HEALTH CARE EDUCATION/TRAINING PROGRAM

## 2023-02-27 PROCEDURE — 25800000 HC PHARMACY IV SOLUTIONS: Performed by: STUDENT IN AN ORGANIZED HEALTH CARE EDUCATION/TRAINING PROGRAM

## 2023-02-27 PROCEDURE — 25000000 HC PHARMACY GENERAL: Performed by: STUDENT IN AN ORGANIZED HEALTH CARE EDUCATION/TRAINING PROGRAM

## 2023-02-27 PROCEDURE — 200200 PR NO CHARGE: Mod: GC | Performed by: OBSTETRICS & GYNECOLOGY

## 2023-02-27 PROCEDURE — 99232 SBSQ HOSP IP/OBS MODERATE 35: CPT | Performed by: STUDENT IN AN ORGANIZED HEALTH CARE EDUCATION/TRAINING PROGRAM

## 2023-02-27 PROCEDURE — 63700000 HC SELF-ADMINISTRABLE DRUG

## 2023-02-27 PROCEDURE — 3E04305 INTRODUCTION OF OTHER ANTINEOPLASTIC INTO CENTRAL VEIN, PERCUTANEOUS APPROACH: ICD-10-PCS | Performed by: STUDENT IN AN ORGANIZED HEALTH CARE EDUCATION/TRAINING PROGRAM

## 2023-02-27 RX ORDER — SODIUM CHLORIDE 9 MG/ML
500 INJECTION, SOLUTION INTRAVENOUS CONTINUOUS
Status: CANCELLED | OUTPATIENT
Start: 2023-03-06

## 2023-02-27 RX ORDER — FAMOTIDINE 10 MG/ML
20 INJECTION INTRAVENOUS ONCE AS NEEDED
Status: CANCELLED | OUTPATIENT
Start: 2023-03-06

## 2023-02-27 RX ORDER — EPINEPHRINE 1 MG/ML
0.5 INJECTION, SOLUTION INTRAMUSCULAR; SUBCUTANEOUS ONCE AS NEEDED
Status: CANCELLED | OUTPATIENT
Start: 2023-03-01

## 2023-02-27 RX ORDER — ACETAMINOPHEN 325 MG/1
975 TABLET ORAL ONCE
Status: COMPLETED | OUTPATIENT
Start: 2023-02-27 | End: 2023-02-27

## 2023-02-27 RX ORDER — DIPHENHYDRAMINE HCL 50 MG/ML
25 VIAL (ML) INJECTION ONCE AS NEEDED
Status: CANCELLED | OUTPATIENT
Start: 2023-03-06

## 2023-02-27 RX ORDER — EPINEPHRINE 1 MG/ML
0.5 INJECTION, SOLUTION INTRAMUSCULAR; SUBCUTANEOUS ONCE AS NEEDED
Status: CANCELLED | OUTPATIENT
Start: 2023-03-06

## 2023-02-27 RX ORDER — FAMOTIDINE 10 MG/ML
20 INJECTION INTRAVENOUS ONCE AS NEEDED
Status: DISCONTINUED | OUTPATIENT
Start: 2023-02-27 | End: 2023-02-28 | Stop reason: HOSPADM

## 2023-02-27 RX ORDER — FAMOTIDINE 10 MG/ML
20 INJECTION INTRAVENOUS ONCE AS NEEDED
Status: CANCELLED | OUTPATIENT
Start: 2023-03-01

## 2023-02-27 RX ORDER — LEUCOVORIN CALCIUM 5 MG/1
15 TABLET ORAL
Status: CANCELLED | OUTPATIENT
Start: 2023-03-01

## 2023-02-27 RX ORDER — DIPHENHYDRAMINE HCL 50 MG/ML
25 VIAL (ML) INJECTION ONCE AS NEEDED
Status: CANCELLED | OUTPATIENT
Start: 2023-03-01

## 2023-02-27 RX ORDER — EPINEPHRINE 1 MG/ML
0.5 INJECTION, SOLUTION INTRAMUSCULAR; SUBCUTANEOUS ONCE AS NEEDED
Status: DISCONTINUED | OUTPATIENT
Start: 2023-02-27 | End: 2023-02-28 | Stop reason: HOSPADM

## 2023-02-27 RX ORDER — DIPHENHYDRAMINE HCL 50 MG/ML
25 VIAL (ML) INJECTION ONCE AS NEEDED
Status: DISCONTINUED | OUTPATIENT
Start: 2023-02-27 | End: 2023-02-28 | Stop reason: HOSPADM

## 2023-02-27 RX ORDER — PALONOSETRON 0.05 MG/ML
250 INJECTION, SOLUTION INTRAVENOUS ONCE
Status: CANCELLED | OUTPATIENT
Start: 2023-03-06

## 2023-02-27 RX ORDER — DIPHENHYDRAMINE HCL 50 MG/ML
25 VIAL (ML) INJECTION ONCE
Status: COMPLETED | OUTPATIENT
Start: 2023-02-27 | End: 2023-02-27

## 2023-02-27 RX ADMIN — PROMETHAZINE HYDROCHLORIDE 12.5 MG: 25 TABLET ORAL at 23:50

## 2023-02-27 RX ADMIN — DEXTROSE MONOHYDRATE 184 MG: 50 INJECTION, SOLUTION INTRAVENOUS at 11:11

## 2023-02-27 RX ADMIN — APIXABAN 5 MG: 5 TABLET, FILM COATED ORAL at 08:39

## 2023-02-27 RX ADMIN — PROMETHAZINE HYDROCHLORIDE 12.5 MG: 25 TABLET ORAL at 18:02

## 2023-02-27 RX ADMIN — BUPROPION HYDROCHLORIDE 150 MG: 150 TABLET, EXTENDED RELEASE ORAL at 21:51

## 2023-02-27 RX ADMIN — MAGNESIUM OXIDE TAB 400 MG (241.3 MG ELEMENTAL MG) 400 MG: 400 (241.3 MG) TAB at 08:39

## 2023-02-27 RX ADMIN — PROTRIPTYLINE HYDROCHLORIDE 15 MG: 5 TABLET, FILM COATED ORAL at 21:52

## 2023-02-27 RX ADMIN — DACTINOMYCIN 0.5 MG: 0.5 INJECTION, POWDER, LYOPHILIZED, FOR SOLUTION INTRAVENOUS at 12:21

## 2023-02-27 RX ADMIN — ACETAMINOPHEN 975 MG: 325 TABLET ORAL at 15:33

## 2023-02-27 RX ADMIN — APIXABAN 5 MG: 5 TABLET, FILM COATED ORAL at 21:51

## 2023-02-27 RX ADMIN — METHOTREXATE 550 MG: 25 INJECTION, SOLUTION INTRA-ARTERIAL; INTRAMUSCULAR; INTRATHECAL; INTRAVENOUS at 12:39

## 2023-02-27 RX ADMIN — ONDANSETRON HYDROCHLORIDE: 2 INJECTION, SOLUTION INTRAMUSCULAR; INTRAVENOUS at 10:50

## 2023-02-27 RX ADMIN — DIPHENHYDRAMINE HYDROCHLORIDE 25 MG: 50 INJECTION INTRAMUSCULAR; INTRAVENOUS at 18:40

## 2023-02-27 NOTE — PLAN OF CARE
Care Coordination Admission Assessment Note  Date: 2/27/2023   Time: 7:52 AM    Patient Name: Alondra Villela  Medical Record Number: [id number]  YOB: 1990  Room/BED: 0378    General Information  Patient-Specific Goals (Include Timeframe)     PCP: Sunil Hinton MD   Insurance Coverage: [employer]  Readmission Within the last 30 days  planned readmission    Advance Directives (for Healthcare)?  Does patient have advance directive?: No  Patient does not have Advance Directive: Patient/Family declines further information  Does patient have current OOH DNR form?: No  Patient does not have current OOH DNR form: Patient/Family declines further information  Does patient have current POLST?: No  Patient does not have current POLST: Patient/Family declines further information  Does patient have mental health advance directive?: No  Patient does not have Mental Health Advance Directive: Patient/Family declines further information        Information       Living Arrangements  Arrived From: home  Current Living Arrangements: home  People in Home: child(brianna), dependent, spouse  Able to Return to Prior Arrangements: yes    Housing Stability and Financial Resources (SDOH)       Current Outpatient/Agency/Support Group       Type of Current Home Care Services       Assistive Device/Animal Currently Used at Home  none    Functional Status Comments       IADL Comments       Employment/ Status       Concerns to be Addressed  no discharge needs identified, care coordination/care conferences    Current Discharge Risk       Anticipated Changes Related to Illness  none    Transportation Concerns (SDOH)  Transportation Concerns: car, none  Transportation Anticipated: family or friend will provide    Concerns Comments       Anticipated Clinical Needs       Anticipated Discharge Plan   Anticipated Discharge Disposition: home with assistance, home without assistance or  services  Patient/Family Anticipates Transition to: home, home with family  Patient/Family Anticipated Services at Transition: none  Screening complete: yes

## 2023-02-27 NOTE — NURSING NOTE
Patient stable this shift. Developed new rash on chest this evening. Paged Dr. Shipman to make aware. To the bedside to assess. New order for IV benadryl given. Patient asymptomatic at this time. Chemotherapy continues to infuse per order. Call bell in reach. Continuing to monitor.  at the bedside.

## 2023-02-27 NOTE — PLAN OF CARE
Problem: Adult Inpatient Plan of Care  Goal: Plan of Care Review  2/27/2023 0512 by Floresita Shen RN  Outcome: Progressing  Flowsheets (Taken 2/27/2023 0512)  Progress: no change  Plan of Care Reviewed With:   patient   spouse  Outcome Summary: Received pt as direct admission from home for next cycle of chemotherapy to be started 2/27. Pt accompanied by her spouse, only complaints being feeling tired and slightly nauseated. Doctor to bedside for admission eval., IV Team here to access RCW port and place periph. IV per MD request, and to draw labs. Pt medicated with Phenergan PO as ordered for c/o nausea.Mg level 1.4, repleted with Mg oxide 400mg PO as ordered by doctor.  2/27/2023 0512 by Floresita Shen RN  Outcome: Progressing  Goal: Patient-Specific Goal (Individualized)  2/27/2023 0512 by Floresita Shen RN  Outcome: Progressing  2/27/2023 0512 by Floresita Shen RN  Outcome: Progressing  Goal: Absence of Hospital-Acquired Illness or Injury  2/27/2023 0512 by Floresita Shen RN  Outcome: Progressing  2/27/2023 0512 by Floresita Shen RN  Outcome: Progressing  Goal: Optimal Comfort and Wellbeing  2/27/2023 0512 by Floresita Shen RN  Outcome: Progressing  2/27/2023 0512 by Floresita Shen RN  Outcome: Progressing     Problem: Nausea and Vomiting  Goal: Fluid and Electrolyte Balance  Outcome: Progressing     Problem: Electrolyte Imbalance  Goal: Electrolyte Balance  Outcome: Progressing   Plan of Care Review  Plan of Care Reviewed With: patient, spouse  Progress: no change  Outcome Summary: Received pt as direct admission from home for next cycle of chemotherapy to be started 2/27. Pt accompanied by her spouse, only complaints being feeling tired and slightly nauseated. Doctor to bedside for admission eval., IV Team here to access RCW port and place periph. IV per MD request, and to draw labs. Pt medicated with Phenergan PO as ordered for c/o nausea.Mg level 1.4, repleted with Mg oxide 400mg PO as  ordered by doctor.

## 2023-02-27 NOTE — PROGRESS NOTES
R2 Gyn.     Paged by RN patient with development of new rash. Proceeded to evaluate patient. Alondra has a rash on her anterior chest wall that her nurse noticed. Alondra states she feels well. She denies associated pruritus, shortness of breath, wheezing, or difficulty swallowing. She admits to having similar rash in the past. Possibly contact dermatitis due to adhesive tape with port site. On exam she is resting comfortably, in no respiratory distress with anterior lung fields clear to auscultation throughout. No wheezing/rhonchi/rales. Scattered hives on anterior chest wall. Will administer IV benadryl 25 mg and continue to monitor.     Discussed with Dr. Smith.     Lay Shipman,  PGY2  Obstetrics & Gynecology   Pager #[phone]

## 2023-02-27 NOTE — NURSING NOTE
Patient noted to have rash on her chest and left cheek. Paged #[phone] Gyn resident to make aware. Patient asymptomatic at this time. Will continue to monitor.

## 2023-02-27 NOTE — PROGRESS NOTES
Gynecology-Oncology Progress Note    Subjective     Interval History:   Overnight no acute events. She states that she is tired and has baseline nausea. Additionally reports continued brown discharge from vagina that is at her baseline. Denies fevers, chills, extremity swelling/pain, abdominal pain, chest pain, SOB, and any other sx or concerns at this time.     Objective     Vital signs in last 24 hours:  Temp:  [36.7 °C (98.1 °F)] 36.7 °C (98.1 °F)  Heart Rate:  [63-78] 63  Resp:  [18] 18  BP: (111-113)/(54-67) 113/67      Exam  General Appearance: Alert, cooperative, no acute distress. RIJ port in place.   Lungs: Clear to auscultation bilaterally, respirations unlabored  Heart: Regular rate and rhythm  Abdomen: soft, nontender to palpation  Extremities: no edema or calf tenderness    Labs    CBC with Diff  WBC   Date Value Ref Range Status   02/26/2023 6.03 3.80 - 10.50 K/uL Final     Comment:     ALL RESULTS HAVE BEEN CHECKED     RBC   Date Value Ref Range Status   02/26/2023 3.33 (L) 3.93 - 5.22 M/uL Final     HGB   Date Value Ref Range Status   02/26/2023 9.0 (L) 11.8 - 15.7 g/dL Final     HCT   Date Value Ref Range Status   02/26/2023 25.6 (L) 35.0 - 45.0 % Final     MCV   Date Value Ref Range Status   02/26/2023 76.9 (L) 83.0 - 98.0 fL Final     MCH   Date Value Ref Range Status   02/26/2023 27.0 (L) 28.0 - 33.2 pg Final     MCHC   Date Value Ref Range Status   02/26/2023 35.2 32.2 - 35.5 g/dL Final     RDW   Date Value Ref Range Status   02/26/2023 12.8 11.7 - 14.4 % Final     PLT   Date Value Ref Range Status   02/26/2023 66 (L) 150 - 369 K/uL Final     Comment:     RESULTS OBTAINED AFTER VORTEXING TO ELIMINATE PLT CLUMPS     MPV   Date Value Ref Range Status   02/26/2023 10.4 9.4 - 12.3 fL Final     NEUTROABS   Date Value Ref Range Status   02/26/2023 2.32 1.70 - 7.00 K/uL Final     ATYPABS   Date Value Ref Range Status   02/13/2023 0.03 (H) <=0.00 K/uL Final     MONOABS   Date Value Ref Range Status    02/26/2023 1.03 (H) 0.28 - 0.80 K/uL Final     EOSABS   Date Value Ref Range Status   02/26/2023 0.00 (L) 0.04 - 0.36 K/uL Final     BASOABS   Date Value Ref Range Status   02/26/2023 0.04 0.01 - 0.10 K/uL Final     NEUTROPHILS   Date Value Ref Range Status   02/26/2023 38.4 % Final     LYMPHOCYTES   Date Value Ref Range Status   02/26/2023 38.8 % Final     MONOCYTES   Date Value Ref Range Status   02/26/2023 17.1 % Final     EOSINOPHILS   Date Value Ref Range Status   02/26/2023 0.0 % Final     BASOPHILS   Date Value Ref Range Status   02/26/2023 0.7 % Final       Results from last 7 days   Lab Units 02/26/23 2055   SODIUM mEQ/L 134*   POTASSIUM mEQ/L 3.8   CHLORIDE mEQ/L 103   CO2 mEQ/L 21*   BUN mg/dL 13   CREATININE mg/dL 0.9   GLUCOSE mg/dL 100*   CALCIUM mg/dL 8.3*      Magnesium   Date Value Ref Range Status   02/26/2023 1.4 (L) 1.8 - 2.5 mg/dL Final        Assessment/Plan     Alondra Villela is a 32 y.o. 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     #GTN  - Vitals stable   - S/p EP induction chemotherapy 1/13-1/14/23 and 1/20-1/23/21.  - S/p Etoposide, Dactinomycin, and Methotrexate 1/30-2/2.  - S/p Etoposide and Cisplatin 2/2.  - S/p cycle 2 Etoposide, Dactinomycin, and Methotrexate 2/13.   - S/p Etoposide and Cisplatin 2/20.  - For start of cycle 3 today with EMA-EP protocol. For Etoposide, Dactomtcin, and MTX through right IJ port. All other meds through peripheral IV.  - continue supportive meds with PRN phenergan for nausea    #Severe Neutropenia  - ANC on admission (2/26) improved from prior at 2.3  - Continue neutropenic precautions  - IV meds through peripheral IV, chemo through port   - Receiving filgrastim on days not receiving chemo    #Hyperthyroidism  - No tachycardia on vitals   - last TSH 1.79 on 2/6/23  - previously on methimazole and lopressor, no medications at this time per endocrinology (Dr. Hodge)    #Depression  - mood stable  - continue Wellbutrin  mg qHS  PO and Protriptyline 15 mg qHS PO (protryptiline ordered as nonformulary)    #Hx of DVT in RUE  - continue Eliquis 5 mg BID PO.     #Hypomagnesium   - Mg on admission 1.4  - Mag ox 400 mg x1 given, for one additional dose this AM    #Diet:  - regular    #DVT ppx:  - Eliquis as above and SCDs in bed     To be discussed with Dr. Mcmullen.     Kyleigh Hoang MD

## 2023-02-27 NOTE — PROGRESS NOTES
Gynecology Progress Note    Subjective     Doing well, finishing chemo at this time. Family member at bedside with patient and RN in room. She reports continued mild HA since this AM, requesting tylenol. Additionally reporting continued scant dark brown vaginal discharge. Denies fevers, chills, abdominal pain, chest pain, SOB, vision changes, extremity pain/swelling, and any other sx or concerns at this time.       Objective     Vital signs in last 24 hours:  Temp:  [36.7 °C (98.1 °F)] 36.7 °C (98.1 °F)  Heart Rate:  [63-78] 66  Resp:  [18] 18  BP: (111-120)/(54-77) 120/77    Exam  General Appearance: Alert, cooperative, no acute distress. RIJ port in place.   Lungs: Clear to auscultation bilaterally, respirations unlabored  Heart: Regular rate and rhythm  Abdomen: soft, nontender to palpation  Extremities: no edema or calf tenderness    Labs  Results from last 7 days   Lab Units 02/26/23 2055   WBC K/uL 6.03   HEMOGLOBIN g/dL 9.0*   HEMATOCRIT % 25.6*   PLATELETS K/uL 66*      Results from last 7 days   Lab Units 02/26/23 2055   SODIUM mEQ/L 134*   POTASSIUM mEQ/L 3.8   CHLORIDE mEQ/L 103   CO2 mEQ/L 21*   BUN mg/dL 13   CREATININE mg/dL 0.9   GLUCOSE mg/dL 100*   CALCIUM mg/dL 8.3*         hCG Quant   Date Value Ref Range Status   02/26/2023 428.1 (H) <5.0 IU/L (mIU/mL) Final     Comment:     Approx. Gestational Age   Approx. hCG Range         (Weeks)                  (IU/L)          0.2-1                    5-50            1-2                               2-3                  100-5000            3-4                  500-10,000            4-5                 1000-50,000            5-6               10,000-100,000            6-8               15,000-200,000            8-12              10,000-100,000         Assessment/Plan     Alondra Villela is a 32 y.o. 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      #GTN  - Vitals stable   - S/p EP induction chemotherapy 1/13-1/14/23 and  1/20-1/23/21.  - S/p Etoposide, Dactinomycin, and Methotrexate 1/30-2/2.  - S/p Etoposide and Cisplatin 2/2.  - S/p cycle 2 Etoposide, Dactinomycin, and Methotrexate 2/13.   - S/p Etoposide and Cisplatin 2/20.  - Finishing day 1 of cycle 3A at this time of the EMA-EP protocol. For Etoposide, Dactomtcin, and MTX through right IJ port tomorrow. All other meds through peripheral IV.  - continue supportive meds with PRN phenergan for nausea  - tylenol 975 mg PO x1 given for HA sx.      #History of Neutropenia  - ANC on admission (2/26) improved from prior at 2320  - Continue neutropenic precautions  - IV meds through peripheral IV, chemo through port   - Receiving filgrastim on days not receiving chemo     #Hyperthyroidism  - No tachycardia on vitals   - last TSH 1.79 on 2/6/23  - previously on methimazole and lopressor, no medications at this time per endocrinology (Dr. Hodge)     #Depression  - mood stable  - continue Wellbutrin  mg qHS PO and Protriptyline 15 mg qHS PO (protryptiline ordered as nonformulary)     #Hx of DVT in RUE  - continue Eliquis 5 mg BID PO.      #Hypomagnesium   - Mg on admission 1.4  - s/p two doses of Mag ox 400 mg      #Diet:  - regular     #DVT ppx:  - Eliquis as above and SCDs in bed     To be discussed with Dr. Mcmullen.     Kyleigh Hoang MD

## 2023-02-27 NOTE — H&P
Gynecology History and Physical    HPI     Patient is a 32 y.o. female  with stage IV gestational trophoblastic neoplasia who presents as directed mission for chemotherapy.     Alondra had an uncomplicated repeat  section in 2022.  She subsequently presented to [facility] 1/10/2023 with symptoms of dyspnea complicated by tachycardia and anemia.  Beta-hCG upon presentation was>461,700 (on adjusted assay).  CT 23 demonstrated pulmonary and liver metastasis.  There is initial concern for brain metastasis however this was ruled out with further brain imaging, MRI brain no signs of metastic disease 23.  WHO score was 17.      Alondra received induction chemotherapy with EP  -  and then  - .  This was through a PICC line.  Subsequent chemotherapy included etoposide, dactinomycin, and methotrexate  - , etoposide and cisplatin , etoposide and cisplatin .  She has been receiving filgastrim on days she does not receive chemotherapy. Last ANC 4.2 on .    Recent beta-hcg 1445 on 23. Plan for EMA-EP for 3 cycles past negative hcg values.      admission was complicated by RUE DVT associated with PICC line. PICC line was removed and a port was placed RUE .  She has been taking Eliquis 5 mg twice daily, last dose this morning.    Alondra was found to be in thyroid storm during initial presentation and  as evaluated by endocrinology.  She was started on PTU which was changed to methimazole. She was discharged home on methimazole and Lopressor. On 23 endocrinology Dr. Hodge informed Alondra that she can discontinue methimazole and lopressor.     Alondra reports she feels well today.  She denies recent fevers, chills, headache, chest pain, shortness of breath, emesis, abdominal or bodily pain.  She admits to baseline nausea.        OB History:   OB History    Para Term  AB Living   2 2 2 0 0 2   SAB IAB Ectopic Multiple Live Births   0 0 0  0 2      # Outcome Date GA Lbr Kelvin/2nd Weight Sex Delivery Anes PTL Lv   2 Term            1 Term                Medical History:   Past Medical History:   Diagnosis Date   • Hx antineoplastic chemo        Surgical History:   Past Surgical History:   Procedure Laterality Date   •  SECTION      x2       Allergies: Patient has no known allergies.    Prior to Admission medications    Medication Sig Start Date End Date Taking? Authorizing Provider   magic mouthwash (BENADRYL-MAALOX-CARAFATE) Swish and spit 15 mL every 4 (four) hours as needed for stomatitis. 23   Estela Bell PA C   apixaban (ELIQUIS) 5 mg tablet Take 1 tablet (5 mg total) by mouth 2 (two) times a day. 2/8/23 3/10/23  Lay Shipman DO   benzonatate (TESSALON) 100 mg capsule Take 100 mg by mouth 3 (three) times a day as needed for cough.    Provider, Ricardo Carr MD   buPROPion XL (WELLBUTRIN XL) 150 mg 24 hr tablet Take 150 mg by mouth nightly. 12/15/22   Provider, Ricardo Carr MD   filgrastim-sndz (ZARXIO) 480 mcg/0.8 mL syringe injection Inject 0.8 mL (480 mcg total) under the skin See admin instr for 15 doses. Inject under the skin on days 3, 4, 5, 9, 10, 11 and 12 of chemotherapy cycle. 2/15/23   Shantelle Mcmullen MD   hydrocortisone 2.5 % cream APPLY THIN COAT TO AFFECTED AREA TWICE A DAY 23   Provider, Ricardo Carr MD   lidocaine-prilocaine (EMLA) cream Apply topically as needed for mild pain. Apply a quarter size amount to skin over port site approximately one hour prior to chemotherapy. Cover with saran wrap. 23   Temitope Peres PA C   magic mouthwash with nystatin (BENADRYL-MAALOX-LIDOCAINE-NYSTATIN) Swish and spit 15 mL every 4 (four) hours as needed for stomatitis. 23   Lorena Hogue MD   methIMAzole (TAPAZOLE) 5 mg tablet Take 2.5 mg by mouth daily.    Provider, Ricardo Carr MD   norethindrone-e.estradioL-iron (JUNEL FE ) 1 mg-20 mcg (21)/75 mg (7) per tablet Take 1 tablet by mouth  daily. 9/30/22 9/30/23  Provider, Ricardo Carr MD   ondansetron ODT (ZOFRAN-ODT) 4 mg disintegrating tablet Take 1-2 tablets (4-8 mg total) by mouth every 6 (six) hours as needed for nausea or vomiting (Nausea/Vomiting). Maximum 32mg per day. 1/21/23   Artur Macias MD   prochlorperazine (COMPAZINE) 5 mg tablet Take 1-2 tablets (5-10 mg total) by mouth every 6 (six) hours as needed for nausea or vomiting. This medication can make you sleepy.  Patient not taking: Reported on 2/26/2023 1/21/23   Artur Macias MD   promethazine (PHENERGAN) 12.5 mg tablet Take 1 tablet (12.5 mg total) by mouth every 6 (six) hours as needed for nausea or vomiting. 2/8/23 3/10/23  Lay Shipman,    protriptyline (VIVACTIL) 5 mg tablet Take 3 tablets (15 mg total) by mouth daily. 1/21/23 2/20/23  Artur Macias MD   triamcinolone (KENALOG) 0.1 % ointment Apply topically 2 times daily. 8/24/22   Provider, Ricardo Carr MD       Review of Systems  All systems were reviewed and normal with the exception of the above.     Objective     Vital Signs for the last 24 hours:  Temp:  [36.7 °C (98.1 °F)] 36.7 °C (98.1 °F)  Heart Rate:  [63-78] 63  Resp:  [18] 18  BP: (111-113)/(54-67) 113/67    Exam  General Appearance: Alert, cooperative, no acute distress  Lungs: Clear to auscultation bilaterally, respirations unlabored  Chest wall: Right port site C/D/I  Heart: Regular rate and rhythm, +S1S2  Abdomen: +BS. Soft, nontender with no rebound/rigidity/guading  Extremities: no edema or calf tenderness b/l    Admission labs pending at this time.     No new imaging.      Assessment/Plan     Alondra Villela is a 32 y.o. female admitted for chemotherapy management of stage IV gestational trophoblastic neoplasia.     1. Vitals stable  2. GTN  a. S/p EP induction chemotherapy 1/13-1/14/23 and 1/20-1/23/21.   b. S/p Etoposide, Dactinomycin, and Methotrexate 1/30-2/2.   c. S/p Etoposide and Cisplatin 2/2.   d. S/p cycle 2 Etoposide, Dactinomycin,  and Methotrexate 2/13.   e. S/p Etoposide and Cisplatin 2/20.   f. [facility]/27 Etoposide, Dactinomycin, and Methotrexate.   g. Right port to be accessed.   h. Will evaluate admission labs tonight prior to start of chemotherapy.   i. Continue supportive care- phenergen prn for nausea per patient request.   3. Severe neutropenia  a. Recent ANC improved 4.2 2/17.   b. Continue neutropenic precautions.   c. Plan for IV medications through peripheral IV except chemotherapy through port.   d. Monitor CBC with differential.   e. Receiving filgastrim on non-chemo days.  4. Hyperthyroidism  a. Last TSH 1.79 on 2/6/21.   b. Per Endocrinology methimazole and lopressor no longer indicated.   c. Stable not on medication.   5. Depression  a. Continue home medications Wellbutrin  mg PO HS and Protriptyline 15 mg PO HS (non-formulary request).   6. Hx DVT RUE  a. Continue home medication eliquis 5 mg PO BID.      Discussed with Dr. Hogue.    Please page #[phone] for questions/concerns.    Lay Shipman, DO PGY2  Obstetrics & Gynecology   Pager #[phone]

## 2023-02-28 VITALS
SYSTOLIC BLOOD PRESSURE: 118 MMHG | OXYGEN SATURATION: 100 % | RESPIRATION RATE: 16 BRPM | HEART RATE: 89 BPM | BODY MASS INDEX: 28.42 KG/M2 | WEIGHT: 166.5 LBS | HEIGHT: 64 IN | DIASTOLIC BLOOD PRESSURE: 65 MMHG | TEMPERATURE: 97.6 F

## 2023-02-28 PROBLEM — H93.13 TINNITUS OF BOTH EARS: Status: ACTIVE | Noted: 2023-02-28

## 2023-02-28 PROCEDURE — 63700000 HC SELF-ADMINISTRABLE DRUG: Performed by: STUDENT IN AN ORGANIZED HEALTH CARE EDUCATION/TRAINING PROGRAM

## 2023-02-28 PROCEDURE — 63700000 HC SELF-ADMINISTRABLE DRUG

## 2023-02-28 PROCEDURE — 25800000 HC PHARMACY IV SOLUTIONS: Performed by: STUDENT IN AN ORGANIZED HEALTH CARE EDUCATION/TRAINING PROGRAM

## 2023-02-28 PROCEDURE — 99239 HOSP IP/OBS DSCHRG MGMT >30: CPT | Performed by: OBSTETRICS & GYNECOLOGY

## 2023-02-28 PROCEDURE — 63600000 HC DRUGS/DETAIL CODE: Performed by: STUDENT IN AN ORGANIZED HEALTH CARE EDUCATION/TRAINING PROGRAM

## 2023-02-28 PROCEDURE — 25000000 HC PHARMACY GENERAL: Performed by: STUDENT IN AN ORGANIZED HEALTH CARE EDUCATION/TRAINING PROGRAM

## 2023-02-28 RX ORDER — ONDANSETRON 4 MG/1
8 TABLET, ORALLY DISINTEGRATING ORAL EVERY 6 HOURS PRN
Status: DISCONTINUED | OUTPATIENT
Start: 2023-02-28 | End: 2023-02-28 | Stop reason: HOSPADM

## 2023-02-28 RX ORDER — LEUCOVORIN CALCIUM 5 MG/1
15 TABLET ORAL
Qty: 30 TABLET | Refills: 2 | Status: SHIPPED | OUTPATIENT
Start: 2023-02-28 | End: 2023-02-28 | Stop reason: SDUPTHER

## 2023-02-28 RX ORDER — LEUCOVORIN CALCIUM 5 MG/1
15 TABLET ORAL
Status: DISCONTINUED | OUTPATIENT
Start: 2023-02-28 | End: 2023-02-28 | Stop reason: HOSPADM

## 2023-02-28 RX ORDER — EPINEPHRINE 1 MG/ML
0.5 INJECTION, SOLUTION INTRAMUSCULAR; SUBCUTANEOUS ONCE AS NEEDED
Status: DISCONTINUED | OUTPATIENT
Start: 2023-02-28 | End: 2023-02-28 | Stop reason: HOSPADM

## 2023-02-28 RX ORDER — HEPARIN 100 UNIT/ML
500 SYRINGE INTRAVENOUS ONCE
Status: COMPLETED | OUTPATIENT
Start: 2023-02-28 | End: 2023-02-28

## 2023-02-28 RX ORDER — LEUCOVORIN CALCIUM 5 MG/1
15 TABLET ORAL
Qty: 30 TABLET | Refills: 2 | Status: SHIPPED | OUTPATIENT
Start: 2023-02-28 | End: 2023-03-01

## 2023-02-28 RX ORDER — DIPHENHYDRAMINE HCL 50 MG/ML
25 VIAL (ML) INJECTION ONCE AS NEEDED
Status: DISCONTINUED | OUTPATIENT
Start: 2023-02-28 | End: 2023-02-28 | Stop reason: HOSPADM

## 2023-02-28 RX ORDER — FAMOTIDINE 10 MG/ML
20 INJECTION INTRAVENOUS ONCE AS NEEDED
Status: DISCONTINUED | OUTPATIENT
Start: 2023-02-28 | End: 2023-02-28 | Stop reason: HOSPADM

## 2023-02-28 RX ADMIN — ONDANSETRON 8 MG: 4 TABLET, ORALLY DISINTEGRATING ORAL at 08:47

## 2023-02-28 RX ADMIN — LEUCOVORIN CALCIUM 15 MG: 5 TABLET ORAL at 12:21

## 2023-02-28 RX ADMIN — APIXABAN 5 MG: 5 TABLET, FILM COATED ORAL at 08:46

## 2023-02-28 RX ADMIN — ONDANSETRON HYDROCHLORIDE: 2 INJECTION, SOLUTION INTRAMUSCULAR; INTRAVENOUS at 10:34

## 2023-02-28 RX ADMIN — DEXTROSE MONOHYDRATE 184 MG: 50 INJECTION, SOLUTION INTRAVENOUS at 10:56

## 2023-02-28 RX ADMIN — DACTINOMYCIN 0.5 MG: 0.5 INJECTION, POWDER, LYOPHILIZED, FOR SOLUTION INTRAVENOUS at 12:07

## 2023-02-28 RX ADMIN — PROMETHAZINE HYDROCHLORIDE 12.5 MG: 25 TABLET ORAL at 14:30

## 2023-02-28 RX ADMIN — HEPARIN 500 UNITS: 100 SYRINGE at 13:01

## 2023-02-28 NOTE — NURSING NOTE
IV out, port deaccessed per orders. Tolerated chemo well. Discharged home with  after chemo. Verbalizes understanding of all DC instructions and meds.

## 2023-02-28 NOTE — PLAN OF CARE
Plan of Care Review  Plan of Care Reviewed With: patient  Progress: no change  Outcome Summary: AAOx3, Vss  stable. Patient denial any pain or discomfort.  Patient continue on chemo therapy. Patient requested medication for nausea , which was given x1 with positive effect noted. The RCW port flushed and patent. Safety checks maintained. Call light within reach. Will continue to monitor.

## 2023-02-28 NOTE — PLAN OF CARE
Care Coordination Discharge Plan Summary   Date: 2/28/2023   Time: 3:32 PM    Patient Name: Alondra Villela  Medical Record Number: [id number]  YOB: 1990  Room/BED: 0378    General Information  Patient-Specific Goals (Include Timeframe)     PCP: Sunil Hinton MD   Insurance Coverage: [employer]  Readmission Within the last 30 days  planned readmission    Advance Directives (for Healthcare)?  Does patient have advance directive?: No  Patient does not have Advance Directive: Patient/Family declines further information  Does patient have current OOH DNR form?: No  Patient does not have current OOH DNR form: Patient/Family declines further information  Does patient have current POLST?: No  Patient does not have current POLST: Patient/Family declines further information  Does patient have mental health advance directive?: No  Patient does not have Mental Health Advance Directive: Patient/Family declines further information        Information       Living Arrangements  Arrived From: home  Current Living Arrangements: home  People in Home: child(brianna), dependent, spouse  Able to Return to Prior Arrangements: yes    Housing Stability and Financial Resources (SDOH)       Current Outpatient/Agency/Support Group       Type of Current Home Care Services       Assistive Device/Animal Currently Used at Home  none    Functional Status Comments       IADL Comments       Employment/ Status       Concerns to be Addressed  no discharge needs identified, care coordination/care conferences    Current Discharge Risk       Anticipated Changes Related to Illness  none    Transportation Concerns (SDOH)  Transportation Concerns: car, none  Transportation Anticipated: family or friend will provide    Concerns Comments       Anticipated Clinical Needs       Anticipated Discharge Plan   Anticipated Discharge Disposition: home with assistance, home without assistance or services  Patient/Family  Anticipates Transition to: home, home with family  Patient/Family Anticipated Services at Transition: none  Screening complete: yes    Discharge Assessment  Concerns to be Addressed: no discharge needs identified, care coordination/care conferences  Anticipated Changes Related to Illness: none    Concerns Comments:      Patient Choice            Discharge Transportation            Discharge Barriers        Continued Care and Services - Admitted Since 2/26/2023    Coordination has not been started for this encounter.

## 2023-02-28 NOTE — PROGRESS NOTES
Gynecologic Oncology Progress Note    Subjective     Interval History: Leslie had a rash overnight that was treated with 25 mg of IV benadryl with improvement of the rash.     This morning, leslie reports feeling tired and continues to have nausea, that she feels is worsening. She continues to have brown vaginal discharge. She denies fevers, chills, extremity swelling/pain, abdominal pain, chest pain, and shortness of breath.     Objective     Vital signs in last 24 hours:  Temp:  [36.9 °C (98.5 °F)] 36.9 °C (98.5 °F)  Heart Rate:  [66-88] 73  Resp:  [16-18] 16  BP: (120-133)/(77-85) 121/77    Exam  General Appearance: Alert, cooperative, no acute distress  Lungs: Clear to auscultation bilaterally, respirations unlabored  Heart: Regular rate and rhythm  Abdomen: soft, non-tender, non-distended. No rebound, rigidity, or guarding.  Extremities: no edema or calf tenderness; + SCDs    Labs  Results from last 7 days   Lab Units 02/26/23 2055   WBC K/uL 6.03   HEMOGLOBIN g/dL 9.0*   HEMATOCRIT % 25.6*   PLATELETS K/uL 66*      Results from last 7 days   Lab Units 02/26/23 2055   SODIUM mEQ/L 134*   POTASSIUM mEQ/L 3.8   CHLORIDE mEQ/L 103   CO2 mEQ/L 21*   BUN mg/dL 13   CREATININE mg/dL 0.9   GLUCOSE mg/dL 100*   CALCIUM mg/dL 8.3*      Assessment/Plan     Leslie Villela is a 32 y.o. admitted for inpatient chemotherapy for stage IV GTN     1. GTN   - Vitals stable overnight  - S/p EP induction chemotherapy 1/13-1/14/23 and 1/20-1/23/21.  - S/p Etoposide, Dactinomycin, and Methotrexate 1/30-2/2.  - S/p Etoposide and Cisplatin 2/2.  - S/p cycle 2 Etoposide, Dactinomycin, and Methotrexate 2/13.   - S/p Etoposide and Cisplatin 2/20.  - She started cycle 3 on 2/27 with EMA-EP protocol. For Etoposide, Dactomtcin, and MTX through right IJ port. All other meds through peripheral IV.  - continue supportive meds with PRN phenergan for nausea. Can consider additional of zofran given uncontrolled nausea     2. Neutropenia,  resolved    - ANC on admission 2.3  - Continue neutropenic precautions  - IV meds through peripheral IV, chemo through port   - Receiving filgrastim on days not receiving chemo    3. Hyperthyroidism  - Vitals stable. Asymptomatic   - Last TSH 1.79 on 2/6/23  - Previously on methimazole and lopressor, no medications at this time per endocrinology (Dr. Hodge)    4. Depression  - Mood stable   - Continue Wellbutrin  mg nightly PO and protriptyline 15 mg nightly (non-formulary)    5. History of DVT in RUE  - Continue eliquis 5 mg BID    6.  Hypomagnesium  - Mg 1.4 on admission  - s/p Mag ox 400 mg x 2 doses    7. Diet  - Regular     8. VTE ppx  - As above     Will review with Dr. Benedict Reeves MD

## 2023-03-01 DIAGNOSIS — O01.9 GESTATIONAL TROPHOBLASTIC NEOPLASM: ICD-10-CM

## 2023-03-02 ENCOUNTER — DOCUMENTATION (OUTPATIENT)
Dept: GYNECOLOGIC ONCOLOGY | Facility: CLINIC | Age: 33
End: 2023-03-02
Payer: COMMERCIAL

## 2023-03-02 ENCOUNTER — OFFICE VISIT (OUTPATIENT)
Dept: AUDIOLOGY | Facility: HOSPITAL | Age: 33
End: 2023-03-02
Attending: OBSTETRICS & GYNECOLOGY
Payer: COMMERCIAL

## 2023-03-02 DIAGNOSIS — H93.13 TINNITUS OF BOTH EARS: Primary | ICD-10-CM

## 2023-03-02 DIAGNOSIS — E83.42 HYPOMAGNESEMIA: Primary | ICD-10-CM

## 2023-03-02 DIAGNOSIS — Z51.11 ENCOUNTER FOR ANTINEOPLASTIC CHEMOTHERAPY: ICD-10-CM

## 2023-03-02 DIAGNOSIS — O01.9 GESTATIONAL TROPHOBLASTIC NEOPLASM: ICD-10-CM

## 2023-03-02 PROCEDURE — 92567 TYMPANOMETRY: CPT

## 2023-03-02 PROCEDURE — 92588 EVOKED AUDITORY TST COMPLETE: CPT

## 2023-03-02 PROCEDURE — 92557 COMPREHENSIVE HEARING TEST: CPT

## 2023-03-02 NOTE — LETTER
"March 2, 2023     Antonio Smith MD  [address], [facility] [address]  [place] [address]    Patient: Alondra Villela   YOB: 1990   Date of Visit: 3/2/2023       Dear Dr. Smith:    Thank you for referring Alondra Villela to me for evaluation. Below are my notes for this consultation.    If you have questions, please do not hesitate to call me. I look forward to following your patient along with you.         Sincerely,        Tamera Whitt        CC: No Recipients  Aishwarya Banerjee AuD  3/2/2023  1:58 PM  Signed  Audiological Evaluation Report      Date of Service: 3/2/2023     Referring provider: Antonio Smith Md  [address], [place] [address]  [place] [address]    Otologic/Audiologic History:  Ms. Alondra Villela was seen for audiological evaluation. She reports an onset of bilateral intermittent tinnitus approximately 1 week ago. She started chemotherapy treatment 7 weeks ago for \"gestational trophoblastic neoplasm\". Ms. Villela does not report any subjective difficulty hearing. She also denies otologic symptoms such as vertigo, aural pressure/fullness, otalgia or drainage. She has no history of ear infections/surgery, noise exposure, head trauma, or family history of hearing loss.     Otoscopy:  Otoscopic examination revealed partially occluding cerumen for the right ear blocking visualization of tympanic membrane. Left canal was clear with good visualization of the tympanic membrane.     Audiometry: Hearing sensitivity was assessed using headphones with good reliability. The results show bilateral hearing within normal limits. Word recognition scores are 100% bilaterally for monosyllabic words presented at comfortably loud hearing levels using monitored live voice.   (See Audiogram.)      Tympanometry: Tympanograms were within normal limits for volume, compliance, and pressure bilaterally. This is consistent with bilateral normal middle ear function.    Otoacoustic Emissions: A " "diagnostic DPOAE test was performed for 12 frequencies. The results were as follows: responses were detected for 11 out of 12 frequencies for the right ear (one was reduced, likely due to wax) and 12 out of 12 frequencies for the left ear. This is consistent with audiologic testing and suggests normal hair cell function.     Impression:    · Bilateral subjective tinnitus.  · Bilateral normal middle ear function.   · Bilateral normal hearing sensitivity.       Recommendations:   · Medical follow-up to review audiometric results and right cerumen removal.   · For tinnitus management: Use a \"masker\" at night for sleep if bothersome (fan, sound machine, etc.).   · Return as recommended or if tinnitus worsens/a change in hearing is perceived.     Aishwarya Banerjee, AuD      "

## 2023-03-02 NOTE — PROGRESS NOTES
Inpatient reservation request made for 3/12/23 chemotherapy. Faxed request to Eveline Dowling and Mellisa Vang at [phone]

## 2023-03-02 NOTE — PROGRESS NOTES
Inpatient chemotherapy prior authorization pending via [facility] Portal for the below medications. Service date requested 2/26/23. Faxed clinicals to [facility] at [phone]. Called [facility] reviewer Maria Eugenia at [phone] to inform her authorization was submitted.   -Etoposide ()  -Actinomycin ()  -Methotrexate ()  Pending authorization #: [id number]    Addendum 3/3/23: Faxed determination received from [facility] approving  , ,  for date of service 3/12/23. Letter scanned to patient chart.  Reference #: [id number]

## 2023-03-02 NOTE — PROGRESS NOTES
"Audiological Evaluation Report      Date of Service: 3/2/2023     Referring provider: Antonio Smith Md  [address], [facility] [address]  [place] [address]    Otologic/Audiologic History:  Ms. Alondra Villela was seen for audiological evaluation. She reports an onset of bilateral intermittent tinnitus approximately 1 week ago. She started chemotherapy treatment 7 weeks ago for \"gestational trophoblastic neoplasm\". Ms. Villela does not report any subjective difficulty hearing. She also denies otologic symptoms such as vertigo, aural pressure/fullness, otalgia or drainage. She has no history of ear infections/surgery, noise exposure, head trauma, or family history of hearing loss.     Otoscopy:  Otoscopic examination revealed partially occluding cerumen for the right ear blocking visualization of tympanic membrane. Left canal was clear with good visualization of the tympanic membrane.     Audiometry: Hearing sensitivity was assessed using headphones with good reliability. The results show bilateral hearing within normal limits. Word recognition scores are 100% bilaterally for monosyllabic words presented at comfortably loud hearing levels using monitored live voice.   (See Audiogram.)      Tympanometry: Tympanograms were within normal limits for volume, compliance, and pressure bilaterally. This is consistent with bilateral normal middle ear function.    Otoacoustic Emissions: A diagnostic DPOAE test was performed for 12 frequencies. The results were as follows: responses were detected for 11 out of 12 frequencies for the right ear (one was reduced, likely due to wax) and 12 out of 12 frequencies for the left ear. This is consistent with audiologic testing and suggests normal hair cell function.     Impression:    · Bilateral subjective tinnitus.  · Bilateral normal middle ear function.   · Bilateral normal hearing sensitivity.       Recommendations:   · Medical follow-up to review audiometric results and right " "cerumen removal.   · For tinnitus management: Use a \"masker\" at night for sleep if bothersome (fan, sound machine, etc.).   · Return as recommended or if tinnitus worsens/a change in hearing is perceived.     Aishwarya Banerjee, Tamera    "

## 2023-03-03 ENCOUNTER — APPOINTMENT (OUTPATIENT)
Dept: LAB | Facility: HOSPITAL | Age: 33
End: 2023-03-03
Attending: PHYSICIAN ASSISTANT
Payer: COMMERCIAL

## 2023-03-03 ENCOUNTER — TELEPHONE (OUTPATIENT)
Dept: GYNECOLOGIC ONCOLOGY | Facility: CLINIC | Age: 33
End: 2023-03-03
Payer: COMMERCIAL

## 2023-03-03 DIAGNOSIS — O01.9 GESTATIONAL TROPHOBLASTIC NEOPLASM: ICD-10-CM

## 2023-03-03 NOTE — TELEPHONE ENCOUNTER
Patient called answering service reporting lab was unable to draw her blood today for pre chemo labs. Left VM asking her to come 45 min earlier on Monday to allow time for blood draw from port.

## 2023-03-04 NOTE — TELEPHONE ENCOUNTER
Patient called answering service reporting throat hurts to swallow since throwing up yesterday. She accidentally swallowed some of her magic mouth wash yesterday and got nauseous and subsequent vomited. Since then swallowing anything hurts. Trying to just drink ice water. Denies white spots in mouth or tongue. Recommended eating only water ice and ice cream today. If pain improves then to go up on solid and room temp foods. Avoid bulky  fibrous foods and large chunks of meat.

## 2023-03-06 ENCOUNTER — OFFICE VISIT (OUTPATIENT)
Dept: GYNECOLOGIC ONCOLOGY | Facility: CLINIC | Age: 33
End: 2023-03-06
Attending: PHYSICIAN ASSISTANT
Payer: COMMERCIAL

## 2023-03-06 ENCOUNTER — HOSPITAL ENCOUNTER (OUTPATIENT)
Dept: INPATIENT UNIT | Facility: HOSPITAL | Age: 33
Discharge: HOME | End: 2023-03-06
Attending: PHYSICIAN ASSISTANT
Payer: COMMERCIAL

## 2023-03-06 VITALS
SYSTOLIC BLOOD PRESSURE: 109 MMHG | HEART RATE: 100 BPM | TEMPERATURE: 97.6 F | DIASTOLIC BLOOD PRESSURE: 57 MMHG | BODY MASS INDEX: 28.26 KG/M2 | OXYGEN SATURATION: 100 % | RESPIRATION RATE: 16 BRPM | HEIGHT: 64 IN | WEIGHT: 165.53 LBS

## 2023-03-06 VITALS
BODY MASS INDEX: 28.38 KG/M2 | OXYGEN SATURATION: 95 % | TEMPERATURE: 98.2 F | HEART RATE: 121 BPM | DIASTOLIC BLOOD PRESSURE: 78 MMHG | WEIGHT: 165.36 LBS | RESPIRATION RATE: 16 BRPM | SYSTOLIC BLOOD PRESSURE: 109 MMHG

## 2023-03-06 DIAGNOSIS — T45.1X5A CHEMOTHERAPY-INDUCED THROMBOCYTOPENIA: Primary | ICD-10-CM

## 2023-03-06 DIAGNOSIS — D64.81 ANTINEOPLASTIC CHEMOTHERAPY INDUCED ANEMIA: ICD-10-CM

## 2023-03-06 DIAGNOSIS — O01.9 GESTATIONAL TROPHOBLASTIC NEOPLASM: ICD-10-CM

## 2023-03-06 DIAGNOSIS — J02.9 SORE THROAT: ICD-10-CM

## 2023-03-06 DIAGNOSIS — O01.9 GESTATIONAL TROPHOBLASTIC NEOPLASM: Primary | ICD-10-CM

## 2023-03-06 DIAGNOSIS — T45.1X5A CHEMOTHERAPY-INDUCED THROMBOCYTOPENIA: ICD-10-CM

## 2023-03-06 DIAGNOSIS — D69.59 CHEMOTHERAPY-INDUCED THROMBOCYTOPENIA: Primary | ICD-10-CM

## 2023-03-06 DIAGNOSIS — D70.1 CHEMOTHERAPY INDUCED NEUTROPENIA (CMS/HCC): Primary | ICD-10-CM

## 2023-03-06 DIAGNOSIS — T45.1X5A CHEMOTHERAPY INDUCED NEUTROPENIA (CMS/HCC): Primary | ICD-10-CM

## 2023-03-06 DIAGNOSIS — D70.2 DRUG-INDUCED NEUTROPENIA (CMS/HCC): ICD-10-CM

## 2023-03-06 DIAGNOSIS — Z51.11 ENCOUNTER FOR ANTINEOPLASTIC CHEMOTHERAPY: Primary | ICD-10-CM

## 2023-03-06 DIAGNOSIS — D69.59 CHEMOTHERAPY-INDUCED THROMBOCYTOPENIA: ICD-10-CM

## 2023-03-06 DIAGNOSIS — T45.1X5A ANTINEOPLASTIC CHEMOTHERAPY INDUCED ANEMIA: ICD-10-CM

## 2023-03-06 LAB
ABO + RH BLD: NORMAL
ALBUMIN SERPL-MCNC: 4 G/DL (ref 3.4–5)
ALP SERPL-CCNC: 101 IU/L (ref 35–126)
ALT SERPL-CCNC: 17 IU/L (ref 11–54)
ANION GAP SERPL CALC-SCNC: 10 MEQ/L (ref 3–15)
AST SERPL-CCNC: 16 IU/L (ref 15–41)
BASOPHILS # BLD: 0.04 K/UL (ref 0.01–0.1)
BASOPHILS NFR BLD: 2 %
BILIRUB SERPL-MCNC: 0.3 MG/DL (ref 0.3–1.2)
BLD GP AB SCN SERPL QL: NEGATIVE
BUN SERPL-MCNC: 18 MG/DL (ref 8–20)
CALCIUM SERPL-MCNC: 8.8 MG/DL (ref 8.9–10.3)
CHLORIDE SERPL-SCNC: 107 MEQ/L (ref 98–109)
CO2 SERPL-SCNC: 20 MEQ/L (ref 22–32)
CREAT SERPL-MCNC: 1.1 MG/DL (ref 0.6–1.1)
D AG BLD QL: POSITIVE
DIFFERENTIAL METHOD BLD: ABNORMAL
EOSINOPHIL # BLD: 0.02 K/UL (ref 0.04–0.36)
EOSINOPHIL NFR BLD: 1 %
ERYTHROCYTE [DISTWIDTH] IN BLOOD BY AUTOMATED COUNT: 12.4 % (ref 11.7–14.4)
GFR SERPL CREATININE-BSD FRML MDRD: 57.2 ML/MIN/1.73M*2
GLUCOSE SERPL-MCNC: 100 MG/DL (ref 70–99)
HCG SERPL-ACNC: 253.9 IU/L (MIU/ML)
HCT VFR BLDCO AUTO: 24.1 % (ref 35–45)
HGB BLD-MCNC: 8.1 G/DL (ref 11.8–15.7)
HYPOCHROMIA BLD QL SMEAR: ABNORMAL
LABORATORY COMMENT REPORT: NORMAL
LYMPHOCYTES # BLD: 1.26 K/UL (ref 1.2–3.5)
LYMPHOCYTES NFR BLD: 68 %
MAGNESIUM SERPL-MCNC: 1.5 MG/DL (ref 1.8–2.5)
MCH RBC QN AUTO: 27.1 PG (ref 28–33.2)
MCHC RBC AUTO-ENTMCNC: 33.6 G/DL (ref 32.2–35.5)
MCV RBC AUTO: 80.6 FL (ref 83–98)
MICROCYTES BLD QL SMEAR: ABNORMAL
MONOCYTES # BLD: 0.11 K/UL (ref 0.28–0.8)
MONOCYTES NFR BLD: 6 %
NEUTROPHILS # BLD: 0.41 K/UL (ref 1.7–7)
NEUTS BAND # BLD: 0.02 K/UL (ref 0–0.53)
NEUTS BAND # BLD: 0.41 K/UL (ref 1.7–7)
NEUTS BAND NFR BLD: 1 %
NEUTS SEG NFR BLD: 22 %
PDW BLD AUTO: 9.3 FL (ref 9.4–12.3)
PLAT MORPH BLD: NORMAL
PLATELET # BLD AUTO: 36 K/UL (ref 150–369)
PLATELET # BLD EST: ABNORMAL 10*3/UL
POTASSIUM SERPL-SCNC: 4.1 MEQ/L (ref 3.6–5.1)
PROT SERPL-MCNC: 6.4 G/DL (ref 6–8.2)
RBC # BLD AUTO: 2.99 M/UL (ref 3.93–5.22)
S PYO DNA THROAT QL NAA+PROBE: NOT DETECTED
SODIUM SERPL-SCNC: 137 MEQ/L (ref 136–144)
SPECIMEN EXP DATE BLD: NORMAL
WBC # BLD AUTO: 1.86 K/UL (ref 3.8–10.5)

## 2023-03-06 PROCEDURE — 63600000 HC DRUGS/DETAIL CODE: Performed by: STUDENT IN AN ORGANIZED HEALTH CARE EDUCATION/TRAINING PROGRAM

## 2023-03-06 PROCEDURE — 86920 COMPATIBILITY TEST SPIN: CPT | Mod: 91

## 2023-03-06 PROCEDURE — 80053 COMPREHEN METABOLIC PANEL: CPT | Performed by: PHYSICIAN ASSISTANT

## 2023-03-06 PROCEDURE — 85025 COMPLETE CBC W/AUTO DIFF WBC: CPT | Performed by: PHYSICIAN ASSISTANT

## 2023-03-06 PROCEDURE — 96413 CHEMO IV INFUSION 1 HR: CPT

## 2023-03-06 PROCEDURE — 86850 RBC ANTIBODY SCREEN: CPT

## 2023-03-06 PROCEDURE — 96415 CHEMO IV INFUSION ADDL HR: CPT

## 2023-03-06 PROCEDURE — 3E043GC INTRODUCTION OF OTHER THERAPEUTIC SUBSTANCE INTO CENTRAL VEIN, PERCUTANEOUS APPROACH: ICD-10-PCS | Performed by: PHYSICIAN ASSISTANT

## 2023-03-06 PROCEDURE — 96361 HYDRATE IV INFUSION ADD-ON: CPT

## 2023-03-06 PROCEDURE — 25800000 HC PHARMACY IV SOLUTIONS: Performed by: STUDENT IN AN ORGANIZED HEALTH CARE EDUCATION/TRAINING PROGRAM

## 2023-03-06 PROCEDURE — 96368 THER/DIAG CONCURRENT INF: CPT

## 2023-03-06 PROCEDURE — 36415 COLL VENOUS BLD VENIPUNCTURE: CPT | Performed by: PHYSICIAN ASSISTANT

## 2023-03-06 PROCEDURE — 96360 HYDRATION IV INFUSION INIT: CPT

## 2023-03-06 PROCEDURE — 96374 THER/PROPH/DIAG INJ IV PUSH: CPT

## 2023-03-06 PROCEDURE — 96366 THER/PROPH/DIAG IV INF ADDON: CPT

## 2023-03-06 PROCEDURE — 87651 STREP A DNA AMP PROBE: CPT | Performed by: PHYSICIAN ASSISTANT

## 2023-03-06 PROCEDURE — 96417 CHEMO IV INFUS EACH ADDL SEQ: CPT

## 2023-03-06 PROCEDURE — 86901 BLOOD TYPING SEROLOGIC RH(D): CPT

## 2023-03-06 PROCEDURE — 99215 OFFICE O/P EST HI 40 MIN: CPT | Performed by: PHYSICIAN ASSISTANT

## 2023-03-06 PROCEDURE — 83735 ASSAY OF MAGNESIUM: CPT | Performed by: PHYSICIAN ASSISTANT

## 2023-03-06 PROCEDURE — 96375 TX/PRO/DX INJ NEW DRUG ADDON: CPT

## 2023-03-06 PROCEDURE — 96367 TX/PROPH/DG ADDL SEQ IV INF: CPT

## 2023-03-06 PROCEDURE — 3E04305 INTRODUCTION OF OTHER ANTINEOPLASTIC INTO CENTRAL VEIN, PERCUTANEOUS APPROACH: ICD-10-PCS | Performed by: PHYSICIAN ASSISTANT

## 2023-03-06 PROCEDURE — 84702 CHORIONIC GONADOTROPIN TEST: CPT | Performed by: PHYSICIAN ASSISTANT

## 2023-03-06 PROCEDURE — 96365 THER/PROPH/DIAG IV INF INIT: CPT

## 2023-03-06 PROCEDURE — 3008F BODY MASS INDEX DOCD: CPT | Performed by: PHYSICIAN ASSISTANT

## 2023-03-06 PROCEDURE — 3E0433Z INTRODUCTION OF ANTI-INFLAMMATORY INTO CENTRAL VEIN, PERCUTANEOUS APPROACH: ICD-10-PCS | Performed by: PHYSICIAN ASSISTANT

## 2023-03-06 RX ORDER — NYSTATIN 100000 [USP'U]/ML
5 SUSPENSION ORAL 4 TIMES DAILY
Qty: 200 ML | Refills: 0 | Status: SHIPPED | OUTPATIENT
Start: 2023-03-06 | End: 2023-03-16

## 2023-03-06 RX ORDER — PALONOSETRON 0.05 MG/ML
250 INJECTION, SOLUTION INTRAVENOUS ONCE
Status: COMPLETED | OUTPATIENT
Start: 2023-03-06 | End: 2023-03-06

## 2023-03-06 RX ORDER — SODIUM CHLORIDE 9 MG/ML
500 INJECTION, SOLUTION INTRAVENOUS CONTINUOUS
Status: ACTIVE | OUTPATIENT
Start: 2023-03-06 | End: 2023-03-06

## 2023-03-06 RX ORDER — HEPARIN 100 UNIT/ML
SYRINGE INTRAVENOUS
Status: COMPLETED
Start: 2023-03-06 | End: 2023-03-06

## 2023-03-06 RX ADMIN — CISPLATIN 110 MG: 1 INJECTION, SOLUTION INTRAVENOUS at 13:21

## 2023-03-06 RX ADMIN — HEPARIN 500 UNITS: 100 SYRINGE at 13:33

## 2023-03-06 RX ADMIN — SODIUM CHLORIDE 500 ML/HR: 9 INJECTION, SOLUTION INTRAVENOUS at 13:32

## 2023-03-06 RX ADMIN — PALONOSETRON HYDROCHLORIDE 250 MCG: 0.25 INJECTION, SOLUTION INTRAVENOUS at 11:17

## 2023-03-06 RX ADMIN — DEXAMETHASONE SODIUM PHOSPHATE 12 MG: 4 INJECTION, SOLUTION INTRA-ARTICULAR; INTRALESIONAL; INTRAMUSCULAR; INTRAVENOUS; SOFT TISSUE at 11:17

## 2023-03-06 RX ADMIN — SODIUM CHLORIDE 150 MG: 900 INJECTION, SOLUTION INTRAVENOUS at 11:17

## 2023-03-06 RX ADMIN — POTASSIUM CHLORIDE 500 ML/HR: 2 INJECTION, SOLUTION, CONCENTRATE INTRAVENOUS at 09:20

## 2023-03-06 RX ADMIN — DEXTROSE MONOHYDRATE 184 MG: 50 INJECTION, SOLUTION INTRAVENOUS at 12:14

## 2023-03-06 NOTE — PROGRESS NOTES
CBC reviewed with Dr. Smith- will proceed with chemo tx today (hgb 8.1 and plts 36), but will obtain type and screen and pt to repeat CBC tomorrow to trend plts/hgb. If hgb below 8, will plan to transfuse with packed RBC tomorrow and if plts <20 will plan to transfuse plts tomorrow. Will discuss with [facility] as well

## 2023-03-06 NOTE — ASSESSMENT & PLAN NOTE
Case was reviewed with Dr. Smith - pt ok to proceed with chemo today   To obtain rapid strep swab - ordered- this will be completed on [facility]   Will also try nystatin rinse for potential oral thrush

## 2023-03-06 NOTE — ASSESSMENT & PLAN NOTE
Stat labs will be drawn on 6 west prior to infusion   Planning to proceed with plan; cisplatin/etoposide today. Last bHCG continues to decline (428.1 as of 2/26) - pending repeat today   Remains on Eliquis for prophylaxis and will continue   Continue phenergan prn for nausea (controlled at this time)

## 2023-03-06 NOTE — PROGRESS NOTES
Ms. Alonrda Villela is a 32 y.o.yo lady with  Cancer Staging   Gestational trophoblastic neoplasm  Staging form: Gestational Trophoblastic Neoplasms, AJCC 8th Edition  - Clinical stage from 1/12/2023: FIGO Stage IV (cM1b, Risk score: 17) - Signed by Shantelle Mcmullen MD on 1/26/2023  . She presents for chemotherapy.   Oncology History   Gestational trophoblastic neoplasm   1/10/2023 Tumor Markers     on initial testing. Dilutional studies repeated and found to be >461,700     1/11/2023 Imaging Significant Findings    1.  No intrauterine gestation and no adnexal mass evident. These findings  represent a pregnancy of unknown location. The appearance of a complex mass with  small cystic spaces and peripheral vascularity occupying the central uterine  cavity could represent a molar pregnancy but additional evaluation is suggested.  2.  Both ovaries are mildly enlarged with multiple complex cystic lesions as  well as some with solid-appearing echoes that may represent a combination of  hemorrhagic cysts/follicles or endometriomas, but challenging to evaluate as the  ovaries are only well evaluated transabdominally. Recommended these be  reassessed on follow-up. No sonographic findings of ovarian torsion.     1/12/2023 Imaging Significant Findings    MRI brain with contrast  1.  No abnormal intracranial contrast enhancement identified to suggest  intracranial metastatic disease.  2.  Previously seen tiny foci of increased T2 signal in the white matter do not  demonstrate contrast enhancement.  These findings could reflect foci of chronic  ischemia, the sequelae of migraine headaches, demyelinating or postinfectious  processes.  Other etiologies are not excluded.  Clinical correlation is  recommended.     1/12/2023 Imaging Significant Findings    CTPA  IMPRESSION:  No evidence for pulmonary embolism.  5 cm right lower lobe mass. Anterior mediastinal mass.  Innumerable bilateral pulmonary nodules which are  suspicious for metastatic  disease to the lungs.  2 hyperenhancing liver lesions, the larger measuring 5 cm in maximal dimension  for which further assessment with MRI with and without contrast is recommended.     1/12/2023 Biopsy    Image-guided biopsy of right lower lobe lesion benign liver parenchyma     1/12/2023 Cancer Staged    Staging form: Gestational Trophoblastic Neoplasms, AJCC 8th Edition  - Clinical stage from 1/12/2023: FIGO Stage IV (cM1b, Risk score: 17)     1/13/2023 - 1/27/2023 Chemotherapy    CISplatin / Etoposide, 7 day Cycles - GTN Induction  Plan Provider: Lorena Hogue MD  Treatment goal: Curative  Line of treatment: Induction     1/13/2023 Imaging Significant Findings    CT abd/pelvis  1.   Uterine mass measuring up to 13.8 cm, consistent with the clinically  suspected diagnosis of choriocarcinoma.  2.  Extensive pulmonary metastases, most notably a right lower lobe 4.9 cm  subpleural mass.  3.  Enlarged ovaries with numerous cysts likely representing theca lutein cysts  4.  Indeterminate 0.8 cm hypoattenuating lesion at the dome of the liver.  The  previously noted hypervascular liver lesions are not well seen due to the phase  of contrast at the time of imaging.  Contrast-enhanced MRI of the abdomen can be  considered for more optimal characterization.  5.  Indeterminate hypoattenuating lesions in the right kidney which can also be  more optimally evaluated with MRI.     1/13/2023 Tumor Markers    HCG >1.8468 million     1/30/2023 -  Chemotherapy    Inpatient: EMA/EP (Etoposide / Methotrexate / Dactinomycin / Etoposide / CISPlatin), 14 Day Cycles - Gestational Trophoblastic Neoplasia   Plan Provider: Shantelle Mcmullen MD  Treatment goal: Curative  Line of treatment: First Line       Pt presents for chemotherapy. C/o sore throat x 1 wk with mouth ulcers. Reports mild cough, but states she has always had this and denies worsening. Called emergency line over the weekend and spoke with   Stickles about symptoms. She tried magic mouth wash, but c/o burning with use and swallowed some causing her to vomit. She thinks she may see some white now in mouth. Otherwise has been feeling well. No further N/V. Denies sick contacts. Denies fevers/chills or congestion. Bowels moving well. Denies diarrhea or constipation. denies neuropathy, denies excessive fatigue. Denies headache, chest pain, shortness of breath, vomiting, lightheadedness, or dizziness. States she has not been around anyone sick.     CHEMOTHERAPY REGIMEN: Etoposide/cisplatin                CYCLE 3 Day 8    PROBLEM LIST:  Patient Active Problem List    Diagnosis Date Noted   • Sore throat 2023   • Tinnitus of both ears 2023   • Encounter for antineoplastic chemotherapy 2023   • Neutropenic fever (CMS/HCC) 2023   • DVT (deep vein thrombosis) in pregnancy 2023   • Thrombocytopenia (CMS/HCC) 2023   • Hematuria 2023   • Anxiety and depression 2023   • Hyponatremia 2023   • Drug-induced neutropenia (CMS/HCC) 2023   • Abnormal CT scan 2023   • Hyperthyroidism 2023   • Gestational trophoblastic neoplasm 2023   • History of COVID-19 2023   • Microcytic anemia 2023        Medical History:   Past Medical History:   Diagnosis Date   • Hx antineoplastic chemo        Surgical History:   Past Surgical History:   Procedure Laterality Date   •  SECTION      x2       Social History:   Social History     Tobacco Use   • Smoking status: Never   • Smokeless tobacco: Never   Vaping Use   • Vaping status: Never Used   Substance Use Topics   • Alcohol use: Not Currently   • Drug use: Never       Family History: Cancer-related family history is not on file.    Allergies: Patient has no known allergies.    Medications:   Current Outpatient Medications   Medication Instructions   • apixaban (ELIQUIS) 5 mg, oral, 2 times daily   • benzonatate (TESSALON) 100 mg, oral, 3 times  daily PRN   • buPROPion XL (WELLBUTRIN XL) 150 mg, oral, Nightly   • hydrocortisone 2.5 % cream APPLY THIN COAT TO AFFECTED AREA TWICE A DAY   • lidocaine-prilocaine (EMLA) cream Topical, As needed, Apply a quarter size amount to skin over port site approximately one hour prior to chemotherapy. Cover with saran wrap.   • magic mouthwash (BENADRYL-MAALOX-CARAFATE) 15 mL, Swish & Spit, Every 4 hours PRN   • magic mouthwash with nystatin (BENADRYL-MAALOX-LIDOCAINE-NYSTATIN) 15 mL, Swish & Spit, Every 4 hours PRN   • methIMAzole (TAPAZOLE) 2.5 mg, oral, Daily   • norethindrone-e.estradioL-iron (JUNEL FE 1/20) 1 mg-20 mcg (21)/75 mg (7) per tablet 1 tablet, oral, Daily   • ondansetron ODT (ZOFRAN-ODT) 4-8 mg, oral, Every 6 hours PRN, Maximum 32mg per day.   • promethazine (PHENERGAN) 12.5 mg, oral, Every 6 hours PRN   • protriptyline (VIVACTIL) 15 mg, oral, Daily   • triamcinolone (KENALOG) 0.1 % ointment Topical, 2 times daily   • ZARXIO 480 mcg, subcutaneous, See admin instructions, Inject under the skin on days 3, 4, 5, 9, 10, 11 and 12 of chemotherapy cycle.         Review of Systems  Pertinent items are noted in HPI.    Labs      Physical Exam:  Visit Vitals  /78 (BP Location: Left upper arm, Patient Position: Sitting)   Pulse (!) 121   Temp 36.8 °C (98.2 °F)   Resp 16   Wt 75 kg (165 lb 5.8 oz)   LMP 12/06/2022 (Exact Date)   SpO2 95%   BMI 28.38 kg/m²       General: well-developed, well-nourished, no apparent distress  Neck: supple, no masses  Throat: mouth ulcers noted on right inner cheek with mild bleeding,  Inflammation of the pharyngeal folds and uvula; uvula erythematous, irritated and swollen, some white noted, no white patches on tongue  Respiratory: lungs clear to auscultation bilaterally, normal respiratory effort  Cardiovascular: regular rate and rhythm, no murmurs, rubs, gallops.   Extremity: no clubbing, cyanosis, edema  Neurologic: alert & oriented x3, no gross deficits  Psychiatric: mood and  affect normal  Musculoskeletal: no deformity or gross strength deficit  Gynecologic: deferred      ASSESSMENT/PLAN:  Assessment   32 y.o. lady with   Cancer Staging   Gestational trophoblastic neoplasm  Staging form: Gestational Trophoblastic Neoplasms, AJCC 8th Edition  - Clinical stage from 1/12/2023: FIGO Stage IV (cM1b, Risk score: 17) - Signed by Shantelle Mcmullen MD on 1/26/2023   who presents for chemotherapy.  ECOG performance status is :0    Problem List Items Addressed This Visit        Hematologic    Drug-induced neutropenia (CMS/HCC)    Current Assessment & Plan     On filgrastim the days she does not have chemo             Ears/Nose/Throat    Sore throat    Current Assessment & Plan     Case was reviewed with Dr. Smith - pt ok to proceed with chemo today   To obtain rapid strep swab - ordered- this will be completed on [facility]   Will also try nystatin rinse for potential oral thrush              Obstetric    Gestational trophoblastic neoplasm    Overview     S/p EP induction chemotherapy 1/13-1/14/23 and 1/20-1/21/23  - S/p Etoposide, Dactinomycin, and Methotrexate 1/30-2/2.  - S/p Etoposide and Cisplatin 2/6.  - S/p cycle 2 Etoposide, Dactinomycin, and Methotrexate 2/13.   - S/p Etoposide and Cisplatin 2/20.  - She started cycle 3 on 2/27 with EMA-EP protocol. Etoposide, Dactomtcin, and MTX          Current Assessment & Plan     Stat labs will be drawn on 6 west prior to infusion   Planning to proceed with plan; cisplatin/etoposide today. Last bHCG continues to decline (428.1 as of 2/26) - pending repeat today   Remains on Eliquis for prophylaxis and will continue   Continue phenergan prn for nausea (controlled at this time)         Relevant Orders    CBC and Differential ([facility] Only)    Comprehensive metabolic panel    BhCG, Serum, Quant    Magnesium       Other    Encounter for antineoplastic chemotherapy - Primary    Current Assessment & Plan     Labs will be drawn on 6 west prior to chemo today as  unable to get outpatient prior               [place] C

## 2023-03-06 NOTE — PROGRESS NOTES
0900 Pt arrived to 6w/infusion room for day 8 cycle 3 chemotherapy for Gestational Trophoblastic Neoplasia.  ID verified. VSS. Pt reports sore throat and on magic mouth wash without much relief.  RCW port accessed per protocol. Labs drawn and sent.  0920 Pre-hydration started : 1 l nss klc 20 meq and Mag 1 gm iv.    1040 Labs resulted. hgb 8.1, plts 36, anc 410.  Treatment not to be held per Dr Smith.  Education completed and handout provided on pancytopenia. Pt verbalized understanding. Plan in placed for patient to return 3/7/23 for cbc.  Type and screen ordered and sent.    1120 Pt pre-medicated with Aloxi 250 mcg iv,Emend 150 mg ivpb, and Decadron 12 mg ivpb.    1214 Pre-hydration completed. Etoposide 184 mg /d5 started and to infused over 1 hr.    1320 Infusion completed. Blood return confirmed. Cisplatin 110 mg /nss started and to infuse over 2 hr. Post hydration started.    1543 Infusions completed. Pt tolerated well.  Blood return confirmed.  Port flushed and de-accessed per protocol.  AVS printed and reviewed. Pt discharged to home in stable condition.

## 2023-03-07 ENCOUNTER — HOSPITAL ENCOUNTER (OUTPATIENT)
Dept: INPATIENT UNIT | Facility: HOSPITAL | Age: 33
Discharge: HOME | End: 2023-03-07
Attending: PHYSICIAN ASSISTANT
Payer: COMMERCIAL

## 2023-03-07 ENCOUNTER — DOCUMENTATION (OUTPATIENT)
Dept: GYNECOLOGIC ONCOLOGY | Facility: CLINIC | Age: 33
End: 2023-03-07
Payer: COMMERCIAL

## 2023-03-07 VITALS
RESPIRATION RATE: 18 BRPM | SYSTOLIC BLOOD PRESSURE: 117 MMHG | OXYGEN SATURATION: 100 % | HEIGHT: 64 IN | BODY MASS INDEX: 28.17 KG/M2 | DIASTOLIC BLOOD PRESSURE: 73 MMHG | WEIGHT: 165 LBS | TEMPERATURE: 98.6 F | HEART RATE: 94 BPM

## 2023-03-07 DIAGNOSIS — D69.59 CHEMOTHERAPY-INDUCED THROMBOCYTOPENIA: Primary | ICD-10-CM

## 2023-03-07 DIAGNOSIS — T45.1X5A CHEMOTHERAPY-INDUCED THROMBOCYTOPENIA: Primary | ICD-10-CM

## 2023-03-07 DIAGNOSIS — O01.9 GESTATIONAL TROPHOBLASTIC NEOPLASM: ICD-10-CM

## 2023-03-07 DIAGNOSIS — O01.9 GESTATIONAL TROPHOBLASTIC NEOPLASM: Primary | ICD-10-CM

## 2023-03-07 DIAGNOSIS — D64.81 ANTINEOPLASTIC CHEMOTHERAPY INDUCED ANEMIA: Primary | ICD-10-CM

## 2023-03-07 DIAGNOSIS — D69.59 CHEMOTHERAPY-INDUCED THROMBOCYTOPENIA: ICD-10-CM

## 2023-03-07 DIAGNOSIS — D64.81 ANTINEOPLASTIC CHEMOTHERAPY INDUCED ANEMIA: ICD-10-CM

## 2023-03-07 DIAGNOSIS — D70.1 CHEMOTHERAPY INDUCED NEUTROPENIA (CMS/HCC): ICD-10-CM

## 2023-03-07 DIAGNOSIS — T45.1X5A ANTINEOPLASTIC CHEMOTHERAPY INDUCED ANEMIA: Primary | ICD-10-CM

## 2023-03-07 DIAGNOSIS — T45.1X5A CHEMOTHERAPY-INDUCED THROMBOCYTOPENIA: ICD-10-CM

## 2023-03-07 DIAGNOSIS — T45.1X5A CHEMOTHERAPY INDUCED NEUTROPENIA (CMS/HCC): ICD-10-CM

## 2023-03-07 DIAGNOSIS — T45.1X5A ANTINEOPLASTIC CHEMOTHERAPY INDUCED ANEMIA: ICD-10-CM

## 2023-03-07 LAB
BASOPHILS # BLD: 0 K/UL (ref 0.01–0.1)
BASOPHILS NFR BLD: 0 %
DIFFERENTIAL METHOD BLD: ABNORMAL
EOSINOPHIL # BLD: 0 K/UL (ref 0.04–0.36)
EOSINOPHIL NFR BLD: 0 %
ERYTHROCYTE [DISTWIDTH] IN BLOOD BY AUTOMATED COUNT: 12.2 % (ref 11.7–14.4)
HCT VFR BLDCO AUTO: 21.7 % (ref 35–45)
HGB BLD-MCNC: 7.4 G/DL (ref 11.8–15.7)
LYMPHOCYTES # BLD: 0.75 K/UL (ref 1.2–3.5)
LYMPHOCYTES NFR BLD: 42 %
MCH RBC QN AUTO: 27.1 PG (ref 28–33.2)
MCHC RBC AUTO-ENTMCNC: 34.1 G/DL (ref 32.2–35.5)
MCV RBC AUTO: 79.5 FL (ref 83–98)
MICROCYTES BLD QL SMEAR: ABNORMAL
MONOCYTES # BLD: 0.13 K/UL (ref 0.28–0.8)
MONOCYTES NFR BLD: 7 %
NEUTS BAND # BLD: 0.91 K/UL (ref 1.7–7)
NEUTS SEG NFR BLD: 51 %
PDW BLD AUTO: 11.6 FL (ref 9.4–12.3)
PLAT MORPH BLD: NORMAL
PLATELET # BLD AUTO: 28 K/UL (ref 150–369)
PLATELET # BLD EST: ABNORMAL 10*3/UL
RBC # BLD AUTO: 2.73 M/UL (ref 3.93–5.22)
WBC # BLD AUTO: 1.79 K/UL (ref 3.8–10.5)

## 2023-03-07 PROCEDURE — 96374 THER/PROPH/DIAG INJ IV PUSH: CPT

## 2023-03-07 PROCEDURE — 3E013GC INTRODUCTION OF OTHER THERAPEUTIC SUBSTANCE INTO SUBCUTANEOUS TISSUE, PERCUTANEOUS APPROACH: ICD-10-PCS | Performed by: PHYSICIAN ASSISTANT

## 2023-03-07 PROCEDURE — 36415 COLL VENOUS BLD VENIPUNCTURE: CPT

## 2023-03-07 PROCEDURE — 96372 THER/PROPH/DIAG INJ SC/IM: CPT | Mod: XS

## 2023-03-07 PROCEDURE — 30233N1 TRANSFUSION OF NONAUTOLOGOUS RED BLOOD CELLS INTO PERIPHERAL VEIN, PERCUTANEOUS APPROACH: ICD-10-PCS | Performed by: PHYSICIAN ASSISTANT

## 2023-03-07 PROCEDURE — 85025 COMPLETE CBC W/AUTO DIFF WBC: CPT

## 2023-03-07 PROCEDURE — 63600000 HC DRUGS/DETAIL CODE: Mod: JG | Performed by: STUDENT IN AN ORGANIZED HEALTH CARE EDUCATION/TRAINING PROGRAM

## 2023-03-07 PROCEDURE — P9016 RBC LEUKOCYTES REDUCED: HCPCS

## 2023-03-07 PROCEDURE — 36430 TRANSFUSION BLD/BLD COMPNT: CPT

## 2023-03-07 RX ORDER — HEPARIN 100 UNIT/ML
500 SYRINGE INTRAVENOUS AS NEEDED
Status: CANCELLED | OUTPATIENT
Start: 2023-03-07

## 2023-03-07 RX ORDER — HEPARIN 100 UNIT/ML
500 SYRINGE INTRAVENOUS ONCE AS NEEDED
Status: CANCELLED | OUTPATIENT
Start: 2023-03-07 | End: 2023-03-08

## 2023-03-07 RX ORDER — HEPARIN 100 UNIT/ML
500 SYRINGE INTRAVENOUS AS NEEDED
Status: DISCONTINUED | OUTPATIENT
Start: 2023-03-07 | End: 2023-03-08 | Stop reason: HOSPADM

## 2023-03-07 RX ORDER — SODIUM CHLORIDE 9 MG/ML
5 INJECTION, SOLUTION INTRAVENOUS AS NEEDED
Status: DISCONTINUED | OUTPATIENT
Start: 2023-03-07 | End: 2023-03-08 | Stop reason: HOSPADM

## 2023-03-07 RX ORDER — SODIUM CHLORIDE 9 MG/ML
5 INJECTION, SOLUTION INTRAVENOUS AS NEEDED
Status: CANCELLED | OUTPATIENT
Start: 2023-03-07 | End: 2023-03-08

## 2023-03-07 RX ORDER — HEPARIN 100 UNIT/ML
500 SYRINGE INTRAVENOUS ONCE AS NEEDED
Status: DISCONTINUED | OUTPATIENT
Start: 2023-03-07 | End: 2023-03-08 | Stop reason: HOSPADM

## 2023-03-07 RX ADMIN — FILGRASTIM-SNDZ 480 MCG: 480 INJECTION, SOLUTION INTRAVENOUS; SUBCUTANEOUS at 13:02

## 2023-03-07 NOTE — PROGRESS NOTES
Hgb 7.4 on repeat today. Plts 28. Will get 2 units PRBCs on 6 west today per Dr. Smith for anemia. Will continue to monitor plts- discussed need for repeat CBC on Thursday, but pt will be in [place] visiting her children. She is a hard stick and outpt labs often have difficulty. She will have CBC repeated on 6[facility] Friday and is aware of possible need for plts if levels <20. Arranged with [facility]

## 2023-03-07 NOTE — PROGRESS NOTES
1100 Patient arrived to [facility] for labs and possible blood transfusion. T & S sent yesterday. RCW port accessed, labs sent.     1150 Hgb 7.4 patient to receive 2 units of PRBC's, awaiting blood bank.     1302 Zarxio 480 mcg given left arm.     1315 First unit PRBC's up, checked and verified by 2 RN's.     1455 First unit complete second unit up. Checked and verified by 2 RN's.     1611 Second unit complete, VSS. Port flushed with NSS and heparin 500 units/5ml and de-accessed. Patient discharged to home.        Private car

## 2023-03-07 NOTE — PATIENT INSTRUCTIONS
Discharge Instructions Medical SPU    1. Review your medication information sheet and call your provider if you have any questions    2. If you have the following--MED lock, PICC line or Port: contact your provider if signs of redness, drainage, swelling, or new onset of pain at the site.    3. If your MED lock becomes dislodged: apply pressure for 2 minutes, wash site with soap and water and apply a band-aid    4. If you experience any of the following symptoms, you should call your family physician of if he/she is unavailable, go to the Emergency Room:     A) Trouble breathing   B) Rash/hives   C) Chills or temperature above 100.5 degrees   D) Pain, redness or drainage at any injection site

## 2023-03-08 ENCOUNTER — TELEPHONE (OUTPATIENT)
Dept: GYNECOLOGIC ONCOLOGY | Facility: CLINIC | Age: 33
End: 2023-03-08
Payer: COMMERCIAL

## 2023-03-08 NOTE — TELEPHONE ENCOUNTER
Patient called answering service reporting she passed a 3 inch blood clot from vagina. She’s been having off and on bleeding since diagnosis. Discussed the bleeding is likely from tumor and will not be responsive to hormonal medications. To call if bleeding is persistently heavy or she has symptoms of anemia.

## 2023-03-09 ENCOUNTER — TELEPHONE (OUTPATIENT)
Dept: GYNECOLOGIC ONCOLOGY | Facility: CLINIC | Age: 33
End: 2023-03-09
Payer: COMMERCIAL

## 2023-03-09 LAB
CROSSMATCH: NORMAL
CROSSMATCH: NORMAL
ISBT CODE: 1700
ISBT CODE: 7300
PRODUCT CODE: NORMAL
PRODUCT CODE: NORMAL
PRODUCT STATUS: NORMAL
PRODUCT STATUS: NORMAL
SPECIMEN EXP DATE BLD: NORMAL
SPECIMEN EXP DATE BLD: NORMAL
UNIT ABO: NORMAL
UNIT ABO: NORMAL
UNIT ID: NORMAL
UNIT ID: NORMAL
UNIT RH: NEGATIVE
UNIT RH: POSITIVE

## 2023-03-09 NOTE — TELEPHONE ENCOUNTER
Patient called yesterday @515pm via service stating she passed a blood clot and to please advise.

## 2023-03-09 NOTE — TELEPHONE ENCOUNTER
Called patient back to check in. No answer left message for her to call me back to see how she was feeling.

## 2023-03-10 ENCOUNTER — DOCUMENTATION (OUTPATIENT)
Dept: GYNECOLOGIC ONCOLOGY | Facility: CLINIC | Age: 33
End: 2023-03-10
Payer: COMMERCIAL

## 2023-03-10 ENCOUNTER — HOSPITAL ENCOUNTER (OUTPATIENT)
Dept: INPATIENT UNIT | Facility: HOSPITAL | Age: 33
Discharge: HOME | End: 2023-03-10
Attending: PHYSICIAN ASSISTANT
Payer: COMMERCIAL

## 2023-03-10 ENCOUNTER — HOSPITAL ENCOUNTER (OUTPATIENT)
Dept: INPATIENT UNIT | Facility: HOSPITAL | Age: 33
Discharge: HOME | End: 2023-03-10
Attending: PHYSICIAN ASSISTANT | Admitting: RADIOLOGY
Payer: COMMERCIAL

## 2023-03-10 VITALS
SYSTOLIC BLOOD PRESSURE: 108 MMHG | RESPIRATION RATE: 16 BRPM | BODY MASS INDEX: 28.17 KG/M2 | DIASTOLIC BLOOD PRESSURE: 70 MMHG | OXYGEN SATURATION: 100 % | WEIGHT: 165 LBS | HEART RATE: 100 BPM | TEMPERATURE: 96.4 F | HEIGHT: 64 IN

## 2023-03-10 DIAGNOSIS — D69.59 CHEMOTHERAPY-INDUCED THROMBOCYTOPENIA: Primary | ICD-10-CM

## 2023-03-10 DIAGNOSIS — T45.1X5A CHEMOTHERAPY-INDUCED THROMBOCYTOPENIA: Primary | ICD-10-CM

## 2023-03-10 DIAGNOSIS — O01.9 GESTATIONAL TROPHOBLASTIC NEOPLASM: Primary | ICD-10-CM

## 2023-03-10 DIAGNOSIS — D64.81 ANTINEOPLASTIC CHEMOTHERAPY INDUCED ANEMIA: ICD-10-CM

## 2023-03-10 DIAGNOSIS — T45.1X5A ANTINEOPLASTIC CHEMOTHERAPY INDUCED ANEMIA: ICD-10-CM

## 2023-03-10 LAB
BASOPHILS # BLD: 0.03 K/UL (ref 0.01–0.1)
BASOPHILS NFR BLD: 1 %
DIFFERENTIAL METHOD BLD: ABNORMAL
EOSINOPHIL # BLD: 0 K/UL (ref 0.04–0.36)
EOSINOPHIL NFR BLD: 0 %
ERYTHROCYTE [DISTWIDTH] IN BLOOD BY AUTOMATED COUNT: 13.2 % (ref 11.7–14.4)
HCT VFR BLDCO AUTO: 28.7 % (ref 35–45)
HGB BLD-MCNC: 10.2 G/DL (ref 11.8–15.7)
HYPOCHROMIA BLD QL SMEAR: ABNORMAL
LYMPHOCYTES # BLD: 1.81 K/UL (ref 1.2–3.5)
LYMPHOCYTES NFR BLD: 71 %
MCH RBC QN AUTO: 27.8 PG (ref 28–33.2)
MCHC RBC AUTO-ENTMCNC: 35.5 G/DL (ref 32.2–35.5)
MCV RBC AUTO: 78.2 FL (ref 83–98)
MICROCYTES BLD QL SMEAR: ABNORMAL
MONOCYTES # BLD: 0.13 K/UL (ref 0.28–0.8)
MONOCYTES NFR BLD: 5 %
NEUTROPHILS # BLD: 0.65 K/UL (ref 1.7–7)
NEUTS BAND # BLD: 0.59 K/UL (ref 1.7–7)
NEUTS SEG NFR BLD: 23 %
PDW BLD AUTO: ABNORMAL FL
PLAT MORPH BLD: NORMAL
PLATELET # BLD AUTO: 7 K/UL (ref 150–369)
PLATELET # BLD EST: ABNORMAL 10*3/UL
RBC # BLD AUTO: 3.67 M/UL (ref 3.93–5.22)
WBC # BLD AUTO: 2.55 K/UL (ref 3.8–10.5)

## 2023-03-10 PROCEDURE — 85025 COMPLETE CBC W/AUTO DIFF WBC: CPT | Performed by: STUDENT IN AN ORGANIZED HEALTH CARE EDUCATION/TRAINING PROGRAM

## 2023-03-10 PROCEDURE — 30233R1 TRANSFUSION OF NONAUTOLOGOUS PLATELETS INTO PERIPHERAL VEIN, PERCUTANEOUS APPROACH: ICD-10-PCS | Performed by: PHYSICIAN ASSISTANT

## 2023-03-10 PROCEDURE — 36415 COLL VENOUS BLD VENIPUNCTURE: CPT | Performed by: STUDENT IN AN ORGANIZED HEALTH CARE EDUCATION/TRAINING PROGRAM

## 2023-03-10 PROCEDURE — 36430 TRANSFUSION BLD/BLD COMPNT: CPT

## 2023-03-10 PROCEDURE — P9073 PLATELETS PHERESIS PATH REDU: HCPCS

## 2023-03-10 RX ORDER — HEPARIN 100 UNIT/ML
SYRINGE INTRAVENOUS
Status: COMPLETED
Start: 2023-03-10 | End: 2023-03-10

## 2023-03-10 RX ORDER — SODIUM CHLORIDE 9 MG/ML
5 INJECTION, SOLUTION INTRAVENOUS AS NEEDED
Status: DISCONTINUED | OUTPATIENT
Start: 2023-03-10 | End: 2023-03-11 | Stop reason: HOSPADM

## 2023-03-10 RX ORDER — HEPARIN 100 UNIT/ML
500 SYRINGE INTRAVENOUS AS NEEDED
Status: CANCELLED | OUTPATIENT
Start: 2023-03-10

## 2023-03-10 RX ORDER — SODIUM CHLORIDE 9 MG/ML
5 INJECTION, SOLUTION INTRAVENOUS AS NEEDED
Status: CANCELLED | OUTPATIENT
Start: 2023-03-10 | End: 2023-03-11

## 2023-03-10 RX ORDER — HEPARIN 100 UNIT/ML
500 SYRINGE INTRAVENOUS AS NEEDED
Status: DISCONTINUED | OUTPATIENT
Start: 2023-03-10 | End: 2023-03-11 | Stop reason: HOSPADM

## 2023-03-10 RX ADMIN — HEPARIN 500 UNITS: 100 SYRINGE at 15:25

## 2023-03-10 NOTE — PROGRESS NOTES
CBC drawn today on [facility] for monitoring of thrombocytopenia. Plts 7K today. As <20K, proceeding with transfusion of platelets today.

## 2023-03-10 NOTE — PROGRESS NOTES
1100 Patient arrived for CBC and possible transfusion. RCW port accessed and labs sent.     1225 Patient platelets 7. PS aware and ordered patient platelets. Platelets need to come from [place] per blood bank.     1500 One unit platelets up, checked and verified by 2 RN's.     1530 Platelets complete. Port flushed and de-accessed. Patient discharged to home.

## 2023-03-10 NOTE — PROGRESS NOTES
1100 Patient arrived for CBC. RCW port accessed labs obtained and sent.    1225 Patient platelets 7. PA aware and ordered patient platelets.

## 2023-03-12 ENCOUNTER — HOSPITAL ENCOUNTER (INPATIENT)
Facility: HOSPITAL | Age: 33
LOS: 2 days | Discharge: HOME | DRG: 846 | End: 2023-03-14
Attending: STUDENT IN AN ORGANIZED HEALTH CARE EDUCATION/TRAINING PROGRAM | Admitting: OBSTETRICS & GYNECOLOGY
Payer: COMMERCIAL

## 2023-03-12 DIAGNOSIS — O01.9 GESTATIONAL TROPHOBLASTIC NEOPLASM: Primary | ICD-10-CM

## 2023-03-12 LAB
ABO + RH BLD: NORMAL
ALBUMIN SERPL-MCNC: 3.6 G/DL (ref 3.4–5)
ALP SERPL-CCNC: 82 IU/L (ref 35–126)
ALT SERPL-CCNC: 17 IU/L (ref 11–54)
ANION GAP SERPL CALC-SCNC: 9 MEQ/L (ref 3–15)
AST SERPL-CCNC: 12 IU/L (ref 15–41)
BASOPHILS # BLD: 0 K/UL (ref 0.01–0.1)
BASOPHILS NFR BLD: 0 %
BILIRUB SERPL-MCNC: 0.6 MG/DL (ref 0.3–1.2)
BLD GP AB SCN SERPL QL: NEGATIVE
BUN SERPL-MCNC: 14 MG/DL (ref 8–20)
CALCIUM SERPL-MCNC: 8 MG/DL (ref 8.9–10.3)
CHLORIDE SERPL-SCNC: 105 MEQ/L (ref 98–109)
CO2 SERPL-SCNC: 21 MEQ/L (ref 22–32)
CREAT SERPL-MCNC: 1 MG/DL (ref 0.6–1.1)
D AG BLD QL: POSITIVE
DIFFERENTIAL METHOD BLD: ABNORMAL
EOSINOPHIL # BLD: 0 K/UL (ref 0.04–0.36)
EOSINOPHIL NFR BLD: 0 %
ERYTHROCYTE [DISTWIDTH] IN BLOOD BY AUTOMATED COUNT: 13.2 % (ref 11.7–14.4)
GFR SERPL CREATININE-BSD FRML MDRD: >60 ML/MIN/1.73M*2
GLUCOSE SERPL-MCNC: 115 MG/DL (ref 70–99)
HCG SERPL-ACNC: 177.8 IU/L (MIU/ML)
HCT VFR BLDCO AUTO: 23.9 % (ref 35–45)
HGB BLD-MCNC: 8.5 G/DL (ref 11.8–15.7)
ISBT CODE: 5100
LABORATORY COMMENT REPORT: NORMAL
LYMPHOCYTES # BLD: 1.32 K/UL (ref 1.2–3.5)
LYMPHOCYTES NFR BLD: 48 %
MAGNESIUM SERPL-MCNC: 1.1 MG/DL (ref 1.8–2.5)
MCH RBC QN AUTO: 28 PG (ref 28–33.2)
MCHC RBC AUTO-ENTMCNC: 35.6 G/DL (ref 32.2–35.5)
MCV RBC AUTO: 78.6 FL (ref 83–98)
MICROCYTES BLD QL SMEAR: ABNORMAL
MONOCYTES # BLD: 0.11 K/UL (ref 0.28–0.8)
MONOCYTES NFR BLD: 4 %
MYELOCYTES # BLD MANUAL: 0.03 K/UL
MYELOCYTES NFR BLD MANUAL: 1 %
NEUTS BAND # BLD: 1.29 K/UL (ref 1.7–7)
NEUTS SEG NFR BLD: 47 %
PDW BLD AUTO: ABNORMAL FL
PLAT MORPH BLD: NORMAL
PLATELET # BLD AUTO: 3 K/UL (ref 150–369)
PLATELET # BLD EST: ABNORMAL 10*3/UL
POTASSIUM SERPL-SCNC: 3.4 MEQ/L (ref 3.6–5.1)
PRODUCT CODE: NORMAL
PRODUCT STATUS: NORMAL
PROT SERPL-MCNC: 5.7 G/DL (ref 6–8.2)
RBC # BLD AUTO: 3.04 M/UL (ref 3.93–5.22)
SODIUM SERPL-SCNC: 135 MEQ/L (ref 136–144)
SPECIMEN EXP DATE BLD: NORMAL
SPECIMEN EXP DATE BLD: NORMAL
UNIT ABO: NORMAL
UNIT ID: NORMAL
UNIT RH: POSITIVE
WBC # BLD AUTO: 2.74 K/UL (ref 3.8–10.5)

## 2023-03-12 PROCEDURE — 36415 COLL VENOUS BLD VENIPUNCTURE: CPT | Performed by: STUDENT IN AN ORGANIZED HEALTH CARE EDUCATION/TRAINING PROGRAM

## 2023-03-12 PROCEDURE — 80053 COMPREHEN METABOLIC PANEL: CPT | Performed by: STUDENT IN AN ORGANIZED HEALTH CARE EDUCATION/TRAINING PROGRAM

## 2023-03-12 PROCEDURE — 21400000 HC ROOM AND CARE CCU/INTERMEDIATE

## 2023-03-12 PROCEDURE — 85027 COMPLETE CBC AUTOMATED: CPT | Performed by: STUDENT IN AN ORGANIZED HEALTH CARE EDUCATION/TRAINING PROGRAM

## 2023-03-12 PROCEDURE — 63700000 HC SELF-ADMINISTRABLE DRUG: Performed by: STUDENT IN AN ORGANIZED HEALTH CARE EDUCATION/TRAINING PROGRAM

## 2023-03-12 PROCEDURE — 86850 RBC ANTIBODY SCREEN: CPT

## 2023-03-12 PROCEDURE — 83735 ASSAY OF MAGNESIUM: CPT | Performed by: STUDENT IN AN ORGANIZED HEALTH CARE EDUCATION/TRAINING PROGRAM

## 2023-03-12 PROCEDURE — P9073 PLATELETS PHERESIS PATH REDU: HCPCS

## 2023-03-12 PROCEDURE — 84702 CHORIONIC GONADOTROPIN TEST: CPT | Performed by: STUDENT IN AN ORGANIZED HEALTH CARE EDUCATION/TRAINING PROGRAM

## 2023-03-12 RX ORDER — DEXTROSE 40 %
15-30 GEL (GRAM) ORAL AS NEEDED
Status: DISCONTINUED | OUTPATIENT
Start: 2023-03-12 | End: 2023-03-15 | Stop reason: HOSPADM

## 2023-03-12 RX ORDER — DEXTROSE 50 % IN WATER (D50W) INTRAVENOUS SYRINGE
25 AS NEEDED
Status: DISCONTINUED | OUTPATIENT
Start: 2023-03-12 | End: 2023-03-15 | Stop reason: HOSPADM

## 2023-03-12 RX ORDER — IBUPROFEN 200 MG
16-32 TABLET ORAL AS NEEDED
Status: DISCONTINUED | OUTPATIENT
Start: 2023-03-12 | End: 2023-03-15 | Stop reason: HOSPADM

## 2023-03-12 RX ORDER — BUPROPION HYDROCHLORIDE 150 MG/1
150 TABLET ORAL NIGHTLY
Status: DISCONTINUED | OUTPATIENT
Start: 2023-03-12 | End: 2023-03-15 | Stop reason: HOSPADM

## 2023-03-12 RX ORDER — POTASSIUM CHLORIDE 750 MG/1
40 TABLET, EXTENDED RELEASE ORAL ONCE
Status: COMPLETED | OUTPATIENT
Start: 2023-03-12 | End: 2023-03-12

## 2023-03-12 RX ORDER — PROMETHAZINE HYDROCHLORIDE 25 MG/1
12.5 TABLET ORAL EVERY 6 HOURS PRN
Status: DISCONTINUED | OUTPATIENT
Start: 2023-03-12 | End: 2023-03-15 | Stop reason: HOSPADM

## 2023-03-12 RX ORDER — SODIUM CHLORIDE 9 MG/ML
5 INJECTION, SOLUTION INTRAVENOUS AS NEEDED
Status: ACTIVE | OUTPATIENT
Start: 2023-03-12 | End: 2023-03-13

## 2023-03-12 RX ORDER — ACETAMINOPHEN 325 MG/1
650 TABLET ORAL EVERY 4 HOURS PRN
Status: DISCONTINUED | OUTPATIENT
Start: 2023-03-12 | End: 2023-03-15 | Stop reason: HOSPADM

## 2023-03-12 RX ORDER — BENZONATATE 100 MG/1
100 CAPSULE ORAL 3 TIMES DAILY PRN
Status: DISCONTINUED | OUTPATIENT
Start: 2023-03-12 | End: 2023-03-15 | Stop reason: HOSPADM

## 2023-03-12 RX ORDER — PROTRIPTYLINE HYDROCHLORIDE 5 MG/1
15 TABLET, FILM COATED ORAL NIGHTLY
Status: DISCONTINUED | OUTPATIENT
Start: 2023-03-12 | End: 2023-03-15 | Stop reason: HOSPADM

## 2023-03-12 RX ADMIN — BUPROPION HYDROCHLORIDE 150 MG: 150 TABLET, EXTENDED RELEASE ORAL at 21:35

## 2023-03-12 RX ADMIN — PROTRIPTYLINE HYDROCHLORIDE 15 MG: 5 TABLET, FILM COATED ORAL at 21:45

## 2023-03-12 RX ADMIN — POTASSIUM CHLORIDE 40 MEQ: 750 TABLET, EXTENDED RELEASE ORAL at 21:35

## 2023-03-13 LAB
ANION GAP SERPL CALC-SCNC: 9 MEQ/L (ref 3–15)
BASOPHILS # BLD: 0.04 K/UL (ref 0.01–0.1)
BASOPHILS NFR BLD: 2 %
BUN SERPL-MCNC: 11 MG/DL (ref 8–20)
CALCIUM SERPL-MCNC: 8.3 MG/DL (ref 8.9–10.3)
CHLORIDE SERPL-SCNC: 106 MEQ/L (ref 98–109)
CO2 SERPL-SCNC: 24 MEQ/L (ref 22–32)
CREAT SERPL-MCNC: 0.8 MG/DL (ref 0.6–1.1)
DIFFERENTIAL METHOD BLD: ABNORMAL
EOSINOPHIL # BLD: 0.02 K/UL (ref 0.04–0.36)
EOSINOPHIL NFR BLD: 1 %
ERYTHROCYTE [DISTWIDTH] IN BLOOD BY AUTOMATED COUNT: 13.1 % (ref 11.7–14.4)
GFR SERPL CREATININE-BSD FRML MDRD: >60 ML/MIN/1.73M*2
GLUCOSE SERPL-MCNC: 117 MG/DL (ref 70–99)
HCT VFR BLDCO AUTO: 22.9 % (ref 35–45)
HGB BLD-MCNC: 7.7 G/DL (ref 11.8–15.7)
LYMPHOCYTES # BLD: 1.23 K/UL (ref 1.2–3.5)
LYMPHOCYTES NFR BLD: 62 %
MAGNESIUM SERPL-MCNC: 1.3 MG/DL (ref 1.8–2.5)
MCH RBC QN AUTO: 27.1 PG (ref 28–33.2)
MCHC RBC AUTO-ENTMCNC: 33.6 G/DL (ref 32.2–35.5)
MCV RBC AUTO: 80.6 FL (ref 83–98)
MICROCYTES BLD QL SMEAR: ABNORMAL
MONOCYTES # BLD: 0.2 K/UL (ref 0.28–0.8)
MONOCYTES NFR BLD: 10 %
NEUTS BAND # BLD: 0.5 K/UL (ref 1.7–7)
NEUTS SEG NFR BLD: 25 %
PDW BLD AUTO: ABNORMAL FL
PLAT MORPH BLD: NORMAL
PLATELET # BLD AUTO: 2 K/UL (ref 150–369)
PLATELET # BLD EST: ABNORMAL 10*3/UL
POTASSIUM SERPL-SCNC: 3.7 MEQ/L (ref 3.6–5.1)
RBC # BLD AUTO: 2.84 M/UL (ref 3.93–5.22)
SODIUM SERPL-SCNC: 139 MEQ/L (ref 136–144)
WBC # BLD AUTO: 1.98 K/UL (ref 3.8–10.5)

## 2023-03-13 PROCEDURE — 83735 ASSAY OF MAGNESIUM: CPT | Performed by: STUDENT IN AN ORGANIZED HEALTH CARE EDUCATION/TRAINING PROGRAM

## 2023-03-13 PROCEDURE — 25000000 HC PHARMACY GENERAL: Performed by: STUDENT IN AN ORGANIZED HEALTH CARE EDUCATION/TRAINING PROGRAM

## 2023-03-13 PROCEDURE — 25800000 HC PHARMACY IV SOLUTIONS: Performed by: STUDENT IN AN ORGANIZED HEALTH CARE EDUCATION/TRAINING PROGRAM

## 2023-03-13 PROCEDURE — 85025 COMPLETE CBC W/AUTO DIFF WBC: CPT | Performed by: STUDENT IN AN ORGANIZED HEALTH CARE EDUCATION/TRAINING PROGRAM

## 2023-03-13 PROCEDURE — 36430 TRANSFUSION BLD/BLD COMPNT: CPT

## 2023-03-13 PROCEDURE — 63600000 HC DRUGS/DETAIL CODE: Performed by: STUDENT IN AN ORGANIZED HEALTH CARE EDUCATION/TRAINING PROGRAM

## 2023-03-13 PROCEDURE — 36415 COLL VENOUS BLD VENIPUNCTURE: CPT | Performed by: STUDENT IN AN ORGANIZED HEALTH CARE EDUCATION/TRAINING PROGRAM

## 2023-03-13 PROCEDURE — 63600000 HC DRUGS/DETAIL CODE

## 2023-03-13 PROCEDURE — 63600000 HC DRUGS/DETAIL CODE: Mod: JW | Performed by: STUDENT IN AN ORGANIZED HEALTH CARE EDUCATION/TRAINING PROGRAM

## 2023-03-13 PROCEDURE — 21400000 HC ROOM AND CARE CCU/INTERMEDIATE

## 2023-03-13 PROCEDURE — P9073 PLATELETS PHERESIS PATH REDU: HCPCS

## 2023-03-13 PROCEDURE — 63700000 HC SELF-ADMINISTRABLE DRUG

## 2023-03-13 PROCEDURE — 63700000 HC SELF-ADMINISTRABLE DRUG: Performed by: STUDENT IN AN ORGANIZED HEALTH CARE EDUCATION/TRAINING PROGRAM

## 2023-03-13 PROCEDURE — 80048 BASIC METABOLIC PNL TOTAL CA: CPT | Performed by: STUDENT IN AN ORGANIZED HEALTH CARE EDUCATION/TRAINING PROGRAM

## 2023-03-13 PROCEDURE — 93005 ELECTROCARDIOGRAM TRACING: CPT | Performed by: STUDENT IN AN ORGANIZED HEALTH CARE EDUCATION/TRAINING PROGRAM

## 2023-03-13 PROCEDURE — 3E03305 INTRODUCTION OF OTHER ANTINEOPLASTIC INTO PERIPHERAL VEIN, PERCUTANEOUS APPROACH: ICD-10-PCS | Performed by: STUDENT IN AN ORGANIZED HEALTH CARE EDUCATION/TRAINING PROGRAM

## 2023-03-13 PROCEDURE — 99222 1ST HOSP IP/OBS MODERATE 55: CPT | Performed by: OBSTETRICS & GYNECOLOGY

## 2023-03-13 RX ORDER — FAMOTIDINE 10 MG/ML
20 INJECTION INTRAVENOUS ONCE AS NEEDED
Status: DISCONTINUED | OUTPATIENT
Start: 2023-03-13 | End: 2023-03-15 | Stop reason: HOSPADM

## 2023-03-13 RX ORDER — FAMOTIDINE 10 MG/ML
20 INJECTION INTRAVENOUS ONCE AS NEEDED
Status: CANCELLED | OUTPATIENT
Start: 2023-03-20

## 2023-03-13 RX ORDER — PALONOSETRON 0.05 MG/ML
250 INJECTION, SOLUTION INTRAVENOUS ONCE
Status: CANCELLED | OUTPATIENT
Start: 2023-03-20

## 2023-03-13 RX ORDER — LEUCOVORIN CALCIUM 5 MG/1
15 TABLET ORAL
Status: CANCELLED | OUTPATIENT
Start: 2023-03-15

## 2023-03-13 RX ORDER — OXYMETAZOLINE HCL 0.05 %
1 SPRAY, NON-AEROSOL (ML) NASAL DAILY PRN
Status: DISCONTINUED | OUTPATIENT
Start: 2023-03-13 | End: 2023-03-15 | Stop reason: HOSPADM

## 2023-03-13 RX ORDER — SODIUM CHLORIDE 9 MG/ML
5 INJECTION, SOLUTION INTRAVENOUS AS NEEDED
Status: ACTIVE | OUTPATIENT
Start: 2023-03-13 | End: 2023-03-14

## 2023-03-13 RX ORDER — MAGNESIUM CHLORIDE 64 MG
64 TABLET, DELAYED RELEASE (ENTERIC COATED) ORAL DAILY
Status: DISCONTINUED | OUTPATIENT
Start: 2023-03-13 | End: 2023-03-15 | Stop reason: HOSPADM

## 2023-03-13 RX ORDER — SODIUM CHLORIDE 9 MG/ML
500 INJECTION, SOLUTION INTRAVENOUS CONTINUOUS
Status: CANCELLED | OUTPATIENT
Start: 2023-03-20

## 2023-03-13 RX ORDER — DIPHENHYDRAMINE HCL 50 MG/ML
25 VIAL (ML) INJECTION ONCE AS NEEDED
Status: CANCELLED | OUTPATIENT
Start: 2023-03-20

## 2023-03-13 RX ORDER — EPINEPHRINE 1 MG/ML
0.5 INJECTION, SOLUTION INTRAMUSCULAR; SUBCUTANEOUS ONCE AS NEEDED
Status: DISCONTINUED | OUTPATIENT
Start: 2023-03-13 | End: 2023-03-15 | Stop reason: HOSPADM

## 2023-03-13 RX ORDER — EPINEPHRINE 1 MG/ML
0.5 INJECTION, SOLUTION INTRAMUSCULAR; SUBCUTANEOUS ONCE AS NEEDED
Status: CANCELLED | OUTPATIENT
Start: 2023-03-20

## 2023-03-13 RX ORDER — OXYMETAZOLINE HCL 0.05 %
1 SPRAY, NON-AEROSOL (ML) NASAL 2 TIMES DAILY PRN
Status: DISCONTINUED | OUTPATIENT
Start: 2023-03-13 | End: 2023-03-13

## 2023-03-13 RX ORDER — DIPHENHYDRAMINE HCL 50 MG/ML
25 VIAL (ML) INJECTION ONCE AS NEEDED
Status: CANCELLED | OUTPATIENT
Start: 2023-03-15

## 2023-03-13 RX ORDER — FAMOTIDINE 10 MG/ML
20 INJECTION INTRAVENOUS ONCE AS NEEDED
Status: CANCELLED | OUTPATIENT
Start: 2023-03-15

## 2023-03-13 RX ORDER — EPINEPHRINE 1 MG/ML
0.5 INJECTION, SOLUTION INTRAMUSCULAR; SUBCUTANEOUS ONCE AS NEEDED
Status: CANCELLED | OUTPATIENT
Start: 2023-03-15

## 2023-03-13 RX ORDER — DIPHENHYDRAMINE HCL 50 MG/ML
25 VIAL (ML) INJECTION ONCE AS NEEDED
Status: COMPLETED | OUTPATIENT
Start: 2023-03-13 | End: 2023-03-13

## 2023-03-13 RX ORDER — METOCLOPRAMIDE HYDROCHLORIDE 5 MG/ML
10 INJECTION INTRAMUSCULAR; INTRAVENOUS ONCE
Status: COMPLETED | OUTPATIENT
Start: 2023-03-13 | End: 2023-03-13

## 2023-03-13 RX ADMIN — DIPHENHYDRAMINE HYDROCHLORIDE 25 MG: 50 INJECTION INTRAMUSCULAR; INTRAVENOUS at 17:37

## 2023-03-13 RX ADMIN — METOCLOPRAMIDE 10 MG: 5 INJECTION, SOLUTION INTRAMUSCULAR; INTRAVENOUS at 18:38

## 2023-03-13 RX ADMIN — ACETAMINOPHEN 650 MG: 325 TABLET ORAL at 09:52

## 2023-03-13 RX ADMIN — OXYMETAZOLINE HYDROCHLORIDE 1 SPRAY: 0.05 SPRAY NASAL at 13:46

## 2023-03-13 RX ADMIN — ALUMINUM HYDROXIDE, MAGNESIUM HYDROXIDE, AND SIMETHICONE 15 ML: 200; 200; 20 SUSPENSION ORAL at 18:33

## 2023-03-13 RX ADMIN — DACTINOMYCIN 0.5 MG: 0.5 INJECTION, POWDER, LYOPHILIZED, FOR SOLUTION INTRAVENOUS at 18:23

## 2023-03-13 RX ADMIN — ONDANSETRON HYDROCHLORIDE: 2 INJECTION, SOLUTION INTRAMUSCULAR; INTRAVENOUS at 16:25

## 2023-03-13 RX ADMIN — BUPROPION HYDROCHLORIDE 150 MG: 150 TABLET, EXTENDED RELEASE ORAL at 23:33

## 2023-03-13 RX ADMIN — ACETAMINOPHEN 650 MG: 325 TABLET ORAL at 16:32

## 2023-03-13 RX ADMIN — MAGNESIUM SULFATE HEPTAHYDRATE 2 G: 40 INJECTION, SOLUTION INTRAVENOUS at 00:59

## 2023-03-13 RX ADMIN — PROTRIPTYLINE HYDROCHLORIDE 15 MG: 5 TABLET, FILM COATED ORAL at 23:33

## 2023-03-13 RX ADMIN — MAGNESIUM 64 MG (MAGNESIUM CHLORIDE) TABLET,DELAYED RELEASE 64 MG: at 12:08

## 2023-03-13 RX ADMIN — METHOTREXATE 550 MG: 25 INJECTION, SOLUTION INTRA-ARTERIAL; INTRAMUSCULAR; INTRATHECAL; INTRAVENOUS at 18:25

## 2023-03-13 RX ADMIN — DEXTROSE MONOHYDRATE 184 MG: 50 INJECTION, SOLUTION INTRAVENOUS at 17:06

## 2023-03-13 NOTE — PLAN OF CARE
Care Coordination Admission Assessment Note  Date: 3/13/2023   Time: 7:54 AM    Patient Name: Alondra Villela  Medical Record Number: [id number]  YOB: 1990  Room/BED: 0373    General Information  Patient-Specific Goals (Include Timeframe)     PCP: Sunil Hinton MD   Insurance Coverage: [employer]  Readmission Within the last 30 days  planned readmission    Advance Directives (for Healthcare)?  Does patient have advance directive?: No  Patient does not have Advance Directive: Patient/Family declines further information  Does patient have current OOH DNR form?: No  Patient does not have current OOH DNR form: Patient/Family declines further information  Does patient have current POLST?: No  Patient does not have current POLST: Patient/Family declines further information  Does patient have mental health advance directive?: No  Patient does not have Mental Health Advance Directive: Patient/Family declines further information        Information       Living Arrangements  Arrived From: home  Current Living Arrangements: home  People in Home: child(brianna), dependent, spouse  Home Accessibility: stairs to enter home (Group)  Able to Return to Prior Arrangements: yes    Housing Stability and Financial Resources (SDOH)       Current Outpatient/Agency/Support Group       Type of Current Home Care Services       Assistive Device/Animal Currently Used at Home  none    Functional Status Comments  pt is independent with her ADL's    IADL Comments  pt does not use DME    Employment/ Status       Concerns to be Addressed  care coordination/care conferences    Current Discharge Risk       Anticipated Changes Related to Illness  none    Transportation Concerns (SDOH)  Transportation Concerns: car, none  Transportation Anticipated: family or friend will provide    Concerns Comments  anticipate d/c home no needs    Anticipated Clinical Needs       Anticipated Discharge Plan   Anticipated  Discharge Disposition: home with assistance, home without assistance or services  Patient/Family Anticipates Transition to: home with family, home  Patient/Family Anticipated Services at Transition: none  Screening complete: yes

## 2023-03-13 NOTE — H&P
Gynecology History and Physical    HPI     Patient is a 32 y.o. female  with stage IV gestational trophoblastic neoplasia who presents for direct admission for cycle 4A chemotherapy.     Alondra had an uncomplicated repeat  section in 2022. She subsequently presented to [facility] 1/10/23 with dyspnea, tachycardia, and anemia. Her beta-hcG was >461,700 (on adjusted assay). CT 23 showed pulmonary and liver metastases. There was initial concern for brain metastases, however, this was ruled out on further imaging. WHO score 17.      PICC line was placed. She received chemotherapy induction with EP on - and then -. S/p Etoposide, Dactinomycin, and Methotrexate on 23-23. S/p Etoposide/ Cisplatin  23. S/p Etoposide, Dactinomycin, and Methotrexate . S/p Etoposide/ Cisplatin . S/p Etoposide, Dactinomycin, and Methotrexate . S/p Etoposide/ Cisplatin . Plan for EMA-EP for 3 cycles past negative hcg levels.      Her PICC line was removed during  due to an associated DVT and she had a port placed on 23. Alondra was taking Eliquis for treatment of right UE DVT. She discontinued the Eliquis on 3/7 in setting of thrombocytopenia.      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 Patient initially started on PTU which was transitioned to methimazole. Methimazole and lopressor have since been discontinued.     Alondra has recently developed severe thrombocytopenia. She received a unit of platelets on 3/10. She admits to bleeding from her gums with brushing her teeth. She denies any rashes, other bleeding, or current bruising. Admits to daily Filgrastim injections on days she does not receive chemotherapy. Had severe neutropenia which resolved.     Alondra complains of lower lip ulcer which has bene bothersome for the past four days. She states she was diagnosed recently with  thrush and is using nystatin swish and spit.     Alondra denies current fevers, chills, headache, emesis, chest pain, shortness of breath, dizziness, lightheadedness, diarrhea, constipation. She admits to nausea and decreased appetite at baseline.     OB History:   OB History    Para Term  AB Living   2 2 2 0 0 2   SAB IAB Ectopic Multiple Live Births   0 0 0 0 2      # Outcome Date GA Lbr Kelvin/2nd Weight Sex Delivery Anes PTL Lv   2 Term            1 Term                Medical History:   Past Medical History:   Diagnosis Date   • Hx antineoplastic chemo        Surgical History:   Past Surgical History:   Procedure Laterality Date   •  SECTION      x2         Allergies: Patient has no known allergies.    Prior to Admission medications    Medication Sig Start Date End Date Taking? Authorizing Provider   apixaban (ELIQUIS) 5 mg tablet Take 1 tablet (5 mg total) by mouth 2 (two) times a day. 2/8/23 3/10/23  Lay Shipman,    benzonatate (TESSALON) 100 mg capsule Take 100 mg by mouth 3 (three) times a day as needed for cough.    Provider, Ricardo Carr MD   buPROPion XL (WELLBUTRIN XL) 150 mg 24 hr tablet Take 150 mg by mouth nightly. 12/15/22   ProviderRicardo MD   filgrastim-sndz (ZARXIO) 480 mcg/0.8 mL syringe injection Inject 0.8 mL (480 mcg total) under the skin See admin instr for 15 doses. Inject under the skin on days 3, 4, 5, 9, 10, 11 and 12 of chemotherapy cycle. 2/15/23   Shantelle Mcmullen MD   hydrocortisone 2.5 % cream APPLY THIN COAT TO AFFECTED AREA TWICE A DAY 23   Provider, Ricardo Carr MD   lidocaine-prilocaine (EMLA) cream Apply topically as needed for mild pain. Apply a quarter size amount to skin over port site approximately one hour prior to chemotherapy. Cover with saran wrap. 23   Temitope Peres PA C   magic mouthwash (BENADRYL-MAALOX-CARAFATE) Swish and spit 15 mL every 4 (four) hours as needed for stomatitis. 23   Estela Bell PA C    magic mouthwash with nystatin (BENADRYL-MAALOX-LIDOCAINE-NYSTATIN) Swish and spit 15 mL every 4 (four) hours as needed for stomatitis. 1/27/23   Lorena Hogue MD   methIMAzole (TAPAZOLE) 5 mg tablet Take 2.5 mg by mouth daily.    Provider, Ricardo Carr MD   norethindrone-e.estradioL-iron (JUNEL FE 1/20) 1 mg-20 mcg (21)/75 mg (7) per tablet Take 1 tablet by mouth daily. 9/30/22 9/30/23  Provider, Ricardo Carr MD   nystatin (MYCOSTATIN) 100,000 unit/mL suspension Swish and spit 5 mL (500,000 Units total) 4 (four) times a day for 10 days. 3/6/23 3/16/23  Temitope Peres PA C   ondansetron ODT (ZOFRAN-ODT) 4 mg disintegrating tablet Take 1-2 tablets (4-8 mg total) by mouth every 6 (six) hours as needed for nausea or vomiting (Nausea/Vomiting). Maximum 32mg per day. 1/21/23   Artur Macias MD   protriptyline (VIVACTIL) 5 mg tablet Take 3 tablets (15 mg total) by mouth daily. 1/21/23 2/20/23  Artur Macias MD   triamcinolone (KENALOG) 0.1 % ointment Apply topically 2 times daily. 8/24/22   Provider, Ricardo Carr MD       Review of Systems  All systems were reviewed and normal with the exception of the above.     Objective     Vital Signs for the last 24 hours:  Temp:  [36.7 °C (98.1 °F)] 36.7 °C (98.1 °F)  Resp:  [18] 18  BP: (120)/(76) 120/76    Exam  General Appearance: Alert, cooperative, no acute distress  HEENT: lower lip with small ulceration, healing well. No oral thrush.  Lungs: Clear to auscultation bilaterally, respirations unlabored  Heart: Regular rate and rhythm +S1S2  Chest wall: Right port site C/D/I  Abdomen: +BS, soft, non distended, non tender throughout with no rebound/rigidity/guarding  Extremities: no edema or calf tenderness b/l    Labs              Estimated Creatinine Clearance: 79.7 mL/min (by C-G formula based on SCr of 1 mg/dL).   Results from last 7 days   Lab Units 03/12/23 1957 03/06/23  0852   SODIUM mEQ/L 135* 137   POTASSIUM mEQ/L 3.4* 4.1   CHLORIDE mEQ/L 105 107    CO2 mEQ/L 21* 20*   BUN mg/dL 14 18   CREATININE mg/dL 1.0 1.1   GLUCOSE mg/dL 115* 100*   CALCIUM mg/dL 8.0* 8.8*   MAGNESIUM mg/dL 1.1* 1.5*   ALK PHOS IU/L 82 101   BILIRUBIN TOTAL mg/dL 0.6 0.3   ALBUMIN g/dL 3.6 4.0   ALT IU/L 17 17   AST IU/L 12* 16       Results from last 7 days   Lab Units 03/12/23  1957 03/10/23  1130 03/07/23  1117 03/06/23  0852   WBC K/uL 2.74* 2.55* 1.79* 1.86*   RBC M/uL 3.04* 3.67* 2.73* 2.99*   HEMOGLOBIN g/dL 8.5* 10.2* 7.4* 8.1*   HEMATOCRIT % 23.9* 28.7* 21.7* 24.1*   MCV fL 78.6* 78.2* 79.5* 80.6*   RDW % 13.2 13.2 12.2 12.4   MCH pg 28.0 27.8* 27.1* 27.1*   MCHC g/dL 35.6* 35.5 34.1 33.6   PLATELETS K/uL 3* 7* 28* 36*   DIFF TYPE  Manu Manu Manu Manu   NEUTROS PCT MAN % 47 23 51 22   LYMPHO PCT MAN % 48 71 42 68   MONO PCT MAN % 4 5 7 6   EOSINO PCT MAN % 0 0 0 1   BASOS PCT MAN % 0 1 0 2   BANDS PCT MAN %  --   --   --  1   SEGS ABS MAN K/uL 1.29* 0.59* 0.91* 0.41*   LYMPHO ABS MAN K/uL 1.32 1.81 0.75* 1.26   MONO ABS MAN K/uL 0.11* 0.13* 0.13* 0.11*   EOS ABS MAN K/uL 0.00* 0.00* 0.00* 0.02*   BASOS ABS MAN K/uL 0.00* 0.03 0.00* 0.04   MYELOCYTES, ABSOLUTE (MANUAL) K/uL 0.03*  --   --   --                  Assessment/Plan     Alondra Villela is a 32 y.o. female admitted for chemotherapy management of stage IV gestational trophoblastic neoplasia.    1. GTN  a. Admitted for Cycle 4A EMA-EP.   b. Vitals stable.   c. Beta-HCG on admission 117.8, continues to downtrend.   d. Continue supportive medications Tylenol and Phenergen prn nausea for patient request.   2. Thrombocytopenia  a. Platelets 3K on admission.   b. Will transfuse 1U platelets tonight.   c. Repeat CBC in AM.   3. Mild neutropenia  a. ANC 1,290 on admisison.  b. Neutropenic precautions.   c. Medications to be given through PIV.    d. Alondra continues to take Filgastrim on days she does not receive chemotherapy.   4. Hyperthyroidism   a. Hx thyroid storm.   b. Follows with Endocrinology Dr. Hodge.   c. Last TSH on  file 0.68 2/6/23.   d. Stable not on medication.   e. Next expected TSH 3/14.   5. Thrush/oral mucocutaneous ulcers  a. Continue magic mouthwash swish and spit.   6. Depression  a. Stable, continue home medications Wellbutrin  mg daily and Propytiline 15 mg daily.   7. Hx DVT in right UE  a. Taking Eliquis 5 mg PO BID until 3/7.   b. Eliquis was discontinued insetting of thrombocytopenia and episode of bleeding.   c. Given Plt 3k at this time will continue to hold Eliquis.   d. SCDs for VTE ppx.   8. Hypokalemia/Hypomagnesemia  a. K 3.4 and Mag 1.1 on admission.   b. Will replete with Klor Con 40 mEq PO and 2g IV magnesium .   c. Check BMP/Mag in AM.   9. Diet: Regular    Discussed with Dr. Smith.     Please page #[phone] for questions/concerns.     Lay Shipman,

## 2023-03-13 NOTE — PROGRESS NOTES
Gynecology Progress Note    Subjective     Interval History: Received 1 unit of platelets overnight. She reports feeling tried but overall doing well with her baseline nausea. She denies any abdominal/chest/pelvic pain, SOB, calf pain/swelling, lightheadedness, dizziness, diarrhea/constipation, and any other sx or concerns at this time.       Objective     Vital signs in last 24 hours:  Temp:  [36.7 °C (98 °F)-37.1 °C (98.7 °F)] 36.7 °C (98.1 °F)  Heart Rate:  [70-85] 85  Resp:  [18] 18  BP: (112-128)/(65-76) 128/72    Intake/Output this shift:  I/O this shift:  In: 290 [Blood:290]  Out: -     Exam  General Appearance: Alert, cooperative, no acute distress  Lungs: Clear to auscultation bilaterally, respirations unlabored  Heart: Regular rate and rhythm  Chest: R sided port is clean, dry, and intact.   Abdomen: bowel sounds normoactive in all 4 quadrants. No TTP, no rebound, and no guarding.   Extremities: no edema or calf tenderness     Labs  Results from last 7 days   Lab Units 03/12/23  1957 03/10/23  1130 03/07/23  1117   WBC K/uL 2.74* 2.55* 1.79*   HEMOGLOBIN g/dL 8.5* 10.2* 7.4*   HEMATOCRIT % 23.9* 28.7* 21.7*   PLATELETS K/uL 3* 7* 28*      Results from last 7 days   Lab Units 03/12/23 1957 03/06/23  0852   SODIUM mEQ/L 135* 137   POTASSIUM mEQ/L 3.4* 4.1   CHLORIDE mEQ/L 105 107   CO2 mEQ/L 21* 20*   BUN mg/dL 14 18   CREATININE mg/dL 1.0 1.1   GLUCOSE mg/dL 115* 100*   CALCIUM mg/dL 8.0* 8.8*        Assessment/Plan     Alondra Villela is a 32 y.o. female admitted for cycle 4A EMA-EP chemotherapy management of stage IV gestational trophoblastic neoplasia.     1. GTN  a. Admitted for Cycle 4A EMA-EP.   b. Vitals stable.   c. Beta-HCG on admission 117.8, continues to downtrend.   d. Continue supportive medications Tylenol and Phenergen prn nausea for patient request.     2. Thrombocytopenia  a. Platelets 3K on admission.   b. S/p 1 unit of platelets overnight.  c. Repeat CBC ordered for this AM    3. Mild  neutropenia  a. ANC 1,290 on admisison.  b. Neutropenic precautions ordered. For repeat CBC this AM.   c. Medications to be given through PIV.    d. Alondra continues to take Filgastrim on days she does not receive chemotherapy.     4. Hyperthyroidism   a. Hx thyroid storm.   b. Follows with Endocrinology Dr. Hodge.   c. Last TSH on file 0.68 2/6/23.   d. Stable not on medication.   e. Next expected TSH 3/14.     5. Thrush/oral mucocutaneous ulcers  a. Continue magic mouthwash swish and spit.     6. Depression  a. Stable, continue home medications Wellbutrin  mg daily and Propytiline 15 mg daily (ordered as non-formulary).     7. Hx DVT in right UE  a. Previously on Eliquis 5 mg PO BID until 3/7.   b. Eliquis was discontinued insetting of thrombocytopenia and episode of bleeding.   c. Given Plt 3k at this time will continue to hold Eliquis.   d. SCDs for VTE ppx.     8. Hypokalemia/Hypomagnesemia  a. K 3.4 and Mag 1.1 on admission.   b. S/p Klor Con 40 mEq PO and 2g IV magnesium overnight.   c. Plan for repeat BMP and Mg this AM.     9. Diet: Regular    To be discussed with Dr. Mcmullen.     Kyleigh Hoang MD

## 2023-03-13 NOTE — PLAN OF CARE
Plan of Care Review  Plan of Care Reviewed With: patient, spouse, mother  Progress: no change  Outcome Summary: pt with critical plt=2, 2 units plts given prior to start of chemo.  pt with nosebleeds today, Dr. Shonewald aware and afrin spray ordered. etoposide/dactinomycin/methotrexate given via R CW port +blood return, dose/route/consent verified by 2 RNs.  during etoposide infusion pt with rash on upper chest and face, Dr. Hoang to bedside and IV benadryl given.  when etoposide finished pt with c/o chest pressure/tightness, Dr. Hoang to bedside, EKG done, no further intervention @ this time and ok to continue chemo.  pt with relief from chest pressure @ this time.   pt resting comfortably in bed with call bell within reach and  @ bedside.

## 2023-03-13 NOTE — PROGRESS NOTES
Gynecology Progress Note    Subjective     Interval History:     Currently receiving chemotherapy (at this time etoposide running in through IV). RN paged and noted that she had developed nonpuritic rast to face, neck, and chest area over last 20 minutes. She also is experiencing a migraine-like headache, tightness in her left wrist, and continued mouth pain from her sores. Baseline nausea also appreciated. She denies chest pain, SOB, vision changes, abdominal pain, extremity swelling, and any other sx or concerns at this time.     Objective     Vital signs in last 24 hours:  Temp:  [36.2 °C (97.2 °F)-37.3 °C (99.1 °F)] 37.3 °C (99.1 °F)  Heart Rate:  [] 112  Resp:  [16-18] 16  BP: (109-155)/(64-89) 146/89    Intake/Output this shift:  I/O this shift:  In: 581 [Blood:531; IV Piggyback:50]  Out: -     Exam  General Appearance: Alert, cooperative, no acute distress  Lungs: Clear to auscultation bilaterally, respirations unlabored  Heart: Regular rate and rhythm  Face: erythematous, nonmacular rash to the face, neck, and upper back.   Abdomen: soft and nontender to palpation. No rash noted  Chest wall: Right sided port in place that is clean, dry, and intact.   Extremities: no edema or calf tenderness    Labs  Results from last 7 days   Lab Units 03/13/23  1008 03/12/23  1957 03/10/23  1130   WBC K/uL 1.98* 2.74* 2.55*   HEMOGLOBIN g/dL 7.7* 8.5* 10.2*   HEMATOCRIT % 22.9* 23.9* 28.7*   PLATELETS K/uL 2* 3* 7*      Results from last 7 days   Lab Units 03/13/23  1008 03/12/23 1957   SODIUM mEQ/L 139 135*   POTASSIUM mEQ/L 3.7 3.4*   CHLORIDE mEQ/L 106 105   CO2 mEQ/L 24 21*   BUN mg/dL 11 14   CREATININE mg/dL 0.8 1.0   GLUCOSE mg/dL 117* 115*   CALCIUM mg/dL 8.3* 8.0*        Assessment/Plan     Alondra Villela is a 32 y.o. female admitted for cycle 4A EMA-EP chemotherapy management of stage IV gestational trophoblastic neoplasia.     1. GTN  a. Admitted for Cycle 4A EMA-EP. Currently receiving etoposide through  IV.   b. Vitals stable.   c. Beta-HCG on admission 117.8, continues to downtrend.   d. Continue supportive medications Tylenol and Phenergen prn nausea for patient request. Having HA and rash sx at this time, will treat with benadryl and reglan.      2. Thrombocytopenia  a. Platelets 3K on admission-> given 1 unit of platelets and repeated to 2K. Given an additional unit of platelets (now total of 2 this admission) prior to starting chemo.    b. Repeat CBC ordered for AM     3. Mild neutropenia  a. ANC 1,290 on admission   b. Neutropenic precautions ordered. For repeat CBC with diff in AM   c. Medications to be given through PIV.    d. Alondra continues to take Filgastrim on days she does not receive chemotherapy.      4. Hyperthyroidism   a. Hx thyroid storm.   b. Follows with Endocrinology Dr. Hodge.   c. Last TSH on file 0.68 2/6/23.   d. Stable not on medication.   e. Next expected TSH 3/14.      5. Thrush/oral mucocutaneous ulcers  a. Continue magic mouthwash swish and spit.      6. Depression  a. Stable, continue home medications Wellbutrin  mg daily and Propytiline 15 mg daily (ordered as non-formulary).      7. Hx DVT in right UE  a. Previously on Eliquis 5 mg PO BID until 3/7.   b. Eliquis was discontinued insetting of thrombocytopenia and episode of bleeding.   c. Given Plt 2k at this time will continue to hold Eliquis.   d. SCDs for VTE ppx.      8. Hypokalemia/Hypomagnesemia  a. K 3.4 and Mag 1.1 on admission.   b. S/p Klor Con 40 mEq PO and 2g IV magnesium overnight.   c. Repeat Mg 1.3. PO repletion ordered.   d. Plan for repeat BMP and Mg in AM      9. Diet: Regular    Discussed with Dr. Mcmullen.     Kyleigh Hoang MD         -----------------------------------  6:26 PM addendum:    Paged by RN to bedside for chest tightness. States that she had similar chest tightness with a prior cycle of chemo. This sensation was reproduced on palpation of the chest. Lungs clear to auscultation  bilaterally and heart tachycardic with regular rhythm. Bedside EKG performed, sinus tach with no notable changes when compared to prior (evaluated by Dr. Macias). Will continue chemo at this time.  Of note, rash and HA did both improve with benadryl and reglan.     Dr. Mcmullen aware.    Kyleigh Hoang MD

## 2023-03-13 NOTE — PLAN OF CARE
Plan of Care Review  Plan of Care Reviewed With: patient  Progress: improving  Outcome Summary: VSS. No complaints overnight. Crittical plt of 3, unit of platelets given. Electrolytes repleted as ordered. Will recieve chemo today. Tolerating meds and care. FSP maintained and call bell in reach.

## 2023-03-14 VITALS
BODY MASS INDEX: 28.44 KG/M2 | WEIGHT: 166.6 LBS | TEMPERATURE: 98.8 F | OXYGEN SATURATION: 100 % | HEART RATE: 102 BPM | DIASTOLIC BLOOD PRESSURE: 72 MMHG | RESPIRATION RATE: 16 BRPM | SYSTOLIC BLOOD PRESSURE: 147 MMHG | HEIGHT: 64 IN

## 2023-03-14 LAB
ABO + RH BLD: NORMAL
ATRIAL RATE: 102
BASOPHILS # BLD: 0 K/UL (ref 0.01–0.1)
BASOPHILS NFR BLD: 0 %
BLD GP AB SCN SERPL QL: NEGATIVE
D AG BLD QL: POSITIVE
DIFFERENTIAL METHOD BLD: ABNORMAL
EOSINOPHIL # BLD: 0 K/UL (ref 0.04–0.36)
EOSINOPHIL NFR BLD: 0 %
ERYTHROCYTE [DISTWIDTH] IN BLOOD BY AUTOMATED COUNT: 12.9 % (ref 11.7–14.4)
HCT VFR BLDCO AUTO: 19.4 % (ref 35–45)
HGB BLD-MCNC: 6.9 G/DL (ref 11.8–15.7)
ISBT CODE: 6200
ISBT CODE: 7300
LABORATORY COMMENT REPORT: NORMAL
LYMPHOCYTES # BLD: 1.63 K/UL (ref 1.2–3.5)
LYMPHOCYTES NFR BLD: 77 %
MCH RBC QN AUTO: 27.7 PG (ref 28–33.2)
MCHC RBC AUTO-ENTMCNC: 35.6 G/DL (ref 32.2–35.5)
MCV RBC AUTO: 77.9 FL (ref 83–98)
MICROCYTES BLD QL SMEAR: ABNORMAL
MONOCYTES # BLD: 0.08 K/UL (ref 0.28–0.8)
MONOCYTES NFR BLD: 4 %
NEUTS BAND # BLD: 0.4 K/UL (ref 1.7–7)
NEUTS SEG NFR BLD: 19 %
P AXIS: 45
PDW BLD AUTO: ABNORMAL FL
PLAT MORPH BLD: NORMAL
PLATELET # BLD AUTO: 4 K/UL (ref 150–369)
PLATELET # BLD EST: ABNORMAL 10*3/UL
POLYCHROMASIA BLD QL SMEAR: ABNORMAL
PR INTERVAL: 160
PRODUCT CODE: NORMAL
PRODUCT CODE: NORMAL
PRODUCT STATUS: NORMAL
PRODUCT STATUS: NORMAL
QRS DURATION: 74
QT INTERVAL: 328
QTC CALCULATION(BAZETT): 427
R AXIS: 7
RBC # BLD AUTO: 2.49 M/UL (ref 3.93–5.22)
SPECIMEN EXP DATE BLD: NORMAL
T WAVE AXIS: 8
TSH SERPL DL<=0.05 MIU/L-ACNC: 0.56 MIU/L (ref 0.34–5.6)
UNIT ABO: NORMAL
UNIT ABO: NORMAL
UNIT ID: NORMAL
UNIT ID: NORMAL
UNIT RH: POSITIVE
UNIT RH: POSITIVE
VENTRICULAR RATE: 102
WBC # BLD AUTO: 2.12 K/UL (ref 3.8–10.5)

## 2023-03-14 PROCEDURE — 86807 CYTOTOXIC ANTIBODY SCREENING: CPT

## 2023-03-14 PROCEDURE — 36430 TRANSFUSION BLD/BLD COMPNT: CPT

## 2023-03-14 PROCEDURE — 86920 COMPATIBILITY TEST SPIN: CPT

## 2023-03-14 PROCEDURE — 21400000 HC ROOM AND CARE CCU/INTERMEDIATE

## 2023-03-14 PROCEDURE — P9016 RBC LEUKOCYTES REDUCED: HCPCS

## 2023-03-14 PROCEDURE — 25800000 HC PHARMACY IV SOLUTIONS: Performed by: STUDENT IN AN ORGANIZED HEALTH CARE EDUCATION/TRAINING PROGRAM

## 2023-03-14 PROCEDURE — 86900 BLOOD TYPING SEROLOGIC ABO: CPT

## 2023-03-14 PROCEDURE — 25000000 HC PHARMACY GENERAL: Performed by: STUDENT IN AN ORGANIZED HEALTH CARE EDUCATION/TRAINING PROGRAM

## 2023-03-14 PROCEDURE — 85025 COMPLETE CBC W/AUTO DIFF WBC: CPT

## 2023-03-14 PROCEDURE — P9073 PLATELETS PHERESIS PATH REDU: HCPCS

## 2023-03-14 PROCEDURE — 63700000 HC SELF-ADMINISTRABLE DRUG: Performed by: STUDENT IN AN ORGANIZED HEALTH CARE EDUCATION/TRAINING PROGRAM

## 2023-03-14 PROCEDURE — 63600000 HC DRUGS/DETAIL CODE: Performed by: STUDENT IN AN ORGANIZED HEALTH CARE EDUCATION/TRAINING PROGRAM

## 2023-03-14 PROCEDURE — 93010 ELECTROCARDIOGRAM REPORT: CPT | Performed by: INTERNAL MEDICINE

## 2023-03-14 PROCEDURE — 99232 SBSQ HOSP IP/OBS MODERATE 35: CPT | Performed by: STUDENT IN AN ORGANIZED HEALTH CARE EDUCATION/TRAINING PROGRAM

## 2023-03-14 PROCEDURE — 84443 ASSAY THYROID STIM HORMONE: CPT

## 2023-03-14 PROCEDURE — 63700000 HC SELF-ADMINISTRABLE DRUG

## 2023-03-14 PROCEDURE — 36415 COLL VENOUS BLD VENIPUNCTURE: CPT

## 2023-03-14 RX ORDER — SODIUM CHLORIDE 9 MG/ML
5 INJECTION, SOLUTION INTRAVENOUS AS NEEDED
Status: DISCONTINUED | OUTPATIENT
Start: 2023-03-14 | End: 2023-03-15 | Stop reason: HOSPADM

## 2023-03-14 RX ORDER — DIPHENHYDRAMINE HCL 50 MG/ML
25 VIAL (ML) INJECTION ONCE AS NEEDED
Status: DISCONTINUED | OUTPATIENT
Start: 2023-03-14 | End: 2023-03-15 | Stop reason: HOSPADM

## 2023-03-14 RX ORDER — FAMOTIDINE 10 MG/ML
20 INJECTION INTRAVENOUS ONCE AS NEEDED
Status: DISCONTINUED | OUTPATIENT
Start: 2023-03-14 | End: 2023-03-15 | Stop reason: HOSPADM

## 2023-03-14 RX ORDER — LEUCOVORIN CALCIUM 5 MG/1
15 TABLET ORAL
Status: DISCONTINUED | OUTPATIENT
Start: 2023-03-14 | End: 2023-03-15 | Stop reason: HOSPADM

## 2023-03-14 RX ORDER — HEPARIN 100 UNIT/ML
500 SYRINGE INTRAVENOUS ONCE
Status: COMPLETED | OUTPATIENT
Start: 2023-03-14 | End: 2023-03-14

## 2023-03-14 RX ORDER — EPINEPHRINE 1 MG/ML
0.5 INJECTION, SOLUTION INTRAMUSCULAR; SUBCUTANEOUS ONCE AS NEEDED
Status: DISCONTINUED | OUTPATIENT
Start: 2023-03-14 | End: 2023-03-15 | Stop reason: HOSPADM

## 2023-03-14 RX ADMIN — LEUCOVORIN CALCIUM 15 MG: 5 TABLET ORAL at 18:38

## 2023-03-14 RX ADMIN — ONDANSETRON HYDROCHLORIDE: 2 INJECTION, SOLUTION INTRAMUSCULAR; INTRAVENOUS at 18:24

## 2023-03-14 RX ADMIN — ACETAMINOPHEN 650 MG: 325 TABLET ORAL at 18:18

## 2023-03-14 RX ADMIN — HEPARIN 500 UNITS: 100 SYRINGE at 20:03

## 2023-03-14 RX ADMIN — DEXTROSE MONOHYDRATE 184 MG: 50 INJECTION, SOLUTION INTRAVENOUS at 18:45

## 2023-03-14 RX ADMIN — MAGNESIUM 64 MG (MAGNESIUM CHLORIDE) TABLET,DELAYED RELEASE 64 MG: at 09:07

## 2023-03-14 RX ADMIN — DACTINOMYCIN 0.5 MG: 0.5 INJECTION, POWDER, LYOPHILIZED, FOR SOLUTION INTRAVENOUS at 19:55

## 2023-03-14 NOTE — PLAN OF CARE
Problem: Adult Inpatient Plan of Care  Goal: Plan of Care Review  Outcome: Progressing  Flowsheets (Taken 3/14/2023 0046)  Plan of Care Reviewed With:   patient   spouse  Outcome Summary: received with off going RN at 1930 methotrexate infusion rate and site checked  no rash no nose bleeds no c/o chest discomfort, gait steady in room   at side, very pleasant  vss   Plan of Care Review  Plan of Care Reviewed With: patient, spouse  Outcome Summary: received with off going RN at 1930 methotrexate infusion rate and site checked  no rash no nose bleeds no c/o chest discomfort, gait steady in room   at side, very pleasant  vss

## 2023-03-14 NOTE — PLAN OF CARE
Plan of Care Review  Plan of Care Reviewed With: patient  Progress: no change  Outcome Summary: Patient denies pain, vital signs stable throughout shift. S/p 2 units of platelets and 1 unit of PRBCS. Chemo to be given after blod transfusion. FSP maintained. GMF. Family at the bedside. Call bell within reach, bed alarm refused.

## 2023-03-14 NOTE — DISCHARGE SUMMARY
Inpatient Discharge Summary    BRIEF OVERVIEW  Admitting Provider:  H&P Notes 2/12/2023 to 3/14/2023         Date of Service Author Author Type Status Note Type File Time    03/12/23 2028 Lay Shipman DO Resident Signed H&P 03/12/23 2209 02/26/23 1952 Lay Shipman DO Resident Signed H&P 02/27/23 0729    02/12/23 1833 Lanette Washington MD Resident Signed H&P 02/12/23 2018          Attending Provider: Shantelle Mcmullen MD Attending phys phone: [phone]  Primary Care Physician at Discharge: Sunil Hinton [phone]    Admission Date: 3/12/2023     Discharge Date: 3/14/2023    Primary Discharge Diagnosis  Gestational trophoblastic neoplasm    Secondary Discharge Diagnosis  Active Hospital Problems    Diagnosis Date Noted   • Gestational trophoblastic neoplasm 01/11/2023      Resolved Hospital Problems   No resolved problems to display.       DETAILS OF HOSPITAL STAY    Operative Procedures Performed      Consults: none      Consult Orders During Admission:  None     Procedures: none  Pertinent Test Results: labs: mild neurtopenia and platelets with jackie of 2k.      Presenting Problem/History of Present Illness  Gestational trophoblastic neoplasm [O01.9]     Hospital Course  Alondra Villela is a 32 year old patient with known history of GTN who was admitted for cycle 4 of EMA-EP chemotherapy. During her admission she received Etoposide and metotrexate. Her admission was complicated by thrombocytopenia requiring a total of 5 units of platelets to be transfused. She additionally was noted to have anemia with Hgb of 6.9 on HD#2 and was transfused with 1 unit of packed red blood cells. Given platelet consumption she was recommended by pathology to be cross matched for HLA matched platelets. This lab work was collected and in process at time of discharge. She tolerated the chemotherapy well and was discharged home.      Discharge Orders     Medication List      CONTINUE taking these medications     benzonatate 100 mg capsule  Commonly known as: TESSALON  Take 100 mg by mouth 3 (three) times a day as needed for cough.  Dose: 100 mg     buPROPion  mg 24 hr tablet  Commonly known as: WELLBUTRIN XL  Take 150 mg by mouth nightly.  Dose: 150 mg     hydrocortisone 2.5 % cream  APPLY THIN COAT TO AFFECTED AREA TWICE A DAY     lidocaine-prilocaine cream  Commonly known as: EMLA  Apply topically as needed for mild pain. Apply a quarter size amount to skin over port site approximately one hour prior to chemotherapy. Cover with saran wrap.     magic mouthwash  Commonly known as: BENADRYL-MAALOX-CARAFATE  Swish and spit 15 mL every 4 (four) hours as needed for stomatitis.  Dose: 15 mL     magic mouthwash with nystatin  Commonly known as: BENADRYL-MAALOX-LIDOCAINE-NYSTATIN  Swish and spit 15 mL every 4 (four) hours as needed for stomatitis.  Dose: 15 mL     methIMAzole 5 mg tablet  Commonly known as: TAPAZOLE  Take 2.5 mg by mouth daily.  Dose: 2.5 mg     norethindrone-e.estradioL-iron 1 mg-20 mcg (21)/75 mg (7) per tablet  Commonly known as: JUNEL FE 1/20  Take 1 tablet by mouth daily.  Dose: 1 tablet     nystatin 100,000 unit/mL suspension  Commonly known as: MYCOSTATIN  Swish and spit 5 mL (500,000 Units total) 4 (four) times a day for 10 days.  Dose: 5 mL     ondansetron ODT 4 mg disintegrating tablet  Commonly known as: ZOFRAN-ODT  Take 1-2 tablets (4-8 mg total) by mouth every 6 (six) hours as needed for nausea or vomiting (Nausea/Vomiting). Maximum 32mg per day.  Dose: 4-8 mg     protriptyline 5 mg tablet  Commonly known as: VIVACTIL  Take 3 tablets (15 mg total) by mouth daily.  Dose: 15 mg     triamcinolone 0.1 % ointment  Commonly known as: KENALOG  Apply topically 2 times daily.     ZARXIO 480 mcg/0.8 mL syringe injection  Inject 0.8 mL (480 mcg total) under the skin See admin instr for 15 doses. Inject under the skin on days 3, 4, 5, 9, 10, 11 and 12 of chemotherapy cycle.  Dose: 480 mcg  Generic drug:  filgrastim-sndz        STOP taking these medications    apixaban 5 mg tablet  Commonly known as: ELIQUIS            Outpatient Follow-Ups            In 6 days Shantelle Mcmullen MD [facility]        Active Issues Requiring Follow-up  Issue: HLA matching for platelets  What is Needed: Follow up HLA AB SC and typing orders for platelet transfusions.       Test Results Pending at Discharge  Unresulted Labs (From admission, onward)     Start     Ordered    03/14/23 1703  HLA A&B Typing for Plt Transfusion  Once        Comments: Please collect in either a pale yellow top tube -OR- [two pink tubes and one red top tube]     Question:  Release to patient  Answer:  Immediate    03/14/23 1704    03/14/23 1702  HLA AB SC for Transfusion Class I  Once        Comments: Please collect in a red top tube     Question:  Release to patient  Answer:  Immediate    03/14/23 1704    03/14/23 1307  HLA A&B Typing for Plt Transfusion  STAT        Question:  Release to patient  Answer:  Immediate    03/14/23 1306    03/13/23 0000  BhCG, Serum, Quant        Question Answer Comment   Gestational age of fetus (weeks): 0    Release to patient Immediate        03/13/23 1113    03/13/23 0000  Oncology CBC and Diff ([facility] Only)        Question:  Release to patient  Answer:  Immediate    03/13/23 1113    03/13/23 0000  Comprehensive metabolic panel        Question Answer Comment   Has the patient fasted? No    Release to patient Immediate        03/13/23 1113    03/13/23 0000  Oncology CBC and Diff ([facility] Only)        Question:  Release to patient  Answer:  Immediate    03/13/23 1113    03/13/23 0000  Comprehensive metabolic panel        Question Answer Comment   Has the patient fasted? No    Release to patient Immediate        03/13/23 1113              Discharge Disposition  Disposition: Home   Destination: Home      Code Status at Discharge: Full Code  Physician Order for Life-Sustaining  Treatment Document Status      No documents found

## 2023-03-14 NOTE — PROGRESS NOTES
R2 GYN    Temp:  [36.7 °C (98 °F)-37.1 °C (98.8 °F)] 37.1 °C (98.8 °F)  Heart Rate:  [] 102  Resp:  [16-18] 16  BP: (118-156)/(57-86) 147/72    CBC Results       03/14/23 03/13/23 03/12/23     1012 1008 1957    WBC 2.12 1.98 2.74    RBC 2.49 2.84 3.04    HGB 6.9 7.7 8.5    HCT 19.4 22.9 23.9    MCV 77.9 80.6 78.6    MCH 27.7 27.1 28.0    MCHC 35.6 33.6 35.6    PLT 4 2 3         Comment for WBC at 1012 on 03/14/23: This result has been called to VONDA VILLAFANA by Lara Alegria on 03 14 23 at 11:19, and has been read back.     Comment for WBC at 1008 on 03/13/23: This result has been called to ULISSES GARDUNO by Ya Luo on 03 13 23 at 10:51, and has been read back.     Comment for WBC at 1957 on 03/12/23: ALL RESULTS HAVE BEEN CHECKED    Comment for HGB at 1012 on 03/14/23: This result has been called to VONDA VILLAFANA by Lara Alegria on 03 14 23 at 11:19, and has been read back.     Comment for PLT at 1012 on 03/14/23: RESULTS OBTAINED AFTER VORTEXING TO ELIMINATE PLT CLUMPS. This result has been called to VONDA VILLAFANA by Lara Alegria on 03 14 23 at 11:19, and has been read back.     Comment for PLT at 1008 on 03/13/23: This result has been called to ULISSES GARDUNO by Ya Luo on 03 13 23 at 10:51, and has been read back.     Comment for PLT at 1957 on 03/12/23: This result has been called to SARMAD MUÑOZ by Mateo Rain on 03 12 23 at 20:08, and has been read back.         Labs from this AM as above. Given 1 unit of packed red blood cells and 2 units of platelets. Discussed care with Dr. Thomas who recommended HLA crossmatching for additional platelets. Labs ordered after discussing specific order with blood bank and how to collect.     Pre-meds for chemo given and patient now receiving etoposide. Will be for discharge after receiving full course of cycle 4A chemo.     No additional changes to care plan per prior note.     Evaluated previously with and discussed with Dr. Mcmullen.     Kyleigh Alarcon  MD Viktor

## 2023-03-14 NOTE — PROGRESS NOTES
Gynecology Progress Note    Subjective     Interval History: currently receiving chemo (methotrexate) during rounds. She is sleeping through infusion. She reports continued baseline nausea and improvement in her mouth sores with magic mouth wash. Denies rash, fevers, chills, vomiting, chest pain, SOB, calf pain/swelling, and any other sx or concerns at this time.      Objective     Vital signs in last 24 hours:  Temp:  [36.2 °C (97.2 °F)-37.3 °C (99.1 °F)] 37.1 °C (98.7 °F)  Heart Rate:  [] 104  Resp:  [16-18] 18  BP: (109-155)/(64-89) 136/86    Intake/Output this shift:  I/O this shift:  In: 291.7 [IV Piggyback:291.7]  Out: -     Exam  General Appearance: Alert, cooperative, no acute distress  Lungs: Clear to auscultation bilaterally, respirations unlabored  Face: No rash, swelling, or discomfort to palpation.   Heart: Regular rate and rhythm  Chest wall: Right IJ port in place that is clean, dry, and intact.   Abdomen: soft, nontender to palpation.   Extremities: no edema or calf tenderness    Labs  Results from last 7 days   Lab Units 03/13/23  1008 03/12/23  1957 03/10/23  1130   WBC K/uL 1.98* 2.74* 2.55*   HEMOGLOBIN g/dL 7.7* 8.5* 10.2*   HEMATOCRIT % 22.9* 23.9* 28.7*   PLATELETS K/uL 2* 3* 7*      Results from last 7 days   Lab Units 03/13/23  1008 03/12/23 1957   SODIUM mEQ/L 139 135*   POTASSIUM mEQ/L 3.7 3.4*   CHLORIDE mEQ/L 106 105   CO2 mEQ/L 24 21*   BUN mg/dL 11 14   CREATININE mg/dL 0.8 1.0   GLUCOSE mg/dL 117* 115*   CALCIUM mg/dL 8.3* 8.0*        Assessment/Plan     Alondra Villela is a 32 y.o. female admitted for cycle 4A EMA-EP chemotherapy management of stage IV gestational trophoblastic neoplasia.     1. GTN  a. Admitted for Cycle 4A EMA-EP. Currently receiving day 1 methotrexate through IV.   b. Vitals stable.   c. Beta-HCG on admission 117.8, continues to downtrend.   d. Continue supportive medications Tylenol and Phenergen prn nausea for patient request.       2. Thrombocytopenia  a. Platelets 3K on admission-> given 1 unit of platelets and repeated to 2K. Given an additional 2 units of platelets (now total of 3 this admission) prior to starting chemo yesterday.    b. Repeat CBC ordered this AM      3. Mild neutropenia  a. ANC 1,290 on admission   b. Neutropenic precautions ordered. For repeat CBC with diff this AM  c. Medications to be given through PIV.    d. Alondra continues to take Filgastrim on days she does not receive chemotherapy.      4. Hyperthyroidism   a. Hx thyroid storm.   b. Follows with Endocrinology Dr. Hodge.   c. Last TSH on file 0.68 2/6/23.   d. Stable not on medication.   e. Next expected TSH 3/14- ordered with AM labs.      5. Thrush/oral mucocutaneous ulcers  a. Continue magic mouthwash swish and spit.      6. Depression  a. Stable, continue home medications Wellbutrin  mg daily and Propytiline 15 mg daily (ordered as non-formulary).      7. Hx DVT in right UE  a. Previously on Eliquis 5 mg PO BID until 3/7.   b. Eliquis was discontinued insetting of thrombocytopenia and episode of bleeding.   c. Given Plt 2k at this time will continue to hold Eliquis.   d. SCDs for VTE ppx.      8. Hypokalemia/Hypomagnesemia  a. K 3.4 and Mag 1.1 on admission.   b. S/p Klor Con 40 mEq PO and 2g IV magnesium overnight.   c. Repeat Mg 1.3. PO repletion ordered.      9. Diet: Regular    To be discussed with Dr. Benedict Hoang MD

## 2023-03-15 ENCOUNTER — DOCUMENTATION (OUTPATIENT)
Dept: GYNECOLOGIC ONCOLOGY | Facility: CLINIC | Age: 33
End: 2023-03-15
Payer: COMMERCIAL

## 2023-03-15 ENCOUNTER — APPOINTMENT (OUTPATIENT)
Dept: LAB | Facility: HOSPITAL | Age: 33
End: 2023-03-15
Attending: PHYSICIAN ASSISTANT
Payer: COMMERCIAL

## 2023-03-15 DIAGNOSIS — O01.9 GESTATIONAL TROPHOBLASTIC NEOPLASM: Primary | ICD-10-CM

## 2023-03-15 DIAGNOSIS — O01.9 GESTATIONAL TROPHOBLASTIC NEOPLASM: ICD-10-CM

## 2023-03-15 DIAGNOSIS — Z51.11 ENCOUNTER FOR ANTINEOPLASTIC CHEMOTHERAPY: ICD-10-CM

## 2023-03-15 LAB
ALBUMIN SERPL-MCNC: 4.1 G/DL (ref 3.4–5)
ALP SERPL-CCNC: 84 IU/L (ref 35–126)
ALT SERPL-CCNC: 18 IU/L (ref 11–54)
ANION GAP SERPL CALC-SCNC: 11 MEQ/L (ref 3–15)
AST SERPL-CCNC: 13 IU/L (ref 15–41)
BASOPHILS # BLD: 0 K/UL (ref 0.01–0.1)
BASOPHILS NFR BLD: 0 %
BILIRUB SERPL-MCNC: 0.6 MG/DL (ref 0.3–1.2)
BUN SERPL-MCNC: 15 MG/DL (ref 8–20)
CALCIUM SERPL-MCNC: 8.6 MG/DL (ref 8.9–10.3)
CHLORIDE SERPL-SCNC: 105 MEQ/L (ref 98–109)
CO2 SERPL-SCNC: 20 MEQ/L (ref 22–32)
CREAT SERPL-MCNC: 0.9 MG/DL (ref 0.6–1.1)
DACRYOCYTES BLD QL SMEAR: ABNORMAL
DIFFERENTIAL METHOD BLD: ABNORMAL
DOHLE BOD BLD QL SMEAR: ABNORMAL
EOSINOPHIL # BLD: 0 K/UL (ref 0.04–0.36)
EOSINOPHIL NFR BLD: 0 %
ERYTHROCYTE [DISTWIDTH] IN BLOOD BY AUTOMATED COUNT: 13.2 % (ref 11.7–14.4)
GFR SERPL CREATININE-BSD FRML MDRD: >60 ML/MIN/1.73M*2
GLUCOSE SERPL-MCNC: 112 MG/DL (ref 70–99)
HCG SERPL-ACNC: 174.5 IU/L (MIU/ML)
HCT VFR BLDCO AUTO: 25.9 % (ref 35–45)
HGB BLD-MCNC: 9.3 G/DL (ref 11.8–15.7)
HYPOCHROMIA BLD QL SMEAR: ABNORMAL
ISBT CODE: 6200
ISBT CODE: 7300
LYMPHOCYTES # BLD: 2.23 K/UL (ref 1.2–3.5)
LYMPHOCYTES NFR BLD: 70 %
MAGNESIUM SERPL-MCNC: 1 MG/DL (ref 1.8–2.5)
MCH RBC QN AUTO: 28.7 PG (ref 28–33.2)
MCHC RBC AUTO-ENTMCNC: 35.9 G/DL (ref 32.2–35.5)
MCV RBC AUTO: 79.9 FL (ref 83–98)
MICROCYTES BLD QL SMEAR: ABNORMAL
MONOCYTES # BLD: 0.03 K/UL (ref 0.28–0.8)
MONOCYTES NFR BLD: 1 %
NEUTROPHILS # BLD: 0.74 K/UL (ref 1.7–7)
NEUTS BAND # BLD: 0.92 K/UL (ref 1.7–7)
NEUTS SEG NFR BLD: 29 %
OVALOCYTES BLD QL SMEAR: ABNORMAL
PDW BLD AUTO: 9.1 FL (ref 9.4–12.3)
PLAT MORPH BLD: NORMAL
PLATELET # BLD AUTO: 56 K/UL (ref 150–369)
PLATELET # BLD EST: ABNORMAL 10*3/UL
POTASSIUM SERPL-SCNC: 3.4 MEQ/L (ref 3.6–5.1)
PRODUCT CODE: NORMAL
PRODUCT CODE: NORMAL
PRODUCT STATUS: NORMAL
PRODUCT STATUS: NORMAL
PROT SERPL-MCNC: 6.7 G/DL (ref 6–8.2)
RBC # BLD AUTO: 3.24 M/UL (ref 3.93–5.22)
SODIUM SERPL-SCNC: 136 MEQ/L (ref 136–144)
SPECIMEN EXP DATE BLD: NORMAL
SPECIMEN EXP DATE BLD: NORMAL
TOXIC GRANULES BLD QL SMEAR: SLIGHT
UNIT ABO: NORMAL
UNIT ABO: NORMAL
UNIT ID: NORMAL
UNIT ID: NORMAL
UNIT RH: POSITIVE
UNIT RH: POSITIVE
WBC # BLD AUTO: 3.18 K/UL (ref 3.8–10.5)

## 2023-03-15 PROCEDURE — 84702 CHORIONIC GONADOTROPIN TEST: CPT

## 2023-03-15 PROCEDURE — 85025 COMPLETE CBC W/AUTO DIFF WBC: CPT

## 2023-03-15 PROCEDURE — 80053 COMPREHEN METABOLIC PANEL: CPT

## 2023-03-15 PROCEDURE — 83735 ASSAY OF MAGNESIUM: CPT

## 2023-03-15 PROCEDURE — 36415 COLL VENOUS BLD VENIPUNCTURE: CPT

## 2023-03-15 NOTE — PROGRESS NOTES
Inpatient chemotherapy prior authorization pending via [facility] Portal for the below medications. Service date requested 3/26/23. Faxed clinicals to [facility] at [phone]. Called [facility] reviewer Maria Eugenia at [phone] to inform her authorization was submitted.   -Etoposide ()  -Actinomycin ()  -Methotrexate ()  Pending authorization #: [id number]    Addendum 3/21/23: Faxed letter received from [facility] approving prior authorization for Etoposide (), Actinomycin (), Methotrexate () with start date of 3/26/23. Letter scanned to chart. Authorization #: [id number].

## 2023-03-16 ENCOUNTER — TELEPHONE (OUTPATIENT)
Dept: GYNECOLOGIC ONCOLOGY | Facility: CLINIC | Age: 33
End: 2023-03-16
Payer: COMMERCIAL

## 2023-03-16 LAB
CROSSMATCH: NORMAL
ISBT CODE: 5100
ISBT CODE: 7300
PRODUCT CODE: NORMAL
PRODUCT CODE: NORMAL
PRODUCT STATUS: NORMAL
PRODUCT STATUS: NORMAL
SPECIMEN EXP DATE BLD: NORMAL
SPECIMEN EXP DATE BLD: NORMAL
UNIT ABO: NORMAL
UNIT ABO: NORMAL
UNIT ID: NORMAL
UNIT ID: NORMAL
UNIT RH: POSITIVE
UNIT RH: POSITIVE

## 2023-03-16 RX ORDER — MAGNESIUM CHLORIDE 64 MG
64 TABLET, DELAYED RELEASE (ENTERIC COATED) ORAL 2 TIMES DAILY
Qty: 60 TABLET | Refills: 1 | Status: SHIPPED | OUTPATIENT
Start: 2023-03-16 | End: 2023-03-17 | Stop reason: SDUPTHER

## 2023-03-16 NOTE — TELEPHONE ENCOUNTER
LVM for pt with instructions to start oral magnesium BID. Rx sent for slow release 64mg BID to her pharmacy given serum Mag 1.00

## 2023-03-17 ENCOUNTER — TELEPHONE (OUTPATIENT)
Dept: GYNECOLOGIC ONCOLOGY | Facility: CLINIC | Age: 33
End: 2023-03-17
Payer: COMMERCIAL

## 2023-03-17 ENCOUNTER — DOCUMENTATION (OUTPATIENT)
Dept: GYNECOLOGIC ONCOLOGY | Facility: CLINIC | Age: 33
End: 2023-03-17
Payer: COMMERCIAL

## 2023-03-17 DIAGNOSIS — E83.42 HYPOMAGNESEMIA: ICD-10-CM

## 2023-03-17 RX ORDER — MAGNESIUM CHLORIDE 64 MG
64 TABLET, DELAYED RELEASE (ENTERIC COATED) ORAL 2 TIMES DAILY
Qty: 60 TABLET | Refills: 1 | Status: SHIPPED | OUTPATIENT
Start: 2023-03-17 | End: 2023-03-17 | Stop reason: SDUPTHER

## 2023-03-17 NOTE — TELEPHONE ENCOUNTER
Patient called answering service reporting she cannot find a pharmacy that carries the Mag prescription. Called 3 pharmacies and none have it. Advised to take OTC magnesium supplement, 3 tabs daily. Given last Mag is 1, unlikely to get toxic levels with oral depletion.

## 2023-03-17 NOTE — PROGRESS NOTES
A representative from [facility] called to request clinicals to be sent for Alondra. The fax number is confirmed as [phone] with authorization code: [id number].     Clinicals sent

## 2023-03-19 NOTE — TELEPHONE ENCOUNTER
Patients mom called answering service reporting pt has difficulty swallowing as everything she tried to drink burns and hurts. PCP came over and said throat looks red, no obvious white spots. Temp 99.4, no able to swallow anything, not able to tolerate magic mouth wash either. Advised to go to [facility] urgent care center for IV hydration and strep throat swab. Ok to use right arm for peripheral IV access with the previous catheter rela kwame thrombus.

## 2023-03-19 NOTE — TELEPHONE ENCOUNTER
Patient's father called answering service reporting they were no longer providing IV hydration at [facility]. Advised to go to [facility] ER. Called ER and gave heads up to provider with possible neutropenic fever.

## 2023-03-20 ENCOUNTER — HOSPITAL ENCOUNTER (INPATIENT)
Facility: HOSPITAL | Age: 33
LOS: 8 days | Discharge: HOME | DRG: 809 | End: 2023-03-28
Attending: EMERGENCY MEDICINE | Admitting: STUDENT IN AN ORGANIZED HEALTH CARE EDUCATION/TRAINING PROGRAM
Payer: COMMERCIAL

## 2023-03-20 ENCOUNTER — APPOINTMENT (EMERGENCY)
Dept: RADIOLOGY | Facility: HOSPITAL | Age: 33
DRG: 809 | End: 2023-03-20
Payer: COMMERCIAL

## 2023-03-20 DIAGNOSIS — A41.9 SEPSIS, DUE TO UNSPECIFIED ORGANISM, UNSPECIFIED WHETHER ACUTE ORGAN DYSFUNCTION PRESENT (CMS/HCC): Primary | ICD-10-CM

## 2023-03-20 DIAGNOSIS — O01.9 GESTATIONAL TROPHOBLASTIC NEOPLASM: ICD-10-CM

## 2023-03-20 LAB
ABO + RH BLD: NORMAL
ALBUMIN SERPL-MCNC: 3.7 G/DL (ref 3.4–5)
ALP SERPL-CCNC: 71 IU/L (ref 35–126)
ALT SERPL-CCNC: 12 IU/L (ref 11–54)
ANION GAP SERPL CALC-SCNC: 10 MEQ/L (ref 3–15)
AST SERPL-CCNC: 9 IU/L (ref 15–41)
ATRIAL RATE: 126
BACTERIA URNS QL MICRO: ABNORMAL /HPF
BASOPHILS # BLD: 0 K/UL (ref 0.01–0.1)
BASOPHILS NFR BLD: 0 %
BILIRUB SERPL-MCNC: 1 MG/DL (ref 0.3–1.2)
BILIRUB UR QL STRIP.AUTO: NEGATIVE MG/DL
BLD GP AB SCN SERPL QL: NEGATIVE
BUN SERPL-MCNC: 20 MG/DL (ref 8–20)
CALCIUM SERPL-MCNC: 8.7 MG/DL (ref 8.9–10.3)
CHLORIDE SERPL-SCNC: 102 MEQ/L (ref 98–109)
CLARITY UR REFRACT.AUTO: ABNORMAL
CO2 SERPL-SCNC: 21 MEQ/L (ref 22–32)
COLOR UR AUTO: ABNORMAL
CREAT SERPL-MCNC: 1.2 MG/DL (ref 0.6–1.1)
D AG BLD QL: POSITIVE
DACRYOCYTES BLD QL SMEAR: ABNORMAL
DIFFERENTIAL METHOD BLD: ABNORMAL
EOSINOPHIL # BLD: 0 K/UL (ref 0.04–0.36)
EOSINOPHIL NFR BLD: 0 %
ERYTHROCYTE [DISTWIDTH] IN BLOOD BY AUTOMATED COUNT: 12.7 % (ref 11.7–14.4)
FLUAV RNA SPEC QL NAA+PROBE: NEGATIVE
FLUBV RNA SPEC QL NAA+PROBE: NEGATIVE
GFR SERPL CREATININE-BSD FRML MDRD: 51.7 ML/MIN/1.73M*2
GLUCOSE BLD-MCNC: 73 MG/DL (ref 70–99)
GLUCOSE SERPL-MCNC: 98 MG/DL (ref 70–99)
GLUCOSE UR STRIP.AUTO-MCNC: NEGATIVE MG/DL
HCG SERPL-ACNC: 124.2 IU/L (MIU/ML)
HCG UR QL: POSITIVE
HCT VFR BLDCO AUTO: 22.1 % (ref 35–45)
HGB BLD-MCNC: 7.8 G/DL (ref 11.8–15.7)
HGB UR QL STRIP.AUTO: 3
HYALINE CASTS #/AREA URNS LPF: ABNORMAL /LPF
KETONES UR STRIP.AUTO-MCNC: ABNORMAL MG/DL
LABORATORY COMMENT REPORT: NORMAL
LACTATE SERPL-SCNC: 1.6 MMOL/L (ref 0.4–2)
LEUKOCYTE ESTERASE UR QL STRIP.AUTO: NEGATIVE
LYMPHOCYTES # BLD: 0.64 K/UL (ref 1.2–3.5)
LYMPHOCYTES NFR BLD: 85 %
MCH RBC QN AUTO: 28.1 PG (ref 28–33.2)
MCHC RBC AUTO-ENTMCNC: 35.3 G/DL (ref 32.2–35.5)
MCV RBC AUTO: 79.5 FL (ref 83–98)
MICROCYTES BLD QL SMEAR: ABNORMAL
MONOCYTES # BLD: 0.07 K/UL (ref 0.28–0.8)
MONOCYTES NFR BLD: 9 %
NEUTS BAND # BLD: 0.05 K/UL (ref 1.7–7)
NEUTS SEG NFR BLD: 6 %
NITRITE UR QL STRIP.AUTO: NEGATIVE
OVALOCYTES BLD QL SMEAR: ABNORMAL
P AXIS: 57
PDW BLD AUTO: 9.6 FL (ref 9.4–12.3)
PH UR STRIP.AUTO: 6.5 [PH]
PLAT MORPH BLD: NORMAL
PLATELET # BLD AUTO: 4 K/UL (ref 150–369)
PLATELET # BLD EST: ABNORMAL 10*3/UL
POCT TEST: NORMAL
POTASSIUM SERPL-SCNC: 4.3 MEQ/L (ref 3.6–5.1)
PR INTERVAL: 124
PROT SERPL-MCNC: 6.6 G/DL (ref 6–8.2)
PROT UR QL STRIP.AUTO: 1
QRS DURATION: 68
QT INTERVAL: 280
QTC CALCULATION(BAZETT): 405
R AXIS: 28
RBC # BLD AUTO: 2.78 M/UL (ref 3.93–5.22)
RBC #/AREA URNS HPF: ABNORMAL /HPF
RSV RNA SPEC QL NAA+PROBE: NEGATIVE
S PYO DNA THROAT QL NAA+PROBE: NOT DETECTED
SARS-COV-2 RNA RESP QL NAA+PROBE: NEGATIVE
SODIUM SERPL-SCNC: 133 MEQ/L (ref 136–144)
SP GR UR REFRACT.AUTO: 1.02
SPECIMEN EXP DATE BLD: NORMAL
SQUAMOUS URNS QL MICRO: ABNORMAL /HPF
T WAVE AXIS: 34
TROPONIN I SERPL HS-MCNC: <2.3 PG/ML
TSH SERPL DL<=0.05 MIU/L-ACNC: 0.88 MIU/L (ref 0.34–5.6)
UROBILINOGEN UR STRIP-ACNC: 0.2 EU/DL
VENTRICULAR RATE: 126
WBC # BLD AUTO: 0.75 K/UL (ref 3.8–10.5)
WBC #/AREA URNS HPF: ABNORMAL /HPF

## 2023-03-20 PROCEDURE — 63700000 HC SELF-ADMINISTRABLE DRUG

## 2023-03-20 PROCEDURE — 63600000 HC DRUGS/DETAIL CODE

## 2023-03-20 PROCEDURE — 86900 BLOOD TYPING SEROLOGIC ABO: CPT

## 2023-03-20 PROCEDURE — 36415 COLL VENOUS BLD VENIPUNCTURE: CPT

## 2023-03-20 PROCEDURE — 25800000 HC PHARMACY IV SOLUTIONS

## 2023-03-20 PROCEDURE — P9073 PLATELETS PHERESIS PATH REDU: HCPCS

## 2023-03-20 PROCEDURE — 87040 BLOOD CULTURE FOR BACTERIA: CPT

## 2023-03-20 PROCEDURE — 84484 ASSAY OF TROPONIN QUANT: CPT

## 2023-03-20 PROCEDURE — 84703 CHORIONIC GONADOTROPIN ASSAY: CPT

## 2023-03-20 PROCEDURE — 86920 COMPATIBILITY TEST SPIN: CPT

## 2023-03-20 PROCEDURE — 87651 STREP A DNA AMP PROBE: CPT

## 2023-03-20 PROCEDURE — 86850 RBC ANTIBODY SCREEN: CPT

## 2023-03-20 PROCEDURE — 21400000 HC ROOM AND CARE CCU/INTERMEDIATE

## 2023-03-20 PROCEDURE — 87086 URINE CULTURE/COLONY COUNT: CPT

## 2023-03-20 PROCEDURE — 63700000 HC SELF-ADMINISTRABLE DRUG: Performed by: EMERGENCY MEDICINE

## 2023-03-20 PROCEDURE — 99285 EMERGENCY DEPT VISIT HI MDM: CPT | Mod: 25

## 2023-03-20 PROCEDURE — 25000000 HC PHARMACY GENERAL: Performed by: EMERGENCY MEDICINE

## 2023-03-20 PROCEDURE — 85025 COMPLETE CBC W/AUTO DIFF WBC: CPT

## 2023-03-20 PROCEDURE — 87637 SARSCOV2&INF A&B&RSV AMP PRB: CPT

## 2023-03-20 PROCEDURE — 93005 ELECTROCARDIOGRAM TRACING: CPT | Performed by: EMERGENCY MEDICINE

## 2023-03-20 PROCEDURE — 63600000 HC DRUGS/DETAIL CODE: Performed by: STUDENT IN AN ORGANIZED HEALTH CARE EDUCATION/TRAINING PROGRAM

## 2023-03-20 PROCEDURE — 84702 CHORIONIC GONADOTROPIN TEST: CPT

## 2023-03-20 PROCEDURE — 71045 X-RAY EXAM CHEST 1 VIEW: CPT

## 2023-03-20 PROCEDURE — 80053 COMPREHEN METABOLIC PANEL: CPT

## 2023-03-20 PROCEDURE — 93010 ELECTROCARDIOGRAM REPORT: CPT | Performed by: INTERNAL MEDICINE

## 2023-03-20 PROCEDURE — 99222 1ST HOSP IP/OBS MODERATE 55: CPT | Performed by: STUDENT IN AN ORGANIZED HEALTH CARE EDUCATION/TRAINING PROGRAM

## 2023-03-20 PROCEDURE — 25000000 HC PHARMACY GENERAL: Performed by: STUDENT IN AN ORGANIZED HEALTH CARE EDUCATION/TRAINING PROGRAM

## 2023-03-20 PROCEDURE — 83605 ASSAY OF LACTIC ACID: CPT

## 2023-03-20 PROCEDURE — 84443 ASSAY THYROID STIM HORMONE: CPT

## 2023-03-20 PROCEDURE — 81001 URINALYSIS AUTO W/SCOPE: CPT

## 2023-03-20 PROCEDURE — 93005 ELECTROCARDIOGRAM TRACING: CPT

## 2023-03-20 RX ORDER — MAGNESIUM CHLORIDE 64 MG
64 TABLET, DELAYED RELEASE (ENTERIC COATED) ORAL 2 TIMES DAILY
Qty: 60 TABLET | Refills: 1 | Status: SHIPPED | OUTPATIENT
Start: 2023-03-20 | End: 2023-04-12 | Stop reason: SDUPTHER

## 2023-03-20 RX ORDER — SODIUM CHLORIDE 9 MG/ML
5 INJECTION, SOLUTION INTRAVENOUS AS NEEDED
Status: ACTIVE | OUTPATIENT
Start: 2023-03-20 | End: 2023-03-21

## 2023-03-20 RX ORDER — BENZONATATE 100 MG/1
100 CAPSULE ORAL 3 TIMES DAILY PRN
Status: DISCONTINUED | OUTPATIENT
Start: 2023-03-20 | End: 2023-03-28 | Stop reason: HOSPADM

## 2023-03-20 RX ORDER — OXYMETAZOLINE HCL 0.05 %
2 SPRAY, NON-AEROSOL (ML) NASAL ONCE
Status: DISPENSED | OUTPATIENT
Start: 2023-03-20 | End: 2023-03-21

## 2023-03-20 RX ORDER — LIDOCAINE HYDROCHLORIDE 20 MG/ML
JELLY TOPICAL ONCE
Status: COMPLETED | OUTPATIENT
Start: 2023-03-20 | End: 2023-03-20

## 2023-03-20 RX ORDER — ONDANSETRON 4 MG/1
4 TABLET, ORALLY DISINTEGRATING ORAL EVERY 8 HOURS PRN
Status: DISCONTINUED | OUTPATIENT
Start: 2023-03-20 | End: 2023-03-28 | Stop reason: HOSPADM

## 2023-03-20 RX ORDER — IBUPROFEN 200 MG
16-32 TABLET ORAL AS NEEDED
Status: DISCONTINUED | OUTPATIENT
Start: 2023-03-20 | End: 2023-03-28 | Stop reason: HOSPADM

## 2023-03-20 RX ORDER — FLUCONAZOLE 100 MG/1
100 TABLET ORAL DAILY
Status: DISCONTINUED | OUTPATIENT
Start: 2023-03-20 | End: 2023-03-22

## 2023-03-20 RX ORDER — PROTRIPTYLINE HYDROCHLORIDE 5 MG/1
15 TABLET, FILM COATED ORAL NIGHTLY
Status: DISCONTINUED | OUTPATIENT
Start: 2023-03-20 | End: 2023-03-28 | Stop reason: HOSPADM

## 2023-03-20 RX ORDER — ACETAMINOPHEN 325 MG/1
TABLET ORAL
Status: COMPLETED
Start: 2023-03-20 | End: 2023-03-20

## 2023-03-20 RX ORDER — PANTOPRAZOLE SODIUM 40 MG/10ML
40 INJECTION, POWDER, LYOPHILIZED, FOR SOLUTION INTRAVENOUS EVERY 24 HOURS
Status: DISCONTINUED | OUTPATIENT
Start: 2023-03-20 | End: 2023-03-28 | Stop reason: HOSPADM

## 2023-03-20 RX ORDER — DEXTROSE 50 % IN WATER (D50W) INTRAVENOUS SYRINGE
25 AS NEEDED
Status: DISCONTINUED | OUTPATIENT
Start: 2023-03-20 | End: 2023-03-28 | Stop reason: HOSPADM

## 2023-03-20 RX ORDER — ONDANSETRON 4 MG/1
4-8 TABLET, ORALLY DISINTEGRATING ORAL EVERY 6 HOURS PRN
Status: DISCONTINUED | OUTPATIENT
Start: 2023-03-20 | End: 2023-03-20

## 2023-03-20 RX ORDER — ALUMINUM HYDROXIDE, MAGNESIUM HYDROXIDE, AND SIMETHICONE 1200; 120; 1200 MG/30ML; MG/30ML; MG/30ML
30 SUSPENSION ORAL EVERY 4 HOURS PRN
Status: DISCONTINUED | OUTPATIENT
Start: 2023-03-20 | End: 2023-03-28 | Stop reason: HOSPADM

## 2023-03-20 RX ORDER — BISACODYL 10 MG/1
10 SUPPOSITORY RECTAL DAILY PRN
Status: DISCONTINUED | OUTPATIENT
Start: 2023-03-20 | End: 2023-03-28 | Stop reason: HOSPADM

## 2023-03-20 RX ORDER — DIBUCAINE 1 %
OINTMENT (GRAM) TOPICAL 3 TIMES DAILY
Status: DISCONTINUED | OUTPATIENT
Start: 2023-03-20 | End: 2023-03-28 | Stop reason: HOSPADM

## 2023-03-20 RX ORDER — LIDOCAINE HYDROCHLORIDE 20 MG/ML
10 SOLUTION OROPHARYNGEAL 4 TIMES DAILY PRN
Status: DISCONTINUED | OUTPATIENT
Start: 2023-03-20 | End: 2023-03-28 | Stop reason: HOSPADM

## 2023-03-20 RX ORDER — ACETAMINOPHEN 325 MG/1
650 TABLET ORAL ONCE
Status: DISCONTINUED | OUTPATIENT
Start: 2023-03-20 | End: 2023-03-20

## 2023-03-20 RX ORDER — METOCLOPRAMIDE HYDROCHLORIDE 5 MG/ML
10 INJECTION INTRAMUSCULAR; INTRAVENOUS ONCE
Status: COMPLETED | OUTPATIENT
Start: 2023-03-20 | End: 2023-03-20

## 2023-03-20 RX ORDER — DEXTROSE 40 %
15-30 GEL (GRAM) ORAL AS NEEDED
Status: DISCONTINUED | OUTPATIENT
Start: 2023-03-20 | End: 2023-03-28 | Stop reason: HOSPADM

## 2023-03-20 RX ORDER — DIPHENHYDRAMINE HCL 50 MG/ML
25 VIAL (ML) INJECTION EVERY 6 HOURS PRN
Status: DISCONTINUED | OUTPATIENT
Start: 2023-03-20 | End: 2023-03-28 | Stop reason: HOSPADM

## 2023-03-20 RX ORDER — SENNOSIDES 8.6 MG/1
1 TABLET ORAL 2 TIMES DAILY PRN
Status: DISCONTINUED | OUTPATIENT
Start: 2023-03-20 | End: 2023-03-28 | Stop reason: HOSPADM

## 2023-03-20 RX ORDER — PROTRIPTYLINE HYDROCHLORIDE 5 MG/1
15 TABLET, FILM COATED ORAL NIGHTLY
COMMUNITY

## 2023-03-20 RX ORDER — ACETAMINOPHEN 325 MG/1
975 TABLET ORAL EVERY 6 HOURS PRN
Status: DISCONTINUED | OUTPATIENT
Start: 2023-03-20 | End: 2023-03-21

## 2023-03-20 RX ORDER — PANTOPRAZOLE SODIUM 40 MG/10ML
20 INJECTION, POWDER, LYOPHILIZED, FOR SOLUTION INTRAVENOUS EVERY 12 HOURS
Status: DISCONTINUED | OUTPATIENT
Start: 2023-03-20 | End: 2023-03-20

## 2023-03-20 RX ORDER — ACETAMINOPHEN 325 MG/1
TABLET ORAL
Status: DISPENSED
Start: 2023-03-20 | End: 2023-03-21

## 2023-03-20 RX ORDER — LIDOCAINE AND PRILOCAINE 25; 25 MG/G; MG/G
CREAM TOPICAL ONCE
Status: DISPENSED | OUTPATIENT
Start: 2023-03-20 | End: 2023-03-21

## 2023-03-20 RX ORDER — ACETAMINOPHEN 650 MG/20.3ML
650 SUSPENSION ORAL ONCE
Status: COMPLETED | OUTPATIENT
Start: 2023-03-20 | End: 2023-03-20

## 2023-03-20 RX ORDER — BUPROPION HYDROCHLORIDE 150 MG/1
150 TABLET ORAL NIGHTLY
Status: DISCONTINUED | OUTPATIENT
Start: 2023-03-20 | End: 2023-03-28 | Stop reason: HOSPADM

## 2023-03-20 RX ORDER — SODIUM CHLORIDE, SODIUM LACTATE, POTASSIUM CHLORIDE, CALCIUM CHLORIDE 600; 310; 30; 20 MG/100ML; MG/100ML; MG/100ML; MG/100ML
INJECTION, SOLUTION INTRAVENOUS CONTINUOUS
Status: ACTIVE | OUTPATIENT
Start: 2023-03-20 | End: 2023-03-27

## 2023-03-20 RX ORDER — DOCUSATE SODIUM 100 MG/1
100 CAPSULE, LIQUID FILLED ORAL 2 TIMES DAILY
Status: DISCONTINUED | OUTPATIENT
Start: 2023-03-20 | End: 2023-03-28 | Stop reason: HOSPADM

## 2023-03-20 RX ORDER — PROMETHAZINE HYDROCHLORIDE 12.5 MG/1
12.5 TABLET ORAL EVERY 6 HOURS PRN
COMMUNITY

## 2023-03-20 RX ORDER — DIPHENHYDRAMINE HCL 25 MG
25 CAPSULE ORAL EVERY 6 HOURS PRN
Status: DISCONTINUED | OUTPATIENT
Start: 2023-03-20 | End: 2023-03-28 | Stop reason: HOSPADM

## 2023-03-20 RX ORDER — DIPHENHYDRAMINE HCL 50 MG/ML
25 VIAL (ML) INJECTION ONCE
Status: COMPLETED | OUTPATIENT
Start: 2023-03-20 | End: 2023-03-20

## 2023-03-20 RX ORDER — ONDANSETRON HYDROCHLORIDE 2 MG/ML
4 INJECTION, SOLUTION INTRAVENOUS EVERY 8 HOURS PRN
Status: DISCONTINUED | OUTPATIENT
Start: 2023-03-20 | End: 2023-03-28 | Stop reason: HOSPADM

## 2023-03-20 RX ORDER — PROMETHAZINE HYDROCHLORIDE 6.25 MG/5ML
12.5 SYRUP ORAL ONCE
Status: DISCONTINUED | OUTPATIENT
Start: 2023-03-20 | End: 2023-03-20

## 2023-03-20 RX ADMIN — PROMETHAZINE HYDROCHLORIDE 6.25 MG: 25 INJECTION INTRAMUSCULAR; INTRAVENOUS at 12:19

## 2023-03-20 RX ADMIN — SODIUM CHLORIDE, POTASSIUM CHLORIDE, SODIUM LACTATE AND CALCIUM CHLORIDE 1000 ML: 600; 310; 30; 20 INJECTION, SOLUTION INTRAVENOUS at 10:45

## 2023-03-20 RX ADMIN — PANTOPRAZOLE SODIUM 40 MG: 40 INJECTION, POWDER, LYOPHILIZED, FOR SOLUTION INTRAVENOUS at 22:20

## 2023-03-20 RX ADMIN — SODIUM CHLORIDE, POTASSIUM CHLORIDE, SODIUM LACTATE AND CALCIUM CHLORIDE: 600; 310; 30; 20 INJECTION, SOLUTION INTRAVENOUS at 18:11

## 2023-03-20 RX ADMIN — DIPHENHYDRAMINE HYDROCHLORIDE AND LIDOCAINE HYDROCHLORIDE AND ALUMINUM HYDROXIDE AND MAGNESIUM HYDRO 15 ML: KIT at 15:05

## 2023-03-20 RX ADMIN — LIDOCAINE HYDROCHLORIDE 20 ML: 20 JELLY TOPICAL at 10:30

## 2023-03-20 RX ADMIN — DIPHENHYDRAMINE HYDROCHLORIDE 25 MG: 50 INJECTION INTRAMUSCULAR; INTRAVENOUS at 21:02

## 2023-03-20 RX ADMIN — ACETAMINOPHEN 975 MG: 325 TABLET ORAL at 16:00

## 2023-03-20 RX ADMIN — METOCLOPRAMIDE 10 MG: 5 INJECTION, SOLUTION INTRAMUSCULAR; INTRAVENOUS at 21:03

## 2023-03-20 RX ADMIN — BUPROPION HYDROCHLORIDE 150 MG: 150 TABLET, EXTENDED RELEASE ORAL at 22:22

## 2023-03-20 RX ADMIN — DOCUSATE SODIUM 100 MG: 100 CAPSULE, LIQUID FILLED ORAL at 16:05

## 2023-03-20 RX ADMIN — FLUCONAZOLE 100 MG: 100 TABLET ORAL at 22:21

## 2023-03-20 RX ADMIN — ACETAMINOPHEN 650 MG: 650 SUSPENSION ORAL at 09:43

## 2023-03-20 RX ADMIN — PROTRIPTYLINE HYDROCHLORIDE 15 MG: 5 TABLET, FILM COATED ORAL at 22:21

## 2023-03-20 RX ADMIN — CEFEPIME 2 G: 2 INJECTION, POWDER, FOR SOLUTION INTRAVENOUS at 11:02

## 2023-03-20 RX ADMIN — ACETAMINOPHEN 975 MG: 325 TABLET ORAL at 22:21

## 2023-03-20 ASSESSMENT — ENCOUNTER SYMPTOMS
NUMBNESS: 0
HEMATURIA: 0
BACK PAIN: 0
LIGHT-HEADEDNESS: 1
DIAPHORESIS: 0
VOICE CHANGE: 0
NERVOUS/ANXIOUS: 0
SINUS PRESSURE: 0
CHILLS: 0
TROUBLE SWALLOWING: 1
WEAKNESS: 1
FATIGUE: 1
RHINORRHEA: 0
FLANK PAIN: 0
SORE THROAT: 1

## 2023-03-20 NOTE — CONSULTS
Infectious Disease Consult Note    Patient Name: Alondra Villela  MR#: [id number]  : 1990  Admission Date: 3/20/2023  Consult Date: 23 2:45 PM   Consultant: Awa Terrell MD    Reason for Consult: neutropenic fever  Referring Provider: Pedro Amador MD;Tu*      History of Present Illness     Alondra Villela is a 32 y.o. female who was admitted on 3/20/2023.  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 S/p doses of chemotherapy with Etoposide, Dactinomycin and Methotrexate 3/13/23 and Etoposide/dactinomycin 3/14 via Port.    Presents with fever, neutropenia, oropharyngeal mucositis, poor po intake for approximately 3 days. Felt lightheaded PTA.    Fever 102.5.  WBC 0.75, hemoglobin 7.9, platelets of 4, creatinine 1.2, lactate 1.6.  Given fluids, IV Cefepime.  ENT consulted.    No abdominal pain, no SOB, no cough, no headache, no dysuria, no rectal pain.        Allergies: No Known Allergies    Medical History:  has a past medical history of antineoplastic chemo.    Surgical History:   Past Surgical History:   Procedure Laterality Date   •  SECTION      x2         Social History:   Social History     Tobacco Use   • Smoking status: Never   • Smokeless tobacco: Never   Vaping Use   • Vaping status: Never Used   Substance Use Topics   • Alcohol use: Not Currently   • Drug use: Never      No exposures.        Family History: family history includes Hypertension in her biological father and biological mother.    Review of Systems:    All other systems reviewed an negative except for what is noted in HPI.     Medications:       Current Facility-Administered Medications   Medication Dose Route Frequency   • alum-mag hydroxide-simeth  30 mL oral q4h PRN   • benzonatate  100 mg oral 3x daily PRN   • bisacodyL  10 mg rectal Daily PRN   • buPROPion XL  150 mg oral Nightly   • glucose  16-32 g of dextrose oral PRN    Or   • dextrose  15-30 g of  "dextrose oral PRN    Or   • glucagon  1 mg intramuscular PRN    Or   • dextrose 50 % in water (D50)  25 mL intravenous PRN   • dibucaine   Topical TID   • diphenhydrAMINE  25 mg oral q6h PRN    Or   • diphenhydrAMINE  25 mg intravenous q6h PRN   • docusate sodium  100 mg oral BID   • lactated ringer's   intravenous Continuous   • lidocaine HCL  10 mL Mouth/Throat 4x daily PRN   • lidocaine-prilocaine   Topical Once   • magic mouthwash  15 mL Swish & Spit q4h PRN   • ondansetron ODT  4 mg oral q8h PRN    Or   • ondansetron  4 mg intravenous q8h PRN   • promethazine  12.5 mg intravenous q4h PRN   • protriptyline  15 mg oral Daily   • senna  1 tablet oral 2x daily PRN   • sodium chloride 0.9 %  5 mL/hr intravenous PRN   • witch hazeL   Topical q1h PRN           Objective     Vital Signs:    Visit Vitals  /66 (BP Location: Left upper arm, Patient Position: Lying)   Pulse (!) 115   Temp (!) 38.1 °C (100.6 °F) (Oral)   Resp (!) 21   Ht 1.626 m (5' 4\")   Wt 75.3 kg (166 lb)   SpO2 100%   BMI 28.49 kg/m²     Temp (72hrs), Av °C (100.4 °F), Min:36.8 °C (98.2 °F), Max:39.2 °C (102.5 °F)      Physical exam:   General: non toxic, alert, no distress, appears comfortable but chronically ill appearing, alpecia  HEENT: normocephalic, EOMI, pupils reactive, anicteric sclera, oropharyngeal erythema and mucositis. Evidence of thrush tongue and the inside of mouth.  Neck: supple, no JVD, no masses, no thyromegaly, no tenderness  Cardiovascular: regular S1/S2 , no obvious stigmata of endocarditis  Respiratory: Clear to auscultation bilaterally, no wheezing, no rhonchi, no crackles  GI/Abdomen: soft, no distention, normal bowel sounds, non-tender, no masses, no hepatomegaly or splenomegaly, no peritoneal signs.  Extremities: no  edema, good perfusion of extremities, radial pulses and distal pulses palpable.  MSK/JOINTS:  No swelling, no erythema, no pain. No spine tenderness.  Neurology and psych: no focal deficits, cooperative " with exam, no meningeal signs  Skin: no rashes, lines sites no erythema, swelling or pain and with dressings that are clean, dry and intact  G.U.:  No CVA tenderness or suprapubic pain  Port site no pain or inflammation, no rednes      Lines, Drains, Airways, Wounds:       Labs:  CBC Results       03/20/23 03/15/23 03/14/23     0906 1345 1012    WBC 0.75 3.18 2.12    RBC 2.78 3.24 2.49    HGB 7.8 9.3 6.9    HCT 22.1 25.9 19.4    MCV 79.5 79.9 77.9    MCH 28.1 28.7 27.7    MCHC 35.3 35.9 35.6    PLT 4 56 4         Comment for WBC at 0906 on 03/20/23: ALL RESULTS HAVE BEEN CHECKED. SPECIMEN QUALITY CHECKED. This result has been called to WENDY LEYVA by Ya Luo on 03 20 23 at 09:48, and has been read back.     Comment for WBC at 1012 on 03/14/23: This result has been called to VONDA VILLAFANA by Lara Alegria on 03 14 23 at 11:19, and has been read back.     Comment for HGB at 1345 on 03/15/23: RESULT CHECKED    Comment for HGB at 1012 on 03/14/23: This result has been called to VONDA VILLAFANA by Lara Alegria on 03 14 23 at 11:19, and has been read back.     Comment for PLT at 0906 on 03/20/23: ALL RESULTS HAVE BEEN RECHECKED. SPECIMEN QUALITY CHECKED. This result has been called to WENDY LEYVA by Ya Luo on 03 20 23 at 09:48, and has been read back.     Comment for PLT at 1345 on 03/15/23: RESULTS CHECKED. RESULTS OBTAINED AFTER VORTEXING TO ELIMINATE PLT CLUMPS    Comment for PLT at 1012 on 03/14/23: RESULTS OBTAINED AFTER VORTEXING TO ELIMINATE PLT CLUMPS. This result has been called to VONDA VILLAFANA by Lara Alegria on 03 14 23 at 11:19, and has been read back.           CMP Results       03/20/23 03/15/23 03/13/23     0906 1345 1008     136 139    K 4.3 3.4 3.7    Cl 102 105 106    CO2 21 20 24    Glucose 98 112 117    BUN 20 15 11    Creatinine 1.2 0.9 0.8    Calcium 8.7 8.6 8.3    Anion Gap 10 11 9    AST 9 13 --    ALT 12 18 --    Albumin 3.7 4.1 --    EGFR 51.7 >60.0 >60.0         Comment  for K at 0906 on 03/20/23: Results obtained on plasma. Plasma Potassium values may be up to 0.4 mEQ/L less than serum values. The differences may be greater for patients with high platelet or white cell counts.                 Microbiology Results     Procedure Component Value Units Date/Time    SARS-CoV-2 (COVID-19), PCR Nasopharynx [[id number]]  (Normal) Collected: 03/20/23 0913    Specimen: Nasopharyngeal Swab from Nasopharynx Updated: 03/20/23 1039    Narrative:      The following orders were created for panel order SARS-CoV-2 (COVID-19), PCR Nasopharynx.  Procedure                               Abnormality         Status                     ---------                               -----------         ------                     SARS-COV-2 (COVID-19)/ F...[[id number]]  Normal              Final result                 Please view results for these tests on the individual orders.    Group A Strep by PCR, Throat Throat Swab [[id number]]  (Normal) Collected: 03/20/23 0913    Specimen: Throat Swab Updated: 03/20/23 1029     Strep A PCR, Throat Not Detected    SARS-COV-2 (COVID-19)/ FLU A/B, AND RSV, PCR Nasopharynx [[id number]]  (Normal) Collected: 03/20/23 0913    Specimen: Nasopharyngeal Swab from Nasopharynx Updated: 03/20/23 1039     SARS-CoV-2 (COVID-19) Negative     Influenza A Negative     Influenza B Negative     Respiratory Syncytial Virus Negative    Narrative:      Testing performed using real-time PCR for detection of COVID-19. EUA approved validation studies performed on site.     Group A Strep by PCR, Throat Throat Swab [[id number]]  (Normal) Collected: 03/06/23 0924    Specimen: Throat Swab Updated: 03/06/23 1051     Strep A PCR, Throat Not Detected              Imaging:    X-RAY CHEST 1 VIEW [[id number]] Collected: 03/20/23 1039   Order Status: Completed Updated: 03/20/23 1043   Narrative:     CLINICAL HISTORY:   Sepsis.     COMMENT:     Comparison:   Chest radiographs dating 1/13/2023. Chest CT angiogram  dated   2/4/2023.     Single frontal AP view of the chest was obtained.     Right IJ chest port is in place with the distal end of the catheter within the   distal SVC. The cardiac silhouette and mediastinal contour are unremarkable.   There is right lower lung opacity adjacent to the lateral pleura likely   correspond to the nodule seen on prior CT angiogram. No new consolidation. No   pleural effusion or evidence of a pneumothorax.    Impression:           Assessment   This is a 32 y.o. female stage IV gestational trophoblastic neoplasia     · Neutropenic fever  · Profound neutropenia  · Oropharyngeal mucositis  · Thrush  · Dehydration   · Profound thrombocytopenia  · Estimated Creatinine Clearance: 66.2 mL/min (A) (by C-G formula based on SCr of 1.2 mg/dL (H)).                    Plan     RECOMMEND:    · Close follow up of CBC with differential, to monitor ANC daily  · Fluconazole 100 mg po once a day  · Mucositis management per gyne-oncology team  · Neutropenia management per gyne-oncology team  · Follow blood cultures  · Strict Neutropenic precautions, including neutropenic diet.  · Cefepime 2 g IV q  8 Hours  · Close follow-up of all cultures until completely final  · Adjust the antimicrobials as needed, depending on clinical progress, culture results,  and sensitivities.      Please call ID if any questions or concerns, if any new culture data that needs immediate attention and if any clinical deterioration.   Recommendations discussed and related to primary team.       More than 60     minutes were spent obtaining a detailed interval history, detailed exam, evaluating this high complexity case with significant medical decision making, including coordination of care, reviewing available laboratory and imaging data and discussion with primary team and consultants.        NOTE: Some or all of the note above was created with the use of dictation software, please note this dictation software can have anomalies in  its ability to transcribe. Please contact me directly for anything that seems abnormal for clarification.

## 2023-03-20 NOTE — H&P
Gynecology History and Physical    HPI     Patient is a 32 y.o. female  with stage IV gestational trophoblastic neoplasia who presented to the ED with lightheadedness and inability to tolerate PO 2/2 mouth sores. States that over the last 3-4 days she has had increased soreness in her mouth not well relieved with magic mouthwash and nystatin wash. She reports episode of near syncope on toilet this AM as well as noticeable increase in vaginal bleeding (denies bleeding more than 1 pad/hr). Also reports continuation of her baseline constipation and intermittent bleeding from hemorrhoids. She denies any subjective fevers/chills, chest pain, SOB, palpitations, LE swelling/pain, changes from her baseline nausea, vomiting, stool changes, rashes headaches, and any other sx or concerns at this time.    Upon arrival to the [facility] ED Alondra had a temperature of 102.5 F. She had labs drawn that were significant for WBC of 0.75, ANC of 0.375, Hgb of 7.9, platelets of 4, Cr 1.2, and lactate normal at 1.6. She was started on IV fluids bolus for dehydration, a CXR was ordered, as well as cefepime ordered. Blood cultures were additionally collected. Tylenol suspension ordered as pills difficult to swallow at this time.     Alondra is scheduled for cycle 5A of EMA-EP.       Review of clinical course:    Alondra had an uncomplicated repeat  section in 2022. She subsequently presented to [facility] 1/10/23 with dyspnea, tachycardia, and anemia. Her beta-hcG was >461,700 (on adjusted assay). CT 23 showed pulmonary and liver metastases. There was initial concern for brain metastases, however, this was ruled out on further imaging. WHO score 17.      PICC line was placed. She received chemotherapy induction with EP on - and then -. S/p Etoposide, Dactinomycin, and Methotrexate on 23-23. S/p Etoposide/ Cisplatin  23. S/p Etoposide, Dactinomycin, and Methotrexate . S/p  Etoposide/ Cisplatin . S/p Etoposide, Dactinomycin, and Methotrexate . S/p Etoposide/ Cisplatin . S/p etoposide, Dactomycin, and Methotrexate on 3/13. S/p etoposide/dactinomycin on 3/14. Plan for EMA-EP for 3 cycles past negative hcg levels.      Her PICC line was removed during  due to an associated DVT and she had a port placed on 23. Alondra was taking Eliquis for treatment of right UE DVT. She discontinued the Eliquis on 3/7 in setting of thrombocytopenia and has remained off it.      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 Patient initially started on PTU which was transitioned to methimazole. Methimazole and lopressor have since been discontinued.      Alondra has recently developed severe thrombocytopenia. She received a unit of platelets on 3/10, 5 units of platelets during 3/13-3/14 admission. She admits to bleeding from her gums with brushing her teeth. She denies any rashes, other bleeding, or current bruising. Admits to daily Filgrastim injections on days she does not receive chemotherapy. Platelets on 3/15 were 56k.     OB History:   OB History    Para Term  AB Living   2 2 2 0 0 2   SAB IAB Ectopic Multiple Live Births   0 0 0 0 2      # Outcome Date GA Lbr Kelvin/2nd Weight Sex Delivery Anes PTL Lv   2 Term            1 Term                Medical History:   Past Medical History:   Diagnosis Date   • Hx antineoplastic chemo        Surgical History:   Past Surgical History:   Procedure Laterality Date   •  SECTION      x2       Allergies: Patient has no known allergies.    Prior to Admission medications    Medication Sig Start Date End Date Taking? Authorizing Provider   benzonatate (TESSALON) 100 mg capsule Take 100 mg by mouth 3 (three) times a day as needed for cough.    Provider, yusra Carr MD   buPROPion XL (WELLBUTRIN XL) 150 mg 24 hr tablet Take 150 mg by mouth nightly. 12/15/22    Provider, Ricardo Carr MD   filgrastim-sndz (ZARXIO) 480 mcg/0.8 mL syringe injection Inject 0.8 mL (480 mcg total) under the skin See admin instr for 15 doses. Inject under the skin on days 3, 4, 5, 9, 10, 11 and 12 of chemotherapy cycle. 2/15/23   Shantelle Mcmullen MD   hydrocortisone 2.5 % cream APPLY THIN COAT TO AFFECTED AREA TWICE A DAY 2/11/23   Provider, Ricardo Carr MD   lidocaine-prilocaine (EMLA) cream Apply topically as needed for mild pain. Apply a quarter size amount to skin over port site approximately one hour prior to chemotherapy. Cover with saran wrap. 2/9/23   Temitope Peres PA C   magic mouthwash (BENADRYL-MAALOX-CARAFATE) Swish and spit 15 mL every 4 (four) hours as needed for stomatitis. 2/20/23   Estela Bell PA C   magic mouthwash with nystatin (BENADRYL-MAALOX-LIDOCAINE-NYSTATIN) Swish and spit 15 mL every 4 (four) hours as needed for stomatitis. 1/27/23   Lorena Hogue MD   magnesium chloride 64 mg tablet,delayed release (DR/EC) Take 1 tablet (64 mg total) by mouth 2 (two) times a day. 3/20/23   Estela Bell PA C   methIMAzole (TAPAZOLE) 5 mg tablet Take 2.5 mg by mouth daily.    Provider, Ricardo Carr MD   norethindrone-e.estradioL-iron (JUNEL FE 1/20) 1 mg-20 mcg (21)/75 mg (7) per tablet Take 1 tablet by mouth daily. 9/30/22 9/30/23  ProviderRicardo MD   ondansetron ODT (ZOFRAN-ODT) 4 mg disintegrating tablet Take 1-2 tablets (4-8 mg total) by mouth every 6 (six) hours as needed for nausea or vomiting (Nausea/Vomiting). Maximum 32mg per day. 1/21/23   Artur Macias MD   protriptyline (VIVACTIL) 5 mg tablet Take 3 tablets (15 mg total) by mouth daily. 1/21/23 2/20/23  Artur Macias MD   triamcinolone (KENALOG) 0.1 % ointment Apply topically 2 times daily. 8/24/22   Provider, yusra Carr MD       Review of Systems  All systems were reviewed and normal with the exception of the above.     Objective     Vital Signs for the last 24  hours:  Temp:  [36.8 °C (98.2 °F)-39.2 °C (102.5 °F)] 39.2 °C (102.5 °F)  Heart Rate:  [129-130] 129  Resp:  [17-22] 22  BP: (110-130)/(71-74) 110/74    Exam  General Appearance: Alert, frail appearing   Lungs: Clear to auscultation bilaterally, respirations unlabored  Heart: Regular rhythm, tachycardic rate   Chest wall: Right IJ port clean, dry, and intact.   Abdomen: soft, nontender to palpation. Bowel sounds present in all 4 quadrants.   Extremities: no edema or calf tenderness    Labs  CBC Results       03/20/23 03/15/23 03/14/23     0906 1345 1012    WBC 0.75 3.18 2.12    RBC 2.78 3.24 2.49    HGB 7.8 9.3 6.9    HCT 22.1 25.9 19.4    MCV 79.5 79.9 77.9    MCH 28.1 28.7 27.7    MCHC 35.3 35.9 35.6    PLT 4 56 4         Comment for WBC at 0906 on 03/20/23: ALL RESULTS HAVE BEEN CHECKED. SPECIMEN QUALITY CHECKED. This result has been called to WENDY LEYVA by Ya Luo on 03 20 23 at 09:48, and has been read back.     Comment for WBC at 1012 on 03/14/23: This result has been called to VONDA VILLAFANA by Lara Alegria on 03 14 23 at 11:19, and has been read back.     Comment for HGB at 1345 on 03/15/23: RESULT CHECKED    Comment for HGB at 1012 on 03/14/23: This result has been called to VONDA VILLAFANA by Lara Alegria on 03 14 23 at 11:19, and has been read back.     Comment for PLT at 0906 on 03/20/23: ALL RESULTS HAVE BEEN RECHECKED. SPECIMEN QUALITY CHECKED. This result has been called to WENDY LEYVA by Ya Luo on 03 20 23 at 09:48, and has been read back.     Comment for PLT at 1345 on 03/15/23: RESULTS CHECKED. RESULTS OBTAINED AFTER VORTEXING TO ELIMINATE PLT CLUMPS    Comment for PLT at 1012 on 03/14/23: RESULTS OBTAINED AFTER VORTEXING TO ELIMINATE PLT CLUMPS. This result has been called to VONDA VILLAFANA by Lara Alegria on 03 14 23 at 11:19, and has been read back.           CMP Results       03/20/23 03/15/23 03/13/23     0906 1345 1008     136 139    K 4.3 3.4 3.7    Cl 102 105 106     CO2 21 20 24    Glucose 98 112 117    BUN 20 15 11    Creatinine 1.2 0.9 0.8    Calcium 8.7 8.6 8.3    Anion Gap 10 11 9    AST 9 13 --    ALT 12 18 --    Albumin 3.7 4.1 --    EGFR 51.7 >60.0 >60.0         Comment for K at 0906 on 03/20/23: Results obtained on plasma. Plasma Potassium values may be up to 0.4 mEQ/L less than serum values. The differences may be greater for patients with high platelet or white cell counts.          Imaging: None    Assessment/Plan     Alondra Villela is a 32 y.o. female admitted for dehydration, neutropenic fever, and chemotherapy management of stage IV gestational trophoblastic neoplasia.      1. Severe Neutropenic Fever   1. Temperature on admission 102.5 F, ANC 0.375, tachycardic in 120-130s.   2. Neutropenic precautions placed  3. Blood cultures collected and pending  4. IV Fluid bolus being given, will continue IV hydration   5. COVID and strep cultures pending.   6. CXR to be performed   7. Cefepime started in ED  8. ID consultation placed, will appreciate recs     2. GTN  a. For Cycle 5A EMA-EP.   b. Vitals remarkable for tachycardia and temperature of 102.5 F on admission to ED as above.   c. Beta-HCG on 124.2, continuing to trend down.    d. Continue supportive medications Tylenol and Phenergen prn nausea for patient request.     3. Thrombocytopenia  a. Platelets 4K on admission.   b. Will transfuse 2 units of platelets   c. Has previously been HLA matched to platelets as she previously did not respond well to platelet transfusion.     4. Hyperthyroidism   a. Hx thyroid storm.   b. Follows with Endocrinology Dr. Hodge.   c. Last TSH on file 0.56 on 3/14/23  d. Stable not on medication.     5. Thrush/oral mucocutaneous ulcers  a. Continue magic mouthwash swish and spit.   b. Will touch base with pharmacy regarding additional agents that can be used for pain.   c. IV fluids as above given inability to tolerate by mouth.     6. Depression  a. Stable, continue home medications  Wellbutrin  mg daily and Propytiline 15 mg daily.     7. Hx DVT in right UE  a. Taking Eliquis 5 mg PO BID until 3/7.   b. Eliquis was discontinued insetting of thrombocytopenia and episode of bleeding.   c. Given Plt 3k at this time will continue to hold Eliquis.   d. SCDs for VTE ppx.     8. Diet: Regular      Discussed with Dr. Mcmullen.     Kyleigh Hoang MD

## 2023-03-20 NOTE — PROGRESS NOTES
Gynecology Progress Note    Subjective     Interval History:     Continues to spike fevers, most recently 102.8 F. Notes hematuria this AM that she did not mention earlier today. Notes that this is the first time she has noticed it. Also reports mild HA that improved slightly with tylenol earlier today. Denies chest pain, SOB, changes in nausea, calf pain/swelling, vision changes, and any other sx or concerns at this time.     Objective     Vital signs in last 24 hours:  Temp:  [36.8 °C (98.2 °F)-39.3 °C (102.8 °F)] 39.3 °C (102.8 °F)  Heart Rate:  [112-130] 115  Resp:  [17-24] 21  BP: (110-130)/(66-79) 129/66    Exam  General Appearance: Alert, frail appearing   Lungs: Clear to auscultation bilaterally, respirations unlabored  Heart: Regular rhythm, tachycardic rate   Chest wall: Right IJ port clean, dry, and intact.   Abdomen: soft, nontender to palpation. Bowel sounds present in all 4 quadrants.   Extremities: no edema or calf tenderness    Labs  Results from last 7 days   Lab Units 03/20/23  0906 03/15/23  1345 03/14/23  1012   WBC K/uL 0.75* 3.18* 2.12*   HEMOGLOBIN g/dL 7.8* 9.3* 6.9*   HEMATOCRIT % 22.1* 25.9* 19.4*   PLATELETS K/uL 4* 56* 4*      Results from last 7 days   Lab Units 03/20/23  0906 03/15/23  1345   SODIUM mEQ/L 133* 136   POTASSIUM mEQ/L 4.3 3.4*   CHLORIDE mEQ/L 102 105   CO2 mEQ/L 21* 20*   BUN mg/dL 20 15   CREATININE mg/dL 1.2* 0.9   GLUCOSE mg/dL 98 112*   CALCIUM mg/dL 8.7* 8.6*      Microbiology Results     Procedure Component Value Units Date/Time    SARS-CoV-2 (COVID-19), PCR Nasopharynx [[id number]]  (Normal) Collected: 03/20/23 0913    Specimen: Nasopharyngeal Swab from Nasopharynx Updated: 03/20/23 1039    Narrative:      The following orders were created for panel order SARS-CoV-2 (COVID-19), PCR Nasopharynx.  Procedure                               Abnormality         Status                     ---------                               -----------         ------                      SARS-COV-2 (COVID-19)/ F...[[id number]]  Normal              Final result                 Please view results for these tests on the individual orders.    Group A Strep by PCR, Throat Throat Swab [[id number]]  (Normal) Collected: 03/20/23 0913    Specimen: Throat Swab Updated: 03/20/23 1029     Strep A PCR, Throat Not Detected    SARS-COV-2 (COVID-19)/ FLU A/B, AND RSV, PCR Nasopharynx [[id number]]  (Normal) Collected: 03/20/23 0913    Specimen: Nasopharyngeal Swab from Nasopharynx Updated: 03/20/23 1039     SARS-CoV-2 (COVID-19) Negative     Influenza A Negative     Influenza B Negative     Respiratory Syncytial Virus Negative    Narrative:      Testing performed using real-time PCR for detection of COVID-19. EUA approved validation studies performed on site.     Group A Strep by PCR, Throat Throat Swab [[id number]]  (Normal) Collected: 03/06/23 0924    Specimen: Throat Swab Updated: 03/06/23 1051     Strep A PCR, Throat Not Detected            Assessment/Plan     Alondra Villela is a 32 y.o. female admitted for dehydration, neutropenic fever, and chemotherapy management of stage IV gestational trophoblastic neoplasia.      1. Severe Neutropenic Fever   1. Temperature on admission 102.5 F, ANC 0.375, tachycardic in 120-130s. Most recent temperature 103.2 F at 1600.  2. Tylenol 975 mg q6 hours ordered as antipyretic   3. Neutropenic precautions in place   4. Blood cultures collected and pending  5. IV Fluid bolus being given, will continue IV hydration   6. COVID and strep cultures both negative   7. CXR negative   8. Cefepime started in ED, will continue   9. ID consultation placed, will appreciate recs      2. GTN  a. For Cycle 5A EMA-EP-- will hold off on administration given acute illness.   b. Vitals remarkable for tachycardia and febrile temperature as above  c. Beta-HCG on 124.2 on admission, continuing to trend down.    d. Continue supportive medications Tylenol, zofran prn, and Phenergen prn nausea for  patient request.      3. Thrombocytopenia  a. Platelets 4K on admission.   b. Will transfuse 2 units of platelets--> being transfused at this time.    c. Has previously been HLA matched to platelets as she previously did not respond well to platelet transfusion.     4. Hyperthyroidism   a. Hx thyroid storm.   b. Follows with Endocrinology Dr. Hodge.   c. Last TSH on file 0.56 on 3/14/23  d. Stable not on medication.      5. Thrush/oral mucocutaneous ulcers  a. Seen by ENT who recommended viscous lidocaine, ordered to start using. Continue magic mouthwash swish and spit.   b. IV fluids as above given inability to tolerate by mouth.      6. Depression  a. Stable, continue home medications Wellbutrin  mg daily and Propytiline 15 mg daily.      7. Hx DVT in right UE  a. Taking Eliquis 5 mg PO BID until 3/7.   b. Eliquis was discontinued insetting of thrombocytopenia and episode of bleeding.   c. Given Plt 3k at this time will continue to hold Eliquis.   d. SCDs for VTE ppx.      8. Diet: Regular    Reviewed with Dr. Mcmullen.     Kyleigh Hoang MD

## 2023-03-20 NOTE — PROGRESS NOTES
R2 OB    Temp:  [36.8 °C (98.2 °F)-39.7 °C (103.5 °F)] 39.6 °C (103.2 °F)  Heart Rate:  [2-130] 2  Resp:  [17-24] 20  BP: (110-130)/(66-79) 124/68    Seen for new onset nose bleed and mouth bleeding. She reports started approximately 20 minutes ago. Improving with holding pressure and ice. Will give afrin x1 at this time.     Given need for increased vitals and closer nursing supervision, will be placed on step down instead of tele floor. Discussed with 3SW and ED staffing.     Dr. Mcmullen aware.     Kyleigh Hoang MD

## 2023-03-20 NOTE — ED PROVIDER NOTES
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  History assisted by  at bedside.  Since Friday, 3/17, patient has had a severe sore throat, pain with swallowing.  As a result, she has had decreased p.o. intake.  This morning, patient was scheduled for chemotherapy.  However when she woke up, she was very lightheaded, generally weak and could not support herself.   states that at 1 point, around 6:30 AM when she sat on the toilet she had a period of unresponsiveness for 5 to 10 seconds where she looked unstable on the toilet and was rocking back and forth with her eyes fixed out of sight with patient.  Denies tongue biting, bladder incontinence or convulsion like activity.  No history of seizures.  Patient endorsing generalized headache.  Denies chest pain, shortness of breath, coughs, nausea/vomiting or abdominal pain.  No urinary symptoms.  No true fever, highest temperature 99.4.  Has been taking Tylenol for symptoms and she is able to tolerate it.  When patient tried to stand up this morning, she felt lightheaded, had darkening of her vision was unable to support herself.  As result, has not called EMS with a provided IV fluids in route.      On chart review, patient follows with Dr. Johnson and Dr. Hogue as her oncologists.       History provided by:  Patient, medical records and spouse   used: No          Patient History   PAST HISTORY     Reviewed from Nursing Triage:  Meds       Past Medical History:   Diagnosis Date   • Hx antineoplastic chemo        Past Surgical History:   Procedure Laterality Date   •  SECTION      x2       Family History   Problem Relation Age of Onset   • Hypertension Biological Mother    • Hypertension Biological Father        Social History     Tobacco Use    • Smoking status: Never   • Smokeless tobacco: Never   Vaping Use   • Vaping status: Never Used   Substance Use Topics   • Alcohol use: Not Currently   • Drug use: Never         Review of Systems   REVIEW OF SYSTEMS     Review of Systems   Constitutional: Positive for fatigue. Negative for chills and diaphoresis.   HENT: Positive for sore throat and trouble swallowing. Negative for congestion, rhinorrhea, sinus pressure and voice change.    Eyes: Negative for visual disturbance.   Cardiovascular: Negative for leg swelling.   Genitourinary: Negative for flank pain and hematuria.   Musculoskeletal: Negative for back pain.   Skin: Negative for rash.   Neurological: Positive for syncope, weakness (generalized) and light-headedness. Negative for numbness.   Psychiatric/Behavioral: The patient is not nervous/anxious.          VITALS     ED Vitals    Date/Time Temp Pulse Resp BP SpO2 [facility]   03/20/23 2340 38.6 °C (101.5 °F) 124 20 117/67 99 % PD   03/20/23 2236 37.8 °C (100 °F) 130 22 134/74 100 % KLS   03/20/23 1805 37.3 °C (99.2 °F) 121 18 121/72 100 % KLS   03/20/23 1600 39.6 °C (103.2 °F) 2 20 124/68 100 % KLS   03/20/23 1538 39.7 °C (103.5 °F) 125 20 126/78 100 % KLS   03/20/23 1400 39.3 °C (102.8 °F) 115 21 129/66 100 % KLS   03/20/23 1200 -- 112 22 130/79 100 % KLS   03/20/23 1100 38.1 °C (100.6 °F) 121 24 127/77 100 % JCT   03/20/23 0924  39.2 °C (102.5 °F) -- -- -- -- JCT   03/20/23 0900 -- 129 22 110/74 100 % JCT   03/20/23 0833 36.8 °C (98.2 °F) 130 17 130/71 100 % JCT        Pulse Ox %: 100 % (03/20/23 1003)  Pulse Ox Interpretation: Normal (03/20/23 1003)  Heart Rate: 129 (03/20/23 1003)  Rhythm Strip Interpretation: Sinus Tachycardia (03/20/23 1003)     Physical Exam   PHYSICAL EXAM     Physical Exam  Vitals and nursing note reviewed.   Constitutional:       Appearance: She is well-developed.   HENT:      Head: Normocephalic and atraumatic.      Mouth/Throat:      Pharynx: Uvula midline. Oropharyngeal exudate  and posterior oropharyngeal erythema present. No uvula swelling.      Tonsils: No tonsillar exudate.      Comments: Uvular swelling and exudates noted.  No gross ulcerations visualized or thrush on the tongue  Eyes:      Conjunctiva/sclera: Conjunctivae normal.   Cardiovascular:      Rate and Rhythm: Regular rhythm. Tachycardia present.   Pulmonary:      Effort: Pulmonary effort is normal.      Breath sounds: Normal breath sounds.   Abdominal:      General: There is no distension.      Palpations: Abdomen is soft. There is no mass.      Tenderness: There is no abdominal tenderness.   Musculoskeletal:         General: No tenderness or deformity. Normal range of motion.      Cervical back: Normal range of motion.   Skin:     General: Skin is warm and dry.      Comments: Port right chest wall clean and dry   Neurological:      Mental Status: She is alert. Mental status is at baseline.   Psychiatric:         Behavior: Behavior normal.           PROCEDURES     Procedures     DATA     Results     Procedure Component Value Units Date/Time    Blood Culture Blood, Venous [[id number]]  (Normal) Collected: 03/20/23 1046    Specimen: Blood, Venous Updated: 03/23/23 1302     Culture No growth at 72 hours    Blood Culture Blood, Venous [[id number]]  (Normal) Collected: 03/20/23 1046    Specimen: Blood, Venous Updated: 03/23/23 1302     Culture No growth at 72 hours    UA with reflex culture [[id number]]  (Abnormal) Collected: 03/20/23 2105    Specimen: Urine, Clean Catch Updated: 03/20/23 2141    Narrative:      The following orders were created for panel order UA with reflex culture.  Procedure                               Abnormality         Status                     ---------                               -----------         ------                     UA Reflex to Culture (Ma...[[id number]]  Abnormal            Final result               UA Microscopic[[id number]]               Abnormal            Final result                  Please view results for these tests on the individual orders.    UA Microscopic [[id number]]  (Abnormal) Collected: 03/20/23 2105    Specimen: Urine, Clean Catch Updated: 03/20/23 2141     RBC, Urine Too Numerous To Count /HPF      WBC, Urine 0 TO 3 /HPF      Squamous Epithelial None Seen /hpf      Hyaline Cast None Seen /lpf      Bacteria, Urine Rare /HPF     UA Reflex to Culture (Macroscopic) [[id number]]  (Abnormal) Collected: 03/20/23 2105    Specimen: Urine, Clean Catch Updated: 03/20/23 2141     Color, Urine Brown     Clarity, Urine Cloudy     Specific Gravity, Urine 1.023     pH, Urine 6.5     Leukocyte Esterase Negative     Comment: Results can be falsely negative due to high specific gravity, some antibiotics, glucose >3 g/dl, or WBC other than neutrophils.        Nitrite, Urine Negative     Protein, Urine +1     Glucose, Urine Negative mg/dL      Ketones, Urine Trace mg/dL      Comment: Free sulfhydryl drugs such as Mesna, Capoten, and Acetylcysteine (Mucomyst) may cause false positive ketonuria.        Urobilinogen, Urine 0.2 EU/dL      Bilirubin, Urine Negative mg/dL      Blood, Urine +3     Comment: The sensitivity of the occult blood test is equivalent to approximately 4 intact RBC/HPF.       Prepare Single Donor Platelets (BLOOD BANK ORDER for PRODUCT): 1 Units Transfusion indications: Platelet count <10,000 [[id number]] Collected: 03/20/23 1052     Updated: 03/20/23 1517     Product Code G7931C00     Unit ID [id number]     Unit ABO B     Unit RH Positive     Product Status IS     Unit Expiration Date/Time [id number]     ISBT Code 7300    Type and screen [[id number]] Collected: 03/20/23 1219    Specimen: Blood, Venous Updated: 03/20/23 1350     Specimen Expiration 03/23/2023     Antibody Screen Negative     ABO B     Rh Factor Positive     History Check Previous type on file    SARS-CoV-2 (COVID-19), PCR Nasopharynx [[id number]]  (Normal) Collected: 03/20/23 0913    Specimen: Nasopharyngeal Swab  from Nasopharynx Updated: 03/20/23 1039    Narrative:      The following orders were created for panel order SARS-CoV-2 (COVID-19), PCR Nasopharynx.  Procedure                               Abnormality         Status                     ---------                               -----------         ------                     SARS-COV-2 (COVID-19)/ F...[[id number]]  Normal              Final result                 Please view results for these tests on the individual orders.    SARS-COV-2 (COVID-19)/ FLU A/B, AND RSV, PCR Nasopharynx [[id number]]  (Normal) Collected: 03/20/23 0913    Specimen: Nasopharyngeal Swab from Nasopharynx Updated: 03/20/23 1039     SARS-CoV-2 (COVID-19) Negative     Influenza A Negative     Influenza B Negative     Respiratory Syncytial Virus Negative    Narrative:      Testing performed using real-time PCR for detection of COVID-19. EUA approved validation studies performed on site.     Group A Strep by PCR, Throat Throat Swab [[id number]]  (Normal) Collected: 03/20/23 0913    Specimen: Throat Swab Updated: 03/20/23 1029     Strep A PCR, Throat Not Detected    HS Troponin (with 2 hour reflex) [[id number]]  (Normal) Collected: 03/20/23 0906    Specimen: Blood, Venous Updated: 03/20/23 1002     High Sens Troponin I <2.3 pg/mL     CBC and differential [[id number]]  (Abnormal) Collected: 03/20/23 0906    Specimen: Blood, Venous Updated: 03/20/23 1000     WBC 0.75 K/uL      Comment: ALL RESULTS HAVE BEEN CHECKED. SPECIMEN QUALITY CHECKED. This result has been called to WENDY LEYVA by Ya Luo on 03 20 23 at 09:48, and has been read back.         RBC 2.78 M/uL      Hemoglobin 7.8 g/dL      Hematocrit 22.1 %      MCV 79.5 fL      MCH 28.1 pg      MCHC 35.3 g/dL      RDW 12.7 %      Platelets 4 K/uL      Comment: ALL RESULTS HAVE BEEN RECHECKED. SPECIMEN QUALITY CHECKED. This result has been called to WENDY LEYVA by Ya Luo on 03 20 23 at 09:48, and has been read back.         MPV 9.6 fL       Differential Type Manu     Neutrophils 6 %      Lymphocytes 85 %      Monocytes 9 %      Eosinophils 0 %      Basophils 0 %      Neutrophils, Absolute 0.05 K/uL      Lymphocytes, Absolute 0.64 K/uL      Monocytes, Absolute 0.07 K/uL      Eosinophils, Absolute 0.00 K/uL      Basophils, Absolute 0.00 K/uL      PLT Morphology Normal     Platelet Estimate SUBSTANTIALLY DECREASED(< OR = 50,000)     Microcytes 1+     Ovalocytes Occasional     Teardrop Cells Occasional    Comprehensive metabolic panel [[id number]]  (Abnormal) Collected: 03/20/23 0906    Specimen: Blood, Venous Updated: 03/20/23 0959     Sodium 133 mEQ/L      Potassium 4.3 mEQ/L      Comment: Results obtained on plasma. Plasma Potassium values may be up to 0.4 mEQ/L less than serum values. The differences may be greater for patients with high platelet or white cell counts.        Chloride 102 mEQ/L      CO2 21 mEQ/L      BUN 20 mg/dL      Creatinine 1.2 mg/dL      Glucose 98 mg/dL      Calcium 8.7 mg/dL      AST (SGOT) 9 IU/L      ALT (SGPT) 12 IU/L      Alkaline Phosphatase 71 IU/L      Total Protein 6.6 g/dL      Comment: Test performed on plasma which typically contains approximately 0.4 g/dL more protein than serum.        Albumin 3.7 g/dL      Bilirubin, Total 1.0 mg/dL      eGFR 51.7 mL/min/1.73m*2      Anion Gap 10 mEQ/L     BhCG, Serum, Qual [[id number]]  (Abnormal) Collected: 03/20/23 0906    Specimen: Blood, Venous Updated: 03/20/23 0944     Preg Test, Serum Positive    Lactate, w/ reflex repeat if > 2.0 [[id number]]  (Normal) Collected: 03/20/23 0906    Specimen: Blood, Venous Updated: 03/20/23 0929     Lactate 1.6 mmol/L           Imaging Results          X-RAY CHEST 1 VIEW (Final result)  Result time 03/20/23 10:41:11    Final result                 Impression:    IMPRESSION:  Findings as described above without evidence of an acute cardiopulmonary  process.                 Narrative:    CLINICAL HISTORY:   Sepsis.    COMMENT:    Comparison:    Chest radiographs dating 1/13/2023. Chest CT angiogram dated  2/4/2023.    Single frontal AP view of the chest was obtained.    Right IJ chest port is in place with the distal end of the catheter within the  distal SVC. The cardiac silhouette and mediastinal contour are unremarkable.  There is right lower lung opacity adjacent to the lateral pleura likely  correspond to the nodule seen on prior CT angiogram. No new consolidation. No  pleural effusion or evidence of a pneumothorax.                                ECG 12 lead   ED Interpretation   Sinus tachycardia at 126 bpm.  Normal P waves, normal DC intervals, normal QRS segments, normal R-wave progression, no axis deviation, no ST or T-wave abnormalities.       Final Result          Scoring tools                                  ED Course & MDM   MDM / ED COURSE / CLINICAL IMPRESSION / DISPO     Medical Decision Making  32-year-old female presenting for dehydration, sore throat.  On exam, she is tachycardic and weak appearing.  Oral temperature negative for fever, will check rectal temperature.  Cardiopulmonary exam unremarkable.  HEENT with exudates noted to uvula without obvious ulceration.  Differential includes is not limited to viral versus strep pharyngitis, stomatitis, doubt thrush at this time.  Dehydration, electrolyte abnormality, JLUIS, near syncope.  Will check basic labs, viral swab, strep swab, basic labs, lactate.  Will give IV fluids.  Discussed with attending and Dr. Smith    Sepsis, due to unspecified organism, unspecified whether acute organ dysfunction present (CMS/[facility]): acute illness or injury  Amount and/or Complexity of Data Reviewed  Independent Historian: spouse  External Data Reviewed: notes.     Details: gynon notes 3/6  Labs: ordered. Decision-making details documented in ED Course.  Radiology: ordered and independent interpretation performed. Decision-making details documented in ED Course.  ECG/medicine tests: ordered and independent  interpretation performed.  Discussion of management or test interpretation with external provider(s): spoe with dr ríos with gynonc    Risk  OTC drugs.  Prescription drug management.  Decision regarding hospitalization.          ED Course as of 03/24/23 1017   Mon Mar 20, 2023   0908 Spoke with oncology, Dr Smith. Requested strep swab and IVF. 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  Ordered cefepime for neutropenic fever.  ID consult placed. [SB]   1001 Neutrophils, Absolute(!): 0.05 [MW]   1001 Pt and family aware of plan  [SB]   1048 OBGYN requesting neutropenic precautions and to tranfuse 2 units of platelets [SB]   1122 Patient consented for platelet transfusion [SB]      ED Course User Index  [MW] Pedro Amador MD  [SB] Fina Villavicencio PA C     Clinical Impression      Sepsis, due to unspecified organism, unspecified whether acute organ dysfunction present (CMS/[facility])     _________________     ED Disposition   Admit / Observation                   Fina Villavicencio PA C  03/24/23 1018

## 2023-03-20 NOTE — Clinical Note
Send to the following Provider Care Team:: [facility] [[id number]]   Room Consideration        (quadraplegia, confusion, peritoneal dialysis, wound, trach, intubated, nursing home, 1:1 obs, prisoner, private room): neutropenic fever

## 2023-03-20 NOTE — PLAN OF CARE
Care Coordination Admission Assessment Note  Date: 3/20/2023   Time: 5:03 PM    Patient Name: Alondra Villela  Medical Record Number: [id number]  YOB: 1990  Room/BED: ED03    General Information  Patient-Specific Goals (Include Timeframe)     PCP: Sunil Hinton MD   Insurance Coverage: [employer]  Readmission Within the last 30 days  other (see comments) (pt presented with lightheadedness and inability to tolerate PO 2/2 mouth sores)    Living Arrangements  Arrived From: home  Current Living Arrangements: home  People in Home: spouse  Home Accessibility: stairs to enter home (Group)  Living Arrangement Comments: pt lives in a multilevel house with her children and her spouse in [place]. Pt has been staying locally with her parents.  Able to Return to Prior Arrangements: yes    Assistive Device/Animal Currently Used at Home  none    Functional Status Comments   pt is independent with her ADL's at baseline.    Concerns to be Addressed  care coordination/care conferences    Anticipated Changes Related to Illness  none    Transportation Concerns (SDOH)  Transportation Anticipated: family or friend will provide    Concerns Comments  anticipate d/c home with family when stable for d/c.    Anticipated Clinical Needs   anticipate d/c home no needs    Anticipated Discharge Plan   Anticipated Discharge Disposition: home with assistance, home without assistance or services  Patient/Family Anticipates Transition to: home, home with family  Patient/Family Anticipated Services at Transition: none  Screening complete: yes

## 2023-03-20 NOTE — PROGRESS NOTES
Spoke with patient and confirmed with medication list from [facility] and/or patient's own pharmacy to complete the medication reconciliation.     Prior to admission medication list:  Current Outpatient Medications:   •  promethazine (PHENERGAN) 12.5 mg tablet, Take 12.5 mg by mouth every 6 (six) hours as needed for nausea or vomiting.  •  protriptyline (VIVACTIL) 5 mg tablet, Take 15 mg by mouth nightly.  •  benzonatate (TESSALON) 100 mg capsule, Take 100 mg by mouth 3 (three) times a day as needed for cough.  •  buPROPion XL (WELLBUTRIN XL) 150 mg 24 hr tablet, Take 150 mg by mouth nightly.  •  filgrastim-sndz (ZARXIO) 480 mcg/0.8 mL syringe injection, Inject 0.8 mL (480 mcg total) under the skin See admin instr for 15 doses. Inject under the skin on days 3, 4, 5, 9, 10, 11 and 12 of chemotherapy cycle.  •  hydrocortisone 2.5 % cream, Apply 2 g topically as needed for irritation or rash.  •  lidocaine-prilocaine (EMLA) cream, Apply topically as needed for mild pain. Apply a quarter size amount to skin over port site approximately one hour prior to chemotherapy. Cover with saran wrap.  •  magic mouthwash with nystatin (BENADRYL-MAALOX-LIDOCAINE-NYSTATIN), Swish and spit 15 mL every 4 (four) hours as needed for stomatitis.  •  magnesium chloride 64 mg tablet,delayed release (DR/EC), Take 1 tablet (64 mg total) by mouth 2 (two) times a day.  •  ondansetron ODT (ZOFRAN-ODT) 4 mg disintegrating tablet, Take 1-2 tablets (4-8 mg total) by mouth every 6 (six) hours as needed for nausea or vomiting (Nausea/Vomiting). Maximum 32mg per day.  •  triamcinolone (KENALOG) 0.1 % ointment, Apply topically 2 times daily.    Comments about home medications:  -Patient is no longer on Junel, Eliquis, methimazole, or metoprolol tartrate.    Compliance:   -Consistent fills, no compliance concerns based on patient interview

## 2023-03-20 NOTE — CONSULTS
ENT Consult Note    Subjective     Alondra Villela is a 32 y.o. female with a pmh of  stage IV gestational trophoblastic neoplasia who presented to the ED with lightheadedness and inability to tolerate PO 2/2 mouth sores. Patient is actively receiving chemo most recently etoposide, dactomycin and methotrexate 3/13 and etoposide, dactormycin 3/14. Patient reported only mild relief with magic mouthwash.       Medical History:   Past Medical History:   Diagnosis Date   • Hx antineoplastic chemo        Surgical History:   Past Surgical History:   Procedure Laterality Date   •  SECTION      x2       Social History:   Social History     Socioeconomic History   • Marital status:    Tobacco Use   • Smoking status: Never   • Smokeless tobacco: Never   Vaping Use   • Vaping status: Never Used   Substance and Sexual Activity   • Alcohol use: Not Currently   • Drug use: Never   • Sexual activity: Not Currently     Social Determinants of Health     Food Insecurity: No Food Insecurity (3/20/2023)    Hunger Vital Sign    • Worried About Running Out of Food in the Last Year: Never true    • Ran Out of Food in the Last Year: Never true   Transportation Needs: No Transportation Needs (2023)    PRAPARE - Transportation    • Lack of Transportation (Medical): No    • Lack of Transportation (Non-Medical): No       Family History:   Family History   Problem Relation Age of Onset   • Hypertension Biological Mother    • Hypertension Biological Father        Allergies: Patient has no known allergies.    Current Inpatient Medications   Medication Dose Route Frequency Provider Last Rate Last Admin   • alum-mag hydroxide-simeth (MAALOX) 200-200-20 mg/5 mL suspension 30 mL  30 mL oral q4h PRN Kyleigh Hoang MD       • benzonatate (TESSALON) capsule 100 mg  100 mg oral 3x daily PRN Kyleigh Hoang MD       • bisacodyL (DULCOLAX) 10 mg suppository 10 mg  10 mg rectal Daily PRN Kyleigh Hoang  MD Agnes       • buPROPion XL (WELLBUTRIN XL) 24 hr ER tablet 150 mg  150 mg oral Nightly Kyleigh Hoang MD       • glucose chewable tablet 16-32 g of dextrose  16-32 g of dextrose oral PRN Kyleigh Hoang MD        Or   • dextrose 40 % oral gel 15-30 g of dextrose  15-30 g of dextrose oral PRN Kyleigh Hoang MD        Or   • glucagon (GLUCAGEN) injection 1 mg  1 mg intramuscular PRN Kyleigh Hoang MD        Or   • dextrose 50 % in water (D50) injection 12.5 g  25 mL intravenous PRN Kyleigh Hoang MD       • dibucaine (NUPERCAINAL) 1 % topical ointment   Topical TID Kyleigh Hoang MD       • diphenhydrAMINE (BENADRYL) capsule 25 mg  25 mg oral q6h PRN Kyleigh Hoang MD        Or   • diphenhydrAMINE (BENADRYL) injection 25 mg  25 mg intravenous q6h PRN Kyleigh Hoang MD       • docusate sodium (COLACE) capsule 100 mg  100 mg oral BID Kyleigh Hoang MD       • lactated ringer's infusion   intravenous Continuous Kyleigh Hoang MD       • lidocaine-prilocaine (EMLA) 2.5-2.5 % cream   Topical Once Kyleigh Hoang MD       • magic mouthwash oral suspension (Benadryl-Maalox-Carafate)  15 mL Swish & Spit q4h PRN Kyleigh Hoang MD       • ondansetron ODT (ZOFRAN-ODT) disintegrating tablet 4 mg  4 mg oral q8h PRN Kyleigh Hoang MD        Or   • ondansetron (ZOFRAN) injection 4 mg  4 mg intravenous q8h PRN Kyleigh Hoang MD       • ondansetron ODT (ZOFRAN-ODT) disintegrating tablet 4-8 mg  4-8 mg oral q6h PRN Kyleigh Hoang MD       • promethazine (PHENERGAN) 12.5 mg in sodium chloride 0.9 % 50 mL IVPB  12.5 mg intravenous q4h PRN Kyleigh Hoang MD       • protriptyline (VIVACTIL) tablet 15 mg  15 mg oral Daily Kyleigh Hoang MD       • senna (SENOKOT) tablet 1 tablet  1 tablet oral 2x  daily PRN Kyleigh Hoang MD       • sodium chloride 0.9 % infusion  5 mL/hr intravenous PRN Fina Villavicencio PA C       • witch hazeL (TUCKS) 50 % topical pads   Topical q1h PRN Kyleigh Hoang MD            Medication List      ASK your doctor about these medications    benzonatate 100 mg capsule  Commonly known as: TESSALON  Take 100 mg by mouth 3 (three) times a day as needed for cough.  Dose: 100 mg     buPROPion  mg 24 hr tablet  Commonly known as: WELLBUTRIN XL  Take 150 mg by mouth nightly.  Dose: 150 mg     hydrocortisone 2.5 % cream  Apply 2 g topically as needed for irritation or rash.  Dose: 2 g     lidocaine-prilocaine cream  Commonly known as: EMLA  Apply topically as needed for mild pain. Apply a quarter size amount to skin over port site approximately one hour prior to chemotherapy. Cover with saran wrap.     magic mouthwash with nystatin  Commonly known as: BENADRYL-MAALOX-LIDOCAINE-NYSTATIN  Swish and spit 15 mL every 4 (four) hours as needed for stomatitis.  Dose: 15 mL     magnesium chloride 64 mg tablet,delayed release (DR/EC)  Take 1 tablet (64 mg total) by mouth 2 (two) times a day.  Dose: 64 mg     ondansetron ODT 4 mg disintegrating tablet  Commonly known as: ZOFRAN-ODT  Take 1-2 tablets (4-8 mg total) by mouth every 6 (six) hours as needed for nausea or vomiting (Nausea/Vomiting). Maximum 32mg per day.  Dose: 4-8 mg     promethazine 12.5 mg tablet  Commonly known as: PHENERGAN  Take 12.5 mg by mouth every 6 (six) hours as needed for nausea or vomiting.  Dose: 12.5 mg     protriptyline 5 mg tablet  Commonly known as: VIVACTIL  Take 15 mg by mouth nightly.  Dose: 15 mg  Ask about: Which instructions should I use?     triamcinolone 0.1 % ointment  Commonly known as: KENALOG  Apply topically 2 times daily.     ZARXIO 480 mcg/0.8 mL syringe injection  Inject 0.8 mL (480 mcg total) under the skin See admin instr for 15 doses. Inject under the skin on days 3, 4, 5, 9,  10, 11 and 12 of chemotherapy cycle.  Dose: 480 mcg  Generic drug: [id number]          Review of Systems  14 pt ROS negative except HPI above     Objective   Physical Exam    PHYSICAL EXAM:  GEN: NAD, AAOx3, ill appearing, no increased work of breathing  HEAD: NC/AT  EYES: EOMI  EARS: EAC clear, TMI, no effusion  NOSE: mild DNS, nares patent  OC/OP: extremely dry mucus, no trismus  NECK: supple, no lymphadenopathy, landmarks palpable  LARYNX: Voice clear, no hoarseness      Assessment   32 y.o. female being consulted for mucositis}       Plan   - Dry mucus membranes on exam, no candida visualized or ulcers  - IV hydration  - Recommend magic mouthwash   - May consider folate supplementation  - Patient (and mom, at bedside) defer NPL at this time and likely will not . Plt 4, risk outweigh benefit  - No further ENT intervention at this time   - Contact ENT with questions or concerns   - Po Hernández PGY4

## 2023-03-21 LAB
ALBUMIN SERPL-MCNC: 3 G/DL (ref 3.4–5)
ALP SERPL-CCNC: 57 IU/L (ref 35–126)
ALT SERPL-CCNC: 10 IU/L (ref 11–54)
ANION GAP SERPL CALC-SCNC: 10 MEQ/L (ref 3–15)
AST SERPL-CCNC: 9 IU/L (ref 15–41)
BACTERIA UR CULT: NORMAL
BASOPHILS # BLD: 0 K/UL (ref 0.01–0.1)
BASOPHILS NFR BLD: 0 %
BILIRUB SERPL-MCNC: 0.5 MG/DL (ref 0.3–1.2)
BUN SERPL-MCNC: 13 MG/DL (ref 8–20)
CALCIUM SERPL-MCNC: 7.9 MG/DL (ref 8.9–10.3)
CHLORIDE SERPL-SCNC: 105 MEQ/L (ref 98–109)
CO2 SERPL-SCNC: 20 MEQ/L (ref 22–32)
CREAT SERPL-MCNC: 0.9 MG/DL (ref 0.6–1.1)
DIFFERENTIAL METHOD BLD: ABNORMAL
EOSINOPHIL # BLD: 0.01 K/UL (ref 0.04–0.36)
EOSINOPHIL NFR BLD: 1 %
ERYTHROCYTE [DISTWIDTH] IN BLOOD BY AUTOMATED COUNT: 12.8 % (ref 11.7–14.4)
GFR SERPL CREATININE-BSD FRML MDRD: >60 ML/MIN/1.73M*2
GLUCOSE SERPL-MCNC: 81 MG/DL (ref 70–99)
HCT VFR BLDCO AUTO: 14.2 % (ref 35–45)
HGB BLD-MCNC: 5.2 G/DL (ref 11.8–15.7)
HLA A & B TYPING FOR PLT TRANSFUSION: NORMAL
HLA ANTIBODY SC FOR TRANSFUSION CLASS I: POSITIVE
LYMPHOCYTES # BLD: 0.9 K/UL (ref 1.2–3.5)
LYMPHOCYTES NFR BLD: 88 %
MCH RBC QN AUTO: 28.3 PG (ref 28–33.2)
MCHC RBC AUTO-ENTMCNC: 36.6 G/DL (ref 32.2–35.5)
MCV RBC AUTO: 77.2 FL (ref 83–98)
MICROCYTES BLD QL SMEAR: ABNORMAL
MONOCYTES # BLD: 0.04 K/UL (ref 0.28–0.8)
MONOCYTES NFR BLD: 4 %
NEUTS BAND # BLD: 0.07 K/UL (ref 1.7–7)
NEUTS SEG NFR BLD: 7 %
PDW BLD AUTO: 10.3 FL (ref 9.4–12.3)
PLAT MORPH BLD: NORMAL
PLATELET # BLD AUTO: 18 K/UL (ref 150–369)
PLATELET # BLD EST: ABNORMAL 10*3/UL
POTASSIUM SERPL-SCNC: 3.4 MEQ/L (ref 3.6–5.1)
PROT SERPL-MCNC: 5.1 G/DL (ref 6–8.2)
RBC # BLD AUTO: 1.84 M/UL (ref 3.93–5.22)
SODIUM SERPL-SCNC: 135 MEQ/L (ref 136–144)
WBC # BLD AUTO: 1.02 K/UL (ref 3.8–10.5)

## 2023-03-21 PROCEDURE — 99232 SBSQ HOSP IP/OBS MODERATE 35: CPT | Performed by: STUDENT IN AN ORGANIZED HEALTH CARE EDUCATION/TRAINING PROGRAM

## 2023-03-21 PROCEDURE — 63600000 HC DRUGS/DETAIL CODE: Performed by: OBSTETRICS & GYNECOLOGY

## 2023-03-21 PROCEDURE — 80053 COMPREHEN METABOLIC PANEL: CPT | Performed by: STUDENT IN AN ORGANIZED HEALTH CARE EDUCATION/TRAINING PROGRAM

## 2023-03-21 PROCEDURE — 36430 TRANSFUSION BLD/BLD COMPNT: CPT

## 2023-03-21 PROCEDURE — 21400000 HC ROOM AND CARE CCU/INTERMEDIATE

## 2023-03-21 PROCEDURE — 63700000 HC SELF-ADMINISTRABLE DRUG

## 2023-03-21 PROCEDURE — P9073 PLATELETS PHERESIS PATH REDU: HCPCS

## 2023-03-21 PROCEDURE — 25800000 HC PHARMACY IV SOLUTIONS: Performed by: OBSTETRICS & GYNECOLOGY

## 2023-03-21 PROCEDURE — P9016 RBC LEUKOCYTES REDUCED: HCPCS

## 2023-03-21 PROCEDURE — 25000000 HC PHARMACY GENERAL: Performed by: STUDENT IN AN ORGANIZED HEALTH CARE EDUCATION/TRAINING PROGRAM

## 2023-03-21 PROCEDURE — 85025 COMPLETE CBC W/AUTO DIFF WBC: CPT | Performed by: STUDENT IN AN ORGANIZED HEALTH CARE EDUCATION/TRAINING PROGRAM

## 2023-03-21 PROCEDURE — 36415 COLL VENOUS BLD VENIPUNCTURE: CPT | Performed by: STUDENT IN AN ORGANIZED HEALTH CARE EDUCATION/TRAINING PROGRAM

## 2023-03-21 PROCEDURE — 63600000 HC DRUGS/DETAIL CODE

## 2023-03-21 RX ORDER — DIPHENHYDRAMINE HCL 50 MG/ML
25 VIAL (ML) INJECTION ONCE
Status: DISPENSED | OUTPATIENT
Start: 2023-03-21 | End: 2023-03-22

## 2023-03-21 RX ORDER — ACETAMINOPHEN 650 MG/20.3ML
975 LIQUID ORAL EVERY 6 HOURS PRN
Status: DISCONTINUED | OUTPATIENT
Start: 2023-03-21 | End: 2023-03-22

## 2023-03-21 RX ORDER — DIPHENHYDRAMINE HCL 50 MG/ML
25 VIAL (ML) INJECTION ONCE
Status: COMPLETED | OUTPATIENT
Start: 2023-03-21 | End: 2023-03-21

## 2023-03-21 RX ORDER — METOCLOPRAMIDE HYDROCHLORIDE 5 MG/ML
10 INJECTION INTRAMUSCULAR; INTRAVENOUS ONCE
Status: DISPENSED | OUTPATIENT
Start: 2023-03-21 | End: 2023-03-22

## 2023-03-21 RX ORDER — SODIUM CHLORIDE 9 MG/ML
5 INJECTION, SOLUTION INTRAVENOUS AS NEEDED
Status: ACTIVE | OUTPATIENT
Start: 2023-03-21 | End: 2023-03-22

## 2023-03-21 RX ORDER — METOCLOPRAMIDE HYDROCHLORIDE 5 MG/ML
10 INJECTION INTRAMUSCULAR; INTRAVENOUS ONCE
Status: COMPLETED | OUTPATIENT
Start: 2023-03-21 | End: 2023-03-21

## 2023-03-21 RX ADMIN — DIPHENHYDRAMINE HYDROCHLORIDE AND LIDOCAINE HYDROCHLORIDE AND ALUMINUM HYDROXIDE AND MAGNESIUM HYDRO 15 ML: KIT at 14:59

## 2023-03-21 RX ADMIN — PANTOPRAZOLE SODIUM 40 MG: 40 INJECTION, POWDER, LYOPHILIZED, FOR SOLUTION INTRAVENOUS at 09:26

## 2023-03-21 RX ADMIN — DIPHENHYDRAMINE HYDROCHLORIDE 25 MG: 50 INJECTION INTRAMUSCULAR; INTRAVENOUS at 09:25

## 2023-03-21 RX ADMIN — DIBUCAINE: 0.28 OINTMENT TOPICAL at 11:08

## 2023-03-21 RX ADMIN — SODIUM CHLORIDE, POTASSIUM CHLORIDE, SODIUM LACTATE AND CALCIUM CHLORIDE: 600; 310; 30; 20 INJECTION, SOLUTION INTRAVENOUS at 21:54

## 2023-03-21 RX ADMIN — FILGRASTIM-SNDZ 480 MCG: 480 INJECTION, SOLUTION INTRAVENOUS; SUBCUTANEOUS at 13:37

## 2023-03-21 RX ADMIN — DIBUCAINE: 0.28 OINTMENT TOPICAL at 19:40

## 2023-03-21 RX ADMIN — CEFEPIME 2 G: 2 INJECTION, POWDER, FOR SOLUTION INTRAVENOUS at 18:33

## 2023-03-21 RX ADMIN — FLUCONAZOLE 100 MG: 100 TABLET ORAL at 09:26

## 2023-03-21 RX ADMIN — DIPHENHYDRAMINE HYDROCHLORIDE AND LIDOCAINE HYDROCHLORIDE AND ALUMINUM HYDROXIDE AND MAGNESIUM HYDRO 15 ML: KIT at 02:06

## 2023-03-21 RX ADMIN — DOCUSATE SODIUM 100 MG: 100 CAPSULE, LIQUID FILLED ORAL at 19:40

## 2023-03-21 RX ADMIN — PROTRIPTYLINE HYDROCHLORIDE 15 MG: 5 TABLET, FILM COATED ORAL at 21:06

## 2023-03-21 RX ADMIN — DIPHENHYDRAMINE HYDROCHLORIDE AND LIDOCAINE HYDROCHLORIDE AND ALUMINUM HYDROXIDE AND MAGNESIUM HYDRO 15 ML: KIT at 21:06

## 2023-03-21 RX ADMIN — DIBUCAINE: 0.28 OINTMENT TOPICAL at 15:02

## 2023-03-21 RX ADMIN — ACETAMINOPHEN 975 MG: 650 SOLUTION ORAL at 23:38

## 2023-03-21 RX ADMIN — CEFEPIME 2 G: 2 INJECTION, POWDER, FOR SOLUTION INTRAVENOUS at 09:54

## 2023-03-21 RX ADMIN — DOCUSATE SODIUM 100 MG: 100 CAPSULE, LIQUID FILLED ORAL at 09:26

## 2023-03-21 RX ADMIN — METOCLOPRAMIDE 10 MG: 5 INJECTION, SOLUTION INTRAMUSCULAR; INTRAVENOUS at 09:25

## 2023-03-21 RX ADMIN — DIPHENHYDRAMINE HYDROCHLORIDE AND LIDOCAINE HYDROCHLORIDE AND ALUMINUM HYDROXIDE AND MAGNESIUM HYDRO 15 ML: KIT at 09:28

## 2023-03-21 RX ADMIN — ACETAMINOPHEN 975 MG: 325 TABLET ORAL at 14:59

## 2023-03-21 RX ADMIN — BUPROPION HYDROCHLORIDE 150 MG: 150 TABLET, EXTENDED RELEASE ORAL at 21:06

## 2023-03-21 NOTE — PROGRESS NOTES
R2 GYN.     At bedside to assess patient given RN report that Alondra is complaining of a headache.    Subjective  Alondra states that her headache is located bilateral above her eyes, frontal region.  She says the pain is currently 7 out of 10 in intensity.  It is likened to her past migraines.  She describes pain as pressure-like.  She denies photophobia or phonophobia.  She denies changes to vision.  She denies acute weakness tingling, or loss of sensation in her body.  She admits to generalized weakness.  She states this headache has been present for 3 days.  It is not the worst headache of her life.    She denies current fever; admits to mild chills.  She denies chest pain, shortness of breath, abdominal pain, abdominal distention, nausea, vomiting, dysuria, urinary frequency or urgency.  She states her bleeding has been stable, if not improved.  She denies current nosebleed.  Her  expresses concern about her hemorrhoids.  Alondra states her last bowel movement was 7 days ago.  Per Alondra's mother, she has minimal p.o. intake for the past 2 days.    Objective  Vitals:    03/20/23 1400 03/20/23 1538 03/20/23 1600 03/20/23 1805   BP: 129/66 126/78 124/68 121/72   BP Location: Left upper arm Left upper arm Left upper arm Left upper arm   Patient Position: Lying Lying  Lying   Pulse: (!) 115 (!) 125 (!) 2 (!) 121   Resp: (!) 21 20 20 18   Temp: (!) 39.3 °C (102.8 °F) (!) 39.7 °C (103.5 °F) (!) 39.6 °C (103.2 °F) 37.3 °C (99.2 °F)   TempSrc: Oral Oral Oral Oral   SpO2: 100% 100% 100% 100%   Weight:       Height:         Physical Exam  General: Ill appearing female lying in bed, in no acute distress  Cardiology: Regular tachycardia, + S1-S2  Pulmonology: No acute respiratory distress, poor inspiratory effort, posterior lung fields clear to auscultation throughout  Abdomen: Positive bowel sounds, soft, nontender throughout with no rebound/rigidity/guarding  Extremities: No calf tenderness bilateral, no pedal  petechiae  Neurology: Cranial nerves II through XII intact. Normal finger-to-nose, no pronator drift, no dysdiadochokinesia. Sensation light touch intact C5-T1. Motor 5 out of 5 bilateral upper extremity arm flexion, extension, abduction, adduction, elbow flexion, elbow extension, wrist flexion, wrist extension, opposition, finger extension and flexion.  Normal heel to shin.  Sensation to light touch intact L3-S1.  Motor 5 out of 5 bilateral lower extremity thigh flexion, extension, lower leg flexion, extension, dorsiflexion, plantarflexion, big toe flexion and extension.  Patellar reflexes +2 bilateral DTR.    Assessment and Plan   Alondra is a 32-year-old female with stage IV gestational trophoblastic neoplasia admitted for management of severe neutropenic fever complicated by JLUIS.    Alondra is now complaining of a headache.  Reassured that she is neurologically intact and this is not the worst headache of her life.  Reviewed possible medication management options for headache and she elects for IV Reglan and Benadryl at this time.  If headache does not improve to Reglan/Benadryl we will proceed with CT imaging of her head.  Alondra will keep us posted regarding her symptoms.    To note at time of my assessment IV team is establishing a second peripheral IV.  Her second unit platelets are ordered and are in transport from [place].    Discussed with Dr. Smith.     Page [phone] for questions or concerns.    Lay Shipman DO PGY2  Obstetrics & Gynecology   Pager #[phone]

## 2023-03-21 NOTE — PLAN OF CARE
Problem: Adult Inpatient Plan of Care  Goal: Plan of Care Review  Flowsheets (Taken 3/21/2023 4649)  Progress: improving  Plan of Care Reviewed With: patient  Outcome Summary: Pt admitted to  for closer observation. Pt on neutropenic precautions. Pt WBC <1, received second unit of platelets tonight and tolerated well. Pt still c/o headache that rates a 4-7/10 and mouth soreness. Tylenol and lidocaine mouthwash given. Pt on RA, Bedrest, Assist x1 in bed with a Purewick. Pt is AAOx4. Pt also experiencing vaginal bleeding, minimal bleeding seen this shift. Pt is Sinus Tach on the monitor, Hourly rounding maintained.

## 2023-03-21 NOTE — PROGRESS NOTES
"Infectious Disease Progress Note    Patient Name: Alondra Villela  MR#: [id number]  : 1990  Admission Date: 3/20/2023  Date: 23   Time: 5:10 PM   Author: Awa Terrell MD      Antibiotics:    Anti-infectives (From admission, onward)    Start     Dose/Rate Route Frequency Ordered Stop    23 0945  ceFEPIme (MAXIPIME) 2 g in 100 mL NSS vial in bag         2 g  200 mL/hr over 30 Minutes intravenous Every 8 hours interval 23 0854      23 1815  fluconazole (DIFLUCAN) tablet 100 mg         100 mg oral Daily 23 1750            Subjective     Fever curve improving  No SOB      Objective     Vital Signs:    Visit Vitals  BP (!) 165/84 (BP Location: Left forearm)   Pulse (!) 123   Temp 37.8 °C (100 °F) (Temporal)   Resp 16   Ht 1.626 m (5' 4\")   Wt 73.7 kg (162 lb 8 oz)   SpO2 100%   BMI 27.89 kg/m²       Temp (72hrs), Av.9 °C (100.2 °F), Min:36.7 °C (98.1 °F), Max:39.7 °C (103.5 °F)      Physical Exam:  General: non toxic, alert, no distress, appears more comfortable, alopecia  HEENT: normocephalic, EOMI, pupils reactive, anicteric sclera, mucositis stable, no exudates  Neck: supple, no JVD, no masses, no thyromegaly, no tenderness  Cardiovascular: regular S1/S2 , no obvious stigmata of endocarditis  Respiratory: Clear to auscultation bilaterally, no wheezing, no rhonchi, no crackles  GI/Abdomen: soft, no distention, normal bowel sounds, non-tender, no masses, no hepatomegaly or splenomegaly, no peritoneal signs.  Extremities: no  edema, good perfusion of extremities, radial pulses and distal pulses palpable.  MSK/JOINTS:  No swelling, no erythema, no pain. No spine tenderness.  Neurology and psych: no focal deficits, cooperative with exam, no meningeal signs  Skin: no rashes, lines sites no erythema, swelling or pain and with dressings that are clean, dry and intact  G.U.:  No CVA tenderness or suprapubic pain    Port no tenderness or redness    .  Lines, Drains, Airways, " Wounds:  Single Lumen Implantable Port Right Chest (Active)   Number of days:        Peripheral IV (Adult) 03/20/23 Right Antecubital (Active)   Number of days: 1       Peripheral IV (Adult) 03/20/23 Anterior;Left;Upper Arm (Active)   Number of days: 1       Labs:    CBC Results       03/21/23 03/20/23 03/15/23     0645 0906 1345    WBC 1.02 0.75 3.18    RBC 1.84 2.78 3.24    HGB 5.2 7.8 9.3    HCT 14.2 22.1 25.9    MCV 77.2 79.5 79.9    MCH 28.3 28.1 28.7    MCHC 36.6 35.3 35.9    PLT 18 4 56         Comment for WBC at 0645 on 03/21/23: This result has been called to JACQUELINE BARBOSA by Lara Alegria on 03 21 23 at 08:38, and has been read back.     Comment for WBC at 0906 on 03/20/23: ALL RESULTS HAVE BEEN CHECKED. SPECIMEN QUALITY CHECKED. This result has been called to WENDY LEYVA by Ya Luo on 03 20 23 at 09:48, and has been read back.     Comment for HGB at 0645 on 03/21/23: ALL RESULTS HAVE BEEN CHECKED. This result has been called to JACQUELINE BARBOSA by Lara Alegria on 03 21 23 at 08:38, and has been read back.     Comment for HGB at 1345 on 03/15/23: RESULT CHECKED    Comment for PLT at 0645 on 03/21/23: SPECIMEN QUALITY CHECKED. RESULTS OBTAINED AFTER VORTEXING TO ELIMINATE PLT CLUMPS. This result has been called to JACQUELINE BARBOSA by Lara Alegria on 03 21 23 at 08:38, and has been read back.     Comment for PLT at 0906 on 03/20/23: ALL RESULTS HAVE BEEN RECHECKED. SPECIMEN QUALITY CHECKED. This result has been called to WENDY LEYVA by Ya Luo on 03 20 23 at 09:48, and has been read back.     Comment for PLT at 1345 on 03/15/23: RESULTS CHECKED. RESULTS OBTAINED AFTER VORTEXING TO ELIMINATE PLT CLUMPS          CMP Results       03/21/23 03/20/23 03/15/23     0645 0906 1345     133 136    K 3.4 4.3 3.4    Cl 105 102 105    CO2 20 21 20    Glucose 81 98 112    BUN 13 20 15    Creatinine 0.9 1.2 0.9    Calcium 7.9 8.7 8.6    Anion Gap 10 10 11    AST 9 9 13    ALT 10 12 18    Albumin 3.0 3.7 4.1    EGFR  >60.0 51.7 >60.0         Comment for K at 0906 on 03/20/23: Results obtained on plasma. Plasma Potassium values may be up to 0.4 mEQ/L less than serum values. The differences may be greater for patients with high platelet or white cell counts.                        MICRO: Reviewed  Microbiology Results     Procedure Component Value Units Date/Time    Blood Culture Blood, Venous [[id number]]  (Normal) Collected: 03/20/23 1046    Specimen: Blood, Venous Updated: 03/21/23 1301     Culture No growth at 18-24 hours    Blood Culture Blood, Venous [[id number]]  (Normal) Collected: 03/20/23 1046    Specimen: Blood, Venous Updated: 03/21/23 1301     Culture No growth at 18-24 hours    SARS-CoV-2 (COVID-19), PCR Nasopharynx [[id number]]  (Normal) Collected: 03/20/23 0913    Specimen: Nasopharyngeal Swab from Nasopharynx Updated: 03/20/23 1039    Narrative:      The following orders were created for panel order SARS-CoV-2 (COVID-19), PCR Nasopharynx.  Procedure                               Abnormality         Status                     ---------                               -----------         ------                     SARS-COV-2 (COVID-19)/ F...[[id number]]  Normal              Final result                 Please view results for these tests on the individual orders.    Group A Strep by PCR, Throat Throat Swab [[id number]]  (Normal) Collected: 03/20/23 0913    Specimen: Throat Swab Updated: 03/20/23 1029     Strep A PCR, Throat Not Detected    SARS-COV-2 (COVID-19)/ FLU A/B, AND RSV, PCR Nasopharynx [[id number]]  (Normal) Collected: 03/20/23 0913    Specimen: Nasopharyngeal Swab from Nasopharynx Updated: 03/20/23 1039     SARS-CoV-2 (COVID-19) Negative     Influenza A Negative     Influenza B Negative     Respiratory Syncytial Virus Negative    Narrative:      Testing performed using real-time PCR for detection of COVID-19. EUA approved validation studies performed on site.     Group A Strep by PCR, Throat Throat Swab  [[id number]]  (Normal) Collected: 03/06/23 0924    Specimen: Throat Swab Updated: 03/06/23 1051     Strep A PCR, Throat Not Detected            Imaging: REVIEWED      Assessment   This is a 32 y.o. female stage IV gestational trophoblastic neoplasia      • Neutropenic fever  • Profound neutropenia  • Oropharyngeal mucositis  • Thrush  • Dehydration   • Profound thrombocytopenia                Plan     RECOMMENDATIONS:    · Continue IV Cefepime 2 g q 8 hours  · Continue Fluconazole 100 mg po once a day  · Follow CBC with diff for ANC  · Close f/u platelets  · Neutropenic precautions  · Re-culture if fever      Please call ID if any questions or concerns, if any new culture data that needs immediate attention, and/or  if any clinical deterioration.   Recommendations discussed and related to primary team.        NOTE: Some or all of the note above was created with the use of dictation software, please note this dictation software can have anomalies in its ability to transcribe. Please contact me directly for anything that seems abnormal for clarification.

## 2023-03-21 NOTE — PROGRESS NOTES
Gynecology Progress Note    Subjective     Interval History:     Overnight with isolated febrile temperature. She reports continued headache. Denies fevers, chills, nausea, vomiting, chest pain, SOB, vision changes, abdominal pain, calf pain/swelling, and any other sx or concerns at this time.     Objective     Vital signs in last 24 hours:  Temp:  [36.8 °C (98.2 °F)-39.7 °C (103.5 °F)] 37.6 °C (99.6 °F)  Heart Rate:  [2-130] 118  Resp:  [16-24] 16  BP: (102-138)/(66-82) 113/66    Intake/Output this shift:  I/O this shift:  In: 340 [Blood:340]  Out: -     Exam  General Appearance: Alert, frail appearing   Lungs: Clear to auscultation bilaterally, respirations unlabored  Heart: Regular rhythm, tachycardic rate   Chest wall: Right IJ port clean, dry, and intact.   Abdomen: soft, nontender to palpation. Bowel sounds present in all 4 quadrants.   Extremities: no edema or calf tenderness    Labs  Results from last 7 days   Lab Units 03/20/23  0906 03/15/23  1345 03/14/23  1012   WBC K/uL 0.75* 3.18* 2.12*   HEMOGLOBIN g/dL 7.8* 9.3* 6.9*   HEMATOCRIT % 22.1* 25.9* 19.4*   PLATELETS K/uL 4* 56* 4*      Results from last 7 days   Lab Units 03/20/23  0906 03/15/23  1345   SODIUM mEQ/L 133* 136   POTASSIUM mEQ/L 4.3 3.4*   CHLORIDE mEQ/L 102 105   CO2 mEQ/L 21* 20*   BUN mg/dL 20 15   CREATININE mg/dL 1.2* 0.9   GLUCOSE mg/dL 98 112*   CALCIUM mg/dL 8.7* 8.6*        Microbiology Results     Procedure Component Value Units Date/Time    SARS-CoV-2 (COVID-19), PCR Nasopharynx [[id number]]  (Normal) Collected: 03/20/23 0913    Specimen: Nasopharyngeal Swab from Nasopharynx Updated: 03/20/23 1039    Narrative:      The following orders were created for panel order SARS-CoV-2 (COVID-19), PCR Nasopharynx.  Procedure                               Abnormality         Status                     ---------                               -----------         ------                     SARS-COV-2 (COVID-19)/ F...[[id number]]  Normal               Final result                 Please view results for these tests on the individual orders.    Group A Strep by PCR, Throat Throat Swab [[id number]]  (Normal) Collected: 03/20/23 0913    Specimen: Throat Swab Updated: 03/20/23 1029     Strep A PCR, Throat Not Detected    SARS-COV-2 (COVID-19)/ FLU A/B, AND RSV, PCR Nasopharynx [[id number]]  (Normal) Collected: 03/20/23 0913    Specimen: Nasopharyngeal Swab from Nasopharynx Updated: 03/20/23 1039     SARS-CoV-2 (COVID-19) Negative     Influenza A Negative     Influenza B Negative     Respiratory Syncytial Virus Negative    Narrative:      Testing performed using real-time PCR for detection of COVID-19. EUA approved validation studies performed on site.     Group A Strep by PCR, Throat Throat Swab [[id number]]  (Normal) Collected: 03/06/23 0924    Specimen: Throat Swab Updated: 03/06/23 1051     Strep A PCR, Throat Not Detected            Assessment/Plan     Alondra Villela is a 32 y.o. female admitted for dehydration, neutropenic fever, and chemotherapy management of stage IV gestational trophoblastic neoplasia.      1. Severe Neutropenic Fever   1. Temperature on admission 102.5 F, ANC 0.375, tachycardic in 120-130s. Tmax 103.5 F at 1538 on 3/20/23. Most recent temperature afebrile at 99.6 F.   2. For repeat CBC with differential as well as CMP this AM, to be collected.   3. Tylenol 975 mg q6 hours ordered as antipyretic   4. Neutropenic precautions in place   5. Blood cultures and UCx  pending  6. LR on at 125 cc/hr, continue  7. COVID, flu, RSV, and strep cultures both negative   8. CXR negative   9. Cefepime started in ED, seen by ID who recommended continuing as well as fluconazole 100 mg qd.      2. GTN  a. For Cycle 5A EMA-EP-- will hold off on administration until labs back this AM.   b. Vitals remarkable for tachycardia and febrile temperature as above  c. Beta-HCG on 124.2 on admission, continuing to trend down.    d. Continue supportive medications  Tylenol, zofran prn, and Phenergen prn nausea for patient request.      3. Thrombocytopenia  a. Platelets 4K on admission. Will repeat today   b. S/p transfusion of 2 units of platelets     c. Has previously been HLA matched to platelets as she previously did not respond well to platelet transfusion. Labs still in process      4. Hyperthyroidism   a. Hx thyroid storm.   b. Follows with Endocrinology Dr. Hodge.   c. Last TSH 0.88 on 3/20/23.   d. Stable not on medication.      5. Thrush/oral mucocutaneous ulcers  a. Seen by ENT who recommended viscous lidocaine, ordered to start using. Continue magic mouthwash swish and spit.   b. IV fluids as above given inability to tolerate well by mouth.      6. Depression  a. Stable, continue home medications Wellbutrin  mg daily and Propytiline 15 mg daily.      7. Hx DVT in right UE  a. Taking Eliquis 5 mg PO BID until 3/7.   b. Eliquis was discontinued insetting of thrombocytopenia and episode of bleeding.   c. Given Plt 4k at this time will continue to hold Eliquis.   d. SCDs for VTE ppx.      8. Diet: Regular    To be discussed with Dr. Mcmullen.     Kyleigh Hoang MD

## 2023-03-21 NOTE — PLAN OF CARE
Plan of Care Review  Plan of Care Reviewed With: patient  Progress: improving  Outcome Summary: Patient more alert and headache improving after first unit of pRBCs, second unit infusing now.  ST on monitor also improved throughout the day.  Patient up with assistance to bedside commode for void and BM.  Neutropenic precautions maintained.

## 2023-03-21 NOTE — PROGRESS NOTES
R2 GYN.     Informed by patient's nurse that headache has improved with IV reglan/benadryl. Is currently 4 out to 10 compared to 7 out of 10 earlier. Given headache improvement will continue to monitor closely at this time.     Lya Shipman, DO PGY2  Obstetrics & Gynecology   Pager #[phone]

## 2023-03-21 NOTE — PROGRESS NOTES
Gynecologic Oncology Progress Note    Subjective     Interval History:     Alondra is doing well this afternoon. She reports continued headache. She otherwise denies fevers, chills, nausea, vomiting, chest pain, shortness of breath, vision changes, abdominal pain, calf tenderness or new swelling.     Objective     Vital signs in last 24 hours:  Temp:  [36.7 °C (98.1 °F)-39.6 °C (103.2 °F)] 37.8 °C (100 °F)  Heart Rate:  [2-130] 123  Resp:  [16-22] 16  BP: (102-165)/(66-92) 165/84    Intake/Output this shift:  I/O this shift:  In: 325 [Blood:325]  Out: 700 [Urine:700]    Exam  General Appearance: Alert, frail appearing   Lungs: respirations unlabored  Heart: tachycardic rate   Chest wall: Right IJ port clean, dry, and intact.   Abdomen: soft, nontender to palpation.  Extremities: no edema or calf tenderness    Labs    Results from last 7 days   Lab Units 03/21/23  0645   WBC K/uL 1.02*   HEMOGLOBIN g/dL 5.2*   HEMATOCRIT % 14.2*   PLATELETS K/uL 18*   DIFF TYPE  Manu   NEUTROS PCT MAN % 7   LYMPHO PCT MAN % 88   MONO PCT MAN % 4   EOSINO PCT MAN % 1   BASOS PCT MAN % 0   SEGS ABS MAN K/uL 0.07*   LYMPHO ABS MAN K/uL 0.90*   MONO ABS MAN K/uL 0.04*   EOS ABS MAN K/uL 0.01*   BASOS ABS MAN K/uL 0.00*       Results from last 7 days   Lab Units 03/21/23  0645 03/20/23  0906 03/15/23  1345   SODIUM mEQ/L 135* 133* 136   POTASSIUM mEQ/L 3.4* 4.3 3.4*   CHLORIDE mEQ/L 105 102 105   CO2 mEQ/L 20* 21* 20*   BUN mg/dL 13 20 15   CREATININE mg/dL 0.9 1.2* 0.9   GLUCOSE mg/dL 81 98 112*   CALCIUM mg/dL 7.9* 8.7* 8.6*        Microbiology Results     Procedure Component Value Units Date/Time    SARS-CoV-2 (COVID-19), PCR Nasopharynx [[id number]]  (Normal) Collected: 03/20/23 0913    Specimen: Nasopharyngeal Swab from Nasopharynx Updated: 03/20/23 1039    Narrative:      The following orders were created for panel order SARS-CoV-2 (COVID-19), PCR Nasopharynx.  Procedure                               Abnormality         Status                      ---------                               -----------         ------                     SARS-COV-2 (COVID-19)/ F...[[id number]]  Normal              Final result                 Please view results for these tests on the individual orders.    Group A Strep by PCR, Throat Throat Swab [[id number]]  (Normal) Collected: 03/20/23 0913    Specimen: Throat Swab Updated: 03/20/23 1029     Strep A PCR, Throat Not Detected    SARS-COV-2 (COVID-19)/ FLU A/B, AND RSV, PCR Nasopharynx [[id number]]  (Normal) Collected: 03/20/23 0913    Specimen: Nasopharyngeal Swab from Nasopharynx Updated: 03/20/23 1039     SARS-CoV-2 (COVID-19) Negative     Influenza A Negative     Influenza B Negative     Respiratory Syncytial Virus Negative    Narrative:      Testing performed using real-time PCR for detection of COVID-19. EUA approved validation studies performed on site.     Group A Strep by PCR, Throat Throat Swab [[id number]]  (Normal) Collected: 03/06/23 0924    Specimen: Throat Swab Updated: 03/06/23 1051     Strep A PCR, Throat Not Detected          Assessment/Plan     Alondra Villela is a 32 y.o. female admitted for dehydration, neutropenic fever, and chemotherapy management of stage IV gestational trophoblastic neoplasia.      1. Severe Neutropenic Fever   1. Temperature on admission 102.5 F, ANC 0.375, tachycardic in 120-130s. Tmax 103.5 F at 1538 on 3/20/23. Most recent temperature afebrile at 100 F.   2. For repeat CBC with differential/CMP tomorrow morning  3. Tylenol 975 mg q6 hours ordered as an antipyretic   4. Neutropenic precautions in place   5. Blood cultures and UCx pending  6. LR on at 125 cc/hr, continue  7. COVID, flu, RSV, and strep cultures both negative   8. CXR negative   9. Continue Cefepime and fluconazole 100 mg qd per ID. Appreciate recs.     2. GTN  a. For Cycle 5A EMA-EP-- continue to hold at this time  b. Vitals remarkable for tachycardia and febrile temperature as above  c. Beta-HCG on 124.2 on  admission, continuing to trend down.    d. Continue supportive medications Tylenol, Zofran prn, and Phenergen prn nausea for patient request.      3. Thrombocytopenia  a. Platelets 4K on admission.  b. S/p transfusion of 2 units of platelets     c. Has previously been HLA matched to platelets as she previously did not respond well to platelet transfusion.      4. Hyperthyroidism   a. Hx thyroid storm.   b. Follows with Endocrinology Dr. Hodge.   c. Last TSH 0.88 on 3/20/23.   d. Stable not on medication.      5. Thrush/oral mucocutaneous ulcers  a. Seen by ENT who recommended viscous lidocaine, ordered to start using. Continue magic mouthwash swish and spit.   b. IV fluids as above given inability to tolerate well by mouth.      6. Depression  a. Stable, continue home medications Wellbutrin  mg daily and Propytiline 15 mg daily.      7. Hx DVT in right UE  a. Taking Eliquis 5 mg PO BID until 3/7.   b. Eliquis was discontinued insetting of thrombocytopenia and episode of bleeding.   c. Given Plt 4k at this time will continue to hold Eliquis.   d. SCDs for VTE ppx.      8. Diet: Regular    Reviewed with Dr. Benedict Reeves MD

## 2023-03-22 LAB
ALBUMIN SERPL-MCNC: 2.9 G/DL (ref 3.4–5)
ALP SERPL-CCNC: 55 IU/L (ref 35–126)
ALT SERPL-CCNC: 12 IU/L (ref 11–54)
ANION GAP SERPL CALC-SCNC: 10 MEQ/L (ref 3–15)
AST SERPL-CCNC: 9 IU/L (ref 15–41)
BASOPHILS # BLD: 0 K/UL (ref 0.01–0.1)
BASOPHILS NFR BLD: 0 %
BILIRUB SERPL-MCNC: 0.7 MG/DL (ref 0.3–1.2)
BUN SERPL-MCNC: 11 MG/DL (ref 8–20)
CALCIUM SERPL-MCNC: 8.1 MG/DL (ref 8.9–10.3)
CHLORIDE SERPL-SCNC: 103 MEQ/L (ref 98–109)
CO2 SERPL-SCNC: 19 MEQ/L (ref 22–32)
CREAT SERPL-MCNC: 0.8 MG/DL (ref 0.6–1.1)
DIFFERENTIAL METHOD BLD: ABNORMAL
EOSINOPHIL # BLD: 0.02 K/UL (ref 0.04–0.36)
EOSINOPHIL NFR BLD: 2 %
ERYTHROCYTE [DISTWIDTH] IN BLOOD BY AUTOMATED COUNT: 13.2 % (ref 11.7–14.4)
FERRITIN SERPL-MCNC: 873 NG/ML (ref 11–250)
GFR SERPL CREATININE-BSD FRML MDRD: >60 ML/MIN/1.73M*2
GLUCOSE SERPL-MCNC: 72 MG/DL (ref 70–99)
HCT VFR BLDCO AUTO: 21.9 % (ref 35–45)
HGB BLD-MCNC: 7.7 G/DL (ref 11.8–15.7)
HYPOCHROMIA BLD QL SMEAR: ABNORMAL
IRON SATN MFR SERPL: 61 % (ref 15–45)
IRON SERPL-MCNC: 107 UG/DL (ref 35–150)
LDH SERPL L TO P-CCNC: 115 IU/L (ref 98–192)
LYMPHOCYTES # BLD: 0.74 K/UL (ref 1.2–3.5)
LYMPHOCYTES NFR BLD: 96 %
MCH RBC QN AUTO: 28.7 PG (ref 28–33.2)
MCHC RBC AUTO-ENTMCNC: 35.2 G/DL (ref 32.2–35.5)
MCV RBC AUTO: 81.7 FL (ref 83–98)
MICROCYTES BLD QL SMEAR: ABNORMAL
MONOCYTES # BLD: 0.01 K/UL (ref 0.28–0.8)
MONOCYTES NFR BLD: 1 %
NEUTS BAND # BLD: 0.01 K/UL (ref 1.7–7)
NEUTS SEG NFR BLD: 1 %
PDW BLD AUTO: 9.2 FL (ref 9.4–12.3)
PLAT MORPH BLD: NORMAL
PLATELET # BLD AUTO: 9 K/UL (ref 150–369)
PLATELET # BLD EST: ABNORMAL 10*3/UL
POTASSIUM SERPL-SCNC: 3.6 MEQ/L (ref 3.6–5.1)
PROT SERPL-MCNC: 5.3 G/DL (ref 6–8.2)
RBC # BLD AUTO: 2.68 M/UL (ref 3.93–5.22)
RETICS #: 0.01 M/UL (ref 0–0.12)
RETICS/RBC NFR: 0.35 % (ref 0.6–2.8)
SODIUM SERPL-SCNC: 132 MEQ/L (ref 136–144)
TIBC SERPL-MCNC: 176 UG/DL (ref 270–460)
UIBC SERPL-MCNC: 69 UG/DL (ref 180–360)
WBC # BLD AUTO: 0.77 K/UL (ref 3.8–10.5)

## 2023-03-22 PROCEDURE — 63700000 HC SELF-ADMINISTRABLE DRUG: Performed by: OBSTETRICS & GYNECOLOGY

## 2023-03-22 PROCEDURE — 63600000 HC DRUGS/DETAIL CODE: Performed by: STUDENT IN AN ORGANIZED HEALTH CARE EDUCATION/TRAINING PROGRAM

## 2023-03-22 PROCEDURE — 82746 ASSAY OF FOLIC ACID SERUM: CPT | Performed by: STUDENT IN AN ORGANIZED HEALTH CARE EDUCATION/TRAINING PROGRAM

## 2023-03-22 PROCEDURE — 21400000 HC ROOM AND CARE CCU/INTERMEDIATE

## 2023-03-22 PROCEDURE — 83010 ASSAY OF HAPTOGLOBIN QUANT: CPT | Performed by: OBSTETRICS & GYNECOLOGY

## 2023-03-22 PROCEDURE — 87040 BLOOD CULTURE FOR BACTERIA: CPT | Performed by: STUDENT IN AN ORGANIZED HEALTH CARE EDUCATION/TRAINING PROGRAM

## 2023-03-22 PROCEDURE — 85025 COMPLETE CBC W/AUTO DIFF WBC: CPT

## 2023-03-22 PROCEDURE — 36415 COLL VENOUS BLD VENIPUNCTURE: CPT

## 2023-03-22 PROCEDURE — 82607 VITAMIN B-12: CPT | Performed by: STUDENT IN AN ORGANIZED HEALTH CARE EDUCATION/TRAINING PROGRAM

## 2023-03-22 PROCEDURE — P9073 PLATELETS PHERESIS PATH REDU: HCPCS

## 2023-03-22 PROCEDURE — 63700000 HC SELF-ADMINISTRABLE DRUG

## 2023-03-22 PROCEDURE — 82728 ASSAY OF FERRITIN: CPT | Performed by: OBSTETRICS & GYNECOLOGY

## 2023-03-22 PROCEDURE — 85397 CLOTTING FUNCT ACTIVITY: CPT | Performed by: OBSTETRICS & GYNECOLOGY

## 2023-03-22 PROCEDURE — 83615 LACTATE (LD) (LDH) ENZYME: CPT | Performed by: OBSTETRICS & GYNECOLOGY

## 2023-03-22 PROCEDURE — 85025 COMPLETE CBC W/AUTO DIFF WBC: CPT | Performed by: STUDENT IN AN ORGANIZED HEALTH CARE EDUCATION/TRAINING PROGRAM

## 2023-03-22 PROCEDURE — 80053 COMPREHEN METABOLIC PANEL: CPT

## 2023-03-22 PROCEDURE — 36430 TRANSFUSION BLD/BLD COMPNT: CPT

## 2023-03-22 PROCEDURE — 85045 AUTOMATED RETICULOCYTE COUNT: CPT | Performed by: OBSTETRICS & GYNECOLOGY

## 2023-03-22 PROCEDURE — 63700000 HC SELF-ADMINISTRABLE DRUG: Performed by: STUDENT IN AN ORGANIZED HEALTH CARE EDUCATION/TRAINING PROGRAM

## 2023-03-22 PROCEDURE — 99232 SBSQ HOSP IP/OBS MODERATE 35: CPT | Performed by: STUDENT IN AN ORGANIZED HEALTH CARE EDUCATION/TRAINING PROGRAM

## 2023-03-22 PROCEDURE — 63600000 HC DRUGS/DETAIL CODE: Mod: JG | Performed by: OBSTETRICS & GYNECOLOGY

## 2023-03-22 PROCEDURE — 83550 IRON BINDING TEST: CPT | Performed by: OBSTETRICS & GYNECOLOGY

## 2023-03-22 PROCEDURE — 25000000 HC PHARMACY GENERAL: Performed by: STUDENT IN AN ORGANIZED HEALTH CARE EDUCATION/TRAINING PROGRAM

## 2023-03-22 PROCEDURE — 25800000 HC PHARMACY IV SOLUTIONS: Performed by: OBSTETRICS & GYNECOLOGY

## 2023-03-22 PROCEDURE — 63600000 HC DRUGS/DETAIL CODE

## 2023-03-22 RX ORDER — METOCLOPRAMIDE HYDROCHLORIDE 5 MG/ML
10 INJECTION INTRAMUSCULAR; INTRAVENOUS ONCE
Status: COMPLETED | OUTPATIENT
Start: 2023-03-22 | End: 2023-03-22

## 2023-03-22 RX ORDER — FLUCONAZOLE 100 MG/1
200 TABLET ORAL DAILY
Status: COMPLETED | OUTPATIENT
Start: 2023-03-23 | End: 2023-03-26

## 2023-03-22 RX ORDER — ACETAMINOPHEN 325 MG/1
975 TABLET ORAL EVERY 6 HOURS PRN
Status: DISCONTINUED | OUTPATIENT
Start: 2023-03-22 | End: 2023-03-28 | Stop reason: HOSPADM

## 2023-03-22 RX ORDER — LORAZEPAM 1 MG/1
1 TABLET ORAL NIGHTLY PRN
Status: DISCONTINUED | OUTPATIENT
Start: 2023-03-22 | End: 2023-03-28 | Stop reason: HOSPADM

## 2023-03-22 RX ORDER — ACETAMINOPHEN 650 MG/20.3ML
975 LIQUID ORAL EVERY 6 HOURS PRN
Status: DISCONTINUED | OUTPATIENT
Start: 2023-03-22 | End: 2023-03-22 | Stop reason: ENTERED-IN-ERROR

## 2023-03-22 RX ORDER — SODIUM CHLORIDE 9 MG/ML
5 INJECTION, SOLUTION INTRAVENOUS AS NEEDED
Status: ACTIVE | OUTPATIENT
Start: 2023-03-22 | End: 2023-03-23

## 2023-03-22 RX ORDER — LORAZEPAM 2 MG/ML
0.5 INJECTION INTRAMUSCULAR NIGHTLY PRN
Status: DISCONTINUED | OUTPATIENT
Start: 2023-03-22 | End: 2023-03-22

## 2023-03-22 RX ORDER — IBUPROFEN/PSEUDOEPHEDRINE HCL 200MG-30MG
3 TABLET ORAL NIGHTLY
Status: DISCONTINUED | OUTPATIENT
Start: 2023-03-22 | End: 2023-03-28 | Stop reason: HOSPADM

## 2023-03-22 RX ADMIN — SODIUM CHLORIDE, POTASSIUM CHLORIDE, SODIUM LACTATE AND CALCIUM CHLORIDE: 600; 310; 30; 20 INJECTION, SOLUTION INTRAVENOUS at 06:00

## 2023-03-22 RX ADMIN — PROTRIPTYLINE HYDROCHLORIDE 15 MG: 5 TABLET, FILM COATED ORAL at 23:25

## 2023-03-22 RX ADMIN — DIPHENHYDRAMINE HYDROCHLORIDE 25 MG: 50 INJECTION INTRAMUSCULAR; INTRAVENOUS at 20:42

## 2023-03-22 RX ADMIN — SODIUM CHLORIDE, POTASSIUM CHLORIDE, SODIUM LACTATE AND CALCIUM CHLORIDE: 600; 310; 30; 20 INJECTION, SOLUTION INTRAVENOUS at 22:21

## 2023-03-22 RX ADMIN — FLUCONAZOLE 100 MG: 100 TABLET ORAL at 09:53

## 2023-03-22 RX ADMIN — CEFEPIME 2 G: 2 INJECTION, POWDER, FOR SOLUTION INTRAVENOUS at 01:54

## 2023-03-22 RX ADMIN — DOCUSATE SODIUM 100 MG: 100 CAPSULE, LIQUID FILLED ORAL at 20:42

## 2023-03-22 RX ADMIN — METOCLOPRAMIDE 10 MG: 5 INJECTION, SOLUTION INTRAMUSCULAR; INTRAVENOUS at 20:42

## 2023-03-22 RX ADMIN — ACETAMINOPHEN 975 MG: 325 TABLET ORAL at 23:25

## 2023-03-22 RX ADMIN — Medication 3 MG: at 23:25

## 2023-03-22 RX ADMIN — DIBUCAINE: 0.28 OINTMENT TOPICAL at 20:43

## 2023-03-22 RX ADMIN — CEFEPIME 2 G: 2 INJECTION, POWDER, FOR SOLUTION INTRAVENOUS at 16:31

## 2023-03-22 RX ADMIN — DIPHENHYDRAMINE HYDROCHLORIDE AND LIDOCAINE HYDROCHLORIDE AND ALUMINUM HYDROXIDE AND MAGNESIUM HYDRO 15 ML: KIT at 09:13

## 2023-03-22 RX ADMIN — SODIUM CHLORIDE, POTASSIUM CHLORIDE, SODIUM LACTATE AND CALCIUM CHLORIDE: 600; 310; 30; 20 INJECTION, SOLUTION INTRAVENOUS at 14:30

## 2023-03-22 RX ADMIN — CEFEPIME 2 G: 2 INJECTION, POWDER, FOR SOLUTION INTRAVENOUS at 09:58

## 2023-03-22 RX ADMIN — PANTOPRAZOLE SODIUM 40 MG: 40 INJECTION, POWDER, LYOPHILIZED, FOR SOLUTION INTRAVENOUS at 09:53

## 2023-03-22 RX ADMIN — DOCUSATE SODIUM 100 MG: 100 CAPSULE, LIQUID FILLED ORAL at 09:53

## 2023-03-22 RX ADMIN — FILGRASTIM-SNDZ 480 MCG: 480 INJECTION, SOLUTION INTRAVENOUS; SUBCUTANEOUS at 16:30

## 2023-03-22 RX ADMIN — BUPROPION HYDROCHLORIDE 150 MG: 150 TABLET, EXTENDED RELEASE ORAL at 23:25

## 2023-03-22 NOTE — PROGRESS NOTES
[facility]    At the bedside to evaluate given temperature of 101F.    She reports feeling fine. She denies headaches, lightheadedness, dizziness, shortness of breath, or chest pain.     Temp:  [36.6 °C (97.9 °F)-39.3 °C (102.8 °F)] 37.1 °C (98.8 °F)  Heart Rate:  [106-130] 117  Resp:  [16-18] 16  BP: (131-178)/() 178/92    General: well and resting  Heart: Tachycardic rate and rhythm  Lungs: Clear to auscultation bilaterally  Abdomen: soft, nondistended, non-tender, no rebound/rigidity/guarding.   Extremities: symmetric and no edema     Results from last 7 days   Lab Units 03/22/23  0610   WBC K/uL 0.77*   HEMOGLOBIN g/dL 7.7*   HEMATOCRIT % 21.9*   PLATELETS K/uL 9*   DIFF TYPE  Manu   NEUTROS PCT MAN % 1   LYMPHO PCT MAN % 96   MONO PCT MAN % 1   EOSINO PCT MAN % 2   BASOS PCT MAN % 0   SEGS ABS MAN K/uL 0.01*   LYMPHO ABS MAN K/uL 0.74*   MONO ABS MAN K/uL 0.01*   EOS ABS MAN K/uL 0.02*   BASOS ABS MAN K/uL 0.00*     In terms of the fever, will collect blood cultures at this time. Continue Tylenol 975 mg q6 hours PRN as needed for fevers. Per ID, no need for throat fungal cultures given there is evidence of thrush. Recommended increasing fluconazole to 200 mg daily.     In terms of her labs, she is receiving 2 units of HLA-matched platelets. She is on her second unit. Given she is asymptomatic at this time and feeling well, will defer giving pRBC for Hgb of 7.7.    Orders placed and plan discussed with Dr. Benedict Reeves MD

## 2023-03-22 NOTE — PROGRESS NOTES
Gynecologic Oncology Progress Note    Subjective     Interval History: Alondra had an isolated fever of 102.8F overnight. After a dose of tylenol, temperature returned to 99.8F.    Alondra is well this morning. She reports improvement in her headache. She otherwise denies fevers, chills, nausea, vomiting, chest pain, shortness of breath, vision changes, abdominal pain, calf tenderness or new swelling. Her vaginal bleeding has remained stable. Her biggest concern is the mouth/throat sores.     Objective     Vital signs in last 24 hours:  Temp:  [36.7 °C (98.1 °F)-39.3 °C (102.8 °F)] 37.7 °C (99.8 °F)  Heart Rate:  [106-130] 108  Resp:  [16-18] 16  BP: (131-165)/(68-94) 140/84    Intake/Output this shift:  I/O this shift:  In: 322.3 [Blood:322.3]  Out: 450 [Urine:450]    Exam  General Appearance: Alert, frail appearing   Lungs: lungs clear to ascultation bilaterally. respirations unlabored  Heart: tachycardic rate with regular rhythm   Chest wall: Right IJ port clean, dry, and intact  Abdomen: soft, nontender to palpation.   Extremities: no edema or calf tenderness    Labs    Results from last 7 days   Lab Units 03/21/23  0645   WBC K/uL 1.02*   HEMOGLOBIN g/dL 5.2*   HEMATOCRIT % 14.2*   PLATELETS K/uL 18*   DIFF TYPE  Manu   NEUTROS PCT MAN % 7   LYMPHO PCT MAN % 88   MONO PCT MAN % 4   EOSINO PCT MAN % 1   BASOS PCT MAN % 0   SEGS ABS MAN K/uL 0.07*   LYMPHO ABS MAN K/uL 0.90*   MONO ABS MAN K/uL 0.04*   EOS ABS MAN K/uL 0.01*   BASOS ABS MAN K/uL 0.00*       Results from last 7 days   Lab Units 03/21/23  0645 03/20/23  0906 03/15/23  1345   SODIUM mEQ/L 135* 133* 136   POTASSIUM mEQ/L 3.4* 4.3 3.4*   CHLORIDE mEQ/L 105 102 105   CO2 mEQ/L 20* 21* 20*   BUN mg/dL 13 20 15   CREATININE mg/dL 0.9 1.2* 0.9   GLUCOSE mg/dL 81 98 112*   CALCIUM mg/dL 7.9* 8.7* 8.6*        Microbiology Results     Procedure Component Value Units Date/Time    SARS-CoV-2 (COVID-19), PCR Nasopharynx [[id number]]  (Normal) Collected: 03/20/23  0913    Specimen: Nasopharyngeal Swab from Nasopharynx Updated: 03/20/23 1039    Narrative:      The following orders were created for panel order SARS-CoV-2 (COVID-19), PCR Nasopharynx.  Procedure                               Abnormality         Status                     ---------                               -----------         ------                     SARS-COV-2 (COVID-19)/ F...[[id number]]  Normal              Final result                 Please view results for these tests on the individual orders.    Group A Strep by PCR, Throat Throat Swab [[id number]]  (Normal) Collected: 03/20/23 0913    Specimen: Throat Swab Updated: 03/20/23 1029     Strep A PCR, Throat Not Detected    SARS-COV-2 (COVID-19)/ FLU A/B, AND RSV, PCR Nasopharynx [[id number]]  (Normal) Collected: 03/20/23 0913    Specimen: Nasopharyngeal Swab from Nasopharynx Updated: 03/20/23 1039     SARS-CoV-2 (COVID-19) Negative     Influenza A Negative     Influenza B Negative     Respiratory Syncytial Virus Negative    Narrative:      Testing performed using real-time PCR for detection of COVID-19. EUA approved validation studies performed on site.     Group A Strep by PCR, Throat Throat Swab [[id number]]  (Normal) Collected: 03/06/23 0924    Specimen: Throat Swab Updated: 03/06/23 1051     Strep A PCR, Throat Not Detected          Assessment/Plan     Alondra Villela is a 32 y.o. female admitted for dehydration, neutropenic fever, and chemotherapy management of stage IV gestational trophoblastic neoplasia.      1. Severe Neutropenic Fever   1. Temperature on admission 102.5 F, ANC 0.375, tachycardic in 120-130s. Tmax 103.5 F at 1538 on 3/20/23. Most recent temperature afebrile at 99.8 F.   2. Repeat CBC with differential/CMP collected and pending this morning   3. Tylenol 975 mg q6 hours ordered as an antipyretic   4. Neutropenic precautions in place   5. Blood cultures and UCx no growth to date   6. Continue LR on at 125 cc/hr  7. COVID, flu, RSV, and  strep cultures both negative   8. CXR negative   9. Continue Cefepime and fluconazole 100 mg qd per ID. If she spikes another fever, consider re-culturing per ID. Appreciate recs.     2. GTN  a. For Cycle 5A EMA-EP - continue to hold   b. Vitals remarkable for tachycardia, which is improving, and febrile temperature as above  c. Beta-HCG on 124.2 on admission, continuing to trend down.    d. Continue supportive medications Tylenol, Zofran PRN, and Phenergen PRN nausea for patient request.      3. Thrombocytopenia  a. Platelets 4K on admission.  b. S/p transfusion of 2 units of platelets     c. Has previously been HLA matched to platelets as she previously did not respond well to platelet transfusion.      4. Hyperthyroidism   a. Hx thyroid storm.   b. Follows with Endocrinology Dr. Hodge.   c. Last TSH 0.88 on 3/20/23.   d. Stable not on medication.      5. Thrush/oral mucocutaneous ulcers  a. Seen by ENT who recommended viscous lidocaine. Continue magic mouthwash swish and spit.   b. IV fluids as above given inability to tolerate intake by mouth     6. Depression  a. Stable, continue home medications Wellbutrin  mg daily and Propytiline 15 mg daily.      7. Hx DVT in right UE  a. Taking Eliquis 5 mg PO BID until 3/7.   b. Eliquis was discontinued insetting of thrombocytopenia and episode of bleeding.   c. Given Plt 4k at this time will continue to hold Eliquis.   d. SCDs for VTE ppx.      8. Diet: Regular    Reviewed with Dr. Benedict Reeves MD

## 2023-03-22 NOTE — PLAN OF CARE
Per information gathered at medical rounds; DANIEL is TBD as patient is not stable enough for DC. SW will remain available for emotional support and DC planning. BHARAT Smith

## 2023-03-22 NOTE — PLAN OF CARE
1800: Pt b/l arms became warm and blotchy red with LR infusion. MD notified and ordered to monitor for now, but page MD if pt reports pain or site worsens.     Problem: Adult Inpatient Plan of Care  Goal: Plan of Care Review  Outcome: Progressing  Flowsheets (Taken 3/22/2023 0074)  Progress: improving  Plan of Care Reviewed With: patient  Outcome Summary: Neutropenic precautions maintained for this patient. Pt received 2units of platelets today. MDs orders to draw STAT blood cultures if pts temp is 100.4f or higher. IV abx administered. Pt denies any pain or discomfort. Family at bedside. Call bell and personal items within reach.   Plan of Care Review  Plan of Care Reviewed With: patient  Progress: improving  Outcome Summary: Neutropenic precautions maintained for this patient. Pt received 2units of platelets today. MDs orders to draw STAT blood cultures if pts temp is 100.4f or higher. IV abx administered. Pt denies any pain or discomfort. Family at bedside. Call bell and personal items within reach.

## 2023-03-22 NOTE — PROGRESS NOTES
"Infectious Disease Progress Note    Patient Name: Alondra Villela  MR#: [id number]  : 1990  Admission Date: 3/20/2023  Date: 23   Time: 7:16 PM   Author: Awa Terrell MD      Antibiotics:    Anti-infectives (From admission, onward)    Start     Dose/Rate Route Frequency Ordered Stop    23 0900  fluconazole (DIFLUCAN) tablet 200 mg         200 mg oral Daily 23 1143      23 0945  ceFEPIme (MAXIPIME) 2 g in 100 mL NSS vial in bag         2 g  200 mL/hr over 30 Minutes intravenous Every 8 hours interval 23 0854            Subjective     No vision disturbances, no diarrhea, no pain.  Was febrile this morning.  Afebrile now.  Still with mucositis.  WBC decreased again.        Objective     Vital Signs:    Visit Vitals  /75   Pulse (!) 122   Temp 37.3 °C (99.1 °F) (Temporal)   Resp 16   Ht 1.626 m (5' 4\")   Wt 73.7 kg (162 lb 8 oz)   SpO2 98%   BMI 27.89 kg/m²       Temp (72hrs), Av.7 °C (99.9 °F), Min:36.6 °C (97.9 °F), Max:39.7 °C (103.5 °F)      Physical Exam:    General: non toxic,alopecia  HEENT: normocephalic, EOMI, pupils reactive, anicteric sclera, mucositis stable, no exudates, positive thrush   Neck: supple, no tenderness  Cardiovascular: regular S1/S2 , no obvious stigmata of endocarditis  Respiratory: Clear to auscultation bilaterally, no wheezing, no rhonchi, no crackles  GI/Abdomen: soft, no distention, normal bowel sounds, non-tender, no masses, no hepatomegaly or splenomegaly, no peritoneal signs.  Extremities: no  edema, good perfusion of extremities, radial pulses and distal pulses palpable.  MSK/JOINTS:  No swelling, no erythema, no pain. No spine tenderness.  Neurology and psych: no focal deficits, cooperative with exam, no meningeal signs  Skin:lines sites no erythema, swelling or pain and with dressings that are clean, dry and intact  G.U.:  No CVA tenderness or suprapubic pain     Port no tenderness or redness  .  Lines, Drains, Airways, " Wounds:  Single Lumen Implantable Port Right Chest (Active)   Number of days:        Peripheral IV (Adult) 03/20/23 Right Antecubital (Active)   Number of days: 2       Peripheral IV (Adult) 03/20/23 Anterior;Left;Upper Arm (Active)   Number of days: 2       Labs:    CBC Results       03/22/23 03/21/23 03/20/23     0610 0645 0906    WBC 0.77 1.02 0.75    RBC 2.68 1.84 2.78    HGB 7.7 5.2 7.8    HCT 21.9 14.2 22.1    MCV 81.7 77.2 79.5    MCH 28.7 28.3 28.1    MCHC 35.2 36.6 35.3    PLT 9 18 4         Comment for WBC at 0610 on 03/22/23: This result has been called to TIFFANY WOODSON by Lara Alegria on 03 22 23 at 07:53, and has been read back.     Comment for WBC at 0645 on 03/21/23: This result has been called to JACQUELINE BARBOSA by Lara Alegria on 03 21 23 at 08:38, and has been read back.     Comment for WBC at 0906 on 03/20/23: ALL RESULTS HAVE BEEN CHECKED. SPECIMEN QUALITY CHECKED. This result has been called to WENDY LEYVA by Ya Luo on 03 20 23 at 09:48, and has been read back.     Comment for HGB at 0645 on 03/21/23: ALL RESULTS HAVE BEEN CHECKED. This result has been called to JACQUELINE BARBOSA by Lara Alegria on 03 21 23 at 08:38, and has been read back.     Comment for PLT at 0610 on 03/22/23: RESULTS CHECKED. This result has been called to TIFFANY WOODSON by Lara Alegria on 03 22 23 at 07:53, and has been read back.     Comment for PLT at 0645 on 03/21/23: SPECIMEN QUALITY CHECKED. RESULTS OBTAINED AFTER VORTEXING TO ELIMINATE PLT CLUMPS. This result has been called to JACQUELINE BARBOSA by Lara Alegria on 03 21 23 at 08:38, and has been read back.     Comment for PLT at 0906 on 03/20/23: ALL RESULTS HAVE BEEN RECHECKED. SPECIMEN QUALITY CHECKED. This result has been called to WENDY LEYVA by Ya Luo on 03 20 23 at 09:48, and has been read back.           CMP Results       03/22/23 03/21/23 03/20/23     0610 0645 0906     135 133    K 3.6 3.4 4.3    Cl 103 105 102    CO2 19 20 21    Glucose 72 81 98     BUN 11 13 20    Creatinine 0.8 0.9 1.2    Calcium 8.1 7.9 8.7    Anion Gap 10 10 10    AST 9 9 9    ALT 12 10 12    Albumin 2.9 3.0 3.7    EGFR >60.0 >60.0 51.7         Comment for K at 0906 on 03/20/23: Results obtained on plasma. Plasma Potassium values may be up to 0.4 mEQ/L less than serum values. The differences may be greater for patients with high platelet or white cell counts.                        MICRO: Reviewed  Microbiology Results     Procedure Component Value Units Date/Time    Urine culture Urine, Clean Catch [[id number]]  (Normal) Collected: 03/20/23 2105    Specimen: Urine, Clean Catch Updated: 03/21/23 2056     Urine Culture No Growth (Limit of detection 1000 cfu/mL)    Blood Culture Blood, Venous [[id number]]  (Normal) Collected: 03/20/23 1046    Specimen: Blood, Venous Updated: 03/22/23 1302     Culture No growth at 48 hours    Blood Culture Blood, Venous [[id number]]  (Normal) Collected: 03/20/23 1046    Specimen: Blood, Venous Updated: 03/22/23 1302     Culture No growth at 48 hours    SARS-CoV-2 (COVID-19), PCR Nasopharynx [[id number]]  (Normal) Collected: 03/20/23 0913    Specimen: Nasopharyngeal Swab from Nasopharynx Updated: 03/20/23 1039    Narrative:      The following orders were created for panel order SARS-CoV-2 (COVID-19), PCR Nasopharynx.  Procedure                               Abnormality         Status                     ---------                               -----------         ------                     SARS-COV-2 (COVID-19)/ F...[[id number]]  Normal              Final result                 Please view results for these tests on the individual orders.    Group A Strep by PCR, Throat Throat Swab [[id number]]  (Normal) Collected: 03/20/23 0913    Specimen: Throat Swab Updated: 03/20/23 1029     Strep A PCR, Throat Not Detected    SARS-COV-2 (COVID-19)/ FLU A/B, AND RSV, PCR Nasopharynx [[id number]]  (Normal) Collected: 03/20/23 0913    Specimen: Nasopharyngeal Swab from Nasopharynx  Updated: 03/20/23 1039     SARS-CoV-2 (COVID-19) Negative     Influenza A Negative     Influenza B Negative     Respiratory Syncytial Virus Negative    Narrative:      Testing performed using real-time PCR for detection of COVID-19. EUA approved validation studies performed on site.     Group A Strep by PCR, Throat Throat Swab [[id number]]  (Normal) Collected: 03/06/23 0924    Specimen: Throat Swab Updated: 03/06/23 1051     Strep A PCR, Throat Not Detected                  Assessment   This is a 32 y.o. female  stage IV gestational trophoblastic neoplasia      • Neutropenic fever  • Profound neutropenia  • Oropharyngeal mucositis  • Thrush  • Profound thrombocytopenia                   Plan     RECOMMENDATIONS:    • Continue IV Cefepime 2 g q 8 hours  • Iincreased Fluconazole to 200 mg po once a day  • Follow CBC with diff for ANC  • Close f/u platelets  • Neutropenic precautions  • Monitor fever curve  • Re-culture if fever          Please call ID if any questions or concerns, if any new culture data that needs immediate attention, and/or  if any clinical deterioration.   Recommendations discussed and related to primary team      NOTE: Some or all of the note above was created with the use of dictation software, please note this dictation software can have anomalies in its ability to transcribe. Please contact me directly for anything that seems abnormal for clarification.

## 2023-03-22 NOTE — PROGRESS NOTES
Gynecology Progress Note    Subjective     Interval History:  Continued mouth sores pain and secondary to this difficulty swallowing. Reports mild HA today not requiring PO medications. Denies fevers, chills, changes in baseline nausea, vomiting, stool changes, vision changes, calf pain/swelling, and any other sx or concerns at this time.     Objective     Vital signs in last 24 hours:  Temp:  [36.6 °C (97.9 °F)-39.3 °C (102.8 °F)] 37.3 °C (99.1 °F)  Heart Rate:  [106-122] 120  Resp:  [16-18] 16  BP: (131-183)/() 183/82    Intake/Output this shift:  I/O this shift:  In: 611 [Blood:611]  Out: -     Exam  General Appearance: Alert, frail appearing   Lungs: lungs clear to ascultation bilaterally. respirations unlabored  Heart: tachycardic rate with regular rhythm   Chest wall: Right IJ port clean, dry, and intact  Abdomen: soft, nontender to palpation.   Extremities: no edema or calf tenderness    Labs  Results from last 7 days   Lab Units 03/22/23  0610 03/21/23  0645 03/20/23  0906   WBC K/uL 0.77* 1.02* 0.75*   HEMOGLOBIN g/dL 7.7* 5.2* 7.8*   HEMATOCRIT % 21.9* 14.2* 22.1*   PLATELETS K/uL 9* 18* 4*      Results from last 7 days   Lab Units 03/22/23  0610 03/21/23  0645 03/20/23  0906   SODIUM mEQ/L 132* 135* 133*   POTASSIUM mEQ/L 3.6 3.4* 4.3   CHLORIDE mEQ/L 103 105 102   CO2 mEQ/L 19* 20* 21*   BUN mg/dL 11 13 20   CREATININE mg/dL 0.8 0.9 1.2*   GLUCOSE mg/dL 72 81 98   CALCIUM mg/dL 8.1* 7.9* 8.7*         Microbiology Results     Procedure Component Value Units Date/Time    Urine culture Urine, Clean Catch [[id number]]  (Normal) Collected: 03/20/23 2105    Specimen: Urine, Clean Catch Updated: 03/21/23 2056     Urine Culture No Growth (Limit of detection 1000 cfu/mL)    Blood Culture Blood, Venous [[id number]]  (Normal) Collected: 03/20/23 1046    Specimen: Blood, Venous Updated: 03/22/23 1302     Culture No growth at 48 hours    Blood Culture Blood, Venous [[id number]]  (Normal) Collected: 03/20/23 1046     Specimen: Blood, Venous Updated: 03/22/23 1302     Culture No growth at 48 hours    SARS-CoV-2 (COVID-19), PCR Nasopharynx [[id number]]  (Normal) Collected: 03/20/23 0913    Specimen: Nasopharyngeal Swab from Nasopharynx Updated: 03/20/23 1039    Narrative:      The following orders were created for panel order SARS-CoV-2 (COVID-19), PCR Nasopharynx.  Procedure                               Abnormality         Status                     ---------                               -----------         ------                     SARS-COV-2 (COVID-19)/ F...[[id number]]  Normal              Final result                 Please view results for these tests on the individual orders.    Group A Strep by PCR, Throat Throat Swab [[id number]]  (Normal) Collected: 03/20/23 0913    Specimen: Throat Swab Updated: 03/20/23 1029     Strep A PCR, Throat Not Detected    SARS-COV-2 (COVID-19)/ FLU A/B, AND RSV, PCR Nasopharynx [[id number]]  (Normal) Collected: 03/20/23 0913    Specimen: Nasopharyngeal Swab from Nasopharynx Updated: 03/20/23 1039     SARS-CoV-2 (COVID-19) Negative     Influenza A Negative     Influenza B Negative     Respiratory Syncytial Virus Negative    Narrative:      Testing performed using real-time PCR for detection of COVID-19. EUA approved validation studies performed on site.     Group A Strep by PCR, Throat Throat Swab [[id number]]  (Normal) Collected: 03/06/23 0924    Specimen: Throat Swab Updated: 03/06/23 1051     Strep A PCR, Throat Not Detected          Assessment/Plan     Alondra Villela is a 32 y.o. female admitted for dehydration, neutropenic fever, and chemotherapy management of stage IV gestational trophoblastic neoplasia.      1. Severe Neutropenic Fever   1. Temperature on admission 102.5 F, ANC 0.375, tachycardic in 120-130s. Tmax 103.5 F at 1538 on 3/20/23. Most recent temperature afebrile at 99.1 F.   2. For daily CBC/CMP  3. Tylenol 975 mg q6 hours ordered as an antipyretic   4. Neutropenic  precautions in place   5. Blood cultures and UCx no growth to date   6. Continue LR on at 125 cc/hr  7. COVID, flu, RSV, and strep cultures both negative   8. CXR negative   9. Continue Cefepime and fluconazole 200 mg qd per ID. If she spikes another fever, consider re-culturing per ID. Appreciate recs.     2. GTN  a. For Cycle 5A EMA-EP - continue to hold   b. Vitals remarkable for tachycardia and febrile temperature as above  c. Beta-HCG on 124.2 on admission, continuing to trend down.    d. Continue supportive medications Tylenol, Zofran PRN, and Phenergen PRN nausea for patient request.      3. Thrombocytopenia  a. Platelets 4K on admission.   b. S/p transfusion of 2 units of platelets, Plts currently 9K,  c. Has previously been HLA matched to platelets as she previously did not respond well to platelet transfusion.   d. Hematology consultation placed, to be seen  e. Peripheral smear, LDH, and haptoglobin to be collected.      4. Hyperthyroidism   a. Hx thyroid storm.   b. Follows with Endocrinology Dr. Hodge.   c. Last TSH 0.88 on 3/20/23.   d. Stable not on medication.      5. Thrush/oral mucocutaneous ulcers  a. Seen by ENT who recommended viscous lidocaine. Continue magic mouthwash swish and spit.   b. IV fluids as above given inability to tolerate intake by mouth  c. Nutrition consult ordered, to be seen.      6. Depression  a. Stable, continue home medications Wellbutrin  mg daily and Propytiline 15 mg daily.   b. PRN ativan ordered.      7. Hx DVT in right UE  a. Taking Eliquis 5 mg PO BID until 3/7.   b. Eliquis was discontinued insetting of thrombocytopenia and episode of bleeding.   c. Given Plt 9k at this time will continue to hold Eliquis.   d. SCDs for VTE ppx.      8. Diet: Regular- nutrition consultation in place    Discussed with Dr. Mcmullen.     Kyleigh Hoang MD

## 2023-03-23 LAB
ABO + RH BLD: NORMAL
ALBUMIN SERPL-MCNC: 3 G/DL (ref 3.4–5)
ALP SERPL-CCNC: 62 IU/L (ref 35–126)
ALT SERPL-CCNC: 12 IU/L (ref 11–54)
ANION GAP SERPL CALC-SCNC: 12 MEQ/L (ref 3–15)
APTT PPP: 31 SEC (ref 23–35)
AST SERPL-CCNC: 9 IU/L (ref 15–41)
BASOPHILS # BLD: 0 K/UL (ref 0.01–0.1)
BASOPHILS # BLD: 0 K/UL (ref 0.01–0.1)
BASOPHILS NFR BLD: 0 %
BASOPHILS NFR BLD: 0 %
BILIRUB SERPL-MCNC: 1 MG/DL (ref 0.3–1.2)
BLD GP AB SCN SERPL QL: NEGATIVE
BUN SERPL-MCNC: 9 MG/DL (ref 8–20)
CALCIUM SERPL-MCNC: 8.1 MG/DL (ref 8.9–10.3)
CHLORIDE SERPL-SCNC: 103 MEQ/L (ref 98–109)
CO2 SERPL-SCNC: 19 MEQ/L (ref 22–32)
CREAT SERPL-MCNC: 0.8 MG/DL (ref 0.6–1.1)
D AG BLD QL: POSITIVE
DIFFERENTIAL METHOD BLD: ABNORMAL
DIFFERENTIAL METHOD BLD: ABNORMAL
EOSINOPHIL # BLD: 0 K/UL (ref 0.04–0.36)
EOSINOPHIL # BLD: 0 K/UL (ref 0.04–0.36)
EOSINOPHIL NFR BLD: 0 %
EOSINOPHIL NFR BLD: 0 %
ERYTHROCYTE [DISTWIDTH] IN BLOOD BY AUTOMATED COUNT: 13.2 % (ref 11.7–14.4)
ERYTHROCYTE [DISTWIDTH] IN BLOOD BY AUTOMATED COUNT: 13.5 % (ref 11.7–14.4)
FIBRINOGEN PPP-MCNC: 464 MG/DL (ref 220–480)
FOLATE SERPL-MCNC: >20 NG/ML
GFR SERPL CREATININE-BSD FRML MDRD: >60 ML/MIN/1.73M*2
GLUCOSE SERPL-MCNC: 73 MG/DL (ref 70–99)
HAPTOGLOB SERPL-MCNC: 224 MG/DL (ref 41–203)
HCT VFR BLDCO AUTO: 18.7 % (ref 35–45)
HCT VFR BLDCO AUTO: 23.8 % (ref 35–45)
HGB BLD-MCNC: 6.8 G/DL (ref 11.8–15.7)
HGB BLD-MCNC: 8.3 G/DL (ref 11.8–15.7)
INR PPP: 1.6
LABORATORY COMMENT REPORT: NORMAL
LYMPHOCYTES # BLD: 0.73 K/UL (ref 1.2–3.5)
LYMPHOCYTES # BLD: 0.89 K/UL (ref 1.2–3.5)
LYMPHOCYTES NFR BLD: 97 %
LYMPHOCYTES NFR BLD: 97 %
MAGNESIUM SERPL-MCNC: 1 MG/DL (ref 1.8–2.5)
MCH RBC QN AUTO: 28.3 PG (ref 28–33.2)
MCH RBC QN AUTO: 29.3 PG (ref 28–33.2)
MCHC RBC AUTO-ENTMCNC: 34.9 G/DL (ref 32.2–35.5)
MCHC RBC AUTO-ENTMCNC: 36.4 G/DL (ref 32.2–35.5)
MCV RBC AUTO: 80.6 FL (ref 83–98)
MCV RBC AUTO: 81.2 FL (ref 83–98)
MONOCYTES # BLD: 0 K/UL (ref 0.28–0.8)
MONOCYTES # BLD: 0 K/UL (ref 0.28–0.8)
MONOCYTES NFR BLD: 0 %
MONOCYTES NFR BLD: 0 %
NEUTS BAND # BLD: 0.02 K/UL (ref 1.7–7)
NEUTS BAND # BLD: 0.03 K/UL (ref 1.7–7)
NEUTS SEG NFR BLD: 3 %
NEUTS SEG NFR BLD: 3 %
OVALOCYTES BLD QL SMEAR: ABNORMAL
PATH REV BLD -IMP: NORMAL
PDW BLD AUTO: 10.6 FL (ref 9.4–12.3)
PDW BLD AUTO: 9.6 FL (ref 9.4–12.3)
PLAT MORPH BLD: NORMAL
PLAT MORPH BLD: NORMAL
PLATELET # BLD AUTO: 18 K/UL (ref 150–369)
PLATELET # BLD AUTO: 22 K/UL (ref 150–369)
PLATELET # BLD EST: ABNORMAL 10*3/UL
PLATELET # BLD EST: ABNORMAL 10*3/UL
POTASSIUM SERPL-SCNC: 3.2 MEQ/L (ref 3.6–5.1)
PROT SERPL-MCNC: 5.6 G/DL (ref 6–8.2)
PROTHROMBIN TIME: 19 SEC (ref 12.2–14.5)
RBC # BLD AUTO: 2.32 M/UL (ref 3.93–5.22)
RBC # BLD AUTO: 2.93 M/UL (ref 3.93–5.22)
SODIUM SERPL-SCNC: 134 MEQ/L (ref 136–144)
SPECIMEN EXP DATE BLD: NORMAL
VIT B12 SERPL-MCNC: 1497 PG/ML (ref 180–914)
WBC # BLD AUTO: 0.75 K/UL (ref 3.8–10.5)
WBC # BLD AUTO: 0.92 K/UL (ref 3.8–10.5)

## 2023-03-23 PROCEDURE — 63600000 HC DRUGS/DETAIL CODE: Mod: JG | Performed by: STUDENT IN AN ORGANIZED HEALTH CARE EDUCATION/TRAINING PROGRAM

## 2023-03-23 PROCEDURE — 25000000 HC PHARMACY GENERAL: Performed by: STUDENT IN AN ORGANIZED HEALTH CARE EDUCATION/TRAINING PROGRAM

## 2023-03-23 PROCEDURE — 99231 SBSQ HOSP IP/OBS SF/LOW 25: CPT | Performed by: STUDENT IN AN ORGANIZED HEALTH CARE EDUCATION/TRAINING PROGRAM

## 2023-03-23 PROCEDURE — 83735 ASSAY OF MAGNESIUM: CPT | Performed by: OBSTETRICS & GYNECOLOGY

## 2023-03-23 PROCEDURE — 85025 COMPLETE CBC W/AUTO DIFF WBC: CPT | Performed by: OBSTETRICS & GYNECOLOGY

## 2023-03-23 PROCEDURE — 63700000 HC SELF-ADMINISTRABLE DRUG: Performed by: OBSTETRICS & GYNECOLOGY

## 2023-03-23 PROCEDURE — 36415 COLL VENOUS BLD VENIPUNCTURE: CPT | Performed by: OBSTETRICS & GYNECOLOGY

## 2023-03-23 PROCEDURE — 86920 COMPATIBILITY TEST SPIN: CPT

## 2023-03-23 PROCEDURE — P9016 RBC LEUKOCYTES REDUCED: HCPCS

## 2023-03-23 PROCEDURE — 85730 THROMBOPLASTIN TIME PARTIAL: CPT | Performed by: STUDENT IN AN ORGANIZED HEALTH CARE EDUCATION/TRAINING PROGRAM

## 2023-03-23 PROCEDURE — 86901 BLOOD TYPING SEROLOGIC RH(D): CPT

## 2023-03-23 PROCEDURE — 63600000 HC DRUGS/DETAIL CODE: Performed by: OBSTETRICS & GYNECOLOGY

## 2023-03-23 PROCEDURE — 63700000 HC SELF-ADMINISTRABLE DRUG

## 2023-03-23 PROCEDURE — 63600000 HC DRUGS/DETAIL CODE

## 2023-03-23 PROCEDURE — 25800000 HC PHARMACY IV SOLUTIONS: Performed by: OBSTETRICS & GYNECOLOGY

## 2023-03-23 PROCEDURE — 80053 COMPREHEN METABOLIC PANEL: CPT | Performed by: OBSTETRICS & GYNECOLOGY

## 2023-03-23 PROCEDURE — 21400000 HC ROOM AND CARE CCU/INTERMEDIATE

## 2023-03-23 PROCEDURE — 85610 PROTHROMBIN TIME: CPT | Performed by: STUDENT IN AN ORGANIZED HEALTH CARE EDUCATION/TRAINING PROGRAM

## 2023-03-23 PROCEDURE — 85384 FIBRINOGEN ACTIVITY: CPT | Performed by: STUDENT IN AN ORGANIZED HEALTH CARE EDUCATION/TRAINING PROGRAM

## 2023-03-23 PROCEDURE — 63600000 HC DRUGS/DETAIL CODE: Performed by: STUDENT IN AN ORGANIZED HEALTH CARE EDUCATION/TRAINING PROGRAM

## 2023-03-23 RX ORDER — POTASSIUM CHLORIDE 14.9 MG/ML
20 INJECTION INTRAVENOUS AS NEEDED
Status: DISCONTINUED | OUTPATIENT
Start: 2023-03-23 | End: 2023-03-28 | Stop reason: HOSPADM

## 2023-03-23 RX ORDER — SODIUM CHLORIDE 9 MG/ML
5 INJECTION, SOLUTION INTRAVENOUS AS NEEDED
Status: ACTIVE | OUTPATIENT
Start: 2023-03-23 | End: 2023-03-24

## 2023-03-23 RX ORDER — POTASSIUM CHLORIDE 750 MG/1
40 TABLET, EXTENDED RELEASE ORAL AS NEEDED
Status: DISCONTINUED | OUTPATIENT
Start: 2023-03-23 | End: 2023-03-28 | Stop reason: HOSPADM

## 2023-03-23 RX ORDER — DIPHENHYDRAMINE HCL 50 MG/ML
25 VIAL (ML) INJECTION ONCE
Status: COMPLETED | OUTPATIENT
Start: 2023-03-23 | End: 2023-03-23

## 2023-03-23 RX ORDER — NIFEDIPINE 30 MG/1
30 TABLET, FILM COATED, EXTENDED RELEASE ORAL DAILY
Status: DISCONTINUED | OUTPATIENT
Start: 2023-03-23 | End: 2023-03-28 | Stop reason: HOSPADM

## 2023-03-23 RX ORDER — METOPROLOL TARTRATE 1 MG/ML
5 INJECTION, SOLUTION INTRAVENOUS ONCE
Status: DISCONTINUED | OUTPATIENT
Start: 2023-03-23 | End: 2023-03-24

## 2023-03-23 RX ORDER — POTASSIUM CHLORIDE 750 MG/1
20 TABLET, EXTENDED RELEASE ORAL AS NEEDED
Status: DISCONTINUED | OUTPATIENT
Start: 2023-03-23 | End: 2023-03-28 | Stop reason: HOSPADM

## 2023-03-23 RX ADMIN — NIFEDIPINE 30 MG: 30 TABLET, FILM COATED, EXTENDED RELEASE ORAL at 17:12

## 2023-03-23 RX ADMIN — SODIUM CHLORIDE, POTASSIUM CHLORIDE, SODIUM LACTATE AND CALCIUM CHLORIDE: 600; 310; 30; 20 INJECTION, SOLUTION INTRAVENOUS at 15:21

## 2023-03-23 RX ADMIN — FLUCONAZOLE 200 MG: 100 TABLET ORAL at 09:48

## 2023-03-23 RX ADMIN — BUPROPION HYDROCHLORIDE 150 MG: 150 TABLET, EXTENDED RELEASE ORAL at 21:50

## 2023-03-23 RX ADMIN — CEFEPIME 2 G: 2 INJECTION, POWDER, FOR SOLUTION INTRAVENOUS at 13:44

## 2023-03-23 RX ADMIN — PHENOL 1 SPRAY: 1.4 SPRAY ORAL at 21:48

## 2023-03-23 RX ADMIN — Medication 3 MG: at 21:50

## 2023-03-23 RX ADMIN — PANTOPRAZOLE SODIUM 40 MG: 40 INJECTION, POWDER, LYOPHILIZED, FOR SOLUTION INTRAVENOUS at 09:48

## 2023-03-23 RX ADMIN — MAGNESIUM SULFATE HEPTAHYDRATE 2 G: 40 INJECTION, SOLUTION INTRAVENOUS at 21:49

## 2023-03-23 RX ADMIN — DOCUSATE SODIUM 100 MG: 100 CAPSULE, LIQUID FILLED ORAL at 09:48

## 2023-03-23 RX ADMIN — SODIUM CHLORIDE, POTASSIUM CHLORIDE, SODIUM LACTATE AND CALCIUM CHLORIDE: 600; 310; 30; 20 INJECTION, SOLUTION INTRAVENOUS at 06:39

## 2023-03-23 RX ADMIN — FILGRASTIM-SNDZ 480 MCG: 480 INJECTION, SOLUTION INTRAVENOUS; SUBCUTANEOUS at 13:44

## 2023-03-23 RX ADMIN — CEFEPIME 2 G: 2 INJECTION, POWDER, FOR SOLUTION INTRAVENOUS at 04:57

## 2023-03-23 RX ADMIN — DOCUSATE SODIUM 100 MG: 100 CAPSULE, LIQUID FILLED ORAL at 21:50

## 2023-03-23 RX ADMIN — PROTRIPTYLINE HYDROCHLORIDE 15 MG: 5 TABLET, FILM COATED ORAL at 21:50

## 2023-03-23 RX ADMIN — CEFEPIME 2 G: 2 INJECTION, POWDER, FOR SOLUTION INTRAVENOUS at 20:15

## 2023-03-23 RX ADMIN — DIPHENHYDRAMINE HYDROCHLORIDE AND LIDOCAINE HYDROCHLORIDE AND ALUMINUM HYDROXIDE AND MAGNESIUM HYDRO 15 ML: KIT at 21:48

## 2023-03-23 RX ADMIN — DIPHENHYDRAMINE HYDROCHLORIDE 25 MG: 50 INJECTION INTRAMUSCULAR; INTRAVENOUS at 01:05

## 2023-03-23 RX ADMIN — DIPHENHYDRAMINE HYDROCHLORIDE AND LIDOCAINE HYDROCHLORIDE AND ALUMINUM HYDROXIDE AND MAGNESIUM HYDRO 15 ML: KIT at 09:30

## 2023-03-23 NOTE — PLAN OF CARE
Care Coordination Discharge Plan Note   Date: 3/23/2023    Time: 3:55 PM    Patient Name: Alondra Villela  Medical Record Number: [id number]  YOB: 1990  Room/BED: 0262    Discharge Assessment  Current Discharge Risk: chronically ill  Concerns to be Addressed: care coordination/care conferences, discharge planning  Anticipated Changes Related to Illness: none    Concerns Comments: Pt admitted with Neutropenic fever, Dehydration and Stage IV Gestational Trophoblastic Neoplasia. No medical update. Care Coordination will continue to follow for transition of care needs until discharge is complete.    Anticipated Discharge Plan  Anticipated Discharge Disposition: home with assistance  Patient/Family Anticipates Transition to: home with family  Patient/Family Anticipated Services at Transition: none      Patient Choice            Discharge Transportation   Does the patient need discharge transport arranged?: No        Discharge Barriers   Barriers to Discharge: Medical issues not resolved      Continued Care and Services - Admitted Since 3/20/2023    Coordination has not been started for this encounter.

## 2023-03-23 NOTE — PROGRESS NOTES
"Infectious Disease Progress Note    Patient Name: Alondra Villela  MR#: [id number]  : 1990  Admission Date: 3/20/2023  Date: 23   Time: 5:21 PM   Author: Awa Terrell MD      Antibiotics:    Anti-infectives (From admission, onward)    Start     Dose/Rate Route Frequency Ordered Stop    23 0900  fluconazole (DIFLUCAN) tablet 200 mg         200 mg oral Daily 23 1143      23 0945  ceFEPIme (MAXIPIME) 2 g in 100 mL NSS vial in bag         2 g  200 mL/hr over 30 Minutes intravenous Every 8 hours interval 23 0854            Subjective     Feels better. No headache.  No abdominal pain.  No SOB. No cough.  Less soreness of oral mucosa.  No chills.  Fever curve down.  Still neutropenic.  No rash.      Objective     Vital Signs:    Visit Vitals  BP (!) 155/92   Pulse (!) 110   Temp 37.8 °C (100 °F) (Temporal) Comment: Gabrielle aware of tempterature   Resp 16   Ht 1.626 m (5' 4\")   Wt 73.7 kg (162 lb 8 oz)   SpO2 99%   BMI 27.89 kg/m²       Temp (72hrs), Av.5 °C (99.5 °F), Min:36.4 °C (97.5 °F), Max:39.3 °C (102.8 °F)      Physical Exam:    General: non toxic,alopecia  HEENT: normocephalic, EOMI, pupils reactive, anicteric sclera, mucositis stable, no exudates, positive improved thrush   Neck: supple, no tenderness  Cardiovascular: regular S1/S2 , no obvious stigmata of endocarditis  Respiratory: Clear to auscultation bilaterally, no wheezing, no rhonchi, no crackles  GI/Abdomen: soft, no distention, normal bowel sounds, non-tender, no masses, no hepatomegaly or splenomegaly, no peritoneal signs.  Extremities: no  edema, good perfusion of extremities, radial pulses and distal pulses palpable.  MSK/JOINTS:  No swelling, no erythema, no pain. No spine tenderness.  Neurology and psych: no focal deficits, cooperative with exam, no meningeal signs  Skin:lines sites no erythema, swelling or pain and with dressings that are clean, dry and intact  G.U.:  No CVA tenderness or " suprapubic pain     Port no tenderness or redness    .  Lines, Drains, Airways, Wounds:  Single Lumen Implantable Port Right Chest (Active)   Number of days:        Peripheral IV (Adult) 03/20/23 Anterior;Left;Upper Arm (Active)   Number of days: 3       Labs:    CBC Results       03/23/23 03/22/23 03/22/23     0621 2327 0610    WBC 0.75 0.92 0.77    RBC 2.93 2.32 2.68    HGB 8.3 6.8 7.7    HCT 23.8 18.7 21.9    MCV 81.2 80.6 81.7    MCH 28.3 29.3 28.7    MCHC 34.9 36.4 35.2    PLT 18 22 9         Comment for WBC at 0621 on 03/23/23: This result has been called to ULISSES WORKMAN by Little Pearce on 03 23 23 at 07:11, and has been read back.     Comment for WBC at 2327 on 03/22/23: This result has been called to AUGUSTA HUANG by Little Pearce on 03 23 23 at 00:09, and has been read back.     Comment for WBC at 0610 on 03/22/23: This result has been called to TIFFANY WOODSON by Lara Alegria on 03 22 23 at 07:53, and has been read back.     Comment for HGB at 2327 on 03/22/23: This result has been called to AUGUSTA HUANG by Little Pearce on 03 23 23 at 00:09, and has been read back.     Comment for PLT at 0621 on 03/23/23: This result has been called to ULISSES WORKMAN by Little Pearce on 03 23 23 at 07:11, and has been read back.     Comment for PLT at 2327 on 03/22/23: This result has been called to AUGUSTA HUANG by Little Pearce on 03 23 23 at 00:09, and has been read back.     Comment for PLT at 0610 on 03/22/23: RESULTS CHECKED. This result has been called to TIFFANY WOODSON by Lara Alegria on 03 22 23 at 07:53, and has been read back.           CMP Results       03/23/23 03/22/23 03/21/23     0621 0610 0645     132 135    K 3.2 3.6 3.4    Cl 103 103 105    CO2 19 19 20    Glucose 73 72 81    BUN 9 11 13    Creatinine 0.8 0.8 0.9    Calcium 8.1 8.1 7.9    Anion Gap 12 10 10    AST 9 9 9    ALT 12 12 10    Albumin 3.0 2.9 3.0    EGFR >60.0 >60.0 >60.0                        MICRO: Reviewed  Microbiology Results      Procedure Component Value Units Date/Time    Urine culture Urine, Clean Catch [[id number]]  (Normal) Collected: 03/20/23 2105    Specimen: Urine, Clean Catch Updated: 03/21/23 2056     Urine Culture No Growth (Limit of detection 1000 cfu/mL)    Blood Culture Blood, Venous [[id number]]  (Normal) Collected: 03/20/23 1046    Specimen: Blood, Venous Updated: 03/23/23 1302     Culture No growth at 72 hours    Blood Culture Blood, Venous [[id number]]  (Normal) Collected: 03/20/23 1046    Specimen: Blood, Venous Updated: 03/23/23 1302     Culture No growth at 72 hours    SARS-CoV-2 (COVID-19), PCR Nasopharynx [[id number]]  (Normal) Collected: 03/20/23 0913    Specimen: Nasopharyngeal Swab from Nasopharynx Updated: 03/20/23 1039    Narrative:      The following orders were created for panel order SARS-CoV-2 (COVID-19), PCR Nasopharynx.  Procedure                               Abnormality         Status                     ---------                               -----------         ------                     SARS-COV-2 (COVID-19)/ F...[[id number]]  Normal              Final result                 Please view results for these tests on the individual orders.    Group A Strep by PCR, Throat Throat Swab [[id number]]  (Normal) Collected: 03/20/23 0913    Specimen: Throat Swab Updated: 03/20/23 1029     Strep A PCR, Throat Not Detected    SARS-COV-2 (COVID-19)/ FLU A/B, AND RSV, PCR Nasopharynx [[id number]]  (Normal) Collected: 03/20/23 0913    Specimen: Nasopharyngeal Swab from Nasopharynx Updated: 03/20/23 1039     SARS-CoV-2 (COVID-19) Negative     Influenza A Negative     Influenza B Negative     Respiratory Syncytial Virus Negative    Narrative:      Testing performed using real-time PCR for detection of COVID-19. EUA approved validation studies performed on site.     Group A Strep by PCR, Throat Throat Swab [[id number]]  (Normal) Collected: 03/06/23 0924    Specimen: Throat Swab Updated: 03/06/23 1051     Strep A PCR, Throat Not  Detected                  Assessment   This is a 32 y.o. female   stage IV gestational trophoblastic neoplasia      • Neutropenic fever  • Profound neutropenia  • Oropharyngeal mucositis  • Thrush  • Thrombocytopenia                Plan     RECOMMENDATIONS:    • Continue IV Cefepime 2 g q 8 hours  • Continue Fluconazole to 200 mg po once a day  • Follow CBC with diff for ANC  • Close f/u platelets  • Neutropenic precautions  • Monitor fever curve  • Follow CXR tomorrow  • Follow LFTs with alkaline phosphatase tomorrow          Please call ID if any questions or concerns, if any new culture data that needs immediate attention, and/or  if any clinical deterioration.   Recommendations discussed and related to primary team      NOTE: Some or all of the note above was created with the use of dictation software, please note this dictation software can have anomalies in its ability to transcribe. Please contact me directly for anything that seems abnormal for clarification.

## 2023-03-23 NOTE — PROGRESS NOTES
R4    Vitals:    03/23/23 1448   BP: (!) 161/98   Pulse:    Resp:    Temp: 37.8 °C (100 °F)   SpO2:      BP now below threshold of 180 without acute intervention. Will defer lopressor at this time but begin Procardia XL 30mg as a long acting medication.     Discussed with Dr. Benedict Macias MD PGY4  Obstetrics and Gynecology  Beeper #[id number]

## 2023-03-23 NOTE — PROGRESS NOTES
R4:    Patient with elevated BP, 180/96 , will treat with Lopressor 5mg IV.    Dr. Mcmullen aware.    Lanette Washington MD  Ob/Gyn PGY 4

## 2023-03-23 NOTE — CONSULTS
Consult Note    Subjective     Alondra Villela is a 32 y.o. female who was admitted for Sepsis, due to unspecified organism, unspecified whether acute organ dysfunction present (CMS/HCC) [A41.9].    33 yo who presented on 3/20/23 with lightheadedness and mouth sores as well as increase in vaginal bleeding. Found to have neutropenic fever on cefepime in the setting of chemotherapy C4D10 today [id number] mg 100 mg.m2 , dactomycin and methotrexate 550 mg (rounded from 552 mg = 300 mg/m2 × 3/13 and etoposide, dactormycin 3/14. Started gcsf 3/21/23     History as below    Alondra had an uncomplicated repeat  section in 2022. She subsequently presented to [facility] 1/10/23 with dyspnea, tachycardia, and anemia. Her beta-hcG was >461,700 (on adjusted assay). CT 23 showed pulmonary and liver metastases. There was initial concern for brain metastases, however, this was ruled out on further imaging. WHO score 17.      PICC line was placed.   She received chemotherapy induction with EP on - and then -.   · C1 S/p Etoposide, Dactinomycin, and Methotrexate on 23-23. S/p Etoposide/ Cisplatin  23.   · C2  S/p Etoposide, Dactinomycin, and Methotrexate . S/p Etoposide/ Cisplatin .   · C3  S/p Etoposide, Dactinomycin, and Methotrexate . S/p Etoposide/ Cisplatin .   · C4 S/p etoposide, Dactomycin, and Methotrexate on 3/13. S/p etoposide/dactinomycin on 3/14.  - Plan for EMA-EP for 3 cycles past negative hcg levels.      Her PICC line was removed during isison due to an associated DVT and she had a port placed on 23. Alondra was taking Eliquis for treatment of right UE DVT. She discontinued the Eliquis on 3/7 in setting of thrombocytopenia and has remained off it.      Alondra was diagnosed with thyroid storm during initial 1/10 presentation; Endocrinology was consulted. Patient initially started on PTU which was transitioned to methimazole.  Methimazole and lopressor have since been discontinued.      Alondra has recently developed severe thrombocytopenia. She received a unit of platelets on 3/10, 5 units of platelets during 3/13-3/14 admission. She admits to bleeding from her gums with brushing her teeth. She denies any rashes, other bleeding, or current bruising. Admits to daily Filgrastim injections on days she does not receive chemotherapy. Platelets on 3/15 were 56k.       [id number] pending   Iron 107  Ferritin 873  ldh 115  hapto pending  retic 0.35  Path review of smear no evidence of schistocytes   Creatinine 0.8  Pt 19   ptt normal   inr  normal  Fibrinogen ordered   tbili 0.7  HLA Antibody SC for Transfusion Class I    Patient reports she has had some easy bleeding. Prior to chemo no history of pancytopenia.  and father at bedside, reviewed labs   Medical History:   Past Medical History:   Diagnosis Date   • Hx antineoplastic chemo        Surgical History:   Past Surgical History:   Procedure Laterality Date   •  SECTION      x2       Allergies: Patient has no known allergies.    Current Inpatient Medications   Medication Dose Route Frequency Provider Last Rate Last Admin   • acetaminophen (TYLENOL) tablet 975 mg  975 mg oral q6h PRN Lay Shipman DO   975 mg at 235   • alum-mag hydroxide-simeth (MAALOX) 200-200-20 mg/5 mL suspension 30 mL  30 mL oral q4h PRN Kyleigh Hoang MD       • benzonatate (TESSALON) capsule 100 mg  100 mg oral 3x daily PRN Kyleigh Hoang MD       • bisacodyL (DULCOLAX) 10 mg suppository 10 mg  10 mg rectal Daily PRN Kyleigh Hoang MD       • buPROPion XL (WELLBUTRIN XL) 24 hr ER tablet 150 mg  150 mg oral Nightly Kyleigh Hoang MD   150 mg at 23 2325   • ceFEPIme (MAXIPIME) 2 g in 100 mL NSS vial in bag  2 g intravenous q8h INT Artur Macias MD   Stopped at 23 0530   • glucose chewable tablet 16-32 g of dextrose  16-32 g of  dextrose oral PRN Kyleigh Hoang MD        Or   • dextrose 40 % oral gel 15-30 g of dextrose  15-30 g of dextrose oral PRN Kyleigh Hoang MD        Or   • glucagon (GLUCAGEN) injection 1 mg  1 mg intramuscular PRN Kyleigh Hoang MD        Or   • dextrose 50 % in water (D50) injection 12.5 g  25 mL intravenous PRN Kyleigh Hoang MD       • dibucaine (NUPERCAINAL) 1 % topical ointment   Topical TID Kyleigh Hoang MD   Given at 03/22/23 2043   • diphenhydrAMINE (BENADRYL) capsule 25 mg  25 mg oral q6h PRN Kyleigh Hoang MD        Or   • diphenhydrAMINE (BENADRYL) injection 25 mg  25 mg intravenous q6h PRN Kyleigh Hoang MD   25 mg at 03/22/23 2042   • docusate sodium (COLACE) capsule 100 mg  100 mg oral BID Kyleigh Hoang MD   100 mg at 03/22/23 2042   • filgrastim-sndz (ZARXIO) injection 480 mcg  5 mcg/kg (Dosing Weight) subcutaneous Daily Artur Macias MD   480 mcg at 03/22/23 1630   • fluconazole (DIFLUCAN) tablet 200 mg  200 mg oral Daily Amber Reeves MD       • lactated ringer's infusion   intravenous Continuous Kyleigh Hoang  mL/hr at 03/22/23 2221 New Bag at 03/22/23 2221   • lidocaine HCL (XYLOCAINE) 2 % viscous mucosal solution 10 mL  10 mL Mouth/Throat 4x daily PRN Kyleigh Hoang MD       • LORazepam (ATIVAN) tablet 1 mg  1 mg oral Nightly PRN Artur Macias MD       • magic mouthwash oral suspension (Benadryl-Maalox-Lidocaine)  15 mL Swish & Spit q4h PRN Kyleigh Hoang MD   15 mL at 03/22/23 0913   • melatonin ODT 3 mg  3 mg oral Nightly Artur Macias MD   3 mg at 03/22/23 2325   • ondansetron ODT (ZOFRAN-ODT) disintegrating tablet 4 mg  4 mg oral q8h PRN Kyleigh Hoang MD        Or   • ondansetron (ZOFRAN) injection 4 mg  4 mg intravenous q8h PRN Kyleigh Hoang MD       • pantoprazole (PROTONIX) injection  40 mg  40 mg intravenous q24h Lay Shipman DO   40 mg at 03/22/23 0953   • promethazine (PHENERGAN) 12.5 mg in sodium chloride 0.9 % 50 mL IVPB  12.5 mg intravenous q4h PRN Kyleigh Hoang MD       • protriptyline (VIVACTIL) tablet 15 mg  15 mg oral Nightly Kyleigh Hoang MD   15 mg at 03/22/23 2325   • senna (SENOKOT) tablet 1 tablet  1 tablet oral 2x daily PRN Kyleigh Hoang MD       • sodium chloride 0.9 % infusion  5 mL/hr intravenous PRN Artur Macias MD       • sodium chloride 0.9 % infusion  5 mL/hr intravenous PRN Artur Macias MD       • sodium chloride 0.9 % infusion  5 mL/hr intravenous PRN Lay Shipman DO       • witch hazeL (TUCKS) 50 % topical pads   Topical q1h PRN Kyleigh Hoang MD            Social History:   Social History     Socioeconomic History   • Marital status:    Tobacco Use   • Smoking status: Never   • Smokeless tobacco: Never   Vaping Use   • Vaping status: Never Used   Substance and Sexual Activity   • Alcohol use: Not Currently   • Drug use: Never   • Sexual activity: Not Currently     Social Determinants of Health     Food Insecurity: No Food Insecurity (3/20/2023)    Hunger Vital Sign    • Worried About Running Out of Food in the Last Year: Never true    • Ran Out of Food in the Last Year: Never true   Transportation Needs: No Transportation Needs (2/13/2023)    PRAPARE - Transportation    • Lack of Transportation (Medical): No    • Lack of Transportation (Non-Medical): No       Family History:   Family History   Problem Relation Age of Onset   • Hypertension Biological Mother    • Hypertension Biological Father        Review of Systems  All other systems reviewed and negative except as noted in the HPI.    Vital signs in last 24 hours:  Temp:  [36.4 °C (97.5 °F)-38.3 °C (101 °F)] 37.3 °C (99.2 °F)  Heart Rate:  [] 97  Resp:  [16-19] 16  BP: (111-183)/() 140/87    Objective     Physical Exam    Gen:   NAD  HEENT: no appreciable LAD, -pallor, -icterus  Cv: RRR,   ABD: soft, NTTP, +BS, no r/g  EXTREM: no edema, warm and well perfused  MSK: strength equal throughout  NEURO: AAO x3, no focal deficits   SKIN: in tact without lesions/rash          Assessment   32 y.o. female being consulted for thrombocytopenia in the setting of GTN C4D10 [id number] mg 100 mg.m2 , dactinomycin and methotrexate 550 mg (rounded from 552 mg = 300 mg/m2) on 3/13 and etoposide, dactinomycin 3/14 . Pancytopenia in the setting of chemotherapy and infection is likely etiology delayed jackie count and inappropriat eresponse to transfusions despite HLA matched platelet given. Would anticipate jackie at D10-12 then a response.     Not concerned for ttp at thsi time given no evidence of hemolysis on labs (normal tbili and ldh), will fu path review of smear and will also review. Retic is low indicating a suppressed marrow which would expected to be elevated in the setting of hemolysis/ttp. Will rule out DIC labs as below. Would not expect MTX toxicity given dose is lower (expect usually with high dose MTX).    - recommend cbc with diff daily   - maintain plt >20 if able given fevers. Patient found to have HLA Ab. Please only give HLA matched platelet that are irradiated (can get infusion reactions). Patient reports she has some rashes afterwards please give benadryl IV prior to transfusion. If this is truly platelet refractoriness will consider treatment with IVIG and/or ritux in this setting but data is limited on its effectiveness   - please continue with gcsf until ANC >1000 for 2 days or 1500 x1 day.      [id number] pending   Iron 107  Ferritin 873  ldh 115  hapto pending  retic 0.35  Path review of smear no schistocytes or blasts   Creatinine 0.8  b12 --ordered  Folate -- ordered   Pt I orderd  ptt inr normal   Fibrinogen normal    tbili 0.7  HLA Antibody SC for Transfusion Class I

## 2023-03-23 NOTE — PLAN OF CARE
Reported wt loss and ongoing poor PO intakes  Problem: Adult Inpatient Plan of Care  Goal: Plan of Care Review  Outcome: Progressing

## 2023-03-23 NOTE — PROGRESS NOTES
R2 GYN.     At bedside to assess patient for fever. Informed by RN patient febrile temperature 100.7F.     Vitals:    03/22/23 1800 03/22/23 1940 03/22/23 2045 03/22/23 2230   BP: 111/75 (!) 159/94     BP Location:  Left forearm     Patient Position:  Lying     Pulse: (!) 122 (!) 120     Resp:  18     Temp:  37.9 °C (100.3 °F) 37.9 °C (100.2 °F) (!) 38.2 °C (100.7 °F)   TempSrc:  Oral Oral Temporal   SpO2: 98% 99%     Weight:       Height:             Alondra denies current fevers/chills. She states since admission she feels stronger. Continues to have mouth sores. She denies current dizziness/lightheadedness, chest pain, shortness of breath, nausea, vomiting, abdominal pain. She admits to poor appetite. Denies recent bleeding. Had a headache earlier for which she received reglan/benadryl. Is interested in Tylenol at this time. Additionally she complains of swollen right forearm and hand, no associated pain/numbness/tingling.     Physical Exam.   General: Alert, frail appearing, in no acute distress  Heart: Regular rate and rhythm +S1S2  Lungs: Anterior lung fields CTA throughout, no wheezes/rhonchi/rales  Abdomen: +BS, soft, nontender throughout, no rebound/rigidity/guarding  Extremities: Right hand and forearm slightly swollen distal to IV, nontender with no induration/warmth/erythema. Lacy, papular rash right shoulder. No calf tenderness b/l.       Results from last 7 days   Lab Units 03/22/23  0610 03/21/23  0645 03/20/23  0906   WBC K/uL 0.77* 1.02* 0.75*   HEMOGLOBIN g/dL 7.7* 5.2* 7.8*   HEMATOCRIT % 21.9* 14.2* 22.1*   PLATELETS K/uL 9* 18* 4*   DIFF TYPE  Manu Manu Manu   NEUTROS PCT MAN % 1 7 6   LYMPHO PCT MAN % 96 88 85   MONO PCT MAN % 1 4 9   EOSINO PCT MAN % 2 1 0   BASOS PCT MAN % 0 0 0   SEGS ABS MAN K/uL 0.01* 0.07* 0.05*   LYMPHO ABS MAN K/uL 0.74* 0.90* 0.64*   MONO ABS MAN K/uL 0.01* 0.04* 0.07*   EOS ABS MAN K/uL 0.02* 0.01* 0.00*   BASOS ABS MAN K/uL 0.00* 0.00* 0.00*     CMP Results        03/22/23 03/21/23 03/20/23     0610 0645 0906     135 133    K 3.6 3.4 4.3    Cl 103 105 102    CO2 19 20 21    Glucose 72 81 98    BUN 11 13 20    Creatinine 0.8 0.9 1.2    Calcium 8.1 7.9 8.7    Anion Gap 10 10 10    AST 9 9 9    ALT 12 10 12    Albumin 2.9 3.0 3.7    EGFR >60.0 >60.0 51.7         Comment for K at 0906 on 03/20/23: Results obtained on plasma. Plasma Potassium values may be up to 0.4 mEQ/L less than serum values. The differences may be greater for patients with high platelet or white cell counts.        Microbiology Results (last 3 days)     Procedure Component Value - Date/Time    Urine culture Urine, Clean Catch [[id number]]  (Normal) Collected: 03/20/23 2105    Lab Status: Final result Specimen: Urine, Clean Catch Updated: 03/21/23 2056     Urine Culture No Growth (Limit of detection 1000 cfu/mL)    Blood Culture Blood, Venous [[id number]]  (Normal) Collected: 03/20/23 1046    Lab Status: Preliminary result Specimen: Blood, Venous Updated: 03/22/23 1302     Culture No growth at 48 hours    Blood Culture Blood, Venous [[id number]]  (Normal) Collected: 03/20/23 1046    Lab Status: Preliminary result Specimen: Blood, Venous Updated: 03/22/23 1302     Culture No growth at 48 hours    SARS-CoV-2 (COVID-19), PCR Nasopharynx [[id number]]  (Normal) Collected: 03/20/23 0913    Lab Status: Final result Specimen: Nasopharyngeal Swab from Nasopharynx Updated: 03/20/23 1039    Narrative:      The following orders were created for panel order SARS-CoV-2 (COVID-19), PCR Nasopharynx.  Procedure                               Abnormality         Status                     ---------                               -----------         ------                     SARS-COV-2 (COVID-19)/ F...[[id number]]  Normal              Final result                 Please view results for these tests on the individual orders.    Group A Strep by PCR, Throat Throat Swab [[id number]]  (Normal) Collected: 03/20/23 0913    Lab Status:  Final result Specimen: Throat Swab Updated: 03/20/23 1029     Strep A PCR, Throat Not Detected    SARS-COV-2 (COVID-19)/ FLU A/B, AND RSV, PCR Nasopharynx [[id number]]  (Normal) Collected: 03/20/23 0913    Lab Status: Final result Specimen: Nasopharyngeal Swab from Nasopharynx Updated: 03/20/23 1039     SARS-CoV-2 (COVID-19) Negative     Influenza A Negative     Influenza B Negative     Respiratory Syncytial Virus Negative    Narrative:      Testing performed using real-time PCR for detection of COVID-19. EUA approved validation studies performed on site.         Alondra Villela is a 31yo female admitted for management of neutropenic fever in setting of stage IV gestational trophoblastic neoplasia.     Newly febrile. Alondra is agreeable to blood cultures x2 at this time. Cultures thus far NGTD. IV team at bedside to obtain blood cultures. Will administer PO Tylenol. Right LE edema overall reassuring on exam, likely due to PIV. Warm compresses offered and declined by patient. Will continue monitor closely. Alondra remains on IV Cefepime 2g q8hr and Fluconazole 200 mg PO daily per ID recommendations.     Lay Shipman DO PGY2  Obstetrics & Gynecology   Pager #[phone]

## 2023-03-23 NOTE — CONSULTS
"Nutrition Assessment    Recommendations  1. Regular diet  2. Boost plus TID (+ cup of ice)  3. Daily MVI + minerals  4. Obtain standing scale weight       Clinical Course: Patient is a 32 y.o. female who was admitted on 3/20/2023 with a diagnosis of Sepsis, due to unspecified organism, unspecified whether acute organ dysfunction present (CMS/[facility]) [A41.9].     Past Medical History:   Diagnosis Date   • Hx antineoplastic chemo      Past Surgical History:   Procedure Laterality Date   •  SECTION      x2     Reason for Assessment  Reason For Assessment: physician consult     [facility] Nutrition Screen Tool  Has patient lost weight without trying?: 0-->No  Has patient been eating poorly due to decreased appetite?: 1-->Yes  MST Nutrition Screen Score: 1     Physical Findings  Last Bowel Movement: 23  Skin: intact     RETS18 Physical Appearance  Last Bowel Movement: 23  Skin: intact     Nutrition Order  Nutrition Order: meets nutritional requirements  Nutrition Order Comments: regular     Anthropometrics  Height: 162.6 cm (5' 4\")     Current Weight  Weight Method: Bed scale  Weight: 73.7 kg (162 lb 8 oz)     Ideal Body Weight (IBW)  Ideal Body Weight (IBW) (kg): 55  % Ideal Body Weight: 134.01     Body Mass Index (BMI)  BMI (Calculated): 27.9     Labs/Procedures/Meds  Lab Results Reviewed: reviewed, pertinent     [email or web address] Lab Results  Lab Results Reviewed: reviewed, pertinent   BMP Results       23     0621 0610 0645     132 135    K 3.2 3.6 3.4    Cl 103 103 105    CO2 19 19 20    Glucose 73 72 81    BUN 9 11 13    Creatinine 0.8 0.8 0.9    Calcium 8.1 8.1 7.9    Anion Gap 12 10 10    EGFR >60.0 >60.0 >60.0         Medications  Pertinent Medications Reviewed: reviewed, pertinent   Scheduled Meds:  • buPROPion XL  150 mg oral Nightly   • cefepime  2 g intravenous q8h INT   • dibucaine   Topical TID   • docusate sodium  100 mg oral BID   • filgrastim (biosilimar)  5 mcg/kg (Dosing " "Weight) subcutaneous Daily   • fluconazole  200 mg oral Daily   • melatonin  3 mg oral Nightly   • pantoprazole  40 mg intravenous q24h   • protriptyline  15 mg oral Nightly     Continuous Infusions:  • lactated ringer's   125 mL/hr at 23 0639     Estimated/Assessed Needs  Additional Documentation: Protein Requirements (Group)     Calorie Requirements  Estimated kCal Needs: Actual Body Weight  Estimated Calorie Need Method: kcal/kg  Calorie/kg Recommended: 25-30  Calorie Recommendations: 4599-4617 kcal     Protein Requirements  Recommended Dosing Weight (Estimated Protein Needs): Actual Body Weight  Est Protein Requirement Amount (gms/kg):  (1.2-1.5 g/kg)  Protein Recommendations:  g     Fluid requirements 2200ml (30 ml/kg ABW)    Dosing weight ABW 73.7kg    Clinical comments: Consult received for poor PO intakes. Pt with mouth sores and loss of appetite impacting PO intakes, magic mouthwash ordered PRN. Pt  at bedside, participated in interview. Mouthwash with mild improvement, helping pt tolerate meds but not food. Cold foods better tolerated (water ice); ordered yogurt and pudding for breakfast, has not yet tried. Hasn't tried supplements PTA. Reports minimal PO intake x 5 days, decreased appetite since before that. Reports 35# wt loss in 2 months. Per wt hx in EMR, wt is down 10# (5.8%) in 2 months, outside parameters for malnutrition. Pt also with apparent 33# (17%) in 6 months, significant, however noted \"s/p  section in 2022.\" NKFA. High calorie/protein diet reviewed and education materials attached to discharge instructions. Pt agreeable to chocolate boost over ice at meals    Goals: meet > 75% needs via PO intakes  Monitor: PO intakes, skin, labs, plan of care    Recommendations: See above     PES  Statement: PES Statement  Nutrition Diagnosis: Inadequate Energy Intake  Related To:: Decreased ability to consume sufficient energy  As Evidenced By:: reported 35# wt loss in 2 " months  Nutritional Needs Met?: No                                   Date: 03/23/23  Signature: DEEPA James

## 2023-03-23 NOTE — PROGRESS NOTES
R2 GYN.     CBC resulted, diff pending. Hgb 6.8. Will transfuse 1U PRBC.     CBC Results       03/22/23 03/22/23 03/21/23     2327 0610 0645    WBC 0.92 0.77 1.02    RBC 2.32 2.68 1.84    HGB 6.8 7.7 5.2    HCT 18.7 21.9 14.2    MCV 80.6 81.7 77.2    MCH 29.3 28.7 28.3    MCHC 36.4 35.2 36.6    PLT 22 9 18         Comment for WBC at 2327 on 03/22/23: This result has been called to AUGUSTA HUANG by Little Pearce on 03 23 23 at 00:09, and has been read back.     Comment for WBC at 0610 on 03/22/23: This result has been called to TIFFANY WOODSON by Lara Alegria on 03 22 23 at 07:53, and has been read back.     Comment for WBC at 0645 on 03/21/23: This result has been called to JACQUELINE BARBOSA by Lara Alegria on 03 21 23 at 08:38, and has been read back.     Comment for HGB at 2327 on 03/22/23: This result has been called to AUGUSTA HUANG by Little Pearce on 03 23 23 at 00:09, and has been read back.     Comment for HGB at 0645 on 03/21/23: ALL RESULTS HAVE BEEN CHECKED. This result has been called to JACQUELINE BARBOSA by Lara Alegria on 03 21 23 at 08:38, and has been read back.     Comment for PLT at 2327 on 03/22/23: This result has been called to AUGUSTA HUANG by Little Pearce on 03 23 23 at 00:09, and has been read back.     Comment for PLT at 0610 on 03/22/23: RESULTS CHECKED. This result has been called to TIFFANY WOODSON by Lara Alegria on 03 22 23 at 07:53, and has been read back.     Comment for PLT at 0645 on 03/21/23: SPECIMEN QUALITY CHECKED. RESULTS OBTAINED AFTER VORTEXING TO ELIMINATE PLT CLUMPS. This result has been called to JACQUELINE BARBOSA by Lara Alegria on 03 21 23 at 08:38, and has been read back.         Lay [place], DO PGY2  Obstetrics & Gynecology   Pager #[phone]

## 2023-03-23 NOTE — PROGRESS NOTES
Gynecologic Oncology Progress Note    Subjective     Interval History: Alondra had a fever overnight of 100.7F. Blood cultures were collected.    Alondra reports feeling improved this morning. She denies fevers, chills, headache, nausea, vomiting, chest pain, shortness of breath, abdominal pain, calf tenderness or new swelling. Her vaginal bleeding has remained stable. Her biggest concern is the mouth/throat sores, which felt mildly improved yesterday with water ice.    Objective     Vital signs in last 24 hours:  Temp:  [36.4 °C (97.5 °F)-38.3 °C (101 °F)] 37.3 °C (99.2 °F)  Heart Rate:  [] 97  Resp:  [16-19] 16  BP: (111-183)/() 140/87    Intake/Output this shift:  I/O this shift:  In: 486.7 [Blood:386.7; IV Piggyback:100]  Out: -     Exam  General Appearance: Alert in no acute distress   Lungs: lungs clear to ascultation bilaterally. respirations unlabored  Heart: regular rate with regular rhythm   Chest wall: Right IJ port clean, dry, and intact  Abdomen: soft, nontender to palpation.   Extremities: no edema or calf tenderness    Labs    Results from last 7 days   Lab Units 03/22/23  2327   WBC K/uL 0.92*   HEMOGLOBIN g/dL 6.8*   HEMATOCRIT % 18.7*   PLATELETS K/uL 22*   DIFF TYPE  Manu   NEUTROS PCT MAN % 3   LYMPHO PCT MAN % 97   MONO PCT MAN % 0   EOSINO PCT MAN % 0   BASOS PCT MAN % 0   SEGS ABS MAN K/uL 0.03*   LYMPHO ABS MAN K/uL 0.89*   MONO ABS MAN K/uL 0.00*   EOS ABS MAN K/uL 0.00*   BASOS ABS MAN K/uL 0.00*     Results from last 7 days   Lab Units 03/22/23  2327 03/22/23  0610 03/21/23  0645   WBC K/uL 0.92* 0.77* 1.02*   HEMOGLOBIN g/dL 6.8* 7.7* 5.2*   HEMATOCRIT % 18.7* 21.9* 14.2*   PLATELETS K/uL 22* 9* 18*       Results from last 7 days   Lab Units 03/22/23  0610 03/21/23  0645 03/20/23  0906   SODIUM mEQ/L 132* 135* 133*   POTASSIUM mEQ/L 3.6 3.4* 4.3   CHLORIDE mEQ/L 103 105 102   CO2 mEQ/L 19* 20* 21*   BUN mg/dL 11 13 20   CREATININE mg/dL 0.8 0.9 1.2*   GLUCOSE mg/dL 72 81 98    CALCIUM mg/dL 8.1* 7.9* 8.7*        Microbiology Results     Procedure Component Value Units Date/Time    SARS-CoV-2 (COVID-19), PCR Nasopharynx [[id number]]  (Normal) Collected: 03/20/23 0913    Specimen: Nasopharyngeal Swab from Nasopharynx Updated: 03/20/23 1039    Narrative:      The following orders were created for panel order SARS-CoV-2 (COVID-19), PCR Nasopharynx.  Procedure                               Abnormality         Status                     ---------                               -----------         ------                     SARS-COV-2 (COVID-19)/ F...[[id number]]  Normal              Final result                 Please view results for these tests on the individual orders.    Group A Strep by PCR, Throat Throat Swab [[id number]]  (Normal) Collected: 03/20/23 0913    Specimen: Throat Swab Updated: 03/20/23 1029     Strep A PCR, Throat Not Detected    SARS-COV-2 (COVID-19)/ FLU A/B, AND RSV, PCR Nasopharynx [[id number]]  (Normal) Collected: 03/20/23 0913    Specimen: Nasopharyngeal Swab from Nasopharynx Updated: 03/20/23 1039     SARS-CoV-2 (COVID-19) Negative     Influenza A Negative     Influenza B Negative     Respiratory Syncytial Virus Negative    Narrative:      Testing performed using real-time PCR for detection of COVID-19. EUA approved validation studies performed on site.     Group A Strep by PCR, Throat Throat Swab [[id number]]  (Normal) Collected: 03/06/23 0924    Specimen: Throat Swab Updated: 03/06/23 1051     Strep A PCR, Throat Not Detected          Assessment/Plan     Alondra Villela is a 32 y.o. female admitted for dehydration, neutropenic fever, and chemotherapy management of stage IV gestational trophoblastic neoplasia.      1. Severe Neutropenic Fever   1. Temperature on admission 102.5 F, ANC 0.375, tachycardic in 120-130s. Tmax 103.5 F at 1538 on 3/20/23. Most recent temperature afebrile at 99.2 F.   2. Most recently, HR has improved to 97. Vitals are stable.  3. Tylenol 975 mg  q6 hours ordered as an antipyretic   4. Neutropenic precautions in place   5. Continue LR on at 125 cc/hr  6. COVID, flu, RSV, and strep cultures both negative. CXR negative   7. Blood cultures and UCx no growth to date from 3/20. Blood cultures x 2 collected overnight.   8. Repeat CBC with differential/CMP this morning   9. Continue Cefepime and fluconazole 200 mg qd per ID. Appreciate recs.  10. Continue zarxio 480 mcg daily     2. GTN  a. For Cycle 5A EMA-EP - will hold this admission.  b. Beta-HCG on 124.2 on admission, continuing to trend down.    c. Continue supportive medications Tylenol, Zofran PRN, and Phenergen PRN nausea for patient request.      3. Thrombocytopenia  a. Platelets 4K on admission.  b. S/p transfusion of 4 units of platelets. Plt now 22.     c. Has previously been HLA matched to platelets as she previously did not respond well to platelet transfusion.      4. Hyperthyroidism   a. Hx thyroid storm.   b. Follows with Endocrinology Dr. Hodge.   c. Last TSH 0.88 on 3/20/23.   d. Stable not on medication.      5. Thrush/oral mucocutaneous ulcers  a. Seen by ENT who recommended viscous lidocaine. Continue magic mouthwash swish and spit.   b. IV fluids as above given inability to tolerate intake by mouth  c. Nutrition consult placed and multiple pages sent to notify their team.      6. Depression  a. Stable, continue home medications Wellbutrin  mg daily and Propytiline 15 mg daily.      7. Hx DVT in right UE  a. Taking Eliquis 5 mg PO BID until 3/7.   b. Eliquis was discontinued insetting of thrombocytopenia and episode of bleeding.   c. Given Plt 4k at this time will continue to hold Eliquis.   d. SCDs for VTE ppx.      8. Diet: Regular    Will review with Dr. Benedict Reeves MD

## 2023-03-23 NOTE — PROGRESS NOTES
Gynecologic Oncology Progress Note    Subjective     Alondra looks well this afternoon.  She denies fevers, chills, headache, nausea, vomiting, chest pain, shortness of breath, abdominal pain, calf tenderness or new swelling. Her vaginal bleeding has remained stable. Her biggest concern is the mouth/throat sores, which feels improved.      Objective     Vital signs in last 24 hours:  Temp:  [36.4 °C (97.5 °F)-38.2 °C (100.7 °F)] 37.8 °C (100 °F)  Heart Rate:  [] 110  Resp:  [16-19] 16  BP: (111-192)/() 155/92    Exam  General Appearance: Alert in no acute distress   Lungs: Lungs clear to ascultation bilaterally. respirations unlabored  Heart: Tachycardic rate with regular rhythm   Chest wall: Right IJ port clean, dry, and intact  Abdomen: soft, nontender to palpation.   Extremities: no edema or calf tenderness; + SCDS    Labs  Results from last 7 days   Lab Units 03/23/23  0621 03/22/23  2327 03/22/23  0610   WBC K/uL 0.75* 0.92* 0.77*   HEMOGLOBIN g/dL 8.3* 6.8* 7.7*   HEMATOCRIT % 23.8* 18.7* 21.9*   PLATELETS K/uL 18* 22* 9*      Results from last 7 days   Lab Units 03/23/23  0621 03/22/23  0610 03/21/23  0645   SODIUM mEQ/L 134* 132* 135*   POTASSIUM mEQ/L 3.2* 3.6 3.4*   CHLORIDE mEQ/L 103 103 105   CO2 mEQ/L 19* 19* 20*   BUN mg/dL 9 11 13   CREATININE mg/dL 0.8 0.8 0.9   GLUCOSE mg/dL 73 72 81   CALCIUM mg/dL 8.1* 8.1* 7.9*        Assessment/Plan     Alondra Villela is a 32 y.o. female admitted for dehydration, neutropenic fever, and chemotherapy management of stage IV gestational trophoblastic neoplasia.      1. Severe Neutropenic Fever   1. Temperature on admission 102.5 F, ANC 0.375, tachycardic in 120-130s. Tmax 103.5 F at 1538 on 3/20/23. Most recent temperature afebrile at 99.2 F.   2. Most recently, heart rate is in the 90s-100s. Vitals are stable.  3. Tylenol 975 mg q6 hours ordered as an antipyretic   4. Neutropenic precautions in place   5. Continue LR on at 125 cc/hr  6. COVID, flu,  RSV, and strep cultures both negative. CXR negative   7. Blood cultures and UCx no growth to date from 3/20. Blood cultures x 2 collected on 3/23. Per ID, redraw blood cultures if temperature >101F.   8. Continue to trend CBC with differential/CMP/Mag daily   9. Continue Cefepime and fluconazole 200 mg qd per ID. Appreciate recs.  10. Continue zarxio 480 mcg daily until ANC >1000 for 2 days or 1500 > 1 day per hematology  11. Plan for repeat CXR in the morning per ID.     2. GTN  a. For Cycle 5A EMA-EP - will hold this admission  b. Beta-HCG on 124.2 on admission, continuing to trend down.    c. Continue supportive medications Tylenol, Zofran PRN, and Phenergen PRN nausea for patient request.      3. Thrombocytopenia  a. Platelets 4K on admission.  b. S/p transfusion of 4 units of platelets. Plt now 18.     c. Has previously been HLA matched to platelets as she previously did not respond well to platelet transfusion.   d. Per hematology, goal >20 given she has been febrile. Will await morning labs to assess for need for transfusion.  e. Can consider IV benadryl prior to transfusions to prevent a rash if needed     4. Hyperthyroidism   a. Hx thyroid storm.   b. Follows with Endocrinology Dr. Hodge.   c. Last TSH 0.88 on 3/20/23.   d. Stable not on medication.      5. Thrush/oral mucocutaneous ulcers  a. Seen by ENT who recommended viscous lidocaine. Continue magic mouthwash swish and spit.   b. IV fluids as above given inability to tolerate intake by mouth  c. S/p nutrition consult who recommended boost plus chocolate supplements. Patient will attempt tomorrow.     6. Depression  a. Stable, continue home medications Wellbutrin  mg daily and Propytiline 15 mg daily.      7. Hx DVT in right UE  a. Taking Eliquis 5 mg PO BID until 3/7.   b. Eliquis was discontinued insetting of thrombocytopenia and episode of bleeding.   c. Given Plt 4k at this time will continue to hold Eliquis.   d. SCDs for VTE ppx.      8. Diet:  Regular    9. HTN  a. BP in the 160s/180s systolic intermittently.   b. Will start procardia 30 mg XL daily      Reviewed with Dr. Benedict Reeves MD

## 2023-03-24 ENCOUNTER — APPOINTMENT (INPATIENT)
Dept: RADIOLOGY | Facility: HOSPITAL | Age: 33
DRG: 809 | End: 2023-03-24
Payer: COMMERCIAL

## 2023-03-24 LAB
ALBUMIN SERPL-MCNC: 3.2 G/DL (ref 3.4–5)
ALP SERPL-CCNC: 72 IU/L (ref 35–126)
ALT SERPL-CCNC: 20 IU/L (ref 11–54)
ANION GAP SERPL CALC-SCNC: 12 MEQ/L (ref 3–15)
ANISOCYTOSIS BLD QL SMEAR: ABNORMAL
AST SERPL-CCNC: 11 IU/L (ref 15–41)
BACTERIA BLD CULT: NORMAL
BACTERIA BLD CULT: NORMAL
BASOPHILS # BLD: 0 K/UL (ref 0.01–0.1)
BASOPHILS NFR BLD: 0 %
BILIRUB SERPL-MCNC: 0.9 MG/DL (ref 0.3–1.2)
BUN SERPL-MCNC: 6 MG/DL (ref 8–20)
CALCIUM SERPL-MCNC: 8.2 MG/DL (ref 8.9–10.3)
CHLORIDE SERPL-SCNC: 103 MEQ/L (ref 98–109)
CO2 SERPL-SCNC: 21 MEQ/L (ref 22–32)
CREAT SERPL-MCNC: 0.7 MG/DL (ref 0.6–1.1)
DACRYOCYTES BLD QL SMEAR: ABNORMAL
DIFFERENTIAL METHOD BLD: ABNORMAL
DOHLE BOD BLD QL SMEAR: ABNORMAL
EOSINOPHIL # BLD: 0 K/UL (ref 0.04–0.36)
EOSINOPHIL NFR BLD: 0 %
ERYTHROCYTE [DISTWIDTH] IN BLOOD BY AUTOMATED COUNT: 13.3 % (ref 11.7–14.4)
GFR SERPL CREATININE-BSD FRML MDRD: >60 ML/MIN/1.73M*2
GLUCOSE SERPL-MCNC: 97 MG/DL (ref 70–99)
HCT VFR BLDCO AUTO: 24.4 % (ref 35–45)
HGB BLD-MCNC: 8.8 G/DL (ref 11.8–15.7)
HYPOCHROMIA BLD QL SMEAR: ABNORMAL
LYMPHOCYTES # BLD: 0.83 K/UL (ref 1.2–3.5)
LYMPHOCYTES NFR BLD: 75 %
MAGNESIUM SERPL-MCNC: 1.9 MG/DL (ref 1.8–2.5)
MCH RBC QN AUTO: 28.5 PG (ref 28–33.2)
MCHC RBC AUTO-ENTMCNC: 36.1 G/DL (ref 32.2–35.5)
MCV RBC AUTO: 79 FL (ref 83–98)
MONOCYTES # BLD: 0.12 K/UL (ref 0.28–0.8)
MONOCYTES NFR BLD: 11 %
NEUTS BAND # BLD: 0.02 K/UL (ref 0–0.53)
NEUTS BAND # BLD: 0.13 K/UL (ref 1.7–7)
NEUTS BAND NFR BLD: 2 %
NEUTS SEG NFR BLD: 12 %
OVALOCYTES BLD QL SMEAR: ABNORMAL
PDW BLD AUTO: 10.6 FL (ref 9.4–12.3)
PLAT MORPH BLD: NORMAL
PLATELET # BLD AUTO: 11 K/UL (ref 150–369)
PLATELET # BLD EST: ABNORMAL 10*3/UL
POIKILOCYTOSIS BLD QL SMEAR: ABNORMAL
POTASSIUM SERPL-SCNC: 2.8 MEQ/L (ref 3.6–5.1)
PROT SERPL-MCNC: 5.9 G/DL (ref 6–8.2)
RBC # BLD AUTO: 3.09 M/UL (ref 3.93–5.22)
SCHISTOCYTES BLD QL SMEAR: ABNORMAL
SODIUM SERPL-SCNC: 136 MEQ/L (ref 136–144)
TOXIC GRANULES BLD QL SMEAR: ABNORMAL
WBC # BLD AUTO: 1.1 K/UL (ref 3.8–10.5)

## 2023-03-24 PROCEDURE — 63600000 HC DRUGS/DETAIL CODE

## 2023-03-24 PROCEDURE — P9073 PLATELETS PHERESIS PATH REDU: HCPCS

## 2023-03-24 PROCEDURE — 63700000 HC SELF-ADMINISTRABLE DRUG

## 2023-03-24 PROCEDURE — 21400000 HC ROOM AND CARE CCU/INTERMEDIATE

## 2023-03-24 PROCEDURE — 85025 COMPLETE CBC W/AUTO DIFF WBC: CPT | Performed by: OBSTETRICS & GYNECOLOGY

## 2023-03-24 PROCEDURE — 25000000 HC PHARMACY GENERAL: Performed by: STUDENT IN AN ORGANIZED HEALTH CARE EDUCATION/TRAINING PROGRAM

## 2023-03-24 PROCEDURE — 71046 X-RAY EXAM CHEST 2 VIEWS: CPT

## 2023-03-24 PROCEDURE — 36430 TRANSFUSION BLD/BLD COMPNT: CPT

## 2023-03-24 PROCEDURE — 36415 COLL VENOUS BLD VENIPUNCTURE: CPT | Performed by: OBSTETRICS & GYNECOLOGY

## 2023-03-24 PROCEDURE — 80053 COMPREHEN METABOLIC PANEL: CPT | Performed by: OBSTETRICS & GYNECOLOGY

## 2023-03-24 PROCEDURE — 83735 ASSAY OF MAGNESIUM: CPT

## 2023-03-24 PROCEDURE — 63600000 HC DRUGS/DETAIL CODE: Performed by: OBSTETRICS & GYNECOLOGY

## 2023-03-24 PROCEDURE — 63600000 HC DRUGS/DETAIL CODE: Mod: JG | Performed by: STUDENT IN AN ORGANIZED HEALTH CARE EDUCATION/TRAINING PROGRAM

## 2023-03-24 PROCEDURE — 63700000 HC SELF-ADMINISTRABLE DRUG: Performed by: OBSTETRICS & GYNECOLOGY

## 2023-03-24 PROCEDURE — 99232 SBSQ HOSP IP/OBS MODERATE 35: CPT | Performed by: STUDENT IN AN ORGANIZED HEALTH CARE EDUCATION/TRAINING PROGRAM

## 2023-03-24 PROCEDURE — 25800000 HC PHARMACY IV SOLUTIONS: Performed by: OBSTETRICS & GYNECOLOGY

## 2023-03-24 RX ORDER — POTASSIUM CHLORIDE 14.9 MG/ML
20 INJECTION INTRAVENOUS ONCE
Status: COMPLETED | OUTPATIENT
Start: 2023-03-24 | End: 2023-03-24

## 2023-03-24 RX ORDER — SODIUM CHLORIDE 9 MG/ML
5 INJECTION, SOLUTION INTRAVENOUS AS NEEDED
Status: ACTIVE | OUTPATIENT
Start: 2023-03-24 | End: 2023-03-25

## 2023-03-24 RX ADMIN — SODIUM CHLORIDE, POTASSIUM CHLORIDE, SODIUM LACTATE AND CALCIUM CHLORIDE: 600; 310; 30; 20 INJECTION, SOLUTION INTRAVENOUS at 11:47

## 2023-03-24 RX ADMIN — POTASSIUM CHLORIDE 20 MEQ: 14.9 INJECTION, SOLUTION INTRAVENOUS at 17:09

## 2023-03-24 RX ADMIN — CEFEPIME 2 G: 2 INJECTION, POWDER, FOR SOLUTION INTRAVENOUS at 12:34

## 2023-03-24 RX ADMIN — MAGNESIUM SULFATE HEPTAHYDRATE 2 G: 40 INJECTION, SOLUTION INTRAVENOUS at 06:48

## 2023-03-24 RX ADMIN — DOCUSATE SODIUM 100 MG: 100 CAPSULE, LIQUID FILLED ORAL at 10:30

## 2023-03-24 RX ADMIN — SODIUM CHLORIDE, POTASSIUM CHLORIDE, SODIUM LACTATE AND CALCIUM CHLORIDE: 600; 310; 30; 20 INJECTION, SOLUTION INTRAVENOUS at 23:00

## 2023-03-24 RX ADMIN — POTASSIUM CHLORIDE 20 MEQ: 14.9 INJECTION, SOLUTION INTRAVENOUS at 20:11

## 2023-03-24 RX ADMIN — POTASSIUM CHLORIDE 20 MEQ: 14.9 INJECTION, SOLUTION INTRAVENOUS at 13:25

## 2023-03-24 RX ADMIN — Medication 3 MG: at 21:10

## 2023-03-24 RX ADMIN — PANTOPRAZOLE SODIUM 40 MG: 40 INJECTION, POWDER, LYOPHILIZED, FOR SOLUTION INTRAVENOUS at 10:30

## 2023-03-24 RX ADMIN — CEFEPIME 2 G: 2 INJECTION, POWDER, FOR SOLUTION INTRAVENOUS at 06:13

## 2023-03-24 RX ADMIN — CEFEPIME 2 G: 2 INJECTION, POWDER, FOR SOLUTION INTRAVENOUS at 21:10

## 2023-03-24 RX ADMIN — FLUCONAZOLE 200 MG: 100 TABLET ORAL at 10:30

## 2023-03-24 RX ADMIN — NIFEDIPINE 30 MG: 30 TABLET, FILM COATED, EXTENDED RELEASE ORAL at 10:30

## 2023-03-24 RX ADMIN — POTASSIUM CHLORIDE 20 MEQ: 14.9 INJECTION, SOLUTION INTRAVENOUS at 21:30

## 2023-03-24 RX ADMIN — FILGRASTIM-SNDZ 480 MCG: 480 INJECTION, SOLUTION INTRAVENOUS; SUBCUTANEOUS at 10:30

## 2023-03-24 RX ADMIN — SODIUM CHLORIDE, POTASSIUM CHLORIDE, SODIUM LACTATE AND CALCIUM CHLORIDE: 600; 310; 30; 20 INJECTION, SOLUTION INTRAVENOUS at 00:19

## 2023-03-24 RX ADMIN — MAGNESIUM SULFATE HEPTAHYDRATE 2 G: 40 INJECTION, SOLUTION INTRAVENOUS at 17:09

## 2023-03-24 RX ADMIN — DIPHENHYDRAMINE HYDROCHLORIDE 25 MG: 50 INJECTION INTRAMUSCULAR; INTRAVENOUS at 13:43

## 2023-03-24 RX ADMIN — PROTRIPTYLINE HYDROCHLORIDE 15 MG: 5 TABLET, FILM COATED ORAL at 21:10

## 2023-03-24 RX ADMIN — BUPROPION HYDROCHLORIDE 150 MG: 150 TABLET, EXTENDED RELEASE ORAL at 21:10

## 2023-03-24 RX ADMIN — ONDANSETRON HYDROCHLORIDE 4 MG: 2 SOLUTION INTRAMUSCULAR; INTRAVENOUS at 17:09

## 2023-03-24 NOTE — PROGRESS NOTES
Gynecology Progress Note    Subjective     Interval History:     Continues to reports baseline mild headache, baseline nausea, and continued mouth sores/mouth pain. She denies fevers, chills, vomiting, chest pain, SOB, abdominal pain, calf pain/swelling, increase in vaginal bleeding, and any other sx or concerns at this time.     Objective     Vital signs in last 24 hours:  Temp:  [36.7 °C (98.1 °F)-38.1 °C (100.5 °F)] 36.7 °C (98.1 °F)  Heart Rate:  [100-120] 112  Resp:  [16-18] 16  BP: (116-192)/() 122/71     Exam  General Appearance: Alert, cooperative, no acute distress  Lungs: Clear to auscultation bilaterally, respirations unlabored  Heart: Regular rhythm with tachycardic rate.   Abdomen: soft, nontender to palpation. Bowel sounds present bilaterally.   Extremities: no edema or calf tenderness; + SCDs  Chest wall: Right IJ port clean, dry, and intact.     Labs  Results from last 7 days   Lab Units 03/23/23  0621 03/22/23 2327 03/22/23  0610   WBC K/uL 0.75* 0.92* 0.77*   HEMOGLOBIN g/dL 8.3* 6.8* 7.7*   HEMATOCRIT % 23.8* 18.7* 21.9*   PLATELETS K/uL 18* 22* 9*      Results from last 7 days   Lab Units 03/23/23  0621 03/22/23  0610 03/21/23  0645   SODIUM mEQ/L 134* 132* 135*   POTASSIUM mEQ/L 3.2* 3.6 3.4*   CHLORIDE mEQ/L 103 103 105   CO2 mEQ/L 19* 19* 20*   BUN mg/dL 9 11 13   CREATININE mg/dL 0.8 0.8 0.9   GLUCOSE mg/dL 73 72 81   CALCIUM mg/dL 8.1* 8.1* 7.9*      Magnesium   Date Value Ref Range Status   03/23/2023 1.0 (LL) 1.8 - 2.5 mg/dL Final     Microbiology Results     Procedure Component Value Units Date/Time    Blood Culture Blood, Venous [[id number]]  (Normal) Collected: 03/22/23 2327    Specimen: Blood, Venous Updated: 03/24/23 0300     Culture No growth at 18-24 hours    Blood Culture Blood, Venous [[id number]]  (Normal) Collected: 03/22/23 2327    Specimen: Blood, Venous Updated: 03/24/23 0300     Culture No growth at 18-24 hours    Urine culture Urine, Clean Catch [[id number]]  (Normal)  Collected: 03/20/23 2105    Specimen: Urine, Clean Catch Updated: 03/21/23 2056     Urine Culture No Growth (Limit of detection 1000 cfu/mL)    Blood Culture Blood, Venous [[id number]]  (Normal) Collected: 03/20/23 1046    Specimen: Blood, Venous Updated: 03/23/23 1302     Culture No growth at 72 hours    Blood Culture Blood, Venous [[id number]]  (Normal) Collected: 03/20/23 1046    Specimen: Blood, Venous Updated: 03/23/23 1302     Culture No growth at 72 hours    SARS-CoV-2 (COVID-19), PCR Nasopharynx [[id number]]  (Normal) Collected: 03/20/23 0913    Specimen: Nasopharyngeal Swab from Nasopharynx Updated: 03/20/23 1039    Narrative:      The following orders were created for panel order SARS-CoV-2 (COVID-19), PCR Nasopharynx.  Procedure                               Abnormality         Status                     ---------                               -----------         ------                     SARS-COV-2 (COVID-19)/ F...[[id number]]  Normal              Final result                 Please view results for these tests on the individual orders.    Group A Strep by PCR, Throat Throat Swab [[id number]]  (Normal) Collected: 03/20/23 0913    Specimen: Throat Swab Updated: 03/20/23 1029     Strep A PCR, Throat Not Detected    SARS-COV-2 (COVID-19)/ FLU A/B, AND RSV, PCR Nasopharynx [[id number]]  (Normal) Collected: 03/20/23 0913    Specimen: Nasopharyngeal Swab from Nasopharynx Updated: 03/20/23 1039     SARS-CoV-2 (COVID-19) Negative     Influenza A Negative     Influenza B Negative     Respiratory Syncytial Virus Negative    Narrative:      Testing performed using real-time PCR for detection of COVID-19. EUA approved validation studies performed on site.     Group A Strep by PCR, Throat Throat Swab [[id number]]  (Normal) Collected: 03/06/23 0924    Specimen: Throat Swab Updated: 03/06/23 1051     Strep A PCR, Throat Not Detected            Assessment/Plan     Alondra Villela is a 32 y.o. 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      1. Severe Neutropenic Fever   1. Temperature on admission 102.5 F, ANC 0.375, tachycardic in 120-130s. Tmax 103.5 F at 1538 on 3/20/23. Tlast 100.5 @ 0820 on 3/23. Most recent temperature afebrile at 98.1 F.   2. On exam HR in the 110s, otherwise vitals are stable. No longer hypertensive   3. Tylenol 975 mg q6 hours ordered as an antipyretic   4. Neutropenic precautions in place   5. Continue LR on at 125 cc/hr  6. COVID, flu, RSV, and strep cultures negative. CXR on 3/20 negative. For repeat CXR today per ID recommendation.    7. Blood cultures and UCx no growth to date from 3/20. Blood cultures x 2 collected on 3/23, no growth at 18-24 hours. Per ID, redraw blood cultures if temperature >101F.   8. Continue to trend CBC with differential/CMP/Mag daily   9. Continue Cefepime 2 g q8hr and fluconazole 200 mg qd per ID. Appreciate recs.  10. Continue zarxio 480 mcg daily until ANC >1000 for 2 days or 1500 > for 1 day per hematology     2. GTN  a. For Cycle 5A EMA-EP - will hold this admission  b. Beta-HCG on 124.2 on admission, continuing to trend down.    c. Continue supportive medications Tylenol, Zofran PRN, and Phenergen PRN nausea for patient request.      3. Thrombocytopenia  a. Platelets 4K on admission.  b. S/p transfusion of 4 units of platelets. Plt 18k on 3/23.     c. Has previously been HLA matched to platelets as she previously did not respond well to platelet transfusion.   d. Per hematology, goal >20 given she has been febrile. Will await morning labs to assess for need for transfusion. Appreciate hematology recs   e. Can consider IV benadryl prior to transfusions to prevent a rash if needed  f. [id number] pending.      4. Hypomagnesemia  1. Mg on 3/23/23 1.0  2. S/p 2g MgSO4, for additional 2g today  3. For repeat Mg level this AM, will follow up results.        5. Hyperthyroidism   a. Hx thyroid storm.    b. Follows with Endocrinology Dr. Hodge.   c. Last TSH 0.88 on 3/20/23.   d. Stable not on medication.      5. Thrush/oral mucocutaneous ulcers  a. Seen by ENT who recommended viscous lidocaine. Continue magic mouthwash swish and spit.   b. IV fluids as above given inability to tolerate intake by mouth  c. S/p nutrition consult who recommended boost plus chocolate supplements which is ordered.      6. Depression  a. Stable, continue home medications Wellbutrin  mg daily and Propytiline 15 mg daily.      7. Hx DVT in right UE  a. Taking Eliquis 5 mg PO BID until 3/7.   b. Eliquis was discontinued insetting of thrombocytopenia and episode of bleeding.   c. Given Plt 4k at this time will continue to hold Eliquis.   d. SCDs for VTE ppx.      8. Diet: Regular    To be discussed with Dr. Mcmullen.     Kyleigh Hoang MD

## 2023-03-24 NOTE — DISCHARGE SUMMARY
Inpatient Discharge Summary    BRIEF OVERVIEW  Admitting Provider:  H&P Notes 2/22/2023 to 3/24/2023         Date of Service Author Author Type Status Note Type File Time    03/20/23 0953 Kyleigh Hoang MD Resident Attested H&P 03/21/23 1054    03/12/23 2028 Lay Shipman DO Resident Signed H&P 03/12/23 2209    02/26/23 1952 Lay Shipman DO Resident Signed H&P 02/27/23 0729          Attending Provider: Shantelle Mcmullen MD Attending phys phone: [phone]  Primary Care Physician at Discharge: Sunil Hinton [phone]    Admission Date: 3/20/2023     Discharge Date: 3/28/2023    Primary Discharge Diagnosis  Sepsis, due to unspecified organism, unspecified whether acute organ dysfunction present (CMS/[facility])    Secondary Discharge Diagnosis  Active Hospital Problems    Diagnosis Date Noted   • Sepsis, due to unspecified organism, unspecified whether acute organ dysfunction present (CMS/[facility]) 03/20/2023      Resolved Hospital Problems   No resolved problems to display.       DETAILS OF HOSPITAL STAY    Operative Procedures Performed      Consults:   Consult Notes 2/22/2023 to 3/24/2023         Date of Service Author Author Type Status Note Type File Time    03/23/23 1028 Christina Capone LDN Registered Dietitian Signed Consults 03/23/23 1040    03/23/23 0556 Luis Carlos Holguin MD Fellow Attested Consults 03/23/23 1554    03/20/23 1445 Awa Terrell MD Physician Signed Consults 03/20/23 1744    03/20/23 1401 Bryanna Hernández DO Resident Cosign Needed Consults 03/20/23 1421        Consult Orders During Admission:  IP CONSULT TO INFECTIOUS DISEASE  IP CONSULT TO ENT  IP CONSULT TO NUTRITION SERVICES  IP CONSULT TO HEMATOLOGY     Imaging  X-RAY CHEST 2 VIEWS    Result Date: 3/24/2023  IMPRESSION: No acute disease in the chest. No consolidation to suggest pneumonia.    X-RAY CHEST 1 VIEW    Result Date: 3/20/2023  IMPRESSION: Findings as described above without evidence of an acute  cardiopulmonary process.       Presenting Problem/History of Present Illness  Severe neutropenic fever  Neutropenia       Exam on Day of Discharge  Patient seen and examined on day of discharge.    Hospital Course  Alondra Villela is a 32 year old female with known history of GTN currently receiving EMA-EP who was admitted on 3/20/23 for dehydration, fever, and severe neutropenia. She was diagnosed with neutropenic fever and started on cefepime 2g q8hr as well as IV fluids. She had blood cultures collected on 3/20/23 and 3/22/22, both of which are NGTD. She additionally had a CXR performed on 3/20 and 3/24, both of which were negative for infectious process. Her last temperature this admission was  3/20/23. Infectious disease was consulted who recommended the addition of fluconazole and she ultimately discontinued the cefepime after 7 days. In terms of her neutropenia she was started on daily Zarxio while inpatient. In terms of her thrombocytopenia her platelets on admission were 4k. She received 6 units of platelets this admission and her platelets on discharge were 28. Of note, during her admission she was started on procardia 30 mg XL secondary to persistently elevated blood pressures. During her admission ENT was consulted for her worsening mouth sores who recommended viscous lidocaine washes as well as nutrition who recommended boost supplementation. Dr. Carter was consulted who recommended discontinuing day two of the EMA-EP protocol.     Alondra received cycle 5 day 1 EMA-EP chemo on 3/27/2023 and tolerated this well. She was discharged home in stable condition on 3/28/2023.     Discharge Orders     Medication List      START taking these medications    NIFEdipine XL 30 mg 24 hr tablet  Commonly known as: PROCARDIA XL  Take 1 tablet (30 mg total) by mouth daily.  Dose: 30 mg        CONTINUE taking these medications    benzonatate 100 mg capsule  Commonly known as: TESSALON  Take 100 mg by mouth 3 (three)  times a day as needed for cough.  Dose: 100 mg     buPROPion  mg 24 hr tablet  Commonly known as: WELLBUTRIN XL  Take 150 mg by mouth nightly.  Dose: 150 mg     hydrocortisone 2.5 % cream  Apply 2 g topically as needed for irritation or rash.  Dose: 2 g     lidocaine-prilocaine cream  Commonly known as: EMLA  Apply topically as needed for mild pain. Apply a quarter size amount to skin over port site approximately one hour prior to chemotherapy. Cover with saran wrap.     magic mouthwash with nystatin  Commonly known as: BENADRYL-MAALOX-LIDOCAINE-NYSTATIN  Swish and spit 15 mL every 4 (four) hours as needed for stomatitis.  Dose: 15 mL     magnesium chloride 64 mg tablet,delayed release (DR/EC)  Take 1 tablet (64 mg total) by mouth 2 (two) times a day.  Dose: 64 mg     ondansetron ODT 4 mg disintegrating tablet  Commonly known as: ZOFRAN-ODT  Take 1-2 tablets (4-8 mg total) by mouth every 6 (six) hours as needed for nausea or vomiting (Nausea/Vomiting). Maximum 32mg per day.  Dose: 4-8 mg     promethazine 12.5 mg tablet  Commonly known as: PHENERGAN  Take 12.5 mg by mouth every 6 (six) hours as needed for nausea or vomiting.  Dose: 12.5 mg     protriptyline 5 mg tablet  Commonly known as: VIVACTIL  Take 15 mg by mouth nightly.  Dose: 15 mg     triamcinolone 0.1 % ointment  Commonly known as: KENALOG  Apply topically 2 times daily.     ZARXIO 480 mcg/0.8 mL syringe injection  Inject 0.8 mL (480 mcg total) under the skin See admin instr for 15 doses. Inject under the skin on days 3, 4, 5, 9, 10, 11 and 12 of chemotherapy cycle.  Dose: 480 mcg  Generic drug: [id number]-[id number]          Test Results Pending at Discharge  Unresulted Labs (From admission, onward)     Start     Ordered    03/26/23 0600  Type and screen  Every 72 hours      Question Answer Comment   Are you aware of this patient having previously identified antibodies? NO    Does this Patient have Sickle Cell Disease/Sickle Cell Anemia? NO    Release to  patient Immediate       Start Status   03/26/23 0600 Scheduled   03/29/23 0600 Scheduled   04/01/23 0600 Scheduled   04/04/23 0600 Scheduled       03/23/23 1615    03/24/23 1223  Prepare Single Donor Platelets (BLOOD BANK ORDER for PRODUCT): 1 Units Transfusion indications: Platelet count <10,000  (Adult Blood Administration - Platelets)  Blood - Once        Question Answer Comment   Transfusion indications Platelet count <10,000    Has consent been obtained? Yes    Platelet Special Requirements Other    Explanatory Comment: irradiated HLA matched       [facility].    03/24/23 1223    03/24/23 0600  CBC and differential  Daily      Question:  Release to patient  Answer:  Immediate   Start Status   03/25/23 0600 Scheduled   03/26/23 0600 Scheduled   03/27/23 0600 Scheduled       03/22/23 1706    03/24/23 0600  Magnesium  Daily      Question:  Release to patient  Answer:  Immediate   Start Status   03/25/23 0600 Scheduled   03/26/23 0600 Scheduled   03/27/23 0600 Scheduled       03/23/23 1543    03/23/23 0600  Comprehensive metabolic panel  Daily      Question:  Release to patient  Answer:  Immediate   Start Status   03/25/23 0600 Scheduled   03/26/23 0600 Scheduled   03/27/23 0600 Scheduled   03/28/23 0600 Scheduled   03/29/23 0600 Scheduled       03/22/23 1706    03/22/23 1733  [id number] Activity w/RFL  Once        Question:  Release to patient  Answer:  Immediate    03/22/23 1732    03/22/23 1241  Prepare Single Donor Platelets (BLOOD BANK ORDER for PRODUCT): 1 Units Transfusion indications: Platelet count <10,000  (Adult Blood Administration - Platelets)  Blood - Once        Question Answer Comment   Transfusion indications Platelet count <10,000    Has consent been obtained? Yes    Platelet Special Requirements Other    Explanatory Comment: HLA        03/22/23 1240              Code Status at Discharge: Full Code  Physician Order for Life-Sustaining Treatment Document Status       No documents found

## 2023-03-24 NOTE — PLAN OF CARE
Problem: Adult Inpatient Plan of Care  Goal: Plan of Care Review  Outcome: Progressing  Flowsheets (Taken 3/24/2023 1705)  Progress: improving  Plan of Care Reviewed With: patient  Outcome Summary: vitals stable throughout the shift. pt afebrile. 2 units of plt given and 80 of iv k ordered and started. mag rider started. pt given zofran for nausea. no complaints of pain, will continue to monitor  Goal: Patient-Specific Goal (Individualized)  Outcome: Progressing  Goal: Absence of Hospital-Acquired Illness or Injury  Outcome: Progressing  Goal: Optimal Comfort and Wellbeing  Outcome: Progressing  Goal: Readiness for Transition of Care  Outcome: Progressing   Plan of Care Review  Plan of Care Reviewed With: patient  Progress: improving  Outcome Summary: vitals stable throughout the shift. pt afebrile. 2 units of plt given and 80 of iv k ordered and started. mag rider started. pt given zofran for nausea. no complaints of pain, will continue to monitor

## 2023-03-24 NOTE — PLAN OF CARE
Care Coordination Discharge Plan Note   Date: 3/24/2023    Time: 6:04 PM    Patient Name: Alondra Villela  Medical Record Number: [id number]  YOB: 1990  Room/BED: 0262    Discharge Assessment  Current Discharge Risk: chronically ill  Concerns to be Addressed: care coordination/care conferences, discharge planning  Anticipated Changes Related to Illness: none    Concerns Comments: Per info in medical rounds today, pt not yet stable for d/c as pt admitted with neutropenic fever and stage iv gestational tropholastic neoplasia. PT seen by ID who reports that patient can switch to PO abx 7 days of fluconazole if cultures remain negative. Per GYN Onc patient still candidate for chemotherapy when neutropenia resolves. DANIEL unknown maybe next week. SW/CM will continue to follow for emotional support and dispo planning.    Anticipated Discharge Plan  Anticipated Discharge Disposition: home with assistance  Patient/Family Anticipates Transition to: home with family  Patient/Family Anticipated Services at Transition: none      Patient Choice  Offered/Gave Vendor List: no         Discharge Transportation   Does the patient need discharge transport arranged?: No        Discharge Barriers   Barriers to Discharge: Medical issues not resolved      Continued Care and Services - Admitted Since 3/20/2023    Coordination has not been started for this encounter.

## 2023-03-24 NOTE — PROGRESS NOTES
"Infectious Disease Progress Note    Patient Name: Alondra Villela  MR#: [id number]  : 1990  Admission Date: 3/20/2023  Date: 23   Time: 10:52 AM   Author: Awa Terrell MD      Antibiotics:    Anti-infectives (From admission, onward)    Start     Dose/Rate Route Frequency Ordered Stop    23 0900  fluconazole (DIFLUCAN) tablet 200 mg         200 mg oral Daily 23 1143      23 0945  ceFEPIme (MAXIPIME) 2 g in 100 mL NSS vial in bag         2 g  200 mL/hr over 30 Minutes intravenous Every 8 hours interval 23 0854            Subjective      Feels much better.  Afebrile now.  Mouth feels better.  No n/v/d/f/c.  No back pain.  No cough.  No SOB.      Objective     Vital Signs:    Visit Vitals  /71 (BP Location: Left forearm, Patient Position: Lying)   Pulse (!) 112   Temp 36.7 °C (98.1 °F) (Temporal)   Resp 16   Ht 1.626 m (5' 4\")   Wt 73.7 kg (162 lb 8 oz)   SpO2 98%   BMI 27.89 kg/m²       Temp (72hrs), Av.5 °C (99.5 °F), Min:36.4 °C (97.5 °F), Max:39.3 °C (102.8 °F)      Physical Exam:    General: non toxic,alopecia  HEENT: normocephalic, EOMI, pupils reactive, anicteric sclera, mucositis improved, no exudates, no thrush  Neck: supple, no tenderness  Cardiovascular: regular S1/S2   Respiratory: Clear to auscultation bilaterally, no wheezing, no rhonchi, no crackles  GI/Abdomen: soft, no distention, normal bowel sounds, non-tender  Extremities: no  edema, good perfusion of extremities, radial pulses and distal pulses palpable.  MSK/JOINTS:  No swelling, no erythema, no pain. No spine tenderness.  Neurology and psych: no focal deficits, cooperative with exam, no meningeal signs  Skin:lines sites no erythema, swelling or pain and with dressings that are clean, dry and intact  G.U.:  No CVA tenderness or suprapubic pain     Port no tenderness or redness      .  Lines, Drains, Airways, Wounds:  Single Lumen Implantable Port Right Chest (Active)   Number of days:      "   Peripheral IV (Adult) 03/20/23 Anterior;Left;Upper Arm (Active)   Number of days: 4       Labs:    3/24/23:  White count 1.10                  MICRO: Reviewed  Microbiology Results     Procedure Component Value Units Date/Time    Blood Culture Blood, Venous [[id number]]  (Normal) Collected: 03/22/23 2327    Specimen: Blood, Venous Updated: 03/24/23 0300     Culture No growth at 18-24 hours    Blood Culture Blood, Venous [[id number]]  (Normal) Collected: 03/22/23 2327    Specimen: Blood, Venous Updated: 03/24/23 0300     Culture No growth at 18-24 hours    Urine culture Urine, Clean Catch [[id number]]  (Normal) Collected: 03/20/23 2105    Specimen: Urine, Clean Catch Updated: 03/21/23 2056     Urine Culture No Growth (Limit of detection 1000 cfu/mL)    Blood Culture Blood, Venous [[id number]]  (Normal) Collected: 03/20/23 1046    Specimen: Blood, Venous Updated: 03/23/23 1302     Culture No growth at 72 hours    Blood Culture Blood, Venous [[id number]]  (Normal) Collected: 03/20/23 1046    Specimen: Blood, Venous Updated: 03/23/23 1302     Culture No growth at 72 hours    SARS-CoV-2 (COVID-19), PCR Nasopharynx [[id number]]  (Normal) Collected: 03/20/23 0913    Specimen: Nasopharyngeal Swab from Nasopharynx Updated: 03/20/23 1039    Narrative:      The following orders were created for panel order SARS-CoV-2 (COVID-19), PCR Nasopharynx.  Procedure                               Abnormality         Status                     ---------                               -----------         ------                     SARS-COV-2 (COVID-19)/ F...[[id number]]  Normal              Final result                 Please view results for these tests on the individual orders.    Group A Strep by PCR, Throat Throat Swab [[id number]]  (Normal) Collected: 03/20/23 0913    Specimen: Throat Swab Updated: 03/20/23 1029     Strep A PCR, Throat Not Detected    SARS-COV-2 (COVID-19)/ FLU A/B, AND RSV, PCR Nasopharynx [[id number]]  (Normal) Collected:  03/20/23 0913    Specimen: Nasopharyngeal Swab from Nasopharynx Updated: 03/20/23 1039     SARS-CoV-2 (COVID-19) Negative     Influenza A Negative     Influenza B Negative     Respiratory Syncytial Virus Negative    Narrative:      Testing performed using real-time PCR for detection of COVID-19. EUA approved validation studies performed on site.     Group A Strep by PCR, Throat Throat Swab [[id number]]  (Normal) Collected: 03/06/23 0924    Specimen: Throat Swab Updated: 03/06/23 1051     Strep A PCR, Throat Not Detected              Assessment   This is a 32 y.o. female stage IV gestational trophoblastic neoplasia      • Neutropenic fever resolved  • Profound neutropenia resolved today  • Leukopenia  • Oropharyngeal mucositis improved  • Thrush resolved  • Thrombocytopenia      Cefepime IV D # 5  Fluconazole D # 5                  Plan     RECOMMENDATIONS:    · If cultures remain negative, she remains afebrile and no longer neutropenic, then d/c the IV Cefepime  · Complete 7 days of fluconazole  · Replace electrolytes per primary team  · Monitor electrolytes, blood sugars, volume status    Please re-consult ID if needed.      Please call ID if any questions or concerns, if any new culture data that needs immediate attention, and/or  if any clinical deterioration.   Recommendations discussed and related to primary team.        NOTE: Some or all of the note above was created with the use of dictation software, please note this dictation software can have anomalies in its ability to transcribe. Please contact me directly for anything that seems abnormal for clarification.

## 2023-03-24 NOTE — PLAN OF CARE
Problem: Adult Inpatient Plan of Care  Goal: Patient-Specific Goal (Individualized)  Outcome: Progressing     Problem: Adult Inpatient Plan of Care  Goal: Plan of Care Review  Outcome: Progressing  Flowsheets (Taken 3/24/2023 0400)  Plan of Care Reviewed With: patient  Outcome Summary: vss throughout shift. remains afebrile. neutropenic precautions in place. sinus tach on the monitor on room air. LR cont at 125 ml/hr. iv abx. patient still c/o mouth sore. magic mouthwas and lido spray PRN adminstered per MAR. mg repleted.  at bedside throughout the night. hourly rounding maintained. call bell within reach.

## 2023-03-25 LAB
ALBUMIN SERPL-MCNC: 3 G/DL (ref 3.4–5)
ALP SERPL-CCNC: 72 IU/L (ref 35–126)
ALT SERPL-CCNC: 15 IU/L (ref 11–54)
ANION GAP SERPL CALC-SCNC: 9 MEQ/L (ref 3–15)
AST SERPL-CCNC: 10 IU/L (ref 15–41)
BASOPHILS # BLD: 0 K/UL (ref 0.01–0.1)
BASOPHILS NFR BLD: 0 %
BILIRUB SERPL-MCNC: 0.8 MG/DL (ref 0.3–1.2)
BUN SERPL-MCNC: 7 MG/DL (ref 8–20)
CALCIUM SERPL-MCNC: 8.1 MG/DL (ref 8.9–10.3)
CHLORIDE SERPL-SCNC: 106 MEQ/L (ref 98–109)
CO2 SERPL-SCNC: 21 MEQ/L (ref 22–32)
CREAT SERPL-MCNC: 0.7 MG/DL (ref 0.6–1.1)
DIFFERENTIAL METHOD BLD: ABNORMAL
DOHLE BOD BLD QL SMEAR: ABNORMAL
EOSINOPHIL # BLD: 0 K/UL (ref 0.04–0.36)
EOSINOPHIL NFR BLD: 0 %
ERYTHROCYTE [DISTWIDTH] IN BLOOD BY AUTOMATED COUNT: 13.1 % (ref 11.7–14.4)
GFR SERPL CREATININE-BSD FRML MDRD: >60 ML/MIN/1.73M*2
GLUCOSE SERPL-MCNC: 79 MG/DL (ref 70–99)
HCT VFR BLDCO AUTO: 22.3 % (ref 35–45)
HGB BLD-MCNC: 8 G/DL (ref 11.8–15.7)
LYMPHOCYTES # BLD: 0.95 K/UL (ref 1.2–3.5)
LYMPHOCYTES NFR BLD: 61 %
MAGNESIUM SERPL-MCNC: 1.4 MG/DL (ref 1.8–2.5)
MCH RBC QN AUTO: 28.8 PG (ref 28–33.2)
MCHC RBC AUTO-ENTMCNC: 35.9 G/DL (ref 32.2–35.5)
MCV RBC AUTO: 80.2 FL (ref 83–98)
MONOCYTES # BLD: 0.12 K/UL (ref 0.28–0.8)
MONOCYTES NFR BLD: 8 %
MYELOCYTES # BLD MANUAL: 0.03 K/UL
MYELOCYTES NFR BLD MANUAL: 2 %
NEUTS BAND # BLD: 0.03 K/UL (ref 0–0.53)
NEUTS BAND # BLD: 0.42 K/UL (ref 1.7–7)
NEUTS BAND NFR BLD: 2 %
NEUTS SEG NFR BLD: 27 %
OVALOCYTES BLD QL SMEAR: ABNORMAL
PDW BLD AUTO: 12.8 FL (ref 9.4–12.3)
PLAT MORPH BLD: NORMAL
PLATELET # BLD AUTO: 18 K/UL (ref 150–369)
PLATELET # BLD EST: ABNORMAL 10*3/UL
POIKILOCYTOSIS BLD QL SMEAR: ABNORMAL
POTASSIUM SERPL-SCNC: 3.5 MEQ/L (ref 3.6–5.1)
PROT SERPL-MCNC: 5.3 G/DL (ref 6–8.2)
RBC # BLD AUTO: 2.78 M/UL (ref 3.93–5.22)
SCHISTOCYTES BLD QL SMEAR: ABNORMAL
SODIUM SERPL-SCNC: 136 MEQ/L (ref 136–144)
TOXIC GRANULES BLD QL SMEAR: ABNORMAL
WBC # BLD AUTO: 1.56 K/UL (ref 3.8–10.5)

## 2023-03-25 PROCEDURE — 63600000 HC DRUGS/DETAIL CODE: Mod: JG | Performed by: STUDENT IN AN ORGANIZED HEALTH CARE EDUCATION/TRAINING PROGRAM

## 2023-03-25 PROCEDURE — 21400000 HC ROOM AND CARE CCU/INTERMEDIATE

## 2023-03-25 PROCEDURE — 25000000 HC PHARMACY GENERAL: Performed by: STUDENT IN AN ORGANIZED HEALTH CARE EDUCATION/TRAINING PROGRAM

## 2023-03-25 PROCEDURE — 63700000 HC SELF-ADMINISTRABLE DRUG

## 2023-03-25 PROCEDURE — 63600000 HC DRUGS/DETAIL CODE: Performed by: OBSTETRICS & GYNECOLOGY

## 2023-03-25 PROCEDURE — 83735 ASSAY OF MAGNESIUM: CPT

## 2023-03-25 PROCEDURE — 99231 SBSQ HOSP IP/OBS SF/LOW 25: CPT | Performed by: STUDENT IN AN ORGANIZED HEALTH CARE EDUCATION/TRAINING PROGRAM

## 2023-03-25 PROCEDURE — 80053 COMPREHEN METABOLIC PANEL: CPT | Performed by: OBSTETRICS & GYNECOLOGY

## 2023-03-25 PROCEDURE — 63700000 HC SELF-ADMINISTRABLE DRUG: Performed by: OBSTETRICS & GYNECOLOGY

## 2023-03-25 PROCEDURE — 85025 COMPLETE CBC W/AUTO DIFF WBC: CPT | Performed by: OBSTETRICS & GYNECOLOGY

## 2023-03-25 PROCEDURE — 63600000 HC DRUGS/DETAIL CODE

## 2023-03-25 PROCEDURE — 25800000 HC PHARMACY IV SOLUTIONS: Performed by: OBSTETRICS & GYNECOLOGY

## 2023-03-25 PROCEDURE — 36415 COLL VENOUS BLD VENIPUNCTURE: CPT | Performed by: OBSTETRICS & GYNECOLOGY

## 2023-03-25 RX ORDER — DIPHENHYDRAMINE HCL 50 MG/ML
25 VIAL (ML) INJECTION ONCE
Status: ACTIVE | OUTPATIENT
Start: 2023-03-25 | End: 2023-03-25

## 2023-03-25 RX ORDER — POTASSIUM CHLORIDE 14.9 MG/ML
20 INJECTION INTRAVENOUS ONCE
Status: ACTIVE | OUTPATIENT
Start: 2023-03-25 | End: 2023-03-25

## 2023-03-25 RX ORDER — SODIUM CHLORIDE 9 MG/ML
5 INJECTION, SOLUTION INTRAVENOUS AS NEEDED
Status: ACTIVE | OUTPATIENT
Start: 2023-03-25 | End: 2023-03-26

## 2023-03-25 RX ADMIN — FILGRASTIM-SNDZ 480 MCG: 480 INJECTION, SOLUTION INTRAVENOUS; SUBCUTANEOUS at 12:01

## 2023-03-25 RX ADMIN — NIFEDIPINE 30 MG: 30 TABLET, FILM COATED, EXTENDED RELEASE ORAL at 10:32

## 2023-03-25 RX ADMIN — ONDANSETRON HYDROCHLORIDE 4 MG: 2 SOLUTION INTRAMUSCULAR; INTRAVENOUS at 17:01

## 2023-03-25 RX ADMIN — POTASSIUM CHLORIDE 40 MEQ: 750 TABLET, EXTENDED RELEASE ORAL at 12:12

## 2023-03-25 RX ADMIN — SODIUM CHLORIDE, POTASSIUM CHLORIDE, SODIUM LACTATE AND CALCIUM CHLORIDE: 600; 310; 30; 20 INJECTION, SOLUTION INTRAVENOUS at 20:25

## 2023-03-25 RX ADMIN — CEFEPIME 2 G: 2 INJECTION, POWDER, FOR SOLUTION INTRAVENOUS at 05:43

## 2023-03-25 RX ADMIN — BUPROPION HYDROCHLORIDE 150 MG: 150 TABLET, EXTENDED RELEASE ORAL at 21:09

## 2023-03-25 RX ADMIN — SODIUM CHLORIDE, POTASSIUM CHLORIDE, SODIUM LACTATE AND CALCIUM CHLORIDE: 600; 310; 30; 20 INJECTION, SOLUTION INTRAVENOUS at 12:05

## 2023-03-25 RX ADMIN — PROTRIPTYLINE HYDROCHLORIDE 15 MG: 5 TABLET, FILM COATED ORAL at 21:08

## 2023-03-25 RX ADMIN — Medication 3 MG: at 21:09

## 2023-03-25 RX ADMIN — MAGNESIUM SULFATE HEPTAHYDRATE 2 G: 40 INJECTION, SOLUTION INTRAVENOUS at 15:19

## 2023-03-25 RX ADMIN — MAGNESIUM SULFATE HEPTAHYDRATE 2 G: 40 INJECTION, SOLUTION INTRAVENOUS at 12:03

## 2023-03-25 RX ADMIN — PANTOPRAZOLE SODIUM 40 MG: 40 INJECTION, POWDER, LYOPHILIZED, FOR SOLUTION INTRAVENOUS at 10:34

## 2023-03-25 RX ADMIN — DOCUSATE SODIUM 100 MG: 100 CAPSULE, LIQUID FILLED ORAL at 10:31

## 2023-03-25 RX ADMIN — FLUCONAZOLE 200 MG: 100 TABLET ORAL at 10:31

## 2023-03-25 NOTE — PROGRESS NOTES
Gynecology Progress Note    Events  No acute events overnight.  Moved to 3SW overnight . Wondering about plan for Zarxio given plan for chemotherapy on Monday  Pt denies Headache, CP, SOB, Nausea, Vomiting, Fevers, Chills or Diarrhea    Subjective   Subjective  Pain: No  Voiding: without difficulty  Diet: taking regular diet  Bowel: passing flatus  Ambulating: with assistance    Objective   Vitals  Temp:  [36.3 °C (97.3 °F)-37.3 °C (99.1 °F)] 36.8 °C (98.3 °F)  Heart Rate:  [100-130] 112  Resp:  [16-20] 18  BP: (121-145)/(68-91) 121/68    I&O    Intake/Output Summary (Last 24 hours) at 3/25/2023 0504  Last data filed at 3/24/2023 1800  Gross per 24 hour   Intake 2241.5 ml   Output --   Net 2241.5 ml       Labs  Results from last 7 days   Lab Units 03/24/23  1033 03/23/23  0621 03/22/23  2327   WBC K/uL 1.10* 0.75* 0.92*   HEMOGLOBIN g/dL 8.8* 8.3* 6.8*   HEMATOCRIT % 24.4* 23.8* 18.7*   PLATELETS K/uL 11* 18* 22*   DIFF TYPE  Manu Manu Manu   NEUTROS PCT MAN % 12 3 3   LYMPHO PCT MAN % 75 97 97   MONO PCT MAN % 11 0 0   EOSINO PCT MAN % 0 0 0   BASOS PCT MAN % 0 0 0   BANDS PCT MAN % 2  --   --    SEGS ABS MAN K/uL 0.13* 0.02* 0.03*   LYMPHO ABS MAN K/uL 0.83* 0.73* 0.89*   MONO ABS MAN K/uL 0.12* 0.00* 0.00*   EOS ABS MAN K/uL 0.00* 0.00* 0.00*   BASOS ABS MAN K/uL 0.00* 0.00* 0.00*       Results from last 7 days   Lab Units 03/24/23  1033 03/23/23  0621 03/22/23  0610   SODIUM mEQ/L 136 134* 132*   POTASSIUM mEQ/L 2.8* 3.2* 3.6   CHLORIDE mEQ/L 103 103 103   CO2 mEQ/L 21* 19* 19*   BUN mg/dL 6* 9 11   CREATININE mg/dL 0.7 0.8 0.8   GLUCOSE mg/dL 97 73 72   CALCIUM mg/dL 8.2* 8.1* 8.1*         Physical Exam  General: alert, cooperative, NAD  Heart: RRR, no murmurs, rubs, or gallops  Lungs: Clear to auscultation bilaterally, no wheezes, rales, or rhonchi  Abdomen: soft, nondistended, non-tender  Extremities: symmetric and no edema  Neuro: Grossly intact, no deficits noted.    Assessment/Plan   Problem-based  Assessment and Plan    Alondra Villela is a 32 y.o. , admitted with management of neutropenic fever and dehydration in the setting of stage IV GTN       1. Severe Neutropenic Fever   1. Temperature on admission 102.5 F, ANC 0.375, tachycardic in 120-130s. Tmax 103.5 F at 1538 on 3/20/23. Tlast 100.5 @ 0820 on 3/23. Most recently afebrile.  2. On exam HR in the 110s, otherwise vitals are stable. No longer hypertensive   3. Tylenol 975 mg q6 hours ordered as an antipyretic   4. Neutropenic precautions in place   5. Continue LR on at 125 cc/hr  6. COVID, flu, RSV, and strep cultures negative. CXR on 3/20 negative. Repeat CXR yesterday was negative  7. Blood cultures and UCx no growth to date from 3/20. Blood cultures x 2 collected on 3/23, no growth at 18-24 hours. Per ID, redraw blood cultures if temperature >101F.   8. Continue to trend CBC with differential/CMP/Mag daily   9. On day 6/7 of fluconazole.  Can likely discontinue cefepime today per infectious disease recommendations.    10. Continue zarxio 480 mcg daily until ANC >1000 for 2 days or 1500 > for 1 day per hematology     2. GTN  a. For Cycle 5A EMA-EP starting on Monday.  [facility] [place], will hold day 2 meds in an attempt to reduce toxicity  b. Beta-HCG on 124.2 on admission, continuing to trend down.    c. Continue supportive medications Tylenol, Zofran PRN, and Phenergen PRN nausea for patient request.      3. Thrombocytopenia  a. Platelets 4K on admission.  b. Plt 11k yesterday and was given 2 units of platelets for total of 6 during this admission.     c. Has previously been HLA matched to platelets as she previously did not respond well to platelet transfusion.   d. Per hematology, goal >20 given she has been febrile. Will await morning labs to assess for need for transfusion. Appreciate hematology recs   e. Can consider IV benadryl prior to transfusions to prevent a rash if needed  f. [id number] pending, unlikely TTP       4. Hypomagnesemia  1. Mg on 3/23/23 1.0  2. 1.9 yesterday  3. For repeat Mg level this AM, will follow up results.       5. Hyperthyroidism   a. Hx thyroid storm.   b. Follows with Endocrinology Dr. Hodge.   c. Last TSH 0.88 on 3/20/23.   d. Stable not on medication.      5. Thrush/oral mucocutaneous ulcers  a. Seen by ENT who recommended viscous lidocaine. Continue magic mouthwash swish and swallow.   b. IV fluids as above given inability to tolerate intake by mouth  c. S/p nutrition consult who recommended boost plus chocolate supplements which is ordered.      6. Depression  a. Stable, continue home medications Wellbutrin  mg daily and Propytiline 15 mg daily.      7. Hx DVT in right UE  a. Taking Eliquis 5 mg PO BID until 3/7.   b. Eliquis was discontinued insetting of thrombocytopenia and episode of bleeding.   c. Given thrombocytopenia at this time will continue to hold Eliquis.   d. SCDs for VTE ppx.      8. Diet: Regular     Artur Macias MD PGY4  Obstetrics and Gynecology  Beeper #[id number]

## 2023-03-25 NOTE — PLAN OF CARE
Plan of Care Review  Plan of Care Reviewed With: patient  Progress: no change  Outcome Summary: pt OOB to bathroom and around room without difficulty.  IV abx complete.  LR @ 125cc/hr infusing.  K and mg replaced.  pt with c/o of nausea and zofran given.  pt with c/o mouth sores, magic mouthwash used PRN.   and mom @ bedside.  pt ordered for platelets today, awaiting for arrival from [facility].  call bell within reach.

## 2023-03-25 NOTE — PLAN OF CARE
Plan of Care Review  Plan of Care Reviewed With: patient  Progress: no change  Outcome Summary: Pt arrived on unit this shift with family at bedside. Pt AAOX4 TARIQ on RA. Pt oriented to unit and educated on treatment plan. Pt denied pain. Pt vitals were stable. Pt up to Bsc with family assistance throughout the night. Pt currently has LR infusing. Meds and care were provided. Call bell and assistive devices are within reach. FSP maintained.

## 2023-03-25 NOTE — PROGRESS NOTES
Gynecologic Oncology Progress Note    Events  No acute events overnight.    Subjective     Denies Headache, CP, SOB, Nausea, Vomiting, Fevers, Chills or Diarrhea.  Reports her baseline vaginal bleeding which is stable.      Pain: No  Voiding: without difficulty  Diet: taking regular diet  Bowel: passing flatus  Ambulating: with assistance    Objective      Vitals  Temp:  [36.6 °C (97.9 °F)-36.9 °C (98.4 °F)] 36.7 °C (98 °F)  Heart Rate:  [101-108] 102  Resp:  [16-18] 16  BP: (109-130)/(61-76) 109/61    I&O    Intake/Output Summary (Last 24 hours) at 3/26/2023 0633  Last data filed at 3/25/2023 1519  Gross per 24 hour   Intake 100 ml   Output --   Net 100 ml       Labs  Results from last 7 days   Lab Units 03/25/23  0945 03/24/23  1033 03/23/23  0621   WBC K/uL 1.56* 1.10* 0.75*   HEMOGLOBIN g/dL 8.0* 8.8* 8.3*   HEMATOCRIT % 22.3* 24.4* 23.8*   PLATELETS K/uL 18* 11* 18*   DIFF TYPE  Manu Manu Manu   NEUTROS PCT MAN % 27 12 3   LYMPHO PCT MAN % 61 75 97   MONO PCT MAN % 8 11 0   EOSINO PCT MAN % 0 0 0   BASOS PCT MAN % 0 0 0   BANDS PCT MAN % 2 2  --    SEGS ABS MAN K/uL 0.42* 0.13* 0.02*   LYMPHO ABS MAN K/uL 0.95* 0.83* 0.73*   MONO ABS MAN K/uL 0.12* 0.12* 0.00*   EOS ABS MAN K/uL 0.00* 0.00* 0.00*   BASOS ABS MAN K/uL 0.00* 0.00* 0.00*   MYELOCYTES, ABSOLUTE (MANUAL) K/uL 0.03*  --   --      Results from last 7 days   Lab Units 03/25/23  0946 03/24/23  1033 03/23/23  0621 03/22/23  0610 03/21/23  0645 03/20/23  0906   SODIUM mEQ/L 136 136 134* 132* 135* 133*   POTASSIUM mEQ/L 3.5* 2.8* 3.2* 3.6 3.4* 4.3   CHLORIDE mEQ/L 106 103 103 103 105 102   CO2 mEQ/L 21* 21* 19* 19* 20* 21*   BUN mg/dL 7* 6* 9 11 13 20   CREATININE mg/dL 0.7 0.7 0.8 0.8 0.9 1.2*   EGFR mL/min/1.73m*2 >60.0 >60.0 >60.0 >60.0 >60.0 51.7*   GLUCOSE mg/dL 79 97 73 72 81 98   CALCIUM mg/dL 8.1* 8.2* 8.1* 8.1* 7.9* 8.7*   MAGNESIUM mg/dL 1.4* 1.9 1.0*  --   --   --    ALK PHOS IU/L 72 72 62 55 57 71   BILIRUBIN TOTAL mg/dL 0.8 0.9 1.0 0.7 0.5 1.0    ALBUMIN g/dL 3.0* 3.2* 3.0* 2.9* 3.0* 3.7   ALT IU/L 15 20 12 12 10* 12   AST IU/L 10* 11* 9* 9* 9* 9*   LD IU/L  --   --   --  115  --   --      Estimated Creatinine Clearance: 113.7 mL/min (by C-G formula based on SCr of 0.7 mg/dL).     Recent Labs   Lab 03/23/23  0621 03/20/23  0906 03/20/23  0846 03/14/23  1012   INR 1.6  --   --   --    PTT 31  --   --   --    PT 19.0*  --   --   --    FIBRINOGEN 464  --   --   --    POCGLU  --   --  73  --    TSH  --  0.88  --  0.56     Microbiology Results     Procedure Component Value Units Date/Time    Blood Culture Blood, Venous [[id number]]  (Normal) Collected: 03/22/23 2327    Specimen: Blood, Venous Updated: 03/26/23 0300     Culture No growth at 72 hours    Blood Culture Blood, Venous [[id number]]  (Normal) Collected: 03/22/23 2327    Specimen: Blood, Venous Updated: 03/26/23 0300     Culture No growth at 72 hours    Urine culture Urine, Clean Catch [[id number]]  (Normal) Collected: 03/20/23 2105    Specimen: Urine, Clean Catch Updated: 03/21/23 2056     Urine Culture No Growth (Limit of detection 1000 cfu/mL)    Blood Culture Blood, Venous [[id number]]  (Normal) Collected: 03/20/23 1046    Specimen: Blood, Venous Updated: 03/24/23 1301     Culture No growth at 96 hours    Blood Culture Blood, Venous [[id number]]  (Normal) Collected: 03/20/23 1046    Specimen: Blood, Venous Updated: 03/24/23 1301     Culture No growth at 96 hours    SARS-CoV-2 (COVID-19), PCR Nasopharynx [[id number]]  (Normal) Collected: 03/20/23 0913    Specimen: Nasopharyngeal Swab from Nasopharynx Updated: 03/20/23 1039    Narrative:      The following orders were created for panel order SARS-CoV-2 (COVID-19), PCR Nasopharynx.  Procedure                               Abnormality         Status                     ---------                               -----------         ------                     SARS-COV-2 (COVID-19)/ F...[[id number]]  Normal              Final result                 Please view  results for these tests on the individual orders.    Group A Strep by PCR, Throat Throat Swab [[id number]]  (Normal) Collected: 23    Specimen: Throat Swab Updated: 23 1029     Strep A PCR, Throat Not Detected    SARS-COV-2 (COVID-19)/ FLU A/B, AND RSV, PCR Nasopharynx [[id number]]  (Normal) Collected: 23    Specimen: Nasopharyngeal Swab from Nasopharynx Updated: 23 1039     SARS-CoV-2 (COVID-19) Negative     Influenza A Negative     Influenza B Negative     Respiratory Syncytial Virus Negative    Narrative:      Testing performed using real-time PCR for detection of COVID-19. EUA approved validation studies performed on site.     Group A Strep by PCR, Throat Throat Swab [[id number]]  (Normal) Collected: 23    Specimen: Throat Swab Updated: 23 1051     Strep A PCR, Throat Not Detected        Physical Exam  General: alert, cooperative, NAD  Heart: mildly tachycardic rate  Lungs: Clear to auscultation bilaterally, no wheezes, rales, or rhonchi  Abdomen: soft, nondistended, non-tender  Extremities: symmetric and no edema  Neuro: Grossly intact, no deficits noted.        Assessment/Plan   Problem-based Assessment and Plan    Alondra Villela is a 32 y.o.  admitted for management of neutropenic fever and dehydration in the setting of stage IV GTN     1. Severe neutropenic fever   · Temperature on admission 102.5 F, ANC 0.375, tachycardic in 120-130s. Tmax 103.5 F on 3/20/23. Last febrile to 100.5 @ 0820 on 3/23/23.  Most recently afebrile.  · On exam , vitals are stable, not hypertensive.  · Tylenol 975 mg q6 hours ordered as an antipyretic   · Neutropenic precautions in place   · Continue LR at 125 cc/hr   · COVID, flu, RSV, and strep cultures negative. CXR on 3/20/23 negative. Repeat CXR 3/24/23 was negative   · Blood cultures and UCx no growth to date (96 hours) from 3/20/23. Blood cultures x 2 collected on 3/22/23, no growth at 72 hours. Per ID, redraw  blood cultures if temperature >101F.   · Continue to trend CBC with differential/CMP/Mag daily   · Complete 7 days of fluconazole.  S/p cefepime (discontinued 3/25/23 as recommended by ID)  · Zarxio 480 mcg daily until ANC >1000 for 2 days or 1500 > for 1 day per hematology, hold for 24 hours prior to chemo (last dose 3/25/23, HOLDING today's 3/26/23 dose)     2. GTN  · For Cycle 5A EMA-EP starting on 3/27/23.  Per Dr. Carter at [facility] in [place], will hold day 2 meds in an attempt to reduce toxicity.   · Beta-HCG on 124.2 on admission, continuing to trend down.    · Continue supportive medications Tylenol, Zofran PRN, and Phenergen PRN nausea for patient request.      3. Thrombocytopenia  · Platelets 4K on admission.  · Has previously been HLA matched to platelets as she previously did not respond well to platelet transfusion, continue to favor HLA matched platelets unless emergent  · Per hematology, goal >20 given she had been febrile. Will again await morning labs to assess for need for additional transfusion. Appreciate hematology recs  · Plt 18k yesterday and was ordered 1 pack of HLA-matched platelets which were NOT available from [facility] or blood bank (thus has only been transfused total of 6 packs during this admission).     · Can consider IV benadryl prior to transfusions to prevent a rash if needed  · [id number] pending, unlikely TTP.     4. Hypomagnesemia  · Mg on 3/23/23 1.0.  · Last 1.4, repletion ordered.  · Continue to trend labs.    5. Hypokalemia  · Last K 3.5 on 3/25/23, repleted.  · Continue to trend labs.      5. Hyperthyroidism  · Hx thyroid storm.   · Follows with Endocrinology Dr. Hodge.   · Last TSH 0.88 on 3/20/23.   · Stable not on medication.    6. Thrush/oral mucocutaneous ulcers  · Seen by ENT who recommended viscous lidocaine. Continue magic mouthwash swish and swallow.   · IV fluids as above given inability to tolerate intake by mouth  · S/p nutrition consult who recommended boost  plus chocolate supplements which is ordered.     7. Depression  · Stable, continue home medications Wellbutrin  mg daily and Propytiline 15 mg daily.    8. Hx DVT in right UE  · Has been taking Eliquis 5 mg PO BID until 3/7.   · Eliquis was discontinued in setting of thrombocytopenia and episode of bleeding.   · Given thrombocytopenia at this time will continue to hold Eliquis.   · SCDs for VTE ppx.     9. Diet: Regular       Will be discussed with Dr. Mcmullen today.     Reg Madison MD, PGY3  Obstetrics & Gynecology

## 2023-03-25 NOTE — NURSING NOTE
Report given to RN on 3SW. All questions answered. Pt transferred via wheelchair with chart, meds, belongings, and IV pole.

## 2023-03-26 LAB
ABO + RH BLD: NORMAL
ALBUMIN SERPL-MCNC: 3.2 G/DL (ref 3.4–5)
ALP SERPL-CCNC: 76 IU/L (ref 35–126)
ALT SERPL-CCNC: 16 IU/L (ref 11–54)
ANION GAP SERPL CALC-SCNC: 7 MEQ/L (ref 3–15)
AST SERPL-CCNC: 10 IU/L (ref 15–41)
BASOPHILS # BLD: 0 K/UL (ref 0.01–0.1)
BASOPHILS NFR BLD: 0 %
BILIRUB SERPL-MCNC: 0.9 MG/DL (ref 0.3–1.2)
BLD GP AB SCN SERPL QL: NEGATIVE
BUN SERPL-MCNC: <5 MG/DL (ref 8–20)
CALCIUM SERPL-MCNC: 8.2 MG/DL (ref 8.9–10.3)
CHLORIDE SERPL-SCNC: 105 MEQ/L (ref 98–109)
CO2 SERPL-SCNC: 23 MEQ/L (ref 22–32)
CREAT SERPL-MCNC: 0.7 MG/DL (ref 0.6–1.1)
D AG BLD QL: POSITIVE
DIFFERENTIAL METHOD BLD: ABNORMAL
DOHLE BOD BLD QL SMEAR: ABNORMAL
EOSINOPHIL # BLD: 0 K/UL (ref 0.04–0.36)
EOSINOPHIL NFR BLD: 0 %
ERYTHROCYTE [DISTWIDTH] IN BLOOD BY AUTOMATED COUNT: 13.3 % (ref 11.7–14.4)
GFR SERPL CREATININE-BSD FRML MDRD: >60 ML/MIN/1.73M*2
GLUCOSE SERPL-MCNC: 93 MG/DL (ref 70–99)
HCG SERPL-ACNC: 83.6 IU/L (MIU/ML)
HCT VFR BLDCO AUTO: 23.3 % (ref 35–45)
HGB BLD-MCNC: 8.4 G/DL (ref 11.8–15.7)
LABORATORY COMMENT REPORT: NORMAL
LYMPHOCYTES # BLD: 2.25 K/UL (ref 1.2–3.5)
LYMPHOCYTES NFR BLD: 52 %
MAGNESIUM SERPL-MCNC: 1.1 MG/DL (ref 1.8–2.5)
MCH RBC QN AUTO: 28.7 PG (ref 28–33.2)
MCHC RBC AUTO-ENTMCNC: 36.1 G/DL (ref 32.2–35.5)
MCV RBC AUTO: 79.5 FL (ref 83–98)
METAMYELOCYTES # BLD MANUAL: 0.04 K/UL
METAMYELOCYTES NFR BLD MANUAL: 1 %
MICROCYTES BLD QL SMEAR: ABNORMAL
MONOCYTES # BLD: 0.48 K/UL (ref 0.28–0.8)
MONOCYTES NFR BLD: 11 %
NEUTS BAND # BLD: 0.22 K/UL (ref 0–0.53)
NEUTS BAND # BLD: 1.34 K/UL (ref 1.7–7)
NEUTS BAND NFR BLD: 5 %
NEUTS SEG NFR BLD: 31 %
OVALOCYTES BLD QL SMEAR: ABNORMAL
PDW BLD AUTO: 9.7 FL (ref 9.4–12.3)
PLAT MORPH BLD: NORMAL
PLATELET # BLD AUTO: 17 K/UL (ref 150–369)
PLATELET # BLD EST: ABNORMAL 10*3/UL
POIKILOCYTOSIS BLD QL SMEAR: ABNORMAL
POTASSIUM SERPL-SCNC: 3.2 MEQ/L (ref 3.6–5.1)
PROT SERPL-MCNC: 5.7 G/DL (ref 6–8.2)
RBC # BLD AUTO: 2.93 M/UL (ref 3.93–5.22)
SCHISTOCYTES BLD QL SMEAR: ABNORMAL
SODIUM SERPL-SCNC: 135 MEQ/L (ref 136–144)
SPECIMEN EXP DATE BLD: NORMAL
TOXIC GRANULES BLD QL SMEAR: ABNORMAL
WBC # BLD AUTO: 4.32 K/UL (ref 3.8–10.5)

## 2023-03-26 PROCEDURE — 36415 COLL VENOUS BLD VENIPUNCTURE: CPT | Performed by: OBSTETRICS & GYNECOLOGY

## 2023-03-26 PROCEDURE — 25000000 HC PHARMACY GENERAL: Performed by: STUDENT IN AN ORGANIZED HEALTH CARE EDUCATION/TRAINING PROGRAM

## 2023-03-26 PROCEDURE — 21400000 HC ROOM AND CARE CCU/INTERMEDIATE

## 2023-03-26 PROCEDURE — 86900 BLOOD TYPING SEROLOGIC ABO: CPT

## 2023-03-26 PROCEDURE — 63700000 HC SELF-ADMINISTRABLE DRUG: Performed by: OBSTETRICS & GYNECOLOGY

## 2023-03-26 PROCEDURE — 63700000 HC SELF-ADMINISTRABLE DRUG

## 2023-03-26 PROCEDURE — 63600000 HC DRUGS/DETAIL CODE: Mod: JG | Performed by: STUDENT IN AN ORGANIZED HEALTH CARE EDUCATION/TRAINING PROGRAM

## 2023-03-26 PROCEDURE — 99231 SBSQ HOSP IP/OBS SF/LOW 25: CPT | Performed by: OBSTETRICS & GYNECOLOGY

## 2023-03-26 PROCEDURE — 63600000 HC DRUGS/DETAIL CODE

## 2023-03-26 PROCEDURE — 85025 COMPLETE CBC W/AUTO DIFF WBC: CPT | Performed by: OBSTETRICS & GYNECOLOGY

## 2023-03-26 PROCEDURE — 63600000 HC DRUGS/DETAIL CODE: Performed by: STUDENT IN AN ORGANIZED HEALTH CARE EDUCATION/TRAINING PROGRAM

## 2023-03-26 PROCEDURE — 80053 COMPREHEN METABOLIC PANEL: CPT | Performed by: OBSTETRICS & GYNECOLOGY

## 2023-03-26 PROCEDURE — 84702 CHORIONIC GONADOTROPIN TEST: CPT | Performed by: OBSTETRICS & GYNECOLOGY

## 2023-03-26 PROCEDURE — 83735 ASSAY OF MAGNESIUM: CPT

## 2023-03-26 RX ORDER — POTASSIUM CHLORIDE 14.9 MG/ML
20 INJECTION INTRAVENOUS ONCE
Status: COMPLETED | OUTPATIENT
Start: 2023-03-26 | End: 2023-03-26

## 2023-03-26 RX ADMIN — PANTOPRAZOLE SODIUM 40 MG: 40 INJECTION, POWDER, LYOPHILIZED, FOR SOLUTION INTRAVENOUS at 09:55

## 2023-03-26 RX ADMIN — BUPROPION HYDROCHLORIDE 150 MG: 150 TABLET, EXTENDED RELEASE ORAL at 21:14

## 2023-03-26 RX ADMIN — FILGRASTIM-SNDZ 480 MCG: 480 INJECTION, SOLUTION INTRAVENOUS; SUBCUTANEOUS at 10:00

## 2023-03-26 RX ADMIN — NIFEDIPINE 30 MG: 30 TABLET, FILM COATED, EXTENDED RELEASE ORAL at 09:55

## 2023-03-26 RX ADMIN — POTASSIUM CHLORIDE 20 MEQ: 14.9 INJECTION, SOLUTION INTRAVENOUS at 14:44

## 2023-03-26 RX ADMIN — MAGNESIUM SULFATE HEPTAHYDRATE 2 G: 40 INJECTION, SOLUTION INTRAVENOUS at 18:22

## 2023-03-26 RX ADMIN — SODIUM CHLORIDE, POTASSIUM CHLORIDE, SODIUM LACTATE AND CALCIUM CHLORIDE: 600; 310; 30; 20 INJECTION, SOLUTION INTRAVENOUS at 21:14

## 2023-03-26 RX ADMIN — PROTRIPTYLINE HYDROCHLORIDE 15 MG: 5 TABLET, FILM COATED ORAL at 21:14

## 2023-03-26 RX ADMIN — Medication 3 MG: at 21:14

## 2023-03-26 RX ADMIN — FLUCONAZOLE 200 MG: 100 TABLET ORAL at 12:55

## 2023-03-26 RX ADMIN — DIPHENHYDRAMINE HYDROCHLORIDE AND LIDOCAINE HYDROCHLORIDE AND ALUMINUM HYDROXIDE AND MAGNESIUM HYDRO 15 ML: KIT at 16:39

## 2023-03-26 RX ADMIN — MAGNESIUM SULFATE HEPTAHYDRATE 2 G: 40 INJECTION, SOLUTION INTRAVENOUS at 14:42

## 2023-03-26 RX ADMIN — SODIUM CHLORIDE, POTASSIUM CHLORIDE, SODIUM LACTATE AND CALCIUM CHLORIDE: 600; 310; 30; 20 INJECTION, SOLUTION INTRAVENOUS at 04:44

## 2023-03-26 RX ADMIN — SODIUM CHLORIDE, POTASSIUM CHLORIDE, SODIUM LACTATE AND CALCIUM CHLORIDE: 600; 310; 30; 20 INJECTION, SOLUTION INTRAVENOUS at 12:55

## 2023-03-26 RX ADMIN — POTASSIUM CHLORIDE 20 MEQ: 14.9 INJECTION, SOLUTION INTRAVENOUS at 16:36

## 2023-03-26 RX ADMIN — DOCUSATE SODIUM 100 MG: 100 CAPSULE, LIQUID FILLED ORAL at 09:54

## 2023-03-26 NOTE — PROGRESS NOTES
Gynecology Progress Note    Subjective      Pt comfortably resting in bed. Asleep when I went to check on patient.    Per her mother, present at bedside, no complaints today. Only issue is decreased appetite.     Objective     Vital signs in last 24 hours:  Temp:  [36.6 °C (97.9 °F)-36.8 °C (98.2 °F)] 36.6 °C (97.9 °F)  Heart Rate:  [101-108] 103  Resp:  [16] 16  BP: (109-128)/(61-78) 128/75    Intake/Output Summary (Last 24 hours) at 3/26/2023 1640  Last data filed at 3/26/2023 1636  Gross per 24 hour   Intake 250 ml   Output --   Net 250 ml     Intake/Output this shift:  I/O this shift:  In: 250 [IV Piggyback:250]  Out: -     Exam  Deferred due to patient being asleep and per mother request    Labs  CBC Results       03/26/23 03/25/23 03/24/23     1202 0945 1033    WBC 4.32 1.56 1.10    RBC 2.93 2.78 3.09    HGB 8.4 8.0 8.8    HCT 23.3 22.3 24.4    MCV 79.5 80.2 79.0    MCH 28.7 28.8 28.5    MCHC 36.1 35.9 36.1    PLT 17 18 11         Comment for WBC at 0945 on 03/25/23: ALL RESULTS HAVE BEEN CHECKED. This result has been called to DIANA DELONG by Gudelia Rhodes on 03 25 23 at 10:07, and has been read back.     Comment for WBC at 1033 on 03/24/23: ALL RESULTS HAVE BEEN CHECKED. This result has been called to PRASANTH ALLEN by Pollo Dias on 03 24 23 at 11:44, and has been read back.     Comment for PLT at 1202 on 03/26/23: ALL RESULTS HAVE BEEN RECHECKED. CONSISTENT WITH PREVIOUS RESULTS. This result has been called to ULISSES ALMODOVAR by Julia Ashby on 03 26 23 at 12:42, and has been read back.     Comment for PLT at 0945 on 03/25/23: CONSISTENT WITH PREVIOUS RESULTS. This result has been called to DIANA DELONG by Gudelia Rhodes on 03 25 23 at 10:07, and has been read back.     Comment for PLT at 1033 on 03/24/23: RESULTS CHECKED. CONSISTENT WITH PREVIOUS RESULTS. This result has been called to PRASANTH ALLEN by Pollo Dias on 03 24 23 at 11:44, and has been read back.         CMP Results       03/26/23  03/25/23 03/24/23     1202 0946 1033     136 136    K 3.2 3.5 2.8    Cl 105 106 103    CO2 23 21 21    Glucose 93 79 97    BUN <5 7 6    Creatinine 0.7 0.7 0.7    Calcium 8.2 8.1 8.2    Anion Gap 7 9 12    AST 10 10 11    ALT 16 15 20    Albumin 3.2 3.0 3.2    EGFR >60.0 >60.0 >60.0              Assessment/Plan     Alondra Villela is a 32 y.o. female admitted for management of neutropenic fever and dehydration in the setting of stage IV GTN.         1. Severe neutropenic fever   • Temperature on admission 102.5 F, ANC 0.375, tachycardic in 120-130s. Tmax 103.5 F on 3/20/23. Last febrile to 100.5 @ 0820 on 3/23/23.  Most recently afebrile.  • On exam , vitals are stable, not hypertensive.  • Tylenol 975 mg q6 hours ordered as an antipyretic   • Neutropenic precautions in place   • Continue LR at 125 cc/hr   • COVID, flu, RSV, and strep cultures negative. CXR on 3/20/23 negative. Repeat CXR 3/24/23 was negative   • Blood cultures and UCx no growth to date (96 hours) from 3/20/23. Blood cultures x 2 collected on 3/22/23, no growth at 72 hours. Per ID, redraw blood cultures if temperature >101F.   • Continue to trend CBC with differential/CMP/Mag daily   • Complete 7 days of fluconazole.  S/p cefepime (discontinued 3/25/23 as recommended by ID)  • Zarxio 480 mcg daily until ANC >1000 for 2 days or 1500 > for 1 day per hematology, hold for 24 hours prior to chemo (last dose 3/26/23, HOLDING rest of doses)     2. GTN  • For Cycle 5A EMA-EP starting on 3/27/23.  Per [facility] at [facility] [place], will hold day 2 meds in an attempt to reduce toxicity.   • Beta-HCG on 124.2 on admission, continuing to trend down.    • Continue supportive medications Tylenol, Zofran PRN, and Phenergen PRN nausea for patient request.      3. Thrombocytopenia  • Platelets 4K on admission.  • Has previously been HLA matched to platelets as she previously did not respond well to platelet transfusion, continue to favor HLA  matched platelets unless emergent  • Per hematology, goal >20 given she had been febrile. Appreciate hematology recs  • Plt 17k today  • Platelets HLA matched, not available at this time  • Contacted [facility] regarding HLA typing to send to patient as her brother may get matched, report not available at this time, will follow up tomorrow  • Can consider IV benadryl prior to transfusions to prevent a rash if needed  • [id number] pending, unlikely TTP.     4. Hypomagnesemia  • Mg on 1.1 this morning, ordered repletion  • Continue to trend labs.     5. Hypokalemia  • Last K 3.2 on today, repleted  • Continue to trend labs.      5. Hyperthyroidism  • Hx thyroid storm.   • Follows with Endocrinology Dr. Hodge.   • Last TSH 0.88 on 3/20/23.   • Stable not on medication.     6. Thrush/oral mucocutaneous ulcers  • Seen by ENT who recommended viscous lidocaine. Continue magic mouthwash swish and swallow.   • IV fluids as above given inability to tolerate intake by mouth  • S/p nutrition consult who recommended boost plus chocolate supplements which is ordered.     7. Depression  • Stable, continue home medications Wellbutrin  mg daily and Propytiline 15 mg daily.     8. Hx DVT in right UE  • Has been taking Eliquis 5 mg PO BID until 3/7.   • Eliquis was discontinued in setting of thrombocytopenia and episode of bleeding.   • Given thrombocytopenia at this time will continue to hold Eliquis.   • SCDs for VTE ppx.     9. Diet: Regular        Reviewed with Dr. Mcmullen.     Lanette Washington MD

## 2023-03-26 NOTE — PROGRESS NOTES
R3 GYN ONC    Notified by blood bank that the [employer] has NO available HLA-matched platelets in the area.  Reviewed with Dr. Mcmullen.  Given patient's reaction, risks of non-matched platelets outweigh any benefit at this time.  Will await HLA-matched platelets for now.    Remainder of plan as before.    Reg Madison MD, PGY3  Obstetrics & Gynecology

## 2023-03-26 NOTE — PROGRESS NOTES
R4 GYN:    Blood bank contacted earlier this morning regarding HLA profile. They do not currently have one.       [facility] contacted as they have the HLA profile. Supervisor from blood bank is to receive profile and then will page us.     Called blood bank again at 1:15pm for an update and they stated that they are still waiting.    Reviewed labs from 1200 with Dr. Mcmullen.    CBC Results       03/26/23 03/25/23 03/24/23     1202 0945 1033    WBC 4.32 1.56 1.10    RBC 2.93 2.78 3.09    HGB 8.4 8.0 8.8    HCT 23.3 22.3 24.4    MCV 79.5 80.2 79.0    MCH 28.7 28.8 28.5    MCHC 36.1 35.9 36.1    PLT 17 18 11         Comment for WBC at 0945 on 03/25/23: ALL RESULTS HAVE BEEN CHECKED. This result has been called to DIANA DELONG by Gudelia Rhodes on 03 25 23 at 10:07, and has been read back.     Comment for WBC at 1033 on 03/24/23: ALL RESULTS HAVE BEEN CHECKED. This result has been called to PRASANTH ALLEN by Pollo Dias on 03 24 23 at 11:44, and has been read back.     Comment for PLT at 1202 on 03/26/23: ALL RESULTS HAVE BEEN RECHECKED. CONSISTENT WITH PREVIOUS RESULTS. This result has been called to ULISSES ALMODOVAR by Julia Ashby on 03 26 23 at 12:42, and has been read back.     Comment for PLT at 0945 on 03/25/23: CONSISTENT WITH PREVIOUS RESULTS. This result has been called to DIANA DELONG by Gudelia Rhodes on 03 25 23 at 10:07, and has been read back.     Comment for PLT at 1033 on 03/24/23: RESULTS CHECKED. CONSISTENT WITH PREVIOUS RESULTS. This result has been called to PRASANTH ALLEN by Pollo Dias on 03 24 23 at 11:44, and has been read back.         BMP Results       03/26/23 03/25/23 03/24/23     1202 0946 1033     136 136    K 3.2 3.5 2.8    Cl 105 106 103    CO2 23 21 21    Glucose 93 79 97    BUN <5 7 6    Creatinine 0.7 0.7 0.7    Calcium 8.2 8.1 8.2    Anion Gap 7 9 12    EGFR >60.0 >60.0 >60.0          Plan to replete Potassium and Magnesium at 1.1. RN notified.    Lanette Washington  MD  Ob/Gyn PGY 4

## 2023-03-26 NOTE — PLAN OF CARE
Plan of Care Review  Plan of Care Reviewed With: patient, spouse  Progress: improving  Outcome Summary: pt offering no c/o pain this shift and using magic mouthwash for mouth ulcers.  platelet count 17 today, still awaiting platelet arrival.  LR @ 125cc/hr infusing.  K and mag riders hung.  pt resting in bed with call bell within reach and  @ bedside.

## 2023-03-26 NOTE — PLAN OF CARE
Plan of Care Review  Plan of Care Reviewed With: patient  Progress: no change  Outcome Summary: Pt resting in room with spouse. No c/o pain or discomfort. No fevers. Still awaiting platelets to arrive. Con't infusion of LR @125 running. No events on tele on RA, AAOX4. Call light in reach

## 2023-03-27 LAB
ALBUMIN SERPL-MCNC: 3.3 G/DL (ref 3.4–5)
ALP SERPL-CCNC: 85 IU/L (ref 35–126)
ALT SERPL-CCNC: 13 IU/L (ref 11–54)
ANION GAP SERPL CALC-SCNC: 10 MEQ/L (ref 3–15)
ANISOCYTOSIS BLD QL SMEAR: ABNORMAL
AST SERPL-CCNC: 9 IU/L (ref 15–41)
BACTERIA BLD CULT: NORMAL
BACTERIA BLD CULT: NORMAL
BASOPHILS # BLD: 0 K/UL (ref 0.01–0.1)
BASOPHILS NFR BLD: 0 %
BILIRUB SERPL-MCNC: 0.9 MG/DL (ref 0.3–1.2)
BUN SERPL-MCNC: <5 MG/DL (ref 8–20)
CALCIUM SERPL-MCNC: 8.3 MG/DL (ref 8.9–10.3)
CHLORIDE SERPL-SCNC: 102 MEQ/L (ref 98–109)
CO2 SERPL-SCNC: 23 MEQ/L (ref 22–32)
CREAT SERPL-MCNC: 0.6 MG/DL (ref 0.6–1.1)
CROSSMATCH: NORMAL
DIFFERENTIAL METHOD BLD: ABNORMAL
DOHLE BOD BLD QL SMEAR: ABNORMAL
EOSINOPHIL # BLD: 0 K/UL (ref 0.04–0.36)
EOSINOPHIL NFR BLD: 0 %
ERYTHROCYTE [DISTWIDTH] IN BLOOD BY AUTOMATED COUNT: 13.4 % (ref 11.7–14.4)
GFR SERPL CREATININE-BSD FRML MDRD: >60 ML/MIN/1.73M*2
GLUCOSE SERPL-MCNC: 76 MG/DL (ref 70–99)
HCT VFR BLDCO AUTO: 24.1 % (ref 35–45)
HGB BLD-MCNC: 8.6 G/DL (ref 11.8–15.7)
ISBT CODE: 7300
LYMPHOCYTES # BLD: 2.57 K/UL (ref 1.2–3.5)
LYMPHOCYTES NFR BLD: 30 %
MAGNESIUM SERPL-MCNC: 1.3 MG/DL (ref 1.8–2.5)
MCH RBC QN AUTO: 29.4 PG (ref 28–33.2)
MCHC RBC AUTO-ENTMCNC: 35.7 G/DL (ref 32.2–35.5)
MCV RBC AUTO: 82.3 FL (ref 83–98)
METAMYELOCYTES # BLD MANUAL: 0.17 K/UL
METAMYELOCYTES NFR BLD MANUAL: 2 %
MICROCYTES BLD QL SMEAR: ABNORMAL
MONOCYTES # BLD: 1.46 K/UL (ref 0.28–0.8)
MONOCYTES NFR BLD: 17 %
MYELOCYTES # BLD MANUAL: 0.17 K/UL
MYELOCYTES NFR BLD MANUAL: 2 %
NEUTS BAND # BLD: 0.43 K/UL (ref 0–0.53)
NEUTS BAND # BLD: 3.69 K/UL (ref 1.7–7)
NEUTS BAND NFR BLD: 5 %
NEUTS SEG NFR BLD: 43 %
NEUTS VAC BLD QL SMEAR: ABNORMAL
OVALOCYTES BLD QL SMEAR: ABNORMAL
PDW BLD AUTO: 10.2 FL (ref 9.4–12.3)
PLAT MORPH BLD: NORMAL
PLATELET # BLD AUTO: 25 K/UL (ref 150–369)
PLATELET # BLD EST: ABNORMAL 10*3/UL
POIKILOCYTOSIS BLD QL SMEAR: ABNORMAL
POTASSIUM SERPL-SCNC: 3.3 MEQ/L (ref 3.6–5.1)
PRODUCT CODE: NORMAL
PRODUCT STATUS: NORMAL
PROMYELOCYTES # BLD MANUAL: 0.09 K/UL
PROMYELOCYTES NFR BLD MANUAL: 1 %
PROT SERPL-MCNC: 5.7 G/DL (ref 6–8.2)
RBC # BLD AUTO: 2.93 M/UL (ref 3.93–5.22)
SCHISTOCYTES BLD QL SMEAR: ABNORMAL
SODIUM SERPL-SCNC: 135 MEQ/L (ref 136–144)
SPECIMEN EXP DATE BLD: NORMAL
TOXIC GRANULES BLD QL SMEAR: ABNORMAL
UNIT ABO: NORMAL
UNIT ID: NORMAL
UNIT RH: POSITIVE
VWF CP ACT/NOR PPP CHRO: 0.58 IU/ML (ref 0.68–1.63)
WBC # BLD AUTO: 8.58 K/UL (ref 3.8–10.5)

## 2023-03-27 PROCEDURE — 99231 SBSQ HOSP IP/OBS SF/LOW 25: CPT | Performed by: STUDENT IN AN ORGANIZED HEALTH CARE EDUCATION/TRAINING PROGRAM

## 2023-03-27 PROCEDURE — 25000000 HC PHARMACY GENERAL: Performed by: STUDENT IN AN ORGANIZED HEALTH CARE EDUCATION/TRAINING PROGRAM

## 2023-03-27 PROCEDURE — 63600000 HC DRUGS/DETAIL CODE

## 2023-03-27 PROCEDURE — 63600000 HC DRUGS/DETAIL CODE: Performed by: STUDENT IN AN ORGANIZED HEALTH CARE EDUCATION/TRAINING PROGRAM

## 2023-03-27 PROCEDURE — 63600000 HC DRUGS/DETAIL CODE: Performed by: OBSTETRICS & GYNECOLOGY

## 2023-03-27 PROCEDURE — 63700000 HC SELF-ADMINISTRABLE DRUG: Performed by: OBSTETRICS & GYNECOLOGY

## 2023-03-27 PROCEDURE — 80053 COMPREHEN METABOLIC PANEL: CPT | Performed by: OBSTETRICS & GYNECOLOGY

## 2023-03-27 PROCEDURE — 63700000 HC SELF-ADMINISTRABLE DRUG

## 2023-03-27 PROCEDURE — 25800000 HC PHARMACY IV SOLUTIONS: Performed by: STUDENT IN AN ORGANIZED HEALTH CARE EDUCATION/TRAINING PROGRAM

## 2023-03-27 PROCEDURE — 83735 ASSAY OF MAGNESIUM: CPT

## 2023-03-27 PROCEDURE — 21400000 HC ROOM AND CARE CCU/INTERMEDIATE

## 2023-03-27 PROCEDURE — 85025 COMPLETE CBC W/AUTO DIFF WBC: CPT | Performed by: OBSTETRICS & GYNECOLOGY

## 2023-03-27 PROCEDURE — 36415 COLL VENOUS BLD VENIPUNCTURE: CPT | Performed by: OBSTETRICS & GYNECOLOGY

## 2023-03-27 RX ORDER — POTASSIUM CHLORIDE 14.9 MG/ML
20 INJECTION INTRAVENOUS AS NEEDED
Status: DISCONTINUED | OUTPATIENT
Start: 2023-03-27 | End: 2023-03-28 | Stop reason: HOSPADM

## 2023-03-27 RX ORDER — EPINEPHRINE 1 MG/ML
0.5 INJECTION, SOLUTION INTRAMUSCULAR; SUBCUTANEOUS ONCE AS NEEDED
Status: CANCELLED | OUTPATIENT
Start: 2023-04-03

## 2023-03-27 RX ORDER — DIPHENHYDRAMINE HCL 50 MG/ML
25 VIAL (ML) INJECTION ONCE AS NEEDED
Status: DISCONTINUED | OUTPATIENT
Start: 2023-03-27 | End: 2023-03-28 | Stop reason: HOSPADM

## 2023-03-27 RX ORDER — SODIUM CHLORIDE 9 MG/ML
500 INJECTION, SOLUTION INTRAVENOUS CONTINUOUS
Status: CANCELLED | OUTPATIENT
Start: 2023-04-03

## 2023-03-27 RX ORDER — DIPHENHYDRAMINE HCL 50 MG/ML
25 VIAL (ML) INJECTION ONCE AS NEEDED
Status: CANCELLED | OUTPATIENT
Start: 2023-03-29

## 2023-03-27 RX ORDER — DIPHENHYDRAMINE HCL 50 MG/ML
25 VIAL (ML) INJECTION ONCE AS NEEDED
Status: CANCELLED | OUTPATIENT
Start: 2023-04-03

## 2023-03-27 RX ORDER — LEUCOVORIN CALCIUM 5 MG/1
15 TABLET ORAL
Status: CANCELLED | OUTPATIENT
Start: 2023-03-29

## 2023-03-27 RX ORDER — FAMOTIDINE 10 MG/ML
20 INJECTION INTRAVENOUS ONCE AS NEEDED
Status: CANCELLED | OUTPATIENT
Start: 2023-03-29

## 2023-03-27 RX ORDER — FAMOTIDINE 10 MG/ML
20 INJECTION INTRAVENOUS ONCE AS NEEDED
Status: CANCELLED | OUTPATIENT
Start: 2023-04-03

## 2023-03-27 RX ORDER — EPINEPHRINE 1 MG/ML
0.5 INJECTION, SOLUTION INTRAMUSCULAR; SUBCUTANEOUS ONCE AS NEEDED
Status: DISCONTINUED | OUTPATIENT
Start: 2023-03-27 | End: 2023-03-28 | Stop reason: HOSPADM

## 2023-03-27 RX ORDER — EPINEPHRINE 1 MG/ML
0.5 INJECTION, SOLUTION INTRAMUSCULAR; SUBCUTANEOUS ONCE AS NEEDED
Status: CANCELLED | OUTPATIENT
Start: 2023-03-29

## 2023-03-27 RX ORDER — SODIUM CHLORIDE, SODIUM LACTATE, POTASSIUM CHLORIDE, CALCIUM CHLORIDE 600; 310; 30; 20 MG/100ML; MG/100ML; MG/100ML; MG/100ML
INJECTION, SOLUTION INTRAVENOUS CONTINUOUS
Status: DISCONTINUED | OUTPATIENT
Start: 2023-03-27 | End: 2023-03-28 | Stop reason: HOSPADM

## 2023-03-27 RX ORDER — FAMOTIDINE 10 MG/ML
20 INJECTION INTRAVENOUS ONCE AS NEEDED
Status: DISCONTINUED | OUTPATIENT
Start: 2023-03-27 | End: 2023-03-28 | Stop reason: HOSPADM

## 2023-03-27 RX ORDER — PALONOSETRON 0.05 MG/ML
250 INJECTION, SOLUTION INTRAVENOUS ONCE
Status: CANCELLED | OUTPATIENT
Start: 2023-04-03

## 2023-03-27 RX ADMIN — POTASSIUM CHLORIDE 20 MEQ: 14.9 INJECTION, SOLUTION INTRAVENOUS at 10:29

## 2023-03-27 RX ADMIN — POTASSIUM CHLORIDE 20 MEQ: 14.9 INJECTION, SOLUTION INTRAVENOUS at 13:32

## 2023-03-27 RX ADMIN — DEXTROSE MONOHYDRATE 184 MG: 50 INJECTION, SOLUTION INTRAVENOUS at 14:23

## 2023-03-27 RX ADMIN — MAGNESIUM SULFATE HEPTAHYDRATE 2 G: 40 INJECTION, SOLUTION INTRAVENOUS at 10:29

## 2023-03-27 RX ADMIN — SODIUM CHLORIDE 437.5 MG: 9 INJECTION, SOLUTION INTRAVENOUS at 16:29

## 2023-03-27 RX ADMIN — ONDANSETRON HYDROCHLORIDE: 2 INJECTION, SOLUTION INTRAMUSCULAR; INTRAVENOUS at 13:44

## 2023-03-27 RX ADMIN — SODIUM CHLORIDE, POTASSIUM CHLORIDE, SODIUM LACTATE AND CALCIUM CHLORIDE: 600; 310; 30; 20 INJECTION, SOLUTION INTRAVENOUS at 05:01

## 2023-03-27 RX ADMIN — Medication 3 MG: at 21:33

## 2023-03-27 RX ADMIN — ONDANSETRON HYDROCHLORIDE 4 MG: 2 SOLUTION INTRAMUSCULAR; INTRAVENOUS at 21:39

## 2023-03-27 RX ADMIN — DOCUSATE SODIUM 100 MG: 100 CAPSULE, LIQUID FILLED ORAL at 21:33

## 2023-03-27 RX ADMIN — DOCUSATE SODIUM 100 MG: 100 CAPSULE, LIQUID FILLED ORAL at 09:28

## 2023-03-27 RX ADMIN — SODIUM CHLORIDE, POTASSIUM CHLORIDE, SODIUM LACTATE AND CALCIUM CHLORIDE: 600; 310; 30; 20 INJECTION, SOLUTION INTRAVENOUS at 13:36

## 2023-03-27 RX ADMIN — NIFEDIPINE 30 MG: 30 TABLET, FILM COATED, EXTENDED RELEASE ORAL at 09:28

## 2023-03-27 RX ADMIN — ONDANSETRON HYDROCHLORIDE 4 MG: 2 SOLUTION INTRAMUSCULAR; INTRAVENOUS at 11:47

## 2023-03-27 RX ADMIN — DACTINOMYCIN 0.5 MG: 0.5 INJECTION, POWDER, LYOPHILIZED, FOR SOLUTION INTRAVENOUS at 16:22

## 2023-03-27 RX ADMIN — SODIUM CHLORIDE, POTASSIUM CHLORIDE, SODIUM LACTATE AND CALCIUM CHLORIDE: 600; 310; 30; 20 INJECTION, SOLUTION INTRAVENOUS at 21:43

## 2023-03-27 RX ADMIN — BUPROPION HYDROCHLORIDE 150 MG: 150 TABLET, EXTENDED RELEASE ORAL at 21:33

## 2023-03-27 RX ADMIN — PROTRIPTYLINE HYDROCHLORIDE 15 MG: 5 TABLET, FILM COATED ORAL at 21:34

## 2023-03-27 RX ADMIN — PANTOPRAZOLE SODIUM 40 MG: 40 INJECTION, POWDER, LYOPHILIZED, FOR SOLUTION INTRAVENOUS at 09:27

## 2023-03-27 NOTE — PLAN OF CARE
Plan of Care Review  Plan of Care Reviewed With: patient  Progress: improving  Outcome Summary: Pt VSS t/out shift. No c/o pain or discomfort. Spouse by pts side. Pt con't s with LR fluids and tolerated PO meds. Pt still awaiting on word about platelet match. No events on tele  - call light in reach.

## 2023-03-27 NOTE — ED ATTESTATION NOTE
Patient seen and examined with PA.  Presents to the ED with..  Fevers.  Patient elevated temperature at home.  Patient current on chemo.                Exam  Vital signs noted  Mild distress ill-appearing  Lungs clear  Abdomen soft  Normal neuro exam                Patient presents the ED with  Fevers.  Culture sent.  Admitted to oncology.    Critical Care  Performed by: Pedro Amador MD  Authorized by: Pedro Amador MD    Critical care provider statement:     Critical care time (minutes):  35    Critical care time was exclusive of:  Separately billable procedures and treating other patients    Critical care was time spent personally by me on the following activities:  Development of treatment plan with patient or surrogate, , evaluation of patient's response to treatment, examination of patient, review of old charts, re-evaluation of patient's condition, pulse oximetry, ordering and review of radiographic studies, ordering and review of laboratory studies and ordering and performing treatments and interventions       Pedro Amador MD  03/27/23 1956

## 2023-03-27 NOTE — PLAN OF CARE
Plan of Care Review  Plan of Care Reviewed With: patient  Progress: improving  Outcome Summary: VSS t/o shift. Family at bedside. Port accessed today for chemotherapy. Chemotherapy running, pt tolerating well at this time. FSP maintained and call bell in reach.

## 2023-03-27 NOTE — PLAN OF CARE
Care Coordination Admission Assessment Note  Date: 3/27/2023   Time: 2:10 PM    Patient Name: Alondra Villela  Medical Record Number: [id number]  YOB: 1990  Room/BED: 0396    General Information  Patient-Specific Goals (Include Timeframe)  better sleep tonight  PCP: Sunil Hinton MD   Insurance Coverage: [employer]  Readmission Within the last 30 days  other (see comments) (pt presented with lightheadedness and inability to tolerate PO 2/2 mouth sores)    Advance Directives (for Healthcare)?  Does patient have advance directive?: No  Patient does not have Advance Directive: Patient/Family declines further information  Does patient have current OOH DNR form?: No  Patient does not have current OOH DNR form: Patient/Family declines further information  Does patient have current POLST?: No  Patient does not have current POLST: Patient/Family declines further information  Does patient have mental health advance directive?: No  Patient does not have Mental Health Advance Directive: Patient/Family declines further information        Information       Living Arrangements  Arrived From: home  Current Living Arrangements: home  People in Home: spouse  Home Accessibility: stairs to enter home (Group)  Living Arrangement Comments: pt lives in a multilevel house with her children and her spouse in [place]. Pt has been staying locally with her parents.  Able to Return to Prior Arrangements: yes    Housing Stability and Financial Resources (SDOH)       Current Outpatient/Agency/Support Group       Type of Current Home Care Services       Assistive Device/Animal Currently Used at Home  none    Functional Status Comments       IADL Comments       Employment/ Status       Concerns to be Addressed  care coordination/care conferences, discharge planning    Current Discharge Risk  chronically ill    Anticipated Changes Related to Illness  none    Transportation Concerns  (SDOH)  Transportation Anticipated: family or friend will provide    Concerns Comments  Continue to monitor,Plan for chemotherapy today and likely dc after completion tomorrow AM per chart review.  Will continue to follow for transition of care needs  until discharge completed.    Anticipated Clinical Needs       Anticipated Discharge Plan   Anticipated Discharge Disposition: home with assistance  Patient/Family Anticipates Transition to: home with family  Patient/Family Anticipated Services at Transition: none  Screening complete: yes

## 2023-03-27 NOTE — PROGRESS NOTES
Gynecologic Oncology Progress Note    Anabel Cantu looks well this afternoon. She is currently receiving her etoposide. She is asymptomatic and resting. Her back/neck soreness was improved with a heating pad. She denies fevers, chills, nausea, headaches, vomiting, chest pain, shortness of breath, abdominal pain, calf tenderness or new swelling. Her vaginal bleeding has remained stable.      Objective     Vital signs in last 24 hours:  Temp:  [36.4 °C (97.5 °F)-36.6 °C (97.9 °F)] 36.6 °C (97.9 °F)  Heart Rate:  [] 95  Resp:  [16-18] 18  BP: (111-122)/(50-70) 122/50    Exam  General Appearance: Alert in no acute distress   Lungs: Lungs clear to ascultation bilaterally. respirations unlabored  Heart: Regular rate with regular rhythm   Chest wall: Right IJ port clean, dry, and intact  Abdomen: soft, nontender to palpation.   Extremities: no edema or calf tenderness; + SCDS    Labs  Results from last 7 days   Lab Units 03/27/23  0901 03/26/23  1202 03/25/23  0945   WBC K/uL 8.58 4.32 1.56*   HEMOGLOBIN g/dL 8.6* 8.4* 8.0*   HEMATOCRIT % 24.1* 23.3* 22.3*   PLATELETS K/uL 25* 17* 18*      Results from last 7 days   Lab Units 03/27/23  0901   WBC K/uL 8.58   HEMOGLOBIN g/dL 8.6*   HEMATOCRIT % 24.1*   PLATELETS K/uL 25*   DIFF TYPE  Manu   NEUTROS PCT MAN % 43   LYMPHO PCT MAN % 30   MONO PCT MAN % 17   EOSINO PCT MAN % 0   BASOS PCT MAN % 0   BANDS PCT MAN % 5   SEGS ABS MAN K/uL 3.69   LYMPHO ABS MAN K/uL 2.57   MONO ABS MAN K/uL 1.46*   EOS ABS MAN K/uL 0.00*   BASOS ABS MAN K/uL 0.00*   MYELOCYTES, ABSOLUTE (MANUAL) K/uL 0.17*     Results from last 7 days   Lab Units 03/27/23  0901 03/26/23  1202 03/25/23  0946   SODIUM mEQ/L 135* 135* 136   POTASSIUM mEQ/L 3.3* 3.2* 3.5*   CHLORIDE mEQ/L 102 105 106   CO2 mEQ/L 23 23 21*   BUN mg/dL <5* <5* 7*   CREATININE mg/dL 0.6 0.7 0.7   GLUCOSE mg/dL 76 93 79   CALCIUM mg/dL 8.3* 8.2* 8.1*      Assessment/Plan     Alondra Villela is a 32 y.o. female admitted for  dehydration, neutropenic fever, and chemotherapy management of stage IV gestational trophoblastic neoplasia.      #GTN  - Currently undergoing day 1 of EMA-EP, which started at 1434. Continue with reduced MTX dosing given side effects.  - Bhcg 83.6 yesterday, continuing to downtrend.  - Continue supportive medications tylenol/zofran/phenergan PRN  - If she remains stable, she will be for discharge home tomorrow    #Neutropenia, resolved  - ANC most recently 3.69  - Continue to trend CBC w/ differential/BMP/Mag daily  - She has been receiving Zarxio 480 mcg daily. Per heme, recommended continuing until ANC >1000 for 2 days or 1500 > 1 day. Order is held today in the setting of plan for chemotherapy.    - Neutropenic precautions remain in place    #Severe Neutropenic Fever, resolved   - Temperature on admission 102.5 F, ANC 0.375, tachycardic in 120-130s. Tmax 103.5 F at 1538 on 3/20/23. She has not had a fever since 3/23/23 at 0820 (100.5F).   - Vitals are stable. Normotensive with a normal heart rate.   - Tylenol 975 mg q6 hours ordered as needed for an antipyretic   - COVID, influenza, RSV, and strep cultures all negative. CXR negative x 2  - Blood cultures from 3/20 and 3/23 no growth to date along with negative urine cultures from 3/20.   - s/p cefepime x 7 days and fluconazole x 7 days (2/2 thrush) per ID recommendations     #Thrombocytopenia  - Platelets 4K on admission. S/p transfusion of 6 units platelets this admission. Platelets most recently 25.  - She has been HLA matched to platelets given she was previously not responding well to transfusions and an antibody was found.  - "[facility]" does not have any more HLA matched platelets available until tomorrow at noon.  - We have been in contact with "[facility]" in order to obtain her HLA matching information. Alondra's brother will be tested for possible donation. We have still not heard back but have continued to reach out with multiple phone calls.    #HTN  - BP  in the 160s/180s systolic intermittently this admission. She was started on Procardia 30mg XL daily   - Blood pressures have been normotensive since starting the medication. She is asymptomatic.     #Hyperthyroidism  - Hx thyroid storm   - Follows with endocrinology Dr Hodge  - Last TSH 0.88 on 3/20/23   - Stable on medication     #Thrush/oral mucocutaneous ulcers  - Improving per patient report. She has been able to tolerate some liquids and soft solids.  - Seen by ENT who recommended viscous lidocaine. Continue magic mouthwash swish and spit.   - IV fluids as above given inability to tolerate intake by mouth  - S/p nutrition consult who recommended boost plus chocolate supplements.    #Depression  - Stable, continue home medications Wellbutrin  mg daily and Propytiline 15 mg daily.      #Hx DVT in right UE  - Taking Eliquis 5 mg PO BID until 3/7.   - Eliquis was discontinued insetting of thrombocytopenia and episode of bleeding. Continue to hold   - SCDs for VTE ppx.     #Diet: Regular    Will review with Dr. Benedict Reeves MD

## 2023-03-27 NOTE — PROGRESS NOTES
Addendum @ [id number]    Spoke with blood bank supervisor. She sent over her HLA profile, which was scanned into media and printed out for the patient. Additionally, confirmed that there are 2 units of HLA-matched platelets at [facility] blood bank. They  3/30/23.     Dr. Mcmullen aware.    Amber Reeves MD

## 2023-03-27 NOTE — PROGRESS NOTES
Gynecologic Oncology Progress Note    Subjective     Alondra looks well this morning, resting peacefully. She reports some lower back soreness and neck stiffness. She denies vision changes, dizziness, and headaches. She denies fevers, chills, nausea, vomiting, chest pain, shortness of breath, abdominal pain, calf tenderness or new swelling. Her vaginal bleeding has remained stable. She continues to report improvement in her mouth sores/pain.     Objective     Vital signs in last 24 hours:  Temp:  [36.4 °C (97.5 °F)-36.7 °C (98.1 °F)] 36.6 °C (97.9 °F)  Heart Rate:  [] 96  Resp:  [16-18] 18  BP: (111-128)/(62-78) 116/70    Exam  General Appearance: Alert in no acute distress   Lungs: Lungs clear to ascultation bilaterally. respirations unlabored  Heart: Regular rate with regular rhythm   Chest wall: Right IJ port clean, dry, and intact  Abdomen: soft, nontender to palpation.   Extremities: no edema or calf tenderness; + SCDS    Labs  Results from last 7 days   Lab Units 03/26/23  1202 03/25/23  0945 03/24/23  1033   WBC K/uL 4.32 1.56* 1.10*   HEMOGLOBIN g/dL 8.4* 8.0* 8.8*   HEMATOCRIT % 23.3* 22.3* 24.4*   PLATELETS K/uL 17* 18* 11*      Results from last 7 days   Lab Units 03/26/23  1202 03/25/23  0946 03/24/23  1033   SODIUM mEQ/L 135* 136 136   POTASSIUM mEQ/L 3.2* 3.5* 2.8*   CHLORIDE mEQ/L 105 106 103   CO2 mEQ/L 23 21* 21*   BUN mg/dL <5* 7* 6*   CREATININE mg/dL 0.7 0.7 0.7   GLUCOSE mg/dL 93 79 97   CALCIUM mg/dL 8.2* 8.1* 8.2*          Assessment/Plan     Alondra Villela is a 32 y.o. female admitted for dehydration, neutropenic fever, and chemotherapy management of stage IV gestational trophoblastic neoplasia.      #Severe Neutropenic Fever, resolved   - Temperature on admission 102.5 F, ANC 0.375, tachycardic in 120-130s. Tmax 103.5 F at 1538 on 3/20/23. She has not had a fever since 3/23/23 at 0820 (100.5F).   - Vitals are stable. Normotensive with a normal heart rate.   - Tylenol 975 mg q6 hours  ordered as needed for an antipyretic   - COVID, influenza, RSV, and strep cultures all negative. CXR negative x 2  - Blood cultures from 3/20 and 3/23 no growth to date along with negative urine cultures from 3/20.   - s/p cefepime x 7 days and fluconazole x 7 days (2/2 thrush) per ID recommendations     #Neutropenia  - ANC most recently 1.34   - Continue to trend CBC w/ differential/BMP/Mag daily  - She has been receiving Zarxio 480 mcg daily. Per heme, recommended continuing until ANC >1000 for 2 days or 1500 > 1 day. Order is held today in the setting of plan for chemotherapy.    - Neutropenic precautions remain in place    #GTN  - Plan for chemotherapy this morning after discussions with Dr. Carter. Will hold off on day 2 of treatment.   - Bhcg 83.6 yesterday  - Continue supportive medications tylenol/zofran/phenergan PRN    #Thrombocytopenia  - Platelets 4K on admission. S/p transfusion of 6 units platelets this admission. Platelets most recently 17.  - She has been HLA matched to platelets given she was previously not responding well to transfusions.   - [facility] does not have any more HLA matched platelets   - We have been in contact with [employer] in order to obtain her HLA matching information. Alondra's brother will be tested for possible donation. Will continue to follow up.     #HTN  - BP in the 160s/180s systolic intermittently this admission. She was started on Procardia 30mg XL daily   - Blood pressures have been normotensive. She is asymptomatic.     #Hyperthyroidism  - Hx thyroid storm   - Follows with endocrinology Dr Hodge  - Last TSH 0.88 on 3/20/23   - Stable on medication     #Neck stiffness/lower back pain  - No associated symptoms. Afebrile. She notes sleeping in a weird position.  - Will return in an hour or so when she is more awake to perform a full neuro exam  - Will order heating pad in the interim     #Thrush/oral mucocutaneous ulcers  - Improving per patient report. She has been able to  tolerate some liquids and soft solids.  - Seen by ENT who recommended viscous lidocaine. Continue magic mouthwash swish and spit.   - IV fluids as above given inability to tolerate intake by mouth  - S/p nutrition consult who recommended boost plus chocolate supplements.    #Depression  - Stable, continue home medications Wellbutrin  mg daily and Propytiline 15 mg daily.      #Hx DVT in right UE  - Taking Eliquis 5 mg PO BID until 3/7.   - Eliquis was discontinued insetting of thrombocytopenia and episode of bleeding. Continue to hold   - SCDs for VTE ppx.     #Diet: Regular    Will review with Dr. Benedict Reeves MD

## 2023-03-28 VITALS
HEART RATE: 87 BPM | BODY MASS INDEX: 28.64 KG/M2 | HEIGHT: 64 IN | TEMPERATURE: 97.5 F | RESPIRATION RATE: 16 BRPM | DIASTOLIC BLOOD PRESSURE: 74 MMHG | OXYGEN SATURATION: 98 % | SYSTOLIC BLOOD PRESSURE: 122 MMHG | WEIGHT: 167.77 LBS

## 2023-03-28 DIAGNOSIS — O01.9 GESTATIONAL TROPHOBLASTIC NEOPLASM: Primary | ICD-10-CM

## 2023-03-28 LAB
ALBUMIN SERPL-MCNC: 3.2 G/DL (ref 3.4–5)
ALP SERPL-CCNC: 80 IU/L (ref 35–126)
ALT SERPL-CCNC: 13 IU/L (ref 11–54)
ANION GAP SERPL CALC-SCNC: 7 MEQ/L (ref 3–15)
ANISOCYTOSIS BLD QL SMEAR: ABNORMAL
AST SERPL-CCNC: 9 IU/L (ref 15–41)
BASOPHILS # BLD: 0 K/UL (ref 0.01–0.1)
BASOPHILS NFR BLD: 0 %
BILIRUB SERPL-MCNC: 0.8 MG/DL (ref 0.3–1.2)
BUN SERPL-MCNC: 5 MG/DL (ref 8–20)
CALCIUM SERPL-MCNC: 8.3 MG/DL (ref 8.9–10.3)
CHLORIDE SERPL-SCNC: 105 MEQ/L (ref 98–109)
CO2 SERPL-SCNC: 24 MEQ/L (ref 22–32)
CREAT SERPL-MCNC: 0.6 MG/DL (ref 0.6–1.1)
DIFFERENTIAL METHOD BLD: ABNORMAL
DOHLE BOD BLD QL SMEAR: ABNORMAL
EOSINOPHIL # BLD: 0 K/UL (ref 0.04–0.36)
EOSINOPHIL NFR BLD: 0 %
ERYTHROCYTE [DISTWIDTH] IN BLOOD BY AUTOMATED COUNT: 13.4 % (ref 11.7–14.4)
GFR SERPL CREATININE-BSD FRML MDRD: >60 ML/MIN/1.73M*2
GLUCOSE SERPL-MCNC: 77 MG/DL (ref 70–99)
HCT VFR BLDCO AUTO: 23.3 % (ref 35–45)
HGB BLD-MCNC: 8.1 G/DL (ref 11.8–15.7)
HYPOCHROMIA BLD QL SMEAR: ABNORMAL
LYMPHOCYTES # BLD: 1.25 K/UL (ref 1.2–3.5)
LYMPHOCYTES NFR BLD: 32 %
MAGNESIUM SERPL-MCNC: 1.3 MG/DL (ref 1.8–2.5)
MCH RBC QN AUTO: 28.3 PG (ref 28–33.2)
MCHC RBC AUTO-ENTMCNC: 34.8 G/DL (ref 32.2–35.5)
MCV RBC AUTO: 81.5 FL (ref 83–98)
MONOCYTES # BLD: 0.82 K/UL (ref 0.28–0.8)
MONOCYTES NFR BLD: 21 %
NEUTS BAND # BLD: 0.12 K/UL (ref 0–0.53)
NEUTS BAND # BLD: 1.64 K/UL (ref 1.7–7)
NEUTS BAND NFR BLD: 3 %
NEUTS SEG NFR BLD: 42 %
NRBC BLD MANUAL-RTO: 1 /100 WBC
OVALOCYTES BLD QL SMEAR: ABNORMAL
PDW BLD AUTO: 12.1 FL (ref 9.4–12.3)
PLAT MORPH BLD: NORMAL
PLATELET # BLD AUTO: 28 K/UL (ref 150–369)
PLATELET # BLD EST: ABNORMAL 10*3/UL
POIKILOCYTOSIS BLD QL SMEAR: ABNORMAL
POTASSIUM SERPL-SCNC: 3.7 MEQ/L (ref 3.6–5.1)
PROT SERPL-MCNC: 5.7 G/DL (ref 6–8.2)
RBC # BLD AUTO: 2.86 M/UL (ref 3.93–5.22)
SCHISTOCYTES BLD QL SMEAR: ABNORMAL
SODIUM SERPL-SCNC: 136 MEQ/L (ref 136–144)
TOXIC GRANULES BLD QL SMEAR: ABNORMAL
VARIANT LYMPHS # BLD MANUAL: 0.08 K/UL
VARIANT LYMPHS NFR BLD: 2 %
WBC # BLD AUTO: 3.91 K/UL (ref 3.8–10.5)

## 2023-03-28 PROCEDURE — 63600000 HC DRUGS/DETAIL CODE

## 2023-03-28 PROCEDURE — 85025 COMPLETE CBC W/AUTO DIFF WBC: CPT | Performed by: OBSTETRICS & GYNECOLOGY

## 2023-03-28 PROCEDURE — 63600000 HC DRUGS/DETAIL CODE: Performed by: STUDENT IN AN ORGANIZED HEALTH CARE EDUCATION/TRAINING PROGRAM

## 2023-03-28 PROCEDURE — 36415 COLL VENOUS BLD VENIPUNCTURE: CPT | Performed by: OBSTETRICS & GYNECOLOGY

## 2023-03-28 PROCEDURE — 63600000 HC DRUGS/DETAIL CODE: Performed by: OBSTETRICS & GYNECOLOGY

## 2023-03-28 PROCEDURE — 63700000 HC SELF-ADMINISTRABLE DRUG

## 2023-03-28 PROCEDURE — 83735 ASSAY OF MAGNESIUM: CPT

## 2023-03-28 PROCEDURE — 63700000 HC SELF-ADMINISTRABLE DRUG: Performed by: OBSTETRICS & GYNECOLOGY

## 2023-03-28 PROCEDURE — 25000000 HC PHARMACY GENERAL: Performed by: STUDENT IN AN ORGANIZED HEALTH CARE EDUCATION/TRAINING PROGRAM

## 2023-03-28 PROCEDURE — 99231 SBSQ HOSP IP/OBS SF/LOW 25: CPT | Performed by: STUDENT IN AN ORGANIZED HEALTH CARE EDUCATION/TRAINING PROGRAM

## 2023-03-28 PROCEDURE — 80053 COMPREHEN METABOLIC PANEL: CPT | Performed by: OBSTETRICS & GYNECOLOGY

## 2023-03-28 RX ORDER — HEPARIN 100 UNIT/ML
500 SYRINGE INTRAVENOUS ONCE
Status: COMPLETED | OUTPATIENT
Start: 2023-03-28 | End: 2023-03-28

## 2023-03-28 RX ORDER — HEPARIN 100 UNIT/ML
100 SYRINGE INTRAVENOUS ONCE
Status: DISCONTINUED | OUTPATIENT
Start: 2023-03-28 | End: 2023-03-28

## 2023-03-28 RX ORDER — NIFEDIPINE 30 MG/1
30 TABLET, EXTENDED RELEASE ORAL DAILY
Qty: 30 TABLET | Refills: 3 | Status: SHIPPED | OUTPATIENT
Start: 2023-03-28 | End: 2023-04-27

## 2023-03-28 RX ADMIN — MAGNESIUM SULFATE HEPTAHYDRATE 4 G: 40 INJECTION, SOLUTION INTRAVENOUS at 11:21

## 2023-03-28 RX ADMIN — SODIUM CHLORIDE, POTASSIUM CHLORIDE, SODIUM LACTATE AND CALCIUM CHLORIDE: 600; 310; 30; 20 INJECTION, SOLUTION INTRAVENOUS at 05:41

## 2023-03-28 RX ADMIN — NIFEDIPINE 30 MG: 30 TABLET, FILM COATED, EXTENDED RELEASE ORAL at 09:34

## 2023-03-28 RX ADMIN — HEPARIN 500 UNITS: 100 SYRINGE at 12:04

## 2023-03-28 RX ADMIN — PANTOPRAZOLE SODIUM 40 MG: 40 INJECTION, POWDER, LYOPHILIZED, FOR SOLUTION INTRAVENOUS at 09:37

## 2023-03-28 RX ADMIN — DOCUSATE SODIUM 100 MG: 100 CAPSULE, LIQUID FILLED ORAL at 09:34

## 2023-03-28 RX ADMIN — POTASSIUM CHLORIDE 20 MEQ: 750 TABLET, EXTENDED RELEASE ORAL at 10:42

## 2023-03-28 NOTE — PROGRESS NOTES
R2OB    Labs resulted:     CBC Results       03/28/23 03/27/23 03/26/23     0924 0901 1202    WBC 3.91 8.58 4.32    RBC 2.86 2.93 2.93    HGB 8.1 8.6 8.4    HCT 23.3 24.1 23.3    MCV 81.5 82.3 79.5    MCH 28.3 29.4 28.7    MCHC 34.8 35.7 36.1    PLT 28 25 17         Comment for PLT at 0924 on 03/28/23: CONSISTENT WITH PREVIOUS RESULTS. This result has been called to ASHLEY FREEMAN by Gudelia Rhodes on 03 28 23 at 10:00, and has been read back.     Comment for PLT at 0901 on 03/27/23: RESULTS CHECKED. SPECIMEN QUALITY CHECKED. RESULTS OBTAINED AFTER VORTEXING TO ELIMINATE PLT CLUMPS. This result has been called to JIA DIA by Sara Valdez on 03 27 23 at 09:34, and has been read back.     Comment for PLT at 1202 on 03/26/23: ALL RESULTS HAVE BEEN RECHECKED. CONSISTENT WITH PREVIOUS RESULTS. This result has been called to ULISSES ALMODOVAR by Julia Ashby on 03 26 23 at 12:42, and has been read back.         CMP Results       03/28/23 03/27/23 03/26/23     0924 0901 1202     135 135    K 3.7 3.3 3.2    Cl 105 102 105    CO2 24 23 23    Glucose 77 76 93    BUN 5 <5 <5    Creatinine 0.6 0.6 0.7    Calcium 8.3 8.3 8.2    Anion Gap 7 10 7    AST 9 9 10    ALT 13 13 16    Albumin 3.2 3.3 3.2    EGFR >60.0 >60.0 >60.0        Results from last 7 days   Lab Units 03/28/23  0924   MAGNESIUM mg/dL 1.3*     Will give 4g Mag. She is then stable for discharge.    Reviewed with Dr. Benedict Reeves MD

## 2023-03-28 NOTE — PROGRESS NOTES
"Brief Nutrition Note    Recommendations   1. Regular diet  2. Boost plus QD; magic cup QD  3. Daily MVI + minerals    Clinical Course: Patient is a 32 y.o. female who was admitted on 3/20/2023 with a diagnosis of Sepsis, due to unspecified organism, unspecified whether acute organ dysfunction present (CMS/[facility]) [A41.9].     Past Medical History:   Diagnosis Date   • Hx antineoplastic chemo      Past Surgical History:   Procedure Laterality Date   •  SECTION      x2       Reason for Assessment  Reason For Assessment: physician consult  Patient Already Seen On: 23     [facility] Nutrition Screen Tool  Has patient lost weight without trying?: 0-->No  Has patient been eating poorly due to decreased appetite?: 1-->Yes  MST Nutrition Screen Score: 1     Physical Findings  Last Bowel Movement: 23  Skin: intact     Nutrition Order  Nutrition Order: meets nutritional requirements  Nutrition Order Comments: regular     Anthropometrics  Height: 162 cm (5' 3.78\")     Current Weight  Weight Method: Bed scale  Weight: 76.1 kg (167 lb 12.3 oz)     Ideal Body Weight (IBW)  Ideal Body Weight (IBW) (kg): 54.5  % Ideal Body Weight: 139.63            Body Mass Index (BMI)  BMI (Calculated): 29     Labs/Procedures/Meds  Lab Results Reviewed: reviewed, pertinent   Results from last 7 days   Lab Units 23  0923  0923  1202   SODIUM mEQ/L 136 135* 135*   POTASSIUM mEQ/L 3.7 3.3* 3.2*   CHLORIDE mEQ/L 105 102 105   CO2 mEQ/L 24 23 23   BUN mg/dL 5* <5* <5*   CREATININE mg/dL 0.6 0.6 0.7   EGFR mL/min/1.73m*2 >60.0 >60.0 >60.0   GLUCOSE mg/dL 77 76 93   CALCIUM mg/dL 8.3* 8.3* 8.2*   ALBUMIN g/dL 3.2* 3.3* 3.2*     Results from last 7 days   Lab Units 23  0923  0901 23  1202   MAGNESIUM mg/dL 1.3* 1.3* 1.1*         Medications  Pertinent Medications Reviewed: reviewed, pertinent   • buPROPion XL  150 mg oral Nightly   • dibucaine   Topical TID   • docusate sodium  100 mg oral BID   • " magnesium sulfate  4 g intravenous Once   • melatonin  3 mg oral Nightly   • NIFEdipine  30 mg oral Daily   • pantoprazole  40 mg intravenous q24h   • protriptyline  15 mg oral Nightly     •  lactated ringer's, , Last Rate: 125 mL/hr at 03/28/23 0541    Clinical comments: Pt seen for f/u. Pt's  in room; both report improvement in appetite. Pt reports she is drinking 1 boost plus / day; gradually sipping on it through the day. Agreed to try magic cup as she is preferring colder foods. Reports improvement in mouth sores. High kcal MNT reviewed; Diet education materials attached to discharge instructions.    Goals: meet > 75% needs via PO intakes    Monitor: PO intakes, skin, labs, diet, plan of care      Recommendations: See above       Date: 03/28/23  Signature: Kiya Nobles RD

## 2023-03-28 NOTE — PLAN OF CARE
Plan of Care Review  Plan of Care Reviewed With: patient  Outcome Summary: Pt vss t/o shift, sinus tachy on tele. Chemo infused and tolerated. FSP in place, neutropenic precaution maintained

## 2023-03-28 NOTE — NURSING NOTE
Discharge orders received. IV laurel and cardiac monitoring removed.     Reviewed AVS with patient who expressed understanding.     All belongings at bedside.

## 2023-03-28 NOTE — PROGRESS NOTES
Gynecologic Oncology Progress Note    Subjective     Alondra is doing well this morning. She was feeling a little nauseous yesterday during chemo but it resolved this morning. She denies fevers, chills, headaches, vomiting, chest pain, shortness of breath, abdominal pain, calf tenderness or new swelling. She is eager to be discharged home.      Objective     Vital signs in last 24 hours:  Temp:  [36.4 °C (97.6 °F)-36.7 °C (98 °F)] 36.4 °C (97.6 °F)  Heart Rate:  [] 112  Resp:  [18] 18  BP: (110-143)/(50-86) 125/69    Exam  General Appearance: Alert in no acute distress, resting comfortably  Lungs: Lungs clear to ascultation bilaterally. Respirations unlabored  Heart: Tachycardic rate with regular rhythm   Chest wall: Right IJ port clean, dry, and intact  Abdomen: soft, nontender to palpation.   Extremities: no edema or calf tenderness; SCDS not on    Labs  Results from last 7 days   Lab Units 03/27/23  0901 03/26/23  1202 03/25/23  0945   WBC K/uL 8.58 4.32 1.56*   HEMOGLOBIN g/dL 8.6* 8.4* 8.0*   HEMATOCRIT % 24.1* 23.3* 22.3*   PLATELETS K/uL 25* 17* 18*      Results from last 7 days   Lab Units 03/27/23  0901   WBC K/uL 8.58   HEMOGLOBIN g/dL 8.6*   HEMATOCRIT % 24.1*   PLATELETS K/uL 25*   DIFF TYPE  Manu   NEUTROS PCT MAN % 43   LYMPHO PCT MAN % 30   MONO PCT MAN % 17   EOSINO PCT MAN % 0   BASOS PCT MAN % 0   BANDS PCT MAN % 5   SEGS ABS MAN K/uL 3.69   LYMPHO ABS MAN K/uL 2.57   MONO ABS MAN K/uL 1.46*   EOS ABS MAN K/uL 0.00*   BASOS ABS MAN K/uL 0.00*   MYELOCYTES, ABSOLUTE (MANUAL) K/uL 0.17*     Results from last 7 days   Lab Units 03/27/23  0901 03/26/23  1202 03/25/23  0946   SODIUM mEQ/L 135* 135* 136   POTASSIUM mEQ/L 3.3* 3.2* 3.5*   CHLORIDE mEQ/L 102 105 106   CO2 mEQ/L 23 23 21*   BUN mg/dL <5* <5* 7*   CREATININE mg/dL 0.6 0.7 0.7   GLUCOSE mg/dL 76 93 79   CALCIUM mg/dL 8.3* 8.2* 8.1*      Assessment/Plan     Alondra Villela is a 32 y.o. female admitted for dehydration, neutropenic fever,  and chemotherapy management of stage IV gestational trophoblastic neoplasia.      #GTN  - s/p day 1 of EMA-EP yesterday.   - Bhcg 83.6 yesterday, continuing to downtrend.  - Continue supportive medications tylenol/zofran/phenergan PRN  - Given she is stable, will likely be for discharge home today    #Neutropenia, resolved  - ANC most recently 3.69  - Will obtain one more CBC w/ differential/BMP/Mag this morning      #Severe Neutropenic Fever, resolved   - Temperature on admission 102.5 F, ANC 0.375, tachycardic in 120-130s. Tmax 103.5 F at 1538 on 3/20/23. She has not had a fever since 3/23/23 at 0820 (100.5F).   - Vitals are stable. Normotensive with a normal heart rate.   - Tylenol 975 mg q6 hours ordered as needed for an antipyretic   - COVID, influenza, RSV, and strep cultures all negative. CXR negative x 2  - Blood cultures from 3/20 and 3/23 no growth to date along with negative urine cultures from 3/20.   - s/p cefepime x 7 days and fluconazole x 7 days (2/2 thrush) per ID recommendations     #Thrombocytopenia  - Platelets 4K on admission. S/p transfusion of 6 units platelets this admission. Platelets most recently 25.  - She has been HLA matched to platelets given she was previously not responding well to transfusions and an antibody was found.  - We have two units of HLA-matched platelets if needed    #HTN  - BP in the 160s/180s systolic intermittently this admission. She was started on Procardia 30mg XL daily   - Blood pressures have been normotensive since starting the medication. She is asymptomatic.     #Hyperthyroidism  - Hx thyroid storm   - Follows with endocrinology Dr Hodge  - Last TSH 0.88 on 3/20/23   - Stable on medication     #Thrush/oral mucocutaneous ulcers  - Improving per patient report. She has been able to tolerate some liquids and soft solids.  - Seen by ENT who recommended viscous lidocaine. Continue magic mouthwash swish and spit.   - IV fluids as above given inability to tolerate intake  by mouth  - S/p nutrition consult who recommended boost plus chocolate supplements.    #Depression  - Stable, continue home medications Wellbutrin  mg daily and Propytiline 15 mg daily.      #Hx DVT in right UE  - Taking Eliquis 5 mg PO BID until 3/7.   - Eliquis was discontinued insetting of thrombocytopenia and episode of bleeding. Will discuss when to restart.  - SCDs for VTE ppx - messaged nurse to replace them this morning.    #Diet: Regular    Will review with Dr. Benedict Reeves MD

## 2023-03-28 NOTE — PLAN OF CARE
Pt seen for f/u - RD recs in note  Problem: Adult Inpatient Plan of Care  Goal: Plan of Care Review  Outcome: Progressing

## 2023-03-29 ENCOUNTER — TELEPHONE (OUTPATIENT)
Dept: GYNECOLOGIC ONCOLOGY | Facility: CLINIC | Age: 33
End: 2023-03-29
Payer: COMMERCIAL

## 2023-03-29 LAB
CROSSMATCH: NORMAL
ISBT CODE: 5100
ISBT CODE: 6200
ISBT CODE: 7300
PRODUCT CODE: NORMAL
PRODUCT STATUS: NORMAL
SPECIMEN EXP DATE BLD: NORMAL
UNIT ABO: NORMAL
UNIT ID: NORMAL
UNIT RH: POSITIVE

## 2023-03-29 NOTE — TELEPHONE ENCOUNTER
Shabana Gregg from [facility] called to see if the pt needs more blood - she has 3 more units that are good until Saturday. She is just checking - don't need to contact her if nothing is needed.  [phone].  
Thank you rubén. 
alone

## 2023-03-30 NOTE — PROGRESS NOTES
Determination letter received approval Cisplatin () and Etoposide () from 2/3/23 through 2/3/23.  Authorization #: [id number]

## 2023-03-31 ENCOUNTER — DOCUMENTATION (OUTPATIENT)
Dept: GYNECOLOGIC ONCOLOGY | Facility: CLINIC | Age: 33
End: 2023-03-31
Payer: COMMERCIAL

## 2023-03-31 ENCOUNTER — APPOINTMENT (OUTPATIENT)
Dept: LAB | Facility: HOSPITAL | Age: 33
End: 2023-03-31
Attending: STUDENT IN AN ORGANIZED HEALTH CARE EDUCATION/TRAINING PROGRAM
Payer: COMMERCIAL

## 2023-03-31 DIAGNOSIS — O01.9 GESTATIONAL TROPHOBLASTIC NEOPLASM: ICD-10-CM

## 2023-03-31 LAB
ALBUMIN SERPL-MCNC: 3.7 G/DL (ref 3.4–5)
ALP SERPL-CCNC: 94 IU/L (ref 35–126)
ALT SERPL-CCNC: 14 IU/L (ref 11–54)
ANION GAP SERPL CALC-SCNC: 12 MEQ/L (ref 3–15)
AST SERPL-CCNC: 14 IU/L (ref 15–41)
BASOPHILS # BLD: 0 K/UL (ref 0.01–0.1)
BASOPHILS NFR BLD: 0 %
BILIRUB SERPL-MCNC: 0.5 MG/DL (ref 0.3–1.2)
BUN SERPL-MCNC: 7 MG/DL (ref 8–20)
CALCIUM SERPL-MCNC: 9.4 MG/DL (ref 8.9–10.3)
CHLORIDE SERPL-SCNC: 102 MEQ/L (ref 98–109)
CO2 SERPL-SCNC: 22 MEQ/L (ref 22–32)
CREAT SERPL-MCNC: 1 MG/DL (ref 0.6–1.1)
DACRYOCYTES BLD QL SMEAR: ABNORMAL
DIFFERENTIAL METHOD BLD: ABNORMAL
DOHLE BOD BLD QL SMEAR: ABNORMAL
EOSINOPHIL # BLD: 0 K/UL (ref 0.04–0.36)
EOSINOPHIL NFR BLD: 0 %
ERYTHROCYTE [DISTWIDTH] IN BLOOD BY AUTOMATED COUNT: 13.2 % (ref 11.7–14.4)
GFR SERPL CREATININE-BSD FRML MDRD: >60 ML/MIN/1.73M*2
GLUCOSE SERPL-MCNC: 117 MG/DL (ref 70–99)
HCG SERPL-ACNC: 88.6 IU/L (MIU/ML)
HCT VFR BLDCO AUTO: 27.2 % (ref 35–45)
HGB BLD-MCNC: 9.4 G/DL (ref 11.8–15.7)
LYMPHOCYTES # BLD: 1.56 K/UL (ref 1.2–3.5)
LYMPHOCYTES NFR BLD: 31 %
MCH RBC QN AUTO: 28.1 PG (ref 28–33.2)
MCHC RBC AUTO-ENTMCNC: 34.6 G/DL (ref 32.2–35.5)
MCV RBC AUTO: 81.2 FL (ref 83–98)
MONOCYTES # BLD: 0.1 K/UL (ref 0.28–0.8)
MONOCYTES NFR BLD: 2 %
NEUTROPHILS # BLD: 3 K/UL (ref 1.7–7)
NEUTS BAND # BLD: 3.37 K/UL (ref 1.7–7)
NEUTS SEG NFR BLD: 67 %
NEUTS VAC BLD QL SMEAR: ABNORMAL
OVALOCYTES BLD QL SMEAR: ABNORMAL
PDW BLD AUTO: 11.4 FL (ref 9.4–12.3)
PLAT MORPH BLD: NORMAL
PLATELET # BLD AUTO: 52 K/UL (ref 150–369)
PLATELET # BLD EST: ABNORMAL 10*3/UL
POTASSIUM SERPL-SCNC: 3.8 MEQ/L (ref 3.6–5.1)
PROT SERPL-MCNC: 6.4 G/DL (ref 6–8.2)
RBC # BLD AUTO: 3.35 M/UL (ref 3.93–5.22)
SCHISTOCYTES BLD QL SMEAR: ABNORMAL
SODIUM SERPL-SCNC: 136 MEQ/L (ref 136–144)
TOXIC GRANULES BLD QL SMEAR: ABNORMAL
WBC # BLD AUTO: 5.03 K/UL (ref 3.8–10.5)

## 2023-03-31 PROCEDURE — 36415 COLL VENOUS BLD VENIPUNCTURE: CPT

## 2023-03-31 PROCEDURE — 84702 CHORIONIC GONADOTROPIN TEST: CPT

## 2023-03-31 PROCEDURE — 80053 COMPREHEN METABOLIC PANEL: CPT

## 2023-03-31 PROCEDURE — 85025 COMPLETE CBC W/AUTO DIFF WBC: CPT

## 2023-03-31 NOTE — PROGRESS NOTES
Inpatient chemotherapy prior authorization pending via [facility] for the below medications. Service date requested 4/9/23. Faxed clinicals to [facility] at [phone]. Called [facility] reviewer Danitzacarlota at [phone] to inform her authorization was submitted.   -Etoposide ()  -Actinomycin ()  -Methotrexate ()  Pending authorization #: [id number]    Addendum 4/4/23: Approval received for the below medications for inpatient admission scheduled for 4/9/23. Approval letter scanned to patient chart.  -Etoposide ()  -Actinomycin ()  -Methotrexate ()  Authorization #: [id number]

## 2023-04-03 ENCOUNTER — OFFICE VISIT (OUTPATIENT)
Dept: GYNECOLOGIC ONCOLOGY | Facility: CLINIC | Age: 33
End: 2023-04-03
Attending: STUDENT IN AN ORGANIZED HEALTH CARE EDUCATION/TRAINING PROGRAM
Payer: COMMERCIAL

## 2023-04-03 ENCOUNTER — HOSPITAL ENCOUNTER (OUTPATIENT)
Dept: INPATIENT UNIT | Facility: HOSPITAL | Age: 33
Discharge: HOME | End: 2023-04-03
Attending: PHYSICIAN ASSISTANT
Payer: COMMERCIAL

## 2023-04-03 VITALS
DIASTOLIC BLOOD PRESSURE: 69 MMHG | BODY MASS INDEX: 27.2 KG/M2 | TEMPERATURE: 98.1 F | SYSTOLIC BLOOD PRESSURE: 101 MMHG | RESPIRATION RATE: 16 BRPM | HEART RATE: 123 BPM | OXYGEN SATURATION: 99 % | WEIGHT: 157.4 LBS

## 2023-04-03 VITALS
OXYGEN SATURATION: 100 % | DIASTOLIC BLOOD PRESSURE: 66 MMHG | SYSTOLIC BLOOD PRESSURE: 108 MMHG | RESPIRATION RATE: 17 BRPM | TEMPERATURE: 97.1 F | HEART RATE: 111 BPM

## 2023-04-03 DIAGNOSIS — O01.9 GESTATIONAL TROPHOBLASTIC NEOPLASM: Primary | ICD-10-CM

## 2023-04-03 DIAGNOSIS — D70.2 DRUG-INDUCED NEUTROPENIA (CMS/HCC): ICD-10-CM

## 2023-04-03 DIAGNOSIS — O01.9 GESTATIONAL TROPHOBLASTIC NEOPLASM: ICD-10-CM

## 2023-04-03 DIAGNOSIS — Z51.11 ENCOUNTER FOR ANTINEOPLASTIC CHEMOTHERAPY: ICD-10-CM

## 2023-04-03 PROCEDURE — 99215 OFFICE O/P EST HI 40 MIN: CPT | Performed by: STUDENT IN AN ORGANIZED HEALTH CARE EDUCATION/TRAINING PROGRAM

## 2023-04-03 PROCEDURE — 96375 TX/PRO/DX INJ NEW DRUG ADDON: CPT

## 2023-04-03 PROCEDURE — 96415 CHEMO IV INFUSION ADDL HR: CPT

## 2023-04-03 PROCEDURE — 96417 CHEMO IV INFUS EACH ADDL SEQ: CPT

## 2023-04-03 PROCEDURE — 3E043GC INTRODUCTION OF OTHER THERAPEUTIC SUBSTANCE INTO CENTRAL VEIN, PERCUTANEOUS APPROACH: ICD-10-PCS | Performed by: PHYSICIAN ASSISTANT

## 2023-04-03 PROCEDURE — 25800000 HC PHARMACY IV SOLUTIONS: Performed by: STUDENT IN AN ORGANIZED HEALTH CARE EDUCATION/TRAINING PROGRAM

## 2023-04-03 PROCEDURE — 3E0433Z INTRODUCTION OF ANTI-INFLAMMATORY INTO CENTRAL VEIN, PERCUTANEOUS APPROACH: ICD-10-PCS | Performed by: PHYSICIAN ASSISTANT

## 2023-04-03 PROCEDURE — 3008F BODY MASS INDEX DOCD: CPT | Performed by: STUDENT IN AN ORGANIZED HEALTH CARE EDUCATION/TRAINING PROGRAM

## 2023-04-03 PROCEDURE — 96361 HYDRATE IV INFUSION ADD-ON: CPT

## 2023-04-03 PROCEDURE — 63600000 HC DRUGS/DETAIL CODE: Performed by: STUDENT IN AN ORGANIZED HEALTH CARE EDUCATION/TRAINING PROGRAM

## 2023-04-03 PROCEDURE — 96368 THER/DIAG CONCURRENT INF: CPT

## 2023-04-03 PROCEDURE — 96367 TX/PROPH/DG ADDL SEQ IV INF: CPT

## 2023-04-03 PROCEDURE — 3E04305 INTRODUCTION OF OTHER ANTINEOPLASTIC INTO CENTRAL VEIN, PERCUTANEOUS APPROACH: ICD-10-PCS | Performed by: PHYSICIAN ASSISTANT

## 2023-04-03 PROCEDURE — 96413 CHEMO IV INFUSION 1 HR: CPT

## 2023-04-03 RX ORDER — HEPARIN 100 UNIT/ML
500 SYRINGE INTRAVENOUS AS NEEDED
Status: DISCONTINUED | OUTPATIENT
Start: 2023-04-03 | End: 2023-04-04 | Stop reason: HOSPADM

## 2023-04-03 RX ORDER — FILGRASTIM-SNDZ 480 UG/.8ML
480 INJECTION, SOLUTION INTRAVENOUS; SUBCUTANEOUS SEE ADMIN INSTRUCTIONS
Qty: 12 ML | Refills: 1 | Status: SHIPPED | OUTPATIENT
Start: 2023-04-03 | End: 2023-06-12 | Stop reason: ALTCHOICE

## 2023-04-03 RX ORDER — HEPARIN 100 UNIT/ML
500 SYRINGE INTRAVENOUS AS NEEDED
Status: CANCELLED | OUTPATIENT
Start: 2023-04-03

## 2023-04-03 RX ORDER — PALONOSETRON 0.05 MG/ML
250 INJECTION, SOLUTION INTRAVENOUS ONCE
Status: COMPLETED | OUTPATIENT
Start: 2023-04-03 | End: 2023-04-03

## 2023-04-03 RX ORDER — SODIUM CHLORIDE 9 MG/ML
500 INJECTION, SOLUTION INTRAVENOUS CONTINUOUS
Status: ACTIVE | OUTPATIENT
Start: 2023-04-03 | End: 2023-04-03

## 2023-04-03 RX ADMIN — PALONOSETRON HYDROCHLORIDE 250 MCG: 0.25 INJECTION, SOLUTION INTRAVENOUS at 10:11

## 2023-04-03 RX ADMIN — HEPARIN 500 UNITS: 100 SYRINGE at 16:30

## 2023-04-03 RX ADMIN — SODIUM CHLORIDE 150 MG: 900 INJECTION, SOLUTION INTRAVENOUS at 10:13

## 2023-04-03 RX ADMIN — DEXAMETHASONE SODIUM PHOSPHATE 12 MG: 4 INJECTION, SOLUTION INTRA-ARTICULAR; INTRALESIONAL; INTRAMUSCULAR; INTRAVENOUS; SOFT TISSUE at 10:12

## 2023-04-03 RX ADMIN — SODIUM CHLORIDE 500 ML/HR: 9 INJECTION, SOLUTION INTRAVENOUS at 15:25

## 2023-04-03 RX ADMIN — DEXTROSE MONOHYDRATE 184 MG: 50 INJECTION, SOLUTION INTRAVENOUS at 12:05

## 2023-04-03 RX ADMIN — CISPLATIN 110 MG: 1 INJECTION, SOLUTION INTRAVENOUS at 13:20

## 2023-04-03 RX ADMIN — MAGNESIUM SULFATE HEPTAHYDRATE 500 ML/HR: 500 INJECTION, SOLUTION INTRAMUSCULAR; INTRAVENOUS at 09:17

## 2023-04-03 ASSESSMENT — ENCOUNTER SYMPTOMS
LOSS OF SENSATION IN FEET: 0
OCCASIONAL FEELINGS OF UNSTEADINESS: 0
DEPRESSION: 0

## 2023-04-03 ASSESSMENT — PAIN SCALES - GENERAL: PAINLEVEL: 0-NO PAIN

## 2023-04-03 NOTE — PATIENT INSTRUCTIONS
Treatment Information Sheet     Please Call your doctor or nurse if you experience any of the following:  Pain not controlled by your medications  Pain, redness, swelling, or blistering near or at your injection site or Port site  Have a fever 100.4ºF (38.0º C) or higher  Nausea or Vomiting not controlled with your medications  Mouth sores, pain with swallowing, or an inability to swallow foods or fluids  Notice black bowel movements, bruising, faint red rashes, or signs of bleeding  Develop ankle swelling, shortness of breath, difficulty breathing, chest pain, or cough.  Have any unexpected, unexplained problems                     Have any questions or concerns                       Chemotherapy is in your body fluids for 48 hours- use precautions   It is safe for your family to have contact with you during treatment   Flush toilet twice after using, with lid closed   Your caregiver should wear gloves if handling your body fluids for 48 hours after your treatment, followed by thorough handwashing   Special precautions may be necessary if you are sexually active during your treatment (see additional info under “Chemotherapy” section)   [email or web address]: water is a helpful website for information

## 2023-04-03 NOTE — ASSESSMENT & PLAN NOTE
Hcg plateued/slight rise from last week. Discussed chemo today and repeating Hcg before next cycle. If still the same/continuing to rise. Will perform CT scan c/a/p to assess disease. Discussed options of possible TP/TE vs immunotherapy vs possible hysterectomy if disease is resistant to current regimen.    Otherwise doing okay with chemotherapy. Labs reviewed, cleared for EP today.    Follow up this Sunday for direct admission

## 2023-04-03 NOTE — PROGRESS NOTES
Ms. Alondra Villela is a 32 y.o.yo lady with  Cancer Staging   Gestational trophoblastic neoplasm  Staging form: Gestational Trophoblastic Neoplasms, AJCC 8th Edition  - Clinical stage from 1/12/2023: FIGO Stage IV (cM1b, Risk score: 17) - Signed by Shantelle Mcmullen MD on 1/26/2023  . She presents for chemotherapy.   Oncology History   Gestational trophoblastic neoplasm   1/10/2023 Tumor Markers     on initial testing. Dilutional studies repeated and found to be >461,700     1/11/2023 Imaging Significant Findings    1.  No intrauterine gestation and no adnexal mass evident. These findings  represent a pregnancy of unknown location. The appearance of a complex mass with  small cystic spaces and peripheral vascularity occupying the central uterine  cavity could represent a molar pregnancy but additional evaluation is suggested.  2.  Both ovaries are mildly enlarged with multiple complex cystic lesions as  well as some with solid-appearing echoes that may represent a combination of  hemorrhagic cysts/follicles or endometriomas, but challenging to evaluate as the  ovaries are only well evaluated transabdominally. Recommended these be  reassessed on follow-up. No sonographic findings of ovarian torsion.     1/12/2023 Imaging Significant Findings    MRI brain with contrast  1.  No abnormal intracranial contrast enhancement identified to suggest  intracranial metastatic disease.  2.  Previously seen tiny foci of increased T2 signal in the white matter do not  demonstrate contrast enhancement.  These findings could reflect foci of chronic  ischemia, the sequelae of migraine headaches, demyelinating or postinfectious  processes.  Other etiologies are not excluded.  Clinical correlation is  recommended.     1/12/2023 Imaging Significant Findings    CTPA  IMPRESSION:  No evidence for pulmonary embolism.  5 cm right lower lobe mass. Anterior mediastinal mass.  Innumerable bilateral pulmonary nodules which are  suspicious for metastatic  disease to the lungs.  2 hyperenhancing liver lesions, the larger measuring 5 cm in maximal dimension  for which further assessment with MRI with and without contrast is recommended.     1/12/2023 Biopsy    Image-guided biopsy of right lower lobe lesion benign liver parenchyma     1/12/2023 Cancer Staged    Staging form: Gestational Trophoblastic Neoplasms, AJCC 8th Edition  - Clinical stage from 1/12/2023: FIGO Stage IV (cM1b, Risk score: 17)     1/13/2023 - 1/27/2023 Chemotherapy    CISplatin / Etoposide, 7 day Cycles - GTN Induction  Plan Provider: Lorena Hogue MD  Treatment goal: Curative  Line of treatment: Induction     1/13/2023 Imaging Significant Findings    CT abd/pelvis  1.   Uterine mass measuring up to 13.8 cm, consistent with the clinically  suspected diagnosis of choriocarcinoma.  2.  Extensive pulmonary metastases, most notably a right lower lobe 4.9 cm  subpleural mass.  3.  Enlarged ovaries with numerous cysts likely representing theca lutein cysts  4.  Indeterminate 0.8 cm hypoattenuating lesion at the dome of the liver.  The  previously noted hypervascular liver lesions are not well seen due to the phase  of contrast at the time of imaging.  Contrast-enhanced MRI of the abdomen can be  considered for more optimal characterization.  5.  Indeterminate hypoattenuating lesions in the right kidney which can also be  more optimally evaluated with MRI.     1/13/2023 Tumor Markers    HCG >1.8468 million     1/30/2023 -  Chemotherapy    Inpatient: EMA/EP (Etoposide / Methotrexate / Dactinomycin / Etoposide / CISPlatin), 14 Day Cycles - Gestational Trophoblastic Neoplasia   Plan Provider: Shantelle Mcmullen MD  Treatment goal: Curative  Line of treatment: First Line         She reports nausea/vomiting- at her baseline, denies neuropathy, denies excessive fatigue, denies constipation. Denies any hearing issue, denies signifcant swelling. Mouth and throat pain improved.      CHEMOTHERAPY REGIMEN: EMA-EP     CYCLE 5 Day 8    PROBLEM LIST:  Patient Active Problem List    Diagnosis Date Noted   • Sepsis, due to unspecified organism, unspecified whether acute organ dysfunction present (CMS/HCC) 2023   • Sore throat 2023   • Tinnitus of both ears 2023   • Encounter for antineoplastic chemotherapy 2023   • Neutropenic fever (CMS/HCC) 2023   • DVT (deep vein thrombosis) in pregnancy 2023   • Thrombocytopenia (CMS/HCC) 2023   • Hematuria 2023   • Anxiety and depression 2023   • Hyponatremia 2023   • Drug-induced neutropenia (CMS/HCC) 2023   • Abnormal CT scan 2023   • Hyperthyroidism 2023   • Gestational trophoblastic neoplasm 2023   • History of COVID-19 2023   • Microcytic anemia 2023        Medical History:   Past Medical History:   Diagnosis Date   • Hx antineoplastic chemo        Surgical History:   Past Surgical History:   Procedure Laterality Date   •  SECTION      x2       Social History:   Social History     Socioeconomic History   • Marital status:      Spouse name: None   • Number of children: None   • Years of education: None   • Highest education level: None   Tobacco Use   • Smoking status: Never   • Smokeless tobacco: Never   Vaping Use   • Vaping status: Never Used   Substance and Sexual Activity   • Alcohol use: Not Currently   • Drug use: Never   • Sexual activity: Not Currently     Social Determinants of Health     Food Insecurity: No Food Insecurity (3/20/2023)    Hunger Vital Sign    • Worried About Running Out of Food in the Last Year: Never true    • Ran Out of Food in the Last Year: Never true   Transportation Needs: No Transportation Needs (2023)    PRAPARE - Transportation    • Lack of Transportation (Medical): No    • Lack of Transportation (Non-Medical): No       Family History:   Family History   Problem Relation Age of Onset   • Hypertension  Biological Mother    • Hypertension Biological Father        Allergies: Patient has no known allergies.    Medications:      Medication List          Accurate as of April 3, 2023 12:45 PM. If you have any questions, ask your nurse or doctor.            CONTINUE taking these medications    benzonatate 100 mg capsule  Commonly known as: TESSALON  Take 100 mg by mouth 3 (three) times a day as needed for cough.  Dose: 100 mg     buPROPion  mg 24 hr tablet  Commonly known as: WELLBUTRIN XL  Take 150 mg by mouth nightly.  Dose: 150 mg     hydrocortisone 2.5 % cream  Apply 2 g topically as needed for irritation or rash.  Dose: 2 g     lidocaine-prilocaine cream  Commonly known as: EMLA  Apply topically as needed for mild pain. Apply a quarter size amount to skin over port site approximately one hour prior to chemotherapy. Cover with saran wrap.     magic mouthwash with nystatin  Commonly known as: BENADRYL-MAALOX-LIDOCAINE-NYSTATIN  Swish and spit 15 mL every 4 (four) hours as needed for stomatitis.  Dose: 15 mL     magnesium chloride 64 mg tablet,delayed release (DR/EC)  Take 1 tablet (64 mg total) by mouth 2 (two) times a day.  Dose: 64 mg     NIFEdipine XL 30 mg 24 hr tablet  Commonly known as: PROCARDIA XL  Take 1 tablet (30 mg total) by mouth daily.  Dose: 30 mg     ondansetron ODT 4 mg disintegrating tablet  Commonly known as: ZOFRAN-ODT  Take 1-2 tablets (4-8 mg total) by mouth every 6 (six) hours as needed for nausea or vomiting (Nausea/Vomiting). Maximum 32mg per day.  Dose: 4-8 mg     promethazine 12.5 mg tablet  Commonly known as: PHENERGAN  Take 12.5 mg by mouth every 6 (six) hours as needed for nausea or vomiting.  Dose: 12.5 mg     protriptyline 5 mg tablet  Commonly known as: VIVACTIL  Take 15 mg by mouth nightly.  Dose: 15 mg     triamcinolone 0.1 % ointment  Commonly known as: KENALOG  Apply topically 2 times daily.     ZARXIO 480 mcg/0.8 mL syringe injection  Inject 0.8 mL (480 mcg total) under the  skin See admin instr for 15 doses. Inject under the skin on days 3, 4, 5, 9, 10, 11 and 12 of chemotherapy cycle.  Dose: 480 mcg  Generic drug: filgrastim-sndz            Review of Systems  All other systems reviewed and negative except as noted in the HPI.    Labs  I have reviewed the patient's labs.  Platelets, WBC significant improved      Physical Exam:  Visit Vitals  /69 (BP Location: Right upper arm, Patient Position: Sitting)   Pulse (!) 123   Temp 36.7 °C (98.1 °F)   Resp 16   Wt 71.4 kg (157 lb 6.4 oz)   SpO2 99%   BMI 27.20 kg/m²       General: well-developed, well-nourished, no apparent distress  Neck: supple, no masses  Lymphatic: no supraclavicular, cervical, or inguinal adenopathy  Respiratory: lungs clear to auscultation bilaterally, normal respiratory effort  Cardiovascular: regular rate and rhythm, no murmurs, rubs, gallops.   Extremity: no clubbing, cyanosis, edema  GI: abdomen soft, non-distended, non-tender, no hepatosplenomegaly, no masses  Neurologic: alert & oriented x3, no gross deficits  Psychiatric: mood and affect normal  Musculoskeletal: no deformity or gross strength deficit  Gynecologic: deferred       ASSESSMENT/PLAN:  Assessment   32 y.o. lady with   Cancer Staging   Gestational trophoblastic neoplasm  Staging form: Gestational Trophoblastic Neoplasms, AJCC 8th Edition  - Clinical stage from 1/12/2023: FIGO Stage IV (cM1b, Risk score: 17) - Signed by Shantelle Mcmullen MD on 1/26/2023   who presents for chemotherapy.  ECOG performance status is :0    Problem List Items Addressed This Visit        Hematologic    Drug-induced neutropenia (CMS/HCC)    Current Assessment & Plan     Continue fligrastim as scheduled            Obstetric    Gestational trophoblastic neoplasm - Primary    Overview     S/p EP induction chemotherapy 1/13-1/14/23 and 1/20-1/21/23  - S/p Etoposide, Dactinomycin, and Methotrexate 1/30-2/2.  - S/p Etoposide and Cisplatin 2/6.  - S/p cycle 2 Etoposide,  Dactinomycin, and Methotrexate 2/13.   - S/p Etoposide and Cisplatin 2/20.  - She started cycle 3 on 2/27 with EMA-EP protocol. Etoposide, Dactomtcin, and MTX          Current Assessment & Plan     Hcg plateued/slight rise from last week. Discussed chemo today and repeating Hcg before next cycle. If still the same/continuing to rise. Will perform CT scan c/a/p to assess disease. Discussed options of possible TP/TE vs immunotherapy vs possible hysterectomy if disease is resistant to current regimen.    Otherwise doing okay with chemotherapy. Labs reviewed, cleared for EP today.    Follow up this Sunday for direct admission           Relevant Orders    [facility] Infusion Appointment Request - 480 min    Oncology CBC and Diff ([facility] Lab Only)    Infusion Appointment Request    Comprehensive metabolic panel    ONCBCN INFUSION APPOINTMENT REQUEST 480 MIN       Other    Encounter for antineoplastic chemotherapy       Shantelle Mcmullen MD

## 2023-04-03 NOTE — PROGRESS NOTES
0850:  Patient arrived from Dr. Mcmullen's office and cleared for day 8 cycle 5 of EMA-EP treatment for Gestational Trophoblastic Neoplasia.  Patient denies pain, mouth sores have resolved, and has no difficulty or pain with swallowing.  RCW port accessed with +BR.  Orders released.      0917:  NSS 1 liter with 20 eq Kcl and 1 gram Mag Sulfate up and set to infuse over 2 hours.     0950:  Confirmed with AMOR MELARA, that patient is on the right cycle and day of regimen (D8C5) .      1013: Patient's IVF stopped and given premeds:  Aloxi 250 mcg IV, Decadron 12 mg IVPB and Emend 150 mg IVPB.     1052:  Premeds completed, IVF restarted at 500 cc/hour.     1205:  IVF complete, port flushed, +BR.  Etoposide 184 mg IV up and set to infuse for 1 hour.      1320: Etoposide complete, no reaction noted, patient without complains.  Port flushed, +BR. Cisplatin 110 mg IVPB up and set to infuse over 2 hours.     1525:  Cisplatin completed without incident.   cc/hour up.

## 2023-04-10 ENCOUNTER — HOSPITAL ENCOUNTER (INPATIENT)
Facility: HOSPITAL | Age: 33
LOS: 1 days | Discharge: HOME | DRG: 847 | End: 2023-04-11
Attending: STUDENT IN AN ORGANIZED HEALTH CARE EDUCATION/TRAINING PROGRAM | Admitting: STUDENT IN AN ORGANIZED HEALTH CARE EDUCATION/TRAINING PROGRAM
Payer: COMMERCIAL

## 2023-04-10 DIAGNOSIS — O01.9 GESTATIONAL TROPHOBLASTIC NEOPLASM: Primary | ICD-10-CM

## 2023-04-10 LAB
ABO + RH BLD: NORMAL
ALBUMIN SERPL-MCNC: 3.5 G/DL (ref 3.4–5)
ALP SERPL-CCNC: 136 IU/L (ref 35–126)
ALT SERPL-CCNC: 24 IU/L (ref 11–54)
ANION GAP SERPL CALC-SCNC: 11 MEQ/L (ref 3–15)
AST SERPL-CCNC: 16 IU/L (ref 15–41)
BASOPHILS # BLD: 0 K/UL (ref 0.01–0.1)
BASOPHILS NFR BLD: 0 %
BILIRUB SERPL-MCNC: 0.2 MG/DL (ref 0.3–1.2)
BLD GP AB SCN SERPL QL: NEGATIVE
BUN SERPL-MCNC: 9 MG/DL (ref 8–20)
CALCIUM SERPL-MCNC: 8.5 MG/DL (ref 8.9–10.3)
CHLORIDE SERPL-SCNC: 105 MEQ/L (ref 98–109)
CO2 SERPL-SCNC: 23 MEQ/L (ref 22–32)
CREAT SERPL-MCNC: 0.9 MG/DL (ref 0.6–1.1)
D AG BLD QL: POSITIVE
DACRYOCYTES BLD QL SMEAR: ABNORMAL
DIFFERENTIAL METHOD BLD: ABNORMAL
EOSINOPHIL # BLD: 0 K/UL (ref 0.04–0.36)
EOSINOPHIL NFR BLD: 0 %
ERYTHROCYTE [DISTWIDTH] IN BLOOD BY AUTOMATED COUNT: 14.4 % (ref 11.7–14.4)
GFR SERPL CREATININE-BSD FRML MDRD: >60 ML/MIN/1.73M*2
GLUCOSE SERPL-MCNC: 121 MG/DL (ref 70–99)
HCG SERPL-ACNC: 64 IU/L (MIU/ML)
HCT VFR BLDCO AUTO: 22.2 % (ref 35–45)
HGB BLD-MCNC: 7.6 G/DL (ref 11.8–15.7)
LABORATORY COMMENT REPORT: NORMAL
LYMPHOCYTES # BLD: 2.5 K/UL (ref 1.2–3.5)
LYMPHOCYTES NFR BLD: 19 %
MAGNESIUM SERPL-MCNC: 0.9 MG/DL (ref 1.8–2.5)
MCH RBC QN AUTO: 28.8 PG (ref 28–33.2)
MCHC RBC AUTO-ENTMCNC: 34.2 G/DL (ref 32.2–35.5)
MCV RBC AUTO: 84.1 FL (ref 83–98)
METAMYELOCYTES # BLD MANUAL: 0.13 K/UL
METAMYELOCYTES NFR BLD MANUAL: 1 %
MONOCYTES # BLD: 0.53 K/UL (ref 0.28–0.8)
MONOCYTES NFR BLD: 4 %
MYELOCYTES # BLD MANUAL: 0.26 K/UL
MYELOCYTES NFR BLD MANUAL: 2 %
NEUTS BAND # BLD: 0.53 K/UL (ref 0–0.53)
NEUTS BAND # BLD: 9.23 K/UL (ref 1.7–7)
NEUTS BAND NFR BLD: 4 %
NEUTS SEG NFR BLD: 70 %
OVALOCYTES BLD QL SMEAR: ABNORMAL
PDW BLD AUTO: 10.2 FL (ref 9.4–12.3)
PLAT MORPH BLD: NORMAL
PLATELET # BLD AUTO: 101 K/UL (ref 150–369)
PLATELET # BLD EST: ABNORMAL 10*3/UL
POLYCHROMASIA BLD QL SMEAR: ABNORMAL
POTASSIUM SERPL-SCNC: 3.2 MEQ/L (ref 3.6–5.1)
PROT SERPL-MCNC: 5.8 G/DL (ref 6–8.2)
RBC # BLD AUTO: 2.64 M/UL (ref 3.93–5.22)
SODIUM SERPL-SCNC: 139 MEQ/L (ref 136–144)
SPECIMEN EXP DATE BLD: NORMAL
TOXIC GRANULES BLD QL SMEAR: SLIGHT
WBC # BLD AUTO: 13.18 K/UL (ref 3.8–10.5)

## 2023-04-10 PROCEDURE — 99222 1ST HOSP IP/OBS MODERATE 55: CPT | Performed by: STUDENT IN AN ORGANIZED HEALTH CARE EDUCATION/TRAINING PROGRAM

## 2023-04-10 PROCEDURE — 84702 CHORIONIC GONADOTROPIN TEST: CPT

## 2023-04-10 PROCEDURE — 25000000 HC PHARMACY GENERAL: Performed by: STUDENT IN AN ORGANIZED HEALTH CARE EDUCATION/TRAINING PROGRAM

## 2023-04-10 PROCEDURE — 36415 COLL VENOUS BLD VENIPUNCTURE: CPT

## 2023-04-10 PROCEDURE — 63700000 HC SELF-ADMINISTRABLE DRUG

## 2023-04-10 PROCEDURE — 21400000 HC ROOM AND CARE CCU/INTERMEDIATE

## 2023-04-10 PROCEDURE — 3E04305 INTRODUCTION OF OTHER ANTINEOPLASTIC INTO CENTRAL VEIN, PERCUTANEOUS APPROACH: ICD-10-PCS | Performed by: STUDENT IN AN ORGANIZED HEALTH CARE EDUCATION/TRAINING PROGRAM

## 2023-04-10 PROCEDURE — 63600000 HC DRUGS/DETAIL CODE: Performed by: OBSTETRICS & GYNECOLOGY

## 2023-04-10 PROCEDURE — 25800000 HC PHARMACY IV SOLUTIONS: Performed by: STUDENT IN AN ORGANIZED HEALTH CARE EDUCATION/TRAINING PROGRAM

## 2023-04-10 PROCEDURE — 63600000 HC DRUGS/DETAIL CODE: Performed by: STUDENT IN AN ORGANIZED HEALTH CARE EDUCATION/TRAINING PROGRAM

## 2023-04-10 PROCEDURE — 86900 BLOOD TYPING SEROLOGIC ABO: CPT

## 2023-04-10 PROCEDURE — 85025 COMPLETE CBC W/AUTO DIFF WBC: CPT

## 2023-04-10 PROCEDURE — 83735 ASSAY OF MAGNESIUM: CPT

## 2023-04-10 PROCEDURE — 80053 COMPREHEN METABOLIC PANEL: CPT

## 2023-04-10 RX ORDER — DIPHENHYDRAMINE HCL 50 MG/ML
25 VIAL (ML) INJECTION ONCE AS NEEDED
Status: DISCONTINUED | OUTPATIENT
Start: 2023-04-10 | End: 2023-04-11 | Stop reason: HOSPADM

## 2023-04-10 RX ORDER — SODIUM CHLORIDE 9 MG/ML
500 INJECTION, SOLUTION INTRAVENOUS CONTINUOUS
Status: CANCELLED | OUTPATIENT
Start: 2023-04-17

## 2023-04-10 RX ORDER — BENZONATATE 100 MG/1
100 CAPSULE ORAL 3 TIMES DAILY PRN
Status: DISCONTINUED | OUTPATIENT
Start: 2023-04-10 | End: 2023-04-11 | Stop reason: HOSPADM

## 2023-04-10 RX ORDER — EPINEPHRINE 1 MG/ML
0.5 INJECTION, SOLUTION INTRAMUSCULAR; SUBCUTANEOUS ONCE AS NEEDED
Status: CANCELLED | OUTPATIENT
Start: 2023-04-17

## 2023-04-10 RX ORDER — FAMOTIDINE 10 MG/ML
20 INJECTION INTRAVENOUS ONCE AS NEEDED
Status: CANCELLED | OUTPATIENT
Start: 2023-04-17

## 2023-04-10 RX ORDER — LEUCOVORIN CALCIUM 5 MG/1
15 TABLET ORAL
Status: CANCELLED | OUTPATIENT
Start: 2023-04-12

## 2023-04-10 RX ORDER — EPINEPHRINE 1 MG/ML
0.5 INJECTION, SOLUTION INTRAMUSCULAR; SUBCUTANEOUS ONCE AS NEEDED
Status: DISCONTINUED | OUTPATIENT
Start: 2023-04-10 | End: 2023-04-11 | Stop reason: HOSPADM

## 2023-04-10 RX ORDER — PALONOSETRON 0.05 MG/ML
250 INJECTION, SOLUTION INTRAVENOUS ONCE
Status: CANCELLED | OUTPATIENT
Start: 2023-04-17

## 2023-04-10 RX ORDER — POTASSIUM CHLORIDE 750 MG/1
40 TABLET, EXTENDED RELEASE ORAL ONCE
Status: COMPLETED | OUTPATIENT
Start: 2023-04-10 | End: 2023-04-10

## 2023-04-10 RX ORDER — PROCHLORPERAZINE MALEATE 10 MG
10 TABLET ORAL EVERY 6 HOURS PRN
Status: DISCONTINUED | OUTPATIENT
Start: 2023-04-10 | End: 2023-04-11 | Stop reason: HOSPADM

## 2023-04-10 RX ORDER — ONDANSETRON 4 MG/1
4-8 TABLET, ORALLY DISINTEGRATING ORAL EVERY 6 HOURS PRN
Status: DISCONTINUED | OUTPATIENT
Start: 2023-04-10 | End: 2023-04-10

## 2023-04-10 RX ORDER — DIPHENHYDRAMINE HCL 50 MG/ML
25 VIAL (ML) INJECTION ONCE AS NEEDED
Status: CANCELLED | OUTPATIENT
Start: 2023-04-17

## 2023-04-10 RX ORDER — BUPROPION HYDROCHLORIDE 150 MG/1
150 TABLET ORAL NIGHTLY
Status: DISCONTINUED | OUTPATIENT
Start: 2023-04-10 | End: 2023-04-11 | Stop reason: HOSPADM

## 2023-04-10 RX ORDER — NIFEDIPINE 30 MG/1
30 TABLET, FILM COATED, EXTENDED RELEASE ORAL DAILY
Status: DISCONTINUED | OUTPATIENT
Start: 2023-04-10 | End: 2023-04-11 | Stop reason: HOSPADM

## 2023-04-10 RX ORDER — PROTRIPTYLINE HYDROCHLORIDE 5 MG/1
15 TABLET, FILM COATED ORAL NIGHTLY
Status: DISCONTINUED | OUTPATIENT
Start: 2023-04-10 | End: 2023-04-11 | Stop reason: HOSPADM

## 2023-04-10 RX ORDER — FAMOTIDINE 10 MG/ML
20 INJECTION INTRAVENOUS ONCE AS NEEDED
Status: CANCELLED | OUTPATIENT
Start: 2023-04-12

## 2023-04-10 RX ORDER — OXYMETAZOLINE HCL 0.05 %
1 SPRAY, NON-AEROSOL (ML) NASAL 2 TIMES DAILY PRN
Status: DISCONTINUED | OUTPATIENT
Start: 2023-04-10 | End: 2023-04-11 | Stop reason: HOSPADM

## 2023-04-10 RX ORDER — PROMETHAZINE HYDROCHLORIDE 25 MG/1
12.5 TABLET ORAL EVERY 6 HOURS PRN
Status: DISCONTINUED | OUTPATIENT
Start: 2023-04-10 | End: 2023-04-10

## 2023-04-10 RX ORDER — PANTOPRAZOLE SODIUM 40 MG/1
40 TABLET, DELAYED RELEASE ORAL DAILY
Status: DISCONTINUED | OUTPATIENT
Start: 2023-04-10 | End: 2023-04-11 | Stop reason: HOSPADM

## 2023-04-10 RX ORDER — DOCUSATE SODIUM 100 MG/1
100 CAPSULE, LIQUID FILLED ORAL 2 TIMES DAILY PRN
Status: DISCONTINUED | OUTPATIENT
Start: 2023-04-10 | End: 2023-04-11 | Stop reason: HOSPADM

## 2023-04-10 RX ORDER — EPINEPHRINE 1 MG/ML
0.5 INJECTION, SOLUTION INTRAMUSCULAR; SUBCUTANEOUS ONCE AS NEEDED
Status: CANCELLED | OUTPATIENT
Start: 2023-04-12

## 2023-04-10 RX ORDER — DIPHENHYDRAMINE HCL 50 MG/ML
25 VIAL (ML) INJECTION ONCE AS NEEDED
Status: CANCELLED | OUTPATIENT
Start: 2023-04-12

## 2023-04-10 RX ORDER — FAMOTIDINE 10 MG/ML
20 INJECTION INTRAVENOUS ONCE AS NEEDED
Status: DISCONTINUED | OUTPATIENT
Start: 2023-04-10 | End: 2023-04-11 | Stop reason: HOSPADM

## 2023-04-10 RX ADMIN — POTASSIUM CHLORIDE 40 MEQ: 750 TABLET, EXTENDED RELEASE ORAL at 10:42

## 2023-04-10 RX ADMIN — PROTRIPTYLINE HYDROCHLORIDE 15 MG: 5 TABLET, FILM COATED ORAL at 20:44

## 2023-04-10 RX ADMIN — NIFEDIPINE 30 MG: 30 TABLET, FILM COATED, EXTENDED RELEASE ORAL at 10:00

## 2023-04-10 RX ADMIN — PANTOPRAZOLE SODIUM 40 MG: 40 TABLET, DELAYED RELEASE ORAL at 10:11

## 2023-04-10 RX ADMIN — METHOTREXATE 550 MG: 25 INJECTION, SOLUTION INTRA-ARTERIAL; INTRAMUSCULAR; INTRATHECAL; INTRAVENOUS at 17:22

## 2023-04-10 RX ADMIN — DACTINOMYCIN 0.5 MG: 0.5 INJECTION, POWDER, LYOPHILIZED, FOR SOLUTION INTRAVENOUS at 17:10

## 2023-04-10 RX ADMIN — MAGNESIUM SULFATE HEPTAHYDRATE 4 G: 40 INJECTION, SOLUTION INTRAVENOUS at 11:19

## 2023-04-10 RX ADMIN — ONDANSETRON HYDROCHLORIDE: 2 INJECTION, SOLUTION INTRAMUSCULAR; INTRAVENOUS at 15:30

## 2023-04-10 RX ADMIN — DEXTROSE 184 MG: 5 SOLUTION INTRAVENOUS at 16:00

## 2023-04-10 RX ADMIN — BUPROPION HYDROCHLORIDE 150 MG: 150 TABLET, EXTENDED RELEASE ORAL at 20:43

## 2023-04-10 NOTE — PLAN OF CARE
Care Coordination Admission Assessment Note  Date: 4/10/2023   Time: 7:43 AM    Patient Name: Alondra Villela  Medical Record Number: [id number]  YOB: 1990  Room/BED: 0376    General Information  Patient-Specific Goals (Include Timeframe)     PCP: Sunil Hinton MD   Insurance Coverage: [employer]  Readmission Within the last 30 days  planned readmission    Living Arrangements  Arrived From: home  Current Living Arrangements: home  People in Home: spouse  Home Accessibility: stairs to enter home (Group)  Living Arrangement Comments: pt lives in a multistory house with her spouse.  Able to Return to Prior Arrangements: yes    Assistive Device/Animal Currently Used at Home  none    Functional Status Comments  pt does not use DME.    IADL Comments  pt is independent with her ADL's    Concerns to be Addressed  care coordination/care conferences    Anticipated Changes Related to Illness  none    Transportation Concerns (SDOH)  Transportation Concerns: car, none  Transportation Anticipated: family or friend will provide    Anticipated Discharge Plan   Anticipated Discharge Disposition: home without assistance or services, home with assistance  Patient/Family Anticipates Transition to: home, home with family  Patient/Family Anticipated Services at Transition: none  Screening complete: yes

## 2023-04-10 NOTE — H&P
Gynecology History and Physical    HPI     Patient is a 32 y.o. female with FIGO stage IV gestational trophoblastic neoplasia who presents as a direct admission for cycle 6 chemotherapy. She was diagnosed with gestational trophoblastic neoplasia on 1/10/23. CT at that time showed pulmonary and liver mets with a WHO score of 17. She is receiving EMA-EP chemotherapy regimen.  Today the patient reports overall feeling well. She has ongoing fatigue and nausea. Otherwise she denies current fevers, chills, headache, emesis, chest pain, shortness of breath, dizziness, lightheadedness, diarrhea/constipation, neuropathy, hearing changes, changes in swelling.     OB History:   OB History    Para Term  AB Living   2 2 2 0 0 2   SAB IAB Ectopic Multiple Live Births   0 0 0 0 2      # Outcome Date GA Lbr Kelvin/2nd Weight Sex Delivery Anes PTL Lv   2 Term            1 Term              Medical History:   Past Medical History:   Diagnosis Date   • Hx antineoplastic chemo      Surgical History:   Past Surgical History:   Procedure Laterality Date   •  SECTION      x2     Allergies: Patient has no known allergies.    Prior to Admission medications    Medication Sig Start Date End Date Taking? Authorizing Provider   benzonatate (TESSALON) 100 mg capsule Take 100 mg by mouth 3 (three) times a day as needed for cough.    Provider, Ricardo Carr MD   buPROPion XL (WELLBUTRIN XL) 150 mg 24 hr tablet Take 150 mg by mouth nightly. 12/15/22   ProviderRicardo MD   filgrastim-sndz (ZARXIO) 480 mcg/0.8 mL syringe injection Inject 0.8 mL (480 mcg total) under the skin See admin instr for 15 doses. Inject under the skin on days 3, 4, 5, 9, 10, 11 and 12 of chemotherapy cycle. 4/3/23   Shantelle Mcmullen MD   hydrocortisone 2.5 % cream Apply 2 g topically as needed for irritation or rash. 23   ProviderRicardo MD   lidocaine-prilocaine (EMLA) cream Apply topically as needed for mild pain. Apply a  quarter size amount to skin over port site approximately one hour prior to chemotherapy. Cover with saran wrap. 2/9/23   Temitope Peres PA C   magic mouthwash with nystatin (BENADRYL-MAALOX-LIDOCAINE-NYSTATIN) Swish and spit 15 mL every 4 (four) hours as needed for stomatitis. 1/27/23   Lorena Hogue MD   magnesium chloride 64 mg tablet,delayed release (DR/EC) Take 1 tablet (64 mg total) by mouth 2 (two) times a day. 3/20/23   Estela Bell PA C   NIFEdipine XL (PROCARDIA XL) 30 mg 24 hr tablet Take 1 tablet (30 mg total) by mouth daily. 3/28/23 4/27/23  Amber Reeves MD   ondansetron ODT (ZOFRAN-ODT) 4 mg disintegrating tablet Take 1-2 tablets (4-8 mg total) by mouth every 6 (six) hours as needed for nausea or vomiting (Nausea/Vomiting). Maximum 32mg per day. 1/21/23   Artur Macias MD   promethazine (PHENERGAN) 12.5 mg tablet Take 12.5 mg by mouth every 6 (six) hours as needed for nausea or vomiting.    Provider, Ricardo Carr MD   protriptyline (VIVACTIL) 5 mg tablet Take 15 mg by mouth nightly.    Provider, Ricardo Carr MD   triamcinolone (KENALOG) 0.1 % ointment Apply topically 2 times daily. 8/24/22   Provider, Ricardo Carr MD       Objective     Vital Signs for the last 24 hours:  Temp:  [36.3 °C (97.4 °F)] 36.3 °C (97.4 °F)  Heart Rate:  [98] 98  Resp:  [18] 18  BP: (116)/(66) 116/66    Exam  General Appearance: Alert, cooperative, no acute distress  Lungs: Clear to auscultation bilaterally, respirations unlabored  Heart: Regular rate and rhythm  Abdomen: Soft, non distended, non tender   Back: no CVA tenderness  Genitalia: Deferred   Extremities: no edema or calf tenderness      Assessment/Plan     Alondra Villela is a 32 y.o. female admitted for chemotherapy management of stage IV gestational trophoblastic neoplasia.     1. GTN  a. Admitted for Cycle 6 EMA-EP.   b. Vitals stable.   c. Beta-HCG on admission pending  d. Last b-hcg 88 3/31, continues to downtrend.   e. Continue  supportive medications.   2. Thrombocytopenia  1. Platelets last 52K on 3/31  a. Platelets on admission pending    3. Drug induced neutropenia  a. Most recent ANC wnl  b. Labs on admission pending     c. Taking Filgastrim on days she does not receive chemotherapy.   4. Hyperthyroidism   a. Hx thyroid storm.   b. Follows with Endocrinology Dr. Hodge.  c. Not medications currently    d. Last TSH on file wnl 0.88 on 3/20   5. Thrush/oral mucocutaneous ulcers  a. Symptoms improved   b. Continue magic mouthwash swish and spit.   6. Depression  a. Stable, continue home medications Wellbutrin  mg daily and Propytiline 15 mg daily.   7. Hx DVT in right UE  a. Previously taking Eliquis 5 mg PO BID, which was discontinued in setting of thrombocytopenia and episode of bleeding.   b. CBC on admission pending   c. SCDs for VTE ppx.     To be dicussed with Dr. Benedict Orr MD

## 2023-04-10 NOTE — PROGRESS NOTES
Gynecology Progress Note    Subjective     Patient resting in bed this afternoon, without complaints. Receiving chemotherapy at this time.     Objective     Vital signs in last 24 hours:  Temp:  [36.3 °C (97.4 °F)-36.8 °C (98.3 °F)] 36.8 °C (98.3 °F)  Heart Rate:  [] 102  Resp:  [18] 18  BP: (103-116)/(66) 103/66    Exam  General Appearance: Alert, cooperative, no acute distress  Lungs: Clear to auscultation bilaterally, respirations unlabored  Heart: Regular rate and rhythm  Abdomen: Soft, non distended, non tender   Back: no CVA tenderness  Genitalia: Deferred   Extremities: no edema or calf tenderness    Labs  Results from last 7 days   Lab Units 04/10/23  0858   WBC K/uL 13.18*   HEMOGLOBIN g/dL 7.6*   HEMATOCRIT % 22.2*   PLATELETS K/uL 101*      Results from last 7 days   Lab Units 04/10/23  0858   SODIUM mEQ/L 139   POTASSIUM mEQ/L 3.2*   CHLORIDE mEQ/L 105   CO2 mEQ/L 23   BUN mg/dL 9   CREATININE mg/dL 0.9   GLUCOSE mg/dL 121*   CALCIUM mg/dL 8.5*        Assessment/Plan     Alondra Villela is a 32 y.o. female admitted for chemotherapy management of stage IV gestational trophoblastic neoplasia.    1. GTN  a. Admitted for Cycle 6 EMA-EP.   b. Vitals stable.   c. b-hcg 64 on admission which is appropriately down trending  d. Continue supportive medications.   2. Thrombocytopenia  1. Improving, platelets 101K on admission  3. Drug induced neutropenia  a. Most recent ANC wnl  b. Taking Filgastrim on days she does not receive chemotherapy.   4. Hyperthyroidism   a. Hx thyroid storm.   b. Follows with Endocrinology Dr. Hodge.  c. Not medications currently    d. Last TSH on file wnl 0.88 on 3/20   5. Thrush/oral mucocutaneous ulcers  a. Symptoms improved   b. Continue magic mouthwash   6. Depression  a. Stable, continue home medications Wellbutrin  mg daily and Propytiline 15 mg daily.   7. Hx DVT in right UE  a. Previously taking Eliquis 5 mg PO BID, which was discontinued in setting of thrombocytopenia  and episode of bleeding.   b. CBC on admission pending   c. SCDs for VTE ppx.  8. Hypokalemia/Hypomagnesemia   a. K 3.2 and Mag 0.9 n admission.   b. Will replete with Klor Con 40 mEq PO and 4g IV magnesium .   c.   Plan reviewed with Dr. Benedict Orr MD

## 2023-04-10 NOTE — PLAN OF CARE
Problem: Adult Inpatient Plan of Care  Goal: Plan of Care Review  Outcome: Progressing  Flowsheets (Taken 4/10/2023 5262)  Progress: improving  Plan of Care Reviewed With:   patient   spouse   mother  Outcome Summary: Patient admitted to unit. VSS. Port accessed and labs drawn. Chemotherapy started this shift. No adverse reactions noted. All other medications administered as ordered and tolerated by patient. FSP maintained. Call bell within reach.   Plan of Care Review  Plan of Care Reviewed With: patient, spouse, mother  Progress: improving  Outcome Summary: Patient admitted to unit. VSS. Port accessed and labs drawn. Chemotherapy started this shift. No adverse reactions noted. All other medications administered as ordered and tolerated by patient. FSP maintained. Call bell within reach.

## 2023-04-11 VITALS
HEIGHT: 64 IN | RESPIRATION RATE: 16 BRPM | SYSTOLIC BLOOD PRESSURE: 122 MMHG | OXYGEN SATURATION: 100 % | WEIGHT: 158.6 LBS | DIASTOLIC BLOOD PRESSURE: 76 MMHG | BODY MASS INDEX: 27.08 KG/M2 | HEART RATE: 92 BPM | TEMPERATURE: 97.9 F

## 2023-04-11 PROCEDURE — 63700000 HC SELF-ADMINISTRABLE DRUG

## 2023-04-11 RX ORDER — HEPARIN 100 UNIT/ML
500 SYRINGE INTRAVENOUS ONCE
Status: COMPLETED | OUTPATIENT
Start: 2023-04-11 | End: 2023-04-11

## 2023-04-11 RX ADMIN — PANTOPRAZOLE SODIUM 40 MG: 40 TABLET, DELAYED RELEASE ORAL at 09:58

## 2023-04-11 RX ADMIN — HEPARIN 500 UNITS: 100 SYRINGE at 11:49

## 2023-04-11 RX ADMIN — NIFEDIPINE 30 MG: 30 TABLET, FILM COATED, EXTENDED RELEASE ORAL at 09:58

## 2023-04-11 NOTE — PROGRESS NOTES
Gynecology Progress Note    Subjective     Patient resting comfortably in bed this morning. She reports feeling well. She slept over night and reports her nausea has been well controlled. Tolerating PO. Ambulating without difficulty. She denies fevers, chills, CP, SOB, dizziness/lightheadedness, constipation, neuropathy     Objective     Vital signs in last 24 hours:  Temp:  [36.3 °C (97.4 °F)-36.8 °C (98.3 °F)] 36.8 °C (98.3 °F)  Heart Rate:  [] 102  Resp:  [18] 18  BP: (103-116)/(66) 103/66    Exam  General Appearance: Alert, cooperative, no acute distress  Lungs: Clear to auscultation bilaterally, respirations unlabored  Heart: Regular rate and rhythm  Abdomen: Soft, non tender  Extremities: no edema or calf tenderness    Labs  Results from last 7 days   Lab Units 04/10/23  0858   WBC K/uL 13.18*   HEMOGLOBIN g/dL 7.6*   HEMATOCRIT % 22.2*   PLATELETS K/uL 101*      Results from last 7 days   Lab Units 04/10/23  0858   SODIUM mEQ/L 139   POTASSIUM mEQ/L 3.2*   CHLORIDE mEQ/L 105   CO2 mEQ/L 23   BUN mg/dL 9   CREATININE mg/dL 0.9   GLUCOSE mg/dL 121*   CALCIUM mg/dL 8.5*        Assessment/Plan     Alondra Villela is a 32 y.o. female admitted for chemotherapy management of stage IV gestational trophoblastic neoplasia.     1. GTN  a. Admitted for Cycle 6 EMA-EP.   b. Vitals stable.   c. b-hcg 64 on admission which is appropriately down trending   d. Continue supportive medications.   2. Thrombocytopenia  1. Improving, platelets 101K on admission  3. Drug induced neutropenia  a. Most recent ANC wnl  b. Taking Filgastrim on days she does not receive chemotherapy.   4. Hyperthyroidism   a. Hx thyroid storm, follows with Endocrinology Dr. Hodge.  b. Not medications currently    c. Last TSH on file wnl 0.88 on 3/20   5. Thrush/oral mucocutaneous ulcers  a. Symptoms improved   b. Continue magic mouthwash   6. Depression  a. Stable, continue home medications Wellbutrin  mg daily and Propytiline 15 mg daily.    7. Hx DVT in right UE  a. Previously taking Eliquis 5 mg PO BID, which was discontinued in setting of thrombocytopenia and episode of bleeding.   b. SCDs for VTE ppx.  8. Hypokalemia/Hypomagnesemia   a. K 3.2 and Mag 0.9 n admission.   b. Repleted with Klor Con 40 mEq PO and 4g IV magnesium .     To be discussed with Dr. Benedict Orr MD

## 2023-04-11 NOTE — PLAN OF CARE
Problem: Adult Inpatient Plan of Care  Goal: Plan of Care Review  Outcome: Progressing  Flowsheets (Taken 4/11/2023 0440)  Progress: improving  Plan of Care Reviewed With: patient  Outcome Summary: Pt had no events overnight. Methotrexate currently infusing via RCW port. Pt is tolerating well. Pt without c/o pain, cough or SOB. GMF.  at bedside. Safety maintained.  Goal: Patient-Specific Goal (Individualized)  Outcome: Progressing  Goal: Absence of Hospital-Acquired Illness or Injury  Outcome: Progressing  Goal: Optimal Comfort and Wellbeing  Outcome: Progressing

## 2023-04-12 ENCOUNTER — DOCUMENTATION (OUTPATIENT)
Dept: GYNECOLOGIC ONCOLOGY | Facility: CLINIC | Age: 33
End: 2023-04-12
Payer: COMMERCIAL

## 2023-04-12 DIAGNOSIS — O01.9 GESTATIONAL TROPHOBLASTIC NEOPLASM: Primary | ICD-10-CM

## 2023-04-12 DIAGNOSIS — E87.6 HYPOKALEMIA: ICD-10-CM

## 2023-04-12 DIAGNOSIS — E83.42 HYPOMAGNESEMIA: Primary | ICD-10-CM

## 2023-04-12 DIAGNOSIS — Z51.11 ENCOUNTER FOR ANTINEOPLASTIC CHEMOTHERAPY: ICD-10-CM

## 2023-04-12 DIAGNOSIS — E83.42 HYPOMAGNESEMIA: ICD-10-CM

## 2023-04-12 RX ORDER — CALCIUM CARBONATE/VITAMIN D3 500-10/5ML
400 LIQUID (ML) ORAL 2 TIMES DAILY
Qty: 180 CAPSULE | Refills: 1 | Status: SHIPPED | OUTPATIENT
Start: 2023-04-12 | End: 2023-04-19 | Stop reason: ALTCHOICE

## 2023-04-12 RX ORDER — POTASSIUM CHLORIDE 20 MEQ/1
20 TABLET, EXTENDED RELEASE ORAL 2 TIMES DAILY
Qty: 180 TABLET | Refills: 1 | Status: SHIPPED | OUTPATIENT
Start: 2023-04-12 | End: 2023-04-18

## 2023-04-12 RX ORDER — MAGNESIUM CHLORIDE 64 MG
64 TABLET, DELAYED RELEASE (ENTERIC COATED) ORAL 2 TIMES DAILY
Qty: 60 TABLET | Refills: 1 | Status: SHIPPED | OUTPATIENT
Start: 2023-04-12 | End: 2023-04-20 | Stop reason: SDUPTHER

## 2023-04-12 RX ORDER — POTASSIUM CHLORIDE 20 MEQ/1
20 TABLET, EXTENDED RELEASE ORAL 2 TIMES DAILY
Qty: 180 TABLET | Refills: 1 | Status: SHIPPED | OUTPATIENT
Start: 2023-04-12 | End: 2023-04-12 | Stop reason: SDUPTHER

## 2023-04-12 NOTE — PROGRESS NOTES
Inpatient chemotherapy prior authorization pending via [facility] Portal for the below medications. Service date requested 4/23/23.  -Etoposide ()  -Actinomycin ()  -Methotrexate ()  Pending authorization #: [id number]    Addendum 4/13/23: Fax determination received from [facility] approving Etoposide (), Actinomycin (), and Methotrexate () for service date of 4/23/23. Letter scanned to patient chart.

## 2023-04-12 NOTE — PROGRESS NOTES
Inpatient reservation request made for 4/23/23 chemotherapy. Faxed request to Rocio Johnson and Mellisa Vang at [phone]

## 2023-04-13 ENCOUNTER — TELEPHONE (OUTPATIENT)
Dept: GYNECOLOGIC ONCOLOGY | Facility: CLINIC | Age: 33
End: 2023-04-13
Payer: COMMERCIAL

## 2023-04-17 ENCOUNTER — OFFICE VISIT (OUTPATIENT)
Dept: GYNECOLOGIC ONCOLOGY | Facility: CLINIC | Age: 33
End: 2023-04-17
Attending: PHYSICIAN ASSISTANT
Payer: COMMERCIAL

## 2023-04-17 ENCOUNTER — HOSPITAL ENCOUNTER (OUTPATIENT)
Dept: INPATIENT UNIT | Facility: HOSPITAL | Age: 33
Discharge: HOME | End: 2023-04-17
Attending: PHYSICIAN ASSISTANT
Payer: COMMERCIAL

## 2023-04-17 VITALS
TEMPERATURE: 97.7 F | SYSTOLIC BLOOD PRESSURE: 104 MMHG | RESPIRATION RATE: 16 BRPM | WEIGHT: 155.4 LBS | DIASTOLIC BLOOD PRESSURE: 69 MMHG | BODY MASS INDEX: 26.67 KG/M2 | HEART RATE: 132 BPM | OXYGEN SATURATION: 99 %

## 2023-04-17 VITALS
TEMPERATURE: 98.7 F | RESPIRATION RATE: 18 BRPM | DIASTOLIC BLOOD PRESSURE: 65 MMHG | HEART RATE: 114 BPM | SYSTOLIC BLOOD PRESSURE: 112 MMHG | OXYGEN SATURATION: 100 %

## 2023-04-17 DIAGNOSIS — H93.13 TINNITUS OF BOTH EARS: ICD-10-CM

## 2023-04-17 DIAGNOSIS — O22.30 DVT (DEEP VEIN THROMBOSIS) IN PREGNANCY: ICD-10-CM

## 2023-04-17 DIAGNOSIS — K12.31 MUCOSITIS DUE TO CHEMOTHERAPY: ICD-10-CM

## 2023-04-17 DIAGNOSIS — O01.9 GESTATIONAL TROPHOBLASTIC NEOPLASM: Primary | ICD-10-CM

## 2023-04-17 DIAGNOSIS — O01.9 GESTATIONAL TROPHOBLASTIC NEOPLASM: ICD-10-CM

## 2023-04-17 DIAGNOSIS — Z51.11 ENCOUNTER FOR ANTINEOPLASTIC CHEMOTHERAPY: Primary | ICD-10-CM

## 2023-04-17 DIAGNOSIS — D70.2 DRUG-INDUCED NEUTROPENIA (CMS/HCC): ICD-10-CM

## 2023-04-17 DIAGNOSIS — N89.8 VAGINAL DISCHARGE: ICD-10-CM

## 2023-04-17 DIAGNOSIS — K12.31 MUCOSITIS DUE TO CHEMOTHERAPY: Primary | ICD-10-CM

## 2023-04-17 DIAGNOSIS — D64.9 SYMPTOMATIC ANEMIA: Primary | ICD-10-CM

## 2023-04-17 DIAGNOSIS — Z51.11 ENCOUNTER FOR ANTINEOPLASTIC CHEMOTHERAPY: ICD-10-CM

## 2023-04-17 DIAGNOSIS — D69.6 THROMBOCYTOPENIA (CMS/HCC): ICD-10-CM

## 2023-04-17 PROBLEM — K13.79 MOUTH SORE SECONDARY TO CHEMOTHERAPY: Status: ACTIVE | Noted: 2023-04-17

## 2023-04-17 PROBLEM — T45.1X5A MOUTH SORE SECONDARY TO CHEMOTHERAPY: Status: RESOLVED | Noted: 2023-04-17 | Resolved: 2023-04-17

## 2023-04-17 PROBLEM — K13.79 MOUTH SORE SECONDARY TO CHEMOTHERAPY: Status: RESOLVED | Noted: 2023-04-17 | Resolved: 2023-04-17

## 2023-04-17 PROBLEM — T45.1X5A MOUTH SORE SECONDARY TO CHEMOTHERAPY: Status: ACTIVE | Noted: 2023-04-17

## 2023-04-17 LAB
ABO + RH BLD: NORMAL
ALBUMIN SERPL-MCNC: 3.8 G/DL (ref 3.4–5)
ALP SERPL-CCNC: 116 IU/L (ref 35–126)
ALT SERPL-CCNC: 13 IU/L (ref 11–54)
ANION GAP SERPL CALC-SCNC: 9 MEQ/L (ref 3–15)
AST SERPL-CCNC: 12 IU/L (ref 15–41)
BASOPHILS # BLD: 0 K/UL (ref 0.01–0.1)
BASOPHILS NFR BLD: 0 %
BILIRUB SERPL-MCNC: 0.4 MG/DL (ref 0.3–1.2)
BLD GP AB SCN SERPL QL: NEGATIVE
BUN SERPL-MCNC: 18 MG/DL (ref 8–20)
CALCIUM SERPL-MCNC: 8.8 MG/DL (ref 8.9–10.3)
CHLORIDE SERPL-SCNC: 103 MEQ/L (ref 98–109)
CO2 SERPL-SCNC: 24 MEQ/L (ref 22–32)
CREAT SERPL-MCNC: 1 MG/DL (ref 0.6–1.1)
D AG BLD QL: POSITIVE
DIFFERENTIAL METHOD BLD: ABNORMAL
EOSINOPHIL # BLD: 0.04 K/UL (ref 0.04–0.36)
EOSINOPHIL NFR BLD: 1 %
ERYTHROCYTE [DISTWIDTH] IN BLOOD BY AUTOMATED COUNT: 14.3 % (ref 11.7–14.4)
GFR SERPL CREATININE-BSD FRML MDRD: >60 ML/MIN/1.73M*2
GLUCOSE SERPL-MCNC: 120 MG/DL (ref 70–99)
HCG SERPL-ACNC: 60.2 IU/L (MIU/ML)
HCT VFR BLDCO AUTO: 19.9 % (ref 35–45)
HGB BLD-MCNC: 6.7 G/DL (ref 11.8–15.7)
LABORATORY COMMENT REPORT: NORMAL
LYMPHOCYTES # BLD: 0.71 K/UL (ref 1.2–3.5)
LYMPHOCYTES NFR BLD: 20 %
MAGNESIUM SERPL-MCNC: 1.2 MG/DL (ref 1.8–2.5)
MCH RBC QN AUTO: 29.3 PG (ref 28–33.2)
MCHC RBC AUTO-ENTMCNC: 33.7 G/DL (ref 32.2–35.5)
MCV RBC AUTO: 86.9 FL (ref 83–98)
MONOCYTES # BLD: 0.21 K/UL (ref 0.28–0.8)
MONOCYTES NFR BLD: 6 %
NEUTS BAND # BLD: 2.59 K/UL (ref 1.7–7)
NEUTS SEG NFR BLD: 73 %
OVALOCYTES BLD QL SMEAR: ABNORMAL
PDW BLD AUTO: 10.7 FL (ref 9.4–12.3)
PLAT MORPH BLD: NORMAL
PLATELET # BLD AUTO: 31 K/UL (ref 150–369)
PLATELET # BLD EST: ABNORMAL 10*3/UL
POIKILOCYTOSIS BLD QL SMEAR: ABNORMAL
POLYCHROMASIA BLD QL SMEAR: ABNORMAL
POTASSIUM SERPL-SCNC: 3.9 MEQ/L (ref 3.6–5.1)
PROT SERPL-MCNC: 6.6 G/DL (ref 6–8.2)
RBC # BLD AUTO: 2.29 M/UL (ref 3.93–5.22)
SODIUM SERPL-SCNC: 136 MEQ/L (ref 136–144)
SPECIMEN EXP DATE BLD: NORMAL
WBC # BLD AUTO: 3.55 K/UL (ref 3.8–10.5)

## 2023-04-17 PROCEDURE — 80053 COMPREHEN METABOLIC PANEL: CPT

## 2023-04-17 PROCEDURE — 96368 THER/DIAG CONCURRENT INF: CPT

## 2023-04-17 PROCEDURE — 25800000 HC PHARMACY IV SOLUTIONS: Performed by: STUDENT IN AN ORGANIZED HEALTH CARE EDUCATION/TRAINING PROGRAM

## 2023-04-17 PROCEDURE — 63600000 HC DRUGS/DETAIL CODE: Performed by: STUDENT IN AN ORGANIZED HEALTH CARE EDUCATION/TRAINING PROGRAM

## 2023-04-17 PROCEDURE — 36415 COLL VENOUS BLD VENIPUNCTURE: CPT

## 2023-04-17 PROCEDURE — 3E04305 INTRODUCTION OF OTHER ANTINEOPLASTIC INTO CENTRAL VEIN, PERCUTANEOUS APPROACH: ICD-10-PCS | Performed by: STUDENT IN AN ORGANIZED HEALTH CARE EDUCATION/TRAINING PROGRAM

## 2023-04-17 PROCEDURE — 3E0437Z INTRODUCTION OF ELECTROLYTIC AND WATER BALANCE SUBSTANCE INTO CENTRAL VEIN, PERCUTANEOUS APPROACH: ICD-10-PCS | Performed by: STUDENT IN AN ORGANIZED HEALTH CARE EDUCATION/TRAINING PROGRAM

## 2023-04-17 PROCEDURE — 3E043GC INTRODUCTION OF OTHER THERAPEUTIC SUBSTANCE INTO CENTRAL VEIN, PERCUTANEOUS APPROACH: ICD-10-PCS | Performed by: STUDENT IN AN ORGANIZED HEALTH CARE EDUCATION/TRAINING PROGRAM

## 2023-04-17 PROCEDURE — 36591 DRAW BLOOD OFF VENOUS DEVICE: CPT

## 2023-04-17 PROCEDURE — 85025 COMPLETE CBC W/AUTO DIFF WBC: CPT

## 2023-04-17 PROCEDURE — 96375 TX/PRO/DX INJ NEW DRUG ADDON: CPT

## 2023-04-17 PROCEDURE — 3E0433Z INTRODUCTION OF ANTI-INFLAMMATORY INTO CENTRAL VEIN, PERCUTANEOUS APPROACH: ICD-10-PCS | Performed by: STUDENT IN AN ORGANIZED HEALTH CARE EDUCATION/TRAINING PROGRAM

## 2023-04-17 PROCEDURE — 3008F BODY MASS INDEX DOCD: CPT | Performed by: PHYSICIAN ASSISTANT

## 2023-04-17 PROCEDURE — 84702 CHORIONIC GONADOTROPIN TEST: CPT

## 2023-04-17 PROCEDURE — 83735 ASSAY OF MAGNESIUM: CPT

## 2023-04-17 PROCEDURE — 86920 COMPATIBILITY TEST SPIN: CPT

## 2023-04-17 PROCEDURE — 96415 CHEMO IV INFUSION ADDL HR: CPT

## 2023-04-17 PROCEDURE — 86900 BLOOD TYPING SEROLOGIC ABO: CPT

## 2023-04-17 PROCEDURE — 36430 TRANSFUSION BLD/BLD COMPNT: CPT

## 2023-04-17 PROCEDURE — 96361 HYDRATE IV INFUSION ADD-ON: CPT

## 2023-04-17 PROCEDURE — 96413 CHEMO IV INFUSION 1 HR: CPT

## 2023-04-17 PROCEDURE — 99215 OFFICE O/P EST HI 40 MIN: CPT | Performed by: PHYSICIAN ASSISTANT

## 2023-04-17 PROCEDURE — 63700000 HC SELF-ADMINISTRABLE DRUG: Performed by: PHYSICIAN ASSISTANT

## 2023-04-17 PROCEDURE — 96367 TX/PROPH/DG ADDL SEQ IV INF: CPT

## 2023-04-17 PROCEDURE — 86850 RBC ANTIBODY SCREEN: CPT

## 2023-04-17 RX ORDER — FAMOTIDINE 10 MG/ML
20 INJECTION INTRAVENOUS ONCE AS NEEDED
Status: CANCELLED | OUTPATIENT
Start: 2023-04-17

## 2023-04-17 RX ORDER — PALONOSETRON 0.05 MG/ML
250 INJECTION, SOLUTION INTRAVENOUS ONCE
Status: COMPLETED | OUTPATIENT
Start: 2023-04-17 | End: 2023-04-17

## 2023-04-17 RX ORDER — HEPARIN 100 UNIT/ML
500 SYRINGE INTRAVENOUS AS NEEDED
OUTPATIENT
Start: 2023-04-17

## 2023-04-17 RX ORDER — DIPHENHYDRAMINE HCL 50 MG/ML
12.5 VIAL (ML) INJECTION ONCE AS NEEDED
Status: DISCONTINUED | OUTPATIENT
Start: 2023-04-17 | End: 2023-04-18 | Stop reason: HOSPADM

## 2023-04-17 RX ORDER — FAMOTIDINE 10 MG/ML
20 INJECTION INTRAVENOUS ONCE AS NEEDED
Status: DISCONTINUED | OUTPATIENT
Start: 2023-04-17 | End: 2023-04-18 | Stop reason: HOSPADM

## 2023-04-17 RX ORDER — DIPHENHYDRAMINE HCL 50 MG/ML
12.5 VIAL (ML) INJECTION ONCE AS NEEDED
Status: CANCELLED | OUTPATIENT
Start: 2023-04-17

## 2023-04-17 RX ORDER — HEPARIN 100 UNIT/ML
500 SYRINGE INTRAVENOUS AS NEEDED
Status: DISCONTINUED | OUTPATIENT
Start: 2023-04-17 | End: 2023-04-18 | Stop reason: HOSPADM

## 2023-04-17 RX ORDER — SODIUM CHLORIDE 0.9 % (FLUSH) 0.9 %
10 SYRINGE (ML) INJECTION AS NEEDED
OUTPATIENT
Start: 2023-04-17

## 2023-04-17 RX ORDER — SODIUM CHLORIDE 9 MG/ML
500 INJECTION, SOLUTION INTRAVENOUS CONTINUOUS
Status: ACTIVE | OUTPATIENT
Start: 2023-04-17 | End: 2023-04-17

## 2023-04-17 RX ORDER — SODIUM CHLORIDE 9 MG/ML
5 INJECTION, SOLUTION INTRAVENOUS AS NEEDED
Status: CANCELLED | OUTPATIENT
Start: 2023-04-17 | End: 2023-04-18

## 2023-04-17 RX ORDER — SODIUM CHLORIDE 9 MG/ML
5 INJECTION, SOLUTION INTRAVENOUS AS NEEDED
Status: DISCONTINUED | OUTPATIENT
Start: 2023-04-17 | End: 2023-04-18 | Stop reason: HOSPADM

## 2023-04-17 RX ORDER — HEPARIN 100 UNIT/ML
500 SYRINGE INTRAVENOUS AS NEEDED
Status: CANCELLED | OUTPATIENT
Start: 2023-04-17

## 2023-04-17 RX ADMIN — SODIUM CHLORIDE 150 MG: 900 INJECTION, SOLUTION INTRAVENOUS at 11:45

## 2023-04-17 RX ADMIN — DEXAMETHASONE SODIUM PHOSPHATE 12 MG: 4 INJECTION, SOLUTION INTRA-ARTICULAR; INTRALESIONAL; INTRAMUSCULAR; INTRAVENOUS; SOFT TISSUE at 11:45

## 2023-04-17 RX ADMIN — HEPARIN 500 UNITS: 100 SYRINGE at 17:30

## 2023-04-17 RX ADMIN — SODIUM CHLORIDE 500 ML/HR: 9 INJECTION, SOLUTION INTRAVENOUS at 13:57

## 2023-04-17 RX ADMIN — MAGNESIUM SULFATE HEPTAHYDRATE 500 ML/HR: 500 INJECTION, SOLUTION INTRAMUSCULAR; INTRAVENOUS at 09:57

## 2023-04-17 RX ADMIN — DIPHENHYDRAMINE HYDROCHLORIDE AND LIDOCAINE HYDROCHLORIDE AND ALUMINUM HYDROXIDE AND MAGNESIUM HYDRO 15 ML: KIT at 14:14

## 2023-04-17 RX ADMIN — DEXTROSE MONOHYDRATE 184 MG: 50 INJECTION, SOLUTION INTRAVENOUS at 12:43

## 2023-04-17 RX ADMIN — CISPLATIN 110 MG: 1 INJECTION, SOLUTION INTRAVENOUS at 13:48

## 2023-04-17 RX ADMIN — PALONOSETRON HYDROCHLORIDE 250 MCG: 0.25 INJECTION, SOLUTION INTRAVENOUS at 11:43

## 2023-04-17 ASSESSMENT — ENCOUNTER SYMPTOMS
LOSS OF SENSATION IN FEET: 0
OCCASIONAL FEELINGS OF UNSTEADINESS: 0
DEPRESSION: 0

## 2023-04-17 NOTE — ASSESSMENT & PLAN NOTE
Offered pelvic exam, but pt declines. As ASX, does not sound concerning for infection. She will monitor for symptoms and advised to contact office if occurs.

## 2023-04-17 NOTE — PROGRESS NOTES
Case discussed with Dr. Mcmullen via phone -- plan discussed:   - pt to get 1 unit RBC today for symptomatic anemia given hgb 6.8  - pt to get CT C/A/P to assess tx response given bhcg only mildly decreased 64 ->60  - to repeat CBC with diff on Wednesday at [place] to monitor plts/anemia. Only to get RBC today not plts as plts around 30 (>10).

## 2023-04-17 NOTE — PROGRESS NOTES
1550 Cisplatin complete. Blood request sent to blood bank.     1604 PRBC's up, checked and verified by 2 RN's, VSS.     1728 PRBC's complete. Patient tolerated without any difficulty, VSS. Port flushed with NSS and heparin 500 units/5 ml and de-accessed. Patient discharged to home with her .

## 2023-04-17 NOTE — ASSESSMENT & PLAN NOTE
Pending labs, CBC, CMP, Mag, Hcg today. bHCG trending down as of last draw on 4/10. Continuing to monitor.     Will monitor Mag/K with cmp, but of note - pt has not yet started oral K and plans to do so today. She was away on vacation for the weekend and delayed picking up rxn. She has been taking oral mag.

## 2023-04-17 NOTE — TELEPHONE ENCOUNTER
Ok the pharmacy called about it and I did confirm it was sent but she stated they didn't receive it.

## 2023-04-17 NOTE — PROGRESS NOTES
Ms. Alondra Villela is a 32 y.o.yo lady with  Cancer Staging   Gestational trophoblastic neoplasm  Staging form: Gestational Trophoblastic Neoplasms, AJCC 8th Edition  - Clinical stage from 1/12/2023: FIGO Stage IV (cM1b, Risk score: 17) - Signed by Shantelle Mcmullen MD on 1/26/2023  . She presents for chemotherapy.   Oncology History   Gestational trophoblastic neoplasm   1/10/2023 Tumor Markers     on initial testing. Dilutional studies repeated and found to be >461,700     1/11/2023 Imaging Significant Findings    1.  No intrauterine gestation and no adnexal mass evident. These findings  represent a pregnancy of unknown location. The appearance of a complex mass with  small cystic spaces and peripheral vascularity occupying the central uterine  cavity could represent a molar pregnancy but additional evaluation is suggested.  2.  Both ovaries are mildly enlarged with multiple complex cystic lesions as  well as some with solid-appearing echoes that may represent a combination of  hemorrhagic cysts/follicles or endometriomas, but challenging to evaluate as the  ovaries are only well evaluated transabdominally. Recommended these be  reassessed on follow-up. No sonographic findings of ovarian torsion.     1/12/2023 Imaging Significant Findings    MRI brain with contrast  1.  No abnormal intracranial contrast enhancement identified to suggest  intracranial metastatic disease.  2.  Previously seen tiny foci of increased T2 signal in the white matter do not  demonstrate contrast enhancement.  These findings could reflect foci of chronic  ischemia, the sequelae of migraine headaches, demyelinating or postinfectious  processes.  Other etiologies are not excluded.  Clinical correlation is  recommended.     1/12/2023 Imaging Significant Findings    CTPA  IMPRESSION:  No evidence for pulmonary embolism.  5 cm right lower lobe mass. Anterior mediastinal mass.  Innumerable bilateral pulmonary nodules which are  suspicious for metastatic  disease to the lungs.  2 hyperenhancing liver lesions, the larger measuring 5 cm in maximal dimension  for which further assessment with MRI with and without contrast is recommended.     1/12/2023 Biopsy    Image-guided biopsy of right lower lobe lesion benign liver parenchyma     1/12/2023 Cancer Staged    Staging form: Gestational Trophoblastic Neoplasms, AJCC 8th Edition  - Clinical stage from 1/12/2023: FIGO Stage IV (cM1b, Risk score: 17)     1/13/2023 - 1/27/2023 Chemotherapy    CISplatin / Etoposide, 7 day Cycles - GTN Induction  Plan Provider: Lorena Hogue MD  Treatment goal: Curative  Line of treatment: Induction     1/13/2023 Imaging Significant Findings    CT abd/pelvis  1.   Uterine mass measuring up to 13.8 cm, consistent with the clinically  suspected diagnosis of choriocarcinoma.  2.  Extensive pulmonary metastases, most notably a right lower lobe 4.9 cm  subpleural mass.  3.  Enlarged ovaries with numerous cysts likely representing theca lutein cysts  4.  Indeterminate 0.8 cm hypoattenuating lesion at the dome of the liver.  The  previously noted hypervascular liver lesions are not well seen due to the phase  of contrast at the time of imaging.  Contrast-enhanced MRI of the abdomen can be  considered for more optimal characterization.  5.  Indeterminate hypoattenuating lesions in the right kidney which can also be  more optimally evaluated with MRI.     1/13/2023 Tumor Markers    HCG >1.8468 million     1/30/2023 -  Chemotherapy    Inpatient: EMA/EP (Etoposide / Methotrexate / Dactinomycin / Etoposide / CISPlatin), 14 Day Cycles - Gestational Trophoblastic Neoplasia   Plan Provider: Shantelle Mcmullen MD  Treatment goal: Curative  Line of treatment: First Line       CHEMOTHERAPY REGIMEN: EMA-EP     CYCLE 6 Day 8    Reports return of mouth sores, which is making it difficult for her to eat. Denies throat pain this time. Denies N/V. Magic mouthwash helps some - requests  refill. Has been using the one without nystatin, but wonders if she should use the nystatin one. ?if worked better with more relief.     Hypokalemia/Hypomagnesemia 4/10/23: K 3.2 and Mag 0.9 during admission- repleted with Klor Con 40 mEq PO and 4g IV magnesium. Started oral magnesium 4/12, but has not yet started the oral potassium - planning on getting it today and starting. She had been gone for the weekend visiting her children in [place], which delayed start.     Reports fatigue -- took wheelchair up to visit today. Tired after 1 flight of stairs - this has been stable for her x 1 mos.  denies constipation. denies signifcant swelling. Denies neuropathy     Continues with intermittent tinnitus - this is stable. Denies worsening or hearing loss. Had audiology consult and states she was told no hearing loss and to come back if worsening, but does not feel this is worse or concerning to her.     Hx DVT in right UE - previously taking Eliquis 5 mg PO BID, which was discontinued in setting of thrombocytopenia and vaginal bleeding. She is wondering if she should continue to hold. Denies pain or swelling in arm, but reports discomfort from right armpit extending just medially (on side of port). Mild at this time. At worst 4/10. Intermittent. Still has complete ROM. Denies swelling/redness as she had previously with DVT.      C/o recent yellowish/clear vaginal discharge. Denies itching or odor. Denies bleeding.     Continues with filgrastin injections at home without issue       PROBLEM LIST:  Patient Active Problem List    Diagnosis Date Noted   • Vaginal discharge 04/17/2023   • Mucositis due to chemotherapy 04/17/2023   • Sepsis, due to unspecified organism, unspecified whether acute organ dysfunction present (CMS/[facility]) 03/20/2023   • Sore throat 03/06/2023   • Tinnitus of both ears 02/28/2023   • Encounter for antineoplastic chemotherapy 02/20/2023   • Neutropenic fever (CMS/[facility]) 02/04/2023   • DVT (deep vein thrombosis)  in pregnancy 2023   • Thrombocytopenia (CMS/HCC) 2023   • Hematuria 2023   • Anxiety and depression 2023   • Hyponatremia 2023   • Drug-induced neutropenia (CMS/HCC) 2023   • Abnormal CT scan 2023   • Hyperthyroidism 2023   • Gestational trophoblastic neoplasm 2023   • History of COVID-19 2023   • Microcytic anemia 2023        Medical History:   Past Medical History:   Diagnosis Date   • Hx antineoplastic chemo        Surgical History:   Past Surgical History:   Procedure Laterality Date   •  SECTION      x2       Social History:   Social History     Socioeconomic History   • Marital status:      Spouse name: None   • Number of children: None   • Years of education: None   • Highest education level: None   Tobacco Use   • Smoking status: Never   • Smokeless tobacco: Never   Vaping Use   • Vaping status: Never Used   Substance and Sexual Activity   • Alcohol use: Not Currently   • Drug use: Never   • Sexual activity: Not Currently     Social Determinants of Health     Food Insecurity: No Food Insecurity (3/20/2023)    Hunger Vital Sign    • Worried About Running Out of Food in the Last Year: Never true    • Ran Out of Food in the Last Year: Never true   Transportation Needs: No Transportation Needs (2023)    PRAPARE - Transportation    • Lack of Transportation (Medical): No    • Lack of Transportation (Non-Medical): No       Family History:   Family History   Problem Relation Age of Onset   • Hypertension Biological Mother    • Hypertension Biological Father        Allergies: Patient has no known allergies.    Medications:      Medication List          Accurate as of 2023 11:58 AM. If you have any questions, ask your nurse or doctor.            CONTINUE taking these medications    benzonatate 100 mg capsule  Commonly known as: TESSALON  Take 100 mg by mouth 3 (three) times a day as needed for cough.  Dose: 100 mg      buPROPion  mg 24 hr tablet  Commonly known as: WELLBUTRIN XL  Take 150 mg by mouth nightly.  Dose: 150 mg     hydrocortisone 2.5 % cream  Apply 2 g topically as needed for irritation or rash.  Dose: 2 g     lidocaine-prilocaine cream  Commonly known as: EMLA  Apply topically as needed for mild pain. Apply a quarter size amount to skin over port site approximately one hour prior to chemotherapy. Cover with saran wrap.     magic mouthwash with nystatin  Commonly known as: BENADRYL-MAALOX-LIDOCAINE-NYSTATIN  Swish and spit 15 mL every 4 (four) hours as needed for stomatitis.  Dose: 15 mL     magnesium chloride 64 mg tablet,delayed release (DR/EC)  Take 1 tablet (64 mg total) by mouth 2 (two) times a day.  Dose: 64 mg     magnesium oxide 400 mg magnesium capsule  Take 1 capsule (400 mg total) by mouth 2 (two) times a day.  Dose: 400 mg     NIFEdipine XL 30 mg 24 hr tablet  Commonly known as: PROCARDIA XL  Take 1 tablet (30 mg total) by mouth daily.  Dose: 30 mg     ondansetron ODT 4 mg disintegrating tablet  Commonly known as: ZOFRAN-ODT  Take 1-2 tablets (4-8 mg total) by mouth every 6 (six) hours as needed for nausea or vomiting (Nausea/Vomiting). Maximum 32mg per day.  Dose: 4-8 mg     potassium chloride 20 mEq CR tablet  Commonly known as: KLOR-CON M  Take 1 tablet (20 mEq total) by mouth 2 (two) times a day.  Dose: 20 mEq     promethazine 12.5 mg tablet  Commonly known as: PHENERGAN  Take 12.5 mg by mouth every 6 (six) hours as needed for nausea or vomiting.  Dose: 12.5 mg     protriptyline 5 mg tablet  Commonly known as: VIVACTIL  Take 15 mg by mouth nightly.  Dose: 15 mg     triamcinolone 0.1 % ointment  Commonly known as: KENALOG  Apply topically 2 times daily.     ZARXIO 480 mcg/0.8 mL syringe injection  Inject 0.8 mL (480 mcg total) under the skin See admin instr for 15 doses. Inject under the skin on days 3, 4, 5, 9, 10, 11 and 12 of chemotherapy cycle.  Dose: 480 mcg  Generic drug: filgrastim-dz             Review of Systems  All other systems reviewed and negative except as noted in the HPI.    Labs: needs labs     Physical Exam:  Visit Vitals  /69 (BP Location: Right upper arm, Patient Position: Sitting)   Pulse (!) 132   Temp 36.5 °C (97.7 °F)   Resp 16   Wt 70.5 kg (155 lb 6.4 oz)   SpO2 99%   BMI 26.67 kg/m²       General: well-developed, well-nourished, no apparent distress  Neck: supple, no masses  Lymphatic: no supraclavicular, cervical, or inguinal adenopathy  Respiratory: lungs clear to auscultation bilaterally, normal respiratory effort  Cardiovascular: regular rate and rhythm, no murmurs, rubs, gallops.   Extremity: no clubbing, cyanosis, no edema or erythema of extremities including right arm, R/L brachial pulse WNL, pt demonstrated complete ROM of right arm   Neurologic: alert & oriented x3, no gross deficits  Psychiatric: mood and affect normal  Musculoskeletal: no deformity or gross strength deficit  Gynecologic: deferred  Port site WNL - no erythema, edema or tenderness     ASSESSMENT/PLAN:  Assessment   32 y.o. lady with   Cancer Staging   Gestational trophoblastic neoplasm  Staging form: Gestational Trophoblastic Neoplasms, AJCC 8th Edition  - Clinical stage from 1/12/2023: FIGO Stage IV (cM1b, Risk score: 17) - Signed by Shantelle Mcmullen MD on 1/26/2023   who presents for chemotherapy.  ECOG performance status is : 1    Problem List Items Addressed This Visit        Circulatory    DVT (deep vein thrombosis) in pregnancy    Overview     Hx DVT right UE  Previously taking Eliquis 5 mg PO BID, which was discontinued in setting of thrombocytopenia and episode of vaginal bleeding.   Bleeding has resolved          Current Assessment & Plan     Pending stat CBC today.  She c/o new right upper discomfort spanning from R armpit to a few inches medially. No arm edema/erythema. Right pulses palpable on exam.   Will await labs and discuss case with Dr. Mcmullen             Genitourinary    Vaginal  discharge    Overview     New clear/yellowish discharge. ASX - no itching/odor.          Current Assessment & Plan     Offered pelvic exam, but pt declines. As ASX, does not sound concerning for infection. She will monitor for symptoms and advised to contact office if occurs.             Hematologic    Drug-induced neutropenia (CMS/HCC)    Current Assessment & Plan     Continue filgrastin as scheduled          Relevant Orders    CBC and differential (Completed)       Infectious/Inflammatory    Mucositis due to chemotherapy    Current Assessment & Plan     Magic mouthwash refilled - will try one with addition of nystatin again as she felt this was ?more helpful then one without and has only been using one without nystatin recently.   Discussed trying Ensure/Boost meal replacements given difficulty with eating 2/2 sores. On exam, a few mild sores noted at gumline on left     Called script into [facility] as [facility] failed          Relevant Medications    magic mouthwash with nystatin (BENADRYL-MAALOX-LIDOCAINE-NYSTATIN)       Ears/Nose/Throat    Tinnitus of both ears    Current Assessment & Plan     Stable. Had audiology evaluation.             Obstetric    Gestational trophoblastic neoplasm    Overview     S/p EP induction chemotherapy 1/13-1/14/23 and 1/20-1/21/23  - S/p Etoposide, Dactinomycin, and Methotrexate 1/30-2/2.  - S/p Etoposide and Cisplatin 2/6.  - S/p cycle 2 Etoposide, Dactinomycin, and Methotrexate 2/13.   - S/p Etoposide and Cisplatin 2/20.  - She started cycle 3 on 2/27 with EMA-EP protocol. Etoposide, Dactomtcin, and MTX     bHCG 64 on 4/10 - trending down          Current Assessment & Plan     Pending labs, CBC, CMP, Mag, Hcg today. bHCG trending down as of last draw on 4/10. Continuing to monitor.     Will monitor Mag/K with cmp, but of note - pt has not yet started oral K and plans to do so today. She was away on vacation for the weekend and delayed picking up rxn. She has been taking oral  mag.          Relevant Orders    CBC and differential (Completed)    Comprehensive metabolic panel (Completed)    BhCG, Serum, Quant (Completed)    Magnesium (Completed)       Other    Encounter for antineoplastic chemotherapy - Primary    Current Assessment & Plan     Pending labs to clear for chemo today          Relevant Orders    CBC and differential (Completed)    Comprehensive metabolic panel (Completed)    BhCG, Serum, Quant (Completed)    Magnesium (Completed)       KELTON Newton     Later entry  Case discussed with Dr. Mcmullen after labs resulted. Plan as below:    - pt to get 1 unit RBC today for symptomatic anemia as hgb 6.8 on [address] [place]. Stat type/screen first - called and s/w Jaida DE LUNA on [address] who confirmed this can be completed today   - pt to repeat CBC with diff on Wednesday, 4/19 to trend hgb/plts. Will not transfuse plts today as >10 (currently 31), but continuing to monitor. Continuing to hold Eliquis given low plts. Will monitor R armpit/lateral chest discomfort. Given proximity to port, ?if this is some discomfort from port. Denies R arm pain. No erythema/edema or abnormalities on physical exam today. Pt is aware to contact office if swelling/redness or worsening symptoms.   -pt to get CT C/A/P to assess tx response given bHCG only mildly decreasing (64 ->60.2)  -pt getting 1 g mag today. Continue oral magnesium supplementation  - does not need to start oral potassium given current K WNL, 3.9  -pt cleared for chemo today   Reviewed plans with pt and she expressed understanding and is in agreement to plan.   Temitope Peres PA-C

## 2023-04-17 NOTE — ASSESSMENT & PLAN NOTE
Pending stat CBC today.  She c/o new right upper discomfort spanning from R armpit to a few inches medially. No arm edema/erythema. Right pulses palpable on exam.   Will await labs and discuss case with Dr. Mcmullen

## 2023-04-17 NOTE — ASSESSMENT & PLAN NOTE
Magic mouthwash refilled - will try one with addition of nystatin again as she felt this was ?more helpful then one without and has only been using one without nystatin recently.   Discussed trying Ensure/Boost meal replacements given difficulty with eating 2/2 sores. On exam, a few mild sores noted at gumline on left     Called script into [place] pharmacy as [facility] failed

## 2023-04-17 NOTE — PROGRESS NOTES
0915: Patient arrived from Dr. Mcmullen's office via wheelchair.  Patient denies pain, but feels tired today.  Patient also complaining of mouth sores, making it difficult to eat but no difficulty drinking fluids, she is using her magic mouthwash.  HR tachy at 118, radial pulse is regular, no chest pain.  RCW port accessed with +BR, labs were sent.  Patient verbalized understanding to call for any questions and needs.      0957:  1 liter NSS with 20 meq KcL and 1 gram Mag sulfate up and set to infuse over 2 hours.     1037:  KELTON Reese reviewed patient 's blood work and cleared patient for treatment, with additional 1 unit PRBC for hemoglobin 6.7.  Type and screen drawn and sent.  Per Ja MELARA, for Magnesium level of 1.2, patient does not need additional magnesium as she is already getting 1 gram Mag sulfate with her IVF.      1145:  IVF put on hold, patient premedicated with Aloxi 250 mcg IV, Decadron 12 mg IVPB and Emend 150 mg IVPB.      1215:  IVF re-started, patient completed and tolerated premeds.    1243:  IVF completed, port flushed, +BR.  Etoposide 184 mg IV up and set to infuse for 1 hour.      1348:  Etoposide completed, no reaction noted and no complains given by patient, port flushed, +BR.  Cisplatin 110 mg IV up and set to infuse over 1 hour.    1357:  Post hydration NSS up and set to infuse over 1 hour.    1414:   Magic mouthwash 15 ml swish and spit given for c/o mouth sores.

## 2023-04-18 ENCOUNTER — TELEPHONE (OUTPATIENT)
Dept: GYNECOLOGIC ONCOLOGY | Facility: CLINIC | Age: 33
End: 2023-04-18
Payer: COMMERCIAL

## 2023-04-18 ENCOUNTER — TRANSCRIBE ORDERS (OUTPATIENT)
Dept: SCHEDULING | Age: 33
End: 2023-04-18

## 2023-04-18 ENCOUNTER — TELEPHONE (OUTPATIENT)
Dept: GYNECOLOGIC ONCOLOGY | Facility: CLINIC | Age: 33
End: 2023-04-18

## 2023-04-18 DIAGNOSIS — O01.9 HYDATIDIFORM MOLE, UNSPECIFIED: Primary | ICD-10-CM

## 2023-04-18 NOTE — TELEPHONE ENCOUNTER
Called patient to confirm her appointment with [facility] for tomorrow at 7:30am for repeat blood work. Informed her if hemoglobin is still low, they will give her a blood transfusion. Also, provided her with authorization number for her CT scan, patient will call today to schedule.    Reviewed financial assistance application for Leny. Patient will review this evening, complete and drop off tomorrow to the [facility]. Form was sent to her via e-fax

## 2023-04-19 ENCOUNTER — HOSPITAL ENCOUNTER (OUTPATIENT)
Dept: HEMATOLOGY/ONCOLOGY | Facility: HOSPITAL | Age: 33
Setting detail: INFUSION SERIES
Discharge: HOME | End: 2023-04-19
Attending: OBSTETRICS & GYNECOLOGY
Payer: COMMERCIAL

## 2023-04-19 ENCOUNTER — DOCUMENTATION (OUTPATIENT)
Dept: GYNECOLOGIC ONCOLOGY | Facility: CLINIC | Age: 33
End: 2023-04-19
Payer: COMMERCIAL

## 2023-04-19 ENCOUNTER — TELEPHONE (OUTPATIENT)
Dept: HEMATOLOGY/ONCOLOGY | Facility: HOSPITAL | Age: 33
End: 2023-04-19

## 2023-04-19 VITALS
DIASTOLIC BLOOD PRESSURE: 71 MMHG | SYSTOLIC BLOOD PRESSURE: 102 MMHG | OXYGEN SATURATION: 100 % | TEMPERATURE: 98 F | RESPIRATION RATE: 18 BRPM | HEART RATE: 111 BPM

## 2023-04-19 DIAGNOSIS — D64.9 SYMPTOMATIC ANEMIA: Primary | ICD-10-CM

## 2023-04-19 DIAGNOSIS — Z51.11 ENCOUNTER FOR ANTINEOPLASTIC CHEMOTHERAPY: ICD-10-CM

## 2023-04-19 DIAGNOSIS — E86.0 DEHYDRATION: ICD-10-CM

## 2023-04-19 DIAGNOSIS — E83.42 HYPOMAGNESEMIA: ICD-10-CM

## 2023-04-19 DIAGNOSIS — O01.9 GESTATIONAL TROPHOBLASTIC NEOPLASM: ICD-10-CM

## 2023-04-19 DIAGNOSIS — O01.9 GESTATIONAL TROPHOBLASTIC NEOPLASM: Primary | ICD-10-CM

## 2023-04-19 DIAGNOSIS — E86.0 DEHYDRATION: Primary | ICD-10-CM

## 2023-04-19 DIAGNOSIS — D69.6 THROMBOCYTOPENIA (CMS/HCC): ICD-10-CM

## 2023-04-19 DIAGNOSIS — D61.818 PANCYTOPENIA (CMS/HCC): ICD-10-CM

## 2023-04-19 LAB
ABO + RH BLD: NORMAL
BASOPHILS # BLD: 0 K/UL (ref 0.01–0.1)
BASOPHILS NFR BLD: 0 %
BLD GP AB SCN SERPL QL: NEGATIVE
CROSSMATCH: NORMAL
D AG BLD QL: POSITIVE
DIFFERENTIAL METHOD BLD: ABNORMAL
EOSINOPHIL # BLD: 0.05 K/UL (ref 0.04–0.36)
EOSINOPHIL NFR BLD: 1 %
ERYTHROCYTE [DISTWIDTH] IN BLOOD BY AUTOMATED COUNT: 14 % (ref 11.7–14.4)
HCT VFR BLDCO AUTO: 21.5 % (ref 35–45)
HGB BLD-MCNC: 7.1 G/DL (ref 11.8–15.7)
HYPOCHROMIA BLD QL SMEAR: ABNORMAL
ISBT CODE: 7300
LABORATORY COMMENT REPORT: NORMAL
LYMPHOCYTES # BLD: 0.77 K/UL (ref 1.2–3.5)
LYMPHOCYTES NFR BLD: 16 %
MCH RBC QN AUTO: 29.1 PG (ref 28–33.2)
MCHC RBC AUTO-ENTMCNC: 33 G/DL (ref 32.2–35.5)
MCV RBC AUTO: 88.1 FL (ref 83–98)
MONOCYTES # BLD: 0.19 K/UL (ref 0.28–0.8)
MONOCYTES NFR BLD: 4 %
NEUTS BAND # BLD: 3.75 K/UL (ref 1.7–7)
NEUTS SEG NFR BLD: 78 %
OVALOCYTES BLD QL SMEAR: ABNORMAL
PDW BLD AUTO: 11 FL (ref 9.4–12.3)
PLAT MORPH BLD: NORMAL
PLATELET # BLD AUTO: 11 K/UL (ref 150–369)
PLATELET # BLD EST: ABNORMAL 10*3/UL
PRODUCT CODE: NORMAL
PRODUCT STATUS: NORMAL
RBC # BLD AUTO: 2.44 M/UL (ref 3.93–5.22)
SPECIMEN EXP DATE BLD: NORMAL
SPECIMEN EXP DATE BLD: NORMAL
UNIT ABO: NORMAL
UNIT ID: NORMAL
UNIT RH: POSITIVE
VARIANT LYMPHS # BLD MANUAL: 0.05 K/UL
VARIANT LYMPHS NFR BLD: 1 %
WBC # BLD AUTO: 4.81 K/UL (ref 3.8–10.5)

## 2023-04-19 PROCEDURE — 86920 COMPATIBILITY TEST SPIN: CPT

## 2023-04-19 PROCEDURE — 86850 RBC ANTIBODY SCREEN: CPT

## 2023-04-19 PROCEDURE — 96361 HYDRATE IV INFUSION ADD-ON: CPT

## 2023-04-19 PROCEDURE — 36415 COLL VENOUS BLD VENIPUNCTURE: CPT

## 2023-04-19 PROCEDURE — 25800000 HC PHARMACY IV SOLUTIONS: Performed by: PHYSICIAN ASSISTANT

## 2023-04-19 PROCEDURE — 96360 HYDRATION IV INFUSION INIT: CPT

## 2023-04-19 PROCEDURE — 36591 DRAW BLOOD OFF VENOUS DEVICE: CPT

## 2023-04-19 PROCEDURE — 85025 COMPLETE CBC W/AUTO DIFF WBC: CPT

## 2023-04-19 RX ORDER — SODIUM CHLORIDE 9 MG/ML
5 INJECTION, SOLUTION INTRAVENOUS AS NEEDED
Status: CANCELLED | OUTPATIENT
Start: 2023-04-19 | End: 2023-04-20

## 2023-04-19 RX ORDER — DIPHENHYDRAMINE HCL 50 MG/ML
12.5 VIAL (ML) INJECTION ONCE AS NEEDED
Status: CANCELLED | OUTPATIENT
Start: 2023-04-19

## 2023-04-19 RX ORDER — HEPARIN 100 UNIT/ML
500 SYRINGE INTRAVENOUS AS NEEDED
Status: CANCELLED | OUTPATIENT
Start: 2023-04-19

## 2023-04-19 RX ORDER — HEPARIN 100 UNIT/ML
500 SYRINGE INTRAVENOUS AS NEEDED
Status: DISCONTINUED | OUTPATIENT
Start: 2023-04-19 | End: 2023-04-20 | Stop reason: HOSPADM

## 2023-04-19 RX ORDER — SODIUM CHLORIDE 0.9 % (FLUSH) 0.9 %
10 SYRINGE (ML) INJECTION AS NEEDED
Status: CANCELLED | OUTPATIENT
Start: 2023-04-20

## 2023-04-19 RX ORDER — FAMOTIDINE 10 MG/ML
20 INJECTION INTRAVENOUS ONCE AS NEEDED
Status: CANCELLED | OUTPATIENT
Start: 2023-04-19

## 2023-04-19 RX ORDER — DIPHENHYDRAMINE HCL 25 MG
25 CAPSULE ORAL ONCE
Status: CANCELLED | OUTPATIENT
Start: 2023-04-19 | End: 2023-04-19

## 2023-04-19 RX ORDER — SODIUM CHLORIDE 0.9 % (FLUSH) 0.9 %
10 SYRINGE (ML) INJECTION AS NEEDED
Status: CANCELLED | OUTPATIENT
Start: 2023-04-19

## 2023-04-19 RX ADMIN — SODIUM CHLORIDE 1000 ML: 9 INJECTION, SOLUTION INTRAVENOUS at 08:39

## 2023-04-19 RX ADMIN — HEPARIN 500 UNITS: 100 SYRINGE at 10:41

## 2023-04-19 ASSESSMENT — ENCOUNTER SYMPTOMS
BRUISES/BLEEDS EASILY: 0
LOSS OF SENSATION IN FEET: 0
ENDOCRINE NEGATIVE: 1
CARDIOVASCULAR NEGATIVE: 1
FATIGUE: 1
RESPIRATORY NEGATIVE: 1
UNEXPECTED WEIGHT CHANGE: 1
LIGHT-HEADEDNESS: 1
MUSCULOSKELETAL NEGATIVE: 1
DEPRESSION: 0
GASTROINTESTINAL NEGATIVE: 1
OCCASIONAL FEELINGS OF UNSTEADINESS: 0
APPETITE CHANGE: 1

## 2023-04-19 ASSESSMENT — PAIN SCALES - GENERAL: PAINLEVEL: 2

## 2023-04-19 NOTE — TELEPHONE ENCOUNTER
Called in [facility] to [facility]. Patient opts to pay out of pocket versus fill out financial assistance application for the drug. They were informed by this nurse that the drug would likely be quite expensive. Messaged patient on portal with updates. [facility] will run patients insurance and call her today or tomorrow with copay information and arrange for shipping.

## 2023-04-19 NOTE — TELEPHONE ENCOUNTER
Please call spouse, he's has some concerns about his wife and needs to speak to Dr Mcmullen only. Would not provide specifics.

## 2023-04-19 NOTE — PROGRESS NOTES
Review of Systems   Constitutional: Positive for appetite change, fatigue and unexpected weight change.   HENT:   Positive for mouth sores.    Respiratory: Negative.    Cardiovascular: Negative.    Gastrointestinal: Negative.    Endocrine: Negative.    Genitourinary: Negative.     Musculoskeletal: Negative.    Skin: Negative.    Neurological: Positive for light-headedness (with repositioning).   Hematological: Does not bruise/bleed easily.       Patient in for labs/possible transfusion. RN discussed patient's symptoms above with Dr. Hogue . She received treatment at [facility] on 4/17 and was asking if she could get addition AVF today. After discussing with Dr. Hogue office, plan is for 1 L of IVF today. Patient will come back tomorrow for 1 unit of PRBCs and 2 units of Platelets. Dr. Hogue office confirmed with blood bank. Patient is agreeable to coming in at 0730 tomorrow. Patient signed consent today for blood transfusions and it was scanned into chart. T&S drawn today.

## 2023-04-19 NOTE — TELEPHONE ENCOUNTER
04/19/23 8:16 AM  PROVIDER NOTIFICATION OF CRITICAL RESULT  Procedure Reported: Hgb HGB 7.1  Plt 11  Provider Notified: AMOR KELLY  Date and Time Reported to provider: 04/19/23 at  8:17 AM  A read back was performed and confirmed for this reported result.

## 2023-04-19 NOTE — PROGRESS NOTES
Received message for Kwadwo REY regarding Alondra's critical labs. Hemoglobin 7.1 and PLTS 11. Patient also with symptoms of fatigue, weakness, dizziness, poor PO intake. All Vitals reported WNL. Discussed plan with Dr. Mcmullen; orders placed for 1L IVF's for today (NS). Also contacted blood bank spoke with Kimmie regarding need for HLA matched PLTS; HLA blood test already performed. Kimmie reports that she will discuss with supervisor and try to get PLTS set for tomorrow for 2 units. Will discuss scheduling with Kwadwo REY to see if they have availability for tomorrow for 2 units.    Estela Bell PA-C

## 2023-04-20 ENCOUNTER — DOCUMENTATION (OUTPATIENT)
Dept: GYNECOLOGIC ONCOLOGY | Facility: CLINIC | Age: 33
End: 2023-04-20
Payer: COMMERCIAL

## 2023-04-20 ENCOUNTER — HOSPITAL ENCOUNTER (OUTPATIENT)
Dept: HEMATOLOGY/ONCOLOGY | Facility: HOSPITAL | Age: 33
Setting detail: INFUSION SERIES
Discharge: HOME | End: 2023-04-20
Attending: OBSTETRICS & GYNECOLOGY
Payer: COMMERCIAL

## 2023-04-20 VITALS
SYSTOLIC BLOOD PRESSURE: 132 MMHG | HEART RATE: 96 BPM | OXYGEN SATURATION: 100 % | DIASTOLIC BLOOD PRESSURE: 84 MMHG | RESPIRATION RATE: 18 BRPM | TEMPERATURE: 99.5 F

## 2023-04-20 DIAGNOSIS — O01.9 GESTATIONAL TROPHOBLASTIC NEOPLASM: Primary | ICD-10-CM

## 2023-04-20 DIAGNOSIS — O01.9 GESTATIONAL TROPHOBLASTIC NEOPLASM: ICD-10-CM

## 2023-04-20 DIAGNOSIS — D61.818 PANCYTOPENIA (CMS/HCC): Primary | ICD-10-CM

## 2023-04-20 PROCEDURE — P9016 RBC LEUKOCYTES REDUCED: HCPCS

## 2023-04-20 PROCEDURE — 63700000 HC SELF-ADMINISTRABLE DRUG: Performed by: PHYSICIAN ASSISTANT

## 2023-04-20 PROCEDURE — P9073 PLATELETS PHERESIS PATH REDU: HCPCS

## 2023-04-20 PROCEDURE — 36430 TRANSFUSION BLD/BLD COMPNT: CPT

## 2023-04-20 RX ORDER — SODIUM CHLORIDE 9 MG/ML
5 INJECTION, SOLUTION INTRAVENOUS AS NEEDED
Status: DISCONTINUED | OUTPATIENT
Start: 2023-04-20 | End: 2023-04-21 | Stop reason: HOSPADM

## 2023-04-20 RX ORDER — SODIUM CHLORIDE 9 MG/ML
5 INJECTION, SOLUTION INTRAVENOUS AS NEEDED
Status: CANCELLED | OUTPATIENT
Start: 2023-04-20 | End: 2023-04-21

## 2023-04-20 RX ORDER — DIPHENHYDRAMINE HCL 25 MG
25 CAPSULE ORAL ONCE
Status: COMPLETED | OUTPATIENT
Start: 2023-04-20 | End: 2023-04-20

## 2023-04-20 RX ORDER — DIPHENHYDRAMINE HCL 50 MG/ML
12.5 VIAL (ML) INJECTION ONCE AS NEEDED
Status: CANCELLED | OUTPATIENT
Start: 2023-04-20

## 2023-04-20 RX ORDER — FAMOTIDINE 10 MG/ML
20 INJECTION INTRAVENOUS ONCE AS NEEDED
Status: CANCELLED | OUTPATIENT
Start: 2023-04-20

## 2023-04-20 RX ORDER — DIPHENHYDRAMINE HCL 50 MG/ML
12.5 VIAL (ML) INJECTION ONCE AS NEEDED
Status: DISCONTINUED | OUTPATIENT
Start: 2023-04-20 | End: 2023-04-21 | Stop reason: HOSPADM

## 2023-04-20 RX ORDER — HEPARIN 100 UNIT/ML
500 SYRINGE INTRAVENOUS AS NEEDED
Status: DISCONTINUED | OUTPATIENT
Start: 2023-04-20 | End: 2023-04-21 | Stop reason: HOSPADM

## 2023-04-20 RX ORDER — HEPARIN 100 UNIT/ML
500 SYRINGE INTRAVENOUS AS NEEDED
Status: CANCELLED | OUTPATIENT
Start: 2023-04-20

## 2023-04-20 RX ORDER — DIPHENHYDRAMINE HCL 25 MG
25 CAPSULE ORAL ONCE
Status: CANCELLED | OUTPATIENT
Start: 2023-04-20 | End: 2023-04-20

## 2023-04-20 RX ORDER — FAMOTIDINE 10 MG/ML
20 INJECTION INTRAVENOUS ONCE AS NEEDED
Status: DISCONTINUED | OUTPATIENT
Start: 2023-04-20 | End: 2023-04-21 | Stop reason: HOSPADM

## 2023-04-20 RX ADMIN — DIPHENHYDRAMINE HYDROCHLORIDE 25 MG: 25 CAPSULE ORAL at 08:32

## 2023-04-20 RX ADMIN — HEPARIN 500 UNITS: 100 SYRINGE at 12:15

## 2023-04-20 ASSESSMENT — ENCOUNTER SYMPTOMS
EYES NEGATIVE: 1
PSYCHIATRIC NEGATIVE: 1
HEMATOLOGIC/LYMPHATIC NEGATIVE: 1
RESPIRATORY NEGATIVE: 1
APPETITE CHANGE: 1
CARDIOVASCULAR NEGATIVE: 1
MUSCULOSKELETAL NEGATIVE: 1
LIGHT-HEADEDNESS: 1
FATIGUE: 1
DIZZINESS: 1
NAUSEA: 1
HEADACHES: 1
ENDOCRINE NEGATIVE: 1

## 2023-04-20 NOTE — PROGRESS NOTES
Reviewed patients clinical course and discussed with Dr. Smith. 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 Patients hcg levels have plateaued. Additionally, her bone marrow continues to have less reserve as her platelet count is 11k and she has requiring multiple blood transfusions.    Given her plateauing hcg levels, needs to switched to a salvage regimen. Givne her bone marrow reserve and promising evidence that pembrolizumab has high efficacy in GTN, will switch to pembrolizumab. Plan to start asap.     I reviewed this with the patient and her . All questions were answered.

## 2023-04-20 NOTE — PROGRESS NOTES
Review of Systems   Constitutional: Positive for appetite change (decreased, drinking boost QD) and fatigue (moderate).   HENT:   Positive for mouth sores.    Eyes: Negative.    Respiratory: Negative.    Cardiovascular: Negative.    Gastrointestinal: Positive for nausea (Compazine PRN effective).   Endocrine: Negative.    Genitourinary: Negative.     Musculoskeletal: Negative.    Skin: Negative.    Neurological: Positive for dizziness, headaches (occasionally ) and light-headedness.   Hematological: Negative.    Psychiatric/Behavioral: Negative.      Pt presented for x1 unit of PRBCs and x2 units of platelets for a HgB of 7.1 and a platelet count of 11.     PO Benadryl 25mg administered 30 minutes before platelet transfusion as ordered. x2 units of platelets were transfused through RCW port followed by x1 unit of PRBCs. Pt tolerated all transfusions well, VSS throughout, no S/S of transfusion rxn noted.     Pt denied AVS and left ambulatory.

## 2023-04-21 ENCOUNTER — HOSPITAL ENCOUNTER (INPATIENT)
Facility: HOSPITAL | Age: 33
LOS: 8 days | Discharge: HOME | DRG: 808 | End: 2023-04-29
Attending: EMERGENCY MEDICINE | Admitting: OBSTETRICS & GYNECOLOGY
Payer: COMMERCIAL

## 2023-04-21 ENCOUNTER — APPOINTMENT (INPATIENT)
Dept: RADIOLOGY | Facility: HOSPITAL | Age: 33
DRG: 808 | End: 2023-04-21
Attending: OBSTETRICS & GYNECOLOGY
Payer: COMMERCIAL

## 2023-04-21 ENCOUNTER — APPOINTMENT (EMERGENCY)
Dept: RADIOLOGY | Facility: HOSPITAL | Age: 33
DRG: 808 | End: 2023-04-21
Payer: COMMERCIAL

## 2023-04-21 DIAGNOSIS — A41.9 SEPSIS, DUE TO UNSPECIFIED ORGANISM, UNSPECIFIED WHETHER ACUTE ORGAN DYSFUNCTION PRESENT (CMS/HCC): ICD-10-CM

## 2023-04-21 DIAGNOSIS — R50.81 NEUTROPENIC FEVER (CMS/HCC)  (CMS/HCC): ICD-10-CM

## 2023-04-21 DIAGNOSIS — D70.9 NEUTROPENIC FEVER (CMS/HCC)  (CMS/HCC): ICD-10-CM

## 2023-04-21 DIAGNOSIS — D69.6 THROMBOCYTOPENIA (CMS/HCC): Primary | ICD-10-CM

## 2023-04-21 LAB
ALBUMIN SERPL-MCNC: 3.7 G/DL (ref 3.4–5)
ALP SERPL-CCNC: 84 IU/L (ref 35–126)
ALT SERPL-CCNC: 16 IU/L (ref 11–54)
ANION GAP SERPL CALC-SCNC: 10 MEQ/L (ref 3–15)
AST SERPL-CCNC: 13 IU/L (ref 15–41)
ATRIAL RATE: 132
BASOPHILS # BLD: 0.01 K/UL (ref 0.01–0.1)
BASOPHILS NFR BLD: 1 %
BILIRUB SERPL-MCNC: 1 MG/DL (ref 0.3–1.2)
BUN SERPL-MCNC: 20 MG/DL (ref 8–20)
CALCIUM SERPL-MCNC: 8.1 MG/DL (ref 8.9–10.3)
CHLORIDE SERPL-SCNC: 101 MEQ/L (ref 98–109)
CO2 SERPL-SCNC: 22 MEQ/L (ref 22–32)
CREAT SERPL-MCNC: 1.2 MG/DL (ref 0.6–1.1)
CROSSMATCH: NORMAL
DIFFERENTIAL METHOD BLD: ABNORMAL
EOSINOPHIL # BLD: 0 K/UL (ref 0.04–0.36)
EOSINOPHIL NFR BLD: 0 %
ERYTHROCYTE [DISTWIDTH] IN BLOOD BY AUTOMATED COUNT: 13.2 % (ref 11.7–14.4)
GFR SERPL CREATININE-BSD FRML MDRD: 51.7 ML/MIN/1.73M*2
GLUCOSE SERPL-MCNC: 90 MG/DL (ref 70–99)
HCT VFR BLDCO AUTO: 19.6 % (ref 35–45)
HGB BLD-MCNC: 7.1 G/DL (ref 11.8–15.7)
HYPOCHROMIA BLD QL SMEAR: ABNORMAL
ISBT CODE: 5100
ISBT CODE: 6200
ISBT CODE: 6200
LACTATE SERPL-SCNC: 0.7 MMOL/L (ref 0.4–2)
LYMPHOCYTES # BLD: 0.72 K/UL (ref 1.2–3.5)
LYMPHOCYTES NFR BLD: 81 %
MCH RBC QN AUTO: 29.7 PG (ref 28–33.2)
MCHC RBC AUTO-ENTMCNC: 36.2 G/DL (ref 32.2–35.5)
MCV RBC AUTO: 82 FL (ref 83–98)
MONOCYTES # BLD: 0.01 K/UL (ref 0.28–0.8)
MONOCYTES NFR BLD: 1 %
NEUTS BAND # BLD: 0.15 K/UL (ref 1.7–7)
NEUTS SEG NFR BLD: 17 %
P AXIS: 75
PDW BLD AUTO: 12.8 FL (ref 9.4–12.3)
PLAT MORPH BLD: NORMAL
PLATELET # BLD AUTO: 13 K/UL (ref 150–369)
PLATELET # BLD EST: ABNORMAL 10*3/UL
POLYCHROMASIA BLD QL SMEAR: ABNORMAL
POTASSIUM SERPL-SCNC: 3.5 MEQ/L (ref 3.6–5.1)
PR INTERVAL: 122
PRODUCT CODE: NORMAL
PRODUCT STATUS: NORMAL
PROT SERPL-MCNC: 6.3 G/DL (ref 6–8.2)
QRS DURATION: 76
QT INTERVAL: 280
QTC CALCULATION(BAZETT): 414
R AXIS: 43
RBC # BLD AUTO: 2.39 M/UL (ref 3.93–5.22)
SODIUM SERPL-SCNC: 133 MEQ/L (ref 136–144)
SPECIMEN EXP DATE BLD: NORMAL
T WAVE AXIS: -13
TROPONIN I SERPL HS-MCNC: 2 PG/ML
TROPONIN I SERPL HS-MCNC: 3 PG/ML
UNIT ABO: NORMAL
UNIT ID: NORMAL
UNIT RH: POSITIVE
VENTRICULAR RATE: 132
WBC # BLD AUTO: 0.89 K/UL (ref 3.8–10.5)

## 2023-04-21 PROCEDURE — 84484 ASSAY OF TROPONIN QUANT: CPT | Performed by: EMERGENCY MEDICINE

## 2023-04-21 PROCEDURE — 87040 BLOOD CULTURE FOR BACTERIA: CPT | Performed by: OBSTETRICS & GYNECOLOGY

## 2023-04-21 PROCEDURE — 63700000 HC SELF-ADMINISTRABLE DRUG: Performed by: OBSTETRICS & GYNECOLOGY

## 2023-04-21 PROCEDURE — 83605 ASSAY OF LACTIC ACID: CPT

## 2023-04-21 PROCEDURE — G1004 CDSM NDSC: HCPCS

## 2023-04-21 PROCEDURE — 85025 COMPLETE CBC W/AUTO DIFF WBC: CPT | Performed by: EMERGENCY MEDICINE

## 2023-04-21 PROCEDURE — 93005 ELECTROCARDIOGRAM TRACING: CPT | Performed by: EMERGENCY MEDICINE

## 2023-04-21 PROCEDURE — 93005 ELECTROCARDIOGRAM TRACING: CPT

## 2023-04-21 PROCEDURE — 84484 ASSAY OF TROPONIN QUANT: CPT

## 2023-04-21 PROCEDURE — 99285 EMERGENCY DEPT VISIT HI MDM: CPT | Mod: 25

## 2023-04-21 PROCEDURE — 25800000 HC PHARMACY IV SOLUTIONS: Performed by: PHYSICIAN ASSISTANT

## 2023-04-21 PROCEDURE — 71045 X-RAY EXAM CHEST 1 VIEW: CPT

## 2023-04-21 PROCEDURE — 93010 ELECTROCARDIOGRAM REPORT: CPT | Performed by: INTERNAL MEDICINE

## 2023-04-21 PROCEDURE — 99222 1ST HOSP IP/OBS MODERATE 55: CPT | Performed by: OBSTETRICS & GYNECOLOGY

## 2023-04-21 PROCEDURE — 80053 COMPREHEN METABOLIC PANEL: CPT

## 2023-04-21 PROCEDURE — 63600105 HC IODINE BASED CONTRAST: Performed by: OBSTETRICS & GYNECOLOGY

## 2023-04-21 PROCEDURE — 63600000 HC DRUGS/DETAIL CODE: Performed by: PHYSICIAN ASSISTANT

## 2023-04-21 PROCEDURE — 80053 COMPREHEN METABOLIC PANEL: CPT | Performed by: EMERGENCY MEDICINE

## 2023-04-21 PROCEDURE — 36415 COLL VENOUS BLD VENIPUNCTURE: CPT

## 2023-04-21 PROCEDURE — 71275 CT ANGIOGRAPHY CHEST: CPT | Mod: ME

## 2023-04-21 PROCEDURE — 74177 CT ABD & PELVIS W/CONTRAST: CPT | Mod: ME

## 2023-04-21 PROCEDURE — 96374 THER/PROPH/DIAG INJ IV PUSH: CPT

## 2023-04-21 PROCEDURE — 21400000 HC ROOM AND CARE CCU/INTERMEDIATE

## 2023-04-21 PROCEDURE — 63700000 HC SELF-ADMINISTRABLE DRUG: Performed by: PHYSICIAN ASSISTANT

## 2023-04-21 PROCEDURE — 83605 ASSAY OF LACTIC ACID: CPT | Performed by: EMERGENCY MEDICINE

## 2023-04-21 RX ORDER — DEXTROSE 50 % IN WATER (D50W) INTRAVENOUS SYRINGE
25 AS NEEDED
Status: DISCONTINUED | OUTPATIENT
Start: 2023-04-21 | End: 2023-04-29 | Stop reason: HOSPADM

## 2023-04-21 RX ORDER — PROMETHAZINE HYDROCHLORIDE 25 MG/1
12.5 TABLET ORAL EVERY 6 HOURS PRN
Status: DISCONTINUED | OUTPATIENT
Start: 2023-04-21 | End: 2023-04-29 | Stop reason: HOSPADM

## 2023-04-21 RX ORDER — LIDOCAINE AND PRILOCAINE 25; 25 MG/G; MG/G
CREAM TOPICAL AS NEEDED
Status: DISCONTINUED | OUTPATIENT
Start: 2023-04-21 | End: 2023-04-29 | Stop reason: HOSPADM

## 2023-04-21 RX ORDER — NIFEDIPINE 30 MG/1
30 TABLET, FILM COATED, EXTENDED RELEASE ORAL DAILY
Status: DISCONTINUED | OUTPATIENT
Start: 2023-04-21 | End: 2023-04-29 | Stop reason: HOSPADM

## 2023-04-21 RX ORDER — ACETAMINOPHEN 325 MG/1
650 TABLET ORAL EVERY 6 HOURS PRN
Status: ON HOLD | COMMUNITY
End: 2023-04-29 | Stop reason: SDUPTHER

## 2023-04-21 RX ORDER — BUPROPION HYDROCHLORIDE 150 MG/1
150 TABLET ORAL NIGHTLY
Status: DISCONTINUED | OUTPATIENT
Start: 2023-04-21 | End: 2023-04-29 | Stop reason: HOSPADM

## 2023-04-21 RX ORDER — DEXTROSE 40 %
15-30 GEL (GRAM) ORAL AS NEEDED
Status: DISCONTINUED | OUTPATIENT
Start: 2023-04-21 | End: 2023-04-29 | Stop reason: HOSPADM

## 2023-04-21 RX ORDER — IBUPROFEN 200 MG
16-32 TABLET ORAL AS NEEDED
Status: DISCONTINUED | OUTPATIENT
Start: 2023-04-21 | End: 2023-04-29 | Stop reason: HOSPADM

## 2023-04-21 RX ORDER — MAGNESIUM CHLORIDE 64 MG
64 TABLET, DELAYED RELEASE (ENTERIC COATED) ORAL 2 TIMES DAILY
Status: DISCONTINUED | OUTPATIENT
Start: 2023-04-21 | End: 2023-04-23

## 2023-04-21 RX ORDER — ONDANSETRON 4 MG/1
4 TABLET, ORALLY DISINTEGRATING ORAL EVERY 6 HOURS PRN
Status: DISCONTINUED | OUTPATIENT
Start: 2023-04-21 | End: 2023-04-22

## 2023-04-21 RX ORDER — NYSTATIN 100000 [USP'U]/ML
500000 SUSPENSION ORAL 4 TIMES DAILY PRN
Status: DISCONTINUED | OUTPATIENT
Start: 2023-04-21 | End: 2023-04-29 | Stop reason: HOSPADM

## 2023-04-21 RX ORDER — MAGNESIUM CHLORIDE 64 MG
64 TABLET, DELAYED RELEASE (ENTERIC COATED) ORAL 2 TIMES DAILY
Qty: 60 TABLET | Refills: 1 | Status: SHIPPED | OUTPATIENT
Start: 2023-04-21

## 2023-04-21 RX ORDER — SODIUM CHLORIDE, SODIUM LACTATE, POTASSIUM CHLORIDE, CALCIUM CHLORIDE 600; 310; 30; 20 MG/100ML; MG/100ML; MG/100ML; MG/100ML
INJECTION, SOLUTION INTRAVENOUS CONTINUOUS
Status: DISCONTINUED | OUTPATIENT
Start: 2023-04-21 | End: 2023-04-24

## 2023-04-21 RX ORDER — PROTRIPTYLINE HYDROCHLORIDE 5 MG/1
15 TABLET, FILM COATED ORAL NIGHTLY
Status: DISCONTINUED | OUTPATIENT
Start: 2023-04-21 | End: 2023-04-29 | Stop reason: HOSPADM

## 2023-04-21 RX ORDER — BENZONATATE 100 MG/1
100 CAPSULE ORAL 3 TIMES DAILY PRN
Status: DISCONTINUED | OUTPATIENT
Start: 2023-04-21 | End: 2023-04-29 | Stop reason: HOSPADM

## 2023-04-21 RX ADMIN — DIPHENHYDRAMINE HYDROCHLORIDE AND LIDOCAINE HYDROCHLORIDE AND ALUMINUM HYDROXIDE AND MAGNESIUM HYDRO 15 ML: KIT at 18:10

## 2023-04-21 RX ADMIN — CEFEPIME 2 G: 2 INJECTION, POWDER, FOR SOLUTION INTRAVENOUS at 18:11

## 2023-04-21 RX ADMIN — PROTRIPTYLINE HYDROCHLORIDE 15 MG: 5 TABLET, FILM COATED ORAL at 21:33

## 2023-04-21 RX ADMIN — MAGNESIUM 64 MG (MAGNESIUM CHLORIDE) TABLET,DELAYED RELEASE 64 MG: at 21:15

## 2023-04-21 RX ADMIN — IOHEXOL 80 ML: 350 INJECTION, SOLUTION INTRAVENOUS at 23:10

## 2023-04-21 RX ADMIN — SODIUM CHLORIDE 1000 ML: 9 INJECTION, SOLUTION INTRAVENOUS at 17:52

## 2023-04-21 RX ADMIN — BUPROPION HYDROCHLORIDE 150 MG: 150 TABLET, EXTENDED RELEASE ORAL at 21:15

## 2023-04-21 ASSESSMENT — ENCOUNTER SYMPTOMS
ABDOMINAL PAIN: 0
COUGH: 0
DIZZINESS: 0
SHORTNESS OF BREATH: 0
CHILLS: 0
HEADACHES: 0
VOMITING: 0
COLOR CHANGE: 0
NAUSEA: 0
BACK PAIN: 0

## 2023-04-21 NOTE — ED PROVIDER NOTES
Emergency Medicine Note  HPI   HISTORY OF PRESENT ILLNESS     HPI   31 y/o female with PMH of cervical cancer (undergoing chemo every Monday), thrombocytopenia, DVT (off AC x 4 weeks), chemotherapy related mucositis, neutropenic fever presents to ED with chest pain and fever since this morning. She took tylenol PTA and currently has only low grade temperature but reports temperature of 101.2 this AM. Chest pain began today and is sharp and mid-sternal and has been constant. Pain is worse when laying flat and a/w difficulty breathing when laying flat. She was admitted to hospital one month ago for neutropenic fever. Denies cough, dysuria, abdominal pain, N/V/D.          Patient History   PAST HISTORY     Reviewed from Nursing Triage:  Meds       Past Medical History:   Diagnosis Date   • Hx antineoplastic chemo        Past Surgical History:   Procedure Laterality Date   •  SECTION      x2       Family History   Problem Relation Age of Onset   • Hypertension Biological Mother    • Hypertension Biological Father        Social History     Tobacco Use   • Smoking status: Never   • Smokeless tobacco: Never   Vaping Use   • Vaping status: Never Used   Substance Use Topics   • Alcohol use: Not Currently   • Drug use: Never         Review of Systems   REVIEW OF SYSTEMS     Review of Systems   Constitutional: Positive for fever. Negative for chills.   HENT: Positive for mouth sores.    Respiratory: Negative for cough and shortness of breath.    Cardiovascular: Positive for chest pain.   Gastrointestinal: Negative for abdominal pain, nausea and vomiting.   Genitourinary: Negative for dysuria, flank pain, frequency and pelvic pain.   Musculoskeletal: Negative for back pain.   Skin: Negative for color change.   Neurological: Negative for dizziness, syncope, light-headedness and headaches.   All other systems reviewed and are negative.        VITALS     ED Vitals    Date/Time Temp Pulse Resp BP SpO2 [facility]   23 1845  -- 109 20 -- 100 % VMM   04/21/23 1800 -- 104 20 117/73 100 % VMM   04/21/23 1755 37.2 °C (98.9 °F) -- -- -- -- VMM   04/21/23 1700 -- 105 19 117/73 100 % VMM   04/21/23 1647 -- 101 20 -- 100 % VMM   04/21/23 1600 -- 122 23 111/65 100 % VMM   04/21/23 1516 37.6 °C (99.6 °F) 128 20 107/58 98 %                        Physical Exam   PHYSICAL EXAM     Physical Exam  Vitals and nursing note reviewed.   Constitutional:       General: She is not in acute distress.     Appearance: She is well-developed. She is not ill-appearing, toxic-appearing or diaphoretic.   HENT:      Mouth/Throat:      Mouth: Mucous membranes are moist.      Comments: Intra oral mucosal ulceration noted on posterior pharynx and hard palate  Eyes:      Conjunctiva/sclera: Conjunctivae normal.   Cardiovascular:      Rate and Rhythm: Regular rhythm. Tachycardia present.      Pulses: Normal pulses.      Heart sounds: Normal heart sounds.   Pulmonary:      Effort: Pulmonary effort is normal. No respiratory distress.      Breath sounds: Normal breath sounds.   Abdominal:      Palpations: Abdomen is soft.      Tenderness: There is no abdominal tenderness. There is no guarding.   Musculoskeletal:         General: No tenderness.      Right lower leg: No edema.      Left lower leg: No edema.   Skin:     General: Skin is warm and dry.      Coloration: Skin is pale.   Neurological:      Mental Status: She is alert and oriented to person, place, and time.   Psychiatric:         Mood and Affect: Mood normal.           PROCEDURES     Procedures     DATA     Results     Procedure Component Value Units Date/Time    [place] Draw Panel [[id number]] Collected: 04/21/23 1558    Specimen: Blood, Venous Updated: 04/22/23 0000    Narrative:      The following orders were created for panel order [place] Draw Panel.  Procedure                               Abnormality         Status                     ---------                               -----------         ------                      ALEKS SPRAGUE[[id number]]                                  Final result               [facility] Blood Culture[[id number]]                                                       [place] Blood Culture[[id number]]                                                         Please view results for these tests on the individual orders.    ALEKS SPRAGUE [[id number]] Collected: 04/21/23 1558    Specimen: Blood, Venous Updated: 04/22/23 0000    CBC and differential [[id number]]  (Abnormal) Collected: 04/21/23 1558    Specimen: Blood, Venous Updated: 04/21/23 1721     WBC 0.89 K/uL      Comment: This result has been called to JALEN CORDOVA by Miguel Palmer on 04 21 23 at 16:53, and has been read back.         RBC 2.39 M/uL      Hemoglobin 7.1 g/dL      Hematocrit 19.6 %      MCV 82.0 fL      MCH 29.7 pg      MCHC 36.2 g/dL      RDW 13.2 %      Platelets 13 K/uL      Comment: ALL RESULTS HAVE BEEN RECHECKED. CONSISTENT WITH PREVIOUS RESULTS. This result has been called to JALEN CORDOVA by Miguel Palmer on 04 21 23 at 16:53, and has been read back.         MPV 12.8 fL      Differential Type Manu     Neutrophils 17 %      Lymphocytes 81 %      Monocytes 1 %      Eosinophils 0 %      Basophils 1 %      Neutrophils, Absolute 0.15 K/uL      Lymphocytes, Absolute 0.72 K/uL      Monocytes, Absolute 0.01 K/uL      Eosinophils, Absolute 0.00 K/uL      Basophils, Absolute 0.01 K/uL      PLT Morphology Normal     Platelet Estimate SUBSTANTIALLY DECREASED(< OR = 50,000)     Hypochromia Occasional     Polychromasia 1+    HS Troponin (with 2 hour reflex) [[id number]]  (Normal) Collected: 04/21/23 1558    Specimen: Blood, Venous Updated: 04/21/23 1646     High Sens Troponin I 3.0 pg/mL     Comprehensive metabolic panel [[id number]]  (Abnormal) Collected: 04/21/23 1558    Specimen: Blood, Venous Updated: 04/21/23 1646     Sodium 133 mEQ/L      Potassium 3.5 mEQ/L      Comment: Results obtained on plasma. Plasma Potassium values may be up to  0.4 mEQ/L less than serum values. The differences may be greater for patients with high platelet or white cell counts.        Chloride 101 mEQ/L      CO2 22 mEQ/L      BUN 20 mg/dL      Creatinine 1.2 mg/dL      Glucose 90 mg/dL      Calcium 8.1 mg/dL      AST (SGOT) 13 IU/L      ALT (SGPT) 16 IU/L      Alkaline Phosphatase 84 IU/L      Total Protein 6.3 g/dL      Comment: Test performed on plasma which typically contains approximately 0.4 g/dL more protein than serum.        Albumin 3.7 g/dL      Bilirubin, Total 1.0 mg/dL      eGFR 51.7 mL/min/1.73m*2      Anion Gap 10 mEQ/L     Lactate, w/ reflex repeat if > 2.0 [[id number]]  (Normal) Collected: 04/21/23 1558    Specimen: Blood, Venous Updated: 04/21/23 1614     Lactate 0.7 mmol/L           Imaging Results          X-RAY CHEST 1 VIEW (Final result)  Result time 04/21/23 16:27:26    Final result                 Impression:    IMPRESSION: No active disease in the chest.               Narrative:    CLINICAL HISTORY: SOB   .    COMPARISON: 3/24/2023.    COMMENT: Frontal view of the chest was obtained.    No pulmonary consolidation or edema. No pneumothorax or pleural effusion. Stable  cardiomediastinal silhouette. Right chest wall internal jugular infusion port  catheter, tip overlying central SVC. External EKG leads.                                ECG 12 lead   Final Result          Scoring tools                                  ED Course & MDM   MDM / ED COURSE / CLINICAL IMPRESSION / DISPO     Medical Decision Making  33 y/o female with PMH of cervical cancer (undergoing chemo every Monday), thrombocytopenia, DVT (off AC x 4 weeks), chemotherapy related mucositis, neutropenic fever presents to ED with chest pain and fever since this morning, took tylenol PTA and was not febrile at time of triage. She appears dyspneic and is tachycardic, acutely ill. Not on AC, concern for PE given new chest pain and abnormal VS, active cancer and prior DVT of RUE. She meets sepsis  criteria, CT's performed for source of infection and to r/o PE. She was admitted for neutropenic fever and started on cefepime per protocol.     Neutropenic fever (CMS/HCC): complicated acute illness or injury  Sepsis, due to unspecified organism, unspecified whether acute organ dysfunction present (CMS/HCC): complicated acute illness or injury  Thrombocytopenia (CMS/HCC): chronic illness or injury with exacerbation, progression, or side effects of treatment  Amount and/or Complexity of Data Reviewed  Independent Historian: mary  External Data Reviewed: notes.     Details: notes from last hospital admission 3/20/23  Labs: ordered. Decision-making details documented in ED Course.  Radiology: ordered. Decision-making details documented in ED Course.  ECG/medicine tests: ordered and independent interpretation performed. Decision-making details documented in ED Course.  Discussion of management or test interpretation with external provider(s): Discussed with OBGYN for management and admission    Risk  Decision regarding hospitalization.          ED Course as of 04/22/23 0858   Fri Apr 21, 2023   1700 WBC(!!): 0.89 [JL]   1700 Platelets(!!): 13 [JL]   1700 X-RAY CHEST 1 VIEW [JL]   1713 Discussed case with OBGYN - will start cefepime and they will come eval patient and plan to admit for neutropenic fever.  [JL]   1722 Ekg sinus tachycardia inferior and lateral lead t wave inversions noted no stemi - likely 2/2 to sepsis - will get CT A/P/C PE study  [JL]   1722 Neutrophils, Absolute(!): 0.15 [JL]   1805 Discussed with OBGYN, will admit to [facility] on tele under gyn/onc - agree with CT A/P/C including PE study.  [JL]   1830 CRITICAL CARE NOTE:    10:52 PM  I have personally provided 30 minutes of critical care time exclusive of time spent on separately billable procedures. Time includes review of all data, discussion with consultants / admitting physicians, re-evaluation of patient, discussion with patient and/or family,  review of all results, and monitoring for potential decompensation.   [TERESITA]      ED Course User Index  [JL] Shayy Goldberg PA C  [TERESITA] Ozzy Williamson MD     Clinical Impression      Thrombocytopenia (CMS/HCC)   Neutropenic fever (CMS/HCC)   Sepsis, due to unspecified organism, unspecified whether acute organ dysfunction present (CMS/HCC)     _________________     ED Disposition   Admit / Observation                   Shayy Goldberg PA C  04/22/23 0858

## 2023-04-21 NOTE — H&P
Gynecology History and Physical    HPI     Alondra Villela is a 32 y.o.  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 She reports that she had a fever of 101.2F at 13:30 . She took tylenol and presented to the ED. She also reports chest pain which started at around the time of her fever. She denies any dyspnea, trouble taking a deep breath, nausea, vomiting, heart palpitations, headaches, lightheadedness, dizziness. She has a history of thrush and oral mucocutaneous ulcers which she is currently managing with swish/spit magic mouth wash. She reports spontaneous bleeding of her gums. She denies any other bleeding episodes. She reports that the mouth sores are negatively impacting her appetite. She also reports low energy and fatigue.     In the ED, the patient is afebrile (Tmax 99.2F), she is tachycardic to 120s, BP normotensive, SpO2 100% on RA. WBC 0.89, ANC 0.15, Hgb 7.1, Plt 13. Lactate 0.7. EKG with non-specific T wave abnormality, Troponin 3.0. CXR no active disease.     Oncology History   Gestational trophoblastic neoplasm   1/10/2023 Tumor Markers     on initial testing. Dilutional studies repeated and found to be >461,700     2023 Imaging Significant Findings    1.  No intrauterine gestation and no adnexal mass evident. These findings  represent a pregnancy of unknown location. The appearance of a complex mass with  small cystic spaces and peripheral vascularity occupying the central uterine  cavity could represent a molar pregnancy but additional evaluation is suggested.  2.  Both ovaries are mildly enlarged with multiple complex cystic lesions as  well as some with solid-appearing echoes that may represent a combination of  hemorrhagic cysts/follicles or endometriomas, but challenging to evaluate as the  ovaries are only well evaluated transabdominally. Recommended these be  reassessed on follow-up. No sonographic  findings of ovarian torsion.     1/12/2023 Imaging Significant Findings    MRI brain with contrast  1.  No abnormal intracranial contrast enhancement identified to suggest  intracranial metastatic disease.  2.  Previously seen tiny foci of increased T2 signal in the white matter do not  demonstrate contrast enhancement.  These findings could reflect foci of chronic  ischemia, the sequelae of migraine headaches, demyelinating or postinfectious  processes.  Other etiologies are not excluded.  Clinical correlation is  recommended.     1/12/2023 Imaging Significant Findings    CTPA  IMPRESSION:  No evidence for pulmonary embolism.  5 cm right lower lobe mass. Anterior mediastinal mass.  Innumerable bilateral pulmonary nodules which are suspicious for metastatic  disease to the lungs.  2 hyperenhancing liver lesions, the larger measuring 5 cm in maximal dimension  for which further assessment with MRI with and without contrast is recommended.     1/12/2023 Biopsy    Image-guided biopsy of right lower lobe lesion benign liver parenchyma     1/12/2023 Cancer Staged    Staging form: Gestational Trophoblastic Neoplasms, AJCC 8th Edition  - Clinical stage from 1/12/2023: FIGO Stage IV (cM1b, Risk score: 17)     1/13/2023 - 1/27/2023 Chemotherapy    CISplatin / Etoposide, 7 day Cycles - GTN Induction  Plan Provider: Lorena Hogue MD  Treatment goal: Curative  Line of treatment: Induction     1/13/2023 Imaging Significant Findings    CT abd/pelvis  1.   Uterine mass measuring up to 13.8 cm, consistent with the clinically  suspected diagnosis of choriocarcinoma.  2.  Extensive pulmonary metastases, most notably a right lower lobe 4.9 cm  subpleural mass.  3.  Enlarged ovaries with numerous cysts likely representing theca lutein cysts  4.  Indeterminate 0.8 cm hypoattenuating lesion at the dome of the liver.  The  previously noted hypervascular liver lesions are not well seen due to the phase  of contrast at the time of  imaging.  Contrast-enhanced MRI of the abdomen can be  considered for more optimal characterization.  5.  Indeterminate hypoattenuating lesions in the right kidney which can also be  more optimally evaluated with MRI.     2023 Tumor Markers    HCG >1.8468 million     2023 -  Chemotherapy    Inpatient: EMA/EP (Etoposide / Methotrexate / Dactinomycin / Etoposide / CISPlatin), 14 Day Cycles - Gestational Trophoblastic Neoplasia   Plan Provider: Shantelle Mcmullen MD  Treatment goal: Curative  Line of treatment: First Line     2023 -  Chemotherapy    GENERAL INFUSION ORDERS FOR HYDRATION  Plan Provider: Shantelle Mcmullen MD       OB History:   OB History    Para Term  AB Living   2 2 2 0 0 2   SAB IAB Ectopic Multiple Live Births   0 0 0 0 2      # Outcome Date GA Lbr Kelvin/2nd Weight Sex Delivery Anes PTL Lv   2 Term            1 Term                Medical History:   Past Medical History:   Diagnosis Date   • Hx antineoplastic chemo        Surgical History:   Past Surgical History:   Procedure Laterality Date   •  SECTION      x2       Allergies: Patient has no known allergies.    Prior to Admission medications    Medication Sig Start Date End Date Taking? Authorizing Provider   benzonatate (TESSALON) 100 mg capsule Take 100 mg by mouth 3 (three) times a day as needed for cough.    Provider, Ricardo Carr MD   buPROPion XL (WELLBUTRIN XL) 150 mg 24 hr tablet Take 150 mg by mouth nightly. 12/15/22   ProviderRicardo MD   filgrastim-sndz (ZARXIO) 480 mcg/0.8 mL syringe injection Inject 0.8 mL (480 mcg total) under the skin See admin instr for 15 doses. Inject under the skin on days 3, 4, 5, 9, 10, 11 and 12 of chemotherapy cycle. 4/3/23   Shantelle Mcmullen MD   gly-carb h-poly a-pot hydrox solution Take 5-10 mL by mouth as needed for mucositis. Swish and spit 4-6 times per day as needed 23   ProviderRicardo MD   hydrocortisone 2.5 % cream Apply 2 g topically as  needed for irritation or rash. 2/11/23   Ricardo Bowers MD   lidocaine-prilocaine (EMLA) cream Apply topically as needed for mild pain. Apply a quarter size amount to skin over port site approximately one hour prior to chemotherapy. Cover with saran wrap. 2/9/23   Temitope Peres PA C   magic mouthwash with nystatin (BENADRYL-MAALOX-LIDOCAINE-NYSTATIN) Swish and spit 15 mL every 4 (four) hours as needed for stomatitis. 4/17/23   Temitope Peres PA C   magnesium chloride 64 mg tablet,delayed release (DR/EC) Take 1 tablet (64 mg total) by mouth 2 (two) times a day. 4/21/23   Estela Bell PA C   NIFEdipine XL (PROCARDIA XL) 30 mg 24 hr tablet Take 1 tablet (30 mg total) by mouth daily. 3/28/23 4/27/23  Amber Reeves MD   ondansetron ODT (ZOFRAN-ODT) 4 mg disintegrating tablet Take 1-2 tablets (4-8 mg total) by mouth every 6 (six) hours as needed for nausea or vomiting (Nausea/Vomiting). Maximum 32mg per day. 1/21/23   Artur Macias MD   promethazine (PHENERGAN) 12.5 mg tablet Take 12.5 mg by mouth every 6 (six) hours as needed for nausea or vomiting.    ProviderRicardo MD   protriptyline (VIVACTIL) 5 mg tablet Take 15 mg by mouth nightly.    ProviderRicardo MD   triamcinolone (KENALOG) 0.1 % ointment Apply topically 2 times daily. 8/24/22   Ricardo Bowers MD       Review of Systems  All systems were reviewed and normal with the exception of the above.     Objective     Vital Signs for the last 24 hours:  Temp:  [37.6 °C (99.6 °F)] 37.6 °C (99.6 °F)  Heart Rate:  [101-128] 105  Resp:  [19-23] 19  BP: (107-117)/(58-73) 117/73    Exam  General Appearance: Alert, cooperative, no acute distress  Lungs: Clear to auscultation bilaterally, respirations unlabored  Heart: Tachycardic rate and regular rhythm  Abdomen: Soft, nontender, nondistended, nonrigid   Rectal: Deferred  Extremities: no edema or calf tenderness    Labs  Results from last 7 days   Lab Units  23  1558 23  0741 23  0911   WBC K/uL 0.89* 4.81 3.55*   HEMOGLOBIN g/dL 7.1* 7.1* 6.7*   HEMATOCRIT % 19.6* 21.5* 19.9*   PLATELETS K/uL 13* 11* 31*      Results from last 7 days   Lab Units 23  1558 23  0911   SODIUM mEQ/L 133* 136   POTASSIUM mEQ/L 3.5* 3.9   CHLORIDE mEQ/L 101 103   CO2 mEQ/L 22 24   BUN mg/dL 20 18   CREATININE mg/dL 1.2* 1.0   GLUCOSE mg/dL 90 120*   CALCIUM mg/dL 8.1* 8.8*      Imaging  CLINICAL HISTORY: SOB   .     COMPARISON: 3/24/2023.     COMMENT: Frontal view of the chest was obtained.     No pulmonary consolidation or edema. No pneumothorax or pleural effusion. Stable  cardiomediastinal silhouette. Right chest wall internal jugular infusion port  catheter, tip overlying central SVC. External EKG leads.     --  IMPRESSION: No active disease in the chest.    Assessment/Plan     Alondra Villela is a 32 y.o.  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 Patient is afebrile in the ED, however, reports a fever of 101.2F at home at 13:30 . She did take tylenol at home.   2. WBC 0.89, ANC 0.15.   3. CXR no acute disease  4. Collect blood cultures, urine culture. Will start antibiotics with cefepime 2g IV q8h. Will consult ID per hospital policy.   5. IVF with LR 80cc/hr   6. For strict neutropenic precautions including neutropenic diet   7. Will continue to trend fever curve. Repeat CBC with diff in AM.     #Chest pain/tachycardia:   1. HR in ED as high as 120s.   2. Troponin negative, EKG normal   3. Will obtain CTA of chest to r/o PE out an abundance of caution per ED providers.   4. Suspect chest pain and tachycardia in the setting of anemia/thrombocytopenia, and dehydration (patient's PO intake has been decreased in the setting of mouth ulcers). Will admit to telemetry.     #JLUIS  1. Cr 1.2 on admission, baseline Cr 0.9-1.0   2. Suspect in the setting  of poor PO intake and dehydration   3. Will continue IVF as above   4. Will follow-up BMP in AM     #Pancytopenia:   1. Plt on admission 13, will hold off transfusion unless Plt <10.   2. Hgb on admission 7.1, will hold off transfusion unless Hgb <7.0  3. Continue daily filgrastim injections on days when patient is not getting chemotherapy     #Thrush/oral mucocutaneous ulcers  1. Will continue magic mouth wash with nystatin while inpatient   2. Continue IV as above given poor PO intake     #Depression:  1. Continue home medications Wellbutrin  mg daily and Propytiline 15 mg daily    #Hx of DVT in right upper extremity   1. Anticoagulation discontinued in the setting of pancytopenia and bleeding   2. For SCDs while in bed while admitted     Discussed with Dr Luis Flores MD

## 2023-04-21 NOTE — PROGRESS NOTES
Spoke with patient and confirmed with medication list from [facility] and/or patient's own pharmacy to complete the medication reconciliation.     Prior to admission medication list:  •  buPROPion XL (WELLBUTRIN XL) 150 mg 24 hr tablet, Take 150 mg by mouth nightly.  •  magnesium chloride 64 mg tablet,delayed release (DR/EC), Take 1 tablet (64 mg total) by mouth 2 (two) times a day.  •  NIFEdipine XL (PROCARDIA XL) 30 mg 24 hr tablet, Take 1 tablet (30 mg total) by mouth daily.  •  protriptyline (VIVACTIL) 5 mg tablet, Take 15 mg by mouth nightly.  •  acetaminophen (TYLENOL) 325 mg tablet, Take 650 mg by mouth every 6 (six) hours as needed for pain.  •  benzonatate (TESSALON) 100 mg capsule, Take 100 mg by mouth 3 (three) times a day as needed for cough.  •  filgrastim-sndz (ZARXIO) 480 mcg/0.8 mL syringe injection, Inject 0.8 mL (480 mcg total) under the skin See admin instr for 15 doses. Inject under the skin on days 3, 4, 5, 9, 10, 11 and 12 of chemotherapy cycle.  •  gly-carb h-poly a-pot hydrox solution, Take 5-10 mL by mouth as needed for mucositis. Swish and spit 4-6 times per day as needed  •  hydrocortisone 2.5 % cream, Apply 2 g topically as needed for irritation or rash.  •  lidocaine-prilocaine (EMLA) cream, Apply topically as needed for mild pain. Apply a quarter size amount to skin over port site approximately one hour prior to chemotherapy. Cover with saran wrap.  •  magic mouthwash with nystatin (BENADRYL-MAALOX-LIDOCAINE-NYSTATIN), Swish and spit 15 mL every 4 (four) hours as needed for stomatitis.  •  ondansetron ODT (ZOFRAN-ODT) 4 mg disintegrating tablet, Take 1-2 tablets (4-8 mg total) by mouth every 6 (six) hours as needed for nausea or vomiting (Nausea/Vomiting). Maximum 32mg per day.  •  promethazine (PHENERGAN) 12.5 mg tablet, Take 12.5 mg by mouth every 6 (six) hours as needed for nausea or vomiting.  •  triamcinolone (KENALOG) 0.1 % ointment, Apply topically 2 times daily.    Comments  about home medications:  - Patient reports she has not taken nifedipine XL in 2 days because of low blood pressures.     Compliance:   - Consistent fills, no compliance concerns based on patient interview

## 2023-04-21 NOTE — TELEPHONE ENCOUNTER
Alondra called to report a fever of 101.2. she has a bad sore throat, lots of fatigue. Fever this morning was 99.0 woke up from nap with chills and temp rechecked. Did not take any tylenol yet , has not been around any sick contacts.

## 2023-04-21 NOTE — TELEPHONE ENCOUNTER
Spoke with Elvira informing him that Sharmaine mentioned via email that Leslie can come early Monday morning opposed to staying the night on Sunday. Emmanuelon actually prefers for her to come Sunday as he feels Leslie is not doing well, her mouth and throat is getting worst and she has been unable to eat. He said the [facility] came today and she will take it. He is hoping she can come Sunday and have fluids during her admission as well as a ct scan that he said was previously mentioned to him that leslie will need one.     I informed Sharmaine via email of elvira's request.

## 2023-04-21 NOTE — TELEPHONE ENCOUNTER
Called and spoke with Elvira to inform him that Dr. Mcmullen wants them to go to the emergency department today.     Elvira would like to speak with one of the providers as he has some concerns about leslie waiting in the ED.     I spoke with elsa and made her aware and she is calling Elvira to speak with him.

## 2023-04-21 NOTE — ED ATTESTATION NOTE
I have personally seen and examined the patient.  I reviewed and agree with the PA/NP's assessment and plan of care, with the following exceptions: None     My examination, assessment, and plan of care of Alondra Villela is as follows:     Vitals noted  Clear lungs  Soft abd NTND    MDM:    31yo female h/o uterine CA (on chemo), DVT (off AC), neutropenic fever, hyperthyroid c/o fever 101.2. Denies HA, neck pain, cough, abd pain or urinary complaints. Admitted 3/20/23 for neutropenic fever, but BCx neg.  Will check labs, BCx, UCx, cover with broad spectrum ABx and reassess.     Ozzy Williamson MD  04/21/23 [id number]

## 2023-04-21 NOTE — TELEPHONE ENCOUNTER
TC with Car, explained need for evaluation given Alondra is immunocompromised and with fever; concern for infection. He plans to take pt to [facility] ED. Advised I will call ED to inform them

## 2023-04-22 LAB
ABO + RH BLD: NORMAL
ANION GAP SERPL CALC-SCNC: 10 MEQ/L (ref 3–15)
BASOPHILS # BLD: 0 K/UL (ref 0.01–0.1)
BASOPHILS NFR BLD: 0 %
BLD GP AB SCN SERPL QL: NEGATIVE
BUN SERPL-MCNC: 14 MG/DL (ref 8–20)
CALCIUM SERPL-MCNC: 7.4 MG/DL (ref 8.9–10.3)
CHLORIDE SERPL-SCNC: 101 MEQ/L (ref 98–109)
CO2 SERPL-SCNC: 21 MEQ/L (ref 22–32)
CREAT SERPL-MCNC: 1 MG/DL (ref 0.6–1.1)
D AG BLD QL: POSITIVE
DACRYOCYTES BLD QL SMEAR: ABNORMAL
DIFFERENTIAL METHOD BLD: ABNORMAL
EOSINOPHIL # BLD: 0.02 K/UL (ref 0.04–0.36)
EOSINOPHIL NFR BLD: 2 %
ERYTHROCYTE [DISTWIDTH] IN BLOOD BY AUTOMATED COUNT: 12.9 % (ref 11.7–14.4)
GFR SERPL CREATININE-BSD FRML MDRD: >60 ML/MIN/1.73M*2
GLUCOSE SERPL-MCNC: 74 MG/DL (ref 70–99)
HCT VFR BLDCO AUTO: 17.5 % (ref 35–45)
HGB BLD-MCNC: 6.1 G/DL (ref 11.8–15.7)
HYPOCHROMIA BLD QL SMEAR: ABNORMAL
LABORATORY COMMENT REPORT: NORMAL
LYMPHOCYTES # BLD: 0.79 K/UL (ref 1.2–3.5)
LYMPHOCYTES NFR BLD: 80 %
MCH RBC QN AUTO: 28.8 PG (ref 28–33.2)
MCHC RBC AUTO-ENTMCNC: 34.9 G/DL (ref 32.2–35.5)
MCV RBC AUTO: 82.5 FL (ref 83–98)
MONOCYTES # BLD: 0 K/UL (ref 0.28–0.8)
MONOCYTES NFR BLD: 0 %
NEUTS BAND # BLD: 0.18 K/UL (ref 1.7–7)
NEUTS SEG NFR BLD: 18 %
PDW BLD AUTO: 12.1 FL (ref 9.4–12.3)
PLAT MORPH BLD: NORMAL
PLATELET # BLD AUTO: 5 K/UL (ref 150–369)
PLATELET # BLD EST: ABNORMAL 10*3/UL
POTASSIUM SERPL-SCNC: 3.2 MEQ/L (ref 3.6–5.1)
RBC # BLD AUTO: 2.12 M/UL (ref 3.93–5.22)
SODIUM SERPL-SCNC: 132 MEQ/L (ref 136–144)
SPECIMEN EXP DATE BLD: NORMAL
WBC # BLD AUTO: 0.99 K/UL (ref 3.8–10.5)

## 2023-04-22 PROCEDURE — 25800000 HC PHARMACY IV SOLUTIONS: Performed by: OBSTETRICS & GYNECOLOGY

## 2023-04-22 PROCEDURE — 63600000 HC DRUGS/DETAIL CODE: Performed by: OBSTETRICS & GYNECOLOGY

## 2023-04-22 PROCEDURE — 84443 ASSAY THYROID STIM HORMONE: CPT | Performed by: OBSTETRICS & GYNECOLOGY

## 2023-04-22 PROCEDURE — 36430 TRANSFUSION BLD/BLD COMPNT: CPT

## 2023-04-22 PROCEDURE — 86920 COMPATIBILITY TEST SPIN: CPT

## 2023-04-22 PROCEDURE — 21400000 HC ROOM AND CARE CCU/INTERMEDIATE

## 2023-04-22 PROCEDURE — 99232 SBSQ HOSP IP/OBS MODERATE 35: CPT | Performed by: OBSTETRICS & GYNECOLOGY

## 2023-04-22 PROCEDURE — 63700000 HC SELF-ADMINISTRABLE DRUG: Performed by: OBSTETRICS & GYNECOLOGY

## 2023-04-22 PROCEDURE — 63600000 HC DRUGS/DETAIL CODE: Performed by: STUDENT IN AN ORGANIZED HEALTH CARE EDUCATION/TRAINING PROGRAM

## 2023-04-22 PROCEDURE — 80048 BASIC METABOLIC PNL TOTAL CA: CPT | Performed by: OBSTETRICS & GYNECOLOGY

## 2023-04-22 PROCEDURE — 86803 HEPATITIS C AB TEST: CPT | Performed by: OBSTETRICS & GYNECOLOGY

## 2023-04-22 PROCEDURE — 85025 COMPLETE CBC W/AUTO DIFF WBC: CPT | Performed by: OBSTETRICS & GYNECOLOGY

## 2023-04-22 PROCEDURE — P9073 PLATELETS PHERESIS PATH REDU: HCPCS

## 2023-04-22 PROCEDURE — 86900 BLOOD TYPING SEROLOGIC ABO: CPT

## 2023-04-22 PROCEDURE — 36415 COLL VENOUS BLD VENIPUNCTURE: CPT | Performed by: OBSTETRICS & GYNECOLOGY

## 2023-04-22 PROCEDURE — 87340 HEPATITIS B SURFACE AG IA: CPT | Performed by: OBSTETRICS & GYNECOLOGY

## 2023-04-22 RX ORDER — SODIUM CHLORIDE 9 MG/ML
5 INJECTION, SOLUTION INTRAVENOUS AS NEEDED
Status: ACTIVE | OUTPATIENT
Start: 2023-04-22 | End: 2023-04-23

## 2023-04-22 RX ORDER — ACETAMINOPHEN 325 MG/1
650 TABLET ORAL ONCE
Status: COMPLETED | OUTPATIENT
Start: 2023-04-22 | End: 2023-04-22

## 2023-04-22 RX ORDER — POTASSIUM CHLORIDE 14.9 MG/ML
20 INJECTION INTRAVENOUS ONCE
Status: COMPLETED | OUTPATIENT
Start: 2023-04-22 | End: 2023-04-23

## 2023-04-22 RX ORDER — HYDROMORPHONE HYDROCHLORIDE 1 MG/ML
0.25 INJECTION, SOLUTION INTRAMUSCULAR; INTRAVENOUS; SUBCUTANEOUS ONCE
Status: COMPLETED | OUTPATIENT
Start: 2023-04-22 | End: 2023-04-22

## 2023-04-22 RX ORDER — ONDANSETRON HYDROCHLORIDE 2 MG/ML
4 INJECTION, SOLUTION INTRAVENOUS EVERY 6 HOURS PRN
Status: DISCONTINUED | OUTPATIENT
Start: 2023-04-22 | End: 2023-04-29 | Stop reason: HOSPADM

## 2023-04-22 RX ORDER — ACETAMINOPHEN 10 MG/ML
1000 INJECTION, SOLUTION INTRAVENOUS ONCE
Status: DISCONTINUED | OUTPATIENT
Start: 2023-04-23 | End: 2023-04-22

## 2023-04-22 RX ORDER — ACETAMINOPHEN 10 MG/ML
1000 INJECTION, SOLUTION INTRAVENOUS ONCE
Status: COMPLETED | OUTPATIENT
Start: 2023-04-23 | End: 2023-04-23

## 2023-04-22 RX ORDER — DIPHENHYDRAMINE HCL 50 MG/ML
25 VIAL (ML) INJECTION EVERY 6 HOURS PRN
Status: DISCONTINUED | OUTPATIENT
Start: 2023-04-22 | End: 2023-04-29 | Stop reason: HOSPADM

## 2023-04-22 RX ADMIN — DIPHENHYDRAMINE HYDROCHLORIDE 25 MG: 50 INJECTION, SOLUTION INTRAMUSCULAR; INTRAVENOUS at 18:30

## 2023-04-22 RX ADMIN — PROTRIPTYLINE HYDROCHLORIDE 15 MG: 5 TABLET, FILM COATED ORAL at 21:32

## 2023-04-22 RX ADMIN — CEFEPIME 2 G: 2 INJECTION, POWDER, FOR SOLUTION INTRAVENOUS at 01:41

## 2023-04-22 RX ADMIN — MAGNESIUM 64 MG (MAGNESIUM CHLORIDE) TABLET,DELAYED RELEASE 64 MG: at 21:33

## 2023-04-22 RX ADMIN — SODIUM CHLORIDE, POTASSIUM CHLORIDE, SODIUM LACTATE AND CALCIUM CHLORIDE: 600; 310; 30; 20 INJECTION, SOLUTION INTRAVENOUS at 01:43

## 2023-04-22 RX ADMIN — CEFEPIME 2 G: 2 INJECTION, POWDER, FOR SOLUTION INTRAVENOUS at 10:29

## 2023-04-22 RX ADMIN — POTASSIUM CHLORIDE 20 MEQ: 14.9 INJECTION, SOLUTION INTRAVENOUS at 21:33

## 2023-04-22 RX ADMIN — CEFEPIME 2 G: 2 INJECTION, POWDER, FOR SOLUTION INTRAVENOUS at 18:29

## 2023-04-22 RX ADMIN — HYDROMORPHONE HYDROCHLORIDE 0.25 MG: 1 INJECTION, SOLUTION INTRAMUSCULAR; INTRAVENOUS; SUBCUTANEOUS at 21:32

## 2023-04-22 RX ADMIN — ONDANSETRON HYDROCHLORIDE 4 MG: 2 INJECTION, SOLUTION INTRAMUSCULAR; INTRAVENOUS at 22:26

## 2023-04-22 RX ADMIN — ACETAMINOPHEN 650 MG: 325 TABLET ORAL at 01:40

## 2023-04-22 RX ADMIN — BUPROPION HYDROCHLORIDE 150 MG: 150 TABLET, EXTENDED RELEASE ORAL at 21:33

## 2023-04-22 RX ADMIN — FILGRASTIM-SNDZ 480 MCG: 480 INJECTION, SOLUTION INTRAVENOUS; SUBCUTANEOUS at 11:01

## 2023-04-22 RX ADMIN — MAGNESIUM 64 MG (MAGNESIUM CHLORIDE) TABLET,DELAYED RELEASE 64 MG: at 09:57

## 2023-04-22 RX ADMIN — SODIUM CHLORIDE, POTASSIUM CHLORIDE, SODIUM LACTATE AND CALCIUM CHLORIDE: 600; 310; 30; 20 INJECTION, SOLUTION INTRAVENOUS at 11:07

## 2023-04-22 ASSESSMENT — ENCOUNTER SYMPTOMS
FREQUENCY: 0
DYSURIA: 0
FEVER: 1
FLANK PAIN: 0
LIGHT-HEADEDNESS: 0

## 2023-04-22 NOTE — PLAN OF CARE
Plan of Care Review  Plan of Care Reviewed With: patient  Outcome Summary: Pt new admit with low grade temp, 100.4. Tylenol given. IVF infusing and IV ABX tolerated. PO meds tolerated. Pt had no concerns or complaints. Spouse remain at bedside. Pt ambulate to br with standby assist. Call bell in reach, bed alarm refused.

## 2023-04-22 NOTE — CONSULTS
Infectious Disease Consult Note    Patient Name: Alondra Villela  MR#: [id number]  : 1990  Admission Date: 2023  Consult Date: 23 7:15 AM   Consultant: Elkin Pereyra MD    Reason for Consult: neutropenic fever  Referring Provider: referred by Thuy Flores      History of Present Illness     Alondra Villela is a 32 y.o. 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    She was last admitted 3/20-3/28 for neutropenic fever. Blood cultures were negative at the time and she received empiric therapy with cefepime and fluconazole for thrush.  Her last chemotherapy was  where she received Etoposide and Cisplatin.  States she started having mouth ulcers again after methotrexate 2 weeks ago.  Last labs on  she had a wbc of 4.9 but since has become neutropenic 2/2 chemo.     She presents for temp at home of 101.2.  She has had increasing mouth pain and initially had chest pain at time of fever but that resolved quickly.  She denied any shortness of breath, n/v, diarrhea, dysuria or problems with her port.  In the ED she was afebrile with WBC .89 with ANC .15, plt 13.     Outside records were not reviewed.    Allergies: No Known Allergies    Medical History:   Past Medical History:   Diagnosis Date   • Hx antineoplastic chemo        Surgical History:   Past Surgical History:   Procedure Laterality Date   •  SECTION      x2       Social History:   Social History     Socioeconomic History   • Marital status:    Tobacco Use   • Smoking status: Never   • Smokeless tobacco: Never   Vaping Use   • Vaping status: Never Used   Substance and Sexual Activity   • Alcohol use: Not Currently   • Drug use: Never   • Sexual activity: Not Currently     Social Determinants of Health     Food Insecurity: No Food Insecurity (2023)    Hunger Vital Sign     • Worried About Running Out of Food in the Last Year: Never true    • Ran Out of Food in the Last Year: Never true   Transportation Needs: No Transportation Needs (2/13/2023)    PRAPARE - Transportation    • Lack of Transportation (Medical): No    • Lack of Transportation (Non-Medical): No          Travel Exposure:   Travel and Exposure Screening    Flowsheet Row Most Recent Value   Travel Screening    Overnight hospitalization outside the [place] in the last year? No Filed On: 04/21/2023 1517   Exposure Screening    Symptoms           Family History:   Family History   Problem Relation Age of Onset   • Hypertension Biological Mother    • Hypertension Biological Father        Review of Systems    All other systems reviewed and negative except for what is noted in HPI.     Pertinent items are noted in HPI.    Medications:    Current IP Meds (From admission, onward)        Frequency     ceFEPIme (MAXIPIME) 2 g in 100 mL NSS vial in bag  (cefepime (MAXIPIME) IV and consult to infectious disease)         Every 8 hours interval     acetaminophen (TYLENOL) tablet 650 mg         Once     iohexoL (OMNIPAQUE 350) 350 mg iodine/mL solution 80 mL         Once in imaging     buPROPion XL (WELLBUTRIN XL) 24 hr ER tablet 150 mg         Nightly     Pt OWn- ProtRipTylinE (VivactIl) tablet 15mg         Nightly     [Provider Managed Hold]  NIFEdipine (ADALAT CC, PROCARDIA XL) tablet extended release 30 mg        (Provider Managed Hold since Fri 4/21/2023 at 1937 by Thuy Flores MD.Hold Reason: Order parameters not met)    Daily     magnesium chloride tablet,delayed release (DR/EC) 64 mg         2 times daily     filgrastim-sndz (ZARXIO) injection 480 mcg         Daily     lactated ringer's infusion  (Other IV Fluids)         Continuous     glucose chewable tablet 16-32 g of dextrose  (Hypoglycemia Treatment Protocol and Hyperglycemia Validation Protocol)        See Hyperspace for full Linked Orders Report.    As needed      dextrose 40 % oral gel 15-30 g of dextrose  (Hypoglycemia Treatment Protocol and Hyperglycemia Validation Protocol)        Note to Pharmacy: Pharmacist: [facility] is the depot location for this medication.   See Hyperspace for full Linked Orders Report.    As needed     glucagon (GLUCAGEN) injection 1 mg  (Hypoglycemia Treatment Protocol and Hyperglycemia Validation Protocol)        See Hyperspace for full Linked Orders Report.    As needed     dextrose 50 % in water (D50) injection 12.5 g  (Hypoglycemia Treatment Protocol and Hyperglycemia Validation Protocol)        See Hyperspace for full Linked Orders Report.    As needed     magic mouthwash oral suspension (Benadryl-Maalox-Lidocaine)         Every 3 hours PRN     nystatin (MYCOSTATIN) 100,000 unit/mL suspension 500,000 Units         4 times daily PRN     benzonatate (TESSALON) capsule 100 mg         3 times daily PRN     lidocaine-prilocaine (EMLA) 2.5-2.5 % cream         As needed     ondansetron ODT (ZOFRAN-ODT) disintegrating tablet 4 mg         Every 6 hours PRN     promethazine (PHENERGAN) tablet 12.5 mg         Every 6 hours PRN     ceFEPIme (MAXIPIME) 2 g in 100 mL NSS vial in bag  (cefepime (MAXIPIME) IV and consult to infectious disease)         Once     magic mouthwash oral suspension (Benadryl-Maalox-Lidocaine)         Once     ceFEPIme (MAXIPIME) 1 g in 100 mL NSS vial in bag  (cefepime (MAXIPIME) IV and consult to infectious disease)  Status:  Discontinued        See Hyperspace for full Linked Orders Report.    Once     sodium chloride 0.9 % bolus 1,000 mL         Once          Anti-infectives (From admission, onward)    Start     Dose/Rate Route Frequency Ordered Stop    04/22/23 0200  ceFEPIme (MAXIPIME) 2 g in 100 mL NSS vial in bag         2 g  200 mL/hr over 30 Minutes intravenous Every 8 hours interval 04/21/23 1833      04/21/23 1833  nystatin (MYCOSTATIN) 100,000 unit/mL suspension 500,000 Units         500,000 Units Swish & Spit 4 times daily  "PRN 23 1833              Objective     Vital Signs:    Visit Vitals  BP (!) 105/52 (BP Location: Left upper arm)   Pulse (!) 115   Temp 37.1 °C (98.8 °F) (Oral)   Resp 20   Ht 1.626 m (5' 4\")   Wt 68 kg (150 lb)   SpO2 98%   BMI 25.75 kg/m²     Temp (72hrs), Av.4 °C (99.4 °F), Min:37.1 °C (98.8 °F), Max:38 °C (100.4 °F)      Physical Exam:    General: No acute distress  HEENT: NC/AT/ anicteric sclera, Ulceration on left buccal mucosa, no thrush noted, erythema around right tonsil   Neck: supple, no meningismus  Cardiovascular: regular S1/S2  No murmur, rub , or gallop  Respiratory: clear to auscultation  GI/Abdomen: soft, NT/ND,  no peritoneal signs  Extremities: no clubbing, cyanosis, or edema  JOINTS:  No swelling, erythema, or pain  Lymphatics: no lymphadenopathy  Neurology: no focal deficits  Psych: cooperative with exam  Skin: Right chest wall port without erythema or swelling        Lines, Drains, Airways, Wounds:  Peripheral IV (Adult) 23 Right Hand (Active)   Number of days: 1       Peripheral IV (Adult) 23 Anterior;Distal;Right;Upper Arm (Active)   Number of days: 1       Labs:    CBC Results       23     1558 0741 0911    WBC 0.89 4.81 3.55    RBC 2.39 2.44 2.29    HGB 7.1 7.1 6.7    HCT 19.6 21.5 19.9    MCV 82.0 88.1 86.9    MCH 29.7 29.1 29.3    MCHC 36.2 33.0 33.7    PLT 13 11 31         Comment for WBC at 1558 on 23: This result has been called to JALEN CORDOVA by Miguel Palmer on 23 at 16:53, and has been read back.     Comment for HGB at 0741 on 23: RESULT CHECKED    Comment for HGB at 0911 on 23: ALL RESULTS HAVE BEEN CHECKED. SPECIMEN QUALITY CHECKED. This result has been called to KOFI MARIE by Susan Watts on 23 at 10:07, and has been read back.     Comment for PLT at 1558 on 23: ALL RESULTS HAVE BEEN RECHECKED. CONSISTENT WITH PREVIOUS RESULTS. This result has been called to JALEN CORDOVA by Miguel Palmer on  " 23 at 16:53, and has been read back.     Comment for PLT at 0741 on 04/19/23: RESULTS CHECKED. SPECIMEN QUALITY CHECKED. This result has been called to LILIBETH ARAUZ by Joanie Anthony on 04 19 23 at 08:13, and has been read back.     Comment for PLT at 0911 on 04/17/23: This result has been called to KOFI MARIE by Susan Watts on 04 17 23 at 10:07, and has been read back.       BMP Results       04/21/23 04/17/23 04/10/23     1558 0911 0858     136 139    K 3.5 3.9 3.2    Cl 101 103 105    CO2 22 24 23    Glucose 90 120 121    BUN 20 18 9    Creatinine 1.2 1.0 0.9    Calcium 8.1 8.8 8.5    Anion Gap 10 9 11    EGFR 51.7 >60.0 >60.0         Comment for K at 1558 on 04/21/23: Results obtained on plasma. Plasma Potassium values may be up to 0.4 mEQ/L less than serum values. The differences may be greater for patients with high platelet or white cell counts.      PT/PTT Results       03/23/23 02/08/23 02/06/23     0621 0842 0553    PT 19.0 13.5 13.8    INR 1.6 1.0 1.1    PTT 31 24 --         Comment for INR at 0621 on 03/23/23: Moderate Intensity Anticoagulation = 2.0 to 3.0, High Intensity = 2.5 to 3.5    Comment for INR at 0842 on 02/08/23: INR has no defined significance when PT is within Reference Range.    Comment for INR at 0553 on 02/06/23: INR has no defined significance when PT is within Reference Range.      UA Results       03/20/23 2105    Color Brown    Clarity Cloudy    Glucose Negative    Bilirubin Negative    Ketones Trace    Sp Grav 1.023    Blood +3    Ph 6.5    Protein +1    Urobilinogen 0.2    Nitrite Negative    Leuk Est Negative    WBC 0 TO 3    RBC Too Numerous To Count    Bacteria Rare         Comment for Ketones at 2105 on 03/20/23: Free sulfhydryl drugs such as Mesna, Capoten, and Acetylcysteine (Mucomyst) may cause false positive ketonuria.    Comment for Blood at 2105 on 03/20/23: The sensitivity of the occult blood test is equivalent to approximately 4 intact RBC/HPF.     Comment for Leuk Est at 2105 on 03/20/23: Results can be falsely negative due to high specific gravity, some antibiotics, glucose >3 g/dl, or WBC other than neutrophils.      Lactate Results       04/21/23 03/20/23     1558 0906    Lactate 0.7 1.6          Microbiology Results     ** No results found for the last 720 hours. **          Pathology Results     ** No results found for the last 720 hours. **          Echo:     Cardiac Imaging    TRANSTHORACIC ECHO (TTE) COMPLETE 01/12/2023    Interpretation Summary  •  Left Ventricle: Normal ventricle size. Mild concentric left ventricular hypertrophy. Estimated EF 65%.  •  Mitral Valve: Normal leaflet structure.  •  Left Atrium: Normal sized atrium.  •  Aortic Valve: Normal structure. No regurgitation. No stenosis. Calculated dimensionless index = 0.69.  •  Aorta: Aortic root normal.  •  Right Atrium: Normal sized atrium.  •  Right Ventricle: Normal ventricle size. Normal systolic function.  •  Tricuspid Valve: Normal structure.  •  Pulmonic Valve: Normal structure.  •  IVC/SVC: Normal sized inferior vena cava.  •  Pericardium: Normal structure.      Imaging:    Radiology Imaging    XR CHEST 1 VW    Narrative  CLINICAL HISTORY: SOB   .    COMPARISON: 3/24/2023.    COMMENT: Frontal view of the chest was obtained.    No pulmonary consolidation or edema. No pneumothorax or pleural effusion. Stable  cardiomediastinal silhouette. Right chest wall internal jugular infusion port  catheter, tip overlying central SVC. External EKG leads.    --    Impression  No active disease in the chest.      Assessment     Alondra Villela is a 32 y.o. 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      1. Neutropenic Fever  2. Stage IV gestational trophoblastic neoplasia with pulmonary and liver mets on EMA-EP  3. Mucositis  4. Previous DVT on eliquis (held  for thrombocytopenia)  5. Thrombocytopenia  6. Neutropenia on filgrastim      Patient presents for neutropenic fever following recent chemotherapy. She mainly endorses mouth pain related to her ulcers, no other identifiable lesions at this time and mucositis remains a possible cause for the fever with possible translocation.  Would follow up her bcx and CT scan and continue the cefepime for now.  No signs of thrush at this time.         Plan     1. Continue Cefepime 2g q8h  2. Pain control for oral ulcers  3. Follow up blood cultures  4. Follow up CT C/A/P   5. We will follow    Please wait for attending attestation for final recommendations      Elkin Pereyra  Infectious Disease Fellow

## 2023-04-22 NOTE — PROGRESS NOTES
Gynecologic Oncology Progress Note     Subjective     Interval History: none.   Patient is resting comfortably this morning.  Patient reports that her chest pain has resolved completely this morning.  She denies any lightheadedness, dizziness, headache, nausea, vomiting.    Objective     Vital signs in last 24 hours:  Temp:  [37.2 °C (98.9 °F)-38 °C (100.4 °F)] 38 °C (100.4 °F)  Heart Rate:  [101-128] 110  Resp:  [18-23] 20  BP: (107-129)/(58-73) 129/64    Intake/Output this shift:  I/O this shift:  In: 300 [I.V.:100; IV Piggyback:200]  Out: -     Exam  General Appearance: Alert, cooperative, no acute distress  Lungs: Clear to auscultation bilaterally, respirations unlabored  Heart: Regular rate and rhythm  Abdomen: Soft, nontender, nondistended  Extremities: no edema or calf tenderness    Labs  Results from last 7 days   Lab Units 23  1558 23  0741 23  0911   WBC K/uL 0.89* 4.81 3.55*   HEMOGLOBIN g/dL 7.1* 7.1* 6.7*   HEMATOCRIT % 19.6* 21.5* 19.9*   PLATELETS K/uL 13* 11* 31*      Results from last 7 days   Lab Units 23  1558 23  0911   SODIUM mEQ/L 133* 136   POTASSIUM mEQ/L 3.5* 3.9   CHLORIDE mEQ/L 101 103   CO2 mEQ/L 22 24   BUN mg/dL 20 18   CREATININE mg/dL 1.2* 1.0   GLUCOSE mg/dL 90 120*   CALCIUM mg/dL 8.1* 8.8*        Assessment/Plan     Alondra Villela is a 32 y.o.  with FIGO stage IV gestational trophoblastic neoplasia currently on chemotherapy who presents with neutropenic fever      #Neutropenic fever  1. Tmax overnight 100.4F at 2313, received tylenol at 1:40AM.   2. WBC 0.89, ANC 0.15 on adm   3. CXR no acute disease  4. Blood cultures and urine culture collected on adm in process   5. Continue Cefepime 2g IV q8h. Consult ID per hospital policy.   6. IVF with LR 100cc/hr   7. Continue strict neutropenic precautions including neutropenic diet   8. Will continue to trend fever curve. Repeat CBC with diff this AM.       #Chest pain/tachycardia:   1. HR continues  to be elevated to 110s. Patient is asymptomatic.    2. Troponin negative x2, EKG normal   3. CTA of chest negative for PE per [facility] read, follow-up final read.   4. Suspect chest pain and tachycardia in the setting of anemia/thrombocytopenia, and dehydration (patient's PO intake has been decreased in the setting of mouth ulcers).       #JLUIS  1. Cr 1.2 on admission, baseline Cr 0.9-1.0   2. Suspect in the setting of poor PO intake and dehydration   3. Will continue IVF as above   4. Will follow-up BMP this AM      #Pancytopenia:   1. Plt on admission 13, will hold off transfusion unless Plt <10.   2. Hgb on admission 7.1, will hold off transfusion unless Hgb <7.0  3. Continue daily filgrastim injections on days when patient is not getting chemotherapy      #Thrush/oral mucocutaneous ulcers  1. Will continue magic mouth wash with nystatin while inpatient   2. Continue IV as above given poor PO intake     #GTN:   1. Follow up read of CT chest/abdomen/pelvis to assess for tx response.   2. Most recent beta-hcg 60.2 on 4/17/23  3. Given her plateauing hcg levels, patient to switch to a salvage regimen. Given her bone marrow reserve and promising evidence that pembrolizumab has high efficacy in GTN, patient for pembrolizumab in future.      #Depression:  1. Continue home medications Wellbutrin  mg daily and Propytiline 15 mg daily     #Hx of DVT in right upper extremity   1. Anticoagulation discontinued in the setting of pancytopenia and bleeding   2. For SCDs while in bed while admitted     To be discussed with Dr Luis Flores MD

## 2023-04-22 NOTE — PROGRESS NOTES
Gynecology Progress Note    Subjective  Pt resting comfortably in bed. Pain well controlled. Denies HA/CP/SOB. No lightheaded/dizziness. Does report feeling very fatigued, taking naps throughout the day. No N/V. No rectal pain. Reports that she still has pain with taking anything by mouth. Reports Nystatin and Magic Mouthwash has not helped that much. Taking ice chips.     Objective     Vital signs in last 24 hours:  Temp:  [36.8 °C (98.3 °F)-38 °C (100.4 °F)] 36.8 °C (98.3 °F)  Heart Rate:  [101-115] 106  Resp:  [18-20] 18  BP: (105-129)/(52-74) 123/68    Intake/Output Summary (Last 24 hours) at 4/22/2023 1609  Last data filed at 4/22/2023 0340  Gross per 24 hour   Intake 300 ml   Output --   Net 300 ml     Intake/Output this shift:  No intake/output data recorded.    Exam  General Appearance: Alert, cooperative, no acute distress  Lungs: Clear to auscultation bilaterally, respirations unlabored  Heart: Regular rate and rhythm  Abdomen: Soft, nontender, nondistended  Extremities: no edema or calf tenderness    CBC Results       04/22/23 04/21/23 04/19/23     1447 1558 0741    WBC 0.99 0.89 4.81    RBC 2.12 2.39 2.44    HGB 6.1 7.1 7.1    HCT 17.5 19.6 21.5    MCV 82.5 82.0 88.1    MCH 28.8 29.7 29.1    MCHC 34.9 36.2 33.0    PLT 5 13 11         Comment for WBC at 1447 on 04/22/23: CONSISTENT WITH PREVIOUS RESULTS. This result has been called to SARBJIT BADILLO by Julia Ashby on 04 22 23 at 15:09, and has been read back.     Comment for WBC at 1558 on 04/21/23: This result has been called to JALEN CORDOVA by Migeul Palmer on 04 21 23 at 16:53, and has been read back.     Comment for HGB at 1447 on 04/22/23: ALL RESULTS HAVE BEEN CHECKED. This result has been called to SARBJIT BADILLO by Julia Ashby on 04 22 23 at 15:09, and has been read back.     Comment for HGB at 0741 on 04/19/23: RESULT CHECKED    Comment for PLT at 1447 on 04/22/23: ALL RESULTS HAVE BEEN RECHECKED. This result has been called to SARBJIT  JUSTINO by Julia Ashby on 04 22 23 at 15:09, and has been read back.     Comment for PLT at 1558 on 04/21/23: ALL RESULTS HAVE BEEN RECHECKED. CONSISTENT WITH PREVIOUS RESULTS. This result has been called to JALEN CORDOVA by Miguel Palmer on 04 21 23 at 16:53, and has been read back.     Comment for PLT at 0741 on 04/19/23: RESULTS CHECKED. SPECIMEN QUALITY CHECKED. This result has been called to LILIBETH ARAUZ by Joanie Anthony on 04 19 23 at 08:13, and has been read back.         BMP Results       04/22/23 04/21/23 04/17/23     1447 1558 0911     133 136    K 3.2 3.5 3.9    Cl 101 101 103    CO2 21 22 24    Glucose 74 90 120    BUN 14 20 18    Creatinine 1.0 1.2 1.0    Calcium 7.4 8.1 8.8    Anion Gap 10 10 9    EGFR >60.0 51.7 >60.0         Comment for K at 1558 on 04/21/23: Results obtained on plasma. Plasma Potassium values may be up to 0.4 mEQ/L less than serum values. The differences may be greater for patients with high platelet or white cell counts.          CT ANGIOGRAPHY CHEST PULMONARY EMBOLISM WITH IV CONTRAST  Narrative: CLINICAL HISTORY:  Pulmonary embolism (PE) suspected, high prob    COMMENT: Serial axial images of the chest abdomen and pelvis are obtained after  the uneventful administration of intravenous contrast.3D MIP and/or volume  rendered image reconstruction performed and reviewed for the chest portion of  the study. .  80mL of iohexoL (OMNIPAQUE 350) 350 mg iodine/mL solution 80 mL    2D reformats obtained.    Comparison: February 4, 2023.    CT DOSE:  One or more dose reduction techniques (e.g. automated exposure  control, adjustment of the mA and/or kV according to patient size, use of  iterative reconstruction technique) utilized for this examination.    THYROID:Visualized portions no significant findings  LUNGS:There is a right lower lobe subpleural 3 cm x 2.1 cm nodule/mass which has  decreased in size since the prior exam which measured 4.2 x 2.7 cm. There 3 to  4  mm left upper lobe and right upper lobe anterior lung nodules.  MEDIASTINUM/TIBURCIO:No gross adenopathy  AORTA:No significant aneurysm  PULMONARY ARTERIES:Normal  PLEURA:Normal  HEART/PERICARDIUM:No significant effusion  AXILLA/CHEST WALL:Normal    LIVER/BILIARY TREE: Normal  GALL BLADDER:Gallbladder is unremarkable  SPLEEN:  Normal.  PANCREAS:  No acute findings.  ADRENAL GLANDS:  Normal.  KIDNEYS:  No acute findings.  GI TRACT: There is no focal inflammatory process or mural thickening.  Constipation present.  VESSELS:No aneurysm.  PELVIC STRUCTURES: The uterus and mass measuring 6.6 x 6 cm decreased from prior  variant measured 13.8 cm complex cystic lesions in the adnexa have near  completely resolved.  ADENOPATHY: No significant adenopathy present.  ASCITES: None.    OSSEOUS STRUCTURES: No apparent acute findings.  Impression: IMPRESSION:  Right lower lobe mass has decreased since the prior examination. No evidence of  pulmonary arterial embolism. Small 3 to 4 mm nodules bilaterally. Overall there  is improvement in the abdomen pelvis and in the chest.  CT ABDOMEN PELVIS WITH IV CONTRAST  Narrative: CLINICAL HISTORY: Sepsis  Abdominal pain, acute, nonlocalized    COMMENT: Please see associated report  Impression: IMPRESSION: Please see associated report.          Assessment/Plan     Alondra Villela is a 32 y.o. 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 Tmax overnight 100.4F at 2313, received tylenol at 1:40AM.   2. WBC 0.89, ANC 0.15 on adm   3. CXR no acute disease  4. Blood cultures and urine culture collected on adm in process   5. Continue Cefepime 2g IV q8h. S/p ID consult, appreciate their recommendations, continue to monitor hemorrhoid   6. IVF with LR 100cc/hr   7. Continue strict neutropenic precautions including neutropenic diet   8. Will continue to trend fever curve. Repeat CBC with diff daily       #Chest pain/tachycardia:   1. HR continues to be elevated to 110s. Patient is asymptomatic.    2. Troponin negative x2, EKG normal   3. CTA of chest negative for PE     #JLUIS  1. Cr 1.2 on admission, baseline Cr 0.9-1.0  2. Suspect in the setting of poor PO intake and dehydration   3. Will continue IVF as above   4. Most recent BMP Cr 1.0     #Pancytopenia:   1. Plt on admission 13, will transfuse with Plt <10.   2. Hgb on admission 7.1, will transfuse with Hgb <7.0  3. Continue daily filgrastim injections on days when patient is not getting chemotherapy   4. Hgb 6.1, will give 2 u PRBC  5. Plt 5 will give 1 u Plt, will not give HLA matched for this dose, if desired [place] would need to be contacted     #Thrush/oral mucocutaneous ulcers  1. Will continue magic mouth wash with nystatin while inpatient   2. Continue IV as above given poor PO intake   3. Consider nutrition consult tomorrow     #GTN:   1. CT chest abdomen pelvis returned, lung mass decreased in size, uterus with likely blood  2. Most recent beta-hcg 60.2 on 4/17/23  3. Given her plateauing hcg levels, patient to switch to a salvage regimen. Given her bone marrow reserve and promising evidence that pembrolizumab has high efficacy in GTN, patient for pembrolizumab in future.      #Depression:  1. Continue home medications Wellbutrin  mg daily and Propytiline 15 mg daily     #Hx of DVT in right upper extremity   1. Anticoagulation discontinued in the setting of pancytopenia and bleeding   2. For SCDs while in bed while admitted     Please page [phone] with any questions or concerns.     Reviewed with Dr. Smith.     Lanette Washington MD

## 2023-04-22 NOTE — PROGRESS NOTES
Gynecology Progress Note    Subjective      Pt resting comfortably in bed. Episode of emesis last night improved after Zofran. She reports she had worsening nausea after dose of Dilaudid.    Reports that she still has pain with taking anything by mouth. Reports Nystatin and Magic Mouthwash has not helped that much. Taking ice chips.  Requested pain medications IV yesterday. Called pharmacy to request IV Tylenol, was not approved. Received one dose of IV Dilaudid.    Pharmacy now approved only one dose of IV Tylenol for this morning.     Denies HA/CP/SOB. No lightheaded/dizziness. Does report feeling very fatigued, took naps throughout the day. No nausea currently. No rectal pain.       Objective     Vital signs in last 24 hours:  Temp:  [36.2 °C (97.1 °F)-37.8 °C (100.1 °F)] 36.7 °C (98.1 °F)  Heart Rate:  [] 107  Resp:  [18-20] 20  BP: (105-154)/(52-95) 145/88    Intake/Output Summary (Last 24 hours) at 4/23/2023 0442  Last data filed at 4/23/2023 0315  Gross per 24 hour   Intake 750 ml   Output --   Net 750 ml     Intake/Output this shift:  I/O this shift:  In: 750 [Blood:650; IV Piggyback:100]  Out: -     Exam  General Appearance: Alert, cooperative, no acute distress  Lungs: Clear to auscultation bilaterally, respirations unlabored  Heart: Regular rate and rhythm  Abdomen: Soft, nontender, nondistended  Extremities: no edema or calf tenderness    Labs  CBC Results       04/22/23 04/21/23 04/19/23     1447 1558 0741    WBC 0.99 0.89 4.81    RBC 2.12 2.39 2.44    HGB 6.1 7.1 7.1    HCT 17.5 19.6 21.5    MCV 82.5 82.0 88.1    MCH 28.8 29.7 29.1    MCHC 34.9 36.2 33.0    PLT 5 13 11         Comment for WBC at 1447 on 04/22/23: CONSISTENT WITH PREVIOUS RESULTS. This result has been called to SARBJIT BADILLO by Julia Ashby on 04 22 23 at 15:09, and has been read back.     Comment for WBC at 1558 on 04/21/23: This result has been called to JALEN CORDOVA by Miguel Palmer on 04 21 23 at 16:53, and has been read  back.     Comment for HGB at 1447 on 04/22/23: ALL RESULTS HAVE BEEN CHECKED. This result has been called to SARBJIT BADILLO by Julia Ashby on 04 22 23 at 15:09, and has been read back.     Comment for HGB at 0741 on 04/19/23: RESULT CHECKED    Comment for PLT at 1447 on 04/22/23: ALL RESULTS HAVE BEEN RECHECKED. This result has been called to SARBJIT BADILLO by Julia Ashby on 04 22 23 at 15:09, and has been read back.     Comment for PLT at 1558 on 04/21/23: ALL RESULTS HAVE BEEN RECHECKED. CONSISTENT WITH PREVIOUS RESULTS. This result has been called to JALEN CORDOVA by Miguel Palmer on 04 21 23 at 16:53, and has been read back.     Comment for PLT at 0741 on 04/19/23: RESULTS CHECKED. SPECIMEN QUALITY CHECKED. This result has been called to LILIBETH ARAUZ by Joanie Anthony on 04 19 23 at 08:13, and has been read back.         BMP Results       04/22/23 04/21/23 04/17/23     1447 1558 0911     133 136    K 3.2 3.5 3.9    Cl 101 101 103    CO2 21 22 24    Glucose 74 90 120    BUN 14 20 18    Creatinine 1.0 1.2 1.0    Calcium 7.4 8.1 8.8    Anion Gap 10 10 9    EGFR >60.0 51.7 >60.0         Comment for K at 1558 on 04/21/23: Results obtained on plasma. Plasma Potassium values may be up to 0.4 mEQ/L less than serum values. The differences may be greater for patients with high platelet or white cell counts.          Assessment/Plan     Alondra Villela is a 32 y.o. 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 Tmax 100.4F at 2313 4/21, afebrile overnight  2. WBC 0.89, ANC 0.15 on adm   3. CXR no acute disease  4. Blood cultures and urine culture collected on adm in process   5. Continue Cefepime 2g IV q8h. S/p ID consult, appreciate their recommendations, continue to monitor hemorrhoid   6. IVF with LR 100cc/hr   7. Continue strict neutropenic precautions including neutropenic diet   8. Will  continue to trend fever curve. Repeat CBC with diff daily      #Chest pain/tachycardia:   1. HR continues to be elevated to 110s. Patient is asymptomatic.    2. Troponin negative x2, EKG normal   3. CTA of chest negative for PE     #JLUIS  1. Cr 1.2 on admission, baseline Cr 0.9-1.0  2. Suspect in the setting of poor PO intake and dehydration   3. Will continue IVF as above   4. Most recent BMP Cr 1.0     #Pancytopenia:   1. Plt on admission 13, will transfuse with Plt <10.   2. Hgb on admission 7.1, will transfuse with Hgb <7.0  3. Continue daily filgrastim injections on days when patient is not getting chemotherapy   4. Hgb 6.1 on 4/22, given 2 u PRBC  5. Plt 5 on 4/22, given 1 u Plt, will not give HLA matched for this dose, if desired [place] would need to be contacted     #Electrolyte Abnormalities  - Na 132 4/22, hyponatremia likely in setting of hypovolemia, with poor PO intake, continue with IVF, re-assess this AM with labs  - K 3.2 4/22, repletion ordered, re-assess with next labs    #Thrush/oral mucocutaneous ulcers  1. Will continue magic mouth wash with nystatin while inpatient   2. Continue IV as above given poor PO intake   3. Consider nutrition consult  4. Patient requested IV pain medication, received one dose of IV Dilaudid yesterday, Pharmacy approved one dose of IV Tylenol for today, will follow up if able to get more doses of IV Tylenol     #GTN:   1. CT chest abdomen pelvis returned, lung mass decreased in size, uterus with likely blood  2. Most recent beta-hcg 60.2 on 4/17/23  3. Given her plateauing hcg levels, patient to switch to a salvage regimen. Given her bone marrow reserve and promising evidence that pembrolizumab has high efficacy in GTN, patient for pembrolizumab in future.      #Depression:  1. Continue home medications Wellbutrin  mg daily and Propytiline 15 mg daily     #Hx of DVT in right upper extremity   1. Anticoagulation discontinued in the setting of pancytopenia and  bleeding   2. For SCDs while in bed while admitted      To be reviewed with covering resident and Dr. Smith and Dr. Hogue.     Please page [phone] for any questions or concerns.     Lanette Washington MD

## 2023-04-23 LAB
ANION GAP SERPL CALC-SCNC: 9 MEQ/L (ref 3–15)
BASOPHILS # BLD: 0.01 K/UL (ref 0.01–0.1)
BASOPHILS NFR BLD: 1 %
BUN SERPL-MCNC: 14 MG/DL (ref 8–20)
CALCIUM SERPL-MCNC: 7 MG/DL (ref 8.9–10.3)
CHLORIDE SERPL-SCNC: 104 MEQ/L (ref 98–109)
CO2 SERPL-SCNC: 19 MEQ/L (ref 22–32)
CREAT SERPL-MCNC: 0.9 MG/DL (ref 0.6–1.1)
DIFFERENTIAL METHOD BLD: ABNORMAL
EOSINOPHIL # BLD: 0.02 K/UL (ref 0.04–0.36)
EOSINOPHIL NFR BLD: 2 %
ERYTHROCYTE [DISTWIDTH] IN BLOOD BY AUTOMATED COUNT: 13 % (ref 11.7–14.4)
GFR SERPL CREATININE-BSD FRML MDRD: >60 ML/MIN/1.73M*2
GLUCOSE SERPL-MCNC: 75 MG/DL (ref 70–99)
HBV SURFACE AG SER QL: NONREACTIVE
HCT VFR BLDCO AUTO: 23.1 % (ref 35–45)
HCV AB SER QL: NONREACTIVE
HGB BLD-MCNC: 8.3 G/DL (ref 11.8–15.7)
LYMPHOCYTES # BLD: 0.65 K/UL (ref 1.2–3.5)
LYMPHOCYTES NFR BLD: 72 %
MAGNESIUM SERPL-MCNC: 0.6 MG/DL (ref 1.8–2.5)
MCH RBC QN AUTO: 29 PG (ref 28–33.2)
MCHC RBC AUTO-ENTMCNC: 35.9 G/DL (ref 32.2–35.5)
MCV RBC AUTO: 80.8 FL (ref 83–98)
MICROCYTES BLD QL SMEAR: ABNORMAL
MONOCYTES # BLD: 0.08 K/UL (ref 0.28–0.8)
MONOCYTES NFR BLD: 9 %
NEUTS BAND # BLD: 0.14 K/UL (ref 1.7–7)
NEUTS SEG NFR BLD: 16 %
OVALOCYTES BLD QL SMEAR: ABNORMAL
PDW BLD AUTO: ABNORMAL FL
PLAT MORPH BLD: NORMAL
PLATELET # BLD AUTO: 4 K/UL (ref 150–369)
PLATELET # BLD EST: ABNORMAL 10*3/UL
POTASSIUM SERPL-SCNC: 3.2 MEQ/L (ref 3.6–5.1)
RBC # BLD AUTO: 2.86 M/UL (ref 3.93–5.22)
SODIUM SERPL-SCNC: 132 MEQ/L (ref 136–144)
TSH SERPL DL<=0.05 MIU/L-ACNC: 0.4 MIU/L (ref 0.34–5.6)
WBC # BLD AUTO: 0.9 K/UL (ref 3.8–10.5)

## 2023-04-23 PROCEDURE — 36430 TRANSFUSION BLD/BLD COMPNT: CPT

## 2023-04-23 PROCEDURE — 80048 BASIC METABOLIC PNL TOTAL CA: CPT | Performed by: STUDENT IN AN ORGANIZED HEALTH CARE EDUCATION/TRAINING PROGRAM

## 2023-04-23 PROCEDURE — 99232 SBSQ HOSP IP/OBS MODERATE 35: CPT | Performed by: OBSTETRICS & GYNECOLOGY

## 2023-04-23 PROCEDURE — P9016 RBC LEUKOCYTES REDUCED: HCPCS

## 2023-04-23 PROCEDURE — P9073 PLATELETS PHERESIS PATH REDU: HCPCS

## 2023-04-23 PROCEDURE — 63700000 HC SELF-ADMINISTRABLE DRUG: Performed by: OBSTETRICS & GYNECOLOGY

## 2023-04-23 PROCEDURE — 83735 ASSAY OF MAGNESIUM: CPT | Performed by: STUDENT IN AN ORGANIZED HEALTH CARE EDUCATION/TRAINING PROGRAM

## 2023-04-23 PROCEDURE — 21400000 HC ROOM AND CARE CCU/INTERMEDIATE

## 2023-04-23 PROCEDURE — 63600000 HC DRUGS/DETAIL CODE: Performed by: OBSTETRICS & GYNECOLOGY

## 2023-04-23 PROCEDURE — 85025 COMPLETE CBC W/AUTO DIFF WBC: CPT | Performed by: STUDENT IN AN ORGANIZED HEALTH CARE EDUCATION/TRAINING PROGRAM

## 2023-04-23 PROCEDURE — 25800000 HC PHARMACY IV SOLUTIONS: Performed by: OBSTETRICS & GYNECOLOGY

## 2023-04-23 PROCEDURE — 36415 COLL VENOUS BLD VENIPUNCTURE: CPT | Performed by: STUDENT IN AN ORGANIZED HEALTH CARE EDUCATION/TRAINING PROGRAM

## 2023-04-23 PROCEDURE — 63600000 HC DRUGS/DETAIL CODE: Performed by: STUDENT IN AN ORGANIZED HEALTH CARE EDUCATION/TRAINING PROGRAM

## 2023-04-23 RX ORDER — DIPHENHYDRAMINE HCL 50 MG/ML
25 VIAL (ML) INJECTION EVERY 6 HOURS PRN
Status: DISCONTINUED | OUTPATIENT
Start: 2023-04-23 | End: 2023-04-23

## 2023-04-23 RX ORDER — POTASSIUM CHLORIDE 14.9 MG/ML
20 INJECTION INTRAVENOUS AS NEEDED
Status: DISCONTINUED | OUTPATIENT
Start: 2023-04-23 | End: 2023-04-24

## 2023-04-23 RX ORDER — SODIUM CHLORIDE 9 MG/ML
5 INJECTION, SOLUTION INTRAVENOUS AS NEEDED
Status: ACTIVE | OUTPATIENT
Start: 2023-04-23 | End: 2023-04-24

## 2023-04-23 RX ORDER — HYDROMORPHONE HYDROCHLORIDE 1 MG/ML
0.25 INJECTION, SOLUTION INTRAMUSCULAR; INTRAVENOUS; SUBCUTANEOUS EVERY 4 HOURS PRN
Status: DISCONTINUED | OUTPATIENT
Start: 2023-04-23 | End: 2023-04-29 | Stop reason: HOSPADM

## 2023-04-23 RX ORDER — MORPHINE SULFATE 2 MG/ML
1 INJECTION, SOLUTION INTRAMUSCULAR; INTRAVENOUS ONCE
Status: COMPLETED | OUTPATIENT
Start: 2023-04-23 | End: 2023-04-23

## 2023-04-23 RX ORDER — ACETAMINOPHEN 10 MG/ML
1000 INJECTION, SOLUTION INTRAVENOUS
Status: COMPLETED | OUTPATIENT
Start: 2023-04-23 | End: 2023-04-24

## 2023-04-23 RX ADMIN — ACETAMINOPHEN 1000 MG: 10 INJECTION, SOLUTION INTRAVENOUS at 13:01

## 2023-04-23 RX ADMIN — PROTRIPTYLINE HYDROCHLORIDE 15 MG: 5 TABLET, FILM COATED ORAL at 21:53

## 2023-04-23 RX ADMIN — MAGNESIUM SULFATE HEPTAHYDRATE 2 G: 40 INJECTION, SOLUTION INTRAVENOUS at 14:45

## 2023-04-23 RX ADMIN — CEFEPIME 2 G: 2 INJECTION, POWDER, FOR SOLUTION INTRAVENOUS at 03:15

## 2023-04-23 RX ADMIN — CEFEPIME 2 G: 2 INJECTION, POWDER, FOR SOLUTION INTRAVENOUS at 18:54

## 2023-04-23 RX ADMIN — ONDANSETRON HYDROCHLORIDE 4 MG: 2 INJECTION, SOLUTION INTRAMUSCULAR; INTRAVENOUS at 14:45

## 2023-04-23 RX ADMIN — MORPHINE SULFATE 1 MG: 2 INJECTION, SOLUTION INTRAMUSCULAR; INTRAVENOUS at 15:49

## 2023-04-23 RX ADMIN — ACETAMINOPHEN 1000 MG: 10 INJECTION, SOLUTION INTRAVENOUS at 05:49

## 2023-04-23 RX ADMIN — DIPHENHYDRAMINE HYDROCHLORIDE 25 MG: 50 INJECTION, SOLUTION INTRAMUSCULAR; INTRAVENOUS at 18:50

## 2023-04-23 RX ADMIN — SODIUM CHLORIDE, POTASSIUM CHLORIDE, SODIUM LACTATE AND CALCIUM CHLORIDE: 600; 310; 30; 20 INJECTION, SOLUTION INTRAVENOUS at 18:56

## 2023-04-23 RX ADMIN — BUPROPION HYDROCHLORIDE 150 MG: 150 TABLET, EXTENDED RELEASE ORAL at 21:53

## 2023-04-23 RX ADMIN — MAGNESIUM SULFATE HEPTAHYDRATE 2 G: 40 INJECTION, SOLUTION INTRAVENOUS at 12:31

## 2023-04-23 RX ADMIN — CEFEPIME 2 G: 2 INJECTION, POWDER, FOR SOLUTION INTRAVENOUS at 10:59

## 2023-04-23 RX ADMIN — FILGRASTIM-SNDZ 480 MCG: 480 INJECTION, SOLUTION INTRAVENOUS; SUBCUTANEOUS at 09:49

## 2023-04-23 RX ADMIN — POTASSIUM CHLORIDE 20 MEQ: 14.9 INJECTION, SOLUTION INTRAVENOUS at 02:23

## 2023-04-23 RX ADMIN — ACETAMINOPHEN 1000 MG: 10 INJECTION, SOLUTION INTRAVENOUS at 21:39

## 2023-04-23 NOTE — PROGRESS NOTES
R3 GYN ONC    Assessed patient due to new-onset R-sided rib discomfort.  She describes the pain as sharp and constant, exacerbated by certain movements.  It began abruptly after an episode of severe emesis which occurred yesterday after she received Dilaudid.  She denies any associated shortness of breath or abdominal pain.  She denies radiation to any other areas.  On exam, the pain is reproducible on palpation, and no concerning respiratory or abdominal findings.  AFVSS.      Temp:  [36.2 °C (97.1 °F)-37.2 °C (98.9 °F)] 37.2 °C (98.9 °F)  Heart Rate:  [] 96  Resp:  [18-20] 20  BP: (118-154)/(66-95) 140/92  SpO2:  [99 %-100 %] 99 %  Oxygen Therapy: None (Room air)    Continue supportive measures including ice/heat.  IV Tylenol is being administered at this time as well. Continue to monitor.  Reviewed precautions with the patient.    Discussed with Dr. Smith.    Reg Madison MD, PGY3  Obstetrics & Gynecology

## 2023-04-23 NOTE — PROGRESS NOTES
R3 GYN ONC    Discussed with RN, patient unable to tolerate oral liquid suspension for Tylenol due to significant pain from mucositis with extensive oral ulcerative lesions.  Not able to get suppository due to hemorrhoids as previously discussed, appreciated consultant input.  Not a candidate for NSAIDs based on her current chemotherapy regimen/schedule.  Goal remains to avoid narcotics as able.  Discussed with pharmacy clinical coordinator Shayy Johnson who has approved IV Tylenol 1000 mg every 8 hours for the next 3 doses.  We will continue to explore other anti-inflammatory options and continue with the existing supportive measures in place.  Nutritional services was also consulted to assist patient with food/nourishment options she may be able to tolerate.  Labs have resulted, to be reviewed with Dr. Smith.    Reg Madison MD, PGY3  Obstetrics & Gynecology          ADDENDUM @ 0547:    Results from last 7 days   Lab Units 04/23/23  1058 04/22/23  1447 04/21/23  1558 04/19/23  0741 04/17/23  0911   WBC K/uL 0.90* 0.99* 0.89* 4.81 3.55*   RBC M/uL 2.86* 2.12* 2.39* 2.44* 2.29*   HEMOGLOBIN g/dL 8.3* 6.1* 7.1* 7.1* 6.7*   HEMATOCRIT % 23.1* 17.5* 19.6* 21.5* 19.9*   MCV fL 80.8* 82.5* 82.0* 88.1 86.9   RDW % 13.0 12.9 13.2 14.0 14.3   MCH pg 29.0 28.8 29.7 29.1 29.3   MCHC g/dL 35.9* 34.9 36.2* 33.0 33.7   PLATELETS K/uL 4* 5* 13* 11* 31*     Results from last 7 days   Lab Units 04/23/23  1057 04/22/23  1447 04/21/23  1558 04/17/23  0911   SODIUM mEQ/L 132* 132* 133* 136   POTASSIUM mEQ/L 3.2* 3.2* 3.5* 3.9   CHLORIDE mEQ/L 104 101 101 103   CO2 mEQ/L 19* 21* 22 24   BUN mg/dL 14 14 20 18   CREATININE mg/dL 0.9 1.0 1.2* 1.0   EGFR mL/min/1.73m*2 >60.0 >60.0 51.7* >60.0   GLUCOSE mg/dL 75 74 90 120*   CALCIUM mg/dL 7.0* 7.4* 8.1* 8.8*   MAGNESIUM mg/dL 0.6*  --   --  1.2*   ALK PHOS IU/L  --   --  84 116   BILIRUBIN TOTAL mg/dL  --   --  1.0 0.4   ALBUMIN g/dL  --   --  3.7 3.8   ALT IU/L  --   --  16 13    AST IU/L  --   --  13* 12*     Estimated Creatinine Clearance: 84.2 mL/min (by C-G formula based on SCr of 0.9 mg/dL).       Reviewed labs with Dr. Smith, plan to:  · Replete K and Mg IV (not tolerating any PO), if not adequate improvement will plan to consult Nephrology for assistance.    · Transfuse 2 units platelets (preferably HLA-matched, if only unmatched platelets available then IV Benadryl has been ordered to precede transfusion).     Reg Madison MD, PGY3  Obstetrics & Gynecology

## 2023-04-23 NOTE — PLAN OF CARE
Care Coordination Admission Assessment Note  Date: 4/23/2023   Time: 5:03 PM    Patient Name: Alondra Villela  Medical Record Number: [id number]  YOB: 1990  Room/BED: 0381    General Information  Patient-Specific Goals (Include Timeframe)  feel better and d/c home  PCP: Sunil Hinton MD   Insurance Coverage: [employer]  Readmission Within the last 30 days  previous discharge plan unsuccessful    Advance Directives (for Healthcare)?  Does patient have advance directive?: No  Patient does not have Advance Directive: Patient/Family declines further information  Does patient have current OOH DNR form?: No  Patient does not have current OOH DNR form: Patient/Family declines further information  Does patient have current POLST?: No  Patient does not have current POLST: Patient/Family declines further information  Does patient have mental health advance directive?: No  Patient does not have Mental Health Advance Directive: Patient/Family declines further information        Information       Living Arrangements  Arrived From: emergency department  Current Living Arrangements: home  People in Home: spouse, child(brianna), dependent  Living Arrangement Comments: Lives with  and children in 2 st house , independent with all ADL, no Dme use, no Hc or Snf in past, Vaccinated for Covid x 2 Pfizer, Family to transport  Able to Return to Prior Arrangements: yes    Housing Stability and Financial Resources (SDOH)  In the last 12 months, was there a time when you were not able to pay the mortgage or rent on time?: No  In the last 12 months, how many places have you lived?: 1  In the last 12 months, was there a time when you did not have a steady place to sleep or slept in a shelter (including now)?: No  How hard is it for you to pay for the very basics like food, housing, medical care, and heating?: Not very hard    Current Outpatient/Agency/Support Group       Type of Current Home Care  Services       Assistive Device/Animal Currently Used at Home  none    Functional Status Comments       IADL Comments       Employment/ Status  Employment Status: unemployed  Current or Previous Occupation: unable to assess    Concerns to be Addressed  denies needs/concerns at this time, adjustment to diagnosis/illness    Current Discharge Risk       Anticipated Changes Related to Illness  none    Transportation Concerns (SDOH)  Transportation Concerns: car, none  Transportation Anticipated: family or friend will provide  In the past 12 months, has lack of transportation kept you from medical appointments or from getting medications?: No  In the past 12 months, has lack of transportation kept you from meetings, work, or from getting things needed for daily living?: No    Concerns Comments       Anticipated Clinical Needs       Anticipated Discharge Plan   Patient/Family Anticipates Transition to: home, home with family  Patient/Family Anticipated Services at Transition: none,   Met with patient. Provided education and contact information for Care Coordination services.: yes  Screening complete: yes

## 2023-04-23 NOTE — PROGRESS NOTES
"Infectious Disease Progress Note    Patient Name: Alondra Villela  MR#: [id number]  : 1990  Admission Date: 2023  Date: 23   Time: 11:37 AM   Author: Mio Antonio MD        Antibiotics:    Anti-infectives (From admission, onward)    Start     Dose/Rate Route Frequency Ordered Stop    23 0200  ceFEPIme (MAXIPIME) 2 g in 100 mL NSS vial in bag         2 g  200 mL/hr over 30 Minutes intravenous Every 8 hours interval 23  nystatin (MYCOSTATIN) 100,000 unit/mL suspension 500,000 Units         500,000 Units Swish & Spit 4 times daily PRN 23            Subjective     No new complaints other than worsening mouth pain.  Hemorrhoidal pain without change    Objective     Vital Signs:    Visit Vitals  BP (!) 140/92 (BP Location: Right upper arm, Patient Position: Lying)   Pulse 96   Temp 37.2 °C (98.9 °F) (Axillary)   Resp 20   Ht 1.626 m (5' 4\")   Wt 68 kg (150 lb)   SpO2 99%   BMI 25.75 kg/m²       Temp (24hrs), Av.7 °C (98 °F), Min:36.2 °C (97.1 °F), Max:37.2 °C (98.9 °F)    Physical Exam:  General:  Nontoxic  Skin: no rashes  HEENT: NC/AT, anicteric sclera, bilateral buccal oral ulcerations.  Tongue is coated.  Pharynx inferior tongue and the floor the mouth without lesions or exudates  Neck: supple, no meningismus  Cardiovascular: regular S1/S2  No murmur, rub , or gallop  Respiratory: clear to auscultation  GI/Abdomen: soft, NT/ND,  no peritoneal signs  Extremities: no clubbing, cyanosis, or edema  JOINTS:  No swelling, erythema, or  tenderness  Lymphatics: no lymphadenopathy  Neurology: no focal deficits  Psych: cooperative with exam  : no [place]   Port-A-Cath in right upper chest wall without evidence of infection, not currently accessed          Lines, Drains, Airways, Wounds:  Single Lumen Implantable Port Right Chest (Active)   Number of days:        Peripheral IV (Adult) 23 Right Hand (Active)   Number of days: 2     "   Peripheral IV (Adult) 04/21/23 Anterior;Distal;Right;Upper Arm (Active)   Number of days: 2       Labs:    CBC Results       04/23/23 04/22/23 04/21/23     1058 1447 1558    WBC 0.90 0.99 0.89    RBC 2.86 2.12 2.39    HGB 8.3 6.1 7.1    HCT 23.1 17.5 19.6    MCV 80.8 82.5 82.0    MCH 29.0 28.8 29.7    MCHC 35.9 34.9 36.2    PLT 4 5 13         Comment for WBC at 1058 on 04/23/23: CONSISTENT WITH PREVIOUS RESULTS. This result has been called to ASHLEY SANDS by Julia Ashby on 04 23 23 at 11:28, and has been read back.     Comment for WBC at 1447 on 04/22/23: CONSISTENT WITH PREVIOUS RESULTS. This result has been called to SARBJIT BADILLO by Julia Ashby on 04 22 23 at 15:09, and has been read back.     Comment for WBC at 1558 on 04/21/23: This result has been called to JALEN CORDOVA by Miguel Palmer on 04 21 23 at 16:53, and has been read back.     Comment for HGB at 1447 on 04/22/23: ALL RESULTS HAVE BEEN CHECKED. This result has been called to SARBJIT BADILLO by Julia Ashby on 04 22 23 at 15:09, and has been read back.     Comment for PLT at 1058 on 04/23/23: CONSISTENT WITH PREVIOUS RESULTS. This result has been called to ASHLEY SANDS by Julia Ashby on 04 23 23 at 11:28, and has been read back.     Comment for PLT at 1447 on 04/22/23: ALL RESULTS HAVE BEEN RECHECKED. This result has been called to SARBJIT BADILLO by Julia Ashby on 04 22 23 at 15:09, and has been read back.     Comment for PLT at 1558 on 04/21/23: ALL RESULTS HAVE BEEN RECHECKED. CONSISTENT WITH PREVIOUS RESULTS. This result has been called to JALEN CORDOVA by Miguel Palmer on 04 21 23 at 16:53, and has been read back.         CMP Results       04/23/23 04/22/23 04/21/23     1057 1447 1558     132 133    K 3.2 3.2 3.5    Cl 104 101 101    CO2 19 21 22    Glucose 75 74 90    BUN 14 14 20    Creatinine 0.9 1.0 1.2    Calcium 7.0 7.4 8.1    Anion Gap 9 10 10    AST -- -- 13    ALT -- -- 16    Albumin -- --  3.7    EGFR >60.0 >60.0 51.7         Comment for K at 1558 on 04/21/23: Results obtained on plasma. Plasma Potassium values may be up to 0.4 mEQ/L less than serum values. The differences may be greater for patients with high platelet or white cell counts.            Estimated Creatinine Clearance: 84.2 mL/min (by C-G formula based on SCr of 0.9 mg/dL).      Assessment     1.  Neutropenic fever   Blood cultures are negative  2.  Stage IV gestational trophoblastic neoplasia status post-chemotherapy  3.  Oral mucositis without evidence of thrush        Plan     1. Continue cefepime 2 g IV every 8 hours  2.  Topical oral care    Mio Antonio MD

## 2023-04-24 ENCOUNTER — APPOINTMENT (INPATIENT)
Dept: RADIOLOGY | Facility: HOSPITAL | Age: 33
DRG: 808 | End: 2023-04-24
Payer: COMMERCIAL

## 2023-04-24 LAB
ANION GAP SERPL CALC-SCNC: 11 MEQ/L (ref 3–15)
BACTERIA URNS QL MICRO: ABNORMAL /HPF
BASOPHILS # BLD: 0.01 K/UL (ref 0.01–0.1)
BASOPHILS NFR BLD: 1 %
BILIRUB UR QL STRIP.AUTO: NEGATIVE MG/DL
BUN SERPL-MCNC: 14 MG/DL (ref 8–20)
CALCIUM SERPL-MCNC: 7.2 MG/DL (ref 8.9–10.3)
CHLORIDE SERPL-SCNC: 105 MEQ/L (ref 98–109)
CLARITY UR REFRACT.AUTO: CLEAR
CO2 SERPL-SCNC: 18 MEQ/L (ref 22–32)
COLOR UR AUTO: YELLOW
CREAT SERPL-MCNC: 0.9 MG/DL (ref 0.6–1.1)
CROSSMATCH: NORMAL
CROSSMATCH: NORMAL
DIFFERENTIAL METHOD BLD: ABNORMAL
EOSINOPHIL # BLD: 0.01 K/UL (ref 0.04–0.36)
EOSINOPHIL NFR BLD: 1 %
ERYTHROCYTE [DISTWIDTH] IN BLOOD BY AUTOMATED COUNT: 13 % (ref 11.7–14.4)
GFR SERPL CREATININE-BSD FRML MDRD: >60 ML/MIN/1.73M*2
GLUCOSE SERPL-MCNC: 70 MG/DL (ref 70–99)
GLUCOSE UR STRIP.AUTO-MCNC: NEGATIVE MG/DL
HCG SERPL-ACNC: 42.7 IU/L (MIU/ML)
HCT VFR BLDCO AUTO: 21.4 % (ref 35–45)
HGB BLD-MCNC: 7.8 G/DL (ref 11.8–15.7)
HGB UR QL STRIP.AUTO: 1
HYALINE CASTS #/AREA URNS LPF: ABNORMAL /LPF
ISBT CODE: 6200
ISBT CODE: 7300
ISBT CODE: 7300
KETONES UR STRIP.AUTO-MCNC: 2 MG/DL
LEUKOCYTE ESTERASE UR QL STRIP.AUTO: NEGATIVE
LYMPHOCYTES # BLD: 0.61 K/UL (ref 1.2–3.5)
LYMPHOCYTES NFR BLD: 73 %
MAGNESIUM SERPL-MCNC: 1.1 MG/DL (ref 1.8–2.5)
MCH RBC QN AUTO: 29.1 PG (ref 28–33.2)
MCHC RBC AUTO-ENTMCNC: 36.4 G/DL (ref 32.2–35.5)
MCV RBC AUTO: 79.9 FL (ref 83–98)
MICROCYTES BLD QL SMEAR: ABNORMAL
MONOCYTES # BLD: 0.07 K/UL (ref 0.28–0.8)
MONOCYTES NFR BLD: 8 %
NEUTS BAND # BLD: 0.14 K/UL (ref 1.7–7)
NEUTS SEG NFR BLD: 17 %
NITRITE UR QL STRIP.AUTO: NEGATIVE
PDW BLD AUTO: ABNORMAL FL
PH UR STRIP.AUTO: 7.5 [PH]
PLAT MORPH BLD: NORMAL
PLATELET # BLD AUTO: 3 K/UL (ref 150–369)
PLATELET # BLD EST: ABNORMAL 10*3/UL
POTASSIUM SERPL-SCNC: 3.1 MEQ/L (ref 3.6–5.1)
PRODUCT CODE: NORMAL
PRODUCT STATUS: NORMAL
PROT UR QL STRIP.AUTO: 1
RBC # BLD AUTO: 2.68 M/UL (ref 3.93–5.22)
RBC #/AREA URNS HPF: ABNORMAL /HPF
SODIUM SERPL-SCNC: 134 MEQ/L (ref 136–144)
SP GR UR REFRACT.AUTO: 1.02
SPECIMEN EXP DATE BLD: NORMAL
SQUAMOUS URNS QL MICRO: ABNORMAL /HPF
UNIT ABO: NORMAL
UNIT ID: NORMAL
UNIT RH: POSITIVE
UROBILINOGEN UR STRIP-ACNC: 0.2 EU/DL
WBC # BLD AUTO: 0.83 K/UL (ref 3.8–10.5)
WBC #/AREA URNS HPF: ABNORMAL /HPF

## 2023-04-24 PROCEDURE — 81001 URINALYSIS AUTO W/SCOPE: CPT | Performed by: EMERGENCY MEDICINE

## 2023-04-24 PROCEDURE — 83735 ASSAY OF MAGNESIUM: CPT | Performed by: STUDENT IN AN ORGANIZED HEALTH CARE EDUCATION/TRAINING PROGRAM

## 2023-04-24 PROCEDURE — 21400000 HC ROOM AND CARE CCU/INTERMEDIATE

## 2023-04-24 PROCEDURE — 36430 TRANSFUSION BLD/BLD COMPNT: CPT

## 2023-04-24 PROCEDURE — 36415 COLL VENOUS BLD VENIPUNCTURE: CPT | Performed by: STUDENT IN AN ORGANIZED HEALTH CARE EDUCATION/TRAINING PROGRAM

## 2023-04-24 PROCEDURE — 63700000 HC SELF-ADMINISTRABLE DRUG: Performed by: OBSTETRICS & GYNECOLOGY

## 2023-04-24 PROCEDURE — P9073 PLATELETS PHERESIS PATH REDU: HCPCS

## 2023-04-24 PROCEDURE — 80048 BASIC METABOLIC PNL TOTAL CA: CPT | Performed by: STUDENT IN AN ORGANIZED HEALTH CARE EDUCATION/TRAINING PROGRAM

## 2023-04-24 PROCEDURE — 71045 X-RAY EXAM CHEST 1 VIEW: CPT

## 2023-04-24 PROCEDURE — 82310 ASSAY OF CALCIUM: CPT | Performed by: STUDENT IN AN ORGANIZED HEALTH CARE EDUCATION/TRAINING PROGRAM

## 2023-04-24 PROCEDURE — 85025 COMPLETE CBC W/AUTO DIFF WBC: CPT | Performed by: STUDENT IN AN ORGANIZED HEALTH CARE EDUCATION/TRAINING PROGRAM

## 2023-04-24 PROCEDURE — 25800000 HC PHARMACY IV SOLUTIONS

## 2023-04-24 PROCEDURE — 99232 SBSQ HOSP IP/OBS MODERATE 35: CPT | Performed by: STUDENT IN AN ORGANIZED HEALTH CARE EDUCATION/TRAINING PROGRAM

## 2023-04-24 PROCEDURE — 63600000 HC DRUGS/DETAIL CODE: Performed by: STUDENT IN AN ORGANIZED HEALTH CARE EDUCATION/TRAINING PROGRAM

## 2023-04-24 PROCEDURE — 63700000 HC SELF-ADMINISTRABLE DRUG

## 2023-04-24 PROCEDURE — 63600000 HC DRUGS/DETAIL CODE: Performed by: OBSTETRICS & GYNECOLOGY

## 2023-04-24 PROCEDURE — 84702 CHORIONIC GONADOTROPIN TEST: CPT | Performed by: STUDENT IN AN ORGANIZED HEALTH CARE EDUCATION/TRAINING PROGRAM

## 2023-04-24 PROCEDURE — 63700000 HC SELF-ADMINISTRABLE DRUG: Performed by: STUDENT IN AN ORGANIZED HEALTH CARE EDUCATION/TRAINING PROGRAM

## 2023-04-24 PROCEDURE — 63600000 HC DRUGS/DETAIL CODE

## 2023-04-24 PROCEDURE — 25800000 HC PHARMACY IV SOLUTIONS: Performed by: OBSTETRICS & GYNECOLOGY

## 2023-04-24 RX ORDER — POTASSIUM CHLORIDE 14.9 MG/ML
20 INJECTION INTRAVENOUS ONCE
Status: COMPLETED | OUTPATIENT
Start: 2023-04-24 | End: 2023-04-25

## 2023-04-24 RX ORDER — POTASSIUM CHLORIDE 14.9 MG/ML
20 INJECTION INTRAVENOUS ONCE
Status: DISCONTINUED | OUTPATIENT
Start: 2023-04-24 | End: 2023-04-24

## 2023-04-24 RX ORDER — ACETAMINOPHEN 10 MG/ML
1000 INJECTION, SOLUTION INTRAVENOUS
Status: DISPENSED | OUTPATIENT
Start: 2023-04-24 | End: 2023-04-25

## 2023-04-24 RX ORDER — SODIUM CHLORIDE 9 MG/ML
5 INJECTION, SOLUTION INTRAVENOUS AS NEEDED
Status: ACTIVE | OUTPATIENT
Start: 2023-04-24 | End: 2023-04-25

## 2023-04-24 RX ORDER — POTASSIUM CHLORIDE 14.9 MG/ML
20 INJECTION INTRAVENOUS ONCE
Status: COMPLETED | OUTPATIENT
Start: 2023-04-24 | End: 2023-04-24

## 2023-04-24 RX ORDER — DEXTROSE MONOHYDRATE, SODIUM CHLORIDE, AND POTASSIUM CHLORIDE 50; 1.49; 4.5 G/1000ML; G/1000ML; G/1000ML
INJECTION, SOLUTION INTRAVENOUS CONTINUOUS
Status: DISCONTINUED | OUTPATIENT
Start: 2023-04-24 | End: 2023-04-26

## 2023-04-24 RX ORDER — LIDOCAINE 560 MG/1
1 PATCH PERCUTANEOUS; TOPICAL; TRANSDERMAL DAILY PRN
Status: DISCONTINUED | OUTPATIENT
Start: 2023-04-24 | End: 2023-04-29 | Stop reason: HOSPADM

## 2023-04-24 RX ORDER — FENTANYL 12.5 UG/1
1 PATCH TRANSDERMAL
Status: DISCONTINUED | OUTPATIENT
Start: 2023-04-24 | End: 2023-04-29 | Stop reason: HOSPADM

## 2023-04-24 RX ADMIN — POTASSIUM CHLORIDE 20 MEQ: 14.9 INJECTION, SOLUTION INTRAVENOUS at 11:39

## 2023-04-24 RX ADMIN — CEFEPIME 2 G: 2 INJECTION, POWDER, FOR SOLUTION INTRAVENOUS at 10:07

## 2023-04-24 RX ADMIN — POTASSIUM CHLORIDE 20 MEQ: 14.9 INJECTION, SOLUTION INTRAVENOUS at 23:56

## 2023-04-24 RX ADMIN — BUPROPION HYDROCHLORIDE 150 MG: 150 TABLET, EXTENDED RELEASE ORAL at 21:55

## 2023-04-24 RX ADMIN — DIPHENHYDRAMINE HYDROCHLORIDE 25 MG: 50 INJECTION, SOLUTION INTRAMUSCULAR; INTRAVENOUS at 15:11

## 2023-04-24 RX ADMIN — FENTANYL 1 PATCH: 12 PATCH, EXTENDED RELEASE TRANSDERMAL at 20:14

## 2023-04-24 RX ADMIN — CEFEPIME 2 G: 2 INJECTION, POWDER, FOR SOLUTION INTRAVENOUS at 02:59

## 2023-04-24 RX ADMIN — PROTRIPTYLINE HYDROCHLORIDE 15 MG: 5 TABLET, FILM COATED ORAL at 21:55

## 2023-04-24 RX ADMIN — ONDANSETRON HYDROCHLORIDE 4 MG: 2 INJECTION, SOLUTION INTRAMUSCULAR; INTRAVENOUS at 13:35

## 2023-04-24 RX ADMIN — MAGNESIUM SULFATE HEPTAHYDRATE 2 G: 40 INJECTION, SOLUTION INTRAVENOUS at 10:06

## 2023-04-24 RX ADMIN — POTASSIUM CHLORIDE, DEXTROSE MONOHYDRATE AND SODIUM CHLORIDE: 150; 5; 450 INJECTION, SOLUTION INTRAVENOUS at 18:55

## 2023-04-24 RX ADMIN — DIPHENHYDRAMINE HYDROCHLORIDE AND LIDOCAINE HYDROCHLORIDE AND ALUMINUM HYDROXIDE AND MAGNESIUM HYDRO 15 ML: KIT at 18:12

## 2023-04-24 RX ADMIN — LIDOCAINE 1 PATCH: 4 PATCH TOPICAL at 10:11

## 2023-04-24 RX ADMIN — CEFEPIME 2 G: 2 INJECTION, POWDER, FOR SOLUTION INTRAVENOUS at 18:12

## 2023-04-24 RX ADMIN — FILGRASTIM-SNDZ 480 MCG: 480 INJECTION, SOLUTION INTRAVENOUS; SUBCUTANEOUS at 10:11

## 2023-04-24 RX ADMIN — ACETAMINOPHEN 1000 MG: 10 INJECTION, SOLUTION INTRAVENOUS at 05:28

## 2023-04-24 RX ADMIN — ONDANSETRON HYDROCHLORIDE 4 MG: 2 INJECTION, SOLUTION INTRAMUSCULAR; INTRAVENOUS at 05:35

## 2023-04-24 RX ADMIN — POTASSIUM CHLORIDE 20 MEQ: 14.9 INJECTION, SOLUTION INTRAVENOUS at 20:21

## 2023-04-24 RX ADMIN — DIPHENHYDRAMINE HYDROCHLORIDE AND LIDOCAINE HYDROCHLORIDE AND ALUMINUM HYDROXIDE AND MAGNESIUM HYDRO 15 ML: KIT at 11:39

## 2023-04-24 NOTE — PLAN OF CARE
Pt meets criteria for severe malnutrition   Problem: Adult Inpatient Plan of Care  Goal: Plan of Care Review  Outcome: Progressing     Problem: Malnutrition  Goal: Improved Nutritional Intake  4/24/2023 1455 by Alexus Burgess, RD  Outcome: Progressing

## 2023-04-24 NOTE — PROGRESS NOTES
Gynecology Progress Note    Subjective      Pt resting comfortably in bed. Reports that she still has pain with taking anything by mouth, that improved with IV tylenol. She report ongoing fatigue and right rib pain. Denies HA/CP/SOB, lightheadedness/ dizziness, and rectal pain.       Objective     Vital signs in last 24 hours:  Temp:  [37.2 °C (98.9 °F)-37.8 °C (100 °F)] 37.3 °C (99.1 °F)  Heart Rate:  [] 93  Resp:  [20] 20  BP: (118-140)/(69-92) 140/80    Intake/Output Summary (Last 24 hours) at 4/24/2023 0606  Last data filed at 4/24/2023 0340  Gross per 24 hour   Intake 532 ml   Output --   Net 532 ml     Intake/Output this shift:  I/O this shift:  In: 532 [Blood:332; IV Piggyback:200]  Out: -     Exam  General Appearance: Alert, cooperative, no acute distress  Lungs: Clear to auscultation bilaterally, respirations unlabored  Heart: Regular rate and rhythm. Right rib tenderness to palpation  Abdomen: Soft, nontender, nondistended  Extremities: no edema or calf tenderness    Labs  Results from last 7 days   Lab Units 04/23/23  1058 04/22/23  1447 04/21/23  1558   WBC K/uL 0.90* 0.99* 0.89*   HEMOGLOBIN g/dL 8.3* 6.1* 7.1*   HEMATOCRIT % 23.1* 17.5* 19.6*   PLATELETS K/uL 4* 5* 13*       Results from last 7 days   Lab Units 04/23/23  1057 04/22/23  1447 04/21/23  1558   SODIUM mEQ/L 132* 132* 133*   POTASSIUM mEQ/L 3.2* 3.2* 3.5*   CHLORIDE mEQ/L 104 101 101   CO2 mEQ/L 19* 21* 22   BUN mg/dL 14 14 20   CREATININE mg/dL 0.9 1.0 1.2*   GLUCOSE mg/dL 75 74 90   CALCIUM mg/dL 7.0* 7.4* 8.1*       Assessment/Plan     Alondra Villela is a 32 y.o. 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 Tmax 100.4F at 2313 4/21, afebrile overnight  2. WBC 0.89, ANC 0.15 on adm, last 0.90  and ANC 0.14  3. CXR no acute disease  4. Blood cultures collected on adm NGTD @ 48hrs  5. Continue Cefepime 2g IV q8h. S/p ID consult,  appreciate their recommendations, continue to monitor hemorrhoid   6. IVF with LR 100cc/hr   7. Continue strict neutropenic precautions including neutropenic diet   8. Will continue to trend fever curve. Repeat CBC with diff daily      # tachycardia:   1. HR continues to be elevated to 100s, most recently 93. Patient is asymptomatic.    2. Troponin negative x2, EKG normal   3. CTA of chest negative for PE     #JLUIS -resolved  1. Cr 1.2 on admission, baseline Cr 0.9-1.0  2. Suspect in the setting of poor PO intake and dehydration   3. Will continue IVF as above   4. Most recent BMP Cr 0.9     #Pancytopenia:   1. Plt on admission 13, will transfuse with Plt <10.   2. Hgb on admission 7.1, will transfuse with Hgb <7.0  3. Continue daily filgrastim injections on days when patient is not getting chemotherapy   4. 4/22, Hgb 6.1 and platelets 5, given 2 u PRBC,1 u of platelets  5. 4/23 Hgb 8.3 and platelets 4, given 2 u platelets  6. Follow up on AM CBC     #Electrolyte Abnormalities  - Hyponatremia Na 132 4/23, likely in setting of hypovolemia, with poor PO intake, continue with IVF  - hypokalemia K 3.2 4/23- s/p total IV 40 mEq,    -  Follow AM labs- replete as needed    #Thrush/oral mucocutaneous ulcers  1. Will continue magic mouth wash with nystatin while inpatient   2. Continue IV as above given poor PO intake   3. Consider nutrition consult  4. Continue  IV tylenol q8hrs      #GTN:   1. CT chest abdomen pelvis-  lung mass decreased in size, uterus with likely blood  2. Most recent beta-hcg 60.2 on 4/17/23  3. Given her plateauing hcg levels, patient to switch to a salvage regimen. Given her bone marrow reserve and promising evidence that pembrolizumab has high efficacy in GTN, patient for pembrolizumab in future.      #Depression:  1. Continue home medications Wellbutrin  mg daily and Propytiline 15 mg daily     #Hx of DVT in right upper extremity   1. Anticoagulation discontinued in the setting of pancytopenia  and bleeding   2. For SCDs while in bed while admitted      To be reviewed with Dr. Mcmullen    Please page [phone] for any questions or concerns.     Yumiko Sweeney MD

## 2023-04-24 NOTE — PROGRESS NOTES
Gynecology Progress Note    Subjective      Patient reports ongoing oral pain (6-7/10). Her right rib pain is improved with lidocaine patch. She denies HA, CP, SOB and lightheadedness. Of note, her right hand is swollen compared to her left.       Objective     Vital signs in last 24 hours:  Temp:  [36.7 °C (98.1 °F)-37.8 °C (100 °F)] 36.7 °C (98.1 °F)  Heart Rate:  [] 94  Resp:  [20] 20  BP: (118-140)/(69-85) 129/74    Intake/Output Summary (Last 24 hours) at 4/24/2023 1450  Last data filed at 4/24/2023 0340  Gross per 24 hour   Intake 532 ml   Output --   Net 532 ml     Intake/Output this shift:  No intake/output data recorded.    Exam  General Appearance: Alert, cooperative, no acute distress  Lungs: Clear to auscultation bilaterally, respirations unlabored  Heart: Regular rate and rhythm. Right rib tenderness to palpation  Abdomen: Soft, nontender, nondistended  Extremities: no edema or calf tenderness  R hand edema with decreased warmth,  radial pulses intact bilaterally.     Labs  Results from last 7 days   Lab Units 04/24/23  0858 04/23/23  1058 04/22/23  1447   WBC K/uL 0.83* 0.90* 0.99*   HEMOGLOBIN g/dL 7.8* 8.3* 6.1*   HEMATOCRIT % 21.4* 23.1* 17.5*   PLATELETS K/uL 3* 4* 5*       Results from last 7 days   Lab Units 04/24/23  0858 04/23/23  1057 04/22/23  1447   SODIUM mEQ/L 134* 132* 132*   POTASSIUM mEQ/L 3.1* 3.2* 3.2*   CHLORIDE mEQ/L 105 104 101   CO2 mEQ/L 18* 19* 21*   BUN mg/dL 14 14 14   CREATININE mg/dL 0.9 0.9 1.0   GLUCOSE mg/dL 70 75 74   CALCIUM mg/dL 7.2* 7.0* 7.4*       Assessment/Plan     Alondra Villela is a 32 y.o. 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 Tmax 100.4F at 2313 4/21- afebrile  2. WBC 0.89, ANC 0.15 on adm, last 0.83  and ANC 0.14  3. CXR no acute disease  4. Blood cultures collected on adm NGTD @ 48hrs  5. Continue Cefepime 2g IV q8h. S/p ID consult, appreciate  their recommendations, continue to monitor hemorrhoid   6. IVF with D5 in 1/2NS w/ 20 meQ KCl  7. Continue strict neutropenic precautions including neutropenic diet   8. Will continue to trend fever curve. Repeat CBC with diff daily     # hemoptysis  1. Patient coughing up dark red blood clots, picture in media from 4/24  2. CXR 4/24 shows stable lung mass, hemodynamically stable  3. Likely due to irritation in the setting of severe thrombocytopenia      #GTN:   1. CT chest abdomen pelvis-  lung mass decreased in size, uterus with likely blood  2. Most recent beta-hcg 42.7 4/24  3. patient for pembrolizumab upon discharge     # tachycardia:   1. HR continues to be elevated to 100s, most recently 94. Patient is asymptomatic.    2. Troponin negative x2, EKG normal   3. CTA of chest negative for PE     #Pancytopenia:   1. Plt on admission 13, will transfuse with Plt <10.   2. Hgb on admission 7.1, will transfuse with Hgb <7.0  3. Continue daily filgrastim injections   4. 4/22, Hgb 6.1 and platelets 5, given 2 u PRBC,1 u of platelets  5. 4/23 Hgb 8.3 and platelets 4, given 2 u platelets  6. 4/24 Hgb 7.8 and platelets 3, patient to receive 2 units of HLA matched platelets      #Electrolyte Abnormalities  - Hyponatremia Na 132 4/23, likely in setting of hypovolemia, with poor PO intake, continue with IVF  - hypokalemia K 3.1 4/24- total IV 80 mEq ordered- patient unable to tolerate PO/ liquid KCl   - trend daily BMP    #JLUIS -resolved  1. Cr 1.2 on admission, baseline Cr 0.9-1.0  2. Suspect in the setting of poor PO intake and dehydration   3. Will continue IVF as above   4. Most recent BMP Cr 0.9    #Thrush/oral mucocutaneous ulcers  1. Will continue magic mouth wash with nystatin while inpatient   2. Continue IV as above given poor PO intake   3. Consider nutrition consult  4. Continue  IV tylenol for three additional doses     #Depression:  1. Continue home medications Wellbutrin  mg daily and Propytiline 15 mg  daily     #Hx of DVT in right upper extremity   1. Anticoagulation discontinued in the setting of pancytopenia and bleeding   2. For SCDs while in bed while admitted      Please page [phone] for any questions or concerns.     Yumiko Sweeney MD

## 2023-04-24 NOTE — PLAN OF CARE
Plan of Care Review  Plan of Care Reviewed With: patient  Progress: improving  Outcome Summary: VSS t/o shift. All meds given as ordered. Pt reports pain primarily in her mouth, IV acetaminophen given as ordered. Platelets given at 1930, pt tolerated well. FSP maintained, call bell within reach

## 2023-04-24 NOTE — PROGRESS NOTES
"R2GYN    In to assess patient after she coughed up \"quarter\" sized blood clot. Clot is dark/maroon. Patient states she has been coughing the past 2 days with specks of dark/maroon blood. States this has been the biggest clot yet. She denies a sore throat.    Temp:  [37.2 °C (98.9 °F)-37.8 °C (100 °F)] 37.2 °C (99 °F)  Heart Rate:  [] 89  Resp:  [20] 20  BP: (118-140)/(69-92) 129/74  SpO2:  [98 %-100 %] 100 %  Oxygen Therapy: None (Room air)    CT ANGIOGRAPHY CHEST PULMONARY EMBOLISM WITH IV CONTRAST   Final Result   IMPRESSION:   Right lower lobe mass has decreased since the prior examination. No evidence of   pulmonary arterial embolism. Small 3 to 4 mm nodules bilaterally. Overall there   is improvement in the abdomen pelvis and in the chest.            CT ABDOMEN PELVIS WITH IV CONTRAST   Final Result   IMPRESSION: Please see associated report.         X-RAY CHEST 1 VIEW   Final Result   IMPRESSION: No active disease in the chest.         ECG 12 lead   Final Result            Patient is resting comfortably. No active bleeding from oral lesions were noted. Vitals are stable , saturating % on RA. Hgb 7.8 from 8.3.   Will obtain CXR for further evaluation.    Discussed with Dr. Benedict Sweeney MD     12:06 PM  -CXR shows stable RLL mass. Hemoptysis likely due to irritation and setting of severe thrombocytopenia.   -[place] [facility] who has not heard back from [employer] about HLA matched platelets.     Yumiko Sweeney MD      "

## 2023-04-24 NOTE — PLAN OF CARE
Plan of Care Review  Plan of Care Reviewed With: patient  Progress: improving  Outcome Summary: VSS t/o shift. PRN medications given for nausea & pain management. Rib pain improved wtih lidocaine patch. K being repleted, abx maintained, one unit of platelets given, needs second unit still. OOB with supervision for ADLs. Pt noticed new R hand swelling, IV in the hand removed. MD assessed at bedside. Fluids maintained, PO intake encouraged, family updated at the bedside. Call bell in reach.

## 2023-04-24 NOTE — CONSULTS
"Nutrition Assessment    Recommendations    Regular diet   Novasource renal shake TID ( most concentrated supplement)   Magic cup BID  Check Phos and replete PRN, recheck Mg   Continue nystatin any other pain meds that are appropriate.   RD will follow along      Clinical Course: Patient is a 32 y.o. female who was admitted on 2023 with a diagnosis of Thrombocytopenia (CMS/HCC) [D69.6]  Neutropenic fever (CMS/HCC) [D70.9, R50.81]  Sepsis, due to unspecified organism, unspecified whether acute organ dysfunction present (CMS/HCC) [A41.9].     Past Medical History:   Diagnosis Date   • Hx antineoplastic chemo      Past Surgical History:   Procedure Laterality Date   •  SECTION      x2       Reason for Assessment  Reason For Assessment: physician consult  Diagnosis: cancer diagnosis/related complications     [facility] Nutrition Screen Tool  Has patient lost weight without trying?: 0-->No  Has patient been eating poorly due to decreased appetite?: 0-->No  [facility] Nutrition Screen Score: 0     Nutrition/Diet History  Appetite Prior to Admission: Poor-0-25%     Physical Findings  Oral/Mouth Cavity: mucositis, inflammation, bleeding  Skin: intact     RETS18 Physical Appearance  Oral/Mouth Cavity: mucositis, inflammation, bleeding  Skin: intact     Nutrition Order  Nutrition Order: meets nutritional requirements  Nutrition Order Comments: regular, neutropenic     Anthropometrics  Height: 162.6 cm (5' 4\")       Current Weight  Weight: 68 kg (150 lb)     Ideal Body Weight (IBW)  Ideal Body Weight (IBW) (kg): 55  % Ideal Body Weight: 123.7     Usual Body Weight (UBW)  Usual Body Weight: 83.9 kg (185 lb)      Body Mass Index (BMI)  BMI (Calculated): 25.7     [id number] Lab Results  Lab Results Reviewed: reviewed   BMP Results       23     0858 1057 1447     132 132    K 3.1 3.2 3.2    Cl 105 104 101    CO2 18 19 21    Glucose 70 75 74    BUN 14 14 14    Creatinine 0.9 0.9 1.0    Calcium 7.2 7.0 7.4    " Anion Gap 11 9 10    EGFR >60.0 >60.0 >60.0        Lab Results   Component Value Date    ALT 16 04/21/2023    AST 13 (L) 04/21/2023    ALKPHOS 84 04/21/2023    BILITOT 1.0 04/21/2023     Mg 1.1      Medications  Pertinent Medications Reviewed: reviewed   • buPROPion XL  150 mg oral Nightly   • cefepime  2 g intravenous q8h INT   • filgrastim-sndz  480 mcg subcutaneous Daily   • [Provider Managed Hold] NIFEdipine XL  30 mg oral Daily   • potassium chloride in water  20 mEq intravenous Once   • potassium chloride in water  20 mEq intravenous Once    Followed by   • potassium chloride in water  20 mEq intravenous Once   • protriptyline  15 mg oral Nightly     • lactated ringer's   100 mL/hr at 04/23/23 1856         Calorie Requirements  Estimated kCal Needs: Actual Body Weight  Estimated Calorie Need Method: kcal/kg  Calorie/kg Recommended: 25-30  Calorie Recommendations: 1435-2811     Protein Requirements  Recommended Dosing Weight (Estimated Protein Needs): Actual Body Weight  Est Protein Requirement Amount (gms/kg): 1.5-->1.5 gm protein  Protein Recommendations: 102     Fluid requirements: 2050 ml(30 ml/kg )     Dosing weight ABW 68 kg     Skin: wnl    NFPE: wnl    Clinical comments:  Consult received, for severe mucositis, 2/2 chemo, pt unable to tolerate any oral intake, even taking sips water is painful. Has had this for 6 days, virtually no po intake during that time. Discussed using a straw and trying to get high calorie shakes down. Pt is using straws and still painful. The nystatin does not last long enough to get anything substantial down. Pt has lost 35 lbs in past 3 months, unintentional, severe wt loss for time frame.  Pt meets criteria for severe malnutrition in the context of acute illness   Mg and K+being repleted IV    Goals: Meet nutrient needs   Monitor: Labs, skin po diet jennifer, wts    Recommendations: See above     PES  Statement: PES Statement  Nutrition Diagnosis: Inadequate Energy Intake  Related  To:: Decreased ability to consume sufficient energy  As Evidenced By:: unintentional wt loss of 35#, (19% in 3 months)  Nutritional Needs Met?: No                                   Date: 04/24/23  Signature: Alexus Burgess RD

## 2023-04-25 LAB
ABO + RH BLD: NORMAL
ANION GAP SERPL CALC-SCNC: 10 MEQ/L (ref 3–15)
BASOPHILS # BLD: 0.01 K/UL (ref 0.01–0.1)
BASOPHILS NFR BLD: 1 %
BLD GP AB SCN SERPL QL: NEGATIVE
BUN SERPL-MCNC: 8 MG/DL (ref 8–20)
CALCIUM SERPL-MCNC: 7.3 MG/DL (ref 8.9–10.3)
CHLORIDE SERPL-SCNC: 107 MEQ/L (ref 98–109)
CO2 SERPL-SCNC: 16 MEQ/L (ref 22–32)
CREAT SERPL-MCNC: 0.8 MG/DL (ref 0.6–1.1)
D AG BLD QL: POSITIVE
DIFFERENTIAL METHOD BLD: ABNORMAL
EOSINOPHIL # BLD: 0.01 K/UL (ref 0.04–0.36)
EOSINOPHIL NFR BLD: 1 %
ERYTHROCYTE [DISTWIDTH] IN BLOOD BY AUTOMATED COUNT: 12.5 % (ref 11.7–14.4)
GFR SERPL CREATININE-BSD FRML MDRD: >60 ML/MIN/1.73M*2
GLUCOSE SERPL-MCNC: 95 MG/DL (ref 70–99)
HCT VFR BLDCO AUTO: 22.4 % (ref 35–45)
HGB BLD-MCNC: 8.1 G/DL (ref 11.8–15.7)
ISBT CODE: 6200
LABORATORY COMMENT REPORT: NORMAL
LYMPHOCYTES # BLD: 0.83 K/UL (ref 1.2–3.5)
LYMPHOCYTES NFR BLD: 75 %
MAGNESIUM SERPL-MCNC: 0.8 MG/DL (ref 1.8–2.5)
MCH RBC QN AUTO: 29.1 PG (ref 28–33.2)
MCHC RBC AUTO-ENTMCNC: 36.2 G/DL (ref 32.2–35.5)
MCV RBC AUTO: 80.6 FL (ref 83–98)
MICROCYTES BLD QL SMEAR: ABNORMAL
MONOCYTES # BLD: 0.07 K/UL (ref 0.28–0.8)
MONOCYTES NFR BLD: 6 %
NEUTS BAND # BLD: 0.02 K/UL (ref 0–0.53)
NEUTS BAND # BLD: 0.17 K/UL (ref 1.7–7)
NEUTS BAND NFR BLD: 2 %
NEUTS SEG NFR BLD: 15 %
PDW BLD AUTO: 10.1 FL (ref 9.4–12.3)
PHOSPHATE SERPL-MCNC: 1.3 MG/DL (ref 2.4–4.7)
PLAT MORPH BLD: NORMAL
PLATELET # BLD AUTO: 21 K/UL (ref 150–369)
PLATELET # BLD EST: ABNORMAL 10*3/UL
POTASSIUM SERPL-SCNC: 3.5 MEQ/L (ref 3.6–5.1)
PRODUCT CODE: NORMAL
PRODUCT STATUS: NORMAL
RBC # BLD AUTO: 2.78 M/UL (ref 3.93–5.22)
SODIUM SERPL-SCNC: 133 MEQ/L (ref 136–144)
SPECIMEN EXP DATE BLD: NORMAL
SPECIMEN EXP DATE BLD: NORMAL
UNIT ABO: NORMAL
UNIT ID: NORMAL
UNIT RH: POSITIVE
WBC # BLD AUTO: 1.11 K/UL (ref 3.8–10.5)

## 2023-04-25 PROCEDURE — 99232 SBSQ HOSP IP/OBS MODERATE 35: CPT | Performed by: OBSTETRICS & GYNECOLOGY

## 2023-04-25 PROCEDURE — 63600000 HC DRUGS/DETAIL CODE

## 2023-04-25 PROCEDURE — 63700000 HC SELF-ADMINISTRABLE DRUG: Performed by: OBSTETRICS & GYNECOLOGY

## 2023-04-25 PROCEDURE — 83735 ASSAY OF MAGNESIUM: CPT | Performed by: STUDENT IN AN ORGANIZED HEALTH CARE EDUCATION/TRAINING PROGRAM

## 2023-04-25 PROCEDURE — 63600000 HC DRUGS/DETAIL CODE: Performed by: OBSTETRICS & GYNECOLOGY

## 2023-04-25 PROCEDURE — 25000000 HC PHARMACY GENERAL

## 2023-04-25 PROCEDURE — 25800000 HC PHARMACY IV SOLUTIONS: Performed by: OBSTETRICS & GYNECOLOGY

## 2023-04-25 PROCEDURE — 25800000 HC PHARMACY IV SOLUTIONS

## 2023-04-25 PROCEDURE — 63600000 HC DRUGS/DETAIL CODE: Performed by: STUDENT IN AN ORGANIZED HEALTH CARE EDUCATION/TRAINING PROGRAM

## 2023-04-25 PROCEDURE — 85025 COMPLETE CBC W/AUTO DIFF WBC: CPT | Performed by: STUDENT IN AN ORGANIZED HEALTH CARE EDUCATION/TRAINING PROGRAM

## 2023-04-25 PROCEDURE — 84100 ASSAY OF PHOSPHORUS: CPT

## 2023-04-25 PROCEDURE — 80048 BASIC METABOLIC PNL TOTAL CA: CPT | Performed by: STUDENT IN AN ORGANIZED HEALTH CARE EDUCATION/TRAINING PROGRAM

## 2023-04-25 PROCEDURE — 21400000 HC ROOM AND CARE CCU/INTERMEDIATE

## 2023-04-25 PROCEDURE — 86901 BLOOD TYPING SEROLOGIC RH(D): CPT

## 2023-04-25 PROCEDURE — 36415 COLL VENOUS BLD VENIPUNCTURE: CPT

## 2023-04-25 RX ORDER — POTASSIUM CHLORIDE 14.9 MG/ML
20 INJECTION INTRAVENOUS ONCE
Status: DISCONTINUED | OUTPATIENT
Start: 2023-04-25 | End: 2023-04-25

## 2023-04-25 RX ORDER — FENTANYL CITRATE 50 UG/ML
25 INJECTION, SOLUTION INTRAMUSCULAR; INTRAVENOUS ONCE AS NEEDED
Status: COMPLETED | OUTPATIENT
Start: 2023-04-25 | End: 2023-04-25

## 2023-04-25 RX ADMIN — POTASSIUM PHOSPHATE, MONOBASIC AND POTASSIUM PHOSPHATE, DIBASIC 20 MMOL: 224; 236 INJECTION, SOLUTION, CONCENTRATE INTRAVENOUS at 20:19

## 2023-04-25 RX ADMIN — POTASSIUM CHLORIDE 20 MEQ: 14.9 INJECTION, SOLUTION INTRAVENOUS at 02:36

## 2023-04-25 RX ADMIN — POTASSIUM CHLORIDE, DEXTROSE MONOHYDRATE AND SODIUM CHLORIDE: 150; 5; 450 INJECTION, SOLUTION INTRAVENOUS at 09:43

## 2023-04-25 RX ADMIN — FILGRASTIM-SNDZ 480 MCG: 480 INJECTION, SOLUTION INTRAVENOUS; SUBCUTANEOUS at 09:12

## 2023-04-25 RX ADMIN — ACETAMINOPHEN 1000 MG: 10 INJECTION, SOLUTION INTRAVENOUS at 09:21

## 2023-04-25 RX ADMIN — FENTANYL CITRATE 25 MCG: 50 INJECTION, SOLUTION INTRAMUSCULAR; INTRAVENOUS at 18:23

## 2023-04-25 RX ADMIN — MAGNESIUM SULFATE HEPTAHYDRATE 2 G: 40 INJECTION, SOLUTION INTRAVENOUS at 10:48

## 2023-04-25 RX ADMIN — ONDANSETRON HYDROCHLORIDE 4 MG: 2 INJECTION, SOLUTION INTRAMUSCULAR; INTRAVENOUS at 20:38

## 2023-04-25 RX ADMIN — BUPROPION HYDROCHLORIDE 150 MG: 150 TABLET, EXTENDED RELEASE ORAL at 22:24

## 2023-04-25 RX ADMIN — MAGNESIUM SULFATE HEPTAHYDRATE 4 G: 40 INJECTION, SOLUTION INTRAVENOUS at 15:16

## 2023-04-25 RX ADMIN — CEFEPIME 2 G: 2 INJECTION, POWDER, FOR SOLUTION INTRAVENOUS at 10:02

## 2023-04-25 RX ADMIN — POTASSIUM PHOSPHATE, MONOBASIC AND POTASSIUM PHOSPHATE, DIBASIC 20 MMOL: 224; 236 INJECTION, SOLUTION, CONCENTRATE INTRAVENOUS at 15:08

## 2023-04-25 RX ADMIN — DIPHENHYDRAMINE HYDROCHLORIDE AND LIDOCAINE HYDROCHLORIDE AND ALUMINUM HYDROXIDE AND MAGNESIUM HYDRO 15 ML: KIT at 11:45

## 2023-04-25 RX ADMIN — CEFEPIME 2 G: 2 INJECTION, POWDER, FOR SOLUTION INTRAVENOUS at 02:28

## 2023-04-25 RX ADMIN — CEFEPIME 2 G: 2 INJECTION, POWDER, FOR SOLUTION INTRAVENOUS at 18:01

## 2023-04-25 RX ADMIN — POTASSIUM CHLORIDE, DEXTROSE MONOHYDRATE AND SODIUM CHLORIDE: 150; 5; 450 INJECTION, SOLUTION INTRAVENOUS at 22:29

## 2023-04-25 RX ADMIN — PROTRIPTYLINE HYDROCHLORIDE 15 MG: 5 TABLET, FILM COATED ORAL at 22:25

## 2023-04-25 RX ADMIN — ONDANSETRON HYDROCHLORIDE 4 MG: 2 INJECTION, SOLUTION INTRAMUSCULAR; INTRAVENOUS at 11:41

## 2023-04-25 NOTE — PLAN OF CARE
Problem: Adult Inpatient Plan of Care  Goal: Plan of Care Review  Outcome: Progressing  Flowsheets (Taken 4/25/2023 1715)  Progress: improving  Plan of Care Reviewed With: patient  Outcome Summary: VSS. aggressive K and mag repletion. IVF continued. magic mouth wash for mouth sores. Pt states rib pain improved. zofran for nausea. IV abx continued. fentanyl patch in place RUE. 1x IV dose fentanyl ordered for continued pain. all needs met at this time.

## 2023-04-25 NOTE — PROGRESS NOTES
Gynecology Progress Note    Subjective      Patient is sleeping comfortably.     Objective     Vital signs in last 24 hours:  Temp:  [36.2 °C (97.1 °F)-38 °C (100.4 °F)] 36.8 °C (98.3 °F)  Heart Rate:  [] 100  Resp:  [18] 18  BP: (120-143)/(69-85) 127/70    Intake/Output Summary (Last 24 hours) at 4/25/2023 1639  Last data filed at 4/25/2023 1623  Gross per 24 hour   Intake 1496.67 ml   Output --   Net 1496.67 ml       Labs  Results from last 7 days   Lab Units 04/25/23  0923 04/24/23  0858 04/23/23  1058   WBC K/uL 1.11* 0.83* 0.90*   HEMOGLOBIN g/dL 8.1* 7.8* 8.3*   HEMATOCRIT % 22.4* 21.4* 23.1*   PLATELETS K/uL 21* 3* 4*   .cbc      Results from last 7 days   Lab Units 04/25/23  0923 04/24/23  0858 04/23/23  1057   MAGNESIUM mg/dL 0.8* 1.1* 0.6*     Results from last 7 days   Lab Units 04/25/23  0923 04/24/23  0858 04/23/23  1057 04/22/23  1447 04/21/23  1558   SODIUM mEQ/L 133* 134* 132*   < > 133*   POTASSIUM mEQ/L 3.5* 3.1* 3.2*   < > 3.5*   CHLORIDE mEQ/L 107 105 104   < > 101   CO2 mEQ/L 16* 18* 19*   < > 22   BUN mg/dL 8 14 14   < > 20   CREATININE mg/dL 0.8 0.9 0.9   < > 1.2*   EGFR mL/min/1.73m*2 >60.0 >60.0 >60.0   < > 51.7*   GLUCOSE mg/dL 95 70 75   < > 90   CALCIUM mg/dL 7.3* 7.2* 7.0*   < > 8.1*   ALBUMIN g/dL  --   --   --   --  3.7   PHOSPHORUS mg/dL 1.3*  --   --   --   --     < > = values in this interval not displayed.     Assessment/Plan     Alondra Villela is a 32 y.o. 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 Tmax 100.4F at 2313 4/21 on admission and again 100.4 degrees on 4/24 -  2. WBC 0.89, ANC 0.15 on adm,- see pancytopenia problem blelow   3. CXR no acute disease  4. Blood cultures collected on adm NGTD @ 72 hrs  5. Continue Cefepime 2g IV q8h. S/p ID consult, appreciate their recommendations. Per ID no utility in repeat BCx, possibly d/c IV abx 4/26  6. IVF with D5 in 1/2NS  w/ 20 meQ KCl  7. Continue strict neutropenic precautions including neutropenic diet   8. Will continue to trend fever curve. Repeat CBC with diff daily     # hemoptysis  1. Patient coughing up dark red blood clots, picture in media from 4/24  2. CXR 4/24 shows stable lung mass, hemodynamically stable  3. Likely due to irritation in the setting of severe thrombocytopenia      #GTN:   1. CT chest abdomen pelvis-  lung mass decreased in size, uterus with likely blood  2. Most recent beta-hcg 42.7 4/24  3. patient for pembrolizumab upon discharge     # tachycardia:   1. HR continues to be elevated to max 127, most recently 90. Patient is asymptomatic.    2. Troponin negative x2, EKG normal   3. CTA of chest negative for PE     #Pancytopenia:   1. Plt on admission 13, will transfuse with Plt <10.   2. Hgb on admission 7.1, will transfuse with Hgb <7.0  3. Continue daily filgrastim injections   4. 4/22, Hgb 6.1 and platelets 5, given 2 u PRBC,1 u of platelets  5. 4/23 Hgb 8.3 and platelets 4, given 2 u platelets  6. 4/24 Hgb 7.8 and platelets 3, patient received 2 units of HLA matched platelets   7/ 4/25 Hgb 8.1 Platelets increased 21, ANC 0.17  7. Follow up with daily CBC     #Electrolyte Abnormalities  - Hyponatremia Na 133 4/25, likely in setting of hypovolemia, with poor PO intake, continue with IVF  - hypokalemia last K 3.5 and hypophosphatemia of 1.3 4/25- patient to receive a total of IV 40 mmol of potassium phosphates and is on D5 1/2 NS w/ 20 meQ KCl at 100 cc/hr. Patient unable to tolerate PO/ liquid KCl   - hypomagnesia 0.8 4/25 - s/p 6 g IV  - trend daily BMP    #Thrush/oral mucocutaneous ulcers  1. Will continue magic mouth wash with nystatin while inpatient   2. Continue IV as above given poor PO intake   3. S/p nutrition consult  4. Fentanyl patch q 72hrs s/p IV fentanyl      #JLUIS -resolved  1. Cr 1.2 on admission, baseline Cr 0.9-1.0  2. Suspect in the setting of poor PO intake and dehydration   3. Will  continue IVF as above   4. Most recent BMP Cr 0.8     #Depression:  1. Continue home medications Wellbutrin  mg daily and Propytiline 15 mg daily     #Hx of DVT in right upper extremity   1. Anticoagulation discontinued in the setting of pancytopenia  2. For SCDs while in bed while admitted       Please page [phone] for any questions or concerns.     Yumiko Sweeney MD

## 2023-04-25 NOTE — PLAN OF CARE
Plan of Care Review  Plan of Care Reviewed With: patient  Progress: improving  Outcome Summary: Patient is alert and stable this shift, VSS, mild low grade temp noted however resolved, tolerated all meds. second unit of platelets given. K+ completed, total 4 bags. IV ABT given. Fentanyl patched placed on RUE s/p rib pain, relief provided. Slept through the night. Spouse at bedside. FSP in place, call bell within reach. Will cont. to monitor.

## 2023-04-25 NOTE — PROGRESS NOTES
"Infectious Disease Progress Note    Patient Name: Alondra Villela  MR#: [id number]  : 1990  Admission Date: 2023  Date: 23       Antibiotics:    Anti-infectives (From admission, onward)    Start     Dose/Rate Route Frequency Ordered Stop    23 0200  ceFEPIme (MAXIPIME) 2 g in 100 mL NSS vial in bag         2 g  200 mL/hr over 30 Minutes intravenous Every 8 hours interval 23  nystatin (MYCOSTATIN) 100,000 unit/mL suspension 500,000 Units         500,000 Units Swish & Spit 4 times daily PRN 23            Subjective     Tmax 100.2  +  Nausea, no vomiting  No diarrhea  No abdominal pain  No dysuria frequency urgency  No vaginal discharge or itching  No rectal pain  No significant cough or shortness of breath, small dark red blood with coughing    Objective     Visit Vitals  /70 (BP Location: Left upper arm, Patient Position: Lying)   Pulse 99   Temp 36.8 °C (98.3 °F) (Oral)   Resp 18   Ht 1.626 m (5' 4\")   Wt 68 kg (150 lb)   SpO2 99%   BMI 25.75 kg/m²       Temp (72hrs), Av °C (98.6 °F), Min:36.2 °C (97.1 °F), Max:38 °C (100.4 °F)      Physical Exam:  General: Chronically ill-appearing oriented  HEENT: NC/AT/ anicteric sclera, oropharynx with white plaquing.  Bilateral ulcerations on her buccal mucosa.  Unable to see her posterior oropharynx   neck: supple  Cardiovascular: regular S1/S2    Respiratory: clear to auscultation, bilaterally  GI/Abdomen: soft, NT/ND,  no peritoneal signs  Extremities: no  edema, no clubbing  MSK/JOINTS:  No swelling, erythema, or pain  Lymphatics: No submandibular or cervical  Neurology and psych: no focal deficits, cooperative with exam  Skin: no rashes, lines clean dry intact    Lines, Drains, Airways, Wounds:  Single Lumen Implantable Port 23 Right Chest (Active)   Number of days: 76       Peripheral IV (Adult) 23 Anterior;Distal;Right;Upper Arm (Active)   Number of days: 4       Peripheral IV " (Adult) 04/24/23 Anterior;Left Forearm (Active)   Number of days: 1       Labs:  4/24 urine analysis 0-3 WBCs  CBC Results       04/24/23 04/23/23 04/22/23     0858 1058 1447    WBC 0.83 0.90 0.99    RBC 2.68 2.86 2.12    HGB 7.8 8.3 6.1    HCT 21.4 23.1 17.5    MCV 79.9 80.8 82.5    MCH 29.1 29.0 28.8    MCHC 36.4 35.9 34.9    PLT 3 4 5         Comment for WBC at 0858 on 04/24/23: This result has been called to CLAY RO by Leyda Barnett on 04 24 23 at 09:46, and has been read back.     Comment for WBC at 1058 on 04/23/23: CONSISTENT WITH PREVIOUS RESULTS. This result has been called to ASHLEY SANDS by Julia Ashby on 04 23 23 at 11:28, and has been read back.     Comment for WBC at 1447 on 04/22/23: CONSISTENT WITH PREVIOUS RESULTS. This result has been called to SARBJIT BADILLO by Julia Ashby on 04 22 23 at 15:09, and has been read back.     Comment for HGB at 1447 on 04/22/23: ALL RESULTS HAVE BEEN CHECKED. This result has been called to SARBJIT BADILLO by Julia Ashby on 04 22 23 at 15:09, and has been read back.     Comment for PLT at 0858 on 04/24/23: RESULTS OBTAINED AFTER VORTEXING TO ELIMINATE PLT CLUMPS. This result has been called to CLAY RO by Leyda Barnett on 04 24 23 at 09:46, and has been read back.     Comment for PLT at 1058 on 04/23/23: CONSISTENT WITH PREVIOUS RESULTS. This result has been called to ASHLEY SANDS by Julia Ashby on 04 23 23 at 11:28, and has been read back.     Comment for PLT at 1447 on 04/22/23: ALL RESULTS HAVE BEEN RECHECKED. This result has been called to SARBJIT BADILLO by Julia Ashby on 04 22 23 at 15:09, and has been read back.           CMP Results       04/24/23 04/23/23 04/22/23     0858 1057 1447     132 132    K 3.1 3.2 3.2    Cl 105 104 101    CO2 18 19 21    Glucose 70 75 74    BUN 14 14 14    Creatinine 0.9 0.9 1.0    Calcium 7.2 7.0 7.4    Anion Gap 11 9 10    EGFR >60.0 >60.0 >60.0          MICRO:     blood: Negative    Imagin/23 chest x-ray:  IMPRESSION:   No significant change from prior. Lobulated right lower lobe subpleural mass. No   new focal airspace disease seen in the lungs.      CT angio chest abdomen and pelvis:  IMPRESSION:   Right lower lobe mass has decreased since the prior examination. No evidence of   pulmonary arterial embolism. Small 3 to 4 mm nodules bilaterally. Overall there   is improvement in the abdomen pelvis and in the chest.     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  She has been receiving chemotherapy with etoposide, dactinomycin and methotrexate via port, last on .     · Neutropenic fever: o ngoing low grade fever, may be related to transfusions  · Oropharyngeal mucositis, minimal improvement  · Oral candidiasis  · Thrombocytopenia  · Pancytopenia  · Previous DVT right upper extremity  · Stage IV gestational trophoblastic neoplasia, immune compromised on chemotherapy  ·   Plan   · Cefepime 2 g IV every 8 hours, day 5  · Pending WBCs with differential, pending ANC from today  · Trend fever curve  · Nutrition following    Thank you for allowing us to participate in the care of this patient. ID will follow along    Tatyana Arzate MD  2023

## 2023-04-25 NOTE — PROGRESS NOTES
Gynecology Progress Note    Subjective      Patient reports her oral pain is the same despite fentanyl patch on her right shoulder. Right Rib pain has improved with lidocaine. Right hand swelling has improved. Denies HA, CP, SOB and lightheadedness. States she is still coughing up dark red blood but reports the clots have been smaller than what she had yesterday.      Objective     Vital signs in last 24 hours:  Temp:  [36.2 °C (97.1 °F)-38 °C (100.4 °F)] 36.6 °C (97.8 °F)  Heart Rate:  [] 90  Resp:  [18-20] 18  BP: (120-143)/(69-85) 143/85    Intake/Output Summary (Last 24 hours) at 4/25/2023 0607  Last data filed at 4/25/2023 0516  Gross per 24 hour   Intake 1496.67 ml   Output --   Net 1496.67 ml     Intake/Output this shift:  I/O this shift:  In: 1258.7 [Blood:658.7; IV Piggyback:600]  Out: -     Exam  General Appearance: Alert, cooperative, no acute distress  Lungs: Clear to auscultation bilaterally, respirations unlabored  Heart: Regular rate and rhythm. Right rib tenderness to palpation  Abdomen: Soft, nontender, nondistended  Extremities: no edema or calf tenderness  Improved R hand edema,  warmth symmetric and radial pulses intact bilaterally.     Labs  Results from last 7 days   Lab Units 04/24/23  0858 04/23/23  1058 04/22/23  1447   WBC K/uL 0.83* 0.90* 0.99*   HEMOGLOBIN g/dL 7.8* 8.3* 6.1*   HEMATOCRIT % 21.4* 23.1* 17.5*   PLATELETS K/uL 3* 4* 5*       Results from last 7 days   Lab Units 04/24/23  0858 04/23/23  1057 04/22/23  1447   SODIUM mEQ/L 134* 132* 132*   POTASSIUM mEQ/L 3.1* 3.2* 3.2*   CHLORIDE mEQ/L 105 104 101   CO2 mEQ/L 18* 19* 21*   BUN mg/dL 14 14 14   CREATININE mg/dL 0.9 0.9 1.0   GLUCOSE mg/dL 70 75 74   CALCIUM mg/dL 7.2* 7.0* 7.4*       Assessment/Plan     Alondra Villela is a 32 y.o. 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 Tmax 100.4F at 2313 4/21- afebrile overnight    2. WBC 0.89, ANC 0.15 on adm, last 0.83  and ANC 0.14  3. CXR no acute disease  4. Blood cultures collected on adm NGTD @ 72 hrs  5. Continue Cefepime 2g IV q8h. S/p ID consult, appreciate their recommendations   6. IVF with D5 in 1/2NS w/ 20 meQ KCl  7. Continue strict neutropenic precautions including neutropenic diet   8. Will continue to trend fever curve. Repeat CBC with diff daily     # hemoptysis  1. Patient coughing up dark red blood clots, picture in media from 4/24  2. CXR 4/24 shows stable lung mass, hemodynamically stable  3. Likely due to irritation in the setting of severe thrombocytopenia      #GTN:   1. CT chest abdomen pelvis-  lung mass decreased in size, uterus with likely blood  2. Most recent beta-hcg 42.7 4/24  3. patient for pembrolizumab upon discharge     # tachycardia:   1. HR continues to be elevated to max 127, most recently 90. Patient is asymptomatic.    2. Troponin negative x2, EKG normal   3. CTA of chest negative for PE     #Pancytopenia:   1. Plt on admission 13, will transfuse with Plt <10.   2. Hgb on admission 7.1, will transfuse with Hgb <7.0  3. Continue daily filgrastim injections   4. 4/22, Hgb 6.1 and platelets 5, given 2 u PRBC,1 u of platelets  5. 4/23 Hgb 8.3 and platelets 4, given 2 u platelets  6. 4/24 Hgb 7.8 and platelets 3, patient received 2 units of HLA matched platelets   7. Follow up with AM CBC     #Electrolyte Abnormalities  - Hyponatremia Na 134 4/24, likely in setting of hypovolemia, with poor PO intake, continue with IVF  - hypokalemia last K 3.1 4/24- patient received IV 80 mEq and is on D5 1/2 NS w/ 20 meQ KCl at 100 cc/hr. Patient unable to tolerate PO/ liquid KCl   - trend daily BMP    #Thrush/oral mucocutaneous ulcers  1. Will continue magic mouth wash with nystatin while inpatient   2. Continue IV as above given poor PO intake   3. S/p nutrition consult  4. Patient reports minimal relief with IV tylenol, can consider IV narcotics for pain  Rashid Webber DO at [facility] ENDOSCOPY  6/25/2022    EGD POLYP HOT FORCEP/CAUTERY performed by Rashid Webber DO at [facility] ENDOSCOPY       Objective:   BP (!) 132/55   Pulse 81   Temp 99.1 °F (37.3 °C) (Bladder)   Resp 23   Ht 5' 3\" (1.6 m)   Wt 250 lb 14.1 oz (113.8 kg)   SpO2 91%   Breastfeeding No   BMI 44.44 kg/m²       Intake/Output Summary (Last 24 hours) at 6/27/2022 0373  Last data filed at 6/27/2022 0600  Gross per 24 hour   Intake 1175.76 ml   Output 2175 ml   Net -999.24 ml       TELEMETRY: NSR    Physical Exam:  General: No Respiratory distress, appears well developed and well nourished. Eyes:  Sclera nonicteric  Nose/Sinuses:  negative findings: nose shows no deformity, asymmetry, or inflammation, nasal mucosa normal, septum midline with no perforation or bleeding  Back:  no pain to palpation  Joint:  no active joint inflammation  Musculoskeletal:  negative  Skin:  Warm and dry  Neck:  Negative for JVD and Carotid Bruits. Chest:  Clear to auscultation, respiration easy  Cardiovascular:  RRR, S1S2 normal, 2/6 RICK all over the precordium, no rub or thrill.   Abdomen:  Soft normal liver and spleen  Extremities:   No edema, clubbing, cyanosis,  Pulses: Right pedal pulse neg but left pedal pulses are normal.  Neuro: intact    Medications:    levofloxacin  500 mg IntraVENous Q24H    carvedilol  12.5 mg Oral BID     budesonide-formoterol  2 puff Inhalation BID    tiotropium  2 puff Inhalation Daily    insulin lispro  0-12 Units SubCUTAneous TID     insulin lispro  0-6 Units SubCUTAneous Nightly    polyethylene glycol  17 g Oral BID    atropine  1 mg IntraVENous Once    pantoprazole  40 mg IntraVENous Daily    [Held by provider] allopurinol  200 mg Oral Daily    atorvastatin  10 mg Oral Nightly    cetirizine  10 mg Oral Daily    [Held by provider] gabapentin  300 mg Oral BID    [Held by provider] losartan  100 mg Oral Daily    control    #JLUIS -resolved  1. Cr 1.2 on admission, baseline Cr 0.9-1.0  2. Suspect in the setting of poor PO intake and dehydration   3. Will continue IVF as above   4. Most recent BMP Cr 0.9     #Depression:  1. Continue home medications Wellbutrin  mg daily and Propytiline 15 mg daily     #Hx of DVT in right upper extremity   1. Anticoagulation discontinued in the setting of pancytopenia and bleeding   2. For SCDs while in bed while admitted      To be discussed with Dr. Mcmullen     Please page [phone] for any questions or concerns.     Yumiko Sweeney MD       female 1    [id number]  Score  5   Score last updated 6/27/22 9:53 AM EDT    Click here for a link to the UpToDate guideline \"Atrial Fibrillation: Anticoagulation therapy to prevent embolization    Disclaimer: Risk Score calculation is dependent on accuracy of patient problem list and past encounter diagnosis. recommend   continue coreg for afib  She has high chads vasc score high risk for stroke    But Anticoagulation is risky in presence of anemia  Consider 2 week out patient cardiac monitor.   if recurrent afib then consider anticoagulation or watchman device  Adding verapamil is not advised since it will cause symptomatic  Bradycardia  Thyroid function tests  Discussed with RN  PO iron   Check stool for occult blood  She may move to [address] or [facility]    Damaris Bird MD, MD 6/27/2022 8:34 AM

## 2023-04-26 LAB
ANION GAP SERPL CALC-SCNC: 8 MEQ/L (ref 3–15)
BACTERIA BLD CULT: NORMAL
BACTERIA BLD CULT: NORMAL
BASOPHILS # BLD: 0.02 K/UL (ref 0.01–0.1)
BASOPHILS NFR BLD: 2 %
BUN SERPL-MCNC: 7 MG/DL (ref 8–20)
BURR CELLS BLD QL SMEAR: ABNORMAL
CALCIUM SERPL-MCNC: 7.9 MG/DL (ref 8.9–10.3)
CHLORIDE SERPL-SCNC: 107 MEQ/L (ref 98–109)
CO2 SERPL-SCNC: 17 MEQ/L (ref 22–32)
CREAT SERPL-MCNC: 0.8 MG/DL (ref 0.6–1.1)
DIFFERENTIAL METHOD BLD: ABNORMAL
EOSINOPHIL # BLD: 0.02 K/UL (ref 0.04–0.36)
EOSINOPHIL NFR BLD: 2 %
ERYTHROCYTE [DISTWIDTH] IN BLOOD BY AUTOMATED COUNT: 12.9 % (ref 11.7–14.4)
GFR SERPL CREATININE-BSD FRML MDRD: >60 ML/MIN/1.73M*2
GLUCOSE SERPL-MCNC: 99 MG/DL (ref 70–99)
HCT VFR BLDCO AUTO: 23.1 % (ref 35–45)
HGB BLD-MCNC: 8.2 G/DL (ref 11.8–15.7)
ISBT CODE: 5100
ISBT CODE: 5100
LYMPHOCYTES # BLD: 0.64 K/UL (ref 1.2–3.5)
LYMPHOCYTES NFR BLD: 59 %
MAGNESIUM SERPL-MCNC: 1.4 MG/DL (ref 1.8–2.5)
MCH RBC QN AUTO: 28.3 PG (ref 28–33.2)
MCHC RBC AUTO-ENTMCNC: 35.5 G/DL (ref 32.2–35.5)
MCV RBC AUTO: 79.7 FL (ref 83–98)
MONOCYTES # BLD: 0.15 K/UL (ref 0.28–0.8)
MONOCYTES NFR BLD: 14 %
NEUTS BAND # BLD: 0.01 K/UL (ref 0–0.53)
NEUTS BAND # BLD: 0.24 K/UL (ref 1.7–7)
NEUTS BAND NFR BLD: 1 %
NEUTS SEG NFR BLD: 22 %
PDW BLD AUTO: 10.2 FL (ref 9.4–12.3)
PHOSPHATE SERPL-MCNC: 2.4 MG/DL (ref 2.4–4.7)
PLAT MORPH BLD: NORMAL
PLATELET # BLD AUTO: 12 K/UL (ref 150–369)
PLATELET # BLD EST: ABNORMAL 10*3/UL
POTASSIUM SERPL-SCNC: 3.4 MEQ/L (ref 3.6–5.1)
PRODUCT CODE: NORMAL
PRODUCT CODE: NORMAL
PRODUCT STATUS: NORMAL
PRODUCT STATUS: NORMAL
RBC # BLD AUTO: 2.9 M/UL (ref 3.93–5.22)
SODIUM SERPL-SCNC: 132 MEQ/L (ref 136–144)
SPECIMEN EXP DATE BLD: NORMAL
SPECIMEN EXP DATE BLD: NORMAL
UNIT ABO: NORMAL
UNIT ABO: NORMAL
UNIT ID: NORMAL
UNIT ID: NORMAL
UNIT RH: POSITIVE
UNIT RH: POSITIVE
WBC # BLD AUTO: 1.08 K/UL (ref 3.8–10.5)

## 2023-04-26 PROCEDURE — 36415 COLL VENOUS BLD VENIPUNCTURE: CPT | Performed by: STUDENT IN AN ORGANIZED HEALTH CARE EDUCATION/TRAINING PROGRAM

## 2023-04-26 PROCEDURE — 21400000 HC ROOM AND CARE CCU/INTERMEDIATE

## 2023-04-26 PROCEDURE — 99232 SBSQ HOSP IP/OBS MODERATE 35: CPT | Performed by: OBSTETRICS & GYNECOLOGY

## 2023-04-26 PROCEDURE — 80048 BASIC METABOLIC PNL TOTAL CA: CPT | Performed by: STUDENT IN AN ORGANIZED HEALTH CARE EDUCATION/TRAINING PROGRAM

## 2023-04-26 PROCEDURE — 25800000 HC PHARMACY IV SOLUTIONS: Performed by: OBSTETRICS & GYNECOLOGY

## 2023-04-26 PROCEDURE — 63600000 HC DRUGS/DETAIL CODE

## 2023-04-26 PROCEDURE — 63600000 HC DRUGS/DETAIL CODE: Performed by: STUDENT IN AN ORGANIZED HEALTH CARE EDUCATION/TRAINING PROGRAM

## 2023-04-26 PROCEDURE — 63700000 HC SELF-ADMINISTRABLE DRUG: Performed by: OBSTETRICS & GYNECOLOGY

## 2023-04-26 PROCEDURE — 63700000 HC SELF-ADMINISTRABLE DRUG

## 2023-04-26 PROCEDURE — 25800000 HC PHARMACY IV SOLUTIONS

## 2023-04-26 PROCEDURE — 84100 ASSAY OF PHOSPHORUS: CPT

## 2023-04-26 PROCEDURE — 63600000 HC DRUGS/DETAIL CODE: Performed by: OBSTETRICS & GYNECOLOGY

## 2023-04-26 PROCEDURE — 83735 ASSAY OF MAGNESIUM: CPT | Performed by: STUDENT IN AN ORGANIZED HEALTH CARE EDUCATION/TRAINING PROGRAM

## 2023-04-26 PROCEDURE — 85025 COMPLETE CBC W/AUTO DIFF WBC: CPT | Performed by: STUDENT IN AN ORGANIZED HEALTH CARE EDUCATION/TRAINING PROGRAM

## 2023-04-26 RX ORDER — POTASSIUM CHLORIDE 14.9 MG/ML
20 INJECTION INTRAVENOUS ONCE
Status: COMPLETED | OUTPATIENT
Start: 2023-04-26 | End: 2023-04-26

## 2023-04-26 RX ORDER — AMOXICILLIN 250 MG
1 CAPSULE ORAL EVERY MORNING
Status: DISCONTINUED | OUTPATIENT
Start: 2023-04-26 | End: 2023-04-29 | Stop reason: HOSPADM

## 2023-04-26 RX ORDER — DEXTROSE MONOHYDRATE, SODIUM CHLORIDE, AND POTASSIUM CHLORIDE 50; 1.49; 9 G/1000ML; G/1000ML; G/1000ML
INJECTION, SOLUTION INTRAVENOUS CONTINUOUS
Status: DISCONTINUED | OUTPATIENT
Start: 2023-04-26 | End: 2023-04-28

## 2023-04-26 RX ORDER — ACETAMINOPHEN 325 MG/1
975 TABLET ORAL EVERY 6 HOURS
Status: DISCONTINUED | OUTPATIENT
Start: 2023-04-26 | End: 2023-04-29 | Stop reason: HOSPADM

## 2023-04-26 RX ADMIN — FILGRASTIM-SNDZ 480 MCG: 480 INJECTION, SOLUTION INTRAVENOUS; SUBCUTANEOUS at 08:44

## 2023-04-26 RX ADMIN — POTASSIUM CHLORIDE, DEXTROSE MONOHYDRATE AND SODIUM CHLORIDE 80 ML/HR: 150; 5; 900 INJECTION, SOLUTION INTRAVENOUS at 16:40

## 2023-04-26 RX ADMIN — ACETAMINOPHEN 975 MG: 325 TABLET ORAL at 17:37

## 2023-04-26 RX ADMIN — BUPROPION HYDROCHLORIDE 150 MG: 150 TABLET, EXTENDED RELEASE ORAL at 22:14

## 2023-04-26 RX ADMIN — ONDANSETRON HYDROCHLORIDE 4 MG: 2 INJECTION, SOLUTION INTRAMUSCULAR; INTRAVENOUS at 22:14

## 2023-04-26 RX ADMIN — POTASSIUM CHLORIDE, DEXTROSE MONOHYDRATE AND SODIUM CHLORIDE: 150; 5; 450 INJECTION, SOLUTION INTRAVENOUS at 08:44

## 2023-04-26 RX ADMIN — POTASSIUM CHLORIDE 20 MEQ: 14.9 INJECTION, SOLUTION INTRAVENOUS at 16:57

## 2023-04-26 RX ADMIN — CEFEPIME 2 G: 2 INJECTION, POWDER, FOR SOLUTION INTRAVENOUS at 02:18

## 2023-04-26 RX ADMIN — ONDANSETRON HYDROCHLORIDE 4 MG: 2 INJECTION, SOLUTION INTRAMUSCULAR; INTRAVENOUS at 12:07

## 2023-04-26 RX ADMIN — PROTRIPTYLINE HYDROCHLORIDE 15 MG: 5 TABLET, FILM COATED ORAL at 22:14

## 2023-04-26 RX ADMIN — MAGNESIUM SULFATE HEPTAHYDRATE 2 G: 40 INJECTION, SOLUTION INTRAVENOUS at 09:38

## 2023-04-26 RX ADMIN — POTASSIUM CHLORIDE 20 MEQ: 14.9 INJECTION, SOLUTION INTRAVENOUS at 12:05

## 2023-04-26 RX ADMIN — POTASSIUM CHLORIDE 20 MEQ: 14.9 INJECTION, SOLUTION INTRAVENOUS at 14:21

## 2023-04-26 RX ADMIN — SENNOSIDES AND DOCUSATE SODIUM 1 TABLET: 50; 8.6 TABLET ORAL at 08:44

## 2023-04-26 NOTE — PROGRESS NOTES
Gynecology Progress Note    Subjective      Patient reports her pain has improved. Denies HA, CP, SOB, lightheadedness, and nausea/vomiting. Patient reports she has not coughed up any blood clots overnight.     Objective     Vital signs in last 24 hours:  Temp:  [36.8 °C (98.3 °F)] 36.8 °C (98.3 °F)  Heart Rate:  [] 86  Resp:  [18] 18  BP: (115-127)/(58-70) 115/58    Intake/Output Summary (Last 24 hours) at 4/26/2023 0623  Last data filed at 4/25/2023 1623  Gross per 24 hour   Intake 0 ml   Output --   Net 0 ml     Exam  General Appearance: Alert, cooperative, no acute distress  Lungs: Clear to auscultation bilaterally, respirations unlabored  Heart: Regular rate and rhythm. No Right rib tenderness  Abdomen: Soft, nontender, nondistended  Extremities: no edema or calf tenderness  no R hand edema,  warmth symmetric and radial pulses intact bilaterally.     Labs  Results from last 7 days   Lab Units 04/25/23  0923 04/24/23  0858 04/23/23  1058   WBC K/uL 1.11* 0.83* 0.90*   HEMOGLOBIN g/dL 8.1* 7.8* 8.3*   HEMATOCRIT % 22.4* 21.4* 23.1*   PLATELETS K/uL 21* 3* 4*   \      Results from last 7 days   Lab Units 04/25/23  0923 04/24/23  0858 04/23/23  1057   MAGNESIUM mg/dL 0.8* 1.1* 0.6*     Results from last 7 days   Lab Units 04/25/23  0923 04/24/23  0858 04/23/23  1057 04/22/23  1447 04/21/23  1558   SODIUM mEQ/L 133* 134* 132*   < > 133*   POTASSIUM mEQ/L 3.5* 3.1* 3.2*   < > 3.5*   CHLORIDE mEQ/L 107 105 104   < > 101   CO2 mEQ/L 16* 18* 19*   < > 22   BUN mg/dL 8 14 14   < > 20   CREATININE mg/dL 0.8 0.9 0.9   < > 1.2*   EGFR mL/min/1.73m*2 >60.0 >60.0 >60.0   < > 51.7*   GLUCOSE mg/dL 95 70 75   < > 90   CALCIUM mg/dL 7.3* 7.2* 7.0*   < > 8.1*   ALBUMIN g/dL  --   --   --   --  3.7   PHOSPHORUS mg/dL 1.3*  --   --   --   --     < > = values in this interval not displayed.     Assessment/Plan     Alondra Villela is a 32 y.o. 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 Tmax 100.4F at 2313 4/21 on admission and again 100.4 degrees on 4/24. Overnight afebrile   2. WBC 0.89, ANC 0.15 on adm,- see pancytopenia problem blelow   3. CXR no acute disease  4. Blood cultures collected on adm NGTD @ 96 hrs  5. Continue Cefepime 2g IV q8h. S/p ID consult, appreciate their recommendations. Per ID no utility in repeat BCx, possibly d/c IV abx on 4/26- will discuss with ID.   6. IVF with D5 in 1/2NS w/ 20 meQ KCl  7. Continue strict neutropenic precautions including neutropenic diet   8. Will continue to trend fever curve. Repeat CBC with diff daily     # hemoptysis   1. Patient coughing up dark red blood clots, picture in media from 4/24  2. CXR 4/24 shows stable lung mass, hemodynamically stable  3. Likely due to irritation in the setting of severe thrombocytopenia.  Patient has not had any episodes overnight.      #GTN:   1. CT chest abdomen pelvis-  lung mass decreased in size, uterus with likely blood  2. Most recent beta-hcg 42.7 4/24  3. patient for pembrolizumab upon discharge     # tachycardia:   1. HR continues to be elevated to max 100, most recently 86. Patient is asymptomatic.    2. Troponin negative x2, EKG normal   3. CTA of chest negative for PE     #Pancytopenia:   1. Plt on admission 13, will transfuse with Plt <10.   2. Hgb on admission 7.1, will transfuse with Hgb <7.0  3. Continue daily filgrastim injections   4. 4/22, Hgb 6.1 and platelets 5, given 2 u PRBC,1 u of platelets  5. 4/23 Hgb 8.3 and platelets 4, given 2 u platelets  6. 4/24 Hgb 7.8 and platelets 3, patient received 2 units of HLA matched platelets   7. 4/25 Hgb 8.1 Platelets increased 21, ANC 0.17  8. Follow up with daily CBC, goal ANC 1.0      #Electrolyte Abnormalities  - Hyponatremia Na 133 4/25, likely in setting of hypovolemia, with poor PO intake, continue with IVF  - hypokalemia last K 3.5 and hypophosphatemia of 1.3 4/25- patient received a  total of IV 40 mmol of potassium phosphates on 4/25 and is on D5 1/2 NS w/ 20 meQ KCl at 100 cc/hr. Patient unable to tolerate PO/ liquid KCl   - hypomagnesia 0.8 4/25 - s/p 6 g IV 4/25   - trend daily BMP and replete as needed    #Thrush/oral mucocutaneous ulcers  1. Will continue magic mouth wash with nystatin while inpatient   2. Continue IV as above given poor PO intake   3. S/p nutrition consult  4. Fentanyl patch q 72hrs s/p IV fentanyl x1     #JLUIS -resolved  1. Cr 1.2 on admission, baseline Cr 0.9-1.0  2. Suspect in the setting of poor PO intake and dehydration   3. Will continue IVF as above   4. Most recent BMP Cr 0.8     #Depression:  1. Continue home medications Wellbutrin  mg daily and Propytiline 15 mg daily     #Hx of DVT in right upper extremity   1. Anticoagulation discontinued in the setting of pancytopenia  2. For SCDs while in bed while admitted       To be discussed with Dr. Mcmullen     Please page [phone] for any questions or concerns.     Yumiko Sweeney MD

## 2023-04-26 NOTE — PROGRESS NOTES
Gynecology Progress Note    Subjective      Patient is resting comfortably. She reports ongoing oral ulcer pain. States she has not tried any supplement shakes. Denies HA, lightheadedness, Cp, SOB, N/V, any further episodes of hemoptysis.     Objective     Vital signs in last 24 hours:  Temp:  [36.7 °C (98 °F)-36.8 °C (98.3 °F)] 36.7 °C (98 °F)  Heart Rate:  [86-99] 90  Resp:  [18] 18  BP: (115-134)/(58-79) 134/79    Intake/Output Summary (Last 24 hours) at 4/26/2023 1604  Last data filed at 4/26/2023 1137  Gross per 24 hour   Intake 50 ml   Output --   Net 50 ml     Exam  General Appearance: Alert, cooperative, no acute distress  Lungs: Clear to auscultation bilaterally, respirations unlabored  Heart: Regular rate and rhythm.   Abdomen: Soft, nontender, nondistended  Extremities: no edema or calf tenderness  no R hand edema,  warmth symmetric and radial pulses intact bilaterally.     Labs  Results from last 7 days   Lab Units 04/26/23  0815 04/25/23  0923 04/24/23  0858   WBC K/uL 1.08* 1.11* 0.83*   HEMOGLOBIN g/dL 8.2* 8.1* 7.8*   HEMATOCRIT % 23.1* 22.4* 21.4*   PLATELETS K/uL 12* 21* 3*   \      Results from last 7 days   Lab Units 04/26/23  0815 04/25/23  0923 04/24/23  0858   MAGNESIUM mg/dL 1.4* 0.8* 1.1*     Results from last 7 days   Lab Units 04/26/23  0815 04/25/23  0923 04/24/23  0858 04/22/23  1447 04/21/23  1558   SODIUM mEQ/L 132* 133* 134*   < > 133*   POTASSIUM mEQ/L 3.4* 3.5* 3.1*   < > 3.5*   CHLORIDE mEQ/L 107 107 105   < > 101   CO2 mEQ/L 17* 16* 18*   < > 22   BUN mg/dL 7* 8 14   < > 20   CREATININE mg/dL 0.8 0.8 0.9   < > 1.2*   EGFR mL/min/1.73m*2 >60.0 >60.0 >60.0   < > 51.7*   GLUCOSE mg/dL 99 95 70   < > 90   CALCIUM mg/dL 7.9* 7.3* 7.2*   < > 8.1*   ALBUMIN g/dL  --   --   --   --  3.7   PHOSPHORUS mg/dL 2.4 1.3*  --   --   --     < > = values in this interval not displayed.     Assessment/Plan     Alondra Lawrencemanjinder is a 32 y.o. female FIGO stage IV gestational trophoblastic  neoplasia currently on chemotherapy who presents with neutropenic fever      #Neutropenic fever  1. Tmax 100.4F at 2313 4/21 on admission and again 100.4 degrees on 4/24. Currently afebrile   2. WBC 0.89, ANC 0.15 on adm,- see pancytopenia problem blelow   3. CXR no acute disease  4. Blood cultures collected on adm NGTD @ 96 hrs  5. Discontinued Cefepime 2g IV q8h given blood cultures on adm NGTD and afebrile per ID.   6. IVF with D5 in 1/2NS w/ 20 meQ KCl  7. Continue strict neutropenic precautions including neutropenic diet   8. Continue to monitor vital signs. Repeat CBC with diff daily     # hemoptysis - resolved  1. Patient coughing up dark red blood clots, picture in media from 4/24  2. CXR 4/24 shows stable lung mass, hemodynamically stable  3. Likely due to irritation in the setting of severe thrombocytopenia.  Patient has not had any episodes today.      #GTN:   1. CT chest abdomen pelvis-  lung mass decreased in size, uterus with likely blood  2. Most recent beta-hcg 42.7 4/24  3. patient for pembrolizumab upon discharge     # tachycardia- resolved  1. HR most recently 90, range 90-99. Patient is asymptomatic.    2. Troponin negative x2, EKG normal   3. CTA of chest negative for PE     #Pancytopenia:   1. Plt on admission 13, will transfuse with Plt <10.   2. Hgb on admission 7.1, will transfuse with Hgb <7.0  3. Continue daily filgrastim injections   4. 4/22, Hgb 6.1 and platelets 5, given 2 u PRBC,1 u of platelets  5. 4/23 Hgb 8.3 and platelets 4, given 2 u platelets  6. 4/24 Hgb 7.8 and platelets 3, patient received 2 units of HLA matched platelets   7. 4/25 Hgb 8.1 Platelets increased 21, ANC 0.17  8. 4/26 Hgb 8.2 Plts 12 ANC 0.24   9. Follow up with daily CBC, goal ANC 1.0      #Electrolyte Abnormalities  - Hyponatremia Na 132 4/26- like in the setting of poor PO intake. Will change IVF to D5 in NS w/ 20 mEq KCl  - hypokalemia last K 3.4  4/26- patient to receive a total of IV 60 meQ of KCl and  is on  D5  NS w/ 20 meQ KCl at 80 cc/hr. Patient unable to tolerate PO/ liquid KCl   - hypomagnesia 1.2 4/26 - s/p 2 g IV  - trend daily BMP and replete as needed    #Thrush/oral mucocutaneous ulcers  1. Will continue magic mouth wash with nystatin while inpatient   2. Continue IV as above given poor PO intake   3. S/p nutrition consult  4. Fentanyl patch q 72hrs s/p IV fentanyl x1 4/25. Will order tylenol  mg q6 hrs     #JLUIS -resolved  1. Cr 1.2 on admission, baseline Cr 0.9-1.0  2. Suspect in the setting of poor PO intake and dehydration   3. Will continue IVF as above   4. Most recent BMP Cr 0.8     #Depression:  1. Continue home medications Wellbutrin  mg daily and Propytiline 15 mg daily     #Hx of DVT in right upper extremity   1. Anticoagulation discontinued in the setting of pancytopenia  2. For SCDs while in bed while admitted        Please page [phone] for any questions or concerns.     Yumiko Sweeney MD

## 2023-04-26 NOTE — PROGRESS NOTES
"Infectious Disease Progress Note    Patient Name: Alondra Villela  MR#: [id number]  : 1990  Admission Date: 2023  Date: 23       Antibiotics:    Anti-infectives (From admission, onward)    Start     Dose/Rate Route Frequency Ordered Stop    23  nystatin (MYCOSTATIN) 100,000 unit/mL suspension 500,000 Units         500,000 Units Swish & Spit 4 times daily PRN 23            Subjective     No fever in > 24 hours  Persistent leukopenia  +  Nausea, no vomiting  No diarrhea  No abdominal pain  No dysuria frequency urgency  No vaginal discharge or itching  No rectal pain, + hemorrhoid  No significant cough or shortness of breath, small dark red blood with coughing has resolved    Objective     Visit Vitals  /79 (BP Location: Right upper arm, Patient Position: Lying)   Pulse 99   Temp 36.7 °C (98 °F) (Oral)   Resp 18   Ht 1.626 m (5' 4\")   Wt 68 kg (150 lb)   SpO2 99%   BMI 25.75 kg/m²       Temp (72hrs), Av.2 °C (98.9 °F), Min:36.2 °C (97.1 °F), Max:38 °C (100.4 °F)      Physical Exam:  General: Chronically ill-appearing oriented  HEENT: NC/AT/ anicteric sclera, oropharynx with white plaquing.  Bilateral ulcerations on her buccal mucosa.  Unable to see her posterior oropharynx   neck: supple  Cardiovascular: regular S1/S2    Respiratory: clear to auscultation, bilaterally  GI/Abdomen: soft, NT/ND,  no peritoneal signs  Extremities: no  edema, no clubbing  MSK/JOINTS:  No swelling, erythema, or pain  Lymphatics: No submandibular or cervical  Neurology and psych: no focal deficits, cooperative with exam  Skin: no rashes, lines clean dry intact    Lines, Drains, Airways, Wounds:  Single Lumen Implantable Port 23 Right Chest (Active)   Number of days: 77       Peripheral IV (Adult) 23 Anterior;Distal;Right;Upper Arm (Active)   Number of days: 5       Peripheral IV (Adult) 23 Anterior;Left Forearm (Active)   Number of days: 2       Labs:   urine " analysis 0-3 WBCs  CBC Results       23     0815 09 0858    WBC 1.08 1.11 0.83    RBC 2.90 2.78 2.68    HGB 8.2 8.1 7.8    HCT 23.1 22.4 21.4    MCV 79.7 80.6 79.9    MCH 28.3 29.1 29.1    MCHC 35.5 36.2 36.4    PLT 12 21 3         Comment for WBC at 0815 on 23: ALL RESULTS HAVE BEEN CHECKED. This result has been called to FELIX ARANDA by Leyda Barnett on 23 at 09:01, and has been read back.     Comment for WBC at 0923 on 23: CONSISTENT WITH PREVIOUS RESULTS. This result has been called to ROSALIND LOERA by Leyda Barnett on 23 at 09:54, and has been read back.     Comment for WBC at 0858 on 23: This result has been called to CLAY RO by Leyda Barnett on 23 at 09:46, and has been read back.     Comment for PLT at 0815 on 23: This result has been called to FELIX ARANDA by Leyda Barnett on 23 at 09:01, and has been read back.     Comment for PLT at 0923 on 23: CONSISTENT WITH PREVIOUS RESULTS. This result has been called to ROSALIND LOERA by Leyda Barnett on 23 at 09:54, and has been read back.     Comment for PLT at 0858 on 23: RESULTS OBTAINED AFTER VORTEXING TO ELIMINATE PLT CLUMPS. This result has been called to CLAY RO by Leyda Barnett on 23 at 09:46, and has been read back.           CMP Results       23     0815 0923 0858     133 134    K 3.4 3.5 3.1    Cl 107 107 105    CO2 17 16 18    Glucose 99 95 70    BUN 7 8 14    Creatinine 0.8 0.8 0.9    Calcium 7.9 7.3 7.2    Anion Gap 8 10 11    EGFR >60.0 >60.0 >60.0          MICRO:   4/21 Blood: negative    Imagin/24 CXR:  No change     CXR  IMPRESSION:   No significant change from prior. Lobulated right lower lobe subpleural mass. No   new focal airspace disease seen in the lungs.       CT angio chest abdomen and pelvis:  IMPRESSION:   Right lower lobe mass has decreased since the prior  examination. No evidence of   pulmonary arterial embolism. Small 3 to 4 mm nodules bilaterally. Overall there   is improvement in the abdomen pelvis and in the chest.     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  She has been receiving chemotherapy with etoposide, dactinomycin and methotrexate via port, last on 4/17.     • Neutropenic fever: ongoing neutropenia, but fever has resolved. No clear infectious etiology  • Oropharyngeal mucositis, mild improvement. No hx of HSV lesions  • Oral candidiasis  • Thrombocytopenia, likely related  • Pancytopenia, likely chemo related  • Previous DVT right upper extremity  • Stage IV gestational trophoblastic neoplasia, immune compromised on chemotherapy     Plan   • Ok to hold further abx  • Trend ANC (pending today)  • Trend fever curve  • Nutrition following  • Continue neutropenic precautions    Thank you for allowing us to participate in the care of this patient. ID will follow along.    Tatyana Arzate MD  4/26/2023

## 2023-04-27 DIAGNOSIS — O01.9 GESTATIONAL TROPHOBLASTIC NEOPLASM: Primary | ICD-10-CM

## 2023-04-27 LAB
ANION GAP SERPL CALC-SCNC: 6 MEQ/L (ref 3–15)
BASOPHILS # BLD: 0.07 K/UL (ref 0.01–0.1)
BASOPHILS NFR BLD: 3 %
BUN SERPL-MCNC: 8 MG/DL (ref 8–20)
CALCIUM SERPL-MCNC: 8.7 MG/DL (ref 8.9–10.3)
CHLORIDE SERPL-SCNC: 111 MEQ/L (ref 98–109)
CO2 SERPL-SCNC: 19 MEQ/L (ref 22–32)
CREAT SERPL-MCNC: 0.9 MG/DL (ref 0.6–1.1)
DIFFERENTIAL METHOD BLD: ABNORMAL
EOSINOPHIL # BLD: 0.02 K/UL (ref 0.04–0.36)
EOSINOPHIL NFR BLD: 1 %
ERYTHROCYTE [DISTWIDTH] IN BLOOD BY AUTOMATED COUNT: 13 % (ref 11.7–14.4)
GFR SERPL CREATININE-BSD FRML MDRD: >60 ML/MIN/1.73M*2
GLUCOSE SERPL-MCNC: 79 MG/DL (ref 70–99)
HCT VFR BLDCO AUTO: 26.6 % (ref 35–45)
HGB BLD-MCNC: 9.4 G/DL (ref 11.8–15.7)
LYMPHOCYTES # BLD: 1.45 K/UL (ref 1.2–3.5)
LYMPHOCYTES NFR BLD: 67 %
MAGNESIUM SERPL-MCNC: 1.1 MG/DL (ref 1.8–2.5)
MCH RBC QN AUTO: 29 PG (ref 28–33.2)
MCHC RBC AUTO-ENTMCNC: 35.3 G/DL (ref 32.2–35.5)
MCV RBC AUTO: 82.1 FL (ref 83–98)
MONOCYTES # BLD: 0.13 K/UL (ref 0.28–0.8)
MONOCYTES NFR BLD: 6 %
NEUTS BAND # BLD: 0.5 K/UL (ref 1.7–7)
NEUTS SEG NFR BLD: 23 %
OVALOCYTES BLD QL SMEAR: ABNORMAL
PDW BLD AUTO: 10.5 FL (ref 9.4–12.3)
PHOSPHATE SERPL-MCNC: 2.4 MG/DL (ref 2.4–4.7)
PLAT MORPH BLD: NORMAL
PLATELET # BLD AUTO: 10 K/UL (ref 150–369)
PLATELET # BLD EST: ABNORMAL 10*3/UL
POTASSIUM SERPL-SCNC: 4.1 MEQ/L (ref 3.6–5.1)
RBC # BLD AUTO: 3.24 M/UL (ref 3.93–5.22)
SODIUM SERPL-SCNC: 136 MEQ/L (ref 136–144)
WBC # BLD AUTO: 2.17 K/UL (ref 3.8–10.5)

## 2023-04-27 PROCEDURE — 80048 BASIC METABOLIC PNL TOTAL CA: CPT | Performed by: STUDENT IN AN ORGANIZED HEALTH CARE EDUCATION/TRAINING PROGRAM

## 2023-04-27 PROCEDURE — 63600000 HC DRUGS/DETAIL CODE

## 2023-04-27 PROCEDURE — 84100 ASSAY OF PHOSPHORUS: CPT

## 2023-04-27 PROCEDURE — 99231 SBSQ HOSP IP/OBS SF/LOW 25: CPT | Performed by: OBSTETRICS & GYNECOLOGY

## 2023-04-27 PROCEDURE — 21400000 HC ROOM AND CARE CCU/INTERMEDIATE

## 2023-04-27 PROCEDURE — 63700000 HC SELF-ADMINISTRABLE DRUG: Performed by: OBSTETRICS & GYNECOLOGY

## 2023-04-27 PROCEDURE — 85025 COMPLETE CBC W/AUTO DIFF WBC: CPT | Performed by: STUDENT IN AN ORGANIZED HEALTH CARE EDUCATION/TRAINING PROGRAM

## 2023-04-27 PROCEDURE — 63700000 HC SELF-ADMINISTRABLE DRUG

## 2023-04-27 PROCEDURE — 83735 ASSAY OF MAGNESIUM: CPT | Performed by: STUDENT IN AN ORGANIZED HEALTH CARE EDUCATION/TRAINING PROGRAM

## 2023-04-27 PROCEDURE — 36415 COLL VENOUS BLD VENIPUNCTURE: CPT | Performed by: STUDENT IN AN ORGANIZED HEALTH CARE EDUCATION/TRAINING PROGRAM

## 2023-04-27 PROCEDURE — 63700000 HC SELF-ADMINISTRABLE DRUG: Performed by: STUDENT IN AN ORGANIZED HEALTH CARE EDUCATION/TRAINING PROGRAM

## 2023-04-27 PROCEDURE — 63600000 HC DRUGS/DETAIL CODE: Performed by: OBSTETRICS & GYNECOLOGY

## 2023-04-27 PROCEDURE — 25800000 HC PHARMACY IV SOLUTIONS

## 2023-04-27 RX ORDER — FAMOTIDINE 10 MG/ML
20 INJECTION INTRAVENOUS ONCE AS NEEDED
Status: CANCELLED | OUTPATIENT
Start: 2023-05-01

## 2023-04-27 RX ORDER — POLYETHYLENE GLYCOL 3350 17 G/17G
17 POWDER, FOR SOLUTION ORAL DAILY PRN
Status: DISCONTINUED | OUTPATIENT
Start: 2023-04-27 | End: 2023-04-29 | Stop reason: HOSPADM

## 2023-04-27 RX ORDER — EPINEPHRINE 1 MG/ML
0.5 INJECTION, SOLUTION INTRAMUSCULAR; SUBCUTANEOUS ONCE AS NEEDED
Status: CANCELLED | OUTPATIENT
Start: 2023-05-01

## 2023-04-27 RX ADMIN — FILGRASTIM-SNDZ 480 MCG: 480 INJECTION, SOLUTION INTRAVENOUS; SUBCUTANEOUS at 08:50

## 2023-04-27 RX ADMIN — MAGNESIUM SULFATE HEPTAHYDRATE 2 G: 40 INJECTION, SOLUTION INTRAVENOUS at 12:11

## 2023-04-27 RX ADMIN — POLYETHYLENE GLYCOL 3350 17 G: 17 POWDER, FOR SOLUTION ORAL at 08:51

## 2023-04-27 RX ADMIN — FENTANYL 1 PATCH: 12 PATCH, EXTENDED RELEASE TRANSDERMAL at 22:44

## 2023-04-27 RX ADMIN — POTASSIUM CHLORIDE, DEXTROSE MONOHYDRATE AND SODIUM CHLORIDE: 150; 5; 900 INJECTION, SOLUTION INTRAVENOUS at 11:26

## 2023-04-27 RX ADMIN — ACETAMINOPHEN 975 MG: 325 TABLET ORAL at 11:20

## 2023-04-27 RX ADMIN — SENNOSIDES AND DOCUSATE SODIUM 1 TABLET: 50; 8.6 TABLET ORAL at 08:51

## 2023-04-27 RX ADMIN — POTASSIUM CHLORIDE, DEXTROSE MONOHYDRATE AND SODIUM CHLORIDE 80 ML/HR: 150; 5; 900 INJECTION, SOLUTION INTRAVENOUS at 22:36

## 2023-04-27 RX ADMIN — PROTRIPTYLINE HYDROCHLORIDE 15 MG: 5 TABLET, FILM COATED ORAL at 21:45

## 2023-04-27 RX ADMIN — BUPROPION HYDROCHLORIDE 150 MG: 150 TABLET, EXTENDED RELEASE ORAL at 21:45

## 2023-04-27 RX ADMIN — ACETAMINOPHEN 975 MG: 325 TABLET ORAL at 21:45

## 2023-04-27 NOTE — PROGRESS NOTES
Gynecology Progress Note    Subjective     Alondra is sleeping comfortably in bed. She is unable to conserve much as her mouth is painful when talking. She reports that her pain is overall well controlled. She was able to tolerate the tylenol PO last night, as well as boost. She denies vaginal bleeding, hemoptysis, N/V, F/C, lightheadedness, dizziness, chest pain, SOB, or headache.     Objective     Vital signs in last 24 hours:  Temp:  [36.5 °C (97.7 °F)-36.7 °C (98 °F)] 36.5 °C (97.7 °F)  Heart Rate:  [83-99] 86  Resp:  [18] 18  BP: (112-134)/(66-79) 119/75    Exam  General Appearance: Alert, cooperative, no acute distress  Lungs: Clear to auscultation bilaterally, respirations unlabored  Heart: Regular rate and rhythm  Abdomen: soft, nontender to palpation.   Extremities: no edema or calf tenderness. Warm and symmetric     Labs  Results from last 7 days   Lab Units 04/26/23  0815 04/25/23  0923 04/24/23  0858   WBC K/uL 1.08* 1.11* 0.83*   HEMOGLOBIN g/dL 8.2* 8.1* 7.8*   HEMATOCRIT % 23.1* 22.4* 21.4*   PLATELETS K/uL 12* 21* 3*      Results from last 7 days   Lab Units 04/26/23  0815 04/25/23  0923 04/24/23  0858   SODIUM mEQ/L 132* 133* 134*   POTASSIUM mEQ/L 3.4* 3.5* 3.1*   CHLORIDE mEQ/L 107 107 105   CO2 mEQ/L 17* 16* 18*   BUN mg/dL 7* 8 14   CREATININE mg/dL 0.8 0.8 0.9   GLUCOSE mg/dL 99 95 70   CALCIUM mg/dL 7.9* 7.3* 7.2*        Assessment/Plan     Alondra Villela is a 32 y.o. 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 Afebrile since that time.   - WBC stable at 1.08 yesterday-> AM CBC  Pending. Will Continue filgrastim until WBC is >10.     - CXR 4/21 no acute disease  - Blood cultures collected on 4/21 show NGTD at 120 hrs  - s/p Cefepime 2g IV q8h given blood cultures on adm NGTD. ID following   - Keep in IVF while PO intake is scant   - Continue strict neutropenic  precautions including neutropenic diet   - Will monitor vital signs closely     Anemia:  1. Hgb stable yesterday at 8.2. Transfuse to keep above Hgb 7.0. Will repeat CBC today. Asymptomatic    Hemoptysis (resolved):  - Patient coughed up dark red blood clots on 4/24, likely secondary to irritation in the setting of severe thrombocytopenia. CXR at that time was unremarkable. Resolved. Will monitor for recurrence.      GTN:   - Most recent CT chest abdomen pelvis 4/21showed Lung mass that decreased in size and uterus containing blood.   -Most recent beta-hcg 42.7 on 4/24. Downtrending  - Holding chemo at this time. Will likely start pembrolizumab upon discharge      Tachycardia (resolved):  - HR most recently 86, range 83-99. Patient is asymptomatic.    -Troponin negative x2, EKG normal   - CTA of chest negative for PE on 4/21     Pancytopenia:   - Plt on admission 13, Improving after transfusions listed below. Most recent Ptl are 12--> MA CBC pending. Goal: transfuse to keep Plt above 10.    -4/22, Hgb 6.1 and platelets 5, given 2 u PRBC,1 u of platelets   - 4/23 Hgb 8.3 and platelets 4, given 2 u platelets   - 4/24 Hgb 7.8 and platelets 3, patient received 2 units of HLA matched platelets   - ANC 0.15 on adm and improving. Most recently 0.24->AM CBC w/ diff pending. Goal ANC 1.0.   - Follow with daily CBC with diff     Electrolyte Abnormalities  - HypoKalemic: continue K+ infusion with IV fluids and recheck BMP this AM  - Hypomagnesia; 1.4 most recently on 4/26. S/p repletion.   - replete as indicated and check BMP daily    Thrush/oral mucocutaneous ulcers:  - Continue magic mouth wash with nystatin while inpatient   - Continue IVF as above given poor PO intake   - S/p nutrition consult  - Fentanyl patch q 72hrs s/p IV fentanyl x1 4/25. Continue tylenol  mg q6 hrs      JLUIS -resolved  -Cr 1.2 on admission, baseline Cr 0.9-1.0  - Suspect in the setting of poor PO intake and dehydration   - Will continue IVF as  above   - Most recent Cr 0.8 on 4/26     Depression:  - Continue home medications Wellbutrin  mg daily and Propytiline 15 mg daily  - Partner at bedside for support      Hx of DVT in right upper extremity/ VTE prophylaxis   - Anticoagulation discontinued in the setting of pancytopenia  - SCDs while in bed and ambulation as tolerated.     To discus with daytime Gyn Onc attending.    Please page #[phone] with any questions/concerns.     Rob Sweeney, DO

## 2023-04-27 NOTE — PROGRESS NOTES
Gynecology Progress Note    Subjective     Patient is feeling better. She reports tolerating small amounts of water and supplement shakes. Denies HA, CP, SOB, lightheadedness, hemoptysis, vaginal bleeding, N/V/D, and F+C.     Objective     Vital signs in last 24 hours:  Temp:  [36.5 °C (97.7 °F)-36.7 °C (98 °F)] 36.5 °C (97.7 °F)  Heart Rate:  [] 94  Resp:  [15-18] 15  BP: (112-133)/(66-75) 119/72    Exam  General Appearance: Alert, cooperative, no acute distress  Lungs: Clear to auscultation bilaterally, respirations unlabored  Heart: Regular rate and rhythm  Abdomen: soft, nontender to palpation.   Extremities: no edema or calf tenderness. Warm and symmetric     Labs  Results from last 7 days   Lab Units 04/27/23  1105 04/26/23  0815 04/25/23  0923   WBC K/uL 2.17* 1.08* 1.11*   HEMOGLOBIN g/dL 9.4* 8.2* 8.1*   HEMATOCRIT % 26.6* 23.1* 22.4*   PLATELETS K/uL 10* 12* 21*      Results from last 7 days   Lab Units 04/27/23  1105 04/26/23  0815 04/25/23  0923   SODIUM mEQ/L 136 132* 133*   POTASSIUM mEQ/L 4.1 3.4* 3.5*   CHLORIDE mEQ/L 111* 107 107   CO2 mEQ/L 19* 17* 16*   BUN mg/dL 8 7* 8   CREATININE mg/dL 0.9 0.8 0.8   GLUCOSE mg/dL 79 99 95   CALCIUM mg/dL 8.7* 7.9* 7.3*        Assessment/Plan     Alondra Villela is a 32 y.o. 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 Afebrile since that time.   - WBC stable at 1.08 yesterday-> 2.2 this AM. Will Continue filgrastim until WBC is >10.     - CXR 4/21 no acute disease  - Blood cultures collected on 4/21 show NGTD at 120 hrs  - s/p Cefepime 2g IV q8h given blood cultures on adm NGTD. S/p ID consult/ recs.   - Continue IVF while PO intake is scant   - Continue strict neutropenic precautions including neutropenic diet   - Continue to monitor vital signs      # Hemoptysis - resolved  -Patient coughing up dark red blood clots,  picture in media from 4/24  -CXR 4/24 shows stable lung mass, hemodynamically stable  - Likely due to irritation in the setting of severe thrombocytopenia.   - patient has not had any episodes today.      #GTN:   - CT chest abdomen pelvis-  lung mass decreased in size, uterus with likely blood  - Most recent beta-hcg 42.7 4/24  - patient for pembrolizumab upon discharge      # Tachycardia- resolved  - HR most recently 94, range . Patient is asymptomatic.    - Troponin negative x2, EKG normal   - CTA of chest negative for PE     #Pancytopenia:   - Plt on admission 13, will transfuse with Plt <10.   - Hgb on admission 7.1, will transfuse with Hgb <7.0  - Continue daily filgrastim injections   - 4/22, Hgb 6.1 and platelets 5, given 2 u PRBC,1 u of platelets  - 4/23 Hgb 8.3 and platelets 4, given 2 u platelets  - 4/24 Hgb 7.8 and platelets 3, patient received 2 units of HLA matched platelets   - 4/25 Hgb 8.1 Platelets increased 21, ANC 0.17  - 4/26 Hgb 8.2 Plts 12 ANC 0.24   - 4/27 Hgb 9.4 Plts 10 ANC 0.50  - Follow up with daily CBC, goal ANC 1.0      Electrolyte Abnormalities  - HypoKalemic: continue K+ infusion with IV fluids. Most recent K 4.1.   - Hypomagnesia; 1.1 this AM, s/p 2 g IV Mg  - check BMP daily, replete as needed    Thrush/oral mucocutaneous ulcers:  - Continue magic mouth wash with nystatin while inpatient   - Continue IVF as above given poor PO intake   - S/p nutrition consult  - Fentanyl patch q 72hrs s/p IV fentanyl x1 4/25.   - Improved on tylenol  mg q6 hrs      JULIS -resolved  -Cr 1.2 on admission, baseline Cr 0.9-1.0  - Suspect in the setting of poor PO intake and dehydration   - Will continue IVF as above   - Most recent Cr 0.9 on 4/26     Depression:  - Continue home medications Wellbutrin  mg daily and Propytiline 15 mg daily     Hx of DVT in right upper extremity/ VTE prophylaxis   - Anticoagulation discontinued in the setting of pancytopenia  - SCDs while in bed and  ambulation as tolerated.     Discussed with Dr. Mcmullen    Please page #[phone] with any questions/concerns.     Yumiko Sweeney MD

## 2023-04-27 NOTE — PLAN OF CARE
Plan of Care Review  Plan of Care Reviewed With: patient  Progress: improving  Outcome Summary: Patient alert and orientedx4.  Out of bed independently.  IVF.  Mouth sores.  Was able to drink a boost.  Denies pain.  Zofran x1 for nausea.   at bedside.  Call bell within reach.

## 2023-04-27 NOTE — PROGRESS NOTES
"Infectious Disease Progress Note    Patient Name: Alondra Villela  MR#: [id number]  : 1990  Admission Date: 2023  Date: 23       Antibiotics:    Anti-infectives (From admission, onward)    Start     Dose/Rate Route Frequency Ordered Stop    23  nystatin (MYCOSTATIN) 100,000 unit/mL suspension 500,000 Units         500,000 Units Swish & Spit 4 times daily PRN 23            Subjective     Mildly improved oral pain, no dysphagia  No fever since low grades   Persistent leukopenia (yesterday)  100 on RA  +  Nausea, no vomiting  No diarrhea, no stool  No abdominal pain  No dysuria frequency urgency  No vaginal discharge or itching  No rectal pain, + hemorrhoid with mild pain  No significant cough     Objective     Visit Vitals  /70 (BP Location: Left upper arm, Patient Position: Lying)   Pulse 90   Temp 36.7 °C (98 °F) (Oral)   Resp 15   Ht 1.626 m (5' 4\")   Wt 68 kg (150 lb)   SpO2 100%   BMI 25.75 kg/m²       Temp (72hrs), Av °C (98.6 °F), Min:36.2 °C (97.1 °F), Max:38 °C (100.4 °F)      Physical Exam:  General: Chronically ill-appearing oriented  HEENT: NC/AT/ anicteric sclera, oropharynx erythema posterior  Bilateral ulcerations on her buccal mucosa. + white plaquing tongue  neck: supple  Cardiovascular: regular S1/S2    Respiratory: clear to auscultation, bilaterally  GI/Abdomen: soft, NT/ND,  no peritoneal signs  Extremities: no  edema, no clubbing  MSK/JOINTS:  No swelling, erythema, or pain  Lymphatics: No submandibular or cervical  Neurology and psych: no focal deficits, cooperative with exam  Skin: no rashes, lines clean dry intact    Lines, Drains, Airways, Wounds:  Single Lumen Implantable Port 23 Right Chest (Active)   Number of days: 78       Peripheral IV (Adult) 23 Anterior;Distal;Right;Upper Arm (Active)   Number of days: 6       Peripheral IV (Adult) 23 Anterior;Left Forearm (Active)   Number of days: 3       Labs:   urine " analysis 0-3 WBCs  CBC Results       23     0815 09 0858    WBC 1.08 1.11 0.83    RBC 2.90 2.78 2.68    HGB 8.2 8.1 7.8    HCT 23.1 22.4 21.4    MCV 79.7 80.6 79.9    MCH 28.3 29.1 29.1    MCHC 35.5 36.2 36.4    PLT 12 21 3         Comment for WBC at 0815 on 23: ALL RESULTS HAVE BEEN CHECKED. This result has been called to FELIX ARANDA by Leyda Barnett on 23 at 09:01, and has been read back.     Comment for WBC at 0923 on 23: CONSISTENT WITH PREVIOUS RESULTS. This result has been called to ROSALIND LOERA by Leyda Barnett on 23 at 09:54, and has been read back.     Comment for WBC at 0858 on 23: This result has been called to CLAY RO by Leyda Barnett on 23 at 09:46, and has been read back.     Comment for PLT at 0815 on 23: This result has been called to FELIX ARANDA by Leyda Barnett on 23 at 09:01, and has been read back.     Comment for PLT at 0923 on 23: CONSISTENT WITH PREVIOUS RESULTS. This result has been called to ROSALIND LOERA by Leyda Barnett on 23 at 09:54, and has been read back.     Comment for PLT at 0858 on 23: RESULTS OBTAINED AFTER VORTEXING TO ELIMINATE PLT CLUMPS. This result has been called to CLAY RO by Leyda Barnett on 23 at 09:46, and has been read back.           CMP Results       23     0815 0923 0858     133 134    K 3.4 3.5 3.1    Cl 107 107 105    CO2 17 16 18    Glucose 99 95 70    BUN 7 8 14    Creatinine 0.8 0.8 0.9    Calcium 7.9 7.3 7.2    Anion Gap 8 10 11    EGFR >60.0 >60.0 >60.0          MICRO:   4/21 Blood: negative    Imagin/24 CXR:  No change      CXR  IMPRESSION:   No significant change from prior. Lobulated right lower lobe subpleural mass. No   new focal airspace disease seen in the lungs.       CT angio chest abdomen and pelvis:  IMPRESSION:   Right lower lobe mass has decreased since the prior  examination. No evidence of   pulmonary arterial embolism. Small 3 to 4 mm nodules bilaterally. Overall there   is improvement in the abdomen pelvis and in the chest.     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  She has been receiving chemotherapy with etoposide, dactinomycin and methotrexate via port, last on 4/17.     • Neutropenic fever: ongoing neutropenia, but fever has resolved. No clear infectious etiology. CT CAP negative. Blood cx negative. UA negative.  • Oropharyngeal mucositis, mild improvement. No hx of HSV lesions  • Oral candidiasis  • Thrombocytopenia, likely related  • Pancytopenia, likely chemo related  • Previous DVT right upper extremity  • Stage IV gestational trophoblastic neoplasia, immune compromised on chemotherapy     Plan   • Continue to monitor off abx  • Filgrastim daily per Oncology  • Trend WBC with diff (1% bands yesterday)  • Trend ANC (pending today)  • Trend fever curve  • Nutrition following  • Continue neutropenic precautions    Thank you for allowing us to participate in the care of this patient. ID will sign off.  Please call with questions, change in clinical status including fever, or new clinical data.    Tatyana Arzate MD  4/27/2023

## 2023-04-27 NOTE — PLAN OF CARE
Care Coordination Discharge Plan Note   Date: 4/27/2023    Time: 3:29 PM    Patient Name: Alondra Villela  Medical Record Number: [id number]  YOB: 1990  Room/BED: 0381    Discharge Assessment  Concerns to be Addressed: denies needs/concerns at this time, adjustment to diagnosis/illness  Anticipated Changes Related to Illness: none    Concerns Comments:      Anticipated Discharge Plan  Patient/Family Anticipates Transition to: home, home with family  Patient/Family Anticipated Services at Transition: none,       Patient Choice            Discharge Transportation            Discharge Barriers   Comment: Continue to monitor, d/c home when medically stable per chart reviewWill continue to follow for transition of care needs  until discharge completed.

## 2023-04-28 LAB
ABO + RH BLD: NORMAL
ANION GAP SERPL CALC-SCNC: 6 MEQ/L (ref 3–15)
BASOPHILS # BLD: 0.05 K/UL (ref 0.01–0.1)
BASOPHILS NFR BLD: 2 %
BLD GP AB SCN SERPL QL: NEGATIVE
BUN SERPL-MCNC: 7 MG/DL (ref 8–20)
CALCIUM SERPL-MCNC: 8 MG/DL (ref 8.9–10.3)
CHLORIDE SERPL-SCNC: 113 MEQ/L (ref 98–109)
CO2 SERPL-SCNC: 19 MEQ/L (ref 22–32)
CREAT SERPL-MCNC: 0.8 MG/DL (ref 0.6–1.1)
D AG BLD QL: POSITIVE
DIFFERENTIAL METHOD BLD: ABNORMAL
EOSINOPHIL # BLD: 0.03 K/UL (ref 0.04–0.36)
EOSINOPHIL NFR BLD: 1 %
ERYTHROCYTE [DISTWIDTH] IN BLOOD BY AUTOMATED COUNT: 12.9 % (ref 11.7–14.4)
GFR SERPL CREATININE-BSD FRML MDRD: >60 ML/MIN/1.73M*2
GLUCOSE SERPL-MCNC: 91 MG/DL (ref 70–99)
HCT VFR BLDCO AUTO: 23.7 % (ref 35–45)
HGB BLD-MCNC: 8.5 G/DL (ref 11.8–15.7)
LABORATORY COMMENT REPORT: NORMAL
LYMPHOCYTES # BLD: 1.38 K/UL (ref 1.2–3.5)
LYMPHOCYTES NFR BLD: 52 %
MAGNESIUM SERPL-MCNC: 0.9 MG/DL (ref 1.8–2.5)
MCH RBC QN AUTO: 29.2 PG (ref 28–33.2)
MCHC RBC AUTO-ENTMCNC: 35.9 G/DL (ref 32.2–35.5)
MCV RBC AUTO: 81.4 FL (ref 83–98)
MICROCYTES BLD QL SMEAR: ABNORMAL
MONOCYTES # BLD: 0.29 K/UL (ref 0.28–0.8)
MONOCYTES NFR BLD: 11 %
NEUTS BAND # BLD: 0.03 K/UL (ref 0–0.53)
NEUTS BAND # BLD: 0.87 K/UL (ref 1.7–7)
NEUTS BAND NFR BLD: 1 %
NEUTS SEG NFR BLD: 33 %
PDW BLD AUTO: 10.7 FL (ref 9.4–12.3)
PHOSPHATE SERPL-MCNC: 2.3 MG/DL (ref 2.4–4.7)
PLAT MORPH BLD: NORMAL
PLATELET # BLD AUTO: 7 K/UL (ref 150–369)
PLATELET # BLD EST: ABNORMAL 10*3/UL
POTASSIUM SERPL-SCNC: 3.7 MEQ/L (ref 3.6–5.1)
RBC # BLD AUTO: 2.91 M/UL (ref 3.93–5.22)
SODIUM SERPL-SCNC: 138 MEQ/L (ref 136–144)
SPECIMEN EXP DATE BLD: NORMAL
WBC # BLD AUTO: 2.65 K/UL (ref 3.8–10.5)

## 2023-04-28 PROCEDURE — 83735 ASSAY OF MAGNESIUM: CPT | Performed by: STUDENT IN AN ORGANIZED HEALTH CARE EDUCATION/TRAINING PROGRAM

## 2023-04-28 PROCEDURE — 21400000 HC ROOM AND CARE CCU/INTERMEDIATE

## 2023-04-28 PROCEDURE — 80048 BASIC METABOLIC PNL TOTAL CA: CPT | Performed by: STUDENT IN AN ORGANIZED HEALTH CARE EDUCATION/TRAINING PROGRAM

## 2023-04-28 PROCEDURE — 63700000 HC SELF-ADMINISTRABLE DRUG

## 2023-04-28 PROCEDURE — 63700000 HC SELF-ADMINISTRABLE DRUG: Performed by: OBSTETRICS & GYNECOLOGY

## 2023-04-28 PROCEDURE — 63600000 HC DRUGS/DETAIL CODE: Performed by: OBSTETRICS & GYNECOLOGY

## 2023-04-28 PROCEDURE — 84100 ASSAY OF PHOSPHORUS: CPT

## 2023-04-28 PROCEDURE — 86900 BLOOD TYPING SEROLOGIC ABO: CPT

## 2023-04-28 PROCEDURE — 63600000 HC DRUGS/DETAIL CODE: Performed by: STUDENT IN AN ORGANIZED HEALTH CARE EDUCATION/TRAINING PROGRAM

## 2023-04-28 PROCEDURE — 86901 BLOOD TYPING SEROLOGIC RH(D): CPT

## 2023-04-28 PROCEDURE — P9073 PLATELETS PHERESIS PATH REDU: HCPCS

## 2023-04-28 PROCEDURE — 85025 COMPLETE CBC W/AUTO DIFF WBC: CPT | Performed by: STUDENT IN AN ORGANIZED HEALTH CARE EDUCATION/TRAINING PROGRAM

## 2023-04-28 PROCEDURE — 36415 COLL VENOUS BLD VENIPUNCTURE: CPT

## 2023-04-28 PROCEDURE — 99232 SBSQ HOSP IP/OBS MODERATE 35: CPT | Performed by: STUDENT IN AN ORGANIZED HEALTH CARE EDUCATION/TRAINING PROGRAM

## 2023-04-28 PROCEDURE — 25800000 HC PHARMACY IV SOLUTIONS

## 2023-04-28 RX ORDER — SODIUM CHLORIDE 9 MG/ML
5 INJECTION, SOLUTION INTRAVENOUS AS NEEDED
Status: ACTIVE | OUTPATIENT
Start: 2023-04-28 | End: 2023-04-29

## 2023-04-28 RX ORDER — DEXTROSE MONOHYDRATE, SODIUM CHLORIDE, AND POTASSIUM CHLORIDE 50; 2.98; 4.5 G/1000ML; G/1000ML; G/1000ML
INJECTION, SOLUTION INTRAVENOUS CONTINUOUS
Status: DISCONTINUED | OUTPATIENT
Start: 2023-04-28 | End: 2023-04-29 | Stop reason: HOSPADM

## 2023-04-28 RX ORDER — DEXTROSE MONOHYDRATE, SODIUM CHLORIDE, AND POTASSIUM CHLORIDE 50; 2.98; 9 G/1000ML; G/1000ML; G/1000ML
INJECTION, SOLUTION INTRAVENOUS CONTINUOUS
Status: DISCONTINUED | OUTPATIENT
Start: 2023-04-28 | End: 2023-04-28

## 2023-04-28 RX ADMIN — SENNOSIDES AND DOCUSATE SODIUM 1 TABLET: 50; 8.6 TABLET ORAL at 09:44

## 2023-04-28 RX ADMIN — ACETAMINOPHEN 975 MG: 325 TABLET ORAL at 21:54

## 2023-04-28 RX ADMIN — POTASSIUM CHLORIDE, DEXTROSE MONOHYDRATE AND SODIUM CHLORIDE: 150; 5; 900 INJECTION, SOLUTION INTRAVENOUS at 09:44

## 2023-04-28 RX ADMIN — ONDANSETRON HYDROCHLORIDE 4 MG: 2 INJECTION, SOLUTION INTRAMUSCULAR; INTRAVENOUS at 13:42

## 2023-04-28 RX ADMIN — BUPROPION HYDROCHLORIDE 150 MG: 150 TABLET, EXTENDED RELEASE ORAL at 21:53

## 2023-04-28 RX ADMIN — PROMETHAZINE HYDROCHLORIDE 12.5 MG: 25 TABLET ORAL at 16:15

## 2023-04-28 RX ADMIN — FILGRASTIM-SNDZ 480 MCG: 480 INJECTION, SOLUTION INTRAVENOUS; SUBCUTANEOUS at 09:44

## 2023-04-28 RX ADMIN — PROTRIPTYLINE HYDROCHLORIDE 15 MG: 5 TABLET, FILM COATED ORAL at 21:53

## 2023-04-28 RX ADMIN — DIPHENHYDRAMINE HYDROCHLORIDE 25 MG: 50 INJECTION, SOLUTION INTRAMUSCULAR; INTRAVENOUS at 14:52

## 2023-04-28 RX ADMIN — POTASSIUM CHLORIDE, DEXTROSE MONOHYDRATE AND SODIUM CHLORIDE: 300; 5; 450 INJECTION, SOLUTION INTRAVENOUS at 14:54

## 2023-04-28 RX ADMIN — MAGNESIUM SULFATE HEPTAHYDRATE 2 G: 40 INJECTION, SOLUTION INTRAVENOUS at 10:25

## 2023-04-28 NOTE — PROGRESS NOTES
Gynecology Progress Note    Subjective     Patient is feeling nauseous, about to receive compazine. Otherwise she denies CP, SOB, hemoptysis, vaginal bleeding, lightheadedness, and abdominal pain.     Objective     Vital signs in last 24 hours:  Temp:  [36.2 °C (97.2 °F)-37.1 °C (98.8 °F)] 36.2 °C (97.2 °F)  Heart Rate:  [] 99  Resp:  [15-20] 18  BP: (104-132)/(56-82) 132/82    Exam  General Appearance: Alert, cooperative, no acute distress  Lungs: Clear to auscultation bilaterally, respirations unlabored  Heart: Regular rate and rhythm  Abdomen: soft, nontender to palpation.   Extremities: no edema or calf tenderness. Warm and symmetric     Labs   Results from last 7 days   Lab Units 04/28/23  0848 04/27/23  1105 04/26/23  0815 04/25/23  0923   WBC K/uL 2.65* 2.17* 1.08* 1.11*   HEMOGLOBIN g/dL 8.5* 9.4* 8.2* 8.1*   HEMATOCRIT % 23.7* 26.6* 23.1* 22.4*   PLATELETS K/uL 7* 10* 12* 21*   DIFF TYPE  Manu Manu Manu Manu   NEUTROS PCT MAN % 33 23 22 15   LYMPHO PCT MAN % 52 67 59 75   MONO PCT MAN % 11 6 14 6   EOSINO PCT MAN % 1 1 2 1   BASOS PCT MAN % 2 3 2 1   BANDS PCT MAN % 1  --  1 2   SEGS ABS MAN K/uL 0.87* 0.50* 0.24* 0.17*   LYMPHO ABS MAN K/uL 1.38 1.45 0.64* 0.83*   MONO ABS MAN K/uL 0.29 0.13* 0.15* 0.07*   EOS ABS MAN K/uL 0.03* 0.02* 0.02* 0.01*   BASOS ABS MAN K/uL 0.05 0.07 0.02 0.01     Results from last 7 days   Lab Units 04/28/23  0848 04/27/23  1105 04/26/23  0815   SODIUM mEQ/L 138 136 132*   POTASSIUM mEQ/L 3.7 4.1 3.4*   CHLORIDE mEQ/L 113* 111* 107   CO2 mEQ/L 19* 19* 17*   BUN mg/dL 7* 8 7*   CREATININE mg/dL 0.8 0.9 0.8   GLUCOSE mg/dL 91 79 99   CALCIUM mg/dL 8.0* 8.7* 7.9*      Results from last 7 days   Lab Units 04/28/23  0848 04/27/23  1105 04/26/23  0815   MAGNESIUM mg/dL 0.9* 1.1* 1.4*       Assessment/Plan     Alondra Salvatore Villela is a 32 y.o. 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 Afebrile since that time.   - WBC stable at 2.65. Will Continue filgrastim until WBC is >10.     - CXR 4/21 no acute disease  - Blood cultures collected on 4/21 show NGTD since admission   - s/p Cefepime 2g IV q8h given blood cultures on adm NGTD. S/p ID consult/ recs.   - Continue IVF while PO intake is scant   - Continue strict neutropenic precautions   - Continue to monitor vital signs      Hemoptysis - resolved  -Patient coughing up dark red blood clots, picture in media from 4/24  -CXR 4/24 shows stable lung mass, hemodynamically stable  - Likely due to irritation in the setting of severe thrombocytopenia.      GTN:   - CT chest abdomen pelvis-  lung mass decreased in size, uterus with likely blood  - Most recent beta-hcg 42.7 4/24  - plan for patient to receive pembrolizumab on Monday     Tachycardia  - HR most recently 85, range . Patient is asymptomatic.    - Troponin negative x2, EKG normal   - CTA of chest negative for PE     Pancytopenia:   - Plt on admission 13, will transfuse with Plt <10.   - Hgb on admission 7.1, will transfuse with Hgb <7.0  - Continue daily filgrastim injections   - 4/22, Hgb 6.1 and platelets 5, given 2 u PRBC,1 u of platelets  - 4/23 Hgb 8.3 and platelets 4, given 2 u platelets  - 4/24 Hgb 7.8 and platelets 3, patient received 2 units of HLA matched platelets   - 4/25 Hgb 8.1 Platelets increased 21, ANC 0.17  - 4/26 Hgb 8.2 Plts 12 ANC 0.24   - 4/27 Hgb 9.4 Plts 10 ANC 0.50  - 4/28 Hgb 8.5 Plts 7 ANC 0.87 - 2 units of HLA matched platelets to be started shortly   - Follow up with daily CBC, goal ANC 1.0      Electrolyte Abnormalities  - HypoKalemic:  Most recent K 3.7 will increase potassium to 40 meq infusion with IV fluids.  - Hypomagnesia; 0.9 this AM, s/p 2 g IV Mg  - check BMP daily, replete as needed    Thrush/oral mucocutaneous ulcers:  - Continue magic mouth wash with nystatin while inpatient   - Continue IVF as above given poor PO  intake - improving   - S/p nutrition consult  - Fentanyl patch q 72hrs s/p IV fentanyl x1 4/25.   - Improved on tylenol  mg q6 hrs      JLUIS -resolved  -Cr 1.2 on admission, baseline Cr 0.9-1.0  - Suspect in the setting of poor PO intake and dehydration   - Will continue IVF as above   - Most recent Cr 0.8 on 4/28     Depression:  - Continue home medications Wellbutrin  mg daily and Propytiline 15 mg daily     Hx of DVT in right upper extremity/ VTE prophylaxis   - Anticoagulation discontinued in the setting of pancytopenia  - SCDs while in bed and ambulation as tolerated.     Please page #[phone] with any questions/concerns.     Yumiko Sweeney MD

## 2023-04-28 NOTE — PROGRESS NOTES
"Brief Nutrition Note    Recommendations     -Regular diet-no restrictions and encourage PO intake >75% of estimated nutrient needs  -switch boost max to regular boost per pt preference.   -continue additional supplements.  -check Po4 and replete, as needed. Recheck Mg.     Clinical Course: Patient is a 32 y.o. female who was admitted on 2023 with a diagnosis of Thrombocytopenia (CMS/HCC) [D69.6]  Neutropenic fever (CMS/HCC) [D70.9, R50.81]  Sepsis, due to unspecified organism, unspecified whether acute organ dysfunction present (CMS/HCC) [A41.9].     Past Medical History:   Diagnosis Date   • Hx antineoplastic chemo      Past Surgical History:   Procedure Laterality Date   •  SECTION      x2       Reason for Assessment  Reason For Assessment: physician consult  Diagnosis: cancer diagnosis/related complications     [facility] Nutrition Screen Tool  Has patient lost weight without trying?: 0-->No  Has patient been eating poorly due to decreased appetite?: 0-->No  MST Nutrition Screen Score: 0     Nutrition/Diet History  Appetite Prior to Admission: Poor-0-25%    Physical Findings  Last Bowel Movement: 23  Oral/Mouth Cavity: mucositis, inflammation, bleeding  Skin: intact     [id number] Physical Appearance  Last Bowel Movement: 23  Oral/Mouth Cavity: mucositis, inflammation, bleeding  Skin: intact     Nutrition Order  Nutrition Order: meets nutritional requirements  Nutrition Order Comments: regular, neutropenic     Anthropometrics  Height: 162.6 cm (5' 4\")       Current Weight  Weight: 68 kg (150 lb)     Ideal Body Weight (IBW)  Ideal Body Weight (IBW) (kg): 55  % Ideal Body Weight: 123.7     Usual Body Weight (UBW)  Usual Body Weight: 83.9 kg (185 lb)      Body Mass Index (BMI)  BMI (Calculated): 25.7     Labs/Procedures/Meds  Lab Results Reviewed: reviewed, pertinent   Lab Results   Component Value Date    GLUCOSE 91 2023    CALCIUM 8.0 (L) 2023     2023    K 3.7 2023    " CO2 19 (L) 04/28/2023     (H) 04/28/2023    BUN 7 (L) 04/28/2023    CREATININE 0.8 04/28/2023     Medications  Pertinent Medications Reviewed: reviewed, pertinent   • acetaminophen  975 mg oral q6h NOHEMY   • buPROPion XL  150 mg oral Nightly   • fentaNYL  1 patch transdermal q72h INT   • filgrastim-sndz  480 mcg subcutaneous Daily   • magnesium sulfate  2 g intravenous Once   • [Provider Managed Hold] NIFEdipine XL  30 mg oral Daily   • protriptyline  15 mg oral Nightly   • sennosides-docusate sodium  1 tablet oral q AM       Skin: intact  No edema noted    Clinical comments:  Pt seen for follow up. In bed during time of visit with visitor at bedside. Reports goof PO intake and drinking boost. Prefers regular vs boost max-will adjust to reflect pts preference. Currently on neutropenic diet. However, there is not current evidence in support of this. Will change to regular diet-discussed with pt. No n/v/d/c, no questions/concnerns regarding nutrition at this time.     Goals:PO intake >75% of nutrient needs.-met-cont goal    Monitor:Diet order, appetite, PO intake, labs, I/Os, skin integrity, wt status, GI function.       Recommendations: See above       Date: 04/28/23  Signature: DEEPA Lozano

## 2023-04-28 NOTE — PROGRESS NOTES
Gynecology Progress Note    Subjective  Pt comfortable. Reports nausea and vomiting yesterday evening, received Phenergan and Compazine, and has resolved. Currently asx. Denies CP, SOB, hemoptysis, VB, light headedness or abdominal pain. Reports still having pain in her mouth, but tolerating small amounts of the ensure.    Objective     Vital signs in last 24 hours:  Temp:  [36.2 °C (97.2 °F)-37 °C (98.6 °F)] 37 °C (98.6 °F)  Heart Rate:  [] 94  Resp:  [16-18] 18  BP: (113-132)/(64-82) 121/70    Intake/Output Summary (Last 24 hours) at 4/29/2023 0541  Last data filed at 4/28/2023 1811  Gross per 24 hour   Intake 650 ml   Output --   Net 650 ml     Intake/Output this shift:  No intake/output data recorded.    Exam  General Appearance: Alert, cooperative, no acute distress  Lungs: Clear to auscultation bilaterally, respirations unlabored  Heart: Regular rate and rhythm  Abdomen: soft, nontender to palpation.   Extremities: no edema or calf tenderness. Warm and symmetric     Labs  CBC Results       04/28/23 04/27/23 04/26/23     0848 1105 0815    WBC 2.65 2.17 1.08    RBC 2.91 3.24 2.90    HGB 8.5 9.4 8.2    HCT 23.7 26.6 23.1    MCV 81.4 82.1 79.7    MCH 29.2 29.0 28.3    MCHC 35.9 35.3 35.5    PLT 7 10 12         Comment for WBC at 1105 on 04/27/23: RESULTS CHECKED. This result has been called to FELIX ROCHA by Leyda Barnett on 04 27 23 at 11:29, and has been read back.     Comment for WBC at 0815 on 04/26/23: ALL RESULTS HAVE BEEN CHECKED. This result has been called to FELIX ARANDA by Leyda Barnett on 04 26 23 at 09:01, and has been read back.     Comment for PLT at 0848 on 04/28/23: CONSISTENT WITH PREVIOUS RESULTS. This result has been called to DIANA DELONG by Gudelia Rhodes on 04 28 23 at 10:26, and has been read back.     Comment for PLT at 1105 on 04/27/23: CONSISTENT WITH PREVIOUS RESULTS. This result has been called to FELIX ORCHA by Leyda Barnett on 04 27 23 at 11:29, and has been read  back.     Comment for PLT at 0815 on 04/26/23: This result has been called to FELIX ARANDA by Leyda Barnett on 04 26 23 at 09:01, and has been read back.         BMP Results       04/28/23 04/27/23 04/26/23     0848 1105 0815     136 132    K 3.7 4.1 3.4    Cl 113 111 107    CO2 19 19 17    Glucose 91 79 99    BUN 7 8 7    Creatinine 0.8 0.9 0.8    Calcium 8.0 8.7 7.9    Anion Gap 6 6 8    EGFR >60.0 >60.0 >60.0              Assessment/Plan     Alondra Villela is a 32 y.o. 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 Afebrile since that time.   - WBC stable at 2.65. Will Continue filgrastim until WBC is >10.     - CXR 4/21 no acute disease  - Blood cultures collected on 4/21 show NGTD since admission   - s/p Cefepime 2g IV q8h given blood cultures on adm NGTD. S/p ID consult/ recs.   - Continue IVF while PO intake is scant   - Continue strict neutropenic precautions   - Continue to monitor vital signs      Hemoptysis - resolved  -Patient coughing up dark red blood clots, picture in media from 4/24  -CXR 4/24 shows stable lung mass, hemodynamically stable  - Likely due to irritation in the setting of severe thrombocytopenia.      GTN:   - CT chest abdomen pelvis-  lung mass decreased in size, uterus with likely blood  - Most recent beta-hcg 42.7 4/24  - plan for patient to receive pembrolizumab on Monday 5/1/23     Tachycardia  -  Patient is asymptomatic.    - Troponin negative x2, EKG normal   - CTA of chest negative for PE     Pancytopenia:   - Plt on admission 13, will transfuse with Plt <10.   - Hgb on admission 7.1, will transfuse with Hgb <7.0  - Continue daily filgrastim injections   - 4/22, Hgb 6.1 and platelets 5, given 2 u PRBC,1 u of platelets  - 4/23 Hgb 8.3 and platelets 4, given 2 u platelets  - 4/24 Hgb 7.8 and platelets 3, patient received 2 units of  HLA matched platelets   - 4/25 Hgb 8.1 Platelets increased 21, ANC 0.17  - 4/26 Hgb 8.2 Plts 12 ANC 0.24   - 4/27 Hgb 9.4 Plts 10 ANC 0.50  - 4/28 Hgb 8.5 Plts 7 ANC 0.87 - 2 units of HLA matched platelets given  - Follow up with daily CBC, goal ANC 1.0      Electrolyte Abnormalities  - HypoKalemic:  Most recent K 3.7, replete PRN  - Hypomagnesia; 0.9 this yesterday, replete PRN  - check BMP daily, replete as needed     Thrush/oral mucocutaneous ulcers:  - Continue magic mouth wash with nystatin while inpatient   - Continue IVF as above given poor PO intake - improving   - S/p nutrition consult  - Fentanyl patch q 72hrs s/p IV fentanyl x1 4/25.   - Improved on tylenol  mg q6 hrs      JLUIS -resolved  -Cr 1.2 on admission, baseline Cr 0.9-1.0  - Suspect in the setting of poor PO intake and dehydration   - Will continue IVF as above   - Most recent Cr 0.8 on 4/28     Depression:  - Continue home medications Wellbutrin  mg daily and Propytiline 15 mg daily     Hx of DVT in right upper extremity/ VTE prophylaxis   - Anticoagulation discontinued in the setting of pancytopenia  - SCDs while in bed and ambulation as tolerated.      Please page #[phone] with any questions/concerns.     To be reviewed with Dr. Mcmullen.      Lanette Washington MD

## 2023-04-28 NOTE — DISCHARGE SUMMARY
Inpatient Discharge Summary    BRIEF OVERVIEW  Admitting Provider:  H&P Notes 3/29/2023 to 4/29/2023         Date of Service Author Author Type Status Note Type File Time    04/21/23 1752 Thuy Flores MD Resident Attested H&P 04/22/23 1406    04/10/23 0757 Ayaka Orr MD Resident Attested H&P 04/10/23 1057          Attending Provider: Shantelle Mcmullen MD Attending phys phone: [phone]  Primary Care Physician at Discharge: Sunil Hinton [phone]    Admission Date: 4/21/2023     Discharge Date: 4/29/2023    Primary Discharge Diagnosis  Thrombocytopenia (CMS/HCC)    Secondary Discharge Diagnosis  Active Hospital Problems    Diagnosis Date Noted   • Thrombocytopenia (CMS/HCC) 02/04/2023     Priority: High      Resolved Hospital Problems   No resolved problems to display.         DETAILS OF HOSPITAL STAY      Consults:   Consult Notes 3/29/2023 to 4/28/2023         Date of Service Author Author Type Status Note Type File Time    04/24/23 1436 Alexus Burgess RD Registered Dietitian Addendum Consults 04/24/23 1457    04/22/23 0715 Elkin Pereyra MD Fellow Attested Consults 04/22/23 0819          Consult Orders During Admission:  IP CONSULT TO INFECTIOUS DISEASE  IP CONSULT TO NUTRITION SERVICES     Imaging  X-RAY CHEST 1 VIEW    Result Date: 4/24/2023  IMPRESSION: No significant change from prior. Lobulated right lower lobe subpleural mass. No new focal airspace disease seen in the lungs.     CT ANGIOGRAPHY CHEST PULMONARY EMBOLISM WITH IV CONTRAST    Result Date: 4/22/2023  IMPRESSION: Right lower lobe mass has decreased since the prior examination. No evidence of pulmonary arterial embolism. Small 3 to 4 mm nodules bilaterally. Overall there is improvement in the abdomen pelvis and in the chest.     CT ABDOMEN PELVIS WITH IV CONTRAST    Result Date: 4/22/2023  IMPRESSION: Please see associated report.     X-RAY CHEST 1 VIEW    Result Date: 4/21/2023  IMPRESSION: No active disease in  the chest.       Presenting Problem/History of Present Illness  Thrombocytopenia (CMS/HCC) [D69.6]  Neutropenic fever (CMS/HCC) [D70.9, R50.81]  Sepsis, due to unspecified organism, unspecified whether acute organ dysfunction present (CMS/HCC) [A41.9]       Exam on Day of Discharge  Patient seen and examined on day of discharge.      Hospital Course  Alondra Villela is 31yo  who is being treated with FIGO stage IV gestational trophoblastic neoplasia who presented to the hospital on  due to neutropenic fever (100.4 degrees) and tachycardia.  She was started on cefepime and blood cultures were collected.  Infectious disease was also consulted.  CT was negative for pulmonary embolism.  Blood cultures were negative and antibiotics were discontinued on .    Patient was noted to have an JLUIS in the setting of dehydration and poor oral intake related to oral mucositis.  This resolved with IV fluid hydration.  Nutrition was consulted during this admission.  Electrolytes were repleted continually throughout admission.    In terms of her gestational trophoblastic neoplasia, her labs on admission were notable for ongoing pancytopenia.  She was given several units of non-HLA matched platelets and HLA matched platelets and daily Filgastrim injections for thrombocytopenia and neutropenia with a goal ANC of 1.0 respectively.  Patient was also transfused two units of RBCs for anemia.  Her beta hcg level decreased to 42 this admission.  On the day of discharge her platelets were 27K.  ANC was 1.39 (her goal was 1.0).  Arrangements were made for pembrolizumab on 23.    Discharge Orders     Medication List      CHANGE how you take these medications    acetaminophen 325 mg tablet  Commonly known as: TYLENOL  Take 3 tablets (975 mg total) by mouth every 6 (six) hours as needed for pain or moderate pain.  Dose: 975 mg  What changed:   · how much to take  · reasons to take this        CONTINUE taking these medications     benzonatate 100 mg capsule  Commonly known as: TESSALON  Take 100 mg by mouth 3 (three) times a day as needed for cough.  Dose: 100 mg     buPROPion  mg 24 hr tablet  Commonly known as: WELLBUTRIN XL  Take 150 mg by mouth nightly.  Dose: 150 mg     gly-carb h-poly a-pot hydrox solution  Take 5-10 mL by mouth as needed for mucositis. Swish and spit 4-6 times per day as needed  Dose: 5-10 mL     hydrocortisone 2.5 % cream  Apply 2 g topically as needed for irritation or rash.  Dose: 2 g     lidocaine-prilocaine cream  Commonly known as: EMLA  Apply topically as needed for mild pain. Apply a quarter size amount to skin over port site approximately one hour prior to chemotherapy. Cover with saran wrap.     magic mouthwash with nystatin  Commonly known as: BENADRYL-MAALOX-LIDOCAINE-NYSTATIN  Swish and spit 15 mL every 4 (four) hours as needed for stomatitis.  Dose: 15 mL     magnesium chloride 64 mg tablet,delayed release (DR/EC)  Take 1 tablet (64 mg total) by mouth 2 (two) times a day.  Dose: 64 mg     NIFEdipine XL 30 mg 24 hr tablet  Commonly known as: PROCARDIA XL  Take 1 tablet (30 mg total) by mouth daily.  Dose: 30 mg     ondansetron ODT 4 mg disintegrating tablet  Commonly known as: ZOFRAN-ODT  Take 1-2 tablets (4-8 mg total) by mouth every 6 (six) hours as needed for nausea or vomiting (Nausea/Vomiting). Maximum 32mg per day.  Dose: 4-8 mg     promethazine 12.5 mg tablet  Commonly known as: PHENERGAN  Take 12.5 mg by mouth every 6 (six) hours as needed for nausea or vomiting.  Dose: 12.5 mg     protriptyline 5 mg tablet  Commonly known as: VIVACTIL  Take 15 mg by mouth nightly.  Dose: 15 mg     triamcinolone 0.1 % ointment  Commonly known as: KENALOG  Apply topically 2 times daily.     ZARXIO 480 mcg/0.8 mL syringe injection  Inject 0.8 mL (480 mcg total) under the skin See admin instr for 15 doses. Inject under the skin on days 3, 4, 5, 9, 10, 11 and 12 of chemotherapy cycle.  Dose: 480 mcg  Generic  drug: [id number]              Instructions for after discharge     Call provider for:  difficulty breathing, headache or visual disturbances      Call provider for:  persistent nausea or vomiting      Call provider for:  severe uncontrolled pain      Call provider for:  temperature >100.4            Outpatient Follow-Ups            In 2 days Shantelle Mcmullen MD [facility]    In 2 days LM 6W CHAIR C; NURSE [facility] [address]        Referrals:  No orders of the defined types were placed in this encounter.    Discharge Disposition  Disposition: Home     Code Status at Discharge: Full Code

## 2023-04-28 NOTE — PLAN OF CARE
Plan of Care Review  Plan of Care Reviewed With: patient  Progress: improving  Outcome Summary: Patient stable this shift, slept almost entire shift, VSS, tolerated all meds. Fentanyl patch removed RUE, new one placed LUE. No c/o of pain or discomfort. IVF infusing. Ambulated to toilet. Spouse at bedside. FSP in place. Call bell within reach.

## 2023-04-28 NOTE — PLAN OF CARE
Plan of Care Review  Plan of Care Reviewed With: patient  Progress: improving  Outcome Summary: VSS t/o shift. SR on monitor. Intermittent nausea managed with prn zofran and compazine. Platelet count 7 today, transfused 2 U of platelets with no issues. Hgb above goal at 8.5. D5/40 mEq K in 0.45% NS fluids infusing at 80cc/hr. Critical mg of 0.9, repleted per order.  and mother at bedside to assist pt oob.Fentanyl patch in place. Call bell within reach.

## 2023-04-28 NOTE — PROGRESS NOTES
Gynecology Progress Note    Subjective     Patient resting comfortably. She reports being able to tolerate some PO intake yesterday. Denies HA, CP, SOB, lightheadedness, hemoptysis, vaginal bleeding, N/V/D, and F+C.     Objective     Vital signs in last 24 hours:  Temp:  [36.3 °C (97.3 °F)-37.1 °C (98.8 °F)] 36.3 °C (97.3 °F)  Heart Rate:  [] 105  Resp:  [15-20] 18  BP: (104-120)/(56-76) 104/56    Exam  General Appearance: Alert, cooperative, no acute distress  Lungs: Clear to auscultation bilaterally, respirations unlabored  Heart: Regular rate and rhythm  Abdomen: soft, nontender to palpation.   Extremities: no edema or calf tenderness. Warm and symmetric     Labs  Results from last 7 days   Lab Units 04/27/23  1105 04/26/23  0815 04/25/23  0923   WBC K/uL 2.17* 1.08* 1.11*   HEMOGLOBIN g/dL 9.4* 8.2* 8.1*   HEMATOCRIT % 26.6* 23.1* 22.4*   PLATELETS K/uL 10* 12* 21*      Results from last 7 days   Lab Units 04/27/23  1105 04/26/23  0815 04/25/23  0923   SODIUM mEQ/L 136 132* 133*   POTASSIUM mEQ/L 4.1 3.4* 3.5*   CHLORIDE mEQ/L 111* 107 107   CO2 mEQ/L 19* 17* 16*   BUN mg/dL 8 7* 8   CREATININE mg/dL 0.9 0.8 0.8   GLUCOSE mg/dL 79 99 95   CALCIUM mg/dL 8.7* 7.9* 7.3*        Assessment/Plan     Alondra Villela is a 32 y.o. 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 Afebrile since that time.   - WBC stable at 1.08 yesterday-> AM labs pending. Will Continue filgrastim until WBC is >10.     - CXR 4/21 no acute disease  - Blood cultures collected on 4/21 show NGTD since admission   - s/p Cefepime 2g IV q8h given blood cultures on adm NGTD. S/p ID consult/ recs.   - Continue IVF while PO intake is scant   - Continue strict neutropenic precautions including neutropenic diet   - Continue to monitor vital signs      # Hemoptysis - resolved  -Patient coughing up dark red blood  clots, picture in media from 4/24  -CXR 4/24 shows stable lung mass, hemodynamically stable  - Likely due to irritation in the setting of severe thrombocytopenia.   - patient has not had any episodes today.      #GTN:   - CT chest abdomen pelvis-  lung mass decreased in size, uterus with likely blood  - Most recent beta-hcg 42.7 4/24  - patient for pembrolizumab upon discharge      # Tachycardia  - HR most recently 105, range . Patient is asymptomatic.    - Troponin negative x2, EKG normal   - CTA of chest negative for PE     #Pancytopenia:   - Plt on admission 13, will transfuse with Plt <10.   - Hgb on admission 7.1, will transfuse with Hgb <7.0  - Continue daily filgrastim injections   - 4/22, Hgb 6.1 and platelets 5, given 2 u PRBC,1 u of platelets  - 4/23 Hgb 8.3 and platelets 4, given 2 u platelets  - 4/24 Hgb 7.8 and platelets 3, patient received 2 units of HLA matched platelets   - 4/25 Hgb 8.1 Platelets increased 21, ANC 0.17  - 4/26 Hgb 8.2 Plts 12 ANC 0.24   - 4/27 Hgb 9.4 Plts 10 ANC 0.50  - Follow up with daily CBC, goal ANC 1.0      Electrolyte Abnormalities  - HypoKalemic: continue K+ infusion with IV fluids. Most recent K 4.1.   - Hypomagnesia; 1.1 this AM, s/p 2 g IV Mg  - check BMP daily, replete as needed    Thrush/oral mucocutaneous ulcers:  - Continue magic mouth wash with nystatin while inpatient   - Continue IVF as above given poor PO intake   - S/p nutrition consult  - Fentanyl patch q 72hrs s/p IV fentanyl x1 4/25.   - Improved on tylenol  mg q6 hrs      JLUIS -resolved  -Cr 1.2 on admission, baseline Cr 0.9-1.0  - Suspect in the setting of poor PO intake and dehydration   - Will continue IVF as above   - Most recent Cr 0.9 on 4/26  - follow up with AM BMP     Depression:  - Continue home medications Wellbutrin  mg daily and Propytiline 15 mg daily     Hx of DVT in right upper extremity/ VTE prophylaxis   - Anticoagulation discontinued in the setting of pancytopenia  - SCDs  while in bed and ambulation as tolerated.     To be discussed with daytime gyn onc attending    Please page #[phone] with any questions/concerns.     Yumiko Sweeney MD

## 2023-04-29 VITALS
HEART RATE: 90 BPM | DIASTOLIC BLOOD PRESSURE: 61 MMHG | BODY MASS INDEX: 25.61 KG/M2 | RESPIRATION RATE: 18 BRPM | OXYGEN SATURATION: 99 % | WEIGHT: 150 LBS | HEIGHT: 64 IN | SYSTOLIC BLOOD PRESSURE: 114 MMHG | TEMPERATURE: 98.1 F

## 2023-04-29 LAB
ANION GAP SERPL CALC-SCNC: 6 MEQ/L (ref 3–15)
ANISOCYTOSIS BLD QL SMEAR: ABNORMAL
BASOPHILS # BLD: 0 K/UL (ref 0.01–0.1)
BASOPHILS NFR BLD: 0 %
BUN SERPL-MCNC: <5 MG/DL (ref 8–20)
CALCIUM SERPL-MCNC: 7.6 MG/DL (ref 8.9–10.3)
CHLORIDE SERPL-SCNC: 108 MEQ/L (ref 98–109)
CO2 SERPL-SCNC: 23 MEQ/L (ref 22–32)
CREAT SERPL-MCNC: 0.8 MG/DL (ref 0.6–1.1)
DACRYOCYTES BLD QL SMEAR: ABNORMAL
DIFFERENTIAL METHOD BLD: ABNORMAL
EOSINOPHIL # BLD: 0.07 K/UL (ref 0.04–0.36)
EOSINOPHIL NFR BLD: 2 %
ERYTHROCYTE [DISTWIDTH] IN BLOOD BY AUTOMATED COUNT: 13.2 % (ref 11.7–14.4)
GFR SERPL CREATININE-BSD FRML MDRD: >60 ML/MIN/1.73M*2
GLUCOSE SERPL-MCNC: 105 MG/DL (ref 70–99)
HCT VFR BLDCO AUTO: 21.8 % (ref 35–45)
HGB BLD-MCNC: 7.7 G/DL (ref 11.8–15.7)
HYPOCHROMIA BLD QL SMEAR: ABNORMAL
LYMPHOCYTES # BLD: 1.43 K/UL (ref 1.2–3.5)
LYMPHOCYTES NFR BLD: 40 %
MAGNESIUM SERPL-MCNC: 0.7 MG/DL (ref 1.8–2.5)
MCH RBC QN AUTO: 28.6 PG (ref 28–33.2)
MCHC RBC AUTO-ENTMCNC: 35.3 G/DL (ref 32.2–35.5)
MCV RBC AUTO: 81 FL (ref 83–98)
MONOCYTES # BLD: 0.57 K/UL (ref 0.28–0.8)
MONOCYTES NFR BLD: 16 %
NEUTS BAND # BLD: 0.11 K/UL (ref 0–0.53)
NEUTS BAND # BLD: 1.39 K/UL (ref 1.7–7)
NEUTS BAND NFR BLD: 3 %
NEUTS SEG NFR BLD: 39 %
OVALOCYTES BLD QL SMEAR: ABNORMAL
PDW BLD AUTO: 8.8 FL (ref 9.4–12.3)
PHOSPHATE SERPL-MCNC: 2.4 MG/DL (ref 2.4–4.7)
PLAT MORPH BLD: NORMAL
PLATELET # BLD AUTO: 27 K/UL (ref 150–369)
PLATELET # BLD EST: ABNORMAL 10*3/UL
POTASSIUM SERPL-SCNC: 3.6 MEQ/L (ref 3.6–5.1)
RBC # BLD AUTO: 2.69 M/UL (ref 3.93–5.22)
SODIUM SERPL-SCNC: 137 MEQ/L (ref 136–144)
WBC # BLD AUTO: 3.57 K/UL (ref 3.8–10.5)

## 2023-04-29 PROCEDURE — 83735 ASSAY OF MAGNESIUM: CPT | Performed by: STUDENT IN AN ORGANIZED HEALTH CARE EDUCATION/TRAINING PROGRAM

## 2023-04-29 PROCEDURE — 25800000 HC PHARMACY IV SOLUTIONS

## 2023-04-29 PROCEDURE — 85025 COMPLETE CBC W/AUTO DIFF WBC: CPT | Performed by: STUDENT IN AN ORGANIZED HEALTH CARE EDUCATION/TRAINING PROGRAM

## 2023-04-29 PROCEDURE — 36415 COLL VENOUS BLD VENIPUNCTURE: CPT | Performed by: STUDENT IN AN ORGANIZED HEALTH CARE EDUCATION/TRAINING PROGRAM

## 2023-04-29 PROCEDURE — 63600000 HC DRUGS/DETAIL CODE: Performed by: OBSTETRICS & GYNECOLOGY

## 2023-04-29 PROCEDURE — 84100 ASSAY OF PHOSPHORUS: CPT

## 2023-04-29 PROCEDURE — 99232 SBSQ HOSP IP/OBS MODERATE 35: CPT | Performed by: STUDENT IN AN ORGANIZED HEALTH CARE EDUCATION/TRAINING PROGRAM

## 2023-04-29 PROCEDURE — 63600000 HC DRUGS/DETAIL CODE: Performed by: STUDENT IN AN ORGANIZED HEALTH CARE EDUCATION/TRAINING PROGRAM

## 2023-04-29 PROCEDURE — 63700000 HC SELF-ADMINISTRABLE DRUG

## 2023-04-29 PROCEDURE — 63600000 HC DRUGS/DETAIL CODE

## 2023-04-29 PROCEDURE — 80048 BASIC METABOLIC PNL TOTAL CA: CPT | Performed by: STUDENT IN AN ORGANIZED HEALTH CARE EDUCATION/TRAINING PROGRAM

## 2023-04-29 RX ORDER — ACETAMINOPHEN 325 MG/1
975 TABLET ORAL EVERY 6 HOURS PRN
Qty: 120 TABLET | Refills: 1 | Status: SHIPPED | OUTPATIENT
Start: 2023-04-29 | End: 2023-05-29

## 2023-04-29 RX ADMIN — MAGNESIUM SULFATE HEPTAHYDRATE 2 G: 40 INJECTION, SOLUTION INTRAVENOUS at 12:18

## 2023-04-29 RX ADMIN — ONDANSETRON HYDROCHLORIDE 4 MG: 2 INJECTION, SOLUTION INTRAMUSCULAR; INTRAVENOUS at 12:18

## 2023-04-29 RX ADMIN — POTASSIUM CHLORIDE, DEXTROSE MONOHYDRATE AND SODIUM CHLORIDE: 300; 5; 450 INJECTION, SOLUTION INTRAVENOUS at 02:22

## 2023-04-29 RX ADMIN — ACETAMINOPHEN 975 MG: 325 TABLET ORAL at 08:37

## 2023-04-29 RX ADMIN — FILGRASTIM-SNDZ 480 MCG: 480 INJECTION, SOLUTION INTRAVENOUS; SUBCUTANEOUS at 08:48

## 2023-04-29 NOTE — PLAN OF CARE
Plan of Care Review  Plan of Care Reviewed With: patient  Progress: improving  Outcome Summary: Patient is alert and stable this shift, VSS, no c/o of pain or discomfort. Fentanyl patch intact on LUE. IVF infusing. Tachy with ambulation to toilet. Slept through most of the night. Mother at bedside. FSP in place. Call bell within reach.

## 2023-04-29 NOTE — PROGRESS NOTES
[facility] GYN ONC Result Note    Labs reviewed.  Anticipated notable findings include: Hgb 7.7, Plts 27, Mg 0.7 (s/p 2g repletion), ANC 1.39 (at goal, stable for discharge).     Discussed with Dr. Mcmullen.     Reg Madison MD, PGY3        Results from last 7 days   Lab Units 04/29/23  1113 04/28/23  0848 04/27/23  1105 04/26/23  0815 04/25/23  0923 04/24/23  0858 04/23/23  1058   WBC K/uL 3.57* 2.65* 2.17* 1.08* 1.11* 0.83* 0.90*   RBC M/uL 2.69* 2.91* 3.24* 2.90* 2.78* 2.68* 2.86*   HEMOGLOBIN g/dL 7.7* 8.5* 9.4* 8.2* 8.1* 7.8* 8.3*   HEMATOCRIT % 21.8* 23.7* 26.6* 23.1* 22.4* 21.4* 23.1*   MCV fL 81.0* 81.4* 82.1* 79.7* 80.6* 79.9* 80.8*   RDW % 13.2 12.9 13.0 12.9 12.5 13.0 13.0   MCH pg 28.6 29.2 29.0 28.3 29.1 29.1 29.0   MCHC g/dL 35.3 35.9* 35.3 35.5 36.2* 36.4* 35.9*   PLATELETS K/uL 27* 7* 10* 12* 21* 3* 4*     Results from last 7 days   Lab Units 04/29/23  1113 04/28/23  0848 04/27/23  1105 04/26/23  0815   WBC K/uL 3.57* 2.65* 2.17* 1.08*   HEMOGLOBIN g/dL 7.7* 8.5* 9.4* 8.2*   HEMATOCRIT % 21.8* 23.7* 26.6* 23.1*   PLATELETS K/uL 27* 7* 10* 12*   DIFF TYPE  Manu Manu Manu Manu   NEUTROS PCT MAN % 39 33 23 22   LYMPHO PCT MAN % 40 52 67 59   MONO PCT MAN % 16 11 6 14   EOSINO PCT MAN % 2 1 1 2   BASOS PCT MAN % 0 2 3 2   BANDS PCT MAN % 3 1  --  1   SEGS ABS MAN K/uL 1.39* 0.87* 0.50* 0.24*   LYMPHO ABS MAN K/uL 1.43 1.38 1.45 0.64*   MONO ABS MAN K/uL 0.57 0.29 0.13* 0.15*   EOS ABS MAN K/uL 0.07 0.03* 0.02* 0.02*   BASOS ABS MAN K/uL 0.00* 0.05 0.07 0.02     Results from last 7 days   Lab Units 04/29/23  1113 04/28/23  0848 04/27/23  1105 04/26/23  0815 04/25/23  0923 04/24/23  0858 04/23/23  1057   SODIUM mEQ/L 137 138 136 132* 133* 134* 132*   POTASSIUM mEQ/L 3.6 3.7 4.1 3.4* 3.5* 3.1* 3.2*   CHLORIDE mEQ/L 108 113* 111* 107 107 105 104   CO2 mEQ/L 23 19* 19* 17* 16* 18* 19*   BUN mg/dL <5* 7* 8 7* 8 14 14   CREATININE mg/dL 0.8 0.8 0.9 0.8 0.8 0.9 0.9   EGFR mL/min/1.73m*2 >60.0 >60.0 >60.0  >60.0 >60.0 >60.0 >60.0   GLUCOSE mg/dL 105* 91 79 99 95 70 75   CALCIUM mg/dL 7.6* 8.0* 8.7* 7.9* 7.3* 7.2* 7.0*   MAGNESIUM mg/dL 0.7* 0.9* 1.1* 1.4* 0.8* 1.1* 0.6*   PHOSPHORUS mg/dL 2.4 2.3* 2.4 2.4 1.3*  --   --      Estimated Creatinine Clearance: 94.7 mL/min (by C-G formula based on SCr of 0.8 mg/dL).

## 2023-04-30 LAB
ISBT CODE: 7300
ISBT CODE: 7300
PRODUCT CODE: NORMAL
PRODUCT CODE: NORMAL
PRODUCT STATUS: NORMAL
PRODUCT STATUS: NORMAL
SPECIMEN EXP DATE BLD: NORMAL
SPECIMEN EXP DATE BLD: NORMAL
UNIT ABO: NORMAL
UNIT ABO: NORMAL
UNIT ID: NORMAL
UNIT ID: NORMAL
UNIT RH: POSITIVE
UNIT RH: POSITIVE

## 2023-05-01 ENCOUNTER — OFFICE VISIT (OUTPATIENT)
Dept: GYNECOLOGIC ONCOLOGY | Facility: CLINIC | Age: 33
End: 2023-05-01
Attending: STUDENT IN AN ORGANIZED HEALTH CARE EDUCATION/TRAINING PROGRAM
Payer: COMMERCIAL

## 2023-05-01 ENCOUNTER — HOSPITAL ENCOUNTER (OUTPATIENT)
Dept: INPATIENT UNIT | Facility: HOSPITAL | Age: 33
Discharge: HOME | End: 2023-05-01
Attending: OBSTETRICS & GYNECOLOGY
Payer: COMMERCIAL

## 2023-05-01 VITALS
HEIGHT: 64 IN | SYSTOLIC BLOOD PRESSURE: 126 MMHG | OXYGEN SATURATION: 100 % | DIASTOLIC BLOOD PRESSURE: 78 MMHG | RESPIRATION RATE: 16 BRPM | HEART RATE: 100 BPM | TEMPERATURE: 97.6 F | WEIGHT: 151 LBS | BODY MASS INDEX: 25.78 KG/M2

## 2023-05-01 VITALS
DIASTOLIC BLOOD PRESSURE: 81 MMHG | TEMPERATURE: 98.3 F | HEART RATE: 115 BPM | WEIGHT: 151 LBS | BODY MASS INDEX: 25.92 KG/M2 | OXYGEN SATURATION: 99 % | SYSTOLIC BLOOD PRESSURE: 119 MMHG | RESPIRATION RATE: 16 BRPM

## 2023-05-01 DIAGNOSIS — D69.6 THROMBOCYTOPENIA (CMS/HCC): ICD-10-CM

## 2023-05-01 DIAGNOSIS — O01.9 GESTATIONAL TROPHOBLASTIC NEOPLASM: ICD-10-CM

## 2023-05-01 DIAGNOSIS — Z51.11 ENCOUNTER FOR ANTINEOPLASTIC CHEMOTHERAPY: ICD-10-CM

## 2023-05-01 DIAGNOSIS — E83.42 HYPOMAGNESEMIA: ICD-10-CM

## 2023-05-01 DIAGNOSIS — E83.42 HYPOMAGNESEMIA: Primary | ICD-10-CM

## 2023-05-01 DIAGNOSIS — E86.0 DEHYDRATION: Primary | ICD-10-CM

## 2023-05-01 DIAGNOSIS — O01.9 GESTATIONAL TROPHOBLASTIC NEOPLASM: Primary | ICD-10-CM

## 2023-05-01 DIAGNOSIS — E03.2 DRUG-INDUCED HYPOTHYROIDISM: ICD-10-CM

## 2023-05-01 DIAGNOSIS — Z51.12 ENCOUNTER FOR ANTINEOPLASTIC IMMUNOTHERAPY: Primary | ICD-10-CM

## 2023-05-01 DIAGNOSIS — D70.2 DRUG-INDUCED NEUTROPENIA (CMS/HCC): ICD-10-CM

## 2023-05-01 LAB
ALBUMIN SERPL-MCNC: 3.8 G/DL (ref 3.4–5)
ALP SERPL-CCNC: 113 IU/L (ref 35–126)
ALT SERPL-CCNC: 11 IU/L (ref 11–54)
ANION GAP SERPL CALC-SCNC: 9 MEQ/L (ref 3–15)
AST SERPL-CCNC: 13 IU/L (ref 15–41)
BASOPHILS # BLD: 0 K/UL (ref 0.01–0.1)
BASOPHILS NFR BLD: 0 %
BILIRUB SERPL-MCNC: 0.4 MG/DL (ref 0.3–1.2)
BUN SERPL-MCNC: 8 MG/DL (ref 8–20)
CALCIUM SERPL-MCNC: 8.1 MG/DL (ref 8.9–10.3)
CHLORIDE SERPL-SCNC: 104 MEQ/L (ref 98–109)
CO2 SERPL-SCNC: 22 MEQ/L (ref 22–32)
CREAT SERPL-MCNC: 1 MG/DL (ref 0.6–1.1)
DIFFERENTIAL METHOD BLD: ABNORMAL
EOSINOPHIL # BLD: 0 K/UL (ref 0.04–0.36)
EOSINOPHIL NFR BLD: 0 %
ERYTHROCYTE [DISTWIDTH] IN BLOOD BY AUTOMATED COUNT: 12.9 % (ref 11.7–14.4)
GFR SERPL CREATININE-BSD FRML MDRD: >60 ML/MIN/1.73M*2
GLUCOSE SERPL-MCNC: 84 MG/DL (ref 70–99)
HCG SERPL-ACNC: 49.6 IU/L (MIU/ML)
HCT VFR BLDCO AUTO: 24 % (ref 35–45)
HGB BLD-MCNC: 8.7 G/DL (ref 11.8–15.7)
LYMPHOCYTES # BLD: 2.36 K/UL (ref 1.2–3.5)
LYMPHOCYTES NFR BLD: 24 %
MAGNESIUM SERPL-MCNC: 0.7 MG/DL (ref 1.8–2.5)
MCH RBC QN AUTO: 29.5 PG (ref 28–33.2)
MCHC RBC AUTO-ENTMCNC: 36.3 G/DL (ref 32.2–35.5)
MCV RBC AUTO: 81.4 FL (ref 83–98)
MONOCYTES # BLD: 0.79 K/UL (ref 0.28–0.8)
MONOCYTES NFR BLD: 8 %
MYELOCYTES # BLD MANUAL: 0.1 K/UL
MYELOCYTES NFR BLD MANUAL: 1 %
NEUTS BAND # BLD: 0.59 K/UL (ref 0–0.53)
NEUTS BAND # BLD: 6 K/UL (ref 1.7–7)
NEUTS BAND NFR BLD: 6 %
NEUTS SEG NFR BLD: 61 %
PDW BLD AUTO: 11.7 FL (ref 9.4–12.3)
PLAT MORPH BLD: NORMAL
PLATELET # BLD AUTO: 16 K/UL (ref 150–369)
PLATELET # BLD EST: ABNORMAL 10*3/UL
POTASSIUM SERPL-SCNC: 3.4 MEQ/L (ref 3.6–5.1)
PROT SERPL-MCNC: 6.6 G/DL (ref 6–8.2)
RBC # BLD AUTO: 2.95 M/UL (ref 3.93–5.22)
RBC MORPH BLD: NORMAL
SODIUM SERPL-SCNC: 135 MEQ/L (ref 136–144)
TOXIC GRANULES BLD QL SMEAR: ABNORMAL
TSH SERPL DL<=0.05 MIU/L-ACNC: 1.48 MIU/L (ref 0.34–5.6)
WBC # BLD AUTO: 9.83 K/UL (ref 3.8–10.5)

## 2023-05-01 PROCEDURE — 63600000 HC DRUGS/DETAIL CODE: Mod: JG | Performed by: OBSTETRICS & GYNECOLOGY

## 2023-05-01 PROCEDURE — 80053 COMPREHEN METABOLIC PANEL: CPT

## 2023-05-01 PROCEDURE — 85025 COMPLETE CBC W/AUTO DIFF WBC: CPT

## 2023-05-01 PROCEDURE — 84702 CHORIONIC GONADOTROPIN TEST: CPT

## 2023-05-01 PROCEDURE — 3008F BODY MASS INDEX DOCD: CPT | Performed by: STUDENT IN AN ORGANIZED HEALTH CARE EDUCATION/TRAINING PROGRAM

## 2023-05-01 PROCEDURE — 96365 THER/PROPH/DIAG IV INF INIT: CPT

## 2023-05-01 PROCEDURE — 96366 THER/PROPH/DIAG IV INF ADDON: CPT

## 2023-05-01 PROCEDURE — 96413 CHEMO IV INFUSION 1 HR: CPT

## 2023-05-01 PROCEDURE — 36415 COLL VENOUS BLD VENIPUNCTURE: CPT

## 2023-05-01 PROCEDURE — 84443 ASSAY THYROID STIM HORMONE: CPT

## 2023-05-01 PROCEDURE — 96374 THER/PROPH/DIAG INJ IV PUSH: CPT

## 2023-05-01 PROCEDURE — 63600000 HC DRUGS/DETAIL CODE: Performed by: PHYSICIAN ASSISTANT

## 2023-05-01 PROCEDURE — 99215 OFFICE O/P EST HI 40 MIN: CPT | Performed by: STUDENT IN AN ORGANIZED HEALTH CARE EDUCATION/TRAINING PROGRAM

## 2023-05-01 PROCEDURE — 3E043GC INTRODUCTION OF OTHER THERAPEUTIC SUBSTANCE INTO CENTRAL VEIN, PERCUTANEOUS APPROACH: ICD-10-PCS | Performed by: OBSTETRICS & GYNECOLOGY

## 2023-05-01 PROCEDURE — 3E0430M INTRODUCTION OF MONOCLONAL ANTIBODY INTO CENTRAL VEIN, PERCUTANEOUS APPROACH: ICD-10-PCS | Performed by: OBSTETRICS & GYNECOLOGY

## 2023-05-01 PROCEDURE — 96367 TX/PROPH/DG ADDL SEQ IV INF: CPT

## 2023-05-01 PROCEDURE — 83735 ASSAY OF MAGNESIUM: CPT

## 2023-05-01 PROCEDURE — 25800000 HC PHARMACY IV SOLUTIONS: Performed by: OBSTETRICS & GYNECOLOGY

## 2023-05-01 RX ORDER — SODIUM CHLORIDE 0.9 % (FLUSH) 0.9 %
10 SYRINGE (ML) INJECTION AS NEEDED
Status: CANCELLED | OUTPATIENT
Start: 2023-05-01

## 2023-05-01 RX ORDER — FAMOTIDINE 10 MG/ML
20 INJECTION INTRAVENOUS ONCE AS NEEDED
Status: CANCELLED | OUTPATIENT
Start: 2023-05-01

## 2023-05-01 RX ORDER — HEPARIN 100 UNIT/ML
500 SYRINGE INTRAVENOUS AS NEEDED
Status: CANCELLED | OUTPATIENT
Start: 2023-05-01

## 2023-05-01 RX ORDER — EPINEPHRINE 1 MG/ML
0.5 INJECTION, SOLUTION INTRAMUSCULAR; SUBCUTANEOUS ONCE AS NEEDED
Status: CANCELLED | OUTPATIENT
Start: 2023-05-01

## 2023-05-01 RX ORDER — SODIUM CHLORIDE 0.9 % (FLUSH) 0.9 %
3 SYRINGE (ML) INJECTION AS NEEDED
Status: CANCELLED
Start: 2023-05-01

## 2023-05-01 RX ORDER — DIPHENHYDRAMINE HCL 50 MG/ML
25 VIAL (ML) INJECTION ONCE AS NEEDED
Status: CANCELLED | OUTPATIENT
Start: 2023-05-01

## 2023-05-01 RX ORDER — HEPARIN 100 UNIT/ML
500 SYRINGE INTRAVENOUS AS NEEDED
Status: DISCONTINUED | OUTPATIENT
Start: 2023-05-01 | End: 2023-05-02 | Stop reason: HOSPADM

## 2023-05-01 RX ADMIN — HEPARIN 500 UNITS: 100 SYRINGE at 12:34

## 2023-05-01 RX ADMIN — SODIUM CHLORIDE 200 MG: 9 INJECTION, SOLUTION INTRAVENOUS at 11:57

## 2023-05-01 RX ADMIN — MAGNESIUM SULFATE HEPTAHYDRATE 2 G: 40 INJECTION, SOLUTION INTRAVENOUS at 10:15

## 2023-05-01 NOTE — PROGRESS NOTES
Ms. Alondra Villela is a 32 y.o.yo lady with  Cancer Staging   Gestational trophoblastic neoplasm  Staging form: Gestational Trophoblastic Neoplasms, AJCC 8th Edition  - Clinical stage from 1/12/2023: FIGO Stage IV (cM1b, Risk score: 17) - Signed by Shantelle Mcmullen MD on 1/26/2023  . She presents for chemotherapy.   Oncology History   Gestational trophoblastic neoplasm   1/10/2023 Tumor Markers     on initial testing. Dilutional studies repeated and found to be >461,700     1/11/2023 Imaging Significant Findings    1.  No intrauterine gestation and no adnexal mass evident. These findings  represent a pregnancy of unknown location. The appearance of a complex mass with  small cystic spaces and peripheral vascularity occupying the central uterine  cavity could represent a molar pregnancy but additional evaluation is suggested.  2.  Both ovaries are mildly enlarged with multiple complex cystic lesions as  well as some with solid-appearing echoes that may represent a combination of  hemorrhagic cysts/follicles or endometriomas, but challenging to evaluate as the  ovaries are only well evaluated transabdominally. Recommended these be  reassessed on follow-up. No sonographic findings of ovarian torsion.     1/12/2023 Imaging Significant Findings    MRI brain with contrast  1.  No abnormal intracranial contrast enhancement identified to suggest  intracranial metastatic disease.  2.  Previously seen tiny foci of increased T2 signal in the white matter do not  demonstrate contrast enhancement.  These findings could reflect foci of chronic  ischemia, the sequelae of migraine headaches, demyelinating or postinfectious  processes.  Other etiologies are not excluded.  Clinical correlation is  recommended.     1/12/2023 Imaging Significant Findings    CTPA  IMPRESSION:  No evidence for pulmonary embolism.  5 cm right lower lobe mass. Anterior mediastinal mass.  Innumerable bilateral pulmonary nodules which are  suspicious for metastatic  disease to the lungs.  2 hyperenhancing liver lesions, the larger measuring 5 cm in maximal dimension  for which further assessment with MRI with and without contrast is recommended.     1/12/2023 Biopsy    Image-guided biopsy of right lower lobe lesion benign liver parenchyma     1/12/2023 Cancer Staged    Staging form: Gestational Trophoblastic Neoplasms, AJCC 8th Edition  - Clinical stage from 1/12/2023: FIGO Stage IV (cM1b, Risk score: 17)     1/13/2023 - 1/27/2023 Chemotherapy    CISplatin / Etoposide, 7 day Cycles - GTN Induction  Plan Provider: Lorena Hogue MD  Treatment goal: Curative  Line of treatment: Induction     1/13/2023 Imaging Significant Findings    CT abd/pelvis  1.   Uterine mass measuring up to 13.8 cm, consistent with the clinically  suspected diagnosis of choriocarcinoma.  2.  Extensive pulmonary metastases, most notably a right lower lobe 4.9 cm  subpleural mass.  3.  Enlarged ovaries with numerous cysts likely representing theca lutein cysts  4.  Indeterminate 0.8 cm hypoattenuating lesion at the dome of the liver.  The  previously noted hypervascular liver lesions are not well seen due to the phase  of contrast at the time of imaging.  Contrast-enhanced MRI of the abdomen can be  considered for more optimal characterization.  5.  Indeterminate hypoattenuating lesions in the right kidney which can also be  more optimally evaluated with MRI.     1/13/2023 Tumor Markers    HCG >1.8468 million     1/30/2023 - 4/21/2023 Chemotherapy    Inpatient: EMA/EP (Etoposide / Methotrexate / Dactinomycin / Etoposide / CISPlatin), 14 Day Cycles - Gestational Trophoblastic Neoplasia   Plan Provider: Shantelle Mcmullen MD  Treatment goal: Curative  Line of treatment: First Line     4/19/2023 -  Chemotherapy    GENERAL INFUSION ORDERS FOR HYDRATION  Plan Provider: Shantelle Mcmullen MD     5/1/2023 -  Chemotherapy    Pembrolizumab, 21 Day Cycles - Non-Small Cell Lung   Plan  Provider: Antonio Smith MD  Treatment goal: Curative  Line of treatment: Second Line       Patient has been receiving EMA-EP- has recevied a total of 6 cycles for her high risk GTN. Patients hcg levels have plateaued. Additionally, her bone marrow continues to have less reserve as her platelet count is 27k, as of 4/29 and she has requiring multiple blood transfusions. She was just hospitalized for neutropenic fever and tachycardia 4/21 discharged 4/29. CT was negative for pulmonary embolism.  Blood cultures were negative and antibiotics were discontinued on 4/26. She was given several units of non-HLA matched platelets and HLA matched platelets and daily Filgastrim injections for thrombocytopenia and neutropenia with a goal ANC of 1.0 respectively.  Patient was also transfused two units of RBCs for anemia.  Her beta hcg level decreased to 42 this admission.  On the day of discharge her platelets were 27K.  ANC was 1.39 (her goal was 1.0).      Given her plateauing hcg levels, needs to switched to a salvage regimen. Given her bone marrow reserve and promising evidence that pembrolizumab has high efficacy in GTN, will switch to pembrolizumab - starting with first cycle today.     She used Zaxio injection last night, but wants to confirm that she should no longer use it going forward.    Mucositis has been better since discharged from hospital.     Reports she has been holding Procardia 30mg XL (started during hospitalization 3/28/23 2/2 HTN) reporting hypotension at home on med. She has not taken in it about a wk per pt.     She reports feeling well today and is ready to begin pembro.     CHEMOTHERAPY REGIMEN: pembrolizumab alone            CYCLE 1    PROBLEM LIST:  Patient Active Problem List    Diagnosis Date Noted   • Encounter for antineoplastic immunotherapy 05/01/2023   • Hypomagnesemia 05/01/2023   • Drug-induced hypothyroidism 05/01/2023   • Dehydration 04/19/2023   • Vaginal discharge 04/17/2023   •  Mucositis due to chemotherapy 2023   • Sepsis, due to unspecified organism, unspecified whether acute organ dysfunction present (CMS/[facility]) 2023   • Sore throat 2023   • Tinnitus of both ears 2023   • Encounter for antineoplastic chemotherapy 2023   • Neutropenic fever (CMS/[facility]) 2023   • DVT (deep vein thrombosis) in pregnancy 2023   • Thrombocytopenia (CMS/[facility]) 2023   • Hematuria 2023   • Anxiety and depression 2023   • Hyponatremia 2023   • Drug-induced neutropenia (CMS/[facility]) 2023   • Abnormal CT scan 2023   • Hyperthyroidism 2023   • Gestational trophoblastic neoplasm 2023   • History of COVID-19 2023   • Microcytic anemia 2023        Medical History:   Past Medical History:   Diagnosis Date   • Hx antineoplastic chemo        Surgical History:   Past Surgical History:   Procedure Laterality Date   •  SECTION      x2       Social History:   Social History     Tobacco Use   • Smoking status: Never   • Smokeless tobacco: Never   Vaping Use   • Vaping status: Never Used   Substance Use Topics   • Alcohol use: Not Currently   • Drug use: Never       Family History: Cancer-related family history is not on file.    Allergies: Patient has no known allergies.    Medications:   Current Outpatient Medications   Medication Instructions   • acetaminophen (TYLENOL) 975 mg, oral, Every 6 hours PRN   • benzonatate (TESSALON) 100 mg, oral, 3 times daily PRN   • buPROPion XL (WELLBUTRIN XL) 150 mg, oral, Nightly   • gly-carb h-poly a-pot hydrox solution 5-10 mL, oral, As needed, Swish and spit 4-6 times per day as needed   • hydrocortisone 2 g, Topical, As needed   • lidocaine-prilocaine (EMLA) cream Topical, As needed, Apply a quarter size amount to skin over port site approximately one hour prior to chemotherapy. Cover with saran wrap.   • magic mouthwash with nystatin (BENADRYL-MAALOX-LIDOCAINE-NYSTATIN) 15 mL, Swish &  Spit, Every 4 hours PRN   • magnesium chloride 64 mg, oral, 2 times daily   • NIFEdipine XL (PROCARDIA XL) 30 mg, oral, Daily   • ondansetron ODT (ZOFRAN-ODT) 4-8 mg, oral, Every 6 hours PRN, Maximum 32mg per day.   • promethazine (PHENERGAN) 12.5 mg, oral, Every 6 hours PRN   • protriptyline (VIVACTIL) 15 mg, oral, Nightly   • triamcinolone (KENALOG) 0.1 % ointment Topical, 2 times daily   • ZARXIO 480 mcg, subcutaneous, See admin instructions, Inject under the skin on days 3, 4, 5, 9, 10, 11 and 12 of chemotherapy cycle.         Review of Systems  Pertinent items are noted in HPI.    Labs- pending stat labs today on [facility]- [facility], cbc with diff, cmp, beta Hcg, tsh      Physical Exam:  Visit Vitals  /81 (BP Location: Right upper arm, Patient Position: Sitting)   Pulse (!) 115   Temp 36.8 °C (98.3 °F)   Resp 16   Wt 68.5 kg (151 lb)   SpO2 99%   BMI 25.92 kg/m²       General: well-developed, well-nourished, no apparent distress  Neck: supple, no masses  Lymphatic: no supraclavicular, cervical, or inguinal adenopathy  Respiratory: lungs clear to auscultation bilaterally, normal respiratory effort  Cardiovascular: regular rate and rhythm, no murmurs, rubs, gallops.   Extremity: no clubbing, cyanosis, edema  GI: abdomen soft, non-distended, non-tender, no hepatosplenomegaly, no masses  Neurologic: alert & oriented x3, no gross deficits  Psychiatric: mood and affect normal  Musculoskeletal: no deformity or gross strength deficit  Gynecologic: deferred      ASSESSMENT/PLAN:  Assessment   32 y.o. lady with   Cancer Staging   Gestational trophoblastic neoplasm  Staging form: Gestational Trophoblastic Neoplasms, AJCC 8th Edition  - Clinical stage from 1/12/2023: FIGO Stage IV (cM1b, Risk score: 17) - Signed by Shantelle Mcmullen MD on 1/26/2023   who presents for chemotherapy.  ECOG performance status is :1    Problem List Items Addressed This Visit        Endocrine/Metabolic    Drug-induced hypothyroidism    Current  Assessment & Plan     Baseline TSH reflex T4 today given planning to start pembro today          Relevant Orders    TSH w reflex FT4       Hematologic    Drug-induced neutropenia (CMS/HCC)    Current Assessment & Plan     CBC with diff to trend today.   Pt gave herself filgrastin injection yesterday, but reviewed that she will hold filgrastin moving forward given new regimen of pembro - case discussed with Dr. Mcmullen prior to speaking with pt.          Relevant Orders    CBC and differential (Completed)       Obstetric    Gestational trophoblastic neoplasm    Overview     Pt has been receiving EMA-EP- has recevied a total of 6 cycles for her high risk GTN.Given her plateauing hcg levels, needs to switched to a salvage regimen. Given her bone marrow reserve and promising evidence that pembrolizumab has high efficacy in GTN, will switch to pembrolizumab          Current Assessment & Plan     Planning to proceed with first cycle of pembrolizumab today pending labs - stat CBC with diff, CMP, Mag, TSH, beta hcg.   Consent for pembro signed today after reviewing risks.   Discontinuing filgrastin  Previously taking Eliquis 5 mg PO BID, which was discontinued in setting of thrombocytopenia and episode of bleeding - continue to hold 2/2 thrombocytopenia   Continue to hold procardia as BP WNL today without it -continue to monitor at home and will be monitored during infusion and office visits. If symptomatic -to check at home and call office if elevated. Discussed this with pt/mom today.              Relevant Orders    Clinic Appointment Request    Infusion Appointment Request    CBC and differential (Completed)    Magnesium (Completed)    TSH w reflex FT4    Comprehensive metabolic panel (Completed)    BhCG, Serum, Quant       Other    Encounter for antineoplastic chemotherapy    Relevant Orders    CBC and differential (Completed)    Magnesium (Completed)    TSH w reflex FT4    Comprehensive metabolic panel (Completed)     BhCG, Serum, Quant    Encounter for antineoplastic immunotherapy - Primary    Current Assessment & Plan     Pending C1 pembro today pending labs              Later entry: reviewed CBC with diff, cmp and mag. Plts 16, Mag 0.7. Case discussed with Dr. Mcmullen - pt is cleared to begin pembrolizumab today. Will give 2g IV magnesium today with infusion. Pt confirmed she is taking magnesium supplement as advised. TSH and beta hcg still pending, but will review upon receipt. Plan to repeat CBC with diff and Mag on Wednesday 5/3 to trend plts/mag. Pt has difficulty with outpatient blood draws- will try to arrange on [facility] so that her port can be used.     Temitope Peres PA-C

## 2023-05-01 NOTE — ASSESSMENT & PLAN NOTE
Planning to proceed with first cycle of pembrolizumab today pending labs - stat CBC with diff, CMP, Mag, TSH, beta hcg.   Consent for pembro signed today after reviewing risks.   Discontinuing filgrastin  Previously taking Eliquis 5 mg PO BID, which was discontinued in setting of thrombocytopenia and episode of bleeding - continue to hold 2/2 thrombocytopenia   Continue to hold procardia as BP WNL today without it -continue to monitor at home and will be monitored during infusion and office visits. If symptomatic -to check at home and call office if elevated. Discussed this with pt/mom today.

## 2023-05-01 NOTE — PROGRESS NOTES
Port flush entered for pt to have labs drawn on [facility] cbc with diff/mag. Labs orders already entered

## 2023-05-01 NOTE — PROGRESS NOTES
0915 Patient arrived tp [facility] for chemotherapy for GTN cycle 1 day 1 of Keytruda. [facility] port accessed and labs sent.     1003 Labs returned okay for treatment. Patient to received 2 grams mag, for mag of 0.7    1015 Mag 2 grams up over 2 hours.     1157 Magnesium complete. Keytruda 200 mg up over 30 minutes, checked and verified by  Chemo RN's.     1234 Keytruda complete patient tolerated without difficulty. Port flushed with NSS and heparin 500 units/5ml and de-accessed. Patient discharged to home.

## 2023-05-01 NOTE — ASSESSMENT & PLAN NOTE
CBC with diff to trend today.   Pt gave herself filgrastin injection yesterday, but reviewed that she will hold filgrastin moving forward given new regimen of pembro - case discussed with Dr. Mcmullen prior to speaking with pt.

## 2023-05-03 ENCOUNTER — HOSPITAL ENCOUNTER (OUTPATIENT)
Dept: INPATIENT UNIT | Facility: HOSPITAL | Age: 33
Discharge: HOME | End: 2023-05-03
Attending: PHYSICIAN ASSISTANT
Payer: COMMERCIAL

## 2023-05-03 VITALS
TEMPERATURE: 97.3 F | DIASTOLIC BLOOD PRESSURE: 71 MMHG | HEART RATE: 104 BPM | BODY MASS INDEX: 25.78 KG/M2 | WEIGHT: 151 LBS | HEIGHT: 64 IN | SYSTOLIC BLOOD PRESSURE: 122 MMHG | OXYGEN SATURATION: 100 % | RESPIRATION RATE: 16 BRPM

## 2023-05-03 DIAGNOSIS — D69.6 THROMBOCYTOPENIA (CMS/HCC): ICD-10-CM

## 2023-05-03 DIAGNOSIS — O01.9 GESTATIONAL TROPHOBLASTIC NEOPLASM: ICD-10-CM

## 2023-05-03 DIAGNOSIS — E87.6 HYPOKALEMIA: ICD-10-CM

## 2023-05-03 DIAGNOSIS — O01.9 GESTATIONAL TROPHOBLASTIC NEOPLASM: Primary | ICD-10-CM

## 2023-05-03 DIAGNOSIS — E86.0 DEHYDRATION: Primary | ICD-10-CM

## 2023-05-03 DIAGNOSIS — E83.42 HYPOMAGNESEMIA: ICD-10-CM

## 2023-05-03 DIAGNOSIS — E83.42 HYPOMAGNESEMIA: Primary | ICD-10-CM

## 2023-05-03 DIAGNOSIS — E86.0 DEHYDRATION: ICD-10-CM

## 2023-05-03 LAB
BASOPHILS # BLD: 0.04 K/UL (ref 0.01–0.1)
BASOPHILS NFR BLD: 1 %
DIFFERENTIAL METHOD BLD: ABNORMAL
EOSINOPHIL # BLD: 0.07 K/UL (ref 0.04–0.36)
EOSINOPHIL NFR BLD: 2 %
ERYTHROCYTE [DISTWIDTH] IN BLOOD BY AUTOMATED COUNT: 13 % (ref 11.7–14.4)
HCT VFR BLDCO AUTO: 22.6 % (ref 35–45)
HGB BLD-MCNC: 7.8 G/DL (ref 11.8–15.7)
LYMPHOCYTES # BLD: 1.38 K/UL (ref 1.2–3.5)
LYMPHOCYTES NFR BLD: 37 %
MAGNESIUM SERPL-MCNC: 0.8 MG/DL (ref 1.8–2.5)
MCH RBC QN AUTO: 28.8 PG (ref 28–33.2)
MCHC RBC AUTO-ENTMCNC: 34.5 G/DL (ref 32.2–35.5)
MCV RBC AUTO: 83.4 FL (ref 83–98)
METAMYELOCYTES # BLD MANUAL: 0.04 K/UL
METAMYELOCYTES NFR BLD MANUAL: 1 %
MONOCYTES # BLD: 0.67 K/UL (ref 0.28–0.8)
MONOCYTES NFR BLD: 18 %
MYELOCYTES # BLD MANUAL: 0.07 K/UL
MYELOCYTES NFR BLD MANUAL: 2 %
NEUTS BAND # BLD: 1.45 K/UL (ref 1.7–7)
NEUTS SEG NFR BLD: 39 %
NRBC BLD MANUAL-RTO: 1 /100 WBC
PDW BLD AUTO: 10.3 FL (ref 9.4–12.3)
PLAT MORPH BLD: NORMAL
PLATELET # BLD AUTO: 14 K/UL (ref 150–369)
PLATELET # BLD EST: ABNORMAL 10*3/UL
RBC # BLD AUTO: 2.71 M/UL (ref 3.93–5.22)
RBC MORPH BLD: NORMAL
WBC # BLD AUTO: 3.72 K/UL (ref 3.8–10.5)

## 2023-05-03 PROCEDURE — 96523 IRRIG DRUG DELIVERY DEVICE: CPT

## 2023-05-03 PROCEDURE — 3E0437Z INTRODUCTION OF ELECTROLYTIC AND WATER BALANCE SUBSTANCE INTO CENTRAL VEIN, PERCUTANEOUS APPROACH: ICD-10-PCS | Performed by: PHYSICIAN ASSISTANT

## 2023-05-03 PROCEDURE — 63600000 HC DRUGS/DETAIL CODE: Performed by: PHYSICIAN ASSISTANT

## 2023-05-03 PROCEDURE — 96366 THER/PROPH/DIAG IV INF ADDON: CPT

## 2023-05-03 PROCEDURE — 83735 ASSAY OF MAGNESIUM: CPT

## 2023-05-03 PROCEDURE — 96365 THER/PROPH/DIAG IV INF INIT: CPT

## 2023-05-03 PROCEDURE — 36415 COLL VENOUS BLD VENIPUNCTURE: CPT

## 2023-05-03 PROCEDURE — 85025 COMPLETE CBC W/AUTO DIFF WBC: CPT

## 2023-05-03 RX ORDER — DIPHENHYDRAMINE HCL 50 MG/ML
25 VIAL (ML) INJECTION ONCE AS NEEDED
Status: CANCELLED | OUTPATIENT
Start: 2023-05-03

## 2023-05-03 RX ORDER — EPINEPHRINE 1 MG/ML
0.5 INJECTION, SOLUTION INTRAMUSCULAR; SUBCUTANEOUS ONCE AS NEEDED
Status: CANCELLED | OUTPATIENT
Start: 2023-05-03

## 2023-05-03 RX ORDER — FAMOTIDINE 10 MG/ML
20 INJECTION INTRAVENOUS ONCE AS NEEDED
Status: CANCELLED | OUTPATIENT
Start: 2023-05-03

## 2023-05-03 RX ORDER — SODIUM CHLORIDE 0.9 % (FLUSH) 0.9 %
3 SYRINGE (ML) INJECTION AS NEEDED
Status: CANCELLED
Start: 2023-05-03

## 2023-05-03 RX ORDER — SODIUM CHLORIDE 0.9 % (FLUSH) 0.9 %
3 SYRINGE (ML) INJECTION AS NEEDED
Status: DISCONTINUED | OUTPATIENT
Start: 2023-05-03 | End: 2023-05-04 | Stop reason: HOSPADM

## 2023-05-03 RX ORDER — HEPARIN 100 UNIT/ML
500 SYRINGE INTRAVENOUS AS NEEDED
Status: CANCELLED | OUTPATIENT
Start: 2023-05-03

## 2023-05-03 RX ORDER — SODIUM CHLORIDE 0.9 % (FLUSH) 0.9 %
10 SYRINGE (ML) INJECTION AS NEEDED
Status: CANCELLED | OUTPATIENT
Start: 2023-05-03

## 2023-05-03 RX ORDER — HEPARIN 100 UNIT/ML
500 SYRINGE INTRAVENOUS AS NEEDED
Status: DISCONTINUED | OUTPATIENT
Start: 2023-05-03 | End: 2023-05-04 | Stop reason: HOSPADM

## 2023-05-03 RX ADMIN — MAGNESIUM SULFATE HEPTAHYDRATE 2 G: 40 INJECTION, SOLUTION INTRAVENOUS at 09:54

## 2023-05-03 RX ADMIN — HEPARIN 500 UNITS: 100 SYRINGE at 08:19

## 2023-05-03 NOTE — PROGRESS NOTES
Pt to have cbc w/ diff, bmp and mag on Monday 5/8 to monitor plts and s/p IV mag today. Incr mag to 64mg BID to TID.

## 2023-05-03 NOTE — PROGRESS NOTES
0840 Pt arrived to [facility]/infusion room for labs via port.  ID verified. Pt reports fatigue but unchanged.  [facility] port accessed per protocol. Labs drawn and sent. Pt requesting to wait for results.  0954 Labs resulted and reviewed with KELTON Newton. Mag level 0.8.  Magso4 2 Gms ordered and started. Pt has rx for Po mag.  Pt to take tid instead of bid . Pt verbalized understanding.  1158 Magnesium completed. Port flushed and de-accessed per protocol. Plan for pt to return Monday 5/8/23 for repeat labs.

## 2023-05-04 ENCOUNTER — TELEPHONE (OUTPATIENT)
Dept: GYNECOLOGIC ONCOLOGY | Facility: CLINIC | Age: 33
End: 2023-05-04
Payer: COMMERCIAL

## 2023-05-04 NOTE — TELEPHONE ENCOUNTER
----- Message from Alondra Villela sent at 5/4/2023 11:21 AM EDT -----  Regarding: Bloodwork Monday  Contact: [phone]  Vaughn Linn,    I got your voicemail yesterday about returning on Monday for bloodwork and increasing the Magnesium to 3x/day. I wondered what time I should be there Monday and wanted to make sure that if I do need magnesium/blood/platelets that you have me scheduled accordingly with the infusion center. There was some confusion on Wednesday and they weren’t  sure they would be able to fit me in so I just wanted to avoid that on Monday again if possible.     Thanks so much for your help as always and feel free to give me a call if that’s easier. [phone].

## 2023-05-04 NOTE — TELEPHONE ENCOUNTER
Called and LM for Alondra - advised that one of the staff would be getting back to her about potential times for labs on Monday on [facility]. I have been in contact already with [facility], their . Explained that unfortunately we cannot reserve a chair for potential tx as we do not know if pt will need a transfusion and the hope is that the labs, plts/mag will continue to improve given her tx switch to pembro and with increasing her outpatient mag. Will need to wait on labs results to see if any further tx is needed and if so, we can try to get this scheduled, possibly same day if availability, but if not, she may have to return for it.

## 2023-05-07 RX ORDER — APIXABAN 5 MG/1
TABLET, FILM COATED ORAL
Qty: 180 TABLET | Refills: 0 | OUTPATIENT
Start: 2023-05-07

## 2023-05-08 ENCOUNTER — HOSPITAL ENCOUNTER (OUTPATIENT)
Dept: INPATIENT UNIT | Facility: HOSPITAL | Age: 33
Discharge: HOME | End: 2023-05-08
Attending: PHYSICIAN ASSISTANT
Payer: COMMERCIAL

## 2023-05-08 ENCOUNTER — TELEPHONE (OUTPATIENT)
Dept: GYNECOLOGIC ONCOLOGY | Facility: CLINIC | Age: 33
End: 2023-05-08
Payer: COMMERCIAL

## 2023-05-08 DIAGNOSIS — E83.42 HYPOMAGNESEMIA: ICD-10-CM

## 2023-05-08 DIAGNOSIS — D69.6 THROMBOCYTOPENIA (CMS/HCC): ICD-10-CM

## 2023-05-08 DIAGNOSIS — E86.0 DEHYDRATION: ICD-10-CM

## 2023-05-08 DIAGNOSIS — E87.6 HYPOKALEMIA: ICD-10-CM

## 2023-05-08 DIAGNOSIS — O01.9 GESTATIONAL TROPHOBLASTIC NEOPLASM: ICD-10-CM

## 2023-05-08 LAB
ANION GAP SERPL CALC-SCNC: 8 MEQ/L (ref 3–15)
BASOPHILS # BLD: 0.02 K/UL (ref 0.01–0.1)
BASOPHILS NFR BLD: 0.4 %
BUN SERPL-MCNC: 10 MG/DL (ref 8–20)
CALCIUM SERPL-MCNC: 9.1 MG/DL (ref 8.9–10.3)
CHLORIDE SERPL-SCNC: 106 MEQ/L (ref 98–109)
CO2 SERPL-SCNC: 24 MEQ/L (ref 22–32)
CREAT SERPL-MCNC: 0.9 MG/DL (ref 0.6–1.1)
DIFFERENTIAL METHOD BLD: ABNORMAL
EOSINOPHIL # BLD: 0.04 K/UL (ref 0.04–0.36)
EOSINOPHIL NFR BLD: 0.9 %
ERYTHROCYTE [DISTWIDTH] IN BLOOD BY AUTOMATED COUNT: 15 % (ref 11.7–14.4)
GFR SERPL CREATININE-BSD FRML MDRD: >60 ML/MIN/1.73M*2
GLUCOSE SERPL-MCNC: 96 MG/DL (ref 70–99)
HCT VFR BLDCO AUTO: 25.7 % (ref 35–45)
HGB BLD-MCNC: 8.7 G/DL (ref 11.8–15.7)
IMM GRANULOCYTES # BLD AUTO: 0.11 K/UL (ref 0–0.08)
IMM GRANULOCYTES NFR BLD AUTO: 2.4 %
LYMPHOCYTES # BLD: 1.95 K/UL (ref 1.2–3.5)
LYMPHOCYTES NFR BLD: 42.6 %
MAGNESIUM SERPL-MCNC: 1.4 MG/DL (ref 1.8–2.5)
MCH RBC QN AUTO: 29.1 PG (ref 28–33.2)
MCHC RBC AUTO-ENTMCNC: 33.9 G/DL (ref 32.2–35.5)
MCV RBC AUTO: 86 FL (ref 83–98)
MONOCYTES # BLD: 0.73 K/UL (ref 0.28–0.8)
MONOCYTES NFR BLD: 15.9 %
NEUTROPHILS # BLD: 1.73 K/UL (ref 1.7–7)
NEUTS SEG NFR BLD: 37.8 %
NRBC BLD-RTO: 0 %
PDW BLD AUTO: 10 FL (ref 9.4–12.3)
PLATELET # BLD AUTO: 126 K/UL (ref 150–369)
POTASSIUM SERPL-SCNC: 4.7 MEQ/L (ref 3.6–5.1)
RBC # BLD AUTO: 2.99 M/UL (ref 3.93–5.22)
SODIUM SERPL-SCNC: 138 MEQ/L (ref 136–144)
WBC # BLD AUTO: 4.58 K/UL (ref 3.8–10.5)

## 2023-05-08 PROCEDURE — 85025 COMPLETE CBC W/AUTO DIFF WBC: CPT

## 2023-05-08 PROCEDURE — 36591 DRAW BLOOD OFF VENOUS DEVICE: CPT

## 2023-05-08 PROCEDURE — 83735 ASSAY OF MAGNESIUM: CPT

## 2023-05-08 PROCEDURE — 36415 COLL VENOUS BLD VENIPUNCTURE: CPT

## 2023-05-08 PROCEDURE — 80048 BASIC METABOLIC PNL TOTAL CA: CPT

## 2023-05-08 NOTE — PROGRESS NOTES
1140 Pt arrived to [facility]/infusion room for labs/PF. Pt reports she has felt well over weekend. VSS. Port accessed per protocol. Labs drawn and sent.  1300 Labs resulted. No intervention per Dr Mcmullen. Port flushed and de-accessed. Pt discharged to home in stable condition.

## 2023-05-08 NOTE — TELEPHONE ENCOUNTER
Called and reviewed labs with pt. Plts have improved from 14-> 126K and Mag better, now >1.0 (1.4). She will continue oral 64mg Mag TID as she is doing well with this. She will get prechemo labs prior to C2 pembro planned from 5/22

## 2023-05-12 ENCOUNTER — TELEPHONE (OUTPATIENT)
Dept: GYNECOLOGIC ONCOLOGY | Facility: CLINIC | Age: 33
End: 2023-05-12
Payer: COMMERCIAL

## 2023-05-12 NOTE — TELEPHONE ENCOUNTER
She would like to know what type of cold medicine she an take while she is on the Ketruda. Please give her a call back

## 2023-05-15 NOTE — TELEPHONE ENCOUNTER
Returned leslie's call to discuss her pushing back her start time for chemo at her next chemo date of 5/22/23. It was agreed that she will come at 11 am instead of 8 am due to her coming from such a far distance.

## 2023-05-17 ENCOUNTER — PATIENT OUTREACH (OUTPATIENT)
Dept: HEMATOLOGY/ONCOLOGY | Facility: HOSPITAL | Age: 33
End: 2023-05-17
Payer: COMMERCIAL

## 2023-05-18 DIAGNOSIS — O01.9 GESTATIONAL TROPHOBLASTIC NEOPLASM: Primary | ICD-10-CM

## 2023-05-18 RX ORDER — FAMOTIDINE 10 MG/ML
20 INJECTION INTRAVENOUS ONCE AS NEEDED
Status: CANCELLED | OUTPATIENT
Start: 2023-05-22

## 2023-05-18 RX ORDER — EPINEPHRINE 1 MG/ML
0.5 INJECTION, SOLUTION INTRAMUSCULAR; SUBCUTANEOUS ONCE AS NEEDED
Status: CANCELLED | OUTPATIENT
Start: 2023-05-22

## 2023-05-19 DIAGNOSIS — O01.9 GESTATIONAL TROPHOBLASTIC NEOPLASM: Primary | ICD-10-CM

## 2023-05-22 ENCOUNTER — HOSPITAL ENCOUNTER (OUTPATIENT)
Dept: INPATIENT UNIT | Facility: HOSPITAL | Age: 33
Discharge: HOME | End: 2023-05-22
Attending: PHYSICIAN ASSISTANT
Payer: COMMERCIAL

## 2023-05-22 ENCOUNTER — OFFICE VISIT (OUTPATIENT)
Dept: GYNECOLOGIC ONCOLOGY | Facility: CLINIC | Age: 33
End: 2023-05-22
Attending: OBSTETRICS & GYNECOLOGY
Payer: COMMERCIAL

## 2023-05-22 VITALS
OXYGEN SATURATION: 99 % | BODY MASS INDEX: 26.33 KG/M2 | WEIGHT: 153.4 LBS | HEART RATE: 110 BPM | RESPIRATION RATE: 16 BRPM | TEMPERATURE: 98.3 F | DIASTOLIC BLOOD PRESSURE: 78 MMHG | SYSTOLIC BLOOD PRESSURE: 113 MMHG

## 2023-05-22 VITALS — HEART RATE: 106 BPM

## 2023-05-22 DIAGNOSIS — O01.9 GESTATIONAL TROPHOBLASTIC NEOPLASM: Primary | ICD-10-CM

## 2023-05-22 DIAGNOSIS — Z51.12 ENCOUNTER FOR ANTINEOPLASTIC IMMUNOTHERAPY: ICD-10-CM

## 2023-05-22 DIAGNOSIS — O01.9 GESTATIONAL TROPHOBLASTIC NEOPLASM: ICD-10-CM

## 2023-05-22 LAB
ALBUMIN SERPL-MCNC: 3.9 G/DL (ref 3.4–5)
ALP SERPL-CCNC: 63 IU/L (ref 35–126)
ALT SERPL-CCNC: 28 IU/L (ref 11–54)
ANION GAP SERPL CALC-SCNC: 7 MEQ/L (ref 3–15)
AST SERPL-CCNC: 21 IU/L (ref 15–41)
BASOPHILS # BLD: 0.04 K/UL (ref 0.01–0.1)
BASOPHILS NFR BLD: 1.2 %
BILIRUB SERPL-MCNC: 0.4 MG/DL (ref 0.3–1.2)
BUN SERPL-MCNC: 16 MG/DL (ref 8–20)
CALCIUM SERPL-MCNC: 9.1 MG/DL (ref 8.9–10.3)
CHLORIDE SERPL-SCNC: 108 MEQ/L (ref 98–109)
CO2 SERPL-SCNC: 23 MEQ/L (ref 22–32)
CREAT SERPL-MCNC: 1.3 MG/DL (ref 0.6–1.1)
DIFFERENTIAL METHOD BLD: ABNORMAL
EOSINOPHIL # BLD: 0.01 K/UL (ref 0.04–0.36)
EOSINOPHIL NFR BLD: 0.3 %
ERYTHROCYTE [DISTWIDTH] IN BLOOD BY AUTOMATED COUNT: 17.4 % (ref 11.7–14.4)
GFR SERPL CREATININE-BSD FRML MDRD: 47.2 ML/MIN/1.73M*2
GLUCOSE SERPL-MCNC: 116 MG/DL (ref 70–99)
HCG SERPL-ACNC: 35.4 IU/L (MIU/ML)
HCT VFR BLDCO AUTO: 27.2 % (ref 35–45)
HGB BLD-MCNC: 8.7 G/DL (ref 11.8–15.7)
IMM GRANULOCYTES # BLD AUTO: 0.02 K/UL (ref 0–0.08)
IMM GRANULOCYTES NFR BLD AUTO: 0.6 %
LYMPHOCYTES # BLD: 1.47 K/UL (ref 1.2–3.5)
LYMPHOCYTES NFR BLD: 44.3 %
MCH RBC QN AUTO: 29.6 PG (ref 28–33.2)
MCHC RBC AUTO-ENTMCNC: 32 G/DL (ref 32.2–35.5)
MCV RBC AUTO: 92.5 FL (ref 83–98)
MONOCYTES # BLD: 0.5 K/UL (ref 0.28–0.8)
MONOCYTES NFR BLD: 15.1 %
NEUTROPHILS # BLD: 1.28 K/UL (ref 1.7–7)
NEUTROPHILS # BLD: 1.28 K/UL (ref 1.7–7)
NEUTS SEG NFR BLD: 38.5 %
NRBC BLD-RTO: 0 %
PDW BLD AUTO: 9.3 FL (ref 9.4–12.3)
PLATELET # BLD AUTO: 156 K/UL (ref 150–369)
POTASSIUM SERPL-SCNC: 4.4 MEQ/L (ref 3.6–5.1)
PROT SERPL-MCNC: 7.1 G/DL (ref 6–8.2)
RBC # BLD AUTO: 2.94 M/UL (ref 3.93–5.22)
SODIUM SERPL-SCNC: 138 MEQ/L (ref 136–144)
TSH SERPL DL<=0.05 MIU/L-ACNC: 0.95 MIU/L (ref 0.34–5.6)
WBC # BLD AUTO: 3.32 K/UL (ref 3.8–10.5)

## 2023-05-22 PROCEDURE — 36591 DRAW BLOOD OFF VENOUS DEVICE: CPT

## 2023-05-22 PROCEDURE — 63600000 HC DRUGS/DETAIL CODE: Mod: JG | Performed by: OBSTETRICS & GYNECOLOGY

## 2023-05-22 PROCEDURE — 3E0430M INTRODUCTION OF MONOCLONAL ANTIBODY INTO CENTRAL VEIN, PERCUTANEOUS APPROACH: ICD-10-PCS | Performed by: PHYSICIAN ASSISTANT

## 2023-05-22 PROCEDURE — 99214 OFFICE O/P EST MOD 30 MIN: CPT | Performed by: STUDENT IN AN ORGANIZED HEALTH CARE EDUCATION/TRAINING PROGRAM

## 2023-05-22 PROCEDURE — 82040 ASSAY OF SERUM ALBUMIN: CPT | Performed by: OBSTETRICS & GYNECOLOGY

## 2023-05-22 PROCEDURE — 80053 COMPREHEN METABOLIC PANEL: CPT | Performed by: OBSTETRICS & GYNECOLOGY

## 2023-05-22 PROCEDURE — 25800000 HC PHARMACY IV SOLUTIONS: Performed by: OBSTETRICS & GYNECOLOGY

## 2023-05-22 PROCEDURE — 85025 COMPLETE CBC W/AUTO DIFF WBC: CPT | Performed by: OBSTETRICS & GYNECOLOGY

## 2023-05-22 PROCEDURE — 3008F BODY MASS INDEX DOCD: CPT | Performed by: STUDENT IN AN ORGANIZED HEALTH CARE EDUCATION/TRAINING PROGRAM

## 2023-05-22 PROCEDURE — 96413 CHEMO IV INFUSION 1 HR: CPT

## 2023-05-22 PROCEDURE — 84702 CHORIONIC GONADOTROPIN TEST: CPT | Performed by: OBSTETRICS & GYNECOLOGY

## 2023-05-22 PROCEDURE — 36415 COLL VENOUS BLD VENIPUNCTURE: CPT | Performed by: OBSTETRICS & GYNECOLOGY

## 2023-05-22 PROCEDURE — 84443 ASSAY THYROID STIM HORMONE: CPT | Performed by: OBSTETRICS & GYNECOLOGY

## 2023-05-22 RX ORDER — HEPARIN 100 UNIT/ML
500 SYRINGE INTRAVENOUS AS NEEDED
Status: DISCONTINUED | OUTPATIENT
Start: 2023-05-22 | End: 2023-05-23 | Stop reason: HOSPADM

## 2023-05-22 RX ORDER — HEPARIN 100 UNIT/ML
500 SYRINGE INTRAVENOUS AS NEEDED
Status: CANCELLED | OUTPATIENT
Start: 2023-05-22

## 2023-05-22 RX ADMIN — SODIUM CHLORIDE 200 MG: 9 INJECTION, SOLUTION INTRAVENOUS at 12:00

## 2023-05-22 RX ADMIN — HEPARIN 500 UNITS: 100 SYRINGE at 12:05

## 2023-05-22 NOTE — PROGRESS NOTES
1130 Patient arrived tp [facility] for chemotherapy for GTN cycle 2 day 1 of Keytruda. [facility] port accessed and labs sent. Per Dr Mcmullen, do not wait for labs to treat but patient to wait for results before discharge.  Pt verbalized understanding.      1200 Keytruda 200 mg up over 30 minutes, checked and verified by  Chemo RN's.      1234 Keytruda complete patient tolerated without difficulty.     1321 Labs resulted and patient cleared for discharge.Port flushed with NSS and heparin 500 units/5ml and de-accessed. Patient discharged to home.

## 2023-05-22 NOTE — ASSESSMENT & PLAN NOTE
Labs pending inclduing hcg. Awaiting level to assess treatment response. otherwise cleared for cycle 2 of pembro today

## 2023-05-22 NOTE — PROGRESS NOTES
PATIENT ID:  Alondra Villela is a 32 y.o. female.      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 She presents for treatment evaluation. She denies nausea/vomiting, denies neuropathy, denies chest pain, denies shortness of breath, denies excessive fatigue, denies rash, denies diarrhea, denies headache.    Oncology History   Gestational trophoblastic neoplasm   1/10/2023 Tumor Markers     on initial testing. Dilutional studies repeated and found to be >461,700     1/11/2023 Imaging Significant Findings    1.  No intrauterine gestation and no adnexal mass evident. These findings  represent a pregnancy of unknown location. The appearance of a complex mass with  small cystic spaces and peripheral vascularity occupying the central uterine  cavity could represent a molar pregnancy but additional evaluation is suggested.  2.  Both ovaries are mildly enlarged with multiple complex cystic lesions as  well as some with solid-appearing echoes that may represent a combination of  hemorrhagic cysts/follicles or endometriomas, but challenging to evaluate as the  ovaries are only well evaluated transabdominally. Recommended these be  reassessed on follow-up. No sonographic findings of ovarian torsion.     1/12/2023 Imaging Significant Findings    MRI brain with contrast  1.  No abnormal intracranial contrast enhancement identified to suggest  intracranial metastatic disease.  2.  Previously seen tiny foci of increased T2 signal in the white matter do not  demonstrate contrast enhancement.  These findings could reflect foci of chronic  ischemia, the sequelae of migraine headaches, demyelinating or postinfectious  processes.  Other etiologies are not excluded.  Clinical correlation is  recommended.     1/12/2023 Imaging Significant Findings    CTPA  IMPRESSION:  No evidence for pulmonary embolism.  5 cm right lower lobe  mass. Anterior mediastinal mass.  Innumerable bilateral pulmonary nodules which are suspicious for metastatic  disease to the lungs.  2 hyperenhancing liver lesions, the larger measuring 5 cm in maximal dimension  for which further assessment with MRI with and without contrast is recommended.     1/12/2023 Biopsy    Image-guided biopsy of right lower lobe lesion benign liver parenchyma     1/12/2023 Cancer Staged    Staging form: Gestational Trophoblastic Neoplasms, AJCC 8th Edition  - Clinical stage from 1/12/2023: FIGO Stage IV (cM1b, Risk score: 17)     1/13/2023 - 1/27/2023 Chemotherapy    CISplatin / Etoposide, 7 day Cycles - GTN Induction  Plan Provider: Lorena Hogue MD  Treatment goal: Curative  Line of treatment: Induction     1/13/2023 Imaging Significant Findings    CT abd/pelvis  1.   Uterine mass measuring up to 13.8 cm, consistent with the clinically  suspected diagnosis of choriocarcinoma.  2.  Extensive pulmonary metastases, most notably a right lower lobe 4.9 cm  subpleural mass.  3.  Enlarged ovaries with numerous cysts likely representing theca lutein cysts  4.  Indeterminate 0.8 cm hypoattenuating lesion at the dome of the liver.  The  previously noted hypervascular liver lesions are not well seen due to the phase  of contrast at the time of imaging.  Contrast-enhanced MRI of the abdomen can be  considered for more optimal characterization.  5.  Indeterminate hypoattenuating lesions in the right kidney which can also be  more optimally evaluated with MRI.     1/13/2023 Tumor Markers    HCG >1.8468 million     1/30/2023 - 4/21/2023 Chemotherapy    Inpatient: EMA/EP (Etoposide / Methotrexate / Dactinomycin / Etoposide / CISPlatin), 14 Day Cycles - Gestational Trophoblastic Neoplasia   Plan Provider: Shantelle Mcmullen MD  Treatment goal: Curative  Line of treatment: First Line     4/19/2023 -  Chemotherapy    GENERAL INFUSION ORDERS FOR HYDRATION  Plan Provider: Shantelle Mcmullen MD     5/1/2023  -  Chemotherapy    Pembrolizumab, 21 Day Cycles - Non-Small Cell Lung   Plan Provider: Antonio Smith MD  Treatment goal: Curative  Line of treatment: Second Line         Medical History:   Past Medical History:   Diagnosis Date   • Hx antineoplastic chemo        Surgical History:   Past Surgical History:   Procedure Laterality Date   •  SECTION      x2       Social History:   Social History     Tobacco Use   • Smoking status: Never   • Smokeless tobacco: Never   Vaping Use   • Vaping status: Never Used   Substance Use Topics   • Alcohol use: Not Currently   • Drug use: Never        Family History:   Family History   Problem Relation Age of Onset   • Hypertension Biological Mother    • Hypertension Biological Father        Allergies: Patient has no known allergies.    Medications:   Current Outpatient Medications   Medication Sig Dispense Refill   • acetaminophen (TYLENOL) 325 mg tablet Take 3 tablets (975 mg total) by mouth every 6 (six) hours as needed for pain or moderate pain. 120 tablet 1   • benzonatate (TESSALON) 100 mg capsule Take 100 mg by mouth 3 (three) times a day as needed for cough.     • buPROPion XL (WELLBUTRIN XL) 150 mg 24 hr tablet Take 150 mg by mouth nightly.     • filgrastim-sndz (ZARXIO) 480 mcg/0.8 mL syringe injection Inject 0.8 mL (480 mcg total) under the skin See admin instr for 15 doses. Inject under the skin on days 3, 4, 5, 9, 10, 11 and 12 of chemotherapy cycle. 12 mL 1   • gly-carb h-poly a-pot hydrox solution Take 5-10 mL by mouth as needed for mucositis. Swish and spit 4-6 times per day as needed     • hydrocortisone 2.5 % cream Apply 2 g topically as needed for irritation or rash.     • lidocaine-prilocaine (EMLA) cream Apply topically as needed for mild pain. Apply a quarter size amount to skin over port site approximately one hour prior to chemotherapy. Cover with saran wrap. 1 each 1   • magic mouthwash with nystatin (BENADRYL-MAALOX-LIDOCAINE-NYSTATIN) Swish and spit  15 mL every 4 (four) hours as needed for stomatitis. 120 mL 1   • magnesium chloride 64 mg tablet,delayed release (DR/EC) Take 1 tablet (64 mg total) by mouth 2 (two) times a day. 60 tablet 1   • ondansetron ODT (ZOFRAN-ODT) 4 mg disintegrating tablet Take 1-2 tablets (4-8 mg total) by mouth every 6 (six) hours as needed for nausea or vomiting (Nausea/Vomiting). Maximum 32mg per day. 30 tablet 1   • promethazine (PHENERGAN) 12.5 mg tablet Take 12.5 mg by mouth every 6 (six) hours as needed for nausea or vomiting.     • protriptyline (VIVACTIL) 5 mg tablet Take 15 mg by mouth nightly.     • triamcinolone (KENALOG) 0.1 % ointment Apply topically 2 times daily.     • NIFEdipine XL (PROCARDIA XL) 30 mg 24 hr tablet Take 1 tablet (30 mg total) by mouth daily. 30 tablet 3     No current facility-administered medications for this visit.       Review of Systems  All other systems reviewed and negative except as noted in the HPI.    Physical Exam:  Vital Signs for the last 24 hours:  Visit Vitals  /78 (BP Location: Right upper arm, Patient Position: Sitting)   Pulse (!) 110   Temp 36.8 °C (98.3 °F)   Resp 16   Wt 69.6 kg (153 lb 6.4 oz)   SpO2 99%   BMI (P) 26.33 kg/m²       General: well-developed, well-nourished, no apparent distress  Neck: supple, no masses  Lymphatic: no supraclavicular, cervical, or inguinal adenopathy  Respiratory: lungs clear to auscultation bilaterally, normal respiratory effort  Cardiovascular: regular rate and rhythm, no murmurs, rubs, gallops.   Extremity: no clubbing, cyanosis, edema  GI: abdomen soft, non-distended, non-tender, no hepatosplenomegaly, no masses  Neurologic: alert & oriented x3, no gross deficits  Psychiatric: mood and affect normal  Musculoskeletal: no deformity or gross strength deficit  Gynecologic: deferred    CBC Results       05/08/23 05/03/23 05/01/23     1126 0812 0911    WBC 4.58 3.72 9.83    RBC 2.99 2.71 2.95    HGB 8.7 7.8 8.7    HCT 25.7 22.6 24.0    MCV 86.0  83.4 81.4    MCH 29.1 28.8 29.5    MCHC 33.9 34.5 36.3     14 16         Comment for PLT at 0812 on 05/03/23: CONSISTENT WITH PREVIOUS RESULTS. This result has been called to JOHN OVIEDO by Sara Valdez on 05 03 23 at 08:54, and has been read back.     Comment for PLT at 0911 on 05/01/23: CONSISTENT WITH PREVIOUS RESULTS. This result has been called to NASIR HUBER by Leyda Barnett on 05 01 23 at 09:51, and has been read back.         CMP Results       05/08/23 05/01/23 04/29/23     1126 0912 1113     135 137    K 4.7 3.4 3.6    Cl 106 104 108    CO2 24 22 23    Glucose 96 84 105    BUN 10 8 <5    Creatinine 0.9 1.0 0.8    Calcium 9.1 8.1 7.6    Anion Gap 8 9 6    AST -- 13 --    ALT -- 11 --    Albumin -- 3.8 --    EGFR >60.0 >60.0 >60.0         Comment for K at 0912 on 05/01/23: Results obtained on plasma. Plasma Potassium values may be up to 0.4 mEQ/L less than serum values. The differences may be greater for patients with high platelet or white cell counts.        Lab Results   Component Value Date    TSH 1.48 05/01/2023          ASSESSMENT/PLAN:  32 y.o. lady   ECOG performance status is :0         Problem List Items Addressed This Visit        Obstetric    Gestational trophoblastic neoplasm    Overview     Pt has been receiving EMA-EP- has recevied a total of 6 cycles for her high risk GTN.Given her plateauing hcg levels, needs to switched to a salvage regimen. Given her bone marrow reserve and promising evidence that pembrolizumab has high efficacy in GTN, will switch to pembrolizumab          Current Assessment & Plan     Labs pending inclduing hcg. Awaiting level to assess treatment response. otherwise cleared for cycle 2 of pembro today            Other    Encounter for antineoplastic immunotherapy    Current Assessment & Plan     Pending labs for cycle 2 of pembro today            John Mcmullen MD

## 2023-05-23 DIAGNOSIS — R79.89 ELEVATED SERUM CREATININE: Primary | ICD-10-CM

## 2023-05-24 ENCOUNTER — DOCUMENTATION (OUTPATIENT)
Dept: GYNECOLOGIC ONCOLOGY | Facility: CLINIC | Age: 33
End: 2023-05-24
Payer: COMMERCIAL

## 2023-05-24 NOTE — PROGRESS NOTES
Called for retro auth @ -   [facility] 4/17/2023  Spoke with representative: Zuleyma DUTTA  Unfortunately, I spoke to two different representatives who both denied the approval for retro-auth for this case. They both stated that Retro-Authorizations are unable to be performed no matter how long ago it was.

## 2023-05-25 ENCOUNTER — DOCUMENTATION (OUTPATIENT)
Dept: GYNECOLOGIC ONCOLOGY | Facility: CLINIC | Age: 33
End: 2023-05-25
Payer: COMMERCIAL

## 2023-05-25 NOTE — PROGRESS NOTES
Auth is not required per patients [employer] insurance, however per [employer] patient does require PA. Discussed with [employer] representative that we were given miss info which is why PA was not obtained originally. Asked for back date retro auth but they declined. The representative did say we can obtain PA now starting date will be today's date (5/25/23). Once PA is obtained today we can contact [employer] and request for them to back date approval date but they cannot guarentee anything.    [employer]     Trevor DIAL  Pending AUTH reference # [id number]  Faxed clinicals to [employer]: [phone]

## 2023-05-25 NOTE — PROGRESS NOTES
Called spoke with [facility] FLETCHER.    He confirmed that retro-auths are not able to be performed for outpatient services only inpatient services. I asked [facility] if he could please send this in a formal letter for documentation. He advised me that he is unable to do this as it is just their policy but will document it in the chart today with reference number  Reference:[id number]    Estela Bell PA-C

## 2023-06-07 DIAGNOSIS — O01.9 GESTATIONAL TROPHOBLASTIC NEOPLASM: Primary | ICD-10-CM

## 2023-06-12 ENCOUNTER — OFFICE VISIT (OUTPATIENT)
Dept: GYNECOLOGIC ONCOLOGY | Facility: CLINIC | Age: 33
End: 2023-06-12
Attending: PHYSICIAN ASSISTANT
Payer: COMMERCIAL

## 2023-06-12 ENCOUNTER — HOSPITAL ENCOUNTER (OUTPATIENT)
Dept: INPATIENT UNIT | Facility: HOSPITAL | Age: 33
Discharge: HOME | End: 2023-06-12
Attending: PHYSICIAN ASSISTANT
Payer: COMMERCIAL

## 2023-06-12 ENCOUNTER — TELEPHONE (OUTPATIENT)
Dept: GYNECOLOGIC ONCOLOGY | Facility: CLINIC | Age: 33
End: 2023-06-12

## 2023-06-12 VITALS
DIASTOLIC BLOOD PRESSURE: 77 MMHG | HEIGHT: 64 IN | OXYGEN SATURATION: 96 % | SYSTOLIC BLOOD PRESSURE: 114 MMHG | WEIGHT: 157 LBS | TEMPERATURE: 97.5 F | HEART RATE: 104 BPM | BODY MASS INDEX: 26.8 KG/M2

## 2023-06-12 VITALS
DIASTOLIC BLOOD PRESSURE: 67 MMHG | TEMPERATURE: 97.3 F | OXYGEN SATURATION: 100 % | HEIGHT: 64 IN | HEART RATE: 93 BPM | SYSTOLIC BLOOD PRESSURE: 123 MMHG | WEIGHT: 157 LBS | BODY MASS INDEX: 26.8 KG/M2 | RESPIRATION RATE: 16 BRPM

## 2023-06-12 DIAGNOSIS — Z51.12 ENCOUNTER FOR ANTINEOPLASTIC IMMUNOTHERAPY: Primary | ICD-10-CM

## 2023-06-12 DIAGNOSIS — O01.9 GESTATIONAL TROPHOBLASTIC NEOPLASM: Primary | ICD-10-CM

## 2023-06-12 DIAGNOSIS — O01.9 GESTATIONAL TROPHOBLASTIC NEOPLASM: ICD-10-CM

## 2023-06-12 LAB
ALBUMIN SERPL-MCNC: 4 G/DL (ref 3.4–5)
ALP SERPL-CCNC: 60 IU/L (ref 35–126)
ALT SERPL-CCNC: 25 IU/L (ref 11–54)
ANION GAP SERPL CALC-SCNC: 9 MEQ/L (ref 3–15)
AST SERPL-CCNC: 21 IU/L (ref 15–41)
BASOPHILS # BLD: 0.03 K/UL (ref 0.01–0.1)
BASOPHILS NFR BLD: 0.7 %
BILIRUB SERPL-MCNC: 0.5 MG/DL (ref 0.3–1.2)
BUN SERPL-MCNC: 17 MG/DL (ref 8–20)
CALCIUM SERPL-MCNC: 9.3 MG/DL (ref 8.9–10.3)
CHLORIDE SERPL-SCNC: 106 MEQ/L (ref 98–109)
CO2 SERPL-SCNC: 23 MEQ/L (ref 22–32)
CREAT SERPL-MCNC: 1.1 MG/DL (ref 0.6–1.1)
DIFFERENTIAL METHOD BLD: ABNORMAL
EOSINOPHIL # BLD: 0.13 K/UL (ref 0.04–0.36)
EOSINOPHIL NFR BLD: 3.2 %
ERYTHROCYTE [DISTWIDTH] IN BLOOD BY AUTOMATED COUNT: 14.1 % (ref 11.7–14.4)
GFR SERPL CREATININE-BSD FRML MDRD: 57.2 ML/MIN/1.73M*2
GLUCOSE SERPL-MCNC: 110 MG/DL (ref 70–99)
HCG SERPL-ACNC: 22.9 IU/L (MIU/ML)
HCT VFR BLDCO AUTO: 29.8 % (ref 35–45)
HGB BLD-MCNC: 9.8 G/DL (ref 11.8–15.7)
IMM GRANULOCYTES # BLD AUTO: 0.01 K/UL (ref 0–0.08)
IMM GRANULOCYTES NFR BLD AUTO: 0.2 %
LYMPHOCYTES # BLD: 1.43 K/UL (ref 1.2–3.5)
LYMPHOCYTES NFR BLD: 35.1 %
MCH RBC QN AUTO: 30.5 PG (ref 28–33.2)
MCHC RBC AUTO-ENTMCNC: 32.9 G/DL (ref 32.2–35.5)
MCV RBC AUTO: 92.8 FL (ref 83–98)
MONOCYTES # BLD: 0.38 K/UL (ref 0.28–0.8)
MONOCYTES NFR BLD: 9.3 %
NEUTROPHILS # BLD: 2.09 K/UL (ref 1.7–7)
NEUTROPHILS # BLD: 2.09 K/UL (ref 1.7–7)
NEUTS SEG NFR BLD: 51.5 %
NRBC BLD-RTO: 0 %
PDW BLD AUTO: 9.5 FL (ref 9.4–12.3)
PLATELET # BLD AUTO: 113 K/UL (ref 150–369)
POTASSIUM SERPL-SCNC: 4.2 MEQ/L (ref 3.6–5.1)
PROT SERPL-MCNC: 6.8 G/DL (ref 6–8.2)
RBC # BLD AUTO: 3.21 M/UL (ref 3.93–5.22)
SODIUM SERPL-SCNC: 138 MEQ/L (ref 136–144)
TSH SERPL DL<=0.05 MIU/L-ACNC: 0.75 MIU/L (ref 0.34–5.6)
WBC # BLD AUTO: 4.07 K/UL (ref 3.8–10.5)

## 2023-06-12 PROCEDURE — 96413 CHEMO IV INFUSION 1 HR: CPT

## 2023-06-12 PROCEDURE — 85025 COMPLETE CBC W/AUTO DIFF WBC: CPT | Performed by: PHYSICIAN ASSISTANT

## 2023-06-12 PROCEDURE — 36415 COLL VENOUS BLD VENIPUNCTURE: CPT | Performed by: PHYSICIAN ASSISTANT

## 2023-06-12 PROCEDURE — 84443 ASSAY THYROID STIM HORMONE: CPT | Performed by: PHYSICIAN ASSISTANT

## 2023-06-12 PROCEDURE — 36591 DRAW BLOOD OFF VENOUS DEVICE: CPT

## 2023-06-12 PROCEDURE — 99214 OFFICE O/P EST MOD 30 MIN: CPT | Performed by: PHYSICIAN ASSISTANT

## 2023-06-12 PROCEDURE — 25800000 HC PHARMACY IV SOLUTIONS: Performed by: OBSTETRICS & GYNECOLOGY

## 2023-06-12 PROCEDURE — 3E0430M INTRODUCTION OF MONOCLONAL ANTIBODY INTO CENTRAL VEIN, PERCUTANEOUS APPROACH: ICD-10-PCS | Performed by: PHYSICIAN ASSISTANT

## 2023-06-12 PROCEDURE — 84702 CHORIONIC GONADOTROPIN TEST: CPT | Performed by: PHYSICIAN ASSISTANT

## 2023-06-12 PROCEDURE — 3008F BODY MASS INDEX DOCD: CPT | Performed by: PHYSICIAN ASSISTANT

## 2023-06-12 PROCEDURE — 80053 COMPREHEN METABOLIC PANEL: CPT | Performed by: PHYSICIAN ASSISTANT

## 2023-06-12 PROCEDURE — 63600000 HC DRUGS/DETAIL CODE: Mod: JZ,JG | Performed by: OBSTETRICS & GYNECOLOGY

## 2023-06-12 RX ORDER — HEPARIN 100 UNIT/ML
500 SYRINGE INTRAVENOUS AS NEEDED
Status: CANCELLED | OUTPATIENT
Start: 2023-06-12

## 2023-06-12 RX ORDER — EPINEPHRINE 1 MG/ML
0.5 INJECTION, SOLUTION INTRAMUSCULAR; SUBCUTANEOUS ONCE AS NEEDED
Status: CANCELLED | OUTPATIENT
Start: 2023-06-12

## 2023-06-12 RX ORDER — FAMOTIDINE 10 MG/ML
20 INJECTION INTRAVENOUS ONCE AS NEEDED
Status: CANCELLED | OUTPATIENT
Start: 2023-06-12

## 2023-06-12 RX ORDER — HEPARIN 100 UNIT/ML
500 SYRINGE INTRAVENOUS AS NEEDED
Status: DISCONTINUED | OUTPATIENT
Start: 2023-06-12 | End: 2023-06-13 | Stop reason: HOSPADM

## 2023-06-12 RX ADMIN — SODIUM CHLORIDE 200 MG: 9 INJECTION, SOLUTION INTRAVENOUS at 13:05

## 2023-06-12 NOTE — ASSESSMENT & PLAN NOTE
[place] [facility] [place] Otherwise, cleared for cycle 3 pembro today

## 2023-06-12 NOTE — PROGRESS NOTES
1215 Patient arrived tp [facility] for chemotherapy for GTN cycle 3 day 1 of Keytruda. RCW port accessed and labs sent.   1221 Labs resulted. Pt cleared for treatment by KELTON Newton.    1305 Keytruda 200 mg up over 30 minutes, checked and verified by  Chemo RN's.      1340 Keytruda complete patient tolerated without difficulty. Port flushed with NSS and heparin 500 units/5ml and de-accessed. Patient discharged to home.

## 2023-06-12 NOTE — TELEPHONE ENCOUNTER
LVM informing pt to have labs repeated Thursday or Friday to trend plts since decreasing from prior (156-> 113). She is returning to [place] where she lives today so will need drawn around her. Advised to let me know if she will be going to [facility] or [facility] so I can put in script.

## 2023-06-12 NOTE — PROGRESS NOTES
PATIENT ID:  Alondra Villela is a 32 y.o. female.      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 Switched to pembro, here today for cycle 3. She lives in [place] and returns for treatment today - needs labs. Reports feeling much better on Pembro. She denies nausea/vomiting, denies neuropathy, denies chest pain, denies shortness of breath, denies excessive fatigue, denies rash, denies diarrhea, denies headache. Feeling good.     Oncology History   Gestational trophoblastic neoplasm   1/10/2023 Tumor Markers     on initial testing. Dilutional studies repeated and found to be >461,700     1/11/2023 Imaging Significant Findings    1.  No intrauterine gestation and no adnexal mass evident. These findings  represent a pregnancy of unknown location. The appearance of a complex mass with  small cystic spaces and peripheral vascularity occupying the central uterine  cavity could represent a molar pregnancy but additional evaluation is suggested.  2.  Both ovaries are mildly enlarged with multiple complex cystic lesions as  well as some with solid-appearing echoes that may represent a combination of  hemorrhagic cysts/follicles or endometriomas, but challenging to evaluate as the  ovaries are only well evaluated transabdominally. Recommended these be  reassessed on follow-up. No sonographic findings of ovarian torsion.     1/12/2023 Imaging Significant Findings    MRI brain with contrast  1.  No abnormal intracranial contrast enhancement identified to suggest  intracranial metastatic disease.  2.  Previously seen tiny foci of increased T2 signal in the white matter do not  demonstrate contrast enhancement.  These findings could reflect foci of chronic  ischemia, the sequelae of migraine headaches, demyelinating or postinfectious  processes.  Other etiologies are not excluded.  Clinical correlation is  recommended.      1/12/2023 Imaging Significant Findings    CTPA  IMPRESSION:  No evidence for pulmonary embolism.  5 cm right lower lobe mass. Anterior mediastinal mass.  Innumerable bilateral pulmonary nodules which are suspicious for metastatic  disease to the lungs.  2 hyperenhancing liver lesions, the larger measuring 5 cm in maximal dimension  for which further assessment with MRI with and without contrast is recommended.     1/12/2023 Biopsy    Image-guided biopsy of right lower lobe lesion benign liver parenchyma     1/12/2023 Cancer Staged    Staging form: Gestational Trophoblastic Neoplasms, AJCC 8th Edition  - Clinical stage from 1/12/2023: FIGO Stage IV (cM1b, Risk score: 17)     1/13/2023 - 1/27/2023 Chemotherapy    CISplatin / Etoposide, 7 day Cycles - GTN Induction  Plan Provider: Lorena Hogue MD  Treatment goal: Curative  Line of treatment: Induction     1/13/2023 Imaging Significant Findings    CT abd/pelvis  1.   Uterine mass measuring up to 13.8 cm, consistent with the clinically  suspected diagnosis of choriocarcinoma.  2.  Extensive pulmonary metastases, most notably a right lower lobe 4.9 cm  subpleural mass.  3.  Enlarged ovaries with numerous cysts likely representing theca lutein cysts  4.  Indeterminate 0.8 cm hypoattenuating lesion at the dome of the liver.  The  previously noted hypervascular liver lesions are not well seen due to the phase  of contrast at the time of imaging.  Contrast-enhanced MRI of the abdomen can be  considered for more optimal characterization.  5.  Indeterminate hypoattenuating lesions in the right kidney which can also be  more optimally evaluated with MRI.     1/13/2023 Tumor Markers    HCG >1.8468 million     1/30/2023 - 4/21/2023 Chemotherapy    Inpatient: EMA/EP (Etoposide / Methotrexate / Dactinomycin / Etoposide / CISPlatin), 14 Day Cycles - Gestational Trophoblastic Neoplasia   Plan Provider: Shantelle Mcmullen MD  Treatment goal: Curative  Line of treatment: First  Line     2023 -  Chemotherapy    GENERAL INFUSION ORDERS FOR HYDRATION  Plan Provider: Shantelle Mcmullen MD     2023 -  Chemotherapy    Pembrolizumab, 21 Day Cycles - Non-Small Cell Lung   Plan Provider: Antonio Smith MD  Treatment goal: Curative  Line of treatment: Second Line         Medical History:   Past Medical History:   Diagnosis Date   • Hx antineoplastic chemo        Surgical History:   Past Surgical History:   Procedure Laterality Date   •  SECTION      x2       Social History:   Social History     Tobacco Use   • Smoking status: Never   • Smokeless tobacco: Never   Vaping Use   • Vaping Use: Never used   Substance Use Topics   • Alcohol use: Not Currently   • Drug use: Never        Family History:   Family History   Problem Relation Age of Onset   • Hypertension Biological Mother    • Hypertension Biological Father        Allergies: Patient has no known allergies.    Medications:   Current Outpatient Medications   Medication Sig Dispense Refill   • benzonatate (TESSALON) 100 mg capsule Take 100 mg by mouth 3 (three) times a day as needed for cough.     • buPROPion XL (WELLBUTRIN XL) 150 mg 24 hr tablet Take 150 mg by mouth nightly.     • gly-carb h-poly a-pot hydrox solution Take 5-10 mL by mouth as needed for mucositis. Swish and spit 4-6 times per day as needed     • hydrocortisone 2.5 % cream Apply 2 g topically as needed for irritation or rash.     • lidocaine-prilocaine (EMLA) cream Apply topically as needed for mild pain. Apply a quarter size amount to skin over port site approximately one hour prior to chemotherapy. Cover with saran wrap. 1 each 1   • magic mouthwash with nystatin (BENADRYL-MAALOX-LIDOCAINE-NYSTATIN) Swish and spit 15 mL every 4 (four) hours as needed for stomatitis. 120 mL 1   • magnesium chloride 64 mg tablet,delayed release (DR/EC) Take 1 tablet (64 mg total) by mouth 2 (two) times a day. 60 tablet 1   • NIFEdipine XL (PROCARDIA XL) 30 mg 24 hr tablet Take 1  "tablet (30 mg total) by mouth daily. 30 tablet 3   • ondansetron ODT (ZOFRAN-ODT) 4 mg disintegrating tablet Take 1-2 tablets (4-8 mg total) by mouth every 6 (six) hours as needed for nausea or vomiting (Nausea/Vomiting). Maximum 32mg per day. 30 tablet 1   • promethazine (PHENERGAN) 12.5 mg tablet Take 12.5 mg by mouth every 6 (six) hours as needed for nausea or vomiting.     • protriptyline (VIVACTIL) 5 mg tablet Take 15 mg by mouth nightly.     • triamcinolone (KENALOG) 0.1 % ointment Apply topically 2 times daily.       No current facility-administered medications for this visit.       Review of Systems  All other systems reviewed and negative except as noted in the HPI.    Physical Exam:  Vital Signs for the last 24 hours:  Visit Vitals  /77 (BP Location: Right upper arm, Patient Position: Sitting)   Pulse (!) 104   Temp 36.4 °C (97.5 °F) (Oral)   Ht 1.626 m (5' 4\")   Wt 71.2 kg (157 lb)   SpO2 96%   BMI 26.95 kg/m²       General: well-developed, well-nourished, no apparent distress  Neck: supple, no masses  Lymphatic: no supraclavicular, cervical, or inguinal adenopathy  Respiratory: lungs clear to auscultation bilaterally, normal respiratory effort  Cardiovascular: regular rate and rhythm, no murmurs, rubs, gallops.   Extremity: no clubbing, cyanosis, edema  GI: abdomen soft, non-distended, non-tender, no hepatosplenomegaly, no masses  Neurologic: alert & oriented x3, no gross deficits  Psychiatric: mood and affect normal  Musculoskeletal: no deformity or gross strength deficit  Gynecologic: deferred      ASSESSMENT/PLAN:  32 y.o. lady   ECOG performance status is :0         Problem List Items Addressed This Visit        Obstetric    Gestational trophoblastic neoplasm    Overview     recieved 6 cycles of EMA-EP for her high risk GTN. Given her plateauing hcg levels and bone marrow reserve - switched to pembro          Current Assessment & Plan     Labs pending including bhcg. Otherwise, cleared for " cycle 3 pembro today          Relevant Orders    BhCG, Serum, Quant    TSH w reflex FT4    Oncology CBC and Diff    Comprehensive metabolic panel    [facility] Infusion Appointment Request - 180 min    Clinic Appointment Request    Infusion Appointment Request       Other    Encounter for antineoplastic immunotherapy - Primary    Current Assessment & Plan     Pending labs for cycle 3 pembro today             KELTON Newton

## 2023-06-15 DIAGNOSIS — O01.9 GESTATIONAL TROPHOBLASTIC NEOPLASM: Primary | ICD-10-CM

## 2023-06-15 DIAGNOSIS — Z51.12 ENCOUNTER FOR ANTINEOPLASTIC IMMUNOTHERAPY: ICD-10-CM

## 2023-06-17 LAB
ALBUMIN SERPL-MCNC: 4.8 G/DL (ref 3.8–4.8)
ALBUMIN/GLOB SERPL: 1.7 {RATIO} (ref 1.2–2.2)
ALP SERPL-CCNC: 85 IU/L (ref 44–121)
ALT SERPL-CCNC: 31 IU/L (ref 0–32)
AST SERPL-CCNC: 24 IU/L (ref 0–40)
BASOPHILS # BLD AUTO: 0 X10E3/UL (ref 0–0.2)
BASOPHILS NFR BLD AUTO: 1 %
BILIRUB SERPL-MCNC: 0.3 MG/DL (ref 0–1.2)
BUN SERPL-MCNC: 16 MG/DL (ref 6–20)
BUN/CREAT SERPL: 12 (ref 9–23)
CALCIUM SERPL-MCNC: 9.5 MG/DL (ref 8.7–10.2)
CHLORIDE SERPL-SCNC: 101 MMOL/L (ref 96–106)
CO2 SERPL-SCNC: 22 MMOL/L (ref 20–29)
CREAT SERPL-MCNC: 1.32 MG/DL (ref 0.57–1)
EGFRCR SERPLBLD CKD-EPI 2021: 55 ML/MIN/1.73
EOSINOPHIL # BLD AUTO: 0.1 X10E3/UL (ref 0–0.4)
EOSINOPHIL NFR BLD AUTO: 3 %
ERYTHROCYTE [DISTWIDTH] IN BLOOD BY AUTOMATED COUNT: 14.3 % (ref 11.7–15.4)
GLOBULIN SER CALC-MCNC: 2.8 G/DL (ref 1.5–4.5)
GLUCOSE SERPL-MCNC: 79 MG/DL (ref 70–99)
HCG INTACT+B SERPL-ACNC: 28 MIU/ML
HCT VFR BLD AUTO: 33.3 % (ref 34–46.6)
HGB BLD-MCNC: 10.9 G/DL (ref 11.1–15.9)
IMM GRANULOCYTES # BLD AUTO: 0 X10E3/UL (ref 0–0.1)
IMM GRANULOCYTES NFR BLD AUTO: 0 %
LYMPHOCYTES # BLD AUTO: 1.2 X10E3/UL (ref 0.7–3.1)
LYMPHOCYTES NFR BLD AUTO: 33 %
MCH RBC QN AUTO: 31.1 PG (ref 26.6–33)
MCHC RBC AUTO-ENTMCNC: 32.7 G/DL (ref 31.5–35.7)
MCV RBC AUTO: 95 FL (ref 79–97)
MONOCYTES # BLD AUTO: 0.5 X10E3/UL (ref 0.1–0.9)
MONOCYTES NFR BLD AUTO: 13 %
NEUTROPHILS # BLD AUTO: 1.9 X10E3/UL (ref 1.4–7)
NEUTROPHILS NFR BLD AUTO: 50 %
PLATELET # BLD AUTO: 111 X10E3/UL (ref 150–450)
POTASSIUM SERPL-SCNC: 4.9 MMOL/L (ref 3.5–5.2)
PROT SERPL-MCNC: 7.6 G/DL (ref 6–8.5)
RBC # BLD AUTO: 3.5 X10E6/UL (ref 3.77–5.28)
SODIUM SERPL-SCNC: 139 MMOL/L (ref 134–144)
T4 FREE SERPL-MCNC: 0.99 NG/DL (ref 0.82–1.77)
TSH SERPL DL<=0.005 MIU/L-ACNC: 1.32 UIU/ML (ref 0.45–4.5)
WBC # BLD AUTO: 3.8 X10E3/UL (ref 3.4–10.8)

## 2023-06-20 DIAGNOSIS — O01.9 GESTATIONAL TROPHOBLASTIC NEOPLASM: Primary | ICD-10-CM

## 2023-06-26 NOTE — PLAN OF CARE
Problem: Adult Inpatient Plan of Care  Goal: Plan of Care Review  Outcome: Progressing  Flowsheets (Taken 1/13/2023 9129)  Progress: no change  Plan of Care Reviewed With:   patient   spouse   daughter   father  Outcome Summary: Pt transferred to unit for chemotherapy. Chemo initiated after premeds given. Treatment plan verified with two chemo RNs. Education given to patient. RN remained with patient for first 15 minutes of infusion.  VSS after first 15 minutes. Family at bedside.   Plan of Care Review  Plan of Care Reviewed With: patient, spouse, daughter, father  Progress: no change  Outcome Summary: Pt transferred to unit for chemotherapy. Chemo initiated after premeds given. Treatment plan verified with two chemo RNs. Education given to patient. RN remained with patient for first 15 minutes of infusion.  VSS after first 15 minutes. Family at bedside.   Addended by: MITCHELL NIETO on: 6/26/2023 11:51 AM     Modules accepted: Orders

## 2023-06-27 ENCOUNTER — NEW PATIENT (OUTPATIENT)
Dept: URBAN - METROPOLITAN AREA CLINIC 100 | Facility: CLINIC | Age: 33
End: 2023-06-27

## 2023-06-27 DIAGNOSIS — S05.02XA: ICD-10-CM

## 2023-06-27 PROCEDURE — 92002 INTRM OPH EXAM NEW PATIENT: CPT

## 2023-06-30 ENCOUNTER — FOLLOW UP (OUTPATIENT)
Dept: URBAN - METROPOLITAN AREA CLINIC 17 | Facility: CLINIC | Age: 33
End: 2023-06-30

## 2023-06-30 DIAGNOSIS — S05.02XA: ICD-10-CM

## 2023-06-30 PROCEDURE — 99213 OFFICE O/P EST LOW 20 MIN: CPT

## 2023-06-30 RX ORDER — EPINEPHRINE 1 MG/ML
0.5 INJECTION, SOLUTION INTRAMUSCULAR; SUBCUTANEOUS ONCE AS NEEDED
Status: CANCELLED | OUTPATIENT
Start: 2023-07-03

## 2023-06-30 RX ORDER — FAMOTIDINE 10 MG/ML
20 INJECTION INTRAVENOUS ONCE AS NEEDED
Status: CANCELLED | OUTPATIENT
Start: 2023-07-03

## 2023-06-30 ASSESSMENT — VISUAL ACUITY
OS_PH: 20/40
OD_CC: 20/30
OS_CC: CF 4FT

## 2023-06-30 ASSESSMENT — TONOMETRY
OS_IOP_MMHG: 9
OD_IOP_MMHG: 10

## 2023-06-30 NOTE — PROGRESS NOTES
PATIENT ID:  Alondra Villela is a 32 y.o. female.      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 Switched to pembro, here today for cycle 4. She lives in [place] and returns for treatment today - needs labs. Reports feeling much better on Pembro. She denies nausea/vomiting, denies neuropathy, denies chest pain, denies shortness of breath, denies excessive fatigue, denies rash, denies diarrhea, denies headache.    Oncology History   Gestational trophoblastic neoplasm   1/10/2023 Tumor Markers     on initial testing. Dilutional studies repeated and found to be >461,700     1/11/2023 Imaging Significant Findings    1.  No intrauterine gestation and no adnexal mass evident. These findings  represent a pregnancy of unknown location. The appearance of a complex mass with  small cystic spaces and peripheral vascularity occupying the central uterine  cavity could represent a molar pregnancy but additional evaluation is suggested.  2.  Both ovaries are mildly enlarged with multiple complex cystic lesions as  well as some with solid-appearing echoes that may represent a combination of  hemorrhagic cysts/follicles or endometriomas, but challenging to evaluate as the  ovaries are only well evaluated transabdominally. Recommended these be  reassessed on follow-up. No sonographic findings of ovarian torsion.     1/12/2023 Imaging Significant Findings    MRI brain with contrast  1.  No abnormal intracranial contrast enhancement identified to suggest  intracranial metastatic disease.  2.  Previously seen tiny foci of increased T2 signal in the white matter do not  demonstrate contrast enhancement.  These findings could reflect foci of chronic  ischemia, the sequelae of migraine headaches, demyelinating or postinfectious  processes.  Other etiologies are not excluded.  Clinical correlation is  recommended.     1/12/2023  Imaging Significant Findings    CTPA  IMPRESSION:  No evidence for pulmonary embolism.  5 cm right lower lobe mass. Anterior mediastinal mass.  Innumerable bilateral pulmonary nodules which are suspicious for metastatic  disease to the lungs.  2 hyperenhancing liver lesions, the larger measuring 5 cm in maximal dimension  for which further assessment with MRI with and without contrast is recommended.     1/12/2023 Biopsy    Image-guided biopsy of right lower lobe lesion benign liver parenchyma     1/12/2023 Cancer Staged    Staging form: Gestational Trophoblastic Neoplasms, AJCC 8th Edition  - Clinical stage from 1/12/2023: FIGO Stage IV (cM1b, Risk score: 17)     1/13/2023 - 1/27/2023 Chemotherapy    CISplatin / Etoposide, 7 day Cycles - GTN Induction  Plan Provider: Lorena Hogue MD  Treatment goal: Curative  Line of treatment: Induction     1/13/2023 Imaging Significant Findings    CT abd/pelvis  1.   Uterine mass measuring up to 13.8 cm, consistent with the clinically  suspected diagnosis of choriocarcinoma.  2.  Extensive pulmonary metastases, most notably a right lower lobe 4.9 cm  subpleural mass.  3.  Enlarged ovaries with numerous cysts likely representing theca lutein cysts  4.  Indeterminate 0.8 cm hypoattenuating lesion at the dome of the liver.  The  previously noted hypervascular liver lesions are not well seen due to the phase  of contrast at the time of imaging.  Contrast-enhanced MRI of the abdomen can be  considered for more optimal characterization.  5.  Indeterminate hypoattenuating lesions in the right kidney which can also be  more optimally evaluated with MRI.     1/13/2023 Tumor Markers    HCG >1.8468 million     1/30/2023 - 4/21/2023 Chemotherapy    Inpatient: EMA/EP (Etoposide / Methotrexate / Dactinomycin / Etoposide / CISPlatin), 14 Day Cycles - Gestational Trophoblastic Neoplasia   Plan Provider: Shantelle Mcmullen MD  Treatment goal: Curative  Line of treatment: First Line      2023 -  Chemotherapy    GENERAL INFUSION ORDERS FOR HYDRATION  Plan Provider: Shantelle Mcmullen MD     2023 -  Chemotherapy    Pembrolizumab, 21 Day Cycles - Non-Small Cell Lung   Plan Provider: Antonio Smith MD  Treatment goal: Curative  Line of treatment: Second Line         Medical History:   Past Medical History:   Diagnosis Date   • Hx antineoplastic chemo        Surgical History:   Past Surgical History:   Procedure Laterality Date   •  SECTION      x2       Social History:   Social History     Tobacco Use   • Smoking status: Never   • Smokeless tobacco: Never   Vaping Use   • Vaping Use: Never used   Substance Use Topics   • Alcohol use: Not Currently   • Drug use: Never        Family History:   Family History   Problem Relation Age of Onset   • Hypertension Biological Mother    • Hypertension Biological Father        Allergies: Patient has no known allergies.    Medications:   Current Outpatient Medications   Medication Sig Dispense Refill   • benzonatate (TESSALON) 100 mg capsule Take 100 mg by mouth 3 (three) times a day as needed for cough.     • buPROPion XL (WELLBUTRIN XL) 150 mg 24 hr tablet Take 150 mg by mouth nightly.     • gly-carb h-poly a-pot hydrox solution Take 5-10 mL by mouth as needed for mucositis. Swish and spit 4-6 times per day as needed     • hydrocortisone 2.5 % cream Apply 2 g topically as needed for irritation or rash.     • lidocaine-prilocaine (EMLA) cream Apply topically as needed for mild pain. Apply a quarter size amount to skin over port site approximately one hour prior to chemotherapy. Cover with saran wrap. 1 each 1   • magic mouthwash with nystatin (BENADRYL-MAALOX-LIDOCAINE-NYSTATIN) Swish and spit 15 mL every 4 (four) hours as needed for stomatitis. 120 mL 1   • magnesium chloride 64 mg tablet,delayed release (DR/EC) Take 1 tablet (64 mg total) by mouth 2 (two) times a day. 60 tablet 1   • NIFEdipine XL (PROCARDIA XL) 30 mg 24 hr tablet Take 1 tablet  (30 mg total) by mouth daily. 30 tablet 3   • ondansetron ODT (ZOFRAN-ODT) 4 mg disintegrating tablet Take 1-2 tablets (4-8 mg total) by mouth every 6 (six) hours as needed for nausea or vomiting (Nausea/Vomiting). Maximum 32mg per day. 30 tablet 1   • promethazine (PHENERGAN) 12.5 mg tablet Take 12.5 mg by mouth every 6 (six) hours as needed for nausea or vomiting.     • protriptyline (VIVACTIL) 5 mg tablet Take 15 mg by mouth nightly.     • triamcinolone (KENALOG) 0.1 % ointment Apply topically 2 times daily.       No current facility-administered medications for this visit.       Review of Systems  All other systems reviewed and negative except as noted in the HPI.    Physical Exam:  Vital Signs for the last 24 hours:  /74  P98  PO2 97  T 97.8    General: well-developed, well-nourished, no apparent distress  Neck: supple, no masses  Lymphatic: no supraclavicular, cervical, or inguinal adenopathy  Respiratory: lungs clear to auscultation bilaterally, normal respiratory effort  Cardiovascular: regular rate and rhythm, no murmurs, rubs, gallops.   Extremity: no clubbing, cyanosis, edema  GI: abdomen soft, non-distended, non-tender, no hepatosplenomegaly, no masses  Neurologic: alert & oriented x3, no gross deficits  Psychiatric: mood and affect normal  Musculoskeletal: no deformity or gross strength deficit  Gynecologic: deferred      ASSESSMENT/PLAN:  32 y.o. lady   ECOG performance status is :0    For Labs today, 4th cycle.         Problem List Items Addressed This Visit        Obstetric    Gestational trophoblastic neoplasm - Primary    Overview     recieved 6 cycles of EMA-EP for her high risk GTN. Given her plateauing hcg levels and bone marrow reserve - switched to pembro          Relevant Orders    BhCG, Serum, Quant    TSH w reflex FT4    Oncology CBC and Diff    Comprehensive metabolic panel    [facility] Infusion Appointment Request - 180 min    Clinic Appointment Request    Infusion Appointment Request        Other    Encounter for antineoplastic chemotherapy   Jabari Perez MD  Gyn Oncology

## 2023-07-03 ENCOUNTER — HOSPITAL ENCOUNTER (OUTPATIENT)
Dept: INPATIENT UNIT | Facility: HOSPITAL | Age: 33
Discharge: HOME | End: 2023-07-03
Attending: PHYSICIAN ASSISTANT
Payer: COMMERCIAL

## 2023-07-03 ENCOUNTER — OFFICE VISIT (OUTPATIENT)
Dept: GYNECOLOGIC ONCOLOGY | Facility: CLINIC | Age: 33
End: 2023-07-03
Attending: OBSTETRICS & GYNECOLOGY
Payer: COMMERCIAL

## 2023-07-03 VITALS
DIASTOLIC BLOOD PRESSURE: 62 MMHG | HEART RATE: 100 BPM | WEIGHT: 157 LBS | TEMPERATURE: 97 F | HEIGHT: 64 IN | OXYGEN SATURATION: 96 % | RESPIRATION RATE: 16 BRPM | SYSTOLIC BLOOD PRESSURE: 113 MMHG | BODY MASS INDEX: 26.8 KG/M2

## 2023-07-03 DIAGNOSIS — Z51.11 ENCOUNTER FOR ANTINEOPLASTIC CHEMOTHERAPY: ICD-10-CM

## 2023-07-03 DIAGNOSIS — O01.9 GESTATIONAL TROPHOBLASTIC NEOPLASM: Primary | ICD-10-CM

## 2023-07-03 LAB
ALBUMIN SERPL-MCNC: 4.1 G/DL (ref 3.5–5.7)
ALP SERPL-CCNC: 75 IU/L (ref 34–104)
ALT SERPL-CCNC: 17 IU/L (ref 7–52)
ANION GAP SERPL CALC-SCNC: 4 MEQ/L (ref 3–15)
AST SERPL-CCNC: 14 IU/L (ref 13–39)
BASOPHILS # BLD: 0.02 K/UL (ref 0.01–0.1)
BASOPHILS NFR BLD: 0.4 %
BILIRUB SERPL-MCNC: 0.3 MG/DL (ref 0.3–1)
BUN SERPL-MCNC: 18 MG/DL (ref 7–25)
CALCIUM SERPL-MCNC: 8.9 MG/DL (ref 8.6–10.3)
CHLORIDE SERPL-SCNC: 105 MEQ/L (ref 98–107)
CO2 SERPL-SCNC: 28 MEQ/L (ref 21–31)
CREAT SERPL-MCNC: 1.2 MG/DL (ref 0.6–1.2)
DIFFERENTIAL METHOD BLD: ABNORMAL
EOSINOPHIL # BLD: 0.08 K/UL (ref 0.04–0.36)
EOSINOPHIL NFR BLD: 1.7 %
ERYTHROCYTE [DISTWIDTH] IN BLOOD BY AUTOMATED COUNT: 11.9 % (ref 11.7–14.4)
GFR SERPL CREATININE-BSD FRML MDRD: 51.7 ML/MIN/1.73M*2
GLUCOSE SERPL-MCNC: 113 MG/DL (ref 70–99)
HCG SERPL-ACNC: 15.9 IU/L (MIU/ML)
HCT VFR BLDCO AUTO: 29.7 % (ref 35–45)
HGB BLD-MCNC: 10.3 G/DL (ref 11.8–15.7)
IMM GRANULOCYTES # BLD AUTO: 0.01 K/UL (ref 0–0.08)
IMM GRANULOCYTES NFR BLD AUTO: 0.2 %
LYMPHOCYTES # BLD: 1.19 K/UL (ref 1.2–3.5)
LYMPHOCYTES NFR BLD: 25.1 %
MCH RBC QN AUTO: 32.2 PG (ref 28–33.2)
MCHC RBC AUTO-ENTMCNC: 34.7 G/DL (ref 32.2–35.5)
MCV RBC AUTO: 92.8 FL (ref 83–98)
MONOCYTES # BLD: 0.37 K/UL (ref 0.28–0.8)
MONOCYTES NFR BLD: 7.8 %
NEUTROPHILS # BLD: 3.08 K/UL (ref 1.7–7)
NEUTROPHILS # BLD: 3.08 K/UL (ref 1.7–7)
NEUTS SEG NFR BLD: 64.8 %
NRBC BLD-RTO: 0 %
PDW BLD AUTO: 9.7 FL (ref 9.4–12.3)
PLAT MORPH BLD: NORMAL
PLATELET # BLD AUTO: 82 K/UL (ref 150–369)
PLATELET # BLD EST: ABNORMAL 10*3/UL
POLYCHROMASIA BLD QL SMEAR: ABNORMAL
POTASSIUM SERPL-SCNC: 4.2 MEQ/L (ref 3.5–5.1)
PROT SERPL-MCNC: 7.6 G/DL (ref 6.4–8.9)
RBC # BLD AUTO: 3.2 M/UL (ref 3.93–5.22)
SODIUM SERPL-SCNC: 137 MEQ/L (ref 136–145)
TSH SERPL DL<=0.05 MIU/L-ACNC: 1.16 MIU/L (ref 0.34–5.6)
WBC # BLD AUTO: 4.75 K/UL (ref 3.8–10.5)

## 2023-07-03 PROCEDURE — 96374 THER/PROPH/DIAG INJ IV PUSH: CPT

## 2023-07-03 PROCEDURE — 63600000 HC DRUGS/DETAIL CODE: Mod: JZ,JG | Performed by: OBSTETRICS & GYNECOLOGY

## 2023-07-03 PROCEDURE — 99215 OFFICE O/P EST HI 40 MIN: CPT | Performed by: OBSTETRICS & GYNECOLOGY

## 2023-07-03 PROCEDURE — 25800000 HC PHARMACY IV SOLUTIONS: Performed by: OBSTETRICS & GYNECOLOGY

## 2023-07-03 PROCEDURE — 84443 ASSAY THYROID STIM HORMONE: CPT

## 2023-07-03 PROCEDURE — 80053 COMPREHEN METABOLIC PANEL: CPT

## 2023-07-03 PROCEDURE — 3E0430M INTRODUCTION OF MONOCLONAL ANTIBODY INTO CENTRAL VEIN, PERCUTANEOUS APPROACH: ICD-10-PCS | Performed by: PHYSICIAN ASSISTANT

## 2023-07-03 PROCEDURE — 36415 COLL VENOUS BLD VENIPUNCTURE: CPT

## 2023-07-03 PROCEDURE — 84702 CHORIONIC GONADOTROPIN TEST: CPT

## 2023-07-03 PROCEDURE — 85025 COMPLETE CBC W/AUTO DIFF WBC: CPT

## 2023-07-03 PROCEDURE — 96413 CHEMO IV INFUSION 1 HR: CPT

## 2023-07-03 RX ORDER — HEPARIN 100 UNIT/ML
500 SYRINGE INTRAVENOUS AS NEEDED
Status: DISCONTINUED | OUTPATIENT
Start: 2023-07-03 | End: 2023-07-04 | Stop reason: HOSPADM

## 2023-07-03 RX ORDER — HEPARIN 100 UNIT/ML
500 SYRINGE INTRAVENOUS AS NEEDED
Status: CANCELLED | OUTPATIENT
Start: 2023-07-03

## 2023-07-03 RX ADMIN — HEPARIN 500 UNITS: 100 SYRINGE at 11:36

## 2023-07-03 RX ADMIN — SODIUM CHLORIDE 200 MG: 9 INJECTION, SOLUTION INTRAVENOUS at 11:01

## 2023-07-03 NOTE — PROGRESS NOTES
0815 Patient arrived tp [facility] for chemotherapy for GTN cycle 4 day 1 of Keytruda. RCW port accessed and labs sent.      1101 Keytruda 200 mg up over 30 minutes, checked and verified by  Chemo RN's.      1136 Keytruda complete patient tolerated without difficulty. Port flushed with NSS and heparin 500 units/5ml and de-accessed. Patient discharged to home.

## 2023-07-05 ENCOUNTER — TELEPHONE (OUTPATIENT)
Dept: GYNECOLOGIC ONCOLOGY | Facility: CLINIC | Age: 33
End: 2023-07-05
Payer: COMMERCIAL

## 2023-07-14 ENCOUNTER — HOSPITAL ENCOUNTER (EMERGENCY)
Facility: HOSPITAL | Age: 33
Discharge: HOME | End: 2023-07-15
Attending: EMERGENCY MEDICINE
Payer: COMMERCIAL

## 2023-07-14 ENCOUNTER — APPOINTMENT (EMERGENCY)
Dept: RADIOLOGY | Facility: HOSPITAL | Age: 33
End: 2023-07-14
Payer: COMMERCIAL

## 2023-07-14 ENCOUNTER — TELEPHONE (OUTPATIENT)
Dept: GYNECOLOGIC ONCOLOGY | Facility: CLINIC | Age: 33
End: 2023-07-14
Payer: COMMERCIAL

## 2023-07-14 VITALS
DIASTOLIC BLOOD PRESSURE: 57 MMHG | WEIGHT: 155 LBS | BODY MASS INDEX: 26.46 KG/M2 | RESPIRATION RATE: 16 BRPM | OXYGEN SATURATION: 99 % | SYSTOLIC BLOOD PRESSURE: 105 MMHG | HEIGHT: 64 IN | TEMPERATURE: 97.7 F | HEART RATE: 90 BPM

## 2023-07-14 DIAGNOSIS — L03.116 CELLULITIS OF LEFT LOWER EXTREMITY: ICD-10-CM

## 2023-07-14 DIAGNOSIS — R07.9 CHEST PAIN, UNSPECIFIED TYPE: Primary | ICD-10-CM

## 2023-07-14 LAB
ANION GAP SERPL CALC-SCNC: 9 MEQ/L (ref 3–15)
BASOPHILS # BLD: 0 K/UL (ref 0.01–0.1)
BASOPHILS NFR BLD: 0 %
BUN SERPL-MCNC: 16 MG/DL (ref 7–25)
CALCIUM SERPL-MCNC: 9.5 MG/DL (ref 8.6–10.3)
CHLORIDE SERPL-SCNC: 103 MEQ/L (ref 98–107)
CO2 SERPL-SCNC: 25 MEQ/L (ref 21–31)
CREAT SERPL-MCNC: 1.4 MG/DL (ref 0.6–1.2)
DIFFERENTIAL METHOD BLD: ABNORMAL
EOSINOPHIL # BLD: 0 K/UL (ref 0.04–0.36)
EOSINOPHIL NFR BLD: 0 %
ERYTHROCYTE [DISTWIDTH] IN BLOOD BY AUTOMATED COUNT: 12.1 % (ref 11.7–14.4)
GFR SERPL CREATININE-BSD FRML MDRD: 43.3 ML/MIN/1.73M*2
GLUCOSE SERPL-MCNC: 90 MG/DL (ref 70–99)
HCG UR QL: NEGATIVE
HCT VFR BLDCO AUTO: 31 % (ref 35–45)
HGB BLD-MCNC: 10.5 G/DL (ref 11.8–15.7)
HYPOCHROMIA BLD QL SMEAR: ABNORMAL
LYMPHOCYTES # BLD: 1.26 K/UL (ref 1.2–3.5)
LYMPHOCYTES NFR BLD: 45 %
MCH RBC QN AUTO: 31.1 PG (ref 28–33.2)
MCHC RBC AUTO-ENTMCNC: 33.9 G/DL (ref 32.2–35.5)
MCV RBC AUTO: 91.7 FL (ref 83–98)
MONOCYTES # BLD: 0.25 K/UL (ref 0.28–0.8)
MONOCYTES NFR BLD: 9 %
NEUTS BAND # BLD: 1.29 K/UL (ref 1.7–7)
NEUTS SEG NFR BLD: 46 %
PDW BLD AUTO: 9.9 FL (ref 9.4–12.3)
PLAT MORPH BLD: NORMAL
PLATELET # BLD AUTO: 57 K/UL (ref 150–369)
PLATELET # BLD EST: ABNORMAL 10*3/UL
POTASSIUM SERPL-SCNC: 3.6 MEQ/L (ref 3.5–5.1)
RBC # BLD AUTO: 3.38 M/UL (ref 3.93–5.22)
SODIUM SERPL-SCNC: 137 MEQ/L (ref 136–145)
TROPONIN I SERPL HS-MCNC: <2 PG/ML
TROPONIN I SERPL HS-MCNC: <2 PG/ML
WBC # BLD AUTO: 2.8 K/UL (ref 3.8–10.5)

## 2023-07-14 PROCEDURE — 96366 THER/PROPH/DIAG IV INF ADDON: CPT

## 2023-07-14 PROCEDURE — 80048 BASIC METABOLIC PNL TOTAL CA: CPT

## 2023-07-14 PROCEDURE — 71046 X-RAY EXAM CHEST 2 VIEWS: CPT

## 2023-07-14 PROCEDURE — 3E0337Z INTRODUCTION OF ELECTROLYTIC AND WATER BALANCE SUBSTANCE INTO PERIPHERAL VEIN, PERCUTANEOUS APPROACH: ICD-10-PCS | Performed by: EMERGENCY MEDICINE

## 2023-07-14 PROCEDURE — 84484 ASSAY OF TROPONIN QUANT: CPT

## 2023-07-14 PROCEDURE — 84703 CHORIONIC GONADOTROPIN ASSAY: CPT | Performed by: PHYSICIAN ASSISTANT

## 2023-07-14 PROCEDURE — 96367 TX/PROPH/DG ADDL SEQ IV INF: CPT

## 2023-07-14 PROCEDURE — 85025 COMPLETE CBC W/AUTO DIFF WBC: CPT

## 2023-07-14 PROCEDURE — 93005 ELECTROCARDIOGRAM TRACING: CPT

## 2023-07-14 PROCEDURE — 63600000 HC DRUGS/DETAIL CODE: Mod: JZ | Performed by: PHYSICIAN ASSISTANT

## 2023-07-14 PROCEDURE — 96365 THER/PROPH/DIAG IV INF INIT: CPT

## 2023-07-14 PROCEDURE — 84484 ASSAY OF TROPONIN QUANT: CPT | Performed by: EMERGENCY MEDICINE

## 2023-07-14 PROCEDURE — 93005 ELECTROCARDIOGRAM TRACING: CPT | Performed by: EMERGENCY MEDICINE

## 2023-07-14 PROCEDURE — 3E03329 INTRODUCTION OF OTHER ANTI-INFECTIVE INTO PERIPHERAL VEIN, PERCUTANEOUS APPROACH: ICD-10-PCS | Performed by: EMERGENCY MEDICINE

## 2023-07-14 PROCEDURE — 80048 BASIC METABOLIC PNL TOTAL CA: CPT | Performed by: EMERGENCY MEDICINE

## 2023-07-14 PROCEDURE — 25800000 HC PHARMACY IV SOLUTIONS: Performed by: PHYSICIAN ASSISTANT

## 2023-07-14 PROCEDURE — 87070 CULTURE OTHR SPECIMN AEROBIC: CPT | Performed by: PHYSICIAN ASSISTANT

## 2023-07-14 PROCEDURE — 85025 COMPLETE CBC W/AUTO DIFF WBC: CPT | Performed by: EMERGENCY MEDICINE

## 2023-07-14 PROCEDURE — 36415 COLL VENOUS BLD VENIPUNCTURE: CPT

## 2023-07-14 PROCEDURE — 84484 ASSAY OF TROPONIN QUANT: CPT | Mod: 91 | Performed by: EMERGENCY MEDICINE

## 2023-07-14 PROCEDURE — 99284 EMERGENCY DEPT VISIT MOD MDM: CPT | Mod: 25

## 2023-07-14 PROCEDURE — 96361 HYDRATE IV INFUSION ADD-ON: CPT

## 2023-07-14 RX ORDER — VANCOMYCIN/0.9 % SOD CHLORIDE 1.5G/250ML
20 PLASTIC BAG, INJECTION (ML) INTRAVENOUS ONCE
Status: COMPLETED | OUTPATIENT
Start: 2023-07-14 | End: 2023-07-15

## 2023-07-14 RX ORDER — DOXYCYCLINE 100 MG/1
100 CAPSULE ORAL 2 TIMES DAILY
Qty: 14 CAPSULE | Refills: 0 | Status: SHIPPED | OUTPATIENT
Start: 2023-07-14 | End: 2023-07-20 | Stop reason: SDUPTHER

## 2023-07-14 RX ADMIN — SODIUM CHLORIDE 1000 ML: 9 INJECTION, SOLUTION INTRAVENOUS at 19:04

## 2023-07-14 RX ADMIN — VANCOMYCIN HYDROCHLORIDE 1500 MG: 1 INJECTION, POWDER, LYOPHILIZED, FOR SOLUTION INTRAVENOUS at 23:31

## 2023-07-14 RX ADMIN — CEFTRIAXONE SODIUM 1 G: 1 INJECTION, POWDER, FOR SOLUTION INTRAMUSCULAR; INTRAVENOUS at 23:07

## 2023-07-14 NOTE — TELEPHONE ENCOUNTER
"Pt called stating she just experienced an episode of \"severe chest pain which took her breath away\" while changing her babies diaper. Pt is on pembrolizumab s/p C4 on 7/3. She also notes that her ankle wound is looking worse today despite starting antibiotics she got from urgent care last night with drainage.  Advised to report to ER for w/up of chest pain and eval of wound. Pt agrees to plan and is with her  now.  Case was discussed with Dr. Perez.   "

## 2023-07-15 LAB
ATRIAL RATE: 94
P AXIS: 71
PR INTERVAL: 136
QRS DURATION: 66
QT INTERVAL: 358
QTC CALCULATION(BAZETT): 447
R AXIS: 45
T WAVE AXIS: 17
VENTRICULAR RATE: 94

## 2023-07-15 PROCEDURE — 93010 ELECTROCARDIOGRAM REPORT: CPT | Performed by: INTERNAL MEDICINE

## 2023-07-15 NOTE — DISCHARGE INSTRUCTIONS
Please follow up as directed to obtain your results and review your plan of care. CALL your primary doctor's office today to schedule a follow up appointment within the next 48 hours.  Stop taking Bactrim and begin taking doxycycline as prescribed.    REVIEW AND DISCUSS ALL OF YOUR MEDICATIONS WITH YOUR PRIMARY CARE TEAM WITHIN THE NEXT 2 DAYS, even ones that you may have been on for a long time.    Please CALL the Emergency Department at [phone] to obtain the final results within the next 24 hours. Review the final results of all of your tests with your primary care doctor within the next 2 days.     Please call or visit your primary doctor or return to this Emergency Department (or the closest Emergency Department) if you have new concerns, if you are not getting better, or if  you feel that you are getting worse.    Thank you and good luck in your recovery!

## 2023-07-15 NOTE — ED PROVIDER NOTES
"Emergency Medicine Note  HPI   HISTORY OF PRESENT ILLNESS     Prior records reviewed from [facility]. History obtained by the patient.    Ms. Villela is a 32 y.o. female with PMH of stage IV gestational trophoblastic neoplasia  presents to the ER with a chief complaint of chest pain x 1 day.  Patient admits to spontaneous onset of left-sided \"sharp\" chest pain that radiated to the left side of her neck for approximately 1 minute while changing her child's diaper  Patient also admits to associated shortness of breath.  Patient states that while in route to the ER she experienced 2 additional episodes that lasted approximately 20 seconds.  Patient also states that she developed some tenderness on the posterior aspect of her left lower extremity.  Patient was seen at urgent care and discharged with Bactrim.  Patient states that despite the use of Bactrim over the past day the erythema has spread and there has been a crusting of skin in the region as well.    Patient denies diaphoresis, fever, chills, N, V, D, abdominal pain, palpitations, headache, lightheadedness, dizziness or loss of consciousness.              Patient History   PAST HISTORY     Reviewed from Nursing Triage:       Past Medical History:   Diagnosis Date   • Hx antineoplastic chemo        Past Surgical History:   Procedure Laterality Date   •  SECTION      x2       Family History   Problem Relation Age of Onset   • Hypertension Biological Mother    • Hypertension Biological Father        Social History     Tobacco Use   • Smoking status: Never   • Smokeless tobacco: Never   Vaping Use   • Vaping Use: Never used   Substance Use Topics   • Alcohol use: Not Currently   • Drug use: Never         Review of Systems   REVIEW OF SYSTEMS     Review of Systems Pertinent ROS reviewed in HPI, otherwise negative.        VITALS     ED Vitals    Date/Time Temp Pulse Resp BP SpO2 [facility]   23 2312 -- 90 16 105/57 99 % [facility]   23 2044 -- 95 21 110/56 100 % TG "   07/14/23 1903 -- 90 21 137/76 100 % [facility]   07/14/23 1832 -- 95 20 111/81 100 % HT   07/14/23 1720 -- 93 16 121/67 100 % HT   07/14/23 1620 -- 97 18 121/64 100 % HT   07/14/23 1554 36.5 °C (97.7 °F) 92 20 117/59 100 % HH        Pulse Ox %: 100 % (07/14/23 2058)  Pulse Ox Interpretation: Normal (07/14/23 2058)  Heart Rate: 95 (07/14/23 2058)  Rhythm Strip Interpretation: Normal Sinus Rhythm (07/14/23 2058)     Physical Exam   PHYSICAL EXAM     Physical Exam  Vitals and nursing note reviewed.   Constitutional:       Appearance: She is well-developed. She is not ill-appearing.   HENT:      Head: Normocephalic and atraumatic.   Eyes:      Conjunctiva/sclera: Conjunctivae normal.   Cardiovascular:      Rate and Rhythm: Normal rate and regular rhythm.   Pulmonary:      Effort: Pulmonary effort is normal.      Breath sounds: Normal breath sounds.   Abdominal:      General: There is no distension.      Palpations: Abdomen is soft. There is no mass.      Tenderness: There is no abdominal tenderness.   Musculoskeletal:         General: No deformity. Normal range of motion.      Cervical back: Normal range of motion.      Right lower leg: No tenderness.      Left lower leg: Tenderness (With palpation of wound present in the region of the Achilles with surrounding erythema) present.   Skin:     General: Skin is warm and dry.   Neurological:      General: No focal deficit present.      Mental Status: She is alert and oriented to person, place, and time. Mental status is at baseline.      Cranial Nerves: No cranial nerve deficit.      Motor: No weakness.   Psychiatric:         Behavior: Behavior normal.           PROCEDURES     Procedures     DATA     Results     Procedure Component Value Units Date/Time    Aerobic Bacterial Culture/Smear Leg, Left [[id number]]  (Abnormal) Collected: 07/14/23 2117    Specimen: Wound Swab from Leg, Left Updated: 07/16/23 2143     Culture **Positive Culture**      2+ Streptococcus, beta hemolytic  Group A     Gram Stain Result 3+ WBC      No organisms seen    HS Troponin (with 2 hour reflex) [[id number]]  (Normal) Collected: 07/14/23 1716    Specimen: Blood, Venous Updated: 07/14/23 1821     High Sens Troponin I <2.0 pg/mL     CBC and differential [[id number]]  (Abnormal) Collected: 07/14/23 1716    Specimen: Blood, Venous Updated: 07/14/23 1808     WBC 2.80 K/uL      RBC 3.38 M/uL      Hemoglobin 10.5 g/dL      Hematocrit 31.0 %      MCV 91.7 fL      MCH 31.1 pg      MCHC 33.9 g/dL      RDW 12.1 %      Platelets 57 K/uL      Comment: RESULTS OBTAINED AFTER VORTEXING TO ELIMINATE PLT CLUMPS        MPV 9.9 fL      Differential Type Manu     Neutrophils 46 %      Lymphocytes 45 %      Monocytes 9 %      Eosinophils 0 %      Basophils 0 %      Neutrophils, Absolute 1.29 K/uL      Lymphocytes, Absolute 1.26 K/uL      Monocytes, Absolute 0.25 K/uL      Eosinophils, Absolute 0.00 K/uL      Basophils, Absolute 0.00 K/uL      PLT Morphology Normal     Platelet Estimate Decreased (51,000-149,000)     Hypochromia Occasional    Basic metabolic panel [[id number]]  (Abnormal) Collected: 07/14/23 1716    Specimen: Blood, Venous Updated: 07/14/23 1805     Sodium 137 mEQ/L      Potassium 3.6 mEQ/L      Comment: Results obtained on plasma. Plasma Potassium values may be up to 0.4 mEQ/L less than serum values. The differences may be greater for patients with high platelet or white cell counts.        Chloride 103 mEQ/L      CO2 25 mEQ/L      BUN 16 mg/dL      Creatinine 1.4 mg/dL      Glucose 90 mg/dL      Calcium 9.5 mg/dL      eGFR 43.3 mL/min/1.73m*2      Anion Gap 9 mEQ/L     BhCG, Serum, Qual [[id number]]  (Normal) Collected: 07/14/23 1716    Specimen: Blood, Venous Updated: 07/14/23 1805     Preg Test, Serum Negative          Imaging Results          X-RAY CHEST 2 VIEWS (Final result)  Result time 07/14/23 16:54:58    Final result                 Impression:    IMPRESSION:  Right lower lobe mass. No additional consolidation              Narrative:    CLINICAL HISTORY: Chest pain.    COMMENT: Two views of the chest are submitted for review and compared to prior  from 4/24/2023    The heart is normal in size. The mediastinal silhouette is unremarkable.  Right-sided chest port terminates in the SVC lobulated subpleural right middle  lobe mass measures 3 cm. The lung fields are otherwise clear bilaterally without  evidence for infiltrates, effusion, or pneumothorax. The osseous structures are  within normal limits.                                ECG 12 lead          Scoring tools                                  ED Course & MDM   MDM / ED COURSE / CLINICAL IMPRESSION / DISPO     Medical Decision Making  Amount and/or Complexity of Data Reviewed  Labs: ordered.  Radiology: ordered.  ECG/medicine tests: ordered.          ED Course as of 07/18/23 1022   Fri Jul 14, 2023 2015 Completed reviewed urology note from LAVELLE Hummel on 5/11/23 [BC]   2057 Return to follow-up precautions discussed with the patient at bedside.  Reviewed imaging and laboratory results with the patient as well.  Instructed patient to follow-up with PCP to further evaluate nodule found on chest x-ray. [BC]   2126 Patient is on Keytruda.  Unclear if this constitutes that patient is immunocompromised.  Does have a gynecological cancer.  And has a wound that is only gotten worse after about a days worth of Bactrim.  Will discuss with ID whether we should give IV antibiotics and bring her in. [MS]   Sat Jul 15, 2023   0200 Infectious disease both the [facility] and our ID has never called back. [MS]   0205 Will provide patient with ceftriaxone and vancomycin in the ER.  Patient will also be discharged with the use of doxycycline and instructed to discontinue her use of Bactrim.  Strict return and follow-up precautions discussed with the patient.  Troponin negative x2.  Patient also is not currently experiencing any chest pain.  Do not suspect PE, ACS, or dissection.  PERC  score 0. [BC]      ED Course User Index  [BC] Michel Talley PA C  [MS] Chandu Dyson MD     Clinical Impression      Chest pain, unspecified type   Cellulitis of left lower extremity     _________________     ED Disposition   Discharge                   Michel Talley PA C  07/15/23 0236       Michel Talley PA C  07/18/23 1022

## 2023-07-15 NOTE — ED ATTESTATION NOTE
Procedures  Physical Exam  Review of Systems  [facility]    7/15/793617:28 AM  I have personally seen and examined the patient. I was involved in the care and medical decision making for this patient.    I reviewed and agree with the PA/NP/Resident's assessment and plan of care, with any exceptions as documented below.    My focused history, examination, assessment, and plan of care of Alondra Villela is as follows:    The patient presents with chest pain and also some pain and swelling in the left Achilles area.  Chest pain is quite atypical and does not seem like coronary disease or pulmonary emboli.  It is somewhat reproduced with movement.  Studies here including troponin and EKGs are all unremarkable.  The wound in the left Achilles has gotten more red and swollen despite being on a day of Bactrim.    Exam: Awake alert and oriented currently in mild distress.  Lungs are clear.  Heart is sinus without any murmur.  Abdomen soft nontender.  The left chest pain can be somewhat reproduced by movement of the arm.  The left ankle shows the erythema that has expanded from the small centimeter type lesion.  There is a crusted over small early abscess.    The crust was removed and there was a small amount of pus which was sent for culture.    Impression/Plan/Medical Decision Making: Chest pain is atypical.  Does not need any specific treatment.  The infection of the Achilles however somewhat more troublesome.  We will switch to Doxy and given a dose of ceftriaxone and vancomycin as this will cover both staph and strep until we get the results of the culture back.  Attempted to call infectious disease.  Both the on-call numbers yielded no response.  Despite messages being left.  I agree with PA management and discharge.    Vital Signs Review: Vital signs have been reviewed. The oxygen saturation is  SpO2: 100 % which is normal.    This document was created using Dragon dictation software.  There might be some typographical  errors due to this technology.    We are in a period of time with increased volumes and decreased capacity.      Chandu Dyson MD  07/15/23 8735

## 2023-07-16 LAB
GRAM STN SPEC: ABNORMAL
GRAM STN SPEC: ABNORMAL
MICROORGANISM SPEC CULT: ABNORMAL
MICROORGANISM SPEC CULT: ABNORMAL

## 2023-07-18 ENCOUNTER — TELEPHONE (OUTPATIENT)
Dept: GYNECOLOGIC ONCOLOGY | Facility: CLINIC | Age: 33
End: 2023-07-18
Payer: COMMERCIAL

## 2023-07-18 DIAGNOSIS — D69.6 THROMBOCYTOPENIA (CMS/HCC): ICD-10-CM

## 2023-07-18 DIAGNOSIS — R91.8 LUNG MASS: Primary | ICD-10-CM

## 2023-07-18 DIAGNOSIS — D70.2 DRUG-INDUCED NEUTROPENIA (CMS/HCC): ICD-10-CM

## 2023-07-18 DIAGNOSIS — Z51.12 ENCOUNTER FOR ANTINEOPLASTIC IMMUNOTHERAPY: ICD-10-CM

## 2023-07-18 DIAGNOSIS — O01.9 GESTATIONAL TROPHOBLASTIC NEOPLASM: ICD-10-CM

## 2023-07-18 DIAGNOSIS — O01.9 GESTATIONAL TROPHOBLASTIC NEOPLASM: Primary | ICD-10-CM

## 2023-07-18 NOTE — TELEPHONE ENCOUNTER
Spoke to Nereida HIRSCH from [facility] and was told that Authorization for CT was Approved  Case # [id number], and Authorization# [id number] was Provided to pt to call to make the Appt.

## 2023-07-18 NOTE — PLAN OF CARE
Problem: Adult Inpatient Plan of Care  Goal: Plan of Care Review  Flowsheets (Taken 1/16/2023 0650)  Progress: no change  Plan of Care Reviewed With: patient  Outcome Summary: Patient reports intermittent headache, relieved with Tylenol and Motrin. Dry cough still present. Tachycardic with HR in the 110's to 120's during shift. Denies chest pain or discomfort. No bleeding noted. Bilateral SCD's in place. Spouse at bedside. Call bell within reach.     [Gonorrhea test offered] : Gonorrhea test offered [Chlamydia test offered] : Chlamydia test offered [Menarche Age: ____] : age at menarche was [unfilled] [No] : Patient does not have concerns regarding sex [N] : Patient does not use contraception [Y] : Patient is sexually active [Currently Active] : currently active [PapSmeardate] : 1/12/22 [TextBox_31] : neg [GonorrheaDate] : 1/12/22 [TextBox_63] : neg [ChlamydiaDate] : 1/12/22 [TextBox_68] : neg [LMPDate] : 6/22/23 [PGHxTotal] : 2 [[facility] CenterxFullTerm] : 2 [[facility] CorporationxLiving] : 2 [FreeTextEntry1] : 6/22/23

## 2023-07-18 NOTE — TELEPHONE ENCOUNTER
Called Alondra with the following plan:    CT chest to further evaluate right lower lobe mass  Repeat labs this week (before the end of the week)  Possibly delay treatment depending on labs/CT findings/sore on leg

## 2023-07-18 NOTE — TELEPHONE ENCOUNTER
Alondra called back to discuss the plan    Discussed need for repeat lab testing tomorrow/Thursday CBC,CMP, Beta placed for [facility] ( will email here a copy of the lab slips). Since patient has a hard time with lab draws I asked her to call me tomorrow after she goes to let me know if they were able to draw her labs or not.If unable to draw labs we will need to schedule lab draw with [facility]. Pending lab results will determine if treatment is on or not for next week.    PT continues to take doxy for leg infection; feeling better (last dose is scheduled for Monday/same day as next treatment). Will discuss plan with Dr. Smith. Discussed with patient the possibility of delaying tx  If infection has not improved by then.    Also discussed need for CT chest for further eval of lung mass; patient requires PA and will schedule that sometime next week.

## 2023-07-19 DIAGNOSIS — O01.9 GESTATIONAL TROPHOBLASTIC NEOPLASM: Primary | ICD-10-CM

## 2023-07-19 DIAGNOSIS — Z51.12 ENCOUNTER FOR ANTINEOPLASTIC IMMUNOTHERAPY: ICD-10-CM

## 2023-07-20 ENCOUNTER — TELEPHONE (OUTPATIENT)
Dept: GYNECOLOGIC ONCOLOGY | Facility: CLINIC | Age: 33
End: 2023-07-20
Payer: COMMERCIAL

## 2023-07-20 ENCOUNTER — PATIENT OUTREACH (OUTPATIENT)
Dept: HEMATOLOGY/ONCOLOGY | Facility: HOSPITAL | Age: 33
End: 2023-07-20
Payer: COMMERCIAL

## 2023-07-20 ENCOUNTER — DOCUMENTATION (OUTPATIENT)
Dept: GYNECOLOGIC ONCOLOGY | Facility: CLINIC | Age: 33
End: 2023-07-20
Payer: COMMERCIAL

## 2023-07-20 DIAGNOSIS — O01.9 GESTATIONAL TROPHOBLASTIC NEOPLASM: Primary | ICD-10-CM

## 2023-07-20 DIAGNOSIS — D70.2 DRUG-INDUCED NEUTROPENIA (CMS/HCC): Primary | ICD-10-CM

## 2023-07-20 DIAGNOSIS — D70.2 DRUG-INDUCED NEUTROPENIA (CMS/HCC): ICD-10-CM

## 2023-07-20 DIAGNOSIS — O01.9 GESTATIONAL TROPHOBLASTIC NEOPLASM: ICD-10-CM

## 2023-07-20 LAB
ALBUMIN SERPL-MCNC: 4.4 G/DL (ref 3.9–4.9)
ALBUMIN/GLOB SERPL: 1.3 {RATIO} (ref 1.2–2.2)
ALP SERPL-CCNC: 91 IU/L (ref 44–121)
ALT SERPL-CCNC: 22 IU/L (ref 0–32)
AST SERPL-CCNC: 21 IU/L (ref 0–40)
BASOPHILS # BLD AUTO: 0 X10E3/UL (ref 0–0.2)
BASOPHILS NFR BLD AUTO: 1 %
BILIRUB SERPL-MCNC: 0.2 MG/DL (ref 0–1.2)
BUN SERPL-MCNC: 15 MG/DL (ref 6–20)
BUN/CREAT SERPL: 13 (ref 9–23)
CALCIUM SERPL-MCNC: 9 MG/DL (ref 8.7–10.2)
CHLORIDE SERPL-SCNC: 99 MMOL/L (ref 96–106)
CO2 SERPL-SCNC: 21 MMOL/L (ref 20–29)
CREAT SERPL-MCNC: 1.17 MG/DL (ref 0.57–1)
EGFRCR SERPLBLD CKD-EPI 2021: 63 ML/MIN/1.73
EOSINOPHIL # BLD AUTO: 0 X10E3/UL (ref 0–0.4)
EOSINOPHIL NFR BLD AUTO: 1 %
ERYTHROCYTE [DISTWIDTH] IN BLOOD BY AUTOMATED COUNT: 12 % (ref 11.7–15.4)
GLOBULIN SER CALC-MCNC: 3.4 G/DL (ref 1.5–4.5)
GLUCOSE SERPL-MCNC: 88 MG/DL (ref 70–99)
HCG INTACT+B SERPL-ACNC: 15 MIU/ML
HCT VFR BLD AUTO: 30.3 % (ref 34–46.6)
HGB BLD-MCNC: 10.1 G/DL (ref 11.1–15.9)
IMM GRANULOCYTES # BLD AUTO: 0 X10E3/UL (ref 0–0.1)
IMM GRANULOCYTES NFR BLD AUTO: 0 %
LYMPHOCYTES # BLD AUTO: 1.2 X10E3/UL (ref 0.7–3.1)
LYMPHOCYTES NFR BLD AUTO: 54 %
MCH RBC QN AUTO: 31.5 PG (ref 26.6–33)
MCHC RBC AUTO-ENTMCNC: 33.3 G/DL (ref 31.5–35.7)
MCV RBC AUTO: 94 FL (ref 79–97)
MONOCYTES # BLD AUTO: 0.3 X10E3/UL (ref 0.1–0.9)
MONOCYTES NFR BLD AUTO: 12 %
MORPHOLOGY BLD-IMP: ABNORMAL
NEUTROPHILS # BLD AUTO: 0.7 X10E3/UL (ref 1.4–7)
NEUTROPHILS NFR BLD AUTO: 32 %
PLATELET # BLD AUTO: 41 X10E3/UL (ref 150–450)
POTASSIUM SERPL-SCNC: 4.6 MMOL/L (ref 3.5–5.2)
PROT SERPL-MCNC: 7.8 G/DL (ref 6–8.5)
RBC # BLD AUTO: 3.21 X10E6/UL (ref 3.77–5.28)
SODIUM SERPL-SCNC: 137 MMOL/L (ref 134–144)
WBC # BLD AUTO: 2.2 X10E3/UL (ref 3.4–10.8)

## 2023-07-20 RX ORDER — DOXYCYCLINE 100 MG/1
100 CAPSULE ORAL 2 TIMES DAILY
Qty: 14 CAPSULE | Refills: 0 | Status: SHIPPED | OUTPATIENT
Start: 2023-07-20 | End: 2023-07-27

## 2023-07-20 NOTE — PROGRESS NOTES
Called insurance for PA for Diana   [phone]  Alondra MELARA created  Fulphila is non preferred drug; preferred drug is Ziextenzo. Discussed that Ziextenzo is no longer on the market so we must get Fulphila approved.  Case Pending #[id number]  Faxed documents:[phone]

## 2023-07-20 NOTE — TELEPHONE ENCOUNTER
Spoke to Ny from [facility], and was told that CT Abd Pelv W  Iv contrast was Approved.   Case # [id number] and Auth# [id number]    lvm to pt with detail information in regarding new order to be performed.

## 2023-07-20 NOTE — TELEPHONE ENCOUNTER
Discussed case with Dr. Smith and called pt - plan to delay pembro x 1 wk (originally scheduled for 7/24 -- will move to 7/31) 2/2 neutropenia (ANC= 0.7) and thrombocytopenia (41). Pt will repeat CBC with diff on Monday, 7/24 for monitoring. She will also establish care with a hematologist - pt plans to find one in [place] closer to her home. She is scheduled for CT on 7/31 in the afternoon (after 3pm) so this should work well for tx that morning. Plan to stay on doxycycline for her ankle wound while she is neutropenic - refilled script as she will run out on Sunday.

## 2023-07-20 NOTE — TELEPHONE ENCOUNTER
----- Message from Antonio Smith MD sent at 7/20/2023  7:35 AM EDT -----  Please call her with the news that she is neutropenic, but also that with the news that the beta-hCG is lower.

## 2023-07-20 NOTE — TELEPHONE ENCOUNTER
Called patient with lab results discussed neutropenia and thrombocytopenia. Reviewed precautions for both     Called patient with neutropenic precautions:  1. Avoid large groups of people  2. Avoid close contact; kissing, hugging, sharing food/drinks, etc.  3. Avoid live plants  4. Avoid raw fruits/veggies  5. Check temp BID; call if temp is 100.4 or higher  6. Patient instructed to call the office with any concerning signs/symtoms      Patient reports feeling great, wound on her leg is improving an feels well. She is scheduled or Ct on 7/31. Discussed the possibility of holding tx for Monday due to  Labs. Advised her to repeat labs on Saturday/Monday to see the trend.

## 2023-07-20 NOTE — LETTER
Alondra Villela  : 1990  Member ID: [id number]  CPT CODE:   CASE: [id number]    To whom it may concern,     Alondra Villela is a patient under the direct patient care of Dr. Smith. 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 Patient is currently Neutropenic and requires the need for Fulphila (). Please approve this drug as our facility does not support any of the preferred drugs that are listed for this patient and Ziextenzo is no longer available on the market. Because of this situation and the medical need and importance to keep patient on this treatment please approve this drug. It is important not to delay treatment for this patient as this would delay her care and possibly lead to poor outcome. Please also refer to attached clinical documents.    Sincerely,    Estela Bell PA-C

## 2023-07-21 ENCOUNTER — DOCUMENTATION (OUTPATIENT)
Dept: GYNECOLOGIC ONCOLOGY | Facility: CLINIC | Age: 33
End: 2023-07-21
Payer: COMMERCIAL

## 2023-07-21 ENCOUNTER — PATIENT OUTREACH (OUTPATIENT)
Dept: HEMATOLOGY/ONCOLOGY | Facility: HOSPITAL | Age: 33
End: 2023-07-21
Payer: COMMERCIAL

## 2023-07-25 ENCOUNTER — DOCUMENTATION (OUTPATIENT)
Dept: GYNECOLOGIC ONCOLOGY | Facility: CLINIC | Age: 33
End: 2023-07-25
Payer: COMMERCIAL

## 2023-07-25 LAB — HCG INTACT+B SERPL-ACNC: 14 MIU/ML

## 2023-07-25 NOTE — TELEPHONE ENCOUNTER
----- Message from KELTON Perez sent at 7/25/2023  3:36 PM EDT -----  Regarding: Bloodwork  Contact: [phone]  Saarh Siuwilliamne do you mind faxing these documents to the number she provided. Just needs most recent labs; progress notes      ----- Message -----  From: Alondra Villela  Sent: 7/25/2023   3:33 PM EDT  To: KELTON Perez  Subject: Bloodwork                                        Thanks so much Estela. Prior to scheduling, they are requiring the below information be faxed over to them. The reason being, they review this information in order to determine which doctor is the best fit.      1) copy of the referral  2) most recent bloodwork results   3) clinical records/notes from beginning of diagnosis to now.     The fax number is [phone]. If you or the office could please let me know whenever this has been completed, they asked that I call back to confirm they have received it.     Thanks in advance for your assistance with this. Much appreciated!

## 2023-07-25 NOTE — PROGRESS NOTES
Peer to Peer conducted. S/w Dr. Ring- informed fulphila is denied as this is not the preferred brand. Explained that is what the office carries and unable to get Ziextenzo as this is off the market as of 7/1. Dr. Ring said the preferred brands are Neulasta or Udencya and he cannot approve the fulphila.

## 2023-07-25 NOTE — TELEPHONE ENCOUNTER
Spoke to pt to let her know that Clinical documents were faxed over to [facility].

## 2023-07-26 DIAGNOSIS — O01.9 GESTATIONAL TROPHOBLASTIC NEOPLASM: Primary | ICD-10-CM

## 2023-07-26 LAB
BASOPHILS # BLD AUTO: 0 X10E3/UL (ref 0–0.2)
BASOPHILS NFR BLD AUTO: 0 %
EOSINOPHIL # BLD AUTO: 0 X10E3/UL (ref 0–0.4)
EOSINOPHIL NFR BLD AUTO: 1 %
ERYTHROCYTE [DISTWIDTH] IN BLOOD BY AUTOMATED COUNT: 12 % (ref 11.7–15.4)
HCT VFR BLD AUTO: 31.9 % (ref 34–46.6)
HGB BLD-MCNC: 10.5 G/DL (ref 11.1–15.9)
IMM GRANULOCYTES # BLD AUTO: 0 X10E3/UL (ref 0–0.1)
IMM GRANULOCYTES NFR BLD AUTO: 0 %
LYMPHOCYTES # BLD AUTO: 1.6 X10E3/UL (ref 0.7–3.1)
LYMPHOCYTES NFR BLD AUTO: 54 %
MCH RBC QN AUTO: 31 PG (ref 26.6–33)
MCHC RBC AUTO-ENTMCNC: 32.9 G/DL (ref 31.5–35.7)
MCV RBC AUTO: 94 FL (ref 79–97)
MONOCYTES # BLD AUTO: 0.4 X10E3/UL (ref 0.1–0.9)
MONOCYTES NFR BLD AUTO: 13 %
MORPHOLOGY BLD-IMP: ABNORMAL
NEUTROPHILS # BLD AUTO: 1 X10E3/UL (ref 1.4–7)
NEUTROPHILS NFR BLD AUTO: 32 %
PLATELET # BLD AUTO: 46 X10E3/UL (ref 150–450)
RBC # BLD AUTO: 3.39 X10E6/UL (ref 3.77–5.28)
WBC # BLD AUTO: 3.1 X10E3/UL (ref 3.4–10.8)

## 2023-07-27 DIAGNOSIS — O01.9 GESTATIONAL TROPHOBLASTIC NEOPLASM: Primary | ICD-10-CM

## 2023-07-31 ENCOUNTER — TELEPHONE (OUTPATIENT)
Dept: GYNECOLOGIC ONCOLOGY | Facility: CLINIC | Age: 33
End: 2023-07-31
Payer: COMMERCIAL

## 2023-07-31 ENCOUNTER — HOSPITAL ENCOUNTER (OUTPATIENT)
Dept: RADIOLOGY | Facility: HOSPITAL | Age: 33
Discharge: HOME | End: 2023-07-31
Attending: PHYSICIAN ASSISTANT
Payer: COMMERCIAL

## 2023-07-31 ENCOUNTER — HOSPITAL ENCOUNTER (OUTPATIENT)
Dept: INPATIENT UNIT | Facility: HOSPITAL | Age: 33
Discharge: HOME | End: 2023-07-31
Attending: PHYSICIAN ASSISTANT
Payer: COMMERCIAL

## 2023-07-31 DIAGNOSIS — Z51.11 ENCOUNTER FOR ANTINEOPLASTIC CHEMOTHERAPY: ICD-10-CM

## 2023-07-31 DIAGNOSIS — O01.9 GESTATIONAL TROPHOBLASTIC NEOPLASM: ICD-10-CM

## 2023-07-31 DIAGNOSIS — R91.8 LUNG MASS: ICD-10-CM

## 2023-07-31 LAB
ALBUMIN SERPL-MCNC: 4.3 G/DL (ref 3.5–5.7)
ALP SERPL-CCNC: 83 IU/L (ref 34–125)
ALT SERPL-CCNC: 22 IU/L (ref 7–52)
ANION GAP SERPL CALC-SCNC: 8 MEQ/L (ref 3–15)
AST SERPL-CCNC: 18 IU/L (ref 13–39)
BASOPHILS # BLD: 0 K/UL (ref 0.01–0.1)
BASOPHILS NFR BLD: 0 %
BILIRUB SERPL-MCNC: 0.3 MG/DL (ref 0.3–1.2)
BUN SERPL-MCNC: 19 MG/DL (ref 7–25)
CALCIUM SERPL-MCNC: 8.7 MG/DL (ref 8.6–10.3)
CHLORIDE SERPL-SCNC: 105 MEQ/L (ref 98–107)
CO2 SERPL-SCNC: 27 MEQ/L (ref 21–31)
CREAT SERPL-MCNC: 1.4 MG/DL (ref 0.6–1.2)
DIFFERENTIAL METHOD BLD: ABNORMAL
EOSINOPHIL # BLD: 0.07 K/UL (ref 0.04–0.36)
EOSINOPHIL NFR BLD: 3 %
ERYTHROCYTE [DISTWIDTH] IN BLOOD BY AUTOMATED COUNT: 12.1 % (ref 11.7–14.4)
GFR SERPL CREATININE-BSD FRML MDRD: 43.3 ML/MIN/1.73M*2
GLUCOSE SERPL-MCNC: 98 MG/DL (ref 70–99)
HCT VFR BLDCO AUTO: 29.3 % (ref 35–45)
HGB BLD-MCNC: 10.6 G/DL (ref 11.8–15.7)
LYMPHOCYTES # BLD: 1.23 K/UL (ref 1.2–3.5)
LYMPHOCYTES NFR BLD: 53 %
MCH RBC QN AUTO: 33 PG (ref 28–33.2)
MCHC RBC AUTO-ENTMCNC: 36.2 G/DL (ref 32.2–35.5)
MCV RBC AUTO: 91.3 FL (ref 83–98)
MONOCYTES # BLD: 0.12 K/UL (ref 0.28–0.8)
MONOCYTES NFR BLD: 5 %
NEUTS BAND # BLD: 0.91 K/UL (ref 1.7–7)
NEUTS SEG NFR BLD: 39 %
PDW BLD AUTO: 9.9 FL (ref 9.4–12.3)
PLAT MORPH BLD: NORMAL
PLATELET # BLD AUTO: 53 K/UL (ref 150–369)
PLATELET # BLD EST: ABNORMAL 10*3/UL
POTASSIUM SERPL-SCNC: 4.3 MEQ/L (ref 3.5–5.1)
PROT SERPL-MCNC: 7.6 G/DL (ref 6–8.2)
RBC # BLD AUTO: 3.21 M/UL (ref 3.93–5.22)
RBC MORPH BLD: NORMAL
SODIUM SERPL-SCNC: 140 MEQ/L (ref 136–145)
TSH SERPL DL<=0.05 MIU/L-ACNC: 1.53 MIU/L (ref 0.34–5.6)
WBC # BLD AUTO: 2.33 K/UL (ref 3.8–10.5)

## 2023-07-31 PROCEDURE — 36415 COLL VENOUS BLD VENIPUNCTURE: CPT | Performed by: PHYSICIAN ASSISTANT

## 2023-07-31 PROCEDURE — 85025 COMPLETE CBC W/AUTO DIFF WBC: CPT | Performed by: PHYSICIAN ASSISTANT

## 2023-07-31 PROCEDURE — 84443 ASSAY THYROID STIM HORMONE: CPT | Performed by: PHYSICIAN ASSISTANT

## 2023-07-31 PROCEDURE — 63600105 HC IODINE BASED CONTRAST: Performed by: PHYSICIAN ASSISTANT

## 2023-07-31 PROCEDURE — G1004 CDSM NDSC: HCPCS

## 2023-07-31 PROCEDURE — 80053 COMPREHEN METABOLIC PANEL: CPT | Performed by: PHYSICIAN ASSISTANT

## 2023-07-31 PROCEDURE — 36591 DRAW BLOOD OFF VENOUS DEVICE: CPT

## 2023-07-31 RX ORDER — LIDOCAINE AND PRILOCAINE 25; 25 MG/G; MG/G
CREAM TOPICAL AS NEEDED
Qty: 5 G | Refills: 1 | Status: SHIPPED | OUTPATIENT
Start: 2023-07-31 | End: 2024-07-30

## 2023-07-31 RX ADMIN — IOHEXOL 125 ML: 350 INJECTION, SOLUTION INTRAVENOUS at 16:16

## 2023-07-31 NOTE — PROGRESS NOTES
1852 Patient arrived to [facility] for Port access and labs. RCW port accessed with power port needle, patient is going to cat scan after labs. Labs obtained and sent. Patient to CT scan.

## 2023-07-31 NOTE — TELEPHONE ENCOUNTER
Called with Labs; platelets trending up but still low. Advised patient on the following plan: will await CT results from today prior to scheduling next cycle. Will call patient with results tomorrow.    Called [facility] to add beta HCG to today's lab draw.

## 2023-08-01 ENCOUNTER — TELEPHONE (OUTPATIENT)
Dept: GYNECOLOGIC ONCOLOGY | Facility: CLINIC | Age: 33
End: 2023-08-01
Payer: COMMERCIAL

## 2023-08-01 NOTE — TELEPHONE ENCOUNTER
Called with CBC results revealing neutropenia; still with thrombocytopenia. Reviewed precautions; patient understands. Will plan to repeat labs again on Friday morning.    Asked patient for phone number of hem/onc to discuss plan. Patient does not have it on her and plans to look for it and send it via the portal. She is still waiting to hear back from their office to set up an appointment. I asked her to give me the number as soon as possible so I can call.

## 2023-08-01 NOTE — TELEPHONE ENCOUNTER
Contacted [facility] hem/onc department to see if they can see Alondra ASAP as this is urgent and the patient has been waiting for over 1 week to hear back from them. Discussed patient's diagnosis with GTN and the concerns that WBC reatment has been delayed and we need to get her seen asap to clear her. Spoke with Hansa- karyna with Hematology; she is going to send an urgent request to the doctors over at the practice and notify the patient once they heare back.

## 2023-08-02 ENCOUNTER — PATIENT OUTREACH (OUTPATIENT)
Dept: HEMATOLOGY/ONCOLOGY | Facility: HOSPITAL | Age: 33
End: 2023-08-02
Payer: COMMERCIAL

## 2023-08-02 ENCOUNTER — TELEPHONE (OUTPATIENT)
Dept: GYNECOLOGIC ONCOLOGY | Facility: CLINIC | Age: 33
End: 2023-08-02
Payer: COMMERCIAL

## 2023-08-02 NOTE — TELEPHONE ENCOUNTER
Called patient with labs and review of CT scan.  Continues to have a central lucency in the uterus.  I have offered her D&C hysteroscopy versus hysterectomy.  Given her neutropenia and thrombocytopenia, I would favor a D&C at this time.  We are still working on getting her in with a hematologist but she has an appointment on August 11 with hematology at [facility]

## 2023-08-02 NOTE — PROGRESS NOTES
Telephone contact w/ patient to confirm navigator scheduled appoitnment w/ Dr. Manley @ [place] [facility] as requested by Dr. Smith for Mon 8/7/23 @ 2:15 @ [facility] [place]. Navigator scheduled this w/ [facility] new patient coordinator- Little will notify navigator or call patient as she is working on getting Alondra a sooner appointment. provided contact information for patient.  15:30: [facility] notified navigator that an appointment opened up for tomorrow (8/3) @ 8:45am. Patient agrees to this date/time.

## 2023-08-03 ENCOUNTER — LAB REQUISITION (OUTPATIENT)
Dept: LAB | Facility: HOSPITAL | Age: 33
End: 2023-08-03
Attending: INTERNAL MEDICINE
Payer: COMMERCIAL

## 2023-08-03 DIAGNOSIS — D72.819 DECREASED WHITE BLOOD CELL COUNT, UNSPECIFIED: ICD-10-CM

## 2023-08-03 DIAGNOSIS — D69.59 OTHER SECONDARY THROMBOCYTOPENIA: ICD-10-CM

## 2023-08-03 DIAGNOSIS — D64.9 ANEMIA, UNSPECIFIED: ICD-10-CM

## 2023-08-03 LAB
ALBUMIN SERPL-MCNC: 4.7 G/DL (ref 3.5–5.7)
ALP SERPL-CCNC: 81 IU/L (ref 34–125)
ALT SERPL-CCNC: 24 IU/L (ref 7–52)
ANION GAP SERPL CALC-SCNC: 8 MEQ/L (ref 3–15)
AST SERPL-CCNC: 19 IU/L (ref 13–39)
BASOPHILS # BLD: 0.02 K/UL (ref 0.01–0.1)
BASOPHILS NFR BLD: 1 %
BILIRUB SERPL-MCNC: 0.4 MG/DL (ref 0.3–1.2)
BUN SERPL-MCNC: 22 MG/DL (ref 7–25)
CALCIUM SERPL-MCNC: 9.3 MG/DL (ref 8.6–10.3)
CHLORIDE SERPL-SCNC: 103 MEQ/L (ref 98–107)
CO2 SERPL-SCNC: 29 MEQ/L (ref 21–31)
CREAT SERPL-MCNC: 1.3 MG/DL (ref 0.6–1.2)
DACRYOCYTES BLD QL SMEAR: ABNORMAL
DIFFERENTIAL METHOD BLD: ABNORMAL
EOSINOPHIL # BLD: 0.02 K/UL (ref 0.04–0.36)
EOSINOPHIL NFR BLD: 1 %
ERYTHROCYTE [DISTWIDTH] IN BLOOD BY AUTOMATED COUNT: 12.1 % (ref 11.7–14.4)
FERRITIN SERPL-MCNC: 633 NG/ML (ref 11–250)
FOLATE SERPL-MCNC: >20 NG/ML
GFR SERPL CREATININE-BSD FRML MDRD: 47.2 ML/MIN/1.73M*2
GLUCOSE SERPL-MCNC: 88 MG/DL (ref 70–99)
HCT VFR BLDCO AUTO: 31.1 % (ref 35–45)
HGB BLD-MCNC: 10.9 G/DL (ref 11.8–15.7)
IRON SATN MFR SERPL: 36 % (ref 15–45)
IRON SERPL-MCNC: 108 UG/DL (ref 35–150)
LYMPHOCYTES # BLD: 1.13 K/UL (ref 1.2–3.5)
LYMPHOCYTES NFR BLD: 52 %
MCH RBC QN AUTO: 31.3 PG (ref 28–33.2)
MCHC RBC AUTO-ENTMCNC: 35 G/DL (ref 32.2–35.5)
MCV RBC AUTO: 89.4 FL (ref 83–98)
MONOCYTES # BLD: 0.2 K/UL (ref 0.28–0.8)
MONOCYTES NFR BLD: 9 %
NEUTROPHILS # BLD: 0.73 K/UL (ref 1.7–7)
NEUTS BAND # BLD: 0.8 K/UL (ref 1.7–7)
NEUTS SEG NFR BLD: 37 %
OVALOCYTES BLD QL SMEAR: ABNORMAL
PDW BLD AUTO: 9.2 FL (ref 9.4–12.3)
PLAT MORPH BLD: NORMAL
PLATELET # BLD AUTO: 55 K/UL (ref 150–369)
PLATELET # BLD EST: ABNORMAL 10*3/UL
POTASSIUM SERPL-SCNC: 4.4 MEQ/L (ref 3.5–5.1)
PROT SERPL-MCNC: 8.3 G/DL (ref 6–8.2)
RBC # BLD AUTO: 3.48 M/UL (ref 3.93–5.22)
SODIUM SERPL-SCNC: 140 MEQ/L (ref 136–145)
TIBC SERPL-MCNC: 302 UG/DL (ref 270–460)
UIBC SERPL-MCNC: 194 UG/DL (ref 180–360)
VIT B12 SERPL-MCNC: 190 PG/ML (ref 180–914)
WBC # BLD AUTO: 2.17 K/UL (ref 3.8–10.5)

## 2023-08-03 PROCEDURE — 85025 COMPLETE CBC W/AUTO DIFF WBC: CPT | Performed by: INTERNAL MEDICINE

## 2023-08-03 PROCEDURE — 83540 ASSAY OF IRON: CPT | Performed by: INTERNAL MEDICINE

## 2023-08-03 PROCEDURE — 36415 COLL VENOUS BLD VENIPUNCTURE: CPT | Performed by: INTERNAL MEDICINE

## 2023-08-03 PROCEDURE — 82746 ASSAY OF FOLIC ACID SERUM: CPT | Performed by: INTERNAL MEDICINE

## 2023-08-03 PROCEDURE — 82728 ASSAY OF FERRITIN: CPT | Performed by: INTERNAL MEDICINE

## 2023-08-03 PROCEDURE — 80053 COMPREHEN METABOLIC PANEL: CPT | Performed by: INTERNAL MEDICINE

## 2023-08-03 PROCEDURE — 82607 VITAMIN B-12: CPT | Performed by: INTERNAL MEDICINE

## 2023-08-04 ENCOUNTER — TELEPHONE (OUTPATIENT)
Dept: GYNECOLOGIC ONCOLOGY | Facility: CLINIC | Age: 33
End: 2023-08-04
Payer: COMMERCIAL

## 2023-08-07 DIAGNOSIS — O01.9 GESTATIONAL TROPHOBLASTIC NEOPLASM: Primary | ICD-10-CM

## 2023-08-08 ENCOUNTER — TELEPHONE (OUTPATIENT)
Dept: GYNECOLOGIC ONCOLOGY | Facility: CLINIC | Age: 33
End: 2023-08-08
Payer: COMMERCIAL

## 2023-08-08 DIAGNOSIS — O01.9 GESTATIONAL TROPHOBLASTIC NEOPLASM: Primary | ICD-10-CM

## 2023-08-08 RX ORDER — FAMOTIDINE 10 MG/ML
20 INJECTION INTRAVENOUS ONCE AS NEEDED
Status: CANCELLED | OUTPATIENT
Start: 2023-08-10

## 2023-08-08 RX ORDER — EPINEPHRINE 1 MG/ML
0.5 INJECTION, SOLUTION INTRAMUSCULAR; SUBCUTANEOUS ONCE AS NEEDED
Status: CANCELLED | OUTPATIENT
Start: 2023-08-10

## 2023-08-08 NOTE — TELEPHONE ENCOUNTER
Alondra called to discuss scheduling for pembro. She said she could not do Friday but could do tomorrow or Thursday if available. Spoke with stacey who confirmed slot for Thursday @ 1PM. Will have Alondra come to our office at 12:30; will get stat labs. Discussed with Alondra that Dr. Smith did want to be present for her next infusion to discuss questions/surgery. Advised her that he would be in surgery during her treatment on Thursday so I would recommend keeping her appointment with him on Monday (possibly switching to a mychart visit) to discuss plan.    Will discuss plan with the team.

## 2023-08-08 NOTE — TELEPHONE ENCOUNTER
Called Alondra to let her know there was an opening on Thursday @ 1PM. Asked her to call me back to confirm if she can take this appointment for infusion.

## 2023-08-10 ENCOUNTER — HOSPITAL ENCOUNTER (OUTPATIENT)
Dept: INPATIENT UNIT | Facility: HOSPITAL | Age: 33
Discharge: HOME | End: 2023-08-10
Attending: PHYSICIAN ASSISTANT
Payer: COMMERCIAL

## 2023-08-10 ENCOUNTER — OFFICE VISIT (OUTPATIENT)
Dept: GYNECOLOGIC ONCOLOGY | Facility: CLINIC | Age: 33
End: 2023-08-10
Attending: OBSTETRICS & GYNECOLOGY
Payer: COMMERCIAL

## 2023-08-10 VITALS
TEMPERATURE: 98 F | SYSTOLIC BLOOD PRESSURE: 120 MMHG | DIASTOLIC BLOOD PRESSURE: 82 MMHG | WEIGHT: 160.6 LBS | RESPIRATION RATE: 20 BRPM | BODY MASS INDEX: 27.42 KG/M2 | HEIGHT: 64 IN | OXYGEN SATURATION: 96 % | HEART RATE: 90 BPM

## 2023-08-10 DIAGNOSIS — O01.9 GESTATIONAL TROPHOBLASTIC NEOPLASM: ICD-10-CM

## 2023-08-10 DIAGNOSIS — Z51.12 ENCOUNTER FOR ANTINEOPLASTIC IMMUNOTHERAPY: ICD-10-CM

## 2023-08-10 DIAGNOSIS — D69.6 THROMBOCYTOPENIA (CMS/HCC): ICD-10-CM

## 2023-08-10 DIAGNOSIS — G62.9 NEUROPATHY: ICD-10-CM

## 2023-08-10 DIAGNOSIS — Z51.11 ENCOUNTER FOR ANTINEOPLASTIC CHEMOTHERAPY: Primary | ICD-10-CM

## 2023-08-10 DIAGNOSIS — O01.9 GESTATIONAL TROPHOBLASTIC NEOPLASM: Primary | ICD-10-CM

## 2023-08-10 PROBLEM — R50.81 NEUTROPENIC FEVER (CMS/HCC)  (CMS/HCC): Status: RESOLVED | Noted: 2023-02-04 | Resolved: 2023-08-10

## 2023-08-10 PROBLEM — N89.8 VAGINAL DISCHARGE: Status: RESOLVED | Noted: 2023-04-17 | Resolved: 2023-08-10

## 2023-08-10 PROBLEM — D70.9 NEUTROPENIC FEVER (CMS/HCC)  (CMS/HCC): Status: RESOLVED | Noted: 2023-02-04 | Resolved: 2023-08-10

## 2023-08-10 LAB
ALBUMIN SERPL-MCNC: 4.4 G/DL (ref 3.5–5.7)
ALP SERPL-CCNC: 71 IU/L (ref 34–125)
ALT SERPL-CCNC: 24 IU/L (ref 7–52)
ANION GAP SERPL CALC-SCNC: 8 MEQ/L (ref 3–15)
AST SERPL-CCNC: 17 IU/L (ref 13–39)
BASOPHILS # BLD: 0 K/UL (ref 0.01–0.1)
BASOPHILS NFR BLD: 0 %
BILIRUB SERPL-MCNC: 0.3 MG/DL (ref 0.3–1.2)
BUN SERPL-MCNC: 18 MG/DL (ref 7–25)
CALCIUM SERPL-MCNC: 9.3 MG/DL (ref 8.6–10.3)
CHLORIDE SERPL-SCNC: 105 MEQ/L (ref 98–107)
CO2 SERPL-SCNC: 26 MEQ/L (ref 21–31)
CREAT SERPL-MCNC: 1.4 MG/DL (ref 0.6–1.2)
DIFFERENTIAL METHOD BLD: ABNORMAL
EOSINOPHIL # BLD: 0.03 K/UL (ref 0.04–0.36)
EOSINOPHIL NFR BLD: 1 %
ERYTHROCYTE [DISTWIDTH] IN BLOOD BY AUTOMATED COUNT: 12.1 % (ref 11.7–14.4)
GFR SERPL CREATININE-BSD FRML MDRD: 43.3 ML/MIN/1.73M*2
GLUCOSE SERPL-MCNC: 97 MG/DL (ref 70–99)
HCG SERPL-ACNC: 10.4 IU/L (MIU/ML)
HCT VFR BLDCO AUTO: 29.9 % (ref 35–45)
HGB BLD-MCNC: 10.4 G/DL (ref 11.8–15.7)
LYMPHOCYTES # BLD: 1.63 K/UL (ref 1.2–3.5)
LYMPHOCYTES NFR BLD: 64 %
MCH RBC QN AUTO: 32 PG (ref 28–33.2)
MCHC RBC AUTO-ENTMCNC: 34.8 G/DL (ref 32.2–35.5)
MCV RBC AUTO: 92 FL (ref 83–98)
MONOCYTES # BLD: 0.08 K/UL (ref 0.28–0.8)
MONOCYTES NFR BLD: 3 %
NEUTROPHILS # BLD: 0.82 K/UL (ref 1.7–7)
NEUTS BAND # BLD: 0.81 K/UL (ref 1.7–7)
NEUTS SEG NFR BLD: 32 %
PDW BLD AUTO: 9.1 FL (ref 9.4–12.3)
PLAT MORPH BLD: NORMAL
PLATELET # BLD AUTO: 65 K/UL (ref 150–369)
PLATELET # BLD EST: ABNORMAL 10*3/UL
POTASSIUM SERPL-SCNC: 4 MEQ/L (ref 3.5–5.1)
PROT SERPL-MCNC: 7.7 G/DL (ref 6–8.2)
RBC # BLD AUTO: 3.25 M/UL (ref 3.93–5.22)
RBC MORPH BLD: NORMAL
SODIUM SERPL-SCNC: 139 MEQ/L (ref 136–145)
TSH SERPL DL<=0.05 MIU/L-ACNC: 3.02 MIU/L (ref 0.34–5.6)
WBC # BLD AUTO: 2.54 K/UL (ref 3.8–10.5)

## 2023-08-10 PROCEDURE — 85025 COMPLETE CBC W/AUTO DIFF WBC: CPT | Performed by: OBSTETRICS & GYNECOLOGY

## 2023-08-10 PROCEDURE — 63600000 HC DRUGS/DETAIL CODE: Mod: JZ,JG | Performed by: OBSTETRICS & GYNECOLOGY

## 2023-08-10 PROCEDURE — 25800000 HC PHARMACY IV SOLUTIONS: Performed by: OBSTETRICS & GYNECOLOGY

## 2023-08-10 PROCEDURE — 84702 CHORIONIC GONADOTROPIN TEST: CPT | Performed by: OBSTETRICS & GYNECOLOGY

## 2023-08-10 PROCEDURE — 80053 COMPREHEN METABOLIC PANEL: CPT | Performed by: OBSTETRICS & GYNECOLOGY

## 2023-08-10 PROCEDURE — 3008F BODY MASS INDEX DOCD: CPT | Performed by: OBSTETRICS & GYNECOLOGY

## 2023-08-10 PROCEDURE — 99215 OFFICE O/P EST HI 40 MIN: CPT | Performed by: OBSTETRICS & GYNECOLOGY

## 2023-08-10 PROCEDURE — 96413 CHEMO IV INFUSION 1 HR: CPT

## 2023-08-10 PROCEDURE — 36415 COLL VENOUS BLD VENIPUNCTURE: CPT | Performed by: OBSTETRICS & GYNECOLOGY

## 2023-08-10 PROCEDURE — 82040 ASSAY OF SERUM ALBUMIN: CPT | Performed by: OBSTETRICS & GYNECOLOGY

## 2023-08-10 PROCEDURE — 3E0430M INTRODUCTION OF MONOCLONAL ANTIBODY INTO CENTRAL VEIN, PERCUTANEOUS APPROACH: ICD-10-PCS | Performed by: PHYSICIAN ASSISTANT

## 2023-08-10 PROCEDURE — 84443 ASSAY THYROID STIM HORMONE: CPT | Performed by: OBSTETRICS & GYNECOLOGY

## 2023-08-10 RX ORDER — HEPARIN 100 UNIT/ML
500 SYRINGE INTRAVENOUS AS NEEDED
Status: DISCONTINUED | OUTPATIENT
Start: 2023-08-10 | End: 2023-08-11 | Stop reason: HOSPADM

## 2023-08-10 RX ORDER — HEPARIN 100 UNIT/ML
500 SYRINGE INTRAVENOUS AS NEEDED
Status: CANCELLED | OUTPATIENT
Start: 2023-08-10

## 2023-08-10 RX ADMIN — HEPARIN 500 UNITS: 100 SYRINGE at 15:21

## 2023-08-10 RX ADMIN — SODIUM CHLORIDE 200 MG: 9 INJECTION, SOLUTION INTRAVENOUS at 14:47

## 2023-08-10 NOTE — PROGRESS NOTES
1300 Patient arrived tp [facility] for chemotherapy for GTN cycle 5 day 1 of Keytruda. RCW port accessed and labs sent.      1447 Keytruda 200 mg up over 30 minutes, checked and verified by  Chemo RN's.      1521 Keytruda complete patient tolerated without difficulty. Port flushed with NSS and heparin 500 units/5ml and de-accessed. Patient discharged to home.

## 2023-08-10 NOTE — ASSESSMENT & PLAN NOTE
Hematologist, Dr. Manley deemed pancytopenia unlikely related to pembro. Pt will have stat labs today (CBC and diff, cmp, tsh, bhcg) for monitoring. If acceptable, plan to proceed with cycle 5 pembrolizumab today.     Recent CT shows continued central lucency in the uterus. Dr. Smith discussed D&C hysteroscopy versus hysterectomy. Pt is considering her options.

## 2023-08-10 NOTE — PROGRESS NOTES
PATIENT ID:  Alondra Villela is a 32 y.o. female.      TREATMENT REGIMEN: Pembrolizumab                CYCLE 5    MsBerhane Villela is a 32 y.o.yo lady with stage IV GTN whose hcg plateaued on EMA-EP. Switched to pembro, here today for cycle 5. She lives in [place] and returns for treatment today - needs labs.     Now established with hematologist for pancytopenia, Dr. Manley, last seen 8/3. Pancytopenia thought to be possibly related to recent abx use and recent leg wound (wound now healed and abx course completed); low suspicion that  bone marrow suppression is related to pembro. Pt reports her B12 was low and she has since been started on supplementation - started 2 days ago. She has been experiencing recent calf stiffness, b/l, but worse on left and mild neuropathy of toes on b/l feet. Tried tylenol without relief. Denies edema or redness.      She denies nausea/vomiting, denies chest pain, denies shortness of breath, denies excessive fatigue, denies rash, denies diarrhea, denies headache.    Oncology History   Gestational trophoblastic neoplasm   1/10/2023 Tumor Markers     on initial testing. Dilutional studies repeated and found to be >461,700     1/11/2023 Imaging Significant Findings    1.  No intrauterine gestation and no adnexal mass evident. These findings  represent a pregnancy of unknown location. The appearance of a complex mass with  small cystic spaces and peripheral vascularity occupying the central uterine  cavity could represent a molar pregnancy but additional evaluation is suggested.  2.  Both ovaries are mildly enlarged with multiple complex cystic lesions as  well as some with solid-appearing echoes that may represent a combination of  hemorrhagic cysts/follicles or endometriomas, but challenging to evaluate as the  ovaries are only well evaluated transabdominally. Recommended these be  reassessed on follow-up. No sonographic findings of ovarian torsion.     1/12/2023 Imaging  Significant Findings    MRI brain with contrast  1.  No abnormal intracranial contrast enhancement identified to suggest  intracranial metastatic disease.  2.  Previously seen tiny foci of increased T2 signal in the white matter do not  demonstrate contrast enhancement.  These findings could reflect foci of chronic  ischemia, the sequelae of migraine headaches, demyelinating or postinfectious  processes.  Other etiologies are not excluded.  Clinical correlation is  recommended.     1/12/2023 Imaging Significant Findings    CTPA  IMPRESSION:  No evidence for pulmonary embolism.  5 cm right lower lobe mass. Anterior mediastinal mass.  Innumerable bilateral pulmonary nodules which are suspicious for metastatic  disease to the lungs.  2 hyperenhancing liver lesions, the larger measuring 5 cm in maximal dimension  for which further assessment with MRI with and without contrast is recommended.     1/12/2023 Biopsy    Image-guided biopsy of right lower lobe lesion benign liver parenchyma     1/12/2023 Cancer Staged    Staging form: Gestational Trophoblastic Neoplasms, AJCC 8th Edition  - Clinical stage from 1/12/2023: FIGO Stage IV (cM1b, Risk score: 17)     1/13/2023 - 1/27/2023 Chemotherapy    CISplatin / Etoposide, 7 day Cycles - GTN Induction  Plan Provider: Lorena Hogue MD  Treatment goal: Curative  Line of treatment: Induction     1/13/2023 Imaging Significant Findings    CT abd/pelvis  1.   Uterine mass measuring up to 13.8 cm, consistent with the clinically  suspected diagnosis of choriocarcinoma.  2.  Extensive pulmonary metastases, most notably a right lower lobe 4.9 cm  subpleural mass.  3.  Enlarged ovaries with numerous cysts likely representing theca lutein cysts  4.  Indeterminate 0.8 cm hypoattenuating lesion at the dome of the liver.  The  previously noted hypervascular liver lesions are not well seen due to the phase  of contrast at the time of imaging.  Contrast-enhanced MRI of the abdomen can  be  considered for more optimal characterization.  5.  Indeterminate hypoattenuating lesions in the right kidney which can also be  more optimally evaluated with MRI.     2023 Tumor Markers    HCG >1.8468 million     2023 - 2023 Chemotherapy    Inpatient: EMA/EP (Etoposide / Methotrexate / Dactinomycin / Etoposide / CISPlatin), 14 Day Cycles - Gestational Trophoblastic Neoplasia   Plan Provider: Shantelle Mcmullen MD  Treatment goal: Curative  Line of treatment: First Line     2023 -  Chemotherapy    Pembrolizumab, 21 Day Cycles - Non-Small Cell Lung   Plan Provider: Antonio Smith MD  Treatment goal: Curative  Line of treatment: Second Line     2023 Imaging Significant Findings    CT C/A/P:  - improvement in the lung findings with decrease in the right lower lobe dominant nodule during 2 x 2 cm previously 3 x 2.1 cm.  - improvement in pelvic mass/uterine mass; measures 5.9 x  5 cm         Medical History:   Past Medical History:   Diagnosis Date   • Hx antineoplastic chemo        Surgical History:   Past Surgical History:   Procedure Laterality Date   •  SECTION      x2       Social History:   Social History     Tobacco Use   • Smoking status: Never   • Smokeless tobacco: Never   Vaping Use   • Vaping Use: Never used   Substance Use Topics   • Alcohol use: Not Currently   • Drug use: Never        Family History:   Family History   Problem Relation Age of Onset   • Hypertension Biological Mother    • Hypertension Biological Father        Allergies: Patient has no known allergies.    Medications:   Current Outpatient Medications   Medication Sig Dispense Refill   • benzonatate (TESSALON) 100 mg capsule Take 100 mg by mouth 3 (three) times a day as needed for cough.     • buPROPion XL (WELLBUTRIN XL) 150 mg 24 hr tablet Take 150 mg by mouth nightly.     • gly-carb h-poly a-pot hydrox solution Take 5-10 mL by mouth as needed for mucositis. Swish and spit 4-6 times per day as needed     •  "hydrocortisone 2.5 % cream Apply 2 g topically as needed for irritation or rash.     • lidocaine-prilocaine (EMLA) cream Apply topically as needed for pain. 5 g 1   • magic mouthwash with nystatin (BENADRYL-MAALOX-LIDOCAINE-NYSTATIN) Swish and spit 15 mL every 4 (four) hours as needed for stomatitis. 120 mL 1   • magnesium chloride 64 mg tablet,delayed release (DR/EC) Take 1 tablet (64 mg total) by mouth 2 (two) times a day. 60 tablet 1   • NIFEdipine XL (PROCARDIA XL) 30 mg 24 hr tablet Take 1 tablet (30 mg total) by mouth daily. 30 tablet 3   • ondansetron ODT (ZOFRAN-ODT) 4 mg disintegrating tablet Take 1-2 tablets (4-8 mg total) by mouth every 6 (six) hours as needed for nausea or vomiting (Nausea/Vomiting). Maximum 32mg per day. 30 tablet 1   • promethazine (PHENERGAN) 12.5 mg tablet Take 12.5 mg by mouth every 6 (six) hours as needed for nausea or vomiting.     • protriptyline (VIVACTIL) 5 mg tablet Take 15 mg by mouth nightly.     • triamcinolone (KENALOG) 0.1 % ointment Apply topically 2 times daily.       No current facility-administered medications for this visit.     Facility-Administered Medications Ordered in Other Visits   Medication Dose Route Frequency Provider Last Rate Last Admin   • heparin, porcine (PF) flush 500 Units  500 Units intra-catheter PRN Temitope Peres PA C       • pembrolizumab (KEYTRUDA) 200 mg in sodium chloride 0.9 % 63 mL IVPB  200 mg intravenous Once Antonio Smith MD           Review of Systems  All other systems reviewed and negative except as noted in the HPI.    Physical Exam:  Vital Signs for the last 24 hours:    Vitals:    08/10/23 1233   BP: 120/82   BP Location: Right forearm   Patient Position: Sitting   Pulse: 90   Resp: 20   Temp: 36.7 °C (98 °F)   SpO2: 96%   Weight: 72.8 kg (160 lb 9.6 oz)   Height: 1.626 m (5' 4\")     General: well-developed, well-nourished, no apparent distress  Neck: supple, no masses  Lymphatic: no supraclavicular, cervical, or inguinal " adenopathy  Respiratory: lungs clear to auscultation bilaterally, normal respiratory effort  Cardiovascular: regular rate and rhythm, no murmurs, rubs, gallops.   Extremity: no clubbing, cyanosis, edema, no calf tenderness, no erythema   GI: abdomen soft, non-distended, non-tender, no hepatosplenomegaly, no masses  Neurologic: alert & oriented x3, no gross deficits  Psychiatric: mood and affect normal  Musculoskeletal: no deformity or gross strength deficit  Gynecologic: deferred      ASSESSMENT/PLAN:  32 y.o. lady   ECOG performance status is :0      Problem List Items Addressed This Visit        Nervous    Neuropathy    Overview     In toes and calf stiffness. Not limiting ADL - just started vit B12 from Dr. Manley after being told she was deficient         Current Assessment & Plan     Will see if this improves on vitB12 as just recently started and checking electrolytes today             Hematologic    Thrombocytopenia (CMS/HCC)    Current Assessment & Plan     As below             Obstetric    Gestational trophoblastic neoplasm    Overview     recieved 6 cycles of EMA-EP for her high risk GTN. Given her plateauing hcg levels and bone marrow reserve - switched to pembro          Current Assessment & Plan     Hematologist, Dr. Manley deemed pancytopenia unlikely related to pembro. Pt will have stat labs today (CBC and diff, cmp, tsh, bhcg) for monitoring. If acceptable, plan to proceed with cycle 5 pembrolizumab today.     Recent CT shows continued central lucency in the uterus. Dr. Smith discussed D&C hysteroscopy versus hysterectomy. Pt is considering her options.          Relevant Orders    [facility] Infusion Appointment Request - 180 min    Clinic Appointment Request    Infusion Appointment Request       Other    Encounter for antineoplastic chemotherapy - Primary    Encounter for antineoplastic immunotherapy     Later entry: reviewed labs. ANC 0.81, Plts 65, hgb 10.8. Beta hcg continues to decrease, latest  10.4. Given hematologist deems neutropenia/thrombocytopenia unlikely to be related to pembrolizumab, cleared to proceed with tx today per Dr. Smith. Neutropenia/thrombocytopenia would need to improve before she could proceed with surgery. Cont. following with Dr. Manley. Considered giving her pegfilgrastin today, however Ziextenzo has been taken off the market and her insurance denied coverage for fulphila, which is the only brand we carry in office. Her insurance preferred brands are Neulasta or Udencya. Made Dr. Manley aware of above    Also discussed with patient persistent 6 cm fundal uterine region.  I have recommended either a dilation and evacuation or hysterectomy.  Patient wishes to think over the weekend.  Risks of both surgery were discussed.    KELTON Newton    Attending addendum:  The patient was seen and examined.  The PA's note was reviewed, and I agree with the assessment above.  I personally counseled the patient on her diagnosis and proposed treatment as documented above.

## 2023-08-31 ENCOUNTER — OFFICE VISIT (OUTPATIENT)
Dept: GYNECOLOGIC ONCOLOGY | Facility: CLINIC | Age: 33
End: 2023-08-31
Attending: OBSTETRICS & GYNECOLOGY
Payer: COMMERCIAL

## 2023-08-31 ENCOUNTER — HOSPITAL ENCOUNTER (OUTPATIENT)
Dept: INPATIENT UNIT | Facility: HOSPITAL | Age: 33
Discharge: HOME | End: 2023-08-31
Attending: PHYSICIAN ASSISTANT
Payer: COMMERCIAL

## 2023-08-31 VITALS
RESPIRATION RATE: 18 BRPM | DIASTOLIC BLOOD PRESSURE: 76 MMHG | HEART RATE: 108 BPM | SYSTOLIC BLOOD PRESSURE: 108 MMHG | WEIGHT: 163 LBS | TEMPERATURE: 98.4 F | OXYGEN SATURATION: 98 % | BODY MASS INDEX: 27.98 KG/M2

## 2023-08-31 VITALS
HEART RATE: 99 BPM | OXYGEN SATURATION: 98 % | RESPIRATION RATE: 16 BRPM | DIASTOLIC BLOOD PRESSURE: 80 MMHG | SYSTOLIC BLOOD PRESSURE: 115 MMHG | TEMPERATURE: 97.2 F

## 2023-08-31 DIAGNOSIS — O01.9 GESTATIONAL TROPHOBLASTIC NEOPLASM: Primary | ICD-10-CM

## 2023-08-31 LAB
ALBUMIN SERPL-MCNC: 4.3 G/DL (ref 3.5–5.7)
ALP SERPL-CCNC: 78 IU/L (ref 34–125)
ALT SERPL-CCNC: 20 IU/L (ref 7–52)
ANION GAP SERPL CALC-SCNC: 7 MEQ/L (ref 3–15)
AST SERPL-CCNC: 17 IU/L (ref 13–39)
BASOPHILS # BLD: 0.01 K/UL (ref 0.01–0.1)
BASOPHILS NFR BLD: 0.3 %
BILIRUB SERPL-MCNC: 0.3 MG/DL (ref 0.3–1.2)
BUN SERPL-MCNC: 19 MG/DL (ref 7–25)
CALCIUM SERPL-MCNC: 9.2 MG/DL (ref 8.6–10.3)
CHLORIDE SERPL-SCNC: 106 MEQ/L (ref 98–107)
CO2 SERPL-SCNC: 26 MEQ/L (ref 21–31)
CREAT SERPL-MCNC: 1.3 MG/DL (ref 0.6–1.2)
DIFFERENTIAL METHOD BLD: ABNORMAL
EOSINOPHIL # BLD: 0.03 K/UL (ref 0.04–0.36)
EOSINOPHIL NFR BLD: 1 %
ERYTHROCYTE [DISTWIDTH] IN BLOOD BY AUTOMATED COUNT: 12.2 % (ref 11.7–14.4)
GFR SERPL CREATININE-BSD FRML MDRD: 47.2 ML/MIN/1.73M*2
GLUCOSE SERPL-MCNC: 91 MG/DL (ref 70–99)
HCG SERPL-ACNC: 8.7 IU/L (MIU/ML)
HCT VFR BLDCO AUTO: 31.2 % (ref 35–45)
HGB BLD-MCNC: 10.8 G/DL (ref 11.8–15.7)
IMM GRANULOCYTES # BLD AUTO: 0.01 K/UL (ref 0–0.08)
IMM GRANULOCYTES NFR BLD AUTO: 0.3 %
LYMPHOCYTES # BLD: 1.12 K/UL (ref 1.2–3.5)
LYMPHOCYTES NFR BLD: 38.2 %
MCH RBC QN AUTO: 32 PG (ref 28–33.2)
MCHC RBC AUTO-ENTMCNC: 34.6 G/DL (ref 32.2–35.5)
MCV RBC AUTO: 92.6 FL (ref 83–98)
MONOCYTES # BLD: 0.34 K/UL (ref 0.28–0.8)
MONOCYTES NFR BLD: 11.6 %
NEUTROPHILS # BLD: 1.42 K/UL (ref 1.7–7)
NEUTROPHILS # BLD: 1.42 K/UL (ref 1.7–7)
NEUTS SEG NFR BLD: 48.6 %
NRBC BLD-RTO: 0 %
PDW BLD AUTO: 9.1 FL (ref 9.4–12.3)
PLAT MORPH BLD: NORMAL
PLATELET # BLD AUTO: 88 K/UL (ref 150–369)
PLATELET # BLD EST: ABNORMAL 10*3/UL
POLYCHROMASIA BLD QL SMEAR: ABNORMAL
POTASSIUM SERPL-SCNC: 4 MEQ/L (ref 3.5–5.1)
PROT SERPL-MCNC: 7.9 G/DL (ref 6–8.2)
RBC # BLD AUTO: 3.37 M/UL (ref 3.93–5.22)
SODIUM SERPL-SCNC: 139 MEQ/L (ref 136–145)
TSH SERPL DL<=0.05 MIU/L-ACNC: 1.88 MIU/L (ref 0.34–5.6)
WBC # BLD AUTO: 2.93 K/UL (ref 3.8–10.5)

## 2023-08-31 PROCEDURE — 36591 DRAW BLOOD OFF VENOUS DEVICE: CPT

## 2023-08-31 PROCEDURE — 84443 ASSAY THYROID STIM HORMONE: CPT | Performed by: OBSTETRICS & GYNECOLOGY

## 2023-08-31 PROCEDURE — 3008F BODY MASS INDEX DOCD: CPT | Performed by: OBSTETRICS & GYNECOLOGY

## 2023-08-31 PROCEDURE — 80053 COMPREHEN METABOLIC PANEL: CPT | Performed by: OBSTETRICS & GYNECOLOGY

## 2023-08-31 PROCEDURE — 96374 THER/PROPH/DIAG INJ IV PUSH: CPT

## 2023-08-31 PROCEDURE — 25800000 HC PHARMACY IV SOLUTIONS: Performed by: OBSTETRICS & GYNECOLOGY

## 2023-08-31 PROCEDURE — 85025 COMPLETE CBC W/AUTO DIFF WBC: CPT | Performed by: OBSTETRICS & GYNECOLOGY

## 2023-08-31 PROCEDURE — 3E0430M INTRODUCTION OF MONOCLONAL ANTIBODY INTO CENTRAL VEIN, PERCUTANEOUS APPROACH: ICD-10-PCS | Performed by: PHYSICIAN ASSISTANT

## 2023-08-31 PROCEDURE — 96413 CHEMO IV INFUSION 1 HR: CPT

## 2023-08-31 PROCEDURE — 36415 COLL VENOUS BLD VENIPUNCTURE: CPT | Performed by: OBSTETRICS & GYNECOLOGY

## 2023-08-31 PROCEDURE — 84702 CHORIONIC GONADOTROPIN TEST: CPT | Performed by: OBSTETRICS & GYNECOLOGY

## 2023-08-31 PROCEDURE — 63600000 HC DRUGS/DETAIL CODE: Mod: JZ,JG | Performed by: OBSTETRICS & GYNECOLOGY

## 2023-08-31 PROCEDURE — 99214 OFFICE O/P EST MOD 30 MIN: CPT | Performed by: OBSTETRICS & GYNECOLOGY

## 2023-08-31 RX ORDER — EPINEPHRINE 1 MG/ML
0.5 INJECTION, SOLUTION INTRAMUSCULAR; SUBCUTANEOUS ONCE AS NEEDED
Status: CANCELLED | OUTPATIENT
Start: 2023-08-31

## 2023-08-31 RX ORDER — HEPARIN 100 UNIT/ML
500 SYRINGE INTRAVENOUS AS NEEDED
Status: CANCELLED | OUTPATIENT
Start: 2023-08-31

## 2023-08-31 RX ORDER — HEPARIN 100 UNIT/ML
500 SYRINGE INTRAVENOUS AS NEEDED
Status: DISCONTINUED | OUTPATIENT
Start: 2023-08-31 | End: 2023-09-01 | Stop reason: HOSPADM

## 2023-08-31 RX ORDER — FAMOTIDINE 10 MG/ML
20 INJECTION INTRAVENOUS ONCE AS NEEDED
Status: CANCELLED | OUTPATIENT
Start: 2023-08-31

## 2023-08-31 RX ADMIN — HEPARIN 500 UNITS: 100 SYRINGE at 11:52

## 2023-08-31 RX ADMIN — SODIUM CHLORIDE 200 MG: 9 INJECTION, SOLUTION INTRAVENOUS at 11:20

## 2023-09-20 DIAGNOSIS — O01.9 GESTATIONAL TROPHOBLASTIC NEOPLASM: Primary | ICD-10-CM

## 2023-09-20 RX ORDER — EPINEPHRINE 1 MG/ML
0.5 INJECTION, SOLUTION INTRAMUSCULAR; SUBCUTANEOUS ONCE AS NEEDED
Status: CANCELLED | OUTPATIENT
Start: 2023-09-25

## 2023-09-20 RX ORDER — FAMOTIDINE 10 MG/ML
20 INJECTION INTRAVENOUS ONCE AS NEEDED
Status: CANCELLED | OUTPATIENT
Start: 2023-09-25

## 2023-09-21 ENCOUNTER — TELEPHONE (OUTPATIENT)
Dept: GYNECOLOGIC ONCOLOGY | Facility: CLINIC | Age: 33
End: 2023-09-21
Payer: COMMERCIAL

## 2023-09-21 NOTE — TELEPHONE ENCOUNTER
I spoke with Alondra she wants to change appt time on 9/25/23 to 1030.  I checked with [facility] staff and Luna AVERY, no issue with time change.  I called Alondra back and told her date for next treatment is 9/25/23 at 10:30 has been confirmed.

## 2023-09-25 ENCOUNTER — LAB REQUISITION (OUTPATIENT)
Dept: LAB | Facility: HOSPITAL | Age: 33
End: 2023-09-25
Attending: INTERNAL MEDICINE
Payer: COMMERCIAL

## 2023-09-25 ENCOUNTER — OFFICE VISIT (OUTPATIENT)
Dept: GYNECOLOGIC ONCOLOGY | Facility: CLINIC | Age: 33
End: 2023-09-25
Attending: OBSTETRICS & GYNECOLOGY
Payer: COMMERCIAL

## 2023-09-25 ENCOUNTER — HOSPITAL ENCOUNTER (OUTPATIENT)
Dept: INPATIENT UNIT | Facility: HOSPITAL | Age: 33
Discharge: HOME | End: 2023-09-25
Attending: PHYSICIAN ASSISTANT
Payer: COMMERCIAL

## 2023-09-25 VITALS
OXYGEN SATURATION: 100 % | RESPIRATION RATE: 16 BRPM | DIASTOLIC BLOOD PRESSURE: 57 MMHG | SYSTOLIC BLOOD PRESSURE: 103 MMHG | HEART RATE: 91 BPM | TEMPERATURE: 97.2 F

## 2023-09-25 VITALS
RESPIRATION RATE: 18 BRPM | WEIGHT: 152 LBS | OXYGEN SATURATION: 98 % | TEMPERATURE: 97.8 F | DIASTOLIC BLOOD PRESSURE: 79 MMHG | HEART RATE: 88 BPM | BODY MASS INDEX: 25.95 KG/M2 | SYSTOLIC BLOOD PRESSURE: 123 MMHG | HEIGHT: 64 IN

## 2023-09-25 DIAGNOSIS — D64.9 ANEMIA, UNSPECIFIED: ICD-10-CM

## 2023-09-25 DIAGNOSIS — O01.9 GESTATIONAL TROPHOBLASTIC NEOPLASM: Primary | ICD-10-CM

## 2023-09-25 DIAGNOSIS — O22.30 DVT (DEEP VEIN THROMBOSIS) IN PREGNANCY: ICD-10-CM

## 2023-09-25 DIAGNOSIS — O01.9 GESTATIONAL TROPHOBLASTIC NEOPLASM: ICD-10-CM

## 2023-09-25 DIAGNOSIS — D72.819 DECREASED WHITE BLOOD CELL COUNT, UNSPECIFIED: ICD-10-CM

## 2023-09-25 DIAGNOSIS — C58: ICD-10-CM

## 2023-09-25 DIAGNOSIS — D69.59 OTHER SECONDARY THROMBOCYTOPENIA: ICD-10-CM

## 2023-09-25 DIAGNOSIS — Z51.12 ENCOUNTER FOR ANTINEOPLASTIC IMMUNOTHERAPY: Primary | ICD-10-CM

## 2023-09-25 LAB
ALBUMIN SERPL-MCNC: 4.6 G/DL (ref 3.5–5.7)
ALP SERPL-CCNC: 74 IU/L (ref 34–125)
ALT SERPL-CCNC: 20 IU/L (ref 7–52)
ANION GAP SERPL CALC-SCNC: 7 MEQ/L (ref 3–15)
AST SERPL-CCNC: 16 IU/L (ref 13–39)
BASOPHILS # BLD: 0.01 K/UL (ref 0.01–0.1)
BASOPHILS NFR BLD: 0.3 %
BILIRUB SERPL-MCNC: 0.4 MG/DL (ref 0.3–1.2)
BUN SERPL-MCNC: 15 MG/DL (ref 7–25)
CALCIUM SERPL-MCNC: 9.7 MG/DL (ref 8.6–10.3)
CHLORIDE SERPL-SCNC: 104 MEQ/L (ref 98–107)
CO2 SERPL-SCNC: 28 MEQ/L (ref 21–31)
CREAT SERPL-MCNC: 1.2 MG/DL (ref 0.6–1.2)
DIFFERENTIAL METHOD BLD: ABNORMAL
EOSINOPHIL # BLD: 0.05 K/UL (ref 0.04–0.36)
EOSINOPHIL NFR BLD: 1.3 %
ERYTHROCYTE [DISTWIDTH] IN BLOOD BY AUTOMATED COUNT: 12.1 % (ref 11.7–14.4)
FERRITIN SERPL-MCNC: 414 NG/ML (ref 11–250)
FOLATE SERPL-MCNC: 13.4 NG/ML
GFR SERPL CREATININE-BSD FRML MDRD: 51.4 ML/MIN/1.73M*2
GLUCOSE SERPL-MCNC: 97 MG/DL (ref 70–99)
HCG SERPL-ACNC: 6.8 IU/L (MIU/ML)
HCT VFR BLDCO AUTO: 32.3 % (ref 35–45)
HGB BLD-MCNC: 11 G/DL (ref 11.8–15.7)
IMM GRANULOCYTES # BLD AUTO: 0.01 K/UL (ref 0–0.08)
IMM GRANULOCYTES NFR BLD AUTO: 0.3 %
IRON SATN MFR SERPL: 20 % (ref 15–45)
IRON SERPL-MCNC: 53 UG/DL (ref 35–150)
LYMPHOCYTES # BLD: 1.38 K/UL (ref 1.2–3.5)
LYMPHOCYTES NFR BLD: 36.2 %
MCH RBC QN AUTO: 31.4 PG (ref 28–33.2)
MCHC RBC AUTO-ENTMCNC: 34.1 G/DL (ref 32.2–35.5)
MCV RBC AUTO: 92.3 FL (ref 83–98)
MONOCYTES # BLD: 0.36 K/UL (ref 0.28–0.8)
MONOCYTES NFR BLD: 9.4 %
NEUTROPHILS # BLD: 2 K/UL (ref 1.7–7)
NEUTROPHILS # BLD: 2 K/UL (ref 1.7–7)
NEUTS SEG NFR BLD: 52.5 %
NRBC BLD-RTO: 0 %
PDW BLD AUTO: 9.2 FL (ref 9.4–12.3)
PLAT MORPH BLD: NORMAL
PLATELET # BLD AUTO: 99 K/UL (ref 150–369)
PLATELET # BLD EST: ABNORMAL 10*3/UL
POTASSIUM SERPL-SCNC: 4.2 MEQ/L (ref 3.5–5.1)
PROT SERPL-MCNC: 7.6 G/DL (ref 6–8.2)
RBC # BLD AUTO: 3.5 M/UL (ref 3.93–5.22)
RBC MORPH BLD: NORMAL
SODIUM SERPL-SCNC: 139 MEQ/L (ref 136–145)
TIBC SERPL-MCNC: 272 UG/DL (ref 270–460)
TSH SERPL DL<=0.05 MIU/L-ACNC: 1.54 MIU/L (ref 0.34–5.6)
UIBC SERPL-MCNC: 219 UG/DL (ref 180–360)
VIT B12 SERPL-MCNC: 549 PG/ML (ref 180–914)
WBC # BLD AUTO: 3.81 K/UL (ref 3.8–10.5)

## 2023-09-25 PROCEDURE — 36591 DRAW BLOOD OFF VENOUS DEVICE: CPT

## 2023-09-25 PROCEDURE — 63600000 HC DRUGS/DETAIL CODE: Mod: JZ,JG | Performed by: OBSTETRICS & GYNECOLOGY

## 2023-09-25 PROCEDURE — 3E04305 INTRODUCTION OF OTHER ANTINEOPLASTIC INTO CENTRAL VEIN, PERCUTANEOUS APPROACH: ICD-10-PCS | Performed by: PHYSICIAN ASSISTANT

## 2023-09-25 PROCEDURE — 82746 ASSAY OF FOLIC ACID SERUM: CPT | Performed by: INTERNAL MEDICINE

## 2023-09-25 PROCEDURE — 25800000 HC PHARMACY IV SOLUTIONS: Performed by: OBSTETRICS & GYNECOLOGY

## 2023-09-25 PROCEDURE — 96413 CHEMO IV INFUSION 1 HR: CPT

## 2023-09-25 PROCEDURE — 83550 IRON BINDING TEST: CPT | Performed by: INTERNAL MEDICINE

## 2023-09-25 PROCEDURE — 84702 CHORIONIC GONADOTROPIN TEST: CPT | Performed by: OBSTETRICS & GYNECOLOGY

## 2023-09-25 PROCEDURE — 3008F BODY MASS INDEX DOCD: CPT | Performed by: OBSTETRICS & GYNECOLOGY

## 2023-09-25 PROCEDURE — 82607 VITAMIN B-12: CPT | Performed by: INTERNAL MEDICINE

## 2023-09-25 PROCEDURE — 99215 OFFICE O/P EST HI 40 MIN: CPT | Performed by: OBSTETRICS & GYNECOLOGY

## 2023-09-25 PROCEDURE — 80053 COMPREHEN METABOLIC PANEL: CPT | Performed by: OBSTETRICS & GYNECOLOGY

## 2023-09-25 PROCEDURE — 82728 ASSAY OF FERRITIN: CPT | Performed by: INTERNAL MEDICINE

## 2023-09-25 PROCEDURE — 36415 COLL VENOUS BLD VENIPUNCTURE: CPT | Performed by: OBSTETRICS & GYNECOLOGY

## 2023-09-25 PROCEDURE — 85025 COMPLETE CBC W/AUTO DIFF WBC: CPT | Performed by: OBSTETRICS & GYNECOLOGY

## 2023-09-25 PROCEDURE — 83540 ASSAY OF IRON: CPT | Performed by: INTERNAL MEDICINE

## 2023-09-25 PROCEDURE — 84443 ASSAY THYROID STIM HORMONE: CPT | Performed by: OBSTETRICS & GYNECOLOGY

## 2023-09-25 RX ORDER — HEPARIN 100 UNIT/ML
500 SYRINGE INTRAVENOUS AS NEEDED
Status: DISCONTINUED | OUTPATIENT
Start: 2023-09-25 | End: 2023-09-26 | Stop reason: HOSPADM

## 2023-09-25 RX ORDER — HEPARIN 100 UNIT/ML
500 SYRINGE INTRAVENOUS AS NEEDED
OUTPATIENT
Start: 2023-09-25

## 2023-09-25 RX ADMIN — HEPARIN 500 UNITS: 100 SYRINGE at 13:30

## 2023-09-25 RX ADMIN — SODIUM CHLORIDE 200 MG: 9 INJECTION, SOLUTION INTRAVENOUS at 13:22

## 2023-09-25 NOTE — PROGRESS NOTES
1215 Patient arrived tp [facility] for chemotherapy for GTN cycle 4 day 1 of Keytruda. RCW port accessed and labs sent.   1222 Labs resulted. Pt cleared for treatment by KELTON Newton.     1322 Keytruda 200 mg up over 30 minutes, checked and verified by  Chemo RN's.      1355 Keytruda complete patient tolerated without difficulty. Port flushed with NSS and heparin 500 units/5ml and de-accessed. Patient discharged to home.

## 2023-09-25 NOTE — ASSESSMENT & PLAN NOTE
No concerns for DVT on exam or per HPI today.   Continuing to monitor plts and pt also follows with heme

## 2023-09-25 NOTE — PROGRESS NOTES
Ms. Alondra Villela is a 33 y.o.yo lady with  Cancer Staging   Gestational trophoblastic neoplasm  Staging form: Gestational Trophoblastic Neoplasms, AJCC 8th Edition  - Clinical stage from 1/12/2023: FIGO Stage IV (cM1b, Risk score: 17) - Signed by Shantelle Mcmullen MD on 1/26/2023  . She presents for chemotherapy.   Oncology History   Gestational trophoblastic neoplasm   1/10/2023 Tumor Markers     on initial testing. Dilutional studies repeated and found to be >461,700     1/11/2023 Imaging Significant Findings    1.  No intrauterine gestation and no adnexal mass evident. These findings  represent a pregnancy of unknown location. The appearance of a complex mass with  small cystic spaces and peripheral vascularity occupying the central uterine  cavity could represent a molar pregnancy but additional evaluation is suggested.  2.  Both ovaries are mildly enlarged with multiple complex cystic lesions as  well as some with solid-appearing echoes that may represent a combination of  hemorrhagic cysts/follicles or endometriomas, but challenging to evaluate as the  ovaries are only well evaluated transabdominally. Recommended these be  reassessed on follow-up. No sonographic findings of ovarian torsion.     1/12/2023 Imaging Significant Findings    MRI brain with contrast  1.  No abnormal intracranial contrast enhancement identified to suggest  intracranial metastatic disease.  2.  Previously seen tiny foci of increased T2 signal in the white matter do not  demonstrate contrast enhancement.  These findings could reflect foci of chronic  ischemia, the sequelae of migraine headaches, demyelinating or postinfectious  processes.  Other etiologies are not excluded.  Clinical correlation is  recommended.     1/12/2023 Imaging Significant Findings    CTPA  IMPRESSION:  No evidence for pulmonary embolism.  5 cm right lower lobe mass. Anterior mediastinal mass.  Innumerable bilateral pulmonary nodules which are  suspicious for metastatic  disease to the lungs.  2 hyperenhancing liver lesions, the larger measuring 5 cm in maximal dimension  for which further assessment with MRI with and without contrast is recommended.     1/12/2023 Biopsy    Image-guided biopsy of right lower lobe lesion benign liver parenchyma     1/12/2023 Cancer Staged    Staging form: Gestational Trophoblastic Neoplasms, AJCC 8th Edition  - Clinical stage from 1/12/2023: FIGO Stage IV (cM1b, Risk score: 17)     1/13/2023 - 1/27/2023 Chemotherapy    CISplatin / Etoposide, 7 day Cycles - GTN Induction  Plan Provider: Lorena Hogue MD  Treatment goal: Curative  Line of treatment: Induction     1/13/2023 Imaging Significant Findings    CT abd/pelvis  1.   Uterine mass measuring up to 13.8 cm, consistent with the clinically  suspected diagnosis of choriocarcinoma.  2.  Extensive pulmonary metastases, most notably a right lower lobe 4.9 cm  subpleural mass.  3.  Enlarged ovaries with numerous cysts likely representing theca lutein cysts  4.  Indeterminate 0.8 cm hypoattenuating lesion at the dome of the liver.  The  previously noted hypervascular liver lesions are not well seen due to the phase  of contrast at the time of imaging.  Contrast-enhanced MRI of the abdomen can be  considered for more optimal characterization.  5.  Indeterminate hypoattenuating lesions in the right kidney which can also be  more optimally evaluated with MRI.     1/13/2023 Tumor Markers    HCG >1.8468 million     1/30/2023 - 4/21/2023 Chemotherapy    Inpatient: EMA/EP (Etoposide / Methotrexate / Dactinomycin / Etoposide / CISPlatin), 14 Day Cycles - Gestational Trophoblastic Neoplasia   Plan Provider: Shantelle Mcmullen MD  Treatment goal: Curative  Line of treatment: First Line     5/1/2023 -  Chemotherapy    Pembrolizumab, 21 Day Cycles - Non-Small Cell Lung   Plan Provider: Antonio Smith MD  Treatment goal: Curative  Line of treatment: Second Line     7/31/2023 Imaging  Significant Findings    CT C/A/P:  - improvement in the lung findings with decrease in the right lower lobe dominant nodule during 2 x 2 cm previously 3 x 2.1 cm.  - improvement in pelvic mass/uterine mass; measures 5.9 x  5 cm       Pt presents today for chemotherapy; maintenance pembrolizumab    CHEMOTHERAPY REGIMEN: Pembro                CYCLE 7     Reports feeling well. She denies frequent urination, denies chest pain, denies shortness of breath, denies excessive fatigue, denies rashes, denies diarrhea, denies headache, denies blurred vision. Denies diarrhea. Occasional light vaginal spotting     Continues to follow with hematologist, Dr. Manley. She was orginally referred for pancytopenia .Pancytopenia thought to be possibly related to recent abx use and leg wound (wound now healed and abx course completed); low suspicion that  bone marrow suppression is related to pembro. Found to be vit B12 deficient and now on weekly injections, which she reports tolerating well. Reports she is having labs today for Dr. Manley, last 8/3/23.     She having STAT prechemo labs on infusion suite prior to tx as she prefers lab draws from port as she is a hard stick and comm[place] from [place] for tx.       PROBLEM LIST:  Patient Active Problem List    Diagnosis Date Noted    Neuropathy 08/10/2023    Encounter for antineoplastic immunotherapy 05/01/2023    Hypomagnesemia 05/01/2023    Drug-induced hypothyroidism 05/01/2023    Dehydration 04/19/2023    Mucositis due to chemotherapy 04/17/2023    Sepsis, due to unspecified organism, unspecified whether acute organ dysfunction present (CMS/HCC) 03/20/2023    Sore throat 03/06/2023    Tinnitus of both ears 02/28/2023    Encounter for antineoplastic chemotherapy 02/20/2023    DVT (deep vein thrombosis) in pregnancy 02/04/2023    Thrombocytopenia (CMS/HCC) 02/04/2023    Hematuria 02/04/2023    Anxiety and depression 02/04/2023    Hyponatremia 02/04/2023    Drug-induced  "neutropenia (CMS/HCC) 2023    Abnormal CT scan 2023    Hyperthyroidism 2023    Gestational trophoblastic neoplasm 2023    History of COVID-19 2023    Microcytic anemia 2023        Medical History:   Past Medical History:   Diagnosis Date    Hx antineoplastic chemo        Surgical History:   Past Surgical History:   Procedure Laterality Date     SECTION      x2       Social History:   Social History     Tobacco Use    Smoking status: Never    Smokeless tobacco: Never   Vaping Use    Vaping Use: Never used   Substance Use Topics    Alcohol use: Not Currently    Drug use: Never       Family History: Cancer-related family history is not on file.    Allergies: Patient has no known allergies.    Medications:   Current Outpatient Medications   Medication Instructions    benzonatate (TESSALON) 100 mg, oral, 3 times daily PRN    buPROPion XL (WELLBUTRIN XL) 150 mg, oral, Nightly    gly-carb h-poly a-pot hydrox solution 5-10 mL, oral, As needed, Swish and spit 4-6 times per day as needed    hydrocortisone 2 g, Topical, As needed    lidocaine-prilocaine (EMLA) cream Topical, As needed    magic mouthwash with nystatin (BENADRYL-MAALOX-LIDOCAINE-NYSTATIN) 15 mL, Swish & Spit, Every 4 hours PRN    magnesium chloride 64 mg, oral, 2 times daily    NIFEdipine XL (PROCARDIA XL) 30 mg, oral, Daily    ondansetron ODT (ZOFRAN-ODT) 4-8 mg, oral, Every 6 hours PRN, Maximum 32mg per day.    promethazine (PHENERGAN) 12.5 mg, oral, Every 6 hours PRN    protriptyline (VIVACTIL) 15 mg, oral, Nightly    triamcinolone (KENALOG) 0.1 % ointment Topical, 2 times daily         Review of Systems  All other systems reviewed and negative except as noted in the HPI.    Labs: pending stat labs       Physical Exam:  Visit Vitals  /79 (BP Location: Right upper arm, Patient Position: Sitting)   Pulse 88   Temp 36.6 °C (97.8 °F) (Oral)   Resp 18   Ht 1.626 m (5' 4\")   Wt 68.9 kg (152 " lb)   SpO2 98%   BMI 26.09 kg/m²       General: well-developed, well-nourished, no apparent distress  Neck: supple, no masses  Lymphatic: no supraclavicular, cervical, or inguinal adenopathy  Respiratory: lungs clear to auscultation bilaterally, normal respiratory effort  Cardiovascular: regular rate and rhythm, no murmurs, rubs, gallops.   Extremity: no clubbing, cyanosis, edema  GI: abdomen soft, non-distended, non-tender, no hepatosplenomegaly, no masses  Neurologic: alert & oriented x3, no gross deficits  Psychiatric: mood and affect normal  Musculoskeletal: no deformity or gross strength deficit  Gynecologic: deferred      ASSESSMENT/PLAN:  Assessment   33 y.o. lady with   Cancer Staging   Gestational trophoblastic neoplasm  Staging form: Gestational Trophoblastic Neoplasms, AJCC 8th Edition  - Clinical stage from 1/12/2023: FIGO Stage IV (cM1b, Risk score: 17) - Signed by Shantelle Mcmullen MD on 1/26/2023   who presents for chemotherapy.  ECOG performance status is :0       Problem List Items Addressed This Visit        Circulatory    DVT (deep vein thrombosis) in pregnancy    Overview     Hx DVT right UE  Previously taking Eliquis 5 mg PO BID, which was discontinued in setting of thrombocytopenia and episode of vaginal bleeding.   Notes light occasional light vaginal bleeding recently. Plts 88 on 8/31         Current Assessment & Plan     No concerns for DVT on exam or per HPI today.   Continuing to monitor plts and pt also follows with heme             Obstetric    Gestational trophoblastic neoplasm    Overview     recieved 6 cycles of EMA-EP for her high risk GTN. Given her plateauing hcg levels and bone marrow reserve - switched to pembro             Other    Encounter for antineoplastic immunotherapy - Primary       Pending stat labs to clear for tx today (CBC, CMP, TSH with reflex T4), pembro maintenance. Also continuing to monitor beta-HCG; number has been decreasing, last 8.7 on 8/31/23. Dr. Delano Sarah  reviewed plan with pt and her  to continue pembrolizumab until beta- HCG normalizes and for 2 more cycles after normalization. If beta-hCG's do not normalize or start to elevate, hysterectomy would be necessary. We will continue to monitor patient's leukopenia and thrombocytopenia. She is also now established with augustine, Dr. Manley - pancytopenia was deemed unrelated to pembro previously.     Later entry: labs reviewed- cleared for pembro maintenance today. Plts have improved to 99K. Beta hCG continues to slowly decrease, 6.8.     KELTON Newton C

## 2023-09-25 NOTE — PATIENT INSTRUCTIONS
**When to notify your Doctor**    Contact your doctor if you experience any of the following:    Pain, redness, swelling, or blistering near or at your administration site    Fever 100.4 or higher with or without chills    Nausea/ vomiting/ diarrhea not relieved with meds or is persistent    Develop mouth sores that prevent you from eating or drinking    Have black bowel movements, bruise easily, new rash, new headache, abdominal pain, swelling, or any signs of bleeding or new onset/ worsening shortness of breath    Have any new or worsening pain, numbness or tingling    Have any changes in your ability to perform daily activities, change in memory, or dizziness    Have any unexplained problems, questions, or concerns    Chemotherapy is in your body fluids for 48 hours- use precautions    It is safe for your family to have contact with you during treatment    Flush toilet twice after using, with lid closed    Your caregiver should wear gloves if handling your body fluids for 48 hours after your treatment, followed by thorough handwashing    Special precautions may be necessary if you are sexually active during your treatment (see additional info under Chemotherapy section)    [employer] is a helpful website for information

## 2023-10-16 DIAGNOSIS — O01.9 GESTATIONAL TROPHOBLASTIC NEOPLASM: Primary | ICD-10-CM

## 2023-10-16 RX ORDER — FAMOTIDINE 10 MG/ML
20 INJECTION INTRAVENOUS ONCE AS NEEDED
Status: CANCELLED | OUTPATIENT
Start: 2023-10-20

## 2023-10-16 RX ORDER — EPINEPHRINE 1 MG/ML
0.5 INJECTION, SOLUTION INTRAMUSCULAR; SUBCUTANEOUS ONCE AS NEEDED
Status: CANCELLED | OUTPATIENT
Start: 2023-10-20

## 2023-10-20 ENCOUNTER — TELEPHONE (OUTPATIENT)
Dept: GYNECOLOGIC ONCOLOGY | Facility: CLINIC | Age: 33
End: 2023-10-20

## 2023-10-20 NOTE — TELEPHONE ENCOUNTER
Received op report that pt had robotic TLH, BS and VATS, right lower lobe resection 10/11/23 @ [facility]. Called to check in on pt and advised she have them send us a copy of path report when received. Will inquire about plan for future care

## 2023-10-23 ENCOUNTER — TELEPHONE (OUTPATIENT)
Dept: GYNECOLOGIC ONCOLOGY | Facility: CLINIC | Age: 33
End: 2023-10-23
Payer: COMMERCIAL

## 2023-10-23 ENCOUNTER — DOCUMENTATION (OUTPATIENT)
Dept: GYNECOLOGIC ONCOLOGY | Facility: CLINIC | Age: 33
End: 2023-10-23
Payer: COMMERCIAL

## 2023-10-23 DIAGNOSIS — O01.9 GESTATIONAL TROPHOBLASTIC NEOPLASM: Primary | ICD-10-CM

## 2023-10-23 RX ORDER — FAMOTIDINE 10 MG/ML
20 INJECTION INTRAVENOUS ONCE AS NEEDED
Status: CANCELLED | OUTPATIENT
Start: 2023-11-03

## 2023-10-23 RX ORDER — EPINEPHRINE 1 MG/ML
0.5 INJECTION, SOLUTION INTRAMUSCULAR; SUBCUTANEOUS ONCE AS NEEDED
Status: CANCELLED | OUTPATIENT
Start: 2023-11-03

## 2023-10-23 NOTE — PROGRESS NOTES
Received call from heme onc. Dr. Sonal Deng from [facility] informing that pt's surgical path from VATs and hysterectomy are negative (they are faxing over path now). Pt is planning to resume care/immunotherapy with Dr. Smith at [facility] moving forward. Dr. Kemp recommends pt continue pembrolizumab x 1 yr (until April/May 2024 and states she is good to resume asap - also notes pt can now increase to q 42 days if desires. Will discuss with  and get pt back on schedule to resume pembrolizumab.

## 2023-10-23 NOTE — TELEPHONE ENCOUNTER
S/w pt to get her back on the schedule to resume pembro s/p RA CHIDI, BS and right lower lobe resection @ [facility] with neg pathology. Planning to resume tx on Monday 10/30/23 @9am  - changing to q 42 day cycle. Pt will get labs STAT morning of prior to chemo as she is a hard stick.

## 2023-10-27 DIAGNOSIS — O01.9 GESTATIONAL TROPHOBLASTIC NEOPLASM: Primary | ICD-10-CM

## 2023-11-03 ENCOUNTER — OFFICE VISIT (OUTPATIENT)
Dept: GYNECOLOGIC ONCOLOGY | Facility: CLINIC | Age: 33
End: 2023-11-03
Attending: OBSTETRICS & GYNECOLOGY
Payer: COMMERCIAL

## 2023-11-03 ENCOUNTER — HOSPITAL ENCOUNTER (OUTPATIENT)
Dept: INPATIENT UNIT | Facility: HOSPITAL | Age: 33
Setting detail: INFUSION SERIES
Discharge: HOME | End: 2023-11-03
Attending: PHYSICIAN ASSISTANT
Payer: COMMERCIAL

## 2023-11-03 VITALS
TEMPERATURE: 98 F | WEIGHT: 159 LBS | SYSTOLIC BLOOD PRESSURE: 112 MMHG | RESPIRATION RATE: 20 BRPM | OXYGEN SATURATION: 99 % | DIASTOLIC BLOOD PRESSURE: 74 MMHG | HEART RATE: 78 BPM | BODY MASS INDEX: 27.14 KG/M2 | HEIGHT: 64 IN

## 2023-11-03 VITALS
SYSTOLIC BLOOD PRESSURE: 108 MMHG | DIASTOLIC BLOOD PRESSURE: 59 MMHG | HEIGHT: 64 IN | RESPIRATION RATE: 18 BRPM | TEMPERATURE: 98 F | HEART RATE: 92 BPM | BODY MASS INDEX: 27.14 KG/M2 | WEIGHT: 159 LBS

## 2023-11-03 DIAGNOSIS — Z51.12 ENCOUNTER FOR ANTINEOPLASTIC IMMUNOTHERAPY: Primary | ICD-10-CM

## 2023-11-03 DIAGNOSIS — O01.9 GESTATIONAL TROPHOBLASTIC NEOPLASM: ICD-10-CM

## 2023-11-03 DIAGNOSIS — O01.9 GESTATIONAL TROPHOBLASTIC NEOPLASM: Primary | ICD-10-CM

## 2023-11-03 LAB
ALBUMIN SERPL-MCNC: 4.2 G/DL (ref 3.5–5.7)
ALP SERPL-CCNC: 77 IU/L (ref 34–125)
ALT SERPL-CCNC: 14 IU/L (ref 7–52)
ANION GAP SERPL CALC-SCNC: 7 MEQ/L (ref 3–15)
AST SERPL-CCNC: 13 IU/L (ref 13–39)
BASOPHILS # BLD: 0.01 K/UL (ref 0.01–0.1)
BASOPHILS NFR BLD: 0.3 %
BILIRUB SERPL-MCNC: 0.3 MG/DL (ref 0.3–1.2)
BUN SERPL-MCNC: 14 MG/DL (ref 7–25)
CALCIUM SERPL-MCNC: 8.9 MG/DL (ref 8.6–10.3)
CHLORIDE SERPL-SCNC: 106 MEQ/L (ref 98–107)
CO2 SERPL-SCNC: 27 MEQ/L (ref 21–31)
CREAT SERPL-MCNC: 1.2 MG/DL (ref 0.6–1.2)
DIFFERENTIAL METHOD BLD: ABNORMAL
EOSINOPHIL # BLD: 0.04 K/UL (ref 0.04–0.36)
EOSINOPHIL NFR BLD: 1.3 %
ERYTHROCYTE [DISTWIDTH] IN BLOOD BY AUTOMATED COUNT: 12 % (ref 11.7–14.4)
GFR SERPL CREATININE-BSD FRML MDRD: 51.4 ML/MIN/1.73M*2
GLUCOSE SERPL-MCNC: 85 MG/DL (ref 70–99)
HCT VFR BLDCO AUTO: 29.5 % (ref 35–45)
HGB BLD-MCNC: 10 G/DL (ref 11.8–15.7)
IMM GRANULOCYTES # BLD AUTO: 0.01 K/UL (ref 0–0.08)
IMM GRANULOCYTES NFR BLD AUTO: 0.3 %
LYMPHOCYTES # BLD: 1.23 K/UL (ref 1.2–3.5)
LYMPHOCYTES NFR BLD: 40.9 %
MCH RBC QN AUTO: 31.4 PG (ref 28–33.2)
MCHC RBC AUTO-ENTMCNC: 33.9 G/DL (ref 32.2–35.5)
MCV RBC AUTO: 92.8 FL (ref 83–98)
MONOCYTES # BLD: 0.24 K/UL (ref 0.28–0.8)
MONOCYTES NFR BLD: 8 %
NEUTROPHILS # BLD: 1.48 K/UL (ref 1.7–7)
NEUTROPHILS # BLD: 1.48 K/UL (ref 1.7–7)
NEUTS SEG NFR BLD: 49.2 %
NRBC BLD-RTO: 0 %
PDW BLD AUTO: 8.7 FL (ref 9.4–12.3)
PLATELET # BLD AUTO: 103 K/UL (ref 150–369)
POTASSIUM SERPL-SCNC: 3.8 MEQ/L (ref 3.5–5.1)
PROT SERPL-MCNC: 7 G/DL (ref 6–8.2)
RBC # BLD AUTO: 3.18 M/UL (ref 3.93–5.22)
SODIUM SERPL-SCNC: 140 MEQ/L (ref 136–145)
TSH SERPL DL<=0.05 MIU/L-ACNC: 0.88 MIU/L (ref 0.34–5.6)
WBC # BLD AUTO: 3.01 K/UL (ref 3.8–10.5)

## 2023-11-03 PROCEDURE — 36591 DRAW BLOOD OFF VENOUS DEVICE: CPT

## 2023-11-03 PROCEDURE — 25800000 HC PHARMACY IV SOLUTIONS: Performed by: OBSTETRICS & GYNECOLOGY

## 2023-11-03 PROCEDURE — 85025 COMPLETE CBC W/AUTO DIFF WBC: CPT | Performed by: OBSTETRICS & GYNECOLOGY

## 2023-11-03 PROCEDURE — 3008F BODY MASS INDEX DOCD: CPT | Performed by: OBSTETRICS & GYNECOLOGY

## 2023-11-03 PROCEDURE — 3E0430M INTRODUCTION OF MONOCLONAL ANTIBODY INTO CENTRAL VEIN, PERCUTANEOUS APPROACH: ICD-10-PCS | Performed by: PHYSICIAN ASSISTANT

## 2023-11-03 PROCEDURE — 80053 COMPREHEN METABOLIC PANEL: CPT | Performed by: OBSTETRICS & GYNECOLOGY

## 2023-11-03 PROCEDURE — 99215 OFFICE O/P EST HI 40 MIN: CPT | Performed by: OBSTETRICS & GYNECOLOGY

## 2023-11-03 PROCEDURE — 36415 COLL VENOUS BLD VENIPUNCTURE: CPT | Performed by: OBSTETRICS & GYNECOLOGY

## 2023-11-03 PROCEDURE — 96375 TX/PRO/DX INJ NEW DRUG ADDON: CPT

## 2023-11-03 PROCEDURE — 84443 ASSAY THYROID STIM HORMONE: CPT | Performed by: OBSTETRICS & GYNECOLOGY

## 2023-11-03 PROCEDURE — 96413 CHEMO IV INFUSION 1 HR: CPT

## 2023-11-03 PROCEDURE — 63600000 HC DRUGS/DETAIL CODE: Mod: JZ,JG | Performed by: OBSTETRICS & GYNECOLOGY

## 2023-11-03 RX ORDER — HEPARIN 100 UNIT/ML
500 SYRINGE INTRAVENOUS ONCE
Status: COMPLETED | OUTPATIENT
Start: 2023-11-03 | End: 2023-11-03

## 2023-11-03 RX ADMIN — SODIUM CHLORIDE 400 MG: 9 INJECTION, SOLUTION INTRAVENOUS at 13:29

## 2023-11-03 RX ADMIN — HEPARIN 500 UNITS: 100 SYRINGE at 13:38

## 2023-11-03 NOTE — PATIENT INSTRUCTIONS
**When to notify your Doctor**   Contact your doctor if you experience any of the following:   · Pain, redness, swelling, or blistering near or at your administration site   · Fever 100.4 or higher with or without chills   · Nausea/ vomiting/ diarrhea not relieved with meds or is persistent   · Develop mouth sores that prevent you from eating or drinking   · Have black bowel movements, bruise easily, new rash, new headache, abdominal pain, swelling, or any signs of bleeding or new onset/ worsening shortness of breath   · Have any new or worsening pain, numbness or tingling   · Have any changes in your ability to perform daily activities, change in memory, or dizziness   · Have any unexplained problems, questions, or concerns   · Chemotherapy is in your body fluids for 48 hours- use precautions   o It is safe for your family to have contact with you during treatment   o Flush toilet twice after using, with lid closed   o Your caregiver should wear gloves if handling your body fluids for 48 hours after your treatment, followed by thorough handwashing   o Special precautions may be necessary if you are sexually active during your treatment (see additional info under Chemotherapy section)   · [email or web address] is a helpful website for information

## 2023-11-03 NOTE — PROGRESS NOTES
1130: Patient arrived to the unit for treatment for GTN, C1/D1. Chemo/Patient/ consent checked and verified by two chemo nurses. VSS, denies pain, rash, itching, numbness, tingling, N/V, diarrhea, SOB or MONTENEGRO. RCW port accessed with 19g 3/4 inch junior needle with +blood return and flushed without difficulty, labs sent. Patient sitting in chair.     1329: Pembrolizumab (Keytruda) 400 mg IVPB started. Chemo/Patient checked and verified by two chemo nurses.    1405: Keytruda complete. RCW port flushed with NSS 10 ml and Heparin 500 units, +blood return and flushed without difficulty. Port de-accessed. D/C Instructions reviewed.

## 2023-11-03 NOTE — LETTER
November 13, 2023    Patient: Alondra Villela   YOB: 1990   Date of Visit: 11/3/2023       Dear Dr. Hinton:    The patient is seen back at the [facility] today in follow up with regard to her   1. Encounter for antineoplastic immunotherapy    2. Gestational trophoblastic neoplasm    . Below is my assessment and plan of care.       Abdominal incisions healing well from her robotic hysterectomy.  No complaints of vaginal discharge.  I will be happy to perform her postoperative pelvic examination in 2 to 3 weeks to save her the trip up to [facility].     We have discussed 1 year of maintenance pembrolizumab.  Stat labs will be sent today.  She will return again in 6 weeks.       If you have any questions or concerns, please call me at [phone].    Sincerely,        Antonio Smith MD  CC: MD Asia Frost MD

## 2023-11-03 NOTE — PROGRESS NOTES
Ms. Alondra Villela is a 33 y.o.yo lady with  Cancer Staging   Gestational trophoblastic neoplasm  Staging form: Gestational Trophoblastic Neoplasms, AJCC 8th Edition  - Clinical stage from 1/12/2023: FIGO Stage IV (cM1b, Risk score: 17) - Signed by Shantelle Mcmullen MD on 1/26/2023  . She presents for chemotherapy.   Oncology History   Gestational trophoblastic neoplasm   1/10/2023 Tumor Markers     on initial testing. Dilutional studies repeated and found to be >461,700     1/11/2023 Imaging Significant Findings    1.  No intrauterine gestation and no adnexal mass evident. These findings  represent a pregnancy of unknown location. The appearance of a complex mass with  small cystic spaces and peripheral vascularity occupying the central uterine  cavity could represent a molar pregnancy but additional evaluation is suggested.  2.  Both ovaries are mildly enlarged with multiple complex cystic lesions as  well as some with solid-appearing echoes that may represent a combination of  hemorrhagic cysts/follicles or endometriomas, but challenging to evaluate as the  ovaries are only well evaluated transabdominally. Recommended these be  reassessed on follow-up. No sonographic findings of ovarian torsion.     1/12/2023 Imaging Significant Findings    MRI brain with contrast  1.  No abnormal intracranial contrast enhancement identified to suggest  intracranial metastatic disease.  2.  Previously seen tiny foci of increased T2 signal in the white matter do not  demonstrate contrast enhancement.  These findings could reflect foci of chronic  ischemia, the sequelae of migraine headaches, demyelinating or postinfectious  processes.  Other etiologies are not excluded.  Clinical correlation is  recommended.     1/12/2023 Imaging Significant Findings    CTPA  IMPRESSION:  No evidence for pulmonary embolism.  5 cm right lower lobe mass. Anterior mediastinal mass.  Innumerable bilateral pulmonary nodules which are  suspicious for metastatic  disease to the lungs.  2 hyperenhancing liver lesions, the larger measuring 5 cm in maximal dimension  for which further assessment with MRI with and without contrast is recommended.     1/12/2023 Biopsy    Image-guided biopsy of right lower lobe lesion benign liver parenchyma     1/12/2023 Cancer Staged    Staging form: Gestational Trophoblastic Neoplasms, AJCC 8th Edition  - Clinical stage from 1/12/2023: FIGO Stage IV (cM1b, Risk score: 17)     1/13/2023 - 1/27/2023 Chemotherapy    CISplatin / Etoposide, 7 day Cycles - GTN Induction  Plan Provider: Lorena Hogue MD  Treatment goal: Curative  Line of treatment: Induction     1/13/2023 Imaging Significant Findings    CT abd/pelvis  1.   Uterine mass measuring up to 13.8 cm, consistent with the clinically  suspected diagnosis of choriocarcinoma.  2.  Extensive pulmonary metastases, most notably a right lower lobe 4.9 cm  subpleural mass.  3.  Enlarged ovaries with numerous cysts likely representing theca lutein cysts  4.  Indeterminate 0.8 cm hypoattenuating lesion at the dome of the liver.  The  previously noted hypervascular liver lesions are not well seen due to the phase  of contrast at the time of imaging.  Contrast-enhanced MRI of the abdomen can be  considered for more optimal characterization.  5.  Indeterminate hypoattenuating lesions in the right kidney which can also be  more optimally evaluated with MRI.     1/13/2023 Tumor Markers    HCG >1.8468 million     1/30/2023 - 4/21/2023 Chemotherapy    Inpatient: EMA/EP (Etoposide / Methotrexate / Dactinomycin / Etoposide / CISPlatin), 14 Day Cycles - Gestational Trophoblastic Neoplasia   Plan Provider: Shantelle Mcmullen MD  Treatment goal: Curative  Line of treatment: First Line     5/1/2023 - 9/25/2023 Chemotherapy    Pembrolizumab, 21 Day Cycles - Non-Small Cell Lung   Plan Provider: Antonio Smith MD  Treatment goal: Curative  Line of treatment: Second Line     7/31/2023  Imaging Significant Findings    CT C/A/P:  - improvement in the lung findings with decrease in the right lower lobe dominant nodule during 2 x 2 cm previously 3 x 2.1 cm.  - improvement in pelvic mass/uterine mass; measures 5.9 x  5 cm     10/11/2023 Surgery    Robotic TLH, BS and VATS, right lower lobe resection @ [facility]. Pathology negative      11/3/2023 -  Chemotherapy    Pembrolizumab, 42 Day Cycles - Non-Small Cell Lung  Plan Provider: Antonio Smith MD  Treatment goal: Maintenance  Line of treatment: Maintenance         Side effects:     CHEMOTHERAPY REGIMEN: Pembrolizumab maintenance                CYCLE 1 Day 1    PROBLEM LIST:  Patient Active Problem List    Diagnosis Date Noted    Neuropathy 08/10/2023    Encounter for antineoplastic immunotherapy 2023    Hypomagnesemia 2023    Drug-induced hypothyroidism 2023    Dehydration 2023    Mucositis due to chemotherapy 2023    Sepsis, due to unspecified organism, unspecified whether acute organ dysfunction present (CMS/HCC) 2023    Sore throat 2023    Tinnitus of both ears 2023    Encounter for antineoplastic chemotherapy 2023    DVT (deep vein thrombosis) in pregnancy 2023    Thrombocytopenia (CMS/HCC) 2023    Hematuria 2023    Anxiety and depression 2023    Hyponatremia 2023    Drug-induced neutropenia (CMS/HCC) 2023    Abnormal CT scan 2023    Hyperthyroidism 2023    Gestational trophoblastic neoplasm 2023    History of COVID-19 2023    Microcytic anemia 2023        Medical History:   Past Medical History:   Diagnosis Date    Hx antineoplastic chemo        Surgical History:   Past Surgical History:   Procedure Laterality Date     SECTION      x2       Social History:   Social History     Tobacco Use    Smoking status: Never    Smokeless tobacco: Never   Vaping Use    Vaping Use: Never used  "  Substance Use Topics    Alcohol use: Not Currently    Drug use: Never       Family History: Cancer-related family history is not on file.    Allergies: Patient has no known allergies.    Medications:   Current Outpatient Medications   Medication Instructions    benzonatate (TESSALON) 100 mg, oral, 3 times daily PRN    buPROPion XL (WELLBUTRIN XL) 150 mg, oral, Nightly    gly-carb h-poly a-pot hydrox solution 5-10 mL, oral, As needed, Swish and spit 4-6 times per day as needed    hydrocortisone 2 g, Topical, As needed    lidocaine-prilocaine (EMLA) cream Topical, As needed    magic mouthwash with nystatin (BENADRYL-MAALOX-LIDOCAINE-NYSTATIN) 15 mL, Swish & Spit, Every 4 hours PRN    magnesium chloride 64 mg, oral, 2 times daily    NIFEdipine XL (PROCARDIA XL) 30 mg, oral, Daily    ondansetron ODT (ZOFRAN-ODT) 4-8 mg, oral, Every 6 hours PRN, Maximum 32mg per day.    promethazine (PHENERGAN) 12.5 mg, oral, Every 6 hours PRN    protriptyline (VIVACTIL) 15 mg, oral, Nightly    triamcinolone (KENALOG) 0.1 % ointment Topical, 2 times daily         Review of Systems  All other systems reviewed and negative except as noted in the HPI.    Labs  Labs are pending.      Physical Exam:  Visit Vitals  /74 (BP Location: Left upper arm, Patient Position: Sitting)   Pulse 78   Temp 36.7 °C (98 °F) (Oral)   Resp 20   Ht 1.626 m (5' 4\")   Wt 72.1 kg (159 lb)   LMP  (Approximate) Comment: 12/2022   SpO2 99%   BMI 27.29 kg/m²       General: well-developed, well-nourished, no apparent distress  Neck: supple, no masses  Lymphatic: no supraclavicular, cervical, or inguinal adenopathy  Respiratory: lungs clear to auscultation bilaterally, normal respiratory effort  Cardiovascular: regular rate and rhythm, no murmurs, rubs, gallops.   Extremity: no clubbing, cyanosis, edema  GI: abdomen soft, non-distended, non-tender, no hepatosplenomegaly, no masses  Neurologic: alert & oriented x3, no gross deficits  Psychiatric: " mood and affect normal  Musculoskeletal: no deformity or gross strength deficit  Gynecologic: deferred      ASSESSMENT/PLAN:  Assessment   33 y.o. lady with   Cancer Staging   Gestational trophoblastic neoplasm  Staging form: Gestational Trophoblastic Neoplasms, AJCC 8th Edition  - Clinical stage from 1/12/2023: FIGO Stage IV (cM1b, Risk score: 17) - Signed by Shantelle Mcmullen MD on 1/26/2023   who presents for chemotherapy.  ECOG performance status is :1     Abdominal incisions healing well from her robotic hysterectomy.  No complaints of vaginal discharge.  I will be happy to perform her postoperative pelvic examination in 2 to 3 weeks to save her the trip up to [facility].    We have discussed 1 year of maintenance pembrolizumab.  Stat labs will be sent today.  She will return again in 6 weeks.           By signing my name below, I, Oksana Kumar, attest that this documentation has been prepared under the direction and in the presence of Antonio Smith MD      11/3/2023 12:08 PM  Antonio Smith MD   Attending addendum:  I personally interviewed, examined, and developed the plan for the patient.   I personally counseled the patient on her diagnosis and proposed treatment as documented above.  Note was scribed in my presence and I agree with the note above.

## 2023-11-06 DIAGNOSIS — O01.9 GESTATIONAL TROPHOBLASTIC NEOPLASM: Primary | ICD-10-CM

## 2023-11-07 ENCOUNTER — TELEPHONE (OUTPATIENT)
Dept: GYNECOLOGIC ONCOLOGY | Facility: CLINIC | Age: 33
End: 2023-11-07
Payer: COMMERCIAL

## 2023-11-07 NOTE — TELEPHONE ENCOUNTER
Pt had first cycle of higher dose, 400mg pembrolizumab on Friday 11/3 and c/o of a burning/prickling feeling on one spot of the top of her scalp the following day, which has persisted to today (x3 days). Her  evaluated the area and did not see anything abnormal. Pt denies skin irritation/rash or itching anywhere else. She has never felt this before. Notes she usually gets a headache for about 48 hrs after pembro txs and has been experiencing intermittent HA since last tx as well. This is not new for her.   Advised she can try selsun blue on the scalp to see if this helps and monitor for any changes or worsening of symptoms. Also advised I would run this by Dr. Smith to see if he has any further suggestions.     Dr. Smith- any thoughts?

## 2023-11-22 ENCOUNTER — TELEPHONE (OUTPATIENT)
Dept: GYNECOLOGIC ONCOLOGY | Facility: CLINIC | Age: 33
End: 2023-11-22
Payer: COMMERCIAL

## 2023-11-22 NOTE — TELEPHONE ENCOUNTER
Call placed to patient to offer support and assess for needs. No answer. Left detailed message regarding reason for the call and contact number for call back [phone]

## 2023-11-28 ENCOUNTER — DOCUMENTATION (OUTPATIENT)
Dept: GYNECOLOGIC ONCOLOGY | Facility: CLINIC | Age: 33
End: 2023-11-28
Payer: COMMERCIAL

## 2023-11-28 NOTE — PROGRESS NOTES
Mariela HERRING at [facility]. Auth# [id number] from 4/21/2023-4/21/2024 is good for 999 units.

## 2023-12-04 LAB
CASE RPRT: NORMAL
CLINICAL INFO: NORMAL
PATH REPORT.ADDENDUM SPEC: NORMAL
PATH REPORT.ADDENDUM SPEC: NORMAL
PATH REPORT.FINAL DX SPEC: NORMAL
PATH REPORT.FINAL DX SPEC: NORMAL
PATH REPORT.GROSS SPEC: NORMAL

## 2023-12-05 ENCOUNTER — TELEPHONE (OUTPATIENT)
Dept: GYNECOLOGIC ONCOLOGY | Facility: CLINIC | Age: 33
End: 2023-12-05
Payer: COMMERCIAL

## 2023-12-08 ENCOUNTER — TELEPHONE (OUTPATIENT)
Dept: GYNECOLOGIC ONCOLOGY | Facility: CLINIC | Age: 33
End: 2023-12-08
Payer: COMMERCIAL

## 2023-12-08 NOTE — TELEPHONE ENCOUNTER
Called and confirmed with pt she is transferring her care/chemo to [facility] given closer proximity to her home as she lives in [place]. She is very thankful for the care she received and I told her I would pass along the message to Dr. Smith and the team.
